# Patient Record
Sex: MALE | Race: WHITE | NOT HISPANIC OR LATINO | Employment: UNEMPLOYED | URBAN - METROPOLITAN AREA
[De-identification: names, ages, dates, MRNs, and addresses within clinical notes are randomized per-mention and may not be internally consistent; named-entity substitution may affect disease eponyms.]

---

## 2018-08-14 ENCOUNTER — OFFICE VISIT (OUTPATIENT)
Dept: NEUROLOGY | Facility: CLINIC | Age: 23
End: 2018-08-14
Payer: COMMERCIAL

## 2018-08-14 VITALS
SYSTOLIC BLOOD PRESSURE: 125 MMHG | WEIGHT: 177.5 LBS | BODY MASS INDEX: 22.78 KG/M2 | HEART RATE: 70 BPM | DIASTOLIC BLOOD PRESSURE: 85 MMHG | HEIGHT: 74 IN

## 2018-08-14 DIAGNOSIS — G40.019 LOCALIZATION-RELATED (FOCAL) (PARTIAL) IDIOPATHIC EPILEPSY AND EPILEPTIC SYNDROMES WITH SEIZURES OF LOCALIZED ONSET, INTRACTABLE, WITHOUT STATUS EPILEPTICUS: Primary | ICD-10-CM

## 2018-08-14 PROCEDURE — 99205 OFFICE O/P NEW HI 60 MIN: CPT | Mod: S$GLB,,, | Performed by: PSYCHIATRY & NEUROLOGY

## 2018-08-14 PROCEDURE — 99999 PR PBB SHADOW E&M-NEW PATIENT-LVL III: CPT | Mod: PBBFAC,,, | Performed by: PSYCHIATRY & NEUROLOGY

## 2018-08-14 RX ORDER — LACOSAMIDE 200 MG/1
200 TABLET ORAL EVERY 12 HOURS
COMMUNITY
End: 2020-05-17

## 2018-08-14 RX ORDER — ESCITALOPRAM OXALATE 20 MG/1
40 TABLET ORAL DAILY
Status: ON HOLD | COMMUNITY
End: 2018-09-20 | Stop reason: HOSPADM

## 2018-08-14 RX ORDER — LAMOTRIGINE 200 MG/1
200 TABLET ORAL 2 TIMES DAILY
COMMUNITY
End: 2018-11-29 | Stop reason: SDUPTHER

## 2018-08-14 NOTE — PROGRESS NOTES
Name: Darin Cunha  MRN: [id number]   CSN: [id number]      Date: 08/14/2018    HISTORY OF PRESENT ILLNESS (HPI)  The patient is a 23 y.o.   The patient was initially referred for consultation by Dr Holland in [place]  The patient was accompanied today by family members who provided some of the history.    Darin Cunha appears with his family today for further evaluation of an ongoing   seizure disorder, which began about five years ago.  Notably, his seizures began   immediately after a wisdom tooth extraction procedure.  He had a confusional   episode about two days postoperatively that in retrospect was likely a focal   seizure and since then has had a total of five generalized tonic-clonic seizures   as well as multiple partial seizures, which are believed to be of temporal lobe   onset.  He has been treated in [place] by Dr. Holland over the years and   has also once seen specialists in [place] at [facility] for consultation.  He   has had outpatient EEGs and brief in-office EEGs that have revealed bilateral   temporal sharp activity and have captured at least one seizure, believed to be a   right temporal lobe onset seizure.    Currently, Darin is maintained on three anticonvulsants and is still having   breakthrough partial seizures frequently up to several times per month.  He   takes Aptiom, Vimpat and Lamictal at this point and has previously failed at   least three other medications.  He had no seizures prior to five years ago and   no early onset.  No early life risk factors for seizures.  He has had minor   bumps on the head, but no losses of consciousness or major concussions.  No   history of febrile seizures or neurological infections.  He is open to the   possibility of surgery as well as other medical therapies today.  He has had an   MRI in [place], which was read normal, but I do not yet have those images.    He has not had a prolonged video EEG monitoring study.      NBB/HN  dd:  08/15/2018 08:49:04 (CDT)  td: 08/15/2018 09:02:20 (CDT)  Doc ID   #[id number]  Job ID #[id number]    CC:         [id number]              Seizure Triggers  Sleep Deprivation - None  Other medications - None  Psych/stress - None  Photic stimulation - None  Hyperventilation - None  Medical Problems - None  Menses - No  Sensory Stimulation (light, sound, etc) - None  Missed dose of meds - None    AED Treatments  Present regimen  Aptiom 800/d  Vimpat 200mg BID  Lamictal 125mg BID    Prior treatments  levetiracetam (Keppra, LEV)  carbamazepine (Tegretol, CBZ)  topiramate (Topamax, TPM)    Not tried  acetazolamide (Diamox, AZM)  amantadine    clobezam (Onfi or Frizium, CLB)  ethosuximide (Zarontin, ESM)  eslicarbazine (Aptiom, ESL)  felbamate (felbatol, FBM)  gabapentin (Neurontin, GBP)  lacosamide (Vimpat, LCM)   lamotrigine (Lamictal, LTG)     methsuximide (Celontin, MSM)  methyphenytoin (Mesantion, MHT)  oxcarbazepine (Trileptal OXC)  perampanel (Fycompa, FCP)   phenobarbital (Pb)  phenytoin (Dilantin, PHT)  pregabalin (Lyrica, PGB)  primidone (Mysoline, PRM)  retigabine (Potiga, RTG)  rufinamide (Banzel, RUF)  tiagabine (Gabatril,  TGB)    viagabatrin, (Sabril, VGB)  vagal nerve stimulator (VNS)  valproic acid (Depakote, VPA)  zonisamide (Zonegran, ZNS)  Benzodiazepines  diazepam - rectal (Diastatl)  diazepam - oral (Valium, DZ)  clonazepam (Klonopin, CZP)  clorazepate (Tranxene, CLZ)  Ativan  Brain Stimulation  Vagal Nerve Stimulation-n/a  DBS- n/a    Compliance method  Memory - yes  Mom or Spouse - Yes  Pill Box - no  Brian calendar - no  Turn over medication bottle - no  Phone alarm - no    Seizure Evaluation  EEG Routine -   EEG Ambulatory -   EEG\Video Monitoring -   MRI/MRA -   CT/CTA Scan -   PET Scan -   Neuropsychological evaluation -   DEXA Scan    Potential Epilepsy Risk Factors:   Pregnancy/Labor/Delivery - full term uncomplicated pregnancy labor and vaginal delivery  Febrile seizures - none  Head injury  - none  CNS  infection - none     Stroke - none  Family Hx of Sz - none    PAST MEDICAL HISTORY:   Active Ambulatory Problems     Diagnosis Date Noted    No Active Ambulatory Problems     Resolved Ambulatory Problems     Diagnosis Date Noted    No Resolved Ambulatory Problems     No Additional Past Medical History        PAST SURGICAL HISTORY: No past surgical history on file.     FAMILY HISTORY: No family history on file.      SOCIAL HISTORY:   Social History     Socioeconomic History    Marital status: Single     Spouse name: Not on file    Number of children: Not on file    Years of education: Not on file    Highest education level: Not on file   Social Needs    Financial resource strain: Not on file    Food insecurity - worry: Not on file    Food insecurity - inability: Not on file    Transportation needs - medical: Not on file    Transportation needs - non-medical: Not on file   Occupational History    Not on file   Tobacco Use    Smoking status: Not on file   Substance and Sexual Activity    Alcohol use: Not on file    Drug use: Not on file    Sexual activity: Not on file   Other Topics Concern    Not on file   Social History Narrative    Not on file        SUBSTANCE USE:  Social History     Tobacco Use    Smoking status: Not on file   Substance and Sexual Activity    Alcohol use: Not on file    Drug use: Not on file    Sexual activity: Not on file      Social History     Tobacco Use    Smoking status: Not on file   Substance Use Topics    Alcohol use: Not on file        ALLERGIES: Patient has no allergy information on record.       Review of Systems   Constitutional: Negative for chills, diaphoresis, fever, malaise/fatigue and weight loss.   HENT: Negative for ear pain, hearing loss, nosebleeds and tinnitus.    Eyes: Negative for blurred vision, double vision, photophobia and pain.   Respiratory: Negative for cough, hemoptysis and shortness of breath.    Cardiovascular: Negative for chest pain,  palpitations, orthopnea and leg swelling.   Gastrointestinal: Negative for abdominal pain, blood in stool, constipation, diarrhea, heartburn, nausea and vomiting.   Genitourinary: Negative for dysuria and hematuria.   Musculoskeletal: Negative for falls, joint pain and myalgias.   Skin: Negative for itching and rash.   Neurological: Negative for dizziness, tingling, tremors, sensory change, speech change, focal weakness, loss of consciousness, weakness and headaches.   Endo/Heme/Allergies: Negative for environmental allergies. Does not bruise/bleed easily.   Psychiatric/Behavioral: Negative for depression, hallucinations, memory loss and substance abuse. The patient is not nervous/anxious and does not have insomnia.          PHYSICAL EXAM:    There were no vitals taken for this visit.      Neurologic Exam      Higher Cortical Function:    Patient is a well developed, pleasant, well groomed individual appearing their stated age  Oriented - intact to person, place and time    Fund of knowledge was appropriate.    Language - Speech was fluent without evidence for an aphasia.  R-L Orientation - Intact   Agnosias, agraphesthesia, or astereognosis - not present.   Cranial Nerves II - XII:    EOMs were intact with normal smooth and no nystagmus.    PERRLA. Funduscopic exam - disc were flat with normal A/V ratio and no exudates or hemorrhages.   Visual fields were full to confrontation.    Motor - facial movement was symmetrical and normal.    Facial sensory - Light touch and pin prick sensations were normal.    Hearing was normal.  Palate moved well and was symmetrical    Tongue movement was full & the patient could speak without difficulty.  Motor: Power, bulk and tone were normal in all extremities.  Coordination:  Normal  Gait:  Normal  Tremor: resting, postural, intentional - none  Cardiovascular:  No edema  Skin: No rash      I spent 90 minutes with the patient, of which 50 minutes was spent in direct face to face  counseling in matters related to epilepsy, related safety issues, and antiepileptic drug therapy.         IMPRESSION  The patient appears today for evaluation of ongoing intractable focal epilepsy.    He is maintained on a three-drug regimen at this point with only partial   benefit.  He has not had video EEG monitoring stay, although he was seen once in   [place] where that was recommended.  By report, he has had at least one right   temporal lobe seizure recorded, but seems to have bilateral interictal activity   and a normal MRI.    At this point, I believe the best course of action will be to admit Darin for a   prolonged EMU stay to attempt to capture multiple seizures and determine the   full extent of his seizure burden and interictal burden and see if his seizures   are truly unifocal and localizable.  We had a long discussion today about the   possibility of resective epilepsy surgery as well as other possibilities such as   responsive neuro stimulation and the vagal nerve stimulator as well as other   medications that could be tried.  He is really interested in a definitive   treatment if possible, meaning hopefully resection.  I explained to him the   process and the need for the recording of as many seizures as possible as well   as other ancillary tests to determine his candidacy for that.    Today, we will begin by obtaining baseline drug levels and not make any changes   to his regimen today pending the video EEG results.  I will also attempt to   obtain hard copies of his prior MRI and EEG studies.  At this point, he is   reminded of seizure precautions and his restriction from driving at this point,   which he understands well.  I will see him back after the EMU stay.      ABIODUN  dd: 08/15/2018 08:52:12 (CDT)  td: 08/15/2018 09:09:04 (CDT)  Doc ID   #[id number]  Job ID #[id number]    CC:         [id number]

## 2018-09-05 ENCOUNTER — TELEPHONE (OUTPATIENT)
Dept: NEUROLOGY | Facility: CLINIC | Age: 23
End: 2018-09-05

## 2018-09-05 NOTE — TELEPHONE ENCOUNTER
Patient called to see if insurance for emu visit was approved. Informed him of approval. Pt instructed to call his insurance for copay information.

## 2018-09-10 ENCOUNTER — TELEPHONE (OUTPATIENT)
Dept: NEUROLOGY | Facility: CLINIC | Age: 23
End: 2018-09-10

## 2018-09-12 ENCOUNTER — HOSPITAL ENCOUNTER (INPATIENT)
Facility: HOSPITAL | Age: 23
LOS: 8 days | Discharge: HOME | DRG: 101 | End: 2018-09-20
Attending: PSYCHIATRY & NEUROLOGY | Admitting: PSYCHIATRY & NEUROLOGY
Payer: COMMERCIAL

## 2018-09-12 ENCOUNTER — HOSPITAL ENCOUNTER (EMERGENCY)
Facility: HOSPITAL | Age: 23
Discharge: HOME | End: 2018-09-12
Attending: EMERGENCY MEDICINE
Payer: COMMERCIAL

## 2018-09-12 VITALS
DIASTOLIC BLOOD PRESSURE: 87 MMHG | HEART RATE: 96 BPM | TEMPERATURE: 98 F | OXYGEN SATURATION: 95 % | SYSTOLIC BLOOD PRESSURE: 147 MMHG | RESPIRATION RATE: 18 BRPM

## 2018-09-12 DIAGNOSIS — R56.9 SEIZURES: Primary | ICD-10-CM

## 2018-09-12 DIAGNOSIS — G40.219 COMPLEX PARTIAL EPILEPSY WITH GENERALIZATION AND WITH INTRACTABLE EPILEPSY: ICD-10-CM

## 2018-09-12 DIAGNOSIS — G40.919 BREAKTHROUGH SEIZURE: Primary | ICD-10-CM

## 2018-09-12 PROBLEM — F32.A DEPRESSED: Chronic | Status: ACTIVE | Noted: 2018-09-12

## 2018-09-12 LAB
ALBUMIN SERPL BCP-MCNC: 4.3 G/DL
ALP SERPL-CCNC: 101 U/L
ALT SERPL W/O P-5'-P-CCNC: 51 U/L
ANION GAP SERPL CALC-SCNC: 9 MMOL/L
AST SERPL-CCNC: 37 U/L
BASOPHILS # BLD AUTO: 0.04 K/UL
BASOPHILS NFR BLD: 0.3 %
BILIRUB SERPL-MCNC: 0.6 MG/DL
BILIRUB UR QL STRIP: NEGATIVE
BUN SERPL-MCNC: 15 MG/DL
CALCIUM SERPL-MCNC: 9.4 MG/DL
CHLORIDE SERPL-SCNC: 104 MMOL/L
CLARITY UR REFRACT.AUTO: CLEAR
CO2 SERPL-SCNC: 26 MMOL/L
COLOR UR AUTO: YELLOW
CREAT SERPL-MCNC: 0.7 MG/DL
DIFFERENTIAL METHOD: ABNORMAL
EOSINOPHIL # BLD AUTO: 0 K/UL
EOSINOPHIL NFR BLD: 0.2 %
ERYTHROCYTE [DISTWIDTH] IN BLOOD BY AUTOMATED COUNT: 12.6 %
EST. GFR  (AFRICAN AMERICAN): >60 ML/MIN/1.73 M^2
EST. GFR  (NON AFRICAN AMERICAN): >60 ML/MIN/1.73 M^2
GLUCOSE SERPL-MCNC: 85 MG/DL
GLUCOSE UR QL STRIP: NEGATIVE
HCT VFR BLD AUTO: 42.5 %
HGB BLD-MCNC: 14.9 G/DL
HGB UR QL STRIP: NEGATIVE
IMM GRANULOCYTES # BLD AUTO: 0.07 K/UL
IMM GRANULOCYTES NFR BLD AUTO: 0.6 %
KETONES UR QL STRIP: ABNORMAL
LEUKOCYTE ESTERASE UR QL STRIP: NEGATIVE
LYMPHOCYTES # BLD AUTO: 1.1 K/UL
LYMPHOCYTES NFR BLD: 8.9 %
MCH RBC QN AUTO: 30.6 PG
MCHC RBC AUTO-ENTMCNC: 35.1 G/DL
MCV RBC AUTO: 87 FL
MONOCYTES # BLD AUTO: 0.6 K/UL
MONOCYTES NFR BLD: 4.8 %
NEUTROPHILS # BLD AUTO: 10.3 K/UL
NEUTROPHILS NFR BLD: 85.2 %
NITRITE UR QL STRIP: NEGATIVE
NRBC BLD-RTO: 0 /100 WBC
PH UR STRIP: 5 [PH] (ref 5–8)
PLATELET # BLD AUTO: 251 K/UL
PMV BLD AUTO: 9.9 FL
POTASSIUM SERPL-SCNC: 3.8 MMOL/L
PROT SERPL-MCNC: 7 G/DL
PROT UR QL STRIP: NEGATIVE
RBC # BLD AUTO: 4.87 M/UL
SODIUM SERPL-SCNC: 139 MMOL/L
SP GR UR STRIP: >=1.03 (ref 1–1.03)
URN SPEC COLLECT METH UR: ABNORMAL
UROBILINOGEN UR STRIP-ACNC: NEGATIVE EU/DL
WBC # BLD AUTO: 12.09 K/UL

## 2018-09-12 PROCEDURE — 80299 QUANTITATIVE ASSAY DRUG: CPT

## 2018-09-12 PROCEDURE — 99283 EMERGENCY DEPT VISIT LOW MDM: CPT

## 2018-09-12 PROCEDURE — 95951 PR EEG MONITORING/VIDEORECORD: CPT | Mod: 26,,, | Performed by: PSYCHIATRY & NEUROLOGY

## 2018-09-12 PROCEDURE — 81003 URINALYSIS AUTO W/O SCOPE: CPT

## 2018-09-12 PROCEDURE — 25000003 PHARM REV CODE 250: Performed by: STUDENT IN AN ORGANIZED HEALTH CARE EDUCATION/TRAINING PROGRAM

## 2018-09-12 PROCEDURE — 25000003 PHARM REV CODE 250: Performed by: PSYCHIATRY & NEUROLOGY

## 2018-09-12 PROCEDURE — 85025 COMPLETE CBC W/AUTO DIFF WBC: CPT

## 2018-09-12 PROCEDURE — 99223 1ST HOSP IP/OBS HIGH 75: CPT | Mod: ,,, | Performed by: PSYCHIATRY & NEUROLOGY

## 2018-09-12 PROCEDURE — 80299 QUANTITATIVE ASSAY DRUG: CPT | Mod: 91

## 2018-09-12 PROCEDURE — 80175 DRUG SCREEN QUAN LAMOTRIGINE: CPT

## 2018-09-12 PROCEDURE — 95951 HC EEG MONITORING/VIDEO RECORD: CPT

## 2018-09-12 PROCEDURE — 80053 COMPREHEN METABOLIC PANEL: CPT

## 2018-09-12 PROCEDURE — 20600001 HC STEP DOWN PRIVATE ROOM

## 2018-09-12 PROCEDURE — 99283 EMERGENCY DEPT VISIT LOW MDM: CPT | Mod: ,,, | Performed by: EMERGENCY MEDICINE

## 2018-09-12 PROCEDURE — 36415 COLL VENOUS BLD VENIPUNCTURE: CPT

## 2018-09-12 RX ORDER — ACETAMINOPHEN 325 MG/1
650 TABLET ORAL ONCE
Status: COMPLETED | OUTPATIENT
Start: 2018-09-12 | End: 2018-09-12

## 2018-09-12 RX ORDER — DOCUSATE SODIUM 100 MG/1
100 CAPSULE, LIQUID FILLED ORAL 2 TIMES DAILY PRN
Status: DISCONTINUED | OUTPATIENT
Start: 2018-09-12 | End: 2018-09-20 | Stop reason: HOSPADM

## 2018-09-12 RX ORDER — LAMOTRIGINE 100 MG/1
200 TABLET ORAL 2 TIMES DAILY
Status: DISCONTINUED | OUTPATIENT
Start: 2018-09-12 | End: 2018-09-14

## 2018-09-12 RX ORDER — ACETAMINOPHEN 325 MG/1
650 TABLET ORAL EVERY 4 HOURS PRN
Status: DISCONTINUED | OUTPATIENT
Start: 2018-09-12 | End: 2018-09-20 | Stop reason: HOSPADM

## 2018-09-12 RX ORDER — DIAZEPAM 5 MG/ML
1 INJECTION, SOLUTION INTRAMUSCULAR; INTRAVENOUS EVERY 5 MIN PRN
Status: DISCONTINUED | OUTPATIENT
Start: 2018-09-12 | End: 2018-09-20 | Stop reason: HOSPADM

## 2018-09-12 RX ORDER — SODIUM CHLORIDE 0.9 % (FLUSH) 0.9 %
3 SYRINGE (ML) INJECTION
Status: DISCONTINUED | OUTPATIENT
Start: 2018-09-12 | End: 2018-09-20 | Stop reason: HOSPADM

## 2018-09-12 RX ORDER — PROCHLORPERAZINE EDISYLATE 5 MG/ML
5 INJECTION INTRAMUSCULAR; INTRAVENOUS EVERY 6 HOURS PRN
Status: DISCONTINUED | OUTPATIENT
Start: 2018-09-12 | End: 2018-09-20 | Stop reason: HOSPADM

## 2018-09-12 RX ORDER — CITALOPRAM 10 MG/1
40 TABLET ORAL DAILY
Status: DISCONTINUED | OUTPATIENT
Start: 2018-09-13 | End: 2018-09-20 | Stop reason: HOSPADM

## 2018-09-12 RX ORDER — PROMETHAZINE HYDROCHLORIDE 12.5 MG/1
25 TABLET ORAL EVERY 6 HOURS PRN
Status: DISCONTINUED | OUTPATIENT
Start: 2018-09-12 | End: 2018-09-20 | Stop reason: HOSPADM

## 2018-09-12 RX ADMIN — LAMOTRIGINE 200 MG: 100 TABLET ORAL at 08:09

## 2018-09-12 RX ADMIN — ACETAMINOPHEN 650 MG: 325 TABLET, FILM COATED ORAL at 05:09

## 2018-09-12 NOTE — ED NOTES
Pt is asleep and arouses to voice. Respirations are spontaneous with normal rate and effort. Side rails up x 2 and bed in low position. Mom at bedside.

## 2018-09-12 NOTE — ED PROVIDER NOTES
Encounter Date: 9/12/2018       History     Chief Complaint   Patient presents with    Seizures     Pt arrived at [facility] for a scheduled EEG and had 2 seizures while waiting. Pt is awake, alert and confused.      Source of history:  Patient, mother    The patient presents following a witnessed seizure that occurred while here in the hospital awaiting admission to the EMU for video EEG monitoring.  The patient had a seizure in a vehicle on the way to the hospital that lasted approximately 2 min.  He had a 15 min postictal state with drowsiness and confusion following this seizure.  Then, after spending approximately 4 hr waiting for admission to the EMU he had a no other generalized seizure.  It is unclear how long this lasted.  Since secured at the hospital, a code blue was called and the patient was brought to the ED.  The patient had no prodrome prior to either the seizures.  He is not currently postictal.  He has no complaints of headache, lightheadedness, dizziness, changes in his vision, chest pain, palpitations, abdominal pain, nausea and vomiting.          Review of patient's allergies indicates:   Allergen Reactions    Zofran [ondansetron hcl (pf)]      Past Medical History:   Diagnosis Date    Epilepsy 2015     No past surgical history on file.  No family history on file.  Social History     Tobacco Use    Smoking status: Not on file   Substance Use Topics    Alcohol use: Not on file    Drug use: Not on file     Review of Systems   Constitutional: Negative for chills, diaphoresis, fatigue and fever.   Eyes: Negative for visual disturbance.   Respiratory: Negative for cough and shortness of breath.    Cardiovascular: Negative for chest pain and palpitations.   Gastrointestinal: Negative for abdominal pain, nausea and vomiting.   Genitourinary: Negative for dysuria and frequency.   Musculoskeletal: Negative for arthralgias and myalgias.   Skin: Negative for rash.   Neurological: Positive for seizures.  Negative for dizziness, syncope, light-headedness and headaches.       Physical Exam     Initial Vitals [09/12/18 1603]   BP Pulse Resp Temp SpO2   (!) 147/87 96 18 98.2 °F (36.8 °C) 95 %      MAP       --         Physical Exam    Constitutional: He appears well-developed and well-nourished. He is not diaphoretic.  Non-toxic appearance. No distress.   HENT:   Head: Normocephalic and atraumatic.   Mouth/Throat: Oropharynx is clear and moist. No oropharyngeal exudate.   Eyes: Conjunctivae and EOM are normal. Pupils are equal, round, and reactive to light. No scleral icterus.   Neck: Normal range of motion. Neck supple. No JVD present.   Cardiovascular: Normal rate, regular rhythm and normal heart sounds. Exam reveals no gallop and no friction rub.    No murmur heard.  Pulses:       Radial pulses are 2+ on the right side, and 2+ on the left side.        Dorsalis pedis pulses are 2+ on the right side, and 2+ on the left side.   No peripheral edema.   Pulmonary/Chest: Breath sounds normal. No stridor. No respiratory distress. He has no decreased breath sounds. He has no wheezes. He has no rhonchi. He has no rales. He exhibits no tenderness.   Abdominal: Soft. Bowel sounds are normal. He exhibits no distension and no mass. There is no tenderness.   Musculoskeletal: Normal range of motion. He exhibits no edema or tenderness.   Lymphadenopathy:     He has no cervical adenopathy.   Neurological: He is alert and oriented to person, place, and time. He has normal strength. No cranial nerve deficit or sensory deficit. GCS eye subscore is 4. GCS verbal subscore is 5. GCS motor subscore is 6.   Skin: Skin is warm and dry. No pallor.         ED Course   Procedures  Labs Reviewed - No data to display       Imaging Results    None          Medical Decision Making:   History:   Old Medical Records: I decided to obtain old medical records.  ED Management:  1755 - discussed the patient's presentation and exam with Dr. Do (epilepsy  service attending). No further ED evaluation is felt to be needed. The patient can be discharged from the ED and taken immediately to EMU for planned continuous video EEG.                      Clinical Impression:   The encounter diagnosis was Breakthrough seizure.      Disposition:   Disposition: Discharged  Condition: Stable                        Filippo Nunez III, MD  09/12/18 4001

## 2018-09-12 NOTE — ED NOTES
Pt's first and last name and birthday confirmed.   LOC: The patient is awake, alert and aware of environment with an appropriate affect, the patient is oriented x 3 and speaking appropriately.  APPEARANCE: Patient resting comfortably and in no acute distress, patient is clean and well groomed. Pt c/o headache.   SKIN: The skin is warm and dry, patient has normal skin turgor and moist mucus membranes, skin intact, no breakdown or brusing noted.  MUSKULOSKELETAL: Patient moving all extremities well, no obvious swelling or deformities noted.  RESPIRATORY: Airway is open and patent, respirations are spontaneous, patient has a normal effort and rate. Breath sounds are clear and equal bilaterally.  CARDIAC: Normal heart sounds. No peripheral edema.  ABDOMEN: Soft and non tender to palpation, no distention noted. Bowel sounds present. Pt reports nausea.   NEURO: No neuro deficits, hand grasp equal, no drift noted, no facial droop noted. Speech is clear. Pupils are 5 mm equal and reactive to light.

## 2018-09-12 NOTE — NURSING
Pt. Arrived to unit, via stretcher, AA&Ox4, nad noted, vitals stable, and denies pain.   Oxygen, suctioning, side rail padding, and all emergency equipment at bedside.  EMU MD at bedside assessing pt. Pt.'s mother and wife at bedside. Will cont. To monitor pt.

## 2018-09-12 NOTE — ED TRIAGE NOTES
Pt arrived at [facility] for a 10 hour EEG and had 2 seizures in the lobby. Pt was brought to the ED for further evaluation.

## 2018-09-13 PROBLEM — R45.89 DEPRESSED MOOD: Status: ACTIVE | Noted: 2018-09-12

## 2018-09-13 PROCEDURE — 95951 PR EEG MONITORING/VIDEORECORD: CPT | Mod: 26,,, | Performed by: PSYCHIATRY & NEUROLOGY

## 2018-09-13 PROCEDURE — 20600001 HC STEP DOWN PRIVATE ROOM

## 2018-09-13 PROCEDURE — 25000003 PHARM REV CODE 250: Performed by: STUDENT IN AN ORGANIZED HEALTH CARE EDUCATION/TRAINING PROGRAM

## 2018-09-13 PROCEDURE — 99233 SBSQ HOSP IP/OBS HIGH 50: CPT | Mod: ,,, | Performed by: PSYCHIATRY & NEUROLOGY

## 2018-09-13 PROCEDURE — 95951 HC EEG MONITORING/VIDEO RECORD: CPT

## 2018-09-13 RX ADMIN — CITALOPRAM HYDROBROMIDE 40 MG: 10 TABLET ORAL at 09:09

## 2018-09-13 RX ADMIN — ACETAMINOPHEN 650 MG: 325 TABLET ORAL at 08:09

## 2018-09-13 RX ADMIN — LAMOTRIGINE 200 MG: 100 TABLET ORAL at 09:09

## 2018-09-13 RX ADMIN — LAMOTRIGINE 200 MG: 100 TABLET ORAL at 08:09

## 2018-09-13 NOTE — HPI
"Mr. Cunha is a 24 yo M with epilepsy who presents for EMU characterization of events, AED titration, and possible pre-surgical workup.  His events started 5 years ago, right after a wisdom tooth extraction.  He has two events types.  He has no warning prior to either event.  With the first event type, he has lip smacking, repetitive L>R hand movements, and a blank stare.  This lasts less than 2 minutes, and occurs a few times a month.  With the second event type, his mouth draws to one side, he has eye rolling, generalized stiffening, and then a full body convulsion.  No BB incontinence or tongue or cheek biting.  These events occur approx 5x/MONTH, last about 3-4minutes at a time, and are followed by approx 15 min confusion and fatigue, and then a severe HA and nausea.  [facility] EEG reportedly showed bilateral temporal sharps, with one "seizure" originating from the R temporal lobe.  MRI Brain at OSH reportedly wnl.  He is currently on Aptiom 800mg daily, Vimpat 200mg bid, and Lamictal 200mg bid.  He last took his AEDs yesterday am.  He has had two GTCs today: one in the car on the way to [facility], and one in the admitting office.  He was taken to the ED for evaluation after the GTC in Admitting, and now is on the neuro floor.    Per Dr. Gallegos's Note on 8/23/18:  The patient is a 23 y.o.   The patient was initially referred for consultation by Dr Holland in [place]  The patient was accompanied today by family members who provided some of the history.     Darin Cunha appears with his family today for further evaluation of an ongoing   seizure disorder, which began about five years ago.  Notably, his seizures began   immediately after a wisdom tooth extraction procedure.  He had a confusional   episode about two days postoperatively that in retrospect was likely a focal   seizure and since then has had a total of five generalized tonic-clonic seizures   as well as multiple partial seizures, which are believed to be of " temporal lobe   onset.  He has been treated in [place] by Dr. Holland over the years and   has also once seen specialists in [place] at [facility] for consultation.  He   has had outpatient EEGs and brief in-office EEGs that have revealed bilateral   temporal sharp activity and have captured at least one seizure, believed to be a   right temporal lobe onset seizure.     Currently, Darin is maintained on three anticonvulsants and is still having   breakthrough partial seizures frequently up to several times per month.  He   takes Aptiom, Vimpat and Lamictal at this point and has previously failed at   least three other medications.  He had no seizures prior to five years ago and   no early onset.  No early life risk factors for seizures.  He has had minor   bumps on the head, but no losses of consciousness or major concussions.  No   history of febrile seizures or neurological infections.  He is open to the   possibility of surgery as well as other medical therapies today.  He has had an   MRI in [place], which was read normal, but I do not yet have those images.    He has not had a prolonged video EEG monitoring study.        ANGY/MARYAM  dd: 08/15/2018 08:49:04 (CDT)  td: 08/15/2018 09:02:20 (CDT)  Doc ID   #[id number]  Job ID #[id number]     CC:            [id number]                    Seizure Triggers  Sleep Deprivation - None  Other medications - None  Psych/stress - None  Photic stimulation - None  Hyperventilation - None  Medical Problems - None  Menses - No  Sensory Stimulation (light, sound, etc) - None  Missed dose of meds - None     AED Treatments  Present regimen  Aptiom 800/d  Vimpat 200mg BID  Lamictal 125mg BID     Prior treatments  levetiracetam (Keppra, LEV)  carbamazepine (Tegretol, CBZ)  topiramate (Topamax, TPM)     Not tried  acetazolamide (Diamox, AZM)  amantadine     clobezam (Onfi or Frizium, CLB)  ethosuximide (Zarontin, ESM)  eslicarbazine (Aptiom, ESL)  felbamate (felbatol, FBM)  gabapentin  (Neurontin, GBP)  lacosamide (Vimpat, LCM)   lamotrigine (Lamictal, LTG)      methsuximide (Celontin, MSM)  methyphenytoin (Mesantion, MHT)  oxcarbazepine (Trileptal OXC)  perampanel (Fycompa, FCP)   phenobarbital (Pb)  phenytoin (Dilantin, PHT)  pregabalin (Lyrica, PGB)  primidone (Mysoline, PRM)  retigabine (Potiga, RTG)  rufinamide (Banzel, RUF)  tiagabine (Gabatril,  TGB)     viagabatrin, (Sabril, VGB)  vagal nerve stimulator (VNS)  valproic acid (Depakote, VPA)  zonisamide (Zonegran, ZNS)  Benzodiazepines  diazepam - rectal (Diastatl)  diazepam - oral (Valium, DZ)  clonazepam (Klonopin, CZP)  clorazepate (Tranxene, CLZ)  Ativan  Brain Stimulation  Vagal Nerve Stimulation-n/a  DBS- n/a     Compliance method  Memory - yes  Mom or Spouse - Yes  Pill Box - no  Brian calendar - no  Turn over medication bottle - no  Phone alarm - no     Seizure Evaluation  EEG Routine -   EEG Ambulatory -   EEG\Video Monitoring -   MRI/MRA -   CT/CTA Scan -   PET Scan -   Neuropsychological evaluation -   DEXA Scan     Potential Epilepsy Risk Factors:   Pregnancy/Labor/Delivery - full term uncomplicated pregnancy labor and vaginal delivery  Febrile seizures - none  Head injury  - none  CNS infection - none     Stroke - none  Family Hx of Sz - none

## 2018-09-13 NOTE — PLAN OF CARE
Problem: Seizure Disorder/Epilepsy (Adult)  Intervention: Prevent Seizure-related Injury  Side rails padded. HOB elevated 45 degrees. EEG in progress. Two family members at bedside. Instructed not to get OOB without assistance of staff. States understanding.

## 2018-09-13 NOTE — HOSPITAL COURSE
9/12/18:  Admitted to EMU (on the neuro floor, hooked up to a cart).  9/12-13:  One clinical/electrographic seizure at 5:52am, with L temporal origination; clinically, patient had lip smacking and LUE repetitive hand movements.  In addition, bilateral independent epileptiform DCs seen, as well as spike wave DCs and intermittent L>R interictal slowing.  9/13-9/14:  One clinical/electrographic seizure around 4pm, with suspected L temporal origination; clinically, patient had lip smacking and LUE repetitive hand movements.  In addition, bilateral independent epileptiform DCs seen, as well as spike wave DCs and intermittent L>R interictal slowing.  Lamictal dose decreased from 200mg bid to 100mg bid.  9/14-15: No seizures.  Intermittent slowing and epileptiform transients occurring independently in a temporal distribution on either side as discussed previously.  9/15-17: No seizures.  Intermittent slowing and epileptiform transients occurring independently in a temporal distribution on either side as discussed previously.  9/17-18:  Multiple subclinical, focal, L hemispheric seizures, with intermittent biparietal bursts.  No clinical events.  9/18-19:  Multiple complex partial seizures + 1GTC OVN.  Home AEDs restarted: Vimpat 200mg bid; Lamictal restarted at 100mg bid x2 doses, to be subsequently increased to home dose of 200mg bid; Aptiom increased to 1200mg daily from 800mg daily  9/19-20:  No clinical or electrographic events.

## 2018-09-13 NOTE — PROGRESS NOTES
[facility]  Neurology  Progress Note    Patient Name: Darin Cunha  MRN: [id number]  Admission Date: 9/12/2018  Hospital Length of Stay: 1 days  Code Status: Full Code   Attending Provider: Abhijeet Do MD  Primary Care Physician: Primary Doctor No   Principal Problem:Complex partial epilepsy with generalization and with intractable epilepsy      Subjective:     Interval History: One clinical/electrographic seizure at 5:52am, with L temporal origination; clinically, patient had lip smacking and LUE repetitive hand movements.  In addition, bilateral independent epileptiform DCs seen, as well as spike wave DCs and intermittent L>R interictal slowing.     Current Neurological Medications: Lamictal, Celexa    Current Facility-Administered Medications   Medication Dose Route Frequency Provider Last Rate Last Dose    acetaminophen tablet 650 mg  650 mg Oral Q4H PRN Genny VYessica Modi MD        citalopram tablet 40 mg  40 mg Oral Daily Genny Modi MD   40 mg at 09/13/18 0912    diazePAM injection 1 mg  1 mg Intravenous Q5 Min PRN Genny VYessica Modi MD        docusate sodium capsule 100 mg  100 mg Oral BID PRN Genny VYessica Modi MD        lamoTRIgine tablet 200 mg  200 mg Oral BID Genny Modi MD   200 mg at 09/13/18 0912    prochlorperazine injection Soln 5 mg  5 mg Intravenous Q6H PRN Genny VYessica Modi MD        promethazine tablet 25 mg  25 mg Oral Q6H PRN Genny VYessica Modi MD        sodium chloride 0.9% flush 3 mL  3 mL Intravenous PRN Genny Modi MD           Review of Systems   Eyes: Negative for visual disturbance.   Respiratory: Negative for cough and shortness of breath.    Cardiovascular: Negative for chest pain and palpitations.   Gastrointestinal: Positive for nausea (s/p GTCs). Negative for abdominal pain, constipation, diarrhea and vomiting.   Genitourinary: Negative for dysuria, frequency and urgency.   Neurological: Positive for seizures and headaches  (s/p GTCs).   Psychiatric/Behavioral: Negative for agitation and confusion.     Objective:     Vital Signs (Most Recent):  Temp: 97.6 °F (36.4 °C) (09/13/18 1103)  Pulse: 60 (09/13/18 1124)  Resp: 18 (09/13/18 1103)  BP: 118/73 (09/13/18 1103)  SpO2: 96 % (09/13/18 1103) Vital Signs (24h Range):  Temp:  [97.6 °F (36.4 °C)-98.7 °F (37.1 °C)] 97.6 °F (36.4 °C)  Pulse:  [59-96] 60  Resp:  [15-18] 18  SpO2:  [95 %-99 %] 96 %  BP: (118-147)/(56-87) 118/73     Weight: 78 kg (172 lb)  Body mass index is 22.69 kg/m².    Physical Exam   Constitutional: He is oriented to person, place, and time. He appears well-developed and well-nourished. No distress.   HENT:   Head: Normocephalic and atraumatic.   Eyes: EOM are normal.   Cardiovascular: Normal rate.   Pulmonary/Chest: Effort normal.   Musculoskeletal: Normal range of motion.   Neurological: He is alert and oriented to person, place, and time. He has a normal Finger-Nose-Finger Test and a normal Heel to Shin Test.   Reflex Scores:       Bicep reflexes are 2+ on the right side and 2+ on the left side.       Brachioradialis reflexes are 2+ on the right side and 2+ on the left side.       Patellar reflexes are 2+ on the right side and 2+ on the left side.  Skin: Skin is warm and dry. He is not diaphoretic.   Psychiatric: He has a normal mood and affect. His speech is normal and behavior is normal. Judgment and thought content normal.   Nursing note and vitals reviewed.      NEUROLOGICAL EXAMINATION:     MENTAL STATUS   Oriented to person, place, and time.   Attention: normal. Concentration: normal.   Speech: speech is normal   Level of consciousness: alert    CRANIAL NERVES     CN II   Visual fields full to confrontation.     CN III, IV, VI   Extraocular motions are normal.     CN V   Facial sensation intact.     CN VII   Facial expression full, symmetric.     CN VIII   Hearing: intact    CN XII   Tongue: not atrophic  Fasciculations: absent  Tongue deviation: none    MOTOR  EXAM   Muscle bulk: normal  Overall muscle tone: normal    Strength   Right biceps: 5/5  Left biceps: 5/5  Right triceps: 5/5  Left triceps: 5/5  Left quadriceps: 5/5  Left hamstrin/5       4+/5 with RLE knee flexion and extension     REFLEXES     Reflexes   Right brachioradialis: 2+  Left brachioradialis: 2+  Right biceps: 2+  Left biceps: 2+  Right patellar: 2+  Left patellar: 2+    SENSORY EXAM   Light touch normal.     GAIT AND COORDINATION      Coordination   Finger to nose coordination: normal  Heel to shin coordination: normal    Tremor   Resting tremor: absent      Significant Labs:   Recent Lab Results       18  2100 18  1930      Immature Granulocytes  0.6     Immature Grans (Abs)  0.07  Comment:  Mild elevation in immature granulocytes is non specific and   can be seen in a variety of conditions including stress response,   acute inflammation, trauma and pregnancy. Correlation with other   laboratory and clinical findings is essential.       Albumin  4.3     Alkaline Phosphatase  101     ALT  51     Anion Gap  9     Appearance, UA Clear      AST  37     Baso #  0.04     Basophil%  0.3     Bilirubin (UA) Negative      Total Bilirubin  0.6  Comment:  For infants and newborns, interpretation of results should be based  on gestational age, weight and in agreement with clinical  observations.  Premature Infant recommended reference ranges:  Up to 24 hours.............<8.0 mg/dL  Up to 48 hours............<12.0 mg/dL  3-5 days..................<15.0 mg/dL  6-29 days.................<15.0 mg/dL       BUN, Bld  15     Calcium  9.4     Chloride  104     CO2  26     Color, UA Yellow      Creatinine  0.7     Differential Method  Automated     eGFR if African American  >60.0     eGFR if non   >60.0  Comment:  Calculation used to obtain the estimated glomerular filtration  rate (eGFR) is the CKD-EPI equation.        Eos #  0.0     Eosinophil%  0.2     Glucose  85     Glucose, UA  Negative      Gran # (ANC)  10.3     Gran%  85.2     Hematocrit  42.5     Hemoglobin  14.9     Ketones, UA 1+      Leukocytes, UA Negative      Lymph #  1.1     Lymph%  8.9     MCH  30.6     MCHC  35.1     MCV  87     Mono #  0.6     Mono%  4.8     MPV  9.9     Nitrite, UA Negative      nRBC  0     Occult Blood UA Negative      pH, UA 5.0      Platelets  251     Potassium  3.8     Total Protein  7.0     Protein, UA Negative  Comment:  Recommend a 24 hour urine protein or a urine   protein/creatinine ratio if globulin induced proteinuria is  clinically suspected.        RBC  4.87     RDW  12.6     Sodium  139     Specific Gravity, UA >=1.030      Specimen UA Urine, Clean Catch      Urobilinogen, UA Negative      WBC  12.09           Significant Imaging: I have reviewed and interpreted all pertinent imaging results/findings within the past 24 hours.    Assessment and Plan:     * Complex partial epilepsy with generalization and with intractable epilepsy    1 complex partial seizure, of L temporal origin, noted 9/13 at approx 5:50am.    vEEG  PS/HV   Hold Aptiom 800mg daily and Vimpat 200mg bid.  Continue Lamictal 200mg bid.  Seizure precautions  Valium 1mg q5min prn x2 doses ordered for events >/=5 min.  Please call Epilepsy if dose needed.        Depressed mood    Continue Celexa 40mg daily            VTE Risk Mitigation (From admission, onward)        Ordered     IP VTE LOW RISK PATIENT  Once      09/12/18 1914          Genny Modi MD  Neurology  [facility]

## 2018-09-13 NOTE — PROGRESS NOTES
"Called to room by family member for c/o "smacking mouth" X 30 seconds. Pt awake, alert and oriented X 4, but is unable to recall three spoken words. VSS. Notified EMU team.   "

## 2018-09-13 NOTE — H&P
"[facility]  Neurology  History & Physical    Patient Name: Darin Cunha  MRN: [id number]   Admission Date: 9/12/2018  Code Status: Full Code   Attending Provider: Abhijeet Do MD   Primary Care Physician: Primary Doctor No  Principal Problem:Complex partial epilepsy with generalization and with intractable epilepsy    Subjective:     Chief Complaint:  EMU characterization of events and AED  titration     HPI:   Mr. Cunha is a 22 yo M with epilepsy who presents for EMU characterization of events, AED titration, and possible pre-surgical workup.  His events started 5 years ago, right after a wisdom tooth extraction.  He has two events types.  He has no warning prior to either event.  With the first event type, he has lip smacking, repetitive L>R hand movements, and a blank stare.  This lasts less than 2 minutes, and occurs a few times a month.  With the second event type, his mouth draws to one side, he has eye rolling, generalized stiffening, and then a full body convulsion.  No BB incontinence or tongue or cheek biting.  These events occur approx 5x/MONTH, last about 3-4minutes at a time, and are followed by approx 15 min confusion and fatigue, and then a severe HA and nausea.  [facility] EEG reportedly showed bilateral temporal sharps, with one "seizure" originating from the R temporal lobe.  MRI Brain at OSH reportedly wnl.  He is currently on Aptiom 800mg daily, Vimpat 200mg bid, and Lamictal 200mg bid.  He last took his AEDs yesterday am.  He has had two GTCs today: one in the car on the way to [facility], and one in the admitting office.  He was taken to the ED for evaluation after the GTC in Admitting, and now is on the neuro floor.    Per Dr. Gallegos's Note on 8/23/18:  The patient is a 23 y.o.   The patient was initially referred for consultation by Dr Holland in [place]  The patient was accompanied today by family members who provided some of the history.     Darin Cunha appears with his family " today for further evaluation of an ongoing   seizure disorder, which began about five years ago.  Notably, his seizures began   immediately after a wisdom tooth extraction procedure.  He had a confusional   episode about two days postoperatively that in retrospect was likely a focal   seizure and since then has had a total of five generalized tonic-clonic seizures   as well as multiple partial seizures, which are believed to be of temporal lobe   onset.  He has been treated in [place] by Dr. Holland over the years and   has also once seen specialists in [place] at [facility] for consultation.  He   has had outpatient EEGs and brief in-office EEGs that have revealed bilateral   temporal sharp activity and have captured at least one seizure, believed to be a   right temporal lobe onset seizure.     Currently, Darin is maintained on three anticonvulsants and is still having   breakthrough partial seizures frequently up to several times per month.  He   takes Aptiom, Vimpat and Lamictal at this point and has previously failed at   least three other medications.  He had no seizures prior to five years ago and   no early onset.  No early life risk factors for seizures.  He has had minor   bumps on the head, but no losses of consciousness or major concussions.  No   history of febrile seizures or neurological infections.  He is open to the   possibility of surgery as well as other medical therapies today.  He has had an   MRI in [place], which was read normal, but I do not yet have those images.    He has not had a prolonged video EEG monitoring study.        ANGY/MARYAM  dd: 08/15/2018 08:49:04 (CDT)  td: 08/15/2018 09:02:20 (CDT)  Doc ID   #[id number]  Job ID #[id number]     CC:            [id number]                    Seizure Triggers  Sleep Deprivation - None  Other medications - None  Psych/stress - None  Photic stimulation - None  Hyperventilation - None  Medical Problems - None  Menses - No  Sensory Stimulation (light,  sound, etc) - None  Missed dose of meds - None     AED Treatments  Present regimen  Aptiom 800/d  Vimpat 200mg BID  Lamictal 125mg BID     Prior treatments  levetiracetam (Keppra, LEV)  carbamazepine (Tegretol, CBZ)  topiramate (Topamax, TPM)     Not tried  acetazolamide (Diamox, AZM)  amantadine     clobezam (Onfi or Frizium, CLB)  ethosuximide (Zarontin, ESM)  eslicarbazine (Aptiom, ESL)  felbamate (felbatol, FBM)  gabapentin (Neurontin, GBP)  lacosamide (Vimpat, LCM)   lamotrigine (Lamictal, LTG)      methsuximide (Celontin, MSM)  methyphenytoin (Mesantion, MHT)  oxcarbazepine (Trileptal OXC)  perampanel (Fycompa, FCP)   phenobarbital (Pb)  phenytoin (Dilantin, PHT)  pregabalin (Lyrica, PGB)  primidone (Mysoline, PRM)  retigabine (Potiga, RTG)  rufinamide (Banzel, RUF)  tiagabine (Gabatril,  TGB)     viagabatrin, (Sabril, VGB)  vagal nerve stimulator (VNS)  valproic acid (Depakote, VPA)  zonisamide (Zonegran, ZNS)  Benzodiazepines  diazepam - rectal (Diastatl)  diazepam - oral (Valium, DZ)  clonazepam (Klonopin, CZP)  clorazepate (Tranxene, CLZ)  Ativan  Brain Stimulation  Vagal Nerve Stimulation-n/a  DBS- n/a     Compliance method  Memory - yes  Mom or Spouse - Yes  Pill Box - no  Brian calendar - no  Turn over medication bottle - no  Phone alarm - no     Seizure Evaluation  EEG Routine -   EEG Ambulatory -   EEG\Video Monitoring -   MRI/MRA -   CT/CTA Scan -   PET Scan -   Neuropsychological evaluation -   DEXA Scan     Potential Epilepsy Risk Factors:   Pregnancy/Labor/Delivery - full term uncomplicated pregnancy labor and vaginal delivery  Febrile seizures - none  Head injury  - none  CNS infection - none     Stroke - none  Family Hx of Sz - none    Past Medical History:   Diagnosis Date    Epilepsy 2015       No past surgical history on file.    Review of patient's allergies indicates:   Allergen Reactions    Zofran [ondansetron hcl (pf)]        Current Neurological Medications: Aptiom, Lamictal, Vimpat  (off since 9/11am)    No current facility-administered medications on file prior to encounter.      Current Outpatient Medications on File Prior to Encounter   Medication Sig    escitalopram oxalate (LEXAPRO) 20 MG tablet Take 40 mg by mouth once daily.    eslicarbazepine (APTIOM) 800 mg Tab Take 800 mg by mouth.    lacosamide (VIMPAT) 200 mg Tab tablet Take 200 mg by mouth every 12 (twelve) hours.    lamoTRIgine (LAMICTAL) 200 MG tablet Take 200 mg by mouth 2 (two) times daily.     Family History     None        Tobacco Use    Smoking status: Not on file   Substance and Sexual Activity    Alcohol use: Not on file    Drug use: Not on file    Sexual activity: Not on file     Review of Systems   Eyes: Negative for visual disturbance.   Respiratory: Negative for cough and shortness of breath.    Cardiovascular: Negative for chest pain and palpitations.   Gastrointestinal: Positive for nausea (s/p GTCs). Negative for abdominal pain, constipation, diarrhea and vomiting.   Genitourinary: Negative for dysuria, frequency and urgency.   Neurological: Positive for seizures and headaches (s/p GTCs).   Psychiatric/Behavioral: Negative for agitation and confusion.     Objective:     Vital Signs (Most Recent):  Temp: 98.7 °F (37.1 °C) (09/12/18 1825)  Pulse: 77 (09/12/18 1825)  Resp: 17 (09/12/18 1825)  BP: (!) 140/81 (09/12/18 1825)  SpO2: 98 % (09/12/18 1825) Vital Signs (24h Range):  Temp:  [98.2 °F (36.8 °C)-98.7 °F (37.1 °C)] 98.7 °F (37.1 °C)  Pulse:  [77-96] 77  Resp:  [17-18] 17  SpO2:  [95 %-98 %] 98 %  BP: (140-147)/(81-87) 140/81        There is no height or weight on file to calculate BMI.    Physical Exam   Constitutional: He is oriented to person, place, and time. He appears well-developed and well-nourished. No distress.   HENT:   Head: Normocephalic and atraumatic.   Eyes: EOM are normal.   Cardiovascular: Normal rate.   Pulmonary/Chest: Effort normal.   Musculoskeletal: Normal range of motion.    Neurological: He is alert and oriented to person, place, and time. He has a normal Finger-Nose-Finger Test and a normal Heel to Shin Test.   Reflex Scores:       Bicep reflexes are 2+ on the right side and 2+ on the left side.       Brachioradialis reflexes are 2+ on the right side and 2+ on the left side.       Patellar reflexes are 2+ on the right side and 2+ on the left side.  Skin: Skin is warm and dry. He is not diaphoretic.   Psychiatric: He has a normal mood and affect. His speech is normal and behavior is normal. Judgment and thought content normal.   Nursing note and vitals reviewed.      NEUROLOGICAL EXAMINATION:     MENTAL STATUS   Oriented to person, place, and time.   Attention: normal. Concentration: normal.   Speech: speech is normal   Level of consciousness: alert    CRANIAL NERVES     CN II   Visual fields full to confrontation.     CN III, IV, VI   Extraocular motions are normal.     CN V   Facial sensation intact.     CN VII   Facial expression full, symmetric.     CN VIII   Hearing: intact    CN XII   Tongue: not atrophic  Fasciculations: absent  Tongue deviation: none    MOTOR EXAM   Muscle bulk: normal  Overall muscle tone: normal    Strength   Right biceps: 5/5  Left biceps: 5/5  Right triceps: 5/5  Left triceps: 5/5  Left quadriceps: 5/5  Left hamstrin/5       4+/5 with RLE knee flexion and extension     REFLEXES     Reflexes   Right brachioradialis: 2+  Left brachioradialis: 2+  Right biceps: 2+  Left biceps: 2+  Right patellar: 2+  Left patellar: 2+    SENSORY EXAM   Light touch normal.     GAIT AND COORDINATION      Coordination   Finger to nose coordination: normal  Heel to shin coordination: normal    Tremor   Resting tremor: absent      Significant Labs:   Recent Lab Results     None          Significant Imaging: I have reviewed and interpreted all pertinent imaging results/findings within the past 24 hours.    Assessment and Plan:     * Complex partial epilepsy with  generalization and with intractable epilepsy    vEEG  PS/HV   Hold Aptiom 800mg daily and Vimpat 200mg bid for now.  Continue Lamictal 200mg bid for now  Seizure precautions  Given 2 events today, Valium 1mg q5min prn x2 doses ordered for events >/=5 min.  Please call Epilepsy if dose needed.        Depressed    Continue Celexa 40mg daily            VTE Risk Mitigation (From admission, onward)        Ordered     IP VTE LOW RISK PATIENT  Once      09/12/18 1914          Genny Modi MD  Neurology  [facility]

## 2018-09-13 NOTE — SUBJECTIVE & OBJECTIVE
Subjective:     Interval History: One clinical/electrographic seizure at 5:52am, with L temporal origination; clinically, patient had lip smacking and LUE repetitive hand movements.  In addition, bilateral independent epileptiform DCs seen, as well as spike wave DCs and intermittent L>R interictal slowing.     Current Neurological Medications: Lamictal, Celexa    Current Facility-Administered Medications   Medication Dose Route Frequency Provider Last Rate Last Dose    acetaminophen tablet 650 mg  650 mg Oral Q4H PRN Genny Modi MD        citalopram tablet 40 mg  40 mg Oral Daily Genny Modi MD   40 mg at 09/13/18 0912    diazePAM injection 1 mg  1 mg Intravenous Q5 Min PRN Genny Modi MD        docusate sodium capsule 100 mg  100 mg Oral BID PRN Genny oMdi MD        lamoTRIgine tablet 200 mg  200 mg Oral BID Genny Modi MD   200 mg at 09/13/18 0912    prochlorperazine injection Soln 5 mg  5 mg Intravenous Q6H PRN Genny Modi MD        promethazine tablet 25 mg  25 mg Oral Q6H PRN Genny Modi MD        sodium chloride 0.9% flush 3 mL  3 mL Intravenous PRN Genny Modi MD           Review of Systems   Eyes: Negative for visual disturbance.   Respiratory: Negative for cough and shortness of breath.    Cardiovascular: Negative for chest pain and palpitations.   Gastrointestinal: Positive for nausea (s/p GTCs). Negative for abdominal pain, constipation, diarrhea and vomiting.   Genitourinary: Negative for dysuria, frequency and urgency.   Neurological: Positive for seizures and headaches (s/p GTCs).   Psychiatric/Behavioral: Negative for agitation and confusion.     Objective:     Vital Signs (Most Recent):  Temp: 97.6 °F (36.4 °C) (09/13/18 1103)  Pulse: 60 (09/13/18 1124)  Resp: 18 (09/13/18 1103)  BP: 118/73 (09/13/18 1103)  SpO2: 96 % (09/13/18 1103) Vital Signs (24h Range):  Temp:  [97.6 °F (36.4 °C)-98.7 °F (37.1 °C)] 97.6 °F (36.4  °C)  Pulse:  [59-96] 60  Resp:  [15-18] 18  SpO2:  [95 %-99 %] 96 %  BP: (118-147)/(56-87) 118/73     Weight: 78 kg (172 lb)  Body mass index is 22.69 kg/m².    Physical Exam   Constitutional: He is oriented to person, place, and time. He appears well-developed and well-nourished. No distress.   HENT:   Head: Normocephalic and atraumatic.   Eyes: EOM are normal.   Cardiovascular: Normal rate.   Pulmonary/Chest: Effort normal.   Musculoskeletal: Normal range of motion.   Neurological: He is alert and oriented to person, place, and time. He has a normal Finger-Nose-Finger Test and a normal Heel to Shin Test.   Reflex Scores:       Bicep reflexes are 2+ on the right side and 2+ on the left side.       Brachioradialis reflexes are 2+ on the right side and 2+ on the left side.       Patellar reflexes are 2+ on the right side and 2+ on the left side.  Skin: Skin is warm and dry. He is not diaphoretic.   Psychiatric: He has a normal mood and affect. His speech is normal and behavior is normal. Judgment and thought content normal.   Nursing note and vitals reviewed.      NEUROLOGICAL EXAMINATION:     MENTAL STATUS   Oriented to person, place, and time.   Attention: normal. Concentration: normal.   Speech: speech is normal   Level of consciousness: alert    CRANIAL NERVES     CN II   Visual fields full to confrontation.     CN III, IV, VI   Extraocular motions are normal.     CN V   Facial sensation intact.     CN VII   Facial expression full, symmetric.     CN VIII   Hearing: intact    CN XII   Tongue: not atrophic  Fasciculations: absent  Tongue deviation: none    MOTOR EXAM   Muscle bulk: normal  Overall muscle tone: normal    Strength   Right biceps: 5/5  Left biceps: 5/5  Right triceps: 5/5  Left triceps: 5/5  Left quadriceps: 5/5  Left hamstrin/5       4+/5 with RLE knee flexion and extension     REFLEXES     Reflexes   Right brachioradialis: 2+  Left brachioradialis: 2+  Right biceps: 2+  Left biceps: 2+  Right  patellar: 2+  Left patellar: 2+    SENSORY EXAM   Light touch normal.     GAIT AND COORDINATION      Coordination   Finger to nose coordination: normal  Heel to shin coordination: normal    Tremor   Resting tremor: absent      Significant Labs:   Recent Lab Results       09/12/18  2100 09/12/18  1930      Immature Granulocytes  0.6     Immature Grans (Abs)  0.07  Comment:  Mild elevation in immature granulocytes is non specific and   can be seen in a variety of conditions including stress response,   acute inflammation, trauma and pregnancy. Correlation with other   laboratory and clinical findings is essential.       Albumin  4.3     Alkaline Phosphatase  101     ALT  51     Anion Gap  9     Appearance, UA Clear      AST  37     Baso #  0.04     Basophil%  0.3     Bilirubin (UA) Negative      Total Bilirubin  0.6  Comment:  For infants and newborns, interpretation of results should be based  on gestational age, weight and in agreement with clinical  observations.  Premature Infant recommended reference ranges:  Up to 24 hours.............<8.0 mg/dL  Up to 48 hours............<12.0 mg/dL  3-5 days..................<15.0 mg/dL  6-29 days.................<15.0 mg/dL       BUN, Bld  15     Calcium  9.4     Chloride  104     CO2  26     Color, UA Yellow      Creatinine  0.7     Differential Method  Automated     eGFR if African American  >60.0     eGFR if non   >60.0  Comment:  Calculation used to obtain the estimated glomerular filtration  rate (eGFR) is the CKD-EPI equation.        Eos #  0.0     Eosinophil%  0.2     Glucose  85     Glucose, UA Negative      Gran # (ANC)  10.3     Gran%  85.2     Hematocrit  42.5     Hemoglobin  14.9     Ketones, UA 1+      Leukocytes, UA Negative      Lymph #  1.1     Lymph%  8.9     MCH  30.6     MCHC  35.1     MCV  87     Mono #  0.6     Mono%  4.8     MPV  9.9     Nitrite, UA Negative      nRBC  0     Occult Blood UA Negative      pH, UA 5.0      Platelets  251      Potassium  3.8     Total Protein  7.0     Protein, UA Negative  Comment:  Recommend a 24 hour urine protein or a urine   protein/creatinine ratio if globulin induced proteinuria is  clinically suspected.        RBC  4.87     RDW  12.6     Sodium  139     Specific Gravity, UA >=1.030      Specimen UA Urine, Clean Catch      Urobilinogen, UA Negative      WBC  12.09           Significant Imaging: I have reviewed and interpreted all pertinent imaging results/findings within the past 24 hours.

## 2018-09-13 NOTE — PLAN OF CARE
CM met with patient in reference to discharge planning. Patient lives with his fiance and 2 dependent children in a single story home with no steps to get into home. Prior to admission, patient was independent with ADLs.     PCP: Primary Doctor No  Pharmacy:   [facility] - [place], [place] - [address]  [address]  [place] [address]  Phone: [phone] Fax: [phone]       09/13/18 0379   Discharge Assessment   Assessment Type Discharge Planning Assessment   Confirmed/corrected address and phone number on facesheet? Yes   Assessment information obtained from? Patient;Caregiver   Expected Length of Stay (days) (TBD)   Communicated expected length of stay with patient/caregiver yes   Prior to hospitilization cognitive status: Alert/Oriented;No Deficits   Prior to hospitalization functional status: Independent   Current cognitive status: Alert/Oriented;No Deficits   Current Functional Status: Independent   Lives With significant other;child(ronan), dependent   Able to Return to Prior Arrangements unable to determine at this time (comments)   Is patient able to care for self after discharge? Unable to determine at this time (comments)   Who are your caregiver(s) and their phone number(s)? Alexandra Etienne Mother [phone]    Patient's perception of discharge disposition home or selfcare   Readmission Within The Last 30 Days no previous admission in last 30 days   Patient currently being followed by outpatient case management? No   Patient currently receives any other outside agency services? No   Equipment Currently Used at Home none   Do you have any problems affording any of your prescribed medications? No   Is the patient taking medications as prescribed? yes   Does the patient have transportation home? Yes   Transportation Available car;family or friend will provide   Does the patient receive services at the [facility]? No   Discharge Plan A Home   Discharge Plan B Home with family    Patient/Family In Agreement With Plan yes

## 2018-09-13 NOTE — SUBJECTIVE & OBJECTIVE
Past Medical History:   Diagnosis Date    Epilepsy 2015       No past surgical history on file.    Review of patient's allergies indicates:   Allergen Reactions    Zofran [ondansetron hcl (pf)]        Current Neurological Medications: Aptiom, Lamictal, Vimpat (off since 9/11am)    No current facility-administered medications on file prior to encounter.      Current Outpatient Medications on File Prior to Encounter   Medication Sig    escitalopram oxalate (LEXAPRO) 20 MG tablet Take 40 mg by mouth once daily.    eslicarbazepine (APTIOM) 800 mg Tab Take 800 mg by mouth.    lacosamide (VIMPAT) 200 mg Tab tablet Take 200 mg by mouth every 12 (twelve) hours.    lamoTRIgine (LAMICTAL) 200 MG tablet Take 200 mg by mouth 2 (two) times daily.     Family History     None        Tobacco Use    Smoking status: Not on file   Substance and Sexual Activity    Alcohol use: Not on file    Drug use: Not on file    Sexual activity: Not on file     Review of Systems   Eyes: Negative for visual disturbance.   Respiratory: Negative for cough and shortness of breath.    Cardiovascular: Negative for chest pain and palpitations.   Gastrointestinal: Positive for nausea (s/p GTCs). Negative for abdominal pain, constipation, diarrhea and vomiting.   Genitourinary: Negative for dysuria, frequency and urgency.   Neurological: Positive for seizures and headaches (s/p GTCs).   Psychiatric/Behavioral: Negative for agitation and confusion.     Objective:     Vital Signs (Most Recent):  Temp: 98.7 °F (37.1 °C) (09/12/18 1825)  Pulse: 77 (09/12/18 1825)  Resp: 17 (09/12/18 1825)  BP: (!) 140/81 (09/12/18 1825)  SpO2: 98 % (09/12/18 1825) Vital Signs (24h Range):  Temp:  [98.2 °F (36.8 °C)-98.7 °F (37.1 °C)] 98.7 °F (37.1 °C)  Pulse:  [77-96] 77  Resp:  [17-18] 17  SpO2:  [95 %-98 %] 98 %  BP: (140-147)/(81-87) 140/81        There is no height or weight on file to calculate BMI.    Physical Exam   Constitutional: He is oriented to person,  place, and time. He appears well-developed and well-nourished. No distress.   HENT:   Head: Normocephalic and atraumatic.   Eyes: EOM are normal.   Cardiovascular: Normal rate.   Pulmonary/Chest: Effort normal.   Musculoskeletal: Normal range of motion.   Neurological: He is alert and oriented to person, place, and time. He has a normal Finger-Nose-Finger Test and a normal Heel to Shin Test.   Reflex Scores:       Bicep reflexes are 2+ on the right side and 2+ on the left side.       Brachioradialis reflexes are 2+ on the right side and 2+ on the left side.       Patellar reflexes are 2+ on the right side and 2+ on the left side.  Skin: Skin is warm and dry. He is not diaphoretic.   Psychiatric: He has a normal mood and affect. His speech is normal and behavior is normal. Judgment and thought content normal.   Nursing note and vitals reviewed.      NEUROLOGICAL EXAMINATION:     MENTAL STATUS   Oriented to person, place, and time.   Attention: normal. Concentration: normal.   Speech: speech is normal   Level of consciousness: alert    CRANIAL NERVES     CN II   Visual fields full to confrontation.     CN III, IV, VI   Extraocular motions are normal.     CN V   Facial sensation intact.     CN VII   Facial expression full, symmetric.     CN VIII   Hearing: intact    CN XII   Tongue: not atrophic  Fasciculations: absent  Tongue deviation: none    MOTOR EXAM   Muscle bulk: normal  Overall muscle tone: normal    Strength   Right biceps: 5/5  Left biceps: 5/5  Right triceps: 5/5  Left triceps: 5/5  Left quadriceps: 5/5  Left hamstrin/5       4+/5 with RLE knee flexion and extension     REFLEXES     Reflexes   Right brachioradialis: 2+  Left brachioradialis: 2+  Right biceps: 2+  Left biceps: 2+  Right patellar: 2+  Left patellar: 2+    SENSORY EXAM   Light touch normal.     GAIT AND COORDINATION      Coordination   Finger to nose coordination: normal  Heel to shin coordination: normal    Tremor   Resting tremor:  absent      Significant Labs:   Recent Lab Results     None          Significant Imaging: I have reviewed and interpreted all pertinent imaging results/findings within the past 24 hours.

## 2018-09-13 NOTE — ASSESSMENT & PLAN NOTE
vEEG  PS/HV   Hold Aptiom 800mg daily and Vimpat 200mg bid for now.  Continue Lamictal 200mg bid for now  Seizure precautions  Given 2 events today, Valium 1mg q5min prn x2 doses ordered for events >/=5 min.  Please call Epilepsy if dose needed.

## 2018-09-13 NOTE — ASSESSMENT & PLAN NOTE
1 complex partial seizure, of L temporal origin, noted 9/13 at approx 5:50am.    vEEG  PS/HV   Hold Aptiom 800mg daily and Vimpat 200mg bid.  Continue Lamictal 200mg bid.  Seizure precautions  Valium 1mg q5min prn x2 doses ordered for events >/=5 min.  Please call Epilepsy if dose needed.

## 2018-09-14 LAB — LAMOTRIGINE SERPL-MCNC: 1 UG/ML (ref 2–15)

## 2018-09-14 PROCEDURE — 95951 HC EEG MONITORING/VIDEO RECORD: CPT

## 2018-09-14 PROCEDURE — 99233 SBSQ HOSP IP/OBS HIGH 50: CPT | Mod: ,,, | Performed by: PSYCHIATRY & NEUROLOGY

## 2018-09-14 PROCEDURE — 95951 PR EEG MONITORING/VIDEORECORD: CPT | Mod: 26,,, | Performed by: PSYCHIATRY & NEUROLOGY

## 2018-09-14 PROCEDURE — 20600001 HC STEP DOWN PRIVATE ROOM

## 2018-09-14 PROCEDURE — 25000003 PHARM REV CODE 250: Performed by: STUDENT IN AN ORGANIZED HEALTH CARE EDUCATION/TRAINING PROGRAM

## 2018-09-14 RX ORDER — LAMOTRIGINE 100 MG/1
100 TABLET ORAL 2 TIMES DAILY
Status: DISCONTINUED | OUTPATIENT
Start: 2018-09-14 | End: 2018-09-16

## 2018-09-14 RX ADMIN — CITALOPRAM HYDROBROMIDE 40 MG: 10 TABLET ORAL at 08:09

## 2018-09-14 RX ADMIN — LAMOTRIGINE 100 MG: 100 TABLET ORAL at 09:09

## 2018-09-14 RX ADMIN — LAMOTRIGINE 200 MG: 100 TABLET ORAL at 08:09

## 2018-09-14 NOTE — PLAN OF CARE
Problem: Patient Care Overview  Goal: Plan of Care Review  Outcome: Ongoing (interventions implemented as appropriate)  POC reviewed with patient and mother at bedside. Verbalized understanding. No acute events over night. No seizure activity noted. VSS. Complaints of a headache around 9 pm last night that was relieved with Tylenol. Pt ambulating to restroom to void. Fall precautions and seizure precautions remain in place. Bed in lowest position, call light within reach, bed alarm on, suction and O2 at bedside, side rails padded. Will continue to monitor.

## 2018-09-14 NOTE — SUBJECTIVE & OBJECTIVE
Subjective:     Interval History: One complex partial seizure, of suspected L temporal origin, at approx 4pm yesterday afternoon.    Hospital Course:  9/12/18:  Admitted to EMU (on the neuro floor, hooked up to a cart).  9/12-13:  One clinical/electrographic seizure at 5:52am, with L temporal origination; clinically, patient had lip smacking and LUE repetitive hand movements.  In addition, bilateral independent epileptiform DCs seen, as well as spike wave DCs and intermittent L>R interictal slowing.  9/13-9/14:  One clinical/electrographic seizure around 4pm, with suspected L temporal origination; clinically, patient had lip smacking and LUE repetitive hand movements.  In addition, bilateral independent epileptiform DCs seen, as well as spike wave DCs and intermittent L>R interictal slowing.  Lamictal dose decreased from 200mg bid to 100mg bid.    Current Neurological Medications: Lamictal, Celexa    Current Facility-Administered Medications   Medication Dose Route Frequency Provider Last Rate Last Dose    acetaminophen tablet 650 mg  650 mg Oral Q4H PRN Genny Modi MD   650 mg at 09/13/18 2050    citalopram tablet 40 mg  40 mg Oral Daily Genny Modi MD   40 mg at 09/14/18 0820    diazePAM injection 1 mg  1 mg Intravenous Q5 Min PRN Genny Modi MD        docusate sodium capsule 100 mg  100 mg Oral BID PRN Genny Modi MD        lamoTRIgine tablet 100 mg  100 mg Oral BID Genny Modi MD        prochlorperazine injection Soln 5 mg  5 mg Intravenous Q6H PRN Genny Modi MD        promethazine tablet 25 mg  25 mg Oral Q6H PRN Genny Modi MD        sodium chloride 0.9% flush 3 mL  3 mL Intravenous PRN Genny Modi MD           Review of Systems   Eyes: Negative for visual disturbance.   Respiratory: Negative for cough and shortness of breath.    Cardiovascular: Negative for chest pain and palpitations.   Gastrointestinal: Positive for nausea (s/p  GTCs). Negative for abdominal pain, constipation, diarrhea and vomiting.   Genitourinary: Negative for dysuria, frequency and urgency.   Neurological: Positive for seizures and headaches (s/p GTCs).   Psychiatric/Behavioral: Negative for agitation and confusion.     Objective:     Vital Signs (Most Recent):  Temp: 98 °F (36.7 °C) (09/14/18 0829)  Pulse: 97 (09/14/18 0829)  Resp: 20 (09/14/18 0829)  BP: 139/79 (09/14/18 0829)  SpO2: 98 % (09/14/18 0829) Vital Signs (24h Range):  Temp:  [96.8 °F (36 °C)-98.7 °F (37.1 °C)] 98 °F (36.7 °C)  Pulse:  [52-97] 97  Resp:  [16-20] 20  SpO2:  [93 %-98 %] 98 %  BP: (113-139)/(59-79) 139/79     Weight: 78 kg (172 lb)  Body mass index is 22.69 kg/m².    Physical Exam   Constitutional: He is oriented to person, place, and time. He appears well-developed and well-nourished. No distress.   HENT:   Head: Normocephalic and atraumatic.   Eyes: EOM are normal.   Cardiovascular: Normal rate.   Pulmonary/Chest: Effort normal.   Musculoskeletal: Normal range of motion.   Neurological: He is alert and oriented to person, place, and time. He has a normal Finger-Nose-Finger Test and a normal Heel to Shin Test.   Reflex Scores:       Bicep reflexes are 2+ on the right side and 2+ on the left side.       Brachioradialis reflexes are 2+ on the right side and 2+ on the left side.       Patellar reflexes are 2+ on the right side and 2+ on the left side.  Skin: Skin is warm and dry. He is not diaphoretic.   Psychiatric: He has a normal mood and affect. His speech is normal and behavior is normal. Judgment and thought content normal.   Nursing note and vitals reviewed.      NEUROLOGICAL EXAMINATION:     MENTAL STATUS   Oriented to person, place, and time.   Attention: normal. Concentration: normal.   Speech: speech is normal   Level of consciousness: alert    CRANIAL NERVES     CN II   Visual fields full to confrontation.     CN III, IV, VI   Extraocular motions are normal.     CN V   Facial  sensation intact.     CN VII   Facial expression full, symmetric.     CN VIII   Hearing: intact    CN XII   Tongue: not atrophic  Fasciculations: absent  Tongue deviation: none    MOTOR EXAM   Muscle bulk: normal  Overall muscle tone: normal    Strength   Right biceps: 5/5  Left biceps: 5/5  Right triceps: 5/5  Left triceps: 5/5  Left quadriceps: 5/5  Left hamstrin/5       4+/5 with RLE knee flexion and extension     REFLEXES     Reflexes   Right brachioradialis: 2+  Left brachioradialis: 2+  Right biceps: 2+  Left biceps: 2+  Right patellar: 2+  Left patellar: 2+    SENSORY EXAM   Light touch normal.     GAIT AND COORDINATION      Coordination   Finger to nose coordination: normal  Heel to shin coordination: normal    Tremor   Resting tremor: absent      Significant Labs:   Recent Lab Results     None          Significant Imaging: I have reviewed and interpreted all pertinent imaging results/findings within the past 24 hours.

## 2018-09-14 NOTE — PLAN OF CARE
Problem: Fall Risk (Adult)  Intervention: Monitor/Assist with Self Care  Discussed fall risk precautions. Slip resistant socks on. Bed alarm on. States understanding not to get OOB without assistance.

## 2018-09-14 NOTE — ASSESSMENT & PLAN NOTE
vEEG  PS/HV   Hold Aptiom 800mg daily and Vimpat 200mg bid.  Decrease Lamictal to 100mg bid from 200mg bid.  Seizure precautions  Valium 1mg q5min prn x2 doses ordered for events >/=5 min.  Please call Epilepsy if dose needed.

## 2018-09-14 NOTE — PROGRESS NOTES
[facility]  Neurology  Progress Note    Patient Name: Darin Cunha  MRN: [id number]  Admission Date: 9/12/2018  Hospital Length of Stay: 2 days  Code Status: Full Code   Attending Provider: Abhijeet Do MD  Primary Care Physician: Primary Doctor No   Principal Problem:Complex partial epilepsy with generalization and with intractable epilepsy      Subjective:     Interval History: One complex partial seizure, of suspected L temporal origin, at approx 4pm yesterday afternoon.    Hospital Course:  9/12/18:  Admitted to EMU (on the neuro floor, hooked up to a cart).  9/12-13:  One clinical/electrographic seizure at 5:52am, with L temporal origination; clinically, patient had lip smacking and LUE repetitive hand movements.  In addition, bilateral independent epileptiform DCs seen, as well as spike wave DCs and intermittent L>R interictal slowing.  9/13-9/14:  One clinical/electrographic seizure around 4pm, with suspected L temporal origination; clinically, patient had lip smacking and LUE repetitive hand movements.  In addition, bilateral independent epileptiform DCs seen, as well as spike wave DCs and intermittent L>R interictal slowing.  Lamictal dose decreased from 200mg bid to 100mg bid.    Current Neurological Medications: Lamictal, Celexa    Current Facility-Administered Medications   Medication Dose Route Frequency Provider Last Rate Last Dose    acetaminophen tablet 650 mg  650 mg Oral Q4H PRN Genny Modi MD   650 mg at 09/13/18 2050    citalopram tablet 40 mg  40 mg Oral Daily Genny Modi MD   40 mg at 09/14/18 0820    diazePAM injection 1 mg  1 mg Intravenous Q5 Min PRN Genny Modi MD        docusate sodium capsule 100 mg  100 mg Oral BID PRN Genny Modi MD        lamoTRIgine tablet 100 mg  100 mg Oral BID Genny Modi MD        prochlorperazine injection Soln 5 mg  5 mg Intravenous Q6H PRN Genny Modi MD        promethazine tablet 25 mg   25 mg Oral Q6H PRN Genny LINDSAY Modi MD        sodium chloride 0.9% flush 3 mL  3 mL Intravenous PRN Gennymary Modi MD           Review of Systems   Eyes: Negative for visual disturbance.   Respiratory: Negative for cough and shortness of breath.    Cardiovascular: Negative for chest pain and palpitations.   Gastrointestinal: Positive for nausea (s/p GTCs). Negative for abdominal pain, constipation, diarrhea and vomiting.   Genitourinary: Negative for dysuria, frequency and urgency.   Neurological: Positive for seizures and headaches (s/p GTCs).   Psychiatric/Behavioral: Negative for agitation and confusion.     Objective:     Vital Signs (Most Recent):  Temp: 98 °F (36.7 °C) (09/14/18 0829)  Pulse: 97 (09/14/18 0829)  Resp: 20 (09/14/18 0829)  BP: 139/79 (09/14/18 0829)  SpO2: 98 % (09/14/18 0829) Vital Signs (24h Range):  Temp:  [96.8 °F (36 °C)-98.7 °F (37.1 °C)] 98 °F (36.7 °C)  Pulse:  [52-97] 97  Resp:  [16-20] 20  SpO2:  [93 %-98 %] 98 %  BP: (113-139)/(59-79) 139/79     Weight: 78 kg (172 lb)  Body mass index is 22.69 kg/m².    Physical Exam   Constitutional: He is oriented to person, place, and time. He appears well-developed and well-nourished. No distress.   HENT:   Head: Normocephalic and atraumatic.   Eyes: EOM are normal.   Cardiovascular: Normal rate.   Pulmonary/Chest: Effort normal.   Musculoskeletal: Normal range of motion.   Neurological: He is alert and oriented to person, place, and time. He has a normal Finger-Nose-Finger Test and a normal Heel to Shin Test.   Reflex Scores:       Bicep reflexes are 2+ on the right side and 2+ on the left side.       Brachioradialis reflexes are 2+ on the right side and 2+ on the left side.       Patellar reflexes are 2+ on the right side and 2+ on the left side.  Skin: Skin is warm and dry. He is not diaphoretic.   Psychiatric: He has a normal mood and affect. His speech is normal and behavior is normal. Judgment and thought content normal.   Nursing  note and vitals reviewed.      NEUROLOGICAL EXAMINATION:     MENTAL STATUS   Oriented to person, place, and time.   Attention: normal. Concentration: normal.   Speech: speech is normal   Level of consciousness: alert    CRANIAL NERVES     CN II   Visual fields full to confrontation.     CN III, IV, VI   Extraocular motions are normal.     CN V   Facial sensation intact.     CN VII   Facial expression full, symmetric.     CN VIII   Hearing: intact    CN XII   Tongue: not atrophic  Fasciculations: absent  Tongue deviation: none    MOTOR EXAM   Muscle bulk: normal  Overall muscle tone: normal    Strength   Right biceps: 5/5  Left biceps: 5/5  Right triceps: 5/5  Left triceps: 5/5  Left quadriceps: 5/5  Left hamstrin/5       4+/5 with RLE knee flexion and extension     REFLEXES     Reflexes   Right brachioradialis: 2+  Left brachioradialis: 2+  Right biceps: 2+  Left biceps: 2+  Right patellar: 2+  Left patellar: 2+    SENSORY EXAM   Light touch normal.     GAIT AND COORDINATION      Coordination   Finger to nose coordination: normal  Heel to shin coordination: normal    Tremor   Resting tremor: absent      Significant Labs:   Recent Lab Results     None          Significant Imaging: I have reviewed and interpreted all pertinent imaging results/findings within the past 24 hours.    Assessment and Plan:     * Complex partial epilepsy with generalization and with intractable epilepsy    vEEG  PS/HV   Hold Aptiom 800mg daily and Vimpat 200mg bid.  Decrease Lamictal to 100mg bid from 200mg bid.  Seizure precautions  Valium 1mg q5min prn x2 doses ordered for events >/=5 min.  Please call Epilepsy if dose needed.        Depressed mood    Continue Celexa 40mg daily            VTE Risk Mitigation (From admission, onward)        Ordered     IP VTE LOW RISK PATIENT  Once      18          Genny Modi MD  Neurology  [facility]

## 2018-09-15 PROCEDURE — 95951 HC EEG MONITORING/VIDEO RECORD: CPT

## 2018-09-15 PROCEDURE — 99233 SBSQ HOSP IP/OBS HIGH 50: CPT | Mod: ,,, | Performed by: PSYCHIATRY & NEUROLOGY

## 2018-09-15 PROCEDURE — 25000003 PHARM REV CODE 250: Performed by: STUDENT IN AN ORGANIZED HEALTH CARE EDUCATION/TRAINING PROGRAM

## 2018-09-15 PROCEDURE — 20600001 HC STEP DOWN PRIVATE ROOM

## 2018-09-15 PROCEDURE — 95951 PR EEG MONITORING/VIDEORECORD: CPT | Mod: 26,52,, | Performed by: PSYCHIATRY & NEUROLOGY

## 2018-09-15 RX ADMIN — LAMOTRIGINE 100 MG: 100 TABLET ORAL at 09:09

## 2018-09-15 RX ADMIN — CITALOPRAM HYDROBROMIDE 40 MG: 10 TABLET ORAL at 09:09

## 2018-09-15 NOTE — PROGRESS NOTES
[facility]  Neurology  Progress Note    Patient Name: Darin Cunha  MRN: [id number]  Admission Date: 9/12/2018  Hospital Length of Stay: 3 days  Code Status: Full Code   Attending Provider: Abhijeet Do MD  Primary Care Physician: Primary Doctor No   Principal Problem:Complex partial epilepsy with generalization and with intractable epilepsy      Subjective:     Interval History: One complex partial seizure, of suspected L temporal origin, at approx 4pm yesterday afternoon.    Hospital Course:  9/12/18:  Admitted to EMU (on the neuro floor, hooked up to a cart).  9/12-13:  One clinical/electrographic seizure at 5:52am, with L temporal origination; clinically, patient had lip smacking and LUE repetitive hand movements.  In addition, bilateral independent epileptiform DCs seen, as well as spike wave DCs and intermittent L>R interictal slowing.  9/13-9/14:  One clinical/electrographic seizure around 4pm, with suspected L temporal origination; clinically, patient had lip smacking and LUE repetitive hand movements.  In addition, bilateral independent epileptiform DCs seen, as well as spike wave DCs and intermittent L>R interictal slowing.  Lamictal dose decreased from 200mg bid to 100mg bid.    Current Neurological Medications: Lamictal, Celexa    Current Facility-Administered Medications   Medication Dose Route Frequency Provider Last Rate Last Dose    acetaminophen tablet 650 mg  650 mg Oral Q4H PRN Genny Modi MD   650 mg at 09/13/18 2050    citalopram tablet 40 mg  40 mg Oral Daily Genny Modi MD   40 mg at 09/15/18 0913    diazePAM injection 1 mg  1 mg Intravenous Q5 Min PRN Genny Modi MD        docusate sodium capsule 100 mg  100 mg Oral BID PRN Genny Modi MD        lamoTRIgine tablet 100 mg  100 mg Oral BID Genny Modi MD   100 mg at 09/15/18 0916    prochlorperazine injection Soln 5 mg  5 mg Intravenous Q6H PRN Genny Modi MD         promethazine tablet 25 mg  25 mg Oral Q6H PRN Genny Modi MD        sodium chloride 0.9% flush 3 mL  3 mL Intravenous PRN Genny Modi MD           Review of Systems   Eyes: Negative for visual disturbance.   Respiratory: Negative for cough and shortness of breath.    Cardiovascular: Negative for chest pain and palpitations.   Gastrointestinal: Positive for nausea (s/p GTCs). Negative for abdominal pain, constipation, diarrhea and vomiting.   Genitourinary: Negative for dysuria, frequency and urgency.   Neurological: Positive for seizures and headaches (s/p GTCs).   Psychiatric/Behavioral: Negative for agitation and confusion.     Objective:     Vital Signs (Most Recent):  Temp: 97.7 °F (36.5 °C) (09/15/18 1142)  Pulse: 68 (09/15/18 1142)  Resp: 18 (09/15/18 1142)  BP: (!) 142/65 (09/15/18 1142)  SpO2: 99 % (09/15/18 1142) Vital Signs (24h Range):  Temp:  [96.1 °F (35.6 °C)-98.7 °F (37.1 °C)] 97.7 °F (36.5 °C)  Pulse:  [55-75] 68  Resp:  [16-20] 18  SpO2:  [93 %-99 %] 99 %  BP: (108-142)/(58-70) 142/65     Weight: 78 kg (172 lb)  Body mass index is 22.69 kg/m².    Physical Exam   Constitutional: He is oriented to person, place, and time. He appears well-developed and well-nourished. No distress.   HENT:   Head: Normocephalic and atraumatic.   Eyes: EOM are normal.   Cardiovascular: Normal rate.   Pulmonary/Chest: Effort normal.   Musculoskeletal: Normal range of motion.   Neurological: He is alert and oriented to person, place, and time. He has a normal Finger-Nose-Finger Test and a normal Heel to Shin Test.   Reflex Scores:       Bicep reflexes are 2+ on the right side and 2+ on the left side.       Brachioradialis reflexes are 2+ on the right side and 2+ on the left side.       Patellar reflexes are 2+ on the right side and 2+ on the left side.  Skin: Skin is warm and dry. He is not diaphoretic.   Psychiatric: He has a normal mood and affect. His speech is normal and behavior is normal. Judgment  and thought content normal.   Nursing note and vitals reviewed.      NEUROLOGICAL EXAMINATION:     MENTAL STATUS   Oriented to person, place, and time.   Attention: normal. Concentration: normal.   Speech: speech is normal   Level of consciousness: alert    CRANIAL NERVES     CN II   Visual fields full to confrontation.     CN III, IV, VI   Extraocular motions are normal.     CN V   Facial sensation intact.     CN VII   Facial expression full, symmetric.     CN VIII   Hearing: intact    CN XII   Tongue: not atrophic  Fasciculations: absent  Tongue deviation: none    MOTOR EXAM   Muscle bulk: normal  Overall muscle tone: normal    Strength   Right biceps: 5/5  Left biceps: 5/5  Right triceps: 5/5  Left triceps: 5/5  Left quadriceps: 5/5  Left hamstrin/5       4+/5 with RLE knee flexion and extension     REFLEXES     Reflexes   Right brachioradialis: 2+  Left brachioradialis: 2+  Right biceps: 2+  Left biceps: 2+  Right patellar: 2+  Left patellar: 2+    SENSORY EXAM   Light touch normal.     GAIT AND COORDINATION      Coordination   Finger to nose coordination: normal  Heel to shin coordination: normal    Tremor   Resting tremor: absent      Significant Labs:   Recent Lab Results     None          Significant Imaging: I have reviewed and interpreted all pertinent imaging results/findings within the past 24 hours.    Assessment and Plan:     * Complex partial epilepsy with generalization and with intractable epilepsy    vEEG  PS/HV   Hold Aptiom 800mg daily and Vimpat 200mg bid.  Decrease Lamictal to 100mg bid from 200mg bid.  Seizure precautions  Valium 1mg q5min prn x2 doses ordered for events >/=5 min.  Please call Epilepsy if dose needed.        Depressed mood    Continue Celexa 40mg daily            VTE Risk Mitigation (From admission, onward)        Ordered     IP VTE LOW RISK PATIENT  Once      18          J Carlos Garcia Jr, MD  Neurology  [facility]

## 2018-09-15 NOTE — SUBJECTIVE & OBJECTIVE
Subjective:     Interval History: One complex partial seizure, of suspected L temporal origin, at approx 4pm yesterday afternoon.    Hospital Course:  9/12/18:  Admitted to EMU (on the neuro floor, hooked up to a cart).  9/12-13:  One clinical/electrographic seizure at 5:52am, with L temporal origination; clinically, patient had lip smacking and LUE repetitive hand movements.  In addition, bilateral independent epileptiform DCs seen, as well as spike wave DCs and intermittent L>R interictal slowing.  9/13-9/14:  One clinical/electrographic seizure around 4pm, with suspected L temporal origination; clinically, patient had lip smacking and LUE repetitive hand movements.  In addition, bilateral independent epileptiform DCs seen, as well as spike wave DCs and intermittent L>R interictal slowing.  Lamictal dose decreased from 200mg bid to 100mg bid.    Current Neurological Medications: Lamictal, Celexa    Current Facility-Administered Medications   Medication Dose Route Frequency Provider Last Rate Last Dose    acetaminophen tablet 650 mg  650 mg Oral Q4H PRN Genny Modi MD   650 mg at 09/13/18 2050    citalopram tablet 40 mg  40 mg Oral Daily Genny Modi MD   40 mg at 09/15/18 0913    diazePAM injection 1 mg  1 mg Intravenous Q5 Min PRN Genny Modi MD        docusate sodium capsule 100 mg  100 mg Oral BID PRN Genny Modi MD        lamoTRIgine tablet 100 mg  100 mg Oral BID Genny Modi MD   100 mg at 09/15/18 0916    prochlorperazine injection Soln 5 mg  5 mg Intravenous Q6H PRN Genny Modi MD        promethazine tablet 25 mg  25 mg Oral Q6H PRN Genny Modi MD        sodium chloride 0.9% flush 3 mL  3 mL Intravenous PRN Genny Modi MD           Review of Systems   Eyes: Negative for visual disturbance.   Respiratory: Negative for cough and shortness of breath.    Cardiovascular: Negative for chest pain and palpitations.   Gastrointestinal: Positive  for nausea (s/p GTCs). Negative for abdominal pain, constipation, diarrhea and vomiting.   Genitourinary: Negative for dysuria, frequency and urgency.   Neurological: Positive for seizures and headaches (s/p GTCs).   Psychiatric/Behavioral: Negative for agitation and confusion.     Objective:     Vital Signs (Most Recent):  Temp: 97.7 °F (36.5 °C) (09/15/18 1142)  Pulse: 68 (09/15/18 1142)  Resp: 18 (09/15/18 1142)  BP: (!) 142/65 (09/15/18 1142)  SpO2: 99 % (09/15/18 1142) Vital Signs (24h Range):  Temp:  [96.1 °F (35.6 °C)-98.7 °F (37.1 °C)] 97.7 °F (36.5 °C)  Pulse:  [55-75] 68  Resp:  [16-20] 18  SpO2:  [93 %-99 %] 99 %  BP: (108-142)/(58-70) 142/65     Weight: 78 kg (172 lb)  Body mass index is 22.69 kg/m².    Physical Exam   Constitutional: He is oriented to person, place, and time. He appears well-developed and well-nourished. No distress.   HENT:   Head: Normocephalic and atraumatic.   Eyes: EOM are normal.   Cardiovascular: Normal rate.   Pulmonary/Chest: Effort normal.   Musculoskeletal: Normal range of motion.   Neurological: He is alert and oriented to person, place, and time. He has a normal Finger-Nose-Finger Test and a normal Heel to Shin Test.   Reflex Scores:       Bicep reflexes are 2+ on the right side and 2+ on the left side.       Brachioradialis reflexes are 2+ on the right side and 2+ on the left side.       Patellar reflexes are 2+ on the right side and 2+ on the left side.  Skin: Skin is warm and dry. He is not diaphoretic.   Psychiatric: He has a normal mood and affect. His speech is normal and behavior is normal. Judgment and thought content normal.   Nursing note and vitals reviewed.      NEUROLOGICAL EXAMINATION:     MENTAL STATUS   Oriented to person, place, and time.   Attention: normal. Concentration: normal.   Speech: speech is normal   Level of consciousness: alert    CRANIAL NERVES     CN II   Visual fields full to confrontation.     CN III, IV, VI   Extraocular motions are normal.      CN V   Facial sensation intact.     CN VII   Facial expression full, symmetric.     CN VIII   Hearing: intact    CN XII   Tongue: not atrophic  Fasciculations: absent  Tongue deviation: none    MOTOR EXAM   Muscle bulk: normal  Overall muscle tone: normal    Strength   Right biceps: 5/5  Left biceps: 5/5  Right triceps: 5/5  Left triceps: 5/5  Left quadriceps: 5/5  Left hamstrin/5       4+/5 with RLE knee flexion and extension     REFLEXES     Reflexes   Right brachioradialis: 2+  Left brachioradialis: 2+  Right biceps: 2+  Left biceps: 2+  Right patellar: 2+  Left patellar: 2+    SENSORY EXAM   Light touch normal.     GAIT AND COORDINATION      Coordination   Finger to nose coordination: normal  Heel to shin coordination: normal    Tremor   Resting tremor: absent      Significant Labs:   Recent Lab Results     None          Significant Imaging: I have reviewed and interpreted all pertinent imaging results/findings within the past 24 hours.

## 2018-09-15 NOTE — PLAN OF CARE
Problem: Patient Care Overview  Goal: Plan of Care Review  Outcome: Ongoing (interventions implemented as appropriate)  POC reviewed with patient and mother at bedside. Verbalized understanding. No acute events over night. VSS. No seizure activity noted. No neuro changes. Fall precautions remain in place. Will continue to monitor.

## 2018-09-16 LAB — LACOSAMIDE: 1 MCG/ML

## 2018-09-16 PROCEDURE — 25000003 PHARM REV CODE 250: Performed by: STUDENT IN AN ORGANIZED HEALTH CARE EDUCATION/TRAINING PROGRAM

## 2018-09-16 PROCEDURE — 95951 PR EEG MONITORING/VIDEORECORD: CPT | Mod: 26,,, | Performed by: PSYCHIATRY & NEUROLOGY

## 2018-09-16 PROCEDURE — 95951 HC EEG MONITORING/VIDEO RECORD: CPT

## 2018-09-16 PROCEDURE — 20600001 HC STEP DOWN PRIVATE ROOM

## 2018-09-16 PROCEDURE — A4216 STERILE WATER/SALINE, 10 ML: HCPCS | Performed by: STUDENT IN AN ORGANIZED HEALTH CARE EDUCATION/TRAINING PROGRAM

## 2018-09-16 PROCEDURE — 99233 SBSQ HOSP IP/OBS HIGH 50: CPT | Mod: ,,, | Performed by: PSYCHIATRY & NEUROLOGY

## 2018-09-16 RX ADMIN — LAMOTRIGINE 100 MG: 100 TABLET ORAL at 09:09

## 2018-09-16 RX ADMIN — CITALOPRAM HYDROBROMIDE 40 MG: 10 TABLET ORAL at 09:09

## 2018-09-16 RX ADMIN — Medication 3 ML: at 04:09

## 2018-09-16 NOTE — PLAN OF CARE
Problem: Patient Care Overview  Goal: Plan of Care Review  Outcome: Ongoing (interventions implemented as appropriate)  Plan of care reviewed with pt. Pt voiced understanding. Pt AAOX3. No c/o during the night. No apparent distress noted. Bed in lowest position and locked, call light within reach, side rails X2, and slip resistant socks on at this time. Will continue to monitor     Problem: Fall Risk (Adult)  Goal: Identify Related Risk Factors and Signs and Symptoms  Related risk factors and signs and symptoms are identified upon initiation of Human Response Clinical Practice Guideline (CPG)  Outcome: Ongoing (interventions implemented as appropriate)  Diabetes education provided to pt with questions and concerns discussed. Instructed pt on orders for hyper/hypoglycemic control and blood glucose monitoring. Acknowledged understanding. Will cont to monitor.

## 2018-09-16 NOTE — SUBJECTIVE & OBJECTIVE
Subjective:     Interval History: One complex partial seizure, of suspected L temporal origin, at approx 4pm yesterday afternoon.    Hospital Course:  9/12/18:  Admitted to EMU (on the neuro floor, hooked up to a cart).  9/12-13:  One clinical/electrographic seizure at 5:52am, with L temporal origination; clinically, patient had lip smacking and LUE repetitive hand movements.  In addition, bilateral independent epileptiform DCs seen, as well as spike wave DCs and intermittent L>R interictal slowing.  9/13-9/14:  One clinical/electrographic seizure around 4pm, with suspected L temporal origination; clinically, patient had lip smacking and LUE repetitive hand movements.  In addition, bilateral independent epileptiform DCs seen, as well as spike wave DCs and intermittent L>R interictal slowing.  Lamictal dose decreased from 200mg bid to 100mg bid.  9/14-15: No seizures.  Intermittent slowing and epileptiform transients occurring independently in a temporal distribution on either side as discussed previously.  9/15-16: No seizures.  Intermittent slowing and epileptiform transients occurring independently in a temporal distribution on either side as discussed previously.    Current Neurological Medications: Lamictal, Celexa    Current Facility-Administered Medications   Medication Dose Route Frequency Provider Last Rate Last Dose    acetaminophen tablet 650 mg  650 mg Oral Q4H PRN Genny Modi MD   650 mg at 09/13/18 2050    citalopram tablet 40 mg  40 mg Oral Daily Genny Modi MD   40 mg at 09/16/18 0904    diazePAM injection 1 mg  1 mg Intravenous Q5 Min PRN Genny Modi MD        docusate sodium capsule 100 mg  100 mg Oral BID PRN Genny Modi MD        lamoTRIgine tablet 100 mg  100 mg Oral BID Genny Modi MD   100 mg at 09/16/18 0903    prochlorperazine injection Soln 5 mg  5 mg Intravenous Q6H PRN Genny Modi MD        promethazine tablet 25 mg  25 mg Oral Q6H  JOE Modi MD        sodium chloride 0.9% flush 3 mL  3 mL Intravenous PRN Genny Modi MD           Review of Systems   Eyes: Negative for visual disturbance.   Respiratory: Negative for cough and shortness of breath.    Cardiovascular: Negative for chest pain and palpitations.   Gastrointestinal: Positive for nausea (s/p GTCs). Negative for abdominal pain, constipation, diarrhea and vomiting.   Genitourinary: Negative for dysuria, frequency and urgency.   Neurological: Positive for seizures and headaches (s/p GTCs).   Psychiatric/Behavioral: Negative for agitation and confusion.     Objective:     Vital Signs (Most Recent):  Temp: 98 °F (36.7 °C) (09/16/18 1126)  Pulse: 61 (09/16/18 1218)  Resp: 17 (09/16/18 1126)  BP: 113/67 (09/16/18 1126)  SpO2: 100 % (09/16/18 1126) Vital Signs (24h Range):  Temp:  [97.6 °F (36.4 °C)-98.6 °F (37 °C)] 98 °F (36.7 °C)  Pulse:  [54-80] 61  Resp:  [17-18] 17  SpO2:  [96 %-100 %] 100 %  BP: (113-127)/(67-74) 113/67     Weight: 78 kg (172 lb)  Body mass index is 22.69 kg/m².    Physical Exam   Constitutional: He is oriented to person, place, and time. He appears well-developed and well-nourished. No distress.   HENT:   Head: Normocephalic and atraumatic.   Eyes: EOM are normal.   Cardiovascular: Normal rate.   Pulmonary/Chest: Effort normal.   Musculoskeletal: Normal range of motion.   Neurological: He is alert and oriented to person, place, and time. He has a normal Finger-Nose-Finger Test and a normal Heel to Shin Test.   Reflex Scores:       Bicep reflexes are 2+ on the right side and 2+ on the left side.       Brachioradialis reflexes are 2+ on the right side and 2+ on the left side.       Patellar reflexes are 2+ on the right side and 2+ on the left side.  Skin: Skin is warm and dry. He is not diaphoretic.   Psychiatric: He has a normal mood and affect. His speech is normal and behavior is normal. Judgment and thought content normal.   Nursing note and  vitals reviewed.      NEUROLOGICAL EXAMINATION:     MENTAL STATUS   Oriented to person, place, and time.   Attention: normal. Concentration: normal.   Speech: speech is normal   Level of consciousness: alert    CRANIAL NERVES     CN II   Visual fields full to confrontation.     CN III, IV, VI   Extraocular motions are normal.     CN V   Facial sensation intact.     CN VII   Facial expression full, symmetric.     CN VIII   Hearing: intact    CN XII   Tongue: not atrophic  Fasciculations: absent  Tongue deviation: none    MOTOR EXAM   Muscle bulk: normal  Overall muscle tone: normal    Strength   Right biceps: 5/5  Left biceps: 5/5  Right triceps: 5/5  Left triceps: 5/5  Left quadriceps: 5/5  Left hamstrin/5       4+/5 with RLE knee flexion and extension     REFLEXES     Reflexes   Right brachioradialis: 2+  Left brachioradialis: 2+  Right biceps: 2+  Left biceps: 2+  Right patellar: 2+  Left patellar: 2+    SENSORY EXAM   Light touch normal.     GAIT AND COORDINATION      Coordination   Finger to nose coordination: normal  Heel to shin coordination: normal    Tremor   Resting tremor: absent      Significant Labs:   Recent Lab Results     None          Significant Imaging: I have reviewed and interpreted all pertinent imaging results/findings within the past 24 hours.

## 2018-09-16 NOTE — PROGRESS NOTES
[facility]  Neurology  Progress Note    Patient Name: Darin Cunha  MRN: [id number]  Admission Date: 9/12/2018  Hospital Length of Stay: 4 days  Code Status: Full Code   Attending Provider: Abhijeet Do MD  Primary Care Physician: Primary Doctor No   Principal Problem:Complex partial epilepsy with generalization and with intractable epilepsy      Subjective:     Interval History: One complex partial seizure, of suspected L temporal origin, at approx 4pm yesterday afternoon.    Hospital Course:  9/12/18:  Admitted to EMU (on the neuro floor, hooked up to a cart).  9/12-13:  One clinical/electrographic seizure at 5:52am, with L temporal origination; clinically, patient had lip smacking and LUE repetitive hand movements.  In addition, bilateral independent epileptiform DCs seen, as well as spike wave DCs and intermittent L>R interictal slowing.  9/13-9/14:  One clinical/electrographic seizure around 4pm, with suspected L temporal origination; clinically, patient had lip smacking and LUE repetitive hand movements.  In addition, bilateral independent epileptiform DCs seen, as well as spike wave DCs and intermittent L>R interictal slowing.  Lamictal dose decreased from 200mg bid to 100mg bid.  9/14-15: No seizures.  Intermittent slowing and epileptiform transients occurring independently in a temporal distribution on either side as discussed previously.  9/15-16: No seizures.  Intermittent slowing and epileptiform transients occurring independently in a temporal distribution on either side as discussed previously.    Current Neurological Medications: Lamictal, Celexa    Current Facility-Administered Medications   Medication Dose Route Frequency Provider Last Rate Last Dose    acetaminophen tablet 650 mg  650 mg Oral Q4H PRN Genny Modi MD   650 mg at 09/13/18 2050    citalopram tablet 40 mg  40 mg Oral Daily Genny Modi MD   40 mg at 09/16/18 0904    diazePAM injection 1 mg  1 mg  Intravenous Q5 Min PRN Genny Modi MD        docusate sodium capsule 100 mg  100 mg Oral BID PRN Genny Modi MD        lamoTRIgine tablet 100 mg  100 mg Oral BID Genny Modi MD   100 mg at 09/16/18 0903    prochlorperazine injection Soln 5 mg  5 mg Intravenous Q6H PRN Genny Modi MD        promethazine tablet 25 mg  25 mg Oral Q6H PRN Genny Modi MD        sodium chloride 0.9% flush 3 mL  3 mL Intravenous PRN Genny Modi MD           Review of Systems   Eyes: Negative for visual disturbance.   Respiratory: Negative for cough and shortness of breath.    Cardiovascular: Negative for chest pain and palpitations.   Gastrointestinal: Positive for nausea (s/p GTCs). Negative for abdominal pain, constipation, diarrhea and vomiting.   Genitourinary: Negative for dysuria, frequency and urgency.   Neurological: Positive for seizures and headaches (s/p GTCs).   Psychiatric/Behavioral: Negative for agitation and confusion.     Objective:     Vital Signs (Most Recent):  Temp: 98 °F (36.7 °C) (09/16/18 1126)  Pulse: 61 (09/16/18 1218)  Resp: 17 (09/16/18 1126)  BP: 113/67 (09/16/18 1126)  SpO2: 100 % (09/16/18 1126) Vital Signs (24h Range):  Temp:  [97.6 °F (36.4 °C)-98.6 °F (37 °C)] 98 °F (36.7 °C)  Pulse:  [54-80] 61  Resp:  [17-18] 17  SpO2:  [96 %-100 %] 100 %  BP: (113-127)/(67-74) 113/67     Weight: 78 kg (172 lb)  Body mass index is 22.69 kg/m².    Physical Exam   Constitutional: He is oriented to person, place, and time. He appears well-developed and well-nourished. No distress.   HENT:   Head: Normocephalic and atraumatic.   Eyes: EOM are normal.   Cardiovascular: Normal rate.   Pulmonary/Chest: Effort normal.   Musculoskeletal: Normal range of motion.   Neurological: He is alert and oriented to person, place, and time. He has a normal Finger-Nose-Finger Test and a normal Heel to Shin Test.   Reflex Scores:       Bicep reflexes are 2+ on the right side and 2+ on the  left side.       Brachioradialis reflexes are 2+ on the right side and 2+ on the left side.       Patellar reflexes are 2+ on the right side and 2+ on the left side.  Skin: Skin is warm and dry. He is not diaphoretic.   Psychiatric: He has a normal mood and affect. His speech is normal and behavior is normal. Judgment and thought content normal.   Nursing note and vitals reviewed.      NEUROLOGICAL EXAMINATION:     MENTAL STATUS   Oriented to person, place, and time.   Attention: normal. Concentration: normal.   Speech: speech is normal   Level of consciousness: alert    CRANIAL NERVES     CN II   Visual fields full to confrontation.     CN III, IV, VI   Extraocular motions are normal.     CN V   Facial sensation intact.     CN VII   Facial expression full, symmetric.     CN VIII   Hearing: intact    CN XII   Tongue: not atrophic  Fasciculations: absent  Tongue deviation: none    MOTOR EXAM   Muscle bulk: normal  Overall muscle tone: normal    Strength   Right biceps: 5/5  Left biceps: 5/5  Right triceps: 5/5  Left triceps: 5/5  Left quadriceps: 5/5  Left hamstrin/5       4+/5 with RLE knee flexion and extension     REFLEXES     Reflexes   Right brachioradialis: 2+  Left brachioradialis: 2+  Right biceps: 2+  Left biceps: 2+  Right patellar: 2+  Left patellar: 2+    SENSORY EXAM   Light touch normal.     GAIT AND COORDINATION      Coordination   Finger to nose coordination: normal  Heel to shin coordination: normal    Tremor   Resting tremor: absent      Significant Labs:   Recent Lab Results     None          Significant Imaging: I have reviewed and interpreted all pertinent imaging results/findings within the past 24 hours.    Assessment and Plan:     * Complex partial epilepsy with generalization and with intractable epilepsy    vEEG  PS/HV   Hold Aptiom 800mg daily and Vimpat 200mg bid.  Will hold Lamictal as well.  Seizure precautions  Valium 1mg q5min prn x2 doses ordered for events >/=5 min.  Please call  Epilepsy if dose needed.        Depressed mood    Continue Celexa 40mg daily            VTE Risk Mitigation (From admission, onward)        Ordered     IP VTE LOW RISK PATIENT  Once      09/12/18 1914          J Carlos Garcia Jr, MD  Neurology  [facility]

## 2018-09-16 NOTE — ASSESSMENT & PLAN NOTE
vEEG  PS/HV   Hold Aptiom 800mg daily and Vimpat 200mg bid.  Will hold Lamictal as well.  Seizure precautions  Valium 1mg q5min prn x2 doses ordered for events >/=5 min.  Please call Epilepsy if dose needed.

## 2018-09-17 PROCEDURE — 25000003 PHARM REV CODE 250: Performed by: STUDENT IN AN ORGANIZED HEALTH CARE EDUCATION/TRAINING PROGRAM

## 2018-09-17 PROCEDURE — 20600001 HC STEP DOWN PRIVATE ROOM

## 2018-09-17 PROCEDURE — 86255 FLUORESCENT ANTIBODY SCREEN: CPT

## 2018-09-17 PROCEDURE — 83519 RIA NONANTIBODY: CPT

## 2018-09-17 PROCEDURE — 99233 SBSQ HOSP IP/OBS HIGH 50: CPT | Mod: ,,, | Performed by: PSYCHIATRY & NEUROLOGY

## 2018-09-17 PROCEDURE — 86341 ISLET CELL ANTIBODY: CPT

## 2018-09-17 PROCEDURE — 95951 PR EEG MONITORING/VIDEORECORD: CPT | Mod: 26,,, | Performed by: PSYCHIATRY & NEUROLOGY

## 2018-09-17 PROCEDURE — 95951 HC EEG MONITORING/VIDEO RECORD: CPT

## 2018-09-17 RX ADMIN — CITALOPRAM HYDROBROMIDE 40 MG: 10 TABLET ORAL at 08:09

## 2018-09-17 NOTE — PLAN OF CARE
Problem: Patient Care Overview  Goal: Plan of Care Review  Outcome: Ongoing (interventions implemented as appropriate)  Plan of care reviewed with pt. Pt voiced understanding. Pt AAOX3. No c/o during the night. No apparent distress noted. Bed in lowest position and locked, call light within reach, side rails X2, and slip resistant socks on at this time. Will continue to monitor     Problem: Fall Risk (Adult)  Goal: Identify Related Risk Factors and Signs and Symptoms  Related risk factors and signs and symptoms are identified upon initiation of Human Response Clinical Practice Guideline (CPG)  Outcome: Ongoing (interventions implemented as appropriate)  Fall risk and precautions discussed with pt and family. Acknowledged understanding. Fall precautions in place. No questions or concerns at present. Call light in reach. Will cont to monitor.     Problem: Seizure Disorder/Epilepsy (Adult)  Goal: Signs and Symptoms of Listed Potential Problems Will be Absent, Minimized or Managed (Seizure Disorder/Epilepsy)  Signs and symptoms of listed potential problems will be absent, minimized or managed by discharge/transition of care (reference Seizure Disorder/Epilepsy (Adult) CPG).  Outcome: Ongoing (interventions implemented as appropriate)  Continuous EEG monitoring in place for pt. No seizure activity observed this shift. Seizure precautions in place. Will cont to monitor.

## 2018-09-17 NOTE — PLAN OF CARE
Problem: Patient Care Overview  Goal: Plan of Care Review  Outcome: Ongoing (interventions implemented as appropriate)  Plan of care reviewed with patient. All questions answered. VSS throughout shift. No seizures reported. Plan for sleep deprivation tonight-patient aware. Will continue to monitor.

## 2018-09-17 NOTE — ASSESSMENT & PLAN NOTE
vEEG  PS/HV   Hold all home AEDs:  Aptiom 800mg daily, Vimpat 200mg bid, and Lamictal 200mg bid.  Seizure precautions  Valium 1mg q5min prn x2 doses ordered for events >/=5 min.  Please call Epilepsy if dose needed.    Sleep deprivation tonight.    F/u [facility] Epilepsy Panel.

## 2018-09-17 NOTE — SUBJECTIVE & OBJECTIVE
Subjective:     Interval History: No clinical or electrographic events overnight (OVN).    Hospital Course:  9/12/18:  Admitted to EMU (on the neuro floor, hooked up to a cart).  9/12-13:  One clinical/electrographic seizure at 5:52am, with L temporal origination; clinically, patient had lip smacking and LUE repetitive hand movements.  In addition, bilateral independent epileptiform DCs seen, as well as spike wave DCs and intermittent L>R interictal slowing.  9/13-9/14:  One clinical/electrographic seizure around 4pm, with suspected L temporal origination; clinically, patient had lip smacking and LUE repetitive hand movements.  In addition, bilateral independent epileptiform DCs seen, as well as spike wave DCs and intermittent L>R interictal slowing.  Lamictal dose decreased from 200mg bid to 100mg bid.  9/14-15: No seizures.  Intermittent slowing and epileptiform transients occurring independently in a temporal distribution on either side as discussed previously.  9/15-17: No seizures.  Intermittent slowing and epileptiform transients occurring independently in a temporal distribution on either side as discussed previously.    Current Neurological Medications: Lamictal, Celexa    Current Facility-Administered Medications   Medication Dose Route Frequency Provider Last Rate Last Dose    acetaminophen tablet 650 mg  650 mg Oral Q4H PRN Genny Modi MD   650 mg at 09/13/18 2050    citalopram tablet 40 mg  40 mg Oral Daily Genny Modi MD   40 mg at 09/17/18 0821    diazePAM injection 1 mg  1 mg Intravenous Q5 Min PRN Genny Modi MD        docusate sodium capsule 100 mg  100 mg Oral BID PRN Genny Modi MD        prochlorperazine injection Soln 5 mg  5 mg Intravenous Q6H PRN Genny Modi MD        promethazine tablet 25 mg  25 mg Oral Q6H PRN Genny Modi MD        sodium chloride 0.9% flush 3 mL  3 mL Intravenous PRN Genny Modi MD   3 mL at 09/16/18 1614        Review of Systems   Eyes: Negative for visual disturbance.   Respiratory: Negative for cough and shortness of breath.    Cardiovascular: Negative for chest pain and palpitations.   Gastrointestinal: Negative for abdominal pain, constipation, diarrhea and vomiting. Nausea: s/p GTCs.   Genitourinary: Negative for dysuria, frequency and urgency.   Neurological: Positive for seizures. Headaches: s/p GTCs.   Psychiatric/Behavioral: Negative for agitation and confusion.     Objective:     Vital Signs (Most Recent):  Temp: 97.6 °F (36.4 °C) (09/17/18 0728)  Pulse: 61 (09/17/18 0758)  Resp: 18 (09/17/18 0728)  BP: 121/76 (09/17/18 0728)  SpO2: 95 % (09/17/18 0728) Vital Signs (24h Range):  Temp:  [96.6 °F (35.9 °C)-98 °F (36.7 °C)] 97.6 °F (36.4 °C)  Pulse:  [55-83] 61  Resp:  [17-20] 18  SpO2:  [88 %-100 %] 95 %  BP: (102-123)/(66-76) 121/76     Weight: 78 kg (172 lb)  Body mass index is 22.69 kg/m².    Physical Exam   Constitutional: He is oriented to person, place, and time. He appears well-developed and well-nourished. No distress.   HENT:   Head: Normocephalic and atraumatic.   Eyes: EOM are normal.   Cardiovascular: Normal rate.   Pulmonary/Chest: Effort normal.   Musculoskeletal: Normal range of motion.   Neurological: He is alert and oriented to person, place, and time. He has a normal Finger-Nose-Finger Test and a normal Heel to Shin Test.   Reflex Scores:       Bicep reflexes are 2+ on the right side and 2+ on the left side.       Brachioradialis reflexes are 2+ on the right side and 2+ on the left side.       Patellar reflexes are 2+ on the right side and 2+ on the left side.  Skin: Skin is warm and dry. He is not diaphoretic.   Psychiatric: He has a normal mood and affect. His speech is normal and behavior is normal. Judgment and thought content normal.   Nursing note and vitals reviewed.      NEUROLOGICAL EXAMINATION:     MENTAL STATUS   Oriented to person, place, and time.   Attention: normal.  Concentration: normal.   Speech: speech is normal   Level of consciousness: alert    CRANIAL NERVES     CN II   Visual fields full to confrontation.     CN III, IV, VI   Extraocular motions are normal.     CN V   Facial sensation intact.     CN VII   Facial expression full, symmetric.     CN VIII   Hearing: intact    CN XII   Tongue: not atrophic  Fasciculations: absent  Tongue deviation: none    MOTOR EXAM   Muscle bulk: normal  Overall muscle tone: normal    Strength   Right biceps: 5/5  Left biceps: 5/5  Right triceps: 5/5  Left triceps: 5/5  Left quadriceps: 5/5  Left hamstrin/5       4+/5 with RLE knee flexion and extension     REFLEXES     Reflexes   Right brachioradialis: 2+  Left brachioradialis: 2+  Right biceps: 2+  Left biceps: 2+  Right patellar: 2+  Left patellar: 2+    SENSORY EXAM   Light touch normal.     GAIT AND COORDINATION      Coordination   Finger to nose coordination: normal  Heel to shin coordination: normal    Tremor   Resting tremor: absent      Significant Labs:   Recent Lab Results     None          Significant Imaging: I have reviewed and interpreted all pertinent imaging results/findings within the past 24 hours.

## 2018-09-17 NOTE — PROGRESS NOTES
[facility]  Neurology  Progress Note    Patient Name: Darin Cunha  MRN: [id number]  Admission Date: 9/12/2018  Hospital Length of Stay: 5 days  Code Status: Full Code   Attending Provider: Abhijeet Do MD  Primary Care Physician: Primary Doctor No   Principal Problem:Complex partial epilepsy with generalization and with intractable epilepsy      Subjective:     Interval History: No clinical or electrographic events overnight (OVN).    Hospital Course:  9/12/18:  Admitted to EMU (on the neuro floor, hooked up to a cart).  9/12-13:  One clinical/electrographic seizure at 5:52am, with L temporal origination; clinically, patient had lip smacking and LUE repetitive hand movements.  In addition, bilateral independent epileptiform DCs seen, as well as spike wave DCs and intermittent L>R interictal slowing.  9/13-9/14:  One clinical/electrographic seizure around 4pm, with suspected L temporal origination; clinically, patient had lip smacking and LUE repetitive hand movements.  In addition, bilateral independent epileptiform DCs seen, as well as spike wave DCs and intermittent L>R interictal slowing.  Lamictal dose decreased from 200mg bid to 100mg bid.  9/14-15: No seizures.  Intermittent slowing and epileptiform transients occurring independently in a temporal distribution on either side as discussed previously.  9/15-17: No seizures.  Intermittent slowing and epileptiform transients occurring independently in a temporal distribution on either side as discussed previously.    Current Neurological Medications: Lamictal, Celexa    Current Facility-Administered Medications   Medication Dose Route Frequency Provider Last Rate Last Dose    acetaminophen tablet 650 mg  650 mg Oral Q4H PRN Genny Modi MD   650 mg at 09/13/18 2050    citalopram tablet 40 mg  40 mg Oral Daily Genny Modi MD   40 mg at 09/17/18 0821    diazePAM injection 1 mg  1 mg Intravenous Q5 Min PRN Genny Modi MD         docusate sodium capsule 100 mg  100 mg Oral BID PRN Genny Modi MD        prochlorperazine injection Soln 5 mg  5 mg Intravenous Q6H PRN Genny Modi MD        promethazine tablet 25 mg  25 mg Oral Q6H PRN Genny Modi MD        sodium chloride 0.9% flush 3 mL  3 mL Intravenous PRN Genny Modi MD   3 mL at 09/16/18 1614       Review of Systems   Eyes: Negative for visual disturbance.   Respiratory: Negative for cough and shortness of breath.    Cardiovascular: Negative for chest pain and palpitations.   Gastrointestinal: Negative for abdominal pain, constipation, diarrhea and vomiting. Nausea: s/p GTCs.   Genitourinary: Negative for dysuria, frequency and urgency.   Neurological: Positive for seizures. Headaches: s/p GTCs.   Psychiatric/Behavioral: Negative for agitation and confusion.     Objective:     Vital Signs (Most Recent):  Temp: 97.6 °F (36.4 °C) (09/17/18 0728)  Pulse: 61 (09/17/18 0758)  Resp: 18 (09/17/18 0728)  BP: 121/76 (09/17/18 0728)  SpO2: 95 % (09/17/18 0728) Vital Signs (24h Range):  Temp:  [96.6 °F (35.9 °C)-98 °F (36.7 °C)] 97.6 °F (36.4 °C)  Pulse:  [55-83] 61  Resp:  [17-20] 18  SpO2:  [88 %-100 %] 95 %  BP: (102-123)/(66-76) 121/76     Weight: 78 kg (172 lb)  Body mass index is 22.69 kg/m².    Physical Exam   Constitutional: He is oriented to person, place, and time. He appears well-developed and well-nourished. No distress.   HENT:   Head: Normocephalic and atraumatic.   Eyes: EOM are normal.   Cardiovascular: Normal rate.   Pulmonary/Chest: Effort normal.   Musculoskeletal: Normal range of motion.   Neurological: He is alert and oriented to person, place, and time. He has a normal Finger-Nose-Finger Test and a normal Heel to Shin Test.   Reflex Scores:       Bicep reflexes are 2+ on the right side and 2+ on the left side.       Brachioradialis reflexes are 2+ on the right side and 2+ on the left side.       Patellar reflexes are 2+ on the right side  and 2+ on the left side.  Skin: Skin is warm and dry. He is not diaphoretic.   Psychiatric: He has a normal mood and affect. His speech is normal and behavior is normal. Judgment and thought content normal.   Nursing note and vitals reviewed.      NEUROLOGICAL EXAMINATION:     MENTAL STATUS   Oriented to person, place, and time.   Attention: normal. Concentration: normal.   Speech: speech is normal   Level of consciousness: alert    CRANIAL NERVES     CN II   Visual fields full to confrontation.     CN III, IV, VI   Extraocular motions are normal.     CN V   Facial sensation intact.     CN VII   Facial expression full, symmetric.     CN VIII   Hearing: intact    CN XII   Tongue: not atrophic  Fasciculations: absent  Tongue deviation: none    MOTOR EXAM   Muscle bulk: normal  Overall muscle tone: normal    Strength   Right biceps: 5/5  Left biceps: 5/5  Right triceps: 5/5  Left triceps: 5/5  Left quadriceps: 5/5  Left hamstrin/5       4+/5 with RLE knee flexion and extension     REFLEXES     Reflexes   Right brachioradialis: 2+  Left brachioradialis: 2+  Right biceps: 2+  Left biceps: 2+  Right patellar: 2+  Left patellar: 2+    SENSORY EXAM   Light touch normal.     GAIT AND COORDINATION      Coordination   Finger to nose coordination: normal  Heel to shin coordination: normal    Tremor   Resting tremor: absent      Significant Labs:   Recent Lab Results     None          Significant Imaging: I have reviewed and interpreted all pertinent imaging results/findings within the past 24 hours.    Assessment and Plan:     * Complex partial epilepsy with generalization and with intractable epilepsy    vEEG  PS/HV   Hold all home AEDs:  Aptiom 800mg daily, Vimpat 200mg bid, and Lamictal 200mg bid.  Seizure precautions  Valium 1mg q5min prn x2 doses ordered for events >/=5 min.  Please call Epilepsy if dose needed.    Sleep deprivation tonight.    F/u [facility] Epilepsy Panel.        Depressed mood    Continue Celexa 40mg  daily            VTE Risk Mitigation (From admission, onward)        Ordered     IP VTE LOW RISK PATIENT  Once      09/12/18 1914          Genny Modi MD  Neurology  [facility]

## 2018-09-17 NOTE — PROCEDURES
EMU Monitoring  Date/Time: 9/17/2018 1:04 PM  Performed by: Delfin Srivastava MD  Authorized by: Martin Gallegos MD       EPILEPSY MONITORING UNIT  EEG/VIDEO TELEMETRY REPORT  DATE OF SERVICE: 9/16/18-9/20/18  EEG NUMBER: -6,7, 8. 9, 10  REQUESTED BY: Rosy  LOCATION OF SERVICE: [facility] [place]    METHODOLOGY      Electroencephalographic (EEG) is recorded with electrodes placed according to the International 10-20 placement system.  Thirty Two (32) channels of digital signal including the T1 and T2 electrodes are simultaneously recorded from the scalp and also including EKG, EMG  and/or eye movement monitors.  Recording band pass was 0.1 to 512 hz.  Digital video recording of the patient is simultaneously recorded with the EEG.  The patient is instructed report clinical symptoms which may occur during the recording session.  EEG and video recording is stored and archived in digital format. Activation procedures which include photic stimulation, hyperventilation and instructing patients to perform simple task are done in selected patients.         The EEG is displayed on a monitor screen and can be reformatted into different montages for evaluation.  The entire recoding is submitted for computer assisted analysis to detect spike and electrographic seizure activity.  The entire recording is visually reviewed and the times identified by computer analysis as being spikes or seizures are reviewed again.  Compresses spectral analysis (CSA) is also performed on the activity recorded from each individual channel.  This is displayed as a power display of frequencies from 0 to 30 Hz over time.   The CSA analysis is done and displayed continuously.  This is reviewed for asymmetries in power between homologous areas of the scalp and for presence of changes in power which canbe seen when seizures occur.  Sections of suspected abnormalities on the CSA is then compared with the original EEG recording.      [employer] software was also  utilized in the review of this study.  This software suite analyzes the EEG recording in multiple domains.  Coherence and rhythmicity is computed to identify EEG sections which may contain organized seizures.  Each channel undergoes analysis to detect presence of spike and sharp waves which have special and morphological characteristic of epileptic activity.  The routine EEG recording is converted from spacial into frequency domain.  This is then displayed comparing homologous areas to identify areas of significant asymmetry.  Algorithm to identify non-cortically generated artifact is used to separate eye movement, EMG and other artifact from the EEG.      Recording Times  Start on 9/16/18 at 07:00:41  Stop on 9/20/18 at 09:53:10    A total of 98  hours of EEG/Video telemetry was recorded.    ELECTROENCEPHALOGRAM  INTERICTAL:  The record shows a good  organization at rest, consisting of a 9.5-10 Hz posterior dominant rhythm with good  reactivity. There is mild bilateral beta activity. There is frequent, focal, bitemporal, independent theta slowing as well as frequent bitemporal, L>R, anteriorally predominant, independent spike and wave and sharp wave discharges. There are occasional 1-3 second bursts of generalized, high amplitude, sharply contoured rhythmic theta activity.     Drowsiness is characterized by attenuation of the background, vertex waves, and bilateral theta slowing. Stage II sleep is characterized by slowing, vertex waves, and symmetric sleep spindles. Slow wave and REM sleep are recorded.    Provocative maneuvers including hyperventilation and photic stimulation were performed.     EKG recording shows a sinus rhythm.    ICTAL:    Events/Seizures recorded  Seizure 1 - On 9/17/18 at 07:34:38 to 07:35:52  Seizure 2 - On 9/17/18 at 07:35:52 to 07:37:32  Seizure 3- On 9/17/18 at 07:47:07 to 07:47:27  Seizure 4- On 9/17/18 at 07:51:13 to 07:51:44  Seizure 5- On 9/17/18 at 08:30:00 to 08:30:14  Seizure 6-  On  9/17/18 at 11:57:34 to 11:57:59  Seizure 7- On 9/17/18 at 23:19:14 to at 23:20:02  Seizure 8- On 9/18/18 at 04:31:22 to 04:33:00  Seizure 9- On 9/18/18 at 11:58:57 to 12:00:40  Seizure 10- On 9/18/18 at 13:23:18 to 13:25:47  Seizure 11- On 9/19/18 at 08:31:23 to 08:32:07    DESCRIPTION OF SEIZURES:    Seizures 1-8- There is onset of sustained left temporal, rhythmic 4-5 Hz activity maximal over the anterior temporal region at T1 and T3. Clinically, the patient exhibits behavioral changes during seizures 6 and 7 during which he exhibits orofacial automatisms and right hand automatisms with behavioral arrest. His eyes deviate to the right during seizure 6.  Seizure 9: There is onset of subtle right hemispheric theta activity with rapid development of bilateral, posterior high amplitude, sharply contoured rhythmic delta activity with rapid increase to 5-7 Hz activity before becoming obscured by myogenic artifact.  Clinically, the patient moves his left leg briefly before repositioning himself in bed. He exhibits grasping automatisms with his left hand before exhibiting orofacial automatisms and shaking his left hand side to side. Facial automatisms become more pronounced over the L>R face before the patient tonically extends before exhibiting clonic jerking until end of the seizure  Seizure 10:  Electrographically, seizure onset is obscured by myogenic artifact, however background evolution similar to seizure 9 with rhythmic delta followed by rhythmic theta is seen. Electrographic end of the seizure is ultimately insidious. Clinically, the patient exhibits L sided orofacial automatisms and L arm and L arm>leg automatisms. He is not verbally responsive to caregivers and does not follow commands during the seizure.   Seizure 11: Electrographically and clinically similar to Seizures 1-8.    FINAL SUMMARY  ELECTROENCEPHALOGRAM: Abnormal study captured in the awake, asleep, and drowsy states. Bitemporal, independent  epileptiform discharges and mild to moderate focal bitemporal independent slowing are seen suggestive of an underlying irritative focus and possible structural abnormality in these regions. Multiple electrographic seizures are captured, largely with left temporal onset with one seizure with questionable right hemispheric onset with semiology suggestive of such.    Delfin Srivastava MD  Department of Neurology  [facility]

## 2018-09-18 LAB — ESLICARBAZEPINE: 1 MCG/ML

## 2018-09-18 PROCEDURE — 95951 PR EEG MONITORING/VIDEORECORD: CPT | Mod: 26,,, | Performed by: PSYCHIATRY & NEUROLOGY

## 2018-09-18 PROCEDURE — 99233 SBSQ HOSP IP/OBS HIGH 50: CPT | Mod: ,,, | Performed by: PSYCHIATRY & NEUROLOGY

## 2018-09-18 PROCEDURE — 95951 HC EEG MONITORING/VIDEO RECORD: CPT

## 2018-09-18 PROCEDURE — 94761 N-INVAS EAR/PLS OXIMETRY MLT: CPT

## 2018-09-18 PROCEDURE — 25000003 PHARM REV CODE 250: Performed by: STUDENT IN AN ORGANIZED HEALTH CARE EDUCATION/TRAINING PROGRAM

## 2018-09-18 PROCEDURE — 20600001 HC STEP DOWN PRIVATE ROOM

## 2018-09-18 PROCEDURE — 25000003 PHARM REV CODE 250: Performed by: PSYCHIATRY & NEUROLOGY

## 2018-09-18 RX ORDER — LAMOTRIGINE 100 MG/1
100 TABLET ORAL DAILY
Status: COMPLETED | OUTPATIENT
Start: 2018-09-18 | End: 2018-09-19

## 2018-09-18 RX ORDER — LACOSAMIDE 100 MG/1
200 TABLET ORAL EVERY 12 HOURS
Status: DISCONTINUED | OUTPATIENT
Start: 2018-09-18 | End: 2018-09-20 | Stop reason: HOSPADM

## 2018-09-18 RX ORDER — LAMOTRIGINE 100 MG/1
200 TABLET ORAL DAILY
Status: DISCONTINUED | OUTPATIENT
Start: 2018-09-20 | End: 2018-09-20 | Stop reason: HOSPADM

## 2018-09-18 RX ORDER — LACOSAMIDE 100 MG/1
200 TABLET ORAL EVERY 12 HOURS
Status: DISCONTINUED | OUTPATIENT
Start: 2018-09-18 | End: 2018-09-18

## 2018-09-18 RX ADMIN — LACOSAMIDE 200 MG: 100 TABLET, FILM COATED ORAL at 04:09

## 2018-09-18 RX ADMIN — CITALOPRAM HYDROBROMIDE 40 MG: 10 TABLET ORAL at 08:09

## 2018-09-18 RX ADMIN — ACETAMINOPHEN 650 MG: 325 TABLET ORAL at 04:09

## 2018-09-18 RX ADMIN — ACETAMINOPHEN 650 MG: 325 TABLET ORAL at 08:09

## 2018-09-18 RX ADMIN — ESLICARBAZEPINE ACETATE 1200 MG: 400 TABLET ORAL at 03:09

## 2018-09-18 RX ADMIN — LAMOTRIGINE 100 MG: 100 TABLET ORAL at 03:09

## 2018-09-18 NOTE — NURSING
EMU techs at bed and said patient went stiff and started staring into the air. Unresponsive to questions. Vitals 119/72,  94% on RA, HR 93. Lasted about 2 mins. Button pressed.

## 2018-09-18 NOTE — SUBJECTIVE & OBJECTIVE
Subjective:     Interval History: No clinical events overnight (OVN).    Hospital Course:  9/12/18:  Admitted to EMU (on the neuro floor, hooked up to a cart).  9/12-13:  One clinical/electrographic seizure at 5:52am, with L temporal origination; clinically, patient had lip smacking and LUE repetitive hand movements.  In addition, bilateral independent epileptiform DCs seen, as well as spike wave DCs and intermittent L>R interictal slowing.  9/13-9/14:  One clinical/electrographic seizure around 4pm, with suspected L temporal origination; clinically, patient had lip smacking and LUE repetitive hand movements.  In addition, bilateral independent epileptiform DCs seen, as well as spike wave DCs and intermittent L>R interictal slowing.  Lamictal dose decreased from 200mg bid to 100mg bid.  9/14-15: No seizures.  Intermittent slowing and epileptiform transients occurring independently in a temporal distribution on either side as discussed previously.  9/15-17: No seizures.  Intermittent slowing and epileptiform transients occurring independently in a temporal distribution on either side as discussed previously.  9/17-18:  Multiple subclinical, focal, L hemispheric seizures, with intermittent biparietal bursts.  No clinical events.    Current Neurological Medications: Lamictal, Celexa    Current Facility-Administered Medications   Medication Dose Route Frequency Provider Last Rate Last Dose    acetaminophen tablet 650 mg  650 mg Oral Q4H PRN Genny Modi MD   650 mg at 09/13/18 2050    citalopram tablet 40 mg  40 mg Oral Daily Genny Modi MD   40 mg at 09/18/18 0843    diazePAM injection 1 mg  1 mg Intravenous Q5 Min PRN Genny Modi MD        docusate sodium capsule 100 mg  100 mg Oral BID PRN Genny Modi MD        prochlorperazine injection Soln 5 mg  5 mg Intravenous Q6H PRN Genny Modi MD        promethazine tablet 25 mg  25 mg Oral Q6H PRN Genny Modi MD         sodium chloride 0.9% flush 3 mL  3 mL Intravenous PRN Genny Modi MD   3 mL at 09/16/18 1614       Review of Systems   Eyes: Negative for visual disturbance.   Respiratory: Negative for cough and shortness of breath.    Cardiovascular: Negative for chest pain and palpitations.   Gastrointestinal: Negative for abdominal pain, constipation, diarrhea and vomiting. Nausea: s/p GTCs.   Genitourinary: Negative for dysuria, frequency and urgency.   Neurological: Positive for seizures. Headaches: s/p GTCs.   Psychiatric/Behavioral: Negative for agitation and confusion.     Objective:     Vital Signs (Most Recent):  Temp: 96.1 °F (35.6 °C) (09/18/18 0740)  Pulse: 60 (09/18/18 0800)  Resp: 17 (09/18/18 0740)  BP: 116/65 (09/18/18 0740)  SpO2: 95 % (09/18/18 0740) Vital Signs (24h Range):  Temp:  [96.1 °F (35.6 °C)-98.6 °F (37 °C)] 96.1 °F (35.6 °C)  Pulse:  [] 60  Resp:  [17-18] 17  SpO2:  [94 %-99 %] 95 %  BP: (112-127)/(65-78) 116/65     Weight: 78 kg (172 lb)  Body mass index is 22.69 kg/m².    Physical Exam   Constitutional: He is oriented to person, place, and time. He appears well-developed and well-nourished. No distress.   HENT:   Head: Normocephalic and atraumatic.   Eyes: EOM are normal.   Cardiovascular: Normal rate.   Pulmonary/Chest: Effort normal.   Musculoskeletal: Normal range of motion.   Neurological: He is alert and oriented to person, place, and time. He has a normal Finger-Nose-Finger Test and a normal Heel to Shin Test.   Reflex Scores:       Bicep reflexes are 2+ on the right side and 2+ on the left side.       Brachioradialis reflexes are 2+ on the right side and 2+ on the left side.       Patellar reflexes are 2+ on the right side and 2+ on the left side.  Skin: Skin is warm and dry. He is not diaphoretic.   Psychiatric: He has a normal mood and affect. His speech is normal and behavior is normal. Judgment and thought content normal.   Nursing note and vitals reviewed.      NEUROLOGICAL  EXAMINATION:     MENTAL STATUS   Oriented to person, place, and time.   Attention: normal. Concentration: normal.   Speech: speech is normal   Level of consciousness: alert    CRANIAL NERVES     CN II   Visual fields full to confrontation.     CN III, IV, VI   Extraocular motions are normal.     CN V   Facial sensation intact.     CN VII   Facial expression full, symmetric.     CN VIII   Hearing: intact    CN XII   Tongue: not atrophic  Fasciculations: absent  Tongue deviation: none    MOTOR EXAM   Muscle bulk: normal  Overall muscle tone: normal    Strength   Right biceps: 5/5  Left biceps: 5/5  Right triceps: 5/5  Left triceps: 5/5  Left quadriceps: 5/5  Left hamstrin/5       4+/5 with RLE knee flexion and extension     REFLEXES     Reflexes   Right brachioradialis: 2+  Left brachioradialis: 2+  Right biceps: 2+  Left biceps: 2+  Right patellar: 2+  Left patellar: 2+    SENSORY EXAM   Light touch normal.     GAIT AND COORDINATION      Coordination   Finger to nose coordination: normal  Heel to shin coordination: normal    Tremor   Resting tremor: absent      Significant Labs:   Recent Lab Results     None          Significant Imaging: I have reviewed and interpreted all pertinent imaging results/findings within the past 24 hours.

## 2018-09-18 NOTE — NURSING
Pt had an event. Pt became stiff and staring in the air. Lips smacking. Last about 2 mins. Unable to take vitals due to pt. Becoming anxious and rolling around in bed, pulling at lines. Remained restless about 20 mins before relaxing and calming down.

## 2018-09-18 NOTE — ASSESSMENT & PLAN NOTE
vEEG  PS/HV   Hold all home AEDs:  Aptiom 800mg daily, Vimpat 200mg bid, and Lamictal 200mg bid.  Seizure precautions  Valium 1mg q5min prn x2 doses ordered for events >/=5 min.  Please call Epilepsy if dose needed.    F/u [facility] Epilepsy Panel.

## 2018-09-18 NOTE — PROCEDURES
DATE OF STUDY:  09/12/2018 through 09/16/2018    EEG NUMBERS:  EMU--1, EMU--2, EMU--3, EMU--3B,   EMU--4, EMU--5A and EMU--5.    EPILEPSY MONITORING UNIT  EEG AND VIDEO TELEMETRY REPORT    METHODOLOGY:  Electroencephalographic (EEG) is recorded with electrodes placed   according to the International 10-20 placement system.  Thirty Two (32) channels   of digital signal, including T1 and T2 electrodes, are simultaneously recorded   from the scalp and may also include EKG, EMG and/or eye movement monitors.    Recording band pass was 0.1 to 512 Hz.  Digital video recording of the patient   is simultaneously recorded with the EEG.  The patient is instructed to report   clinical symptoms which may occur during the recording session.  EEG and video   recording are stored and archived in digital format.  Activation procedures,   which include photic stimulation, hyperventilation and instructing patients to   perform simple tasks, are done in selected patients.    The EEG is displayed on a monitor screen and can be reformatted into different   montages for evaluation.  The entire recoding is submitted for computer-assisted   analysis to detect spike and electrographic seizure activity.  The entire   recording is visually reviewed, and the times identified by computer analysis as   being spikes or seizures are reviewed again.    Compressed spectral analysis (CSA) is also performed on the activity recorded   from each individual channel.  This is displayed as a power display of   frequencies from 0 to 30 Hz over time.  The CSA analysis is done and displayed   continuously.  This is reviewed for asymmetries in power between homologous   areas of the scalp and for presence of changes in power which can be seen when   seizures occur.  Sections of suspected abnormalities on the CSA are then   compared with the original EEG recording.    [employer] (PRIMP) software was also utilized in the review of  this study.  This software   suite analyzes the EEG recording in multiple domains.  Coherence and rhythmicity   are computed to identify EEG sections which may contain organized seizures.    Each channel undergoes analysis to detect presence of spike and sharp waves   which have special and morphological characteristics of epileptic activity.  The   routine EEG recording is converted from special into frequency domain.  This is   then displayed comparing homologous areas to identify areas of significant   asymmetry.  Algorithm to identify non-cortically generated artifact is used to   separate artifact from the EEG.    RECORDING TIMES:  Start on 09/12/2018, at 18:30:42.  Stop on 09/12/2018, at 18:56:21.  Start on 09/12/2018, at 19:00:51.  Stop on 09/12/2018, at 23:24:26.  Start on 09/12/2018, at 23:26:28.  Stop on 09/13/2018, at 10:53:19.  Start on 09/13/2018, at 10:57:46.  Stop on 09/14/2018, at 07:00:07.  Start on 09/14/2018, at 07:00:32.  Stop on 09/15/2018, at 07:00:06.  Start on 09/15/2018, at 07:00:27.  Stop on 09/15/2018, at 15:32:33.  Start on 09/15/2018, at 15:48:30.  Stop on 09/16/2018, at 07:00:04.  A total of 82 hours, 23 minutes and 25 seconds of video/EEG telemetry was   recorded.    SEIZURES:  Seizure #1:  Start on 09/13/2018, at 05:49:12, stop at 05:49:58.  Seizure #2:  Start on 09/13/2018, at 16:01:17, stop at 16:02:19.    FINDINGS:  The patient's background consists of a posteriorly dominant alpha   rhythm with a frequency of approximately 10 Hz, which is well formed, well   modulated and abolishes with eye opening.  Anteriorly, the patient's background   consists mainly of beta range frequencies.  There is intermittent focal slowing,   most commonly to the theta range seen independently in each temporal region.    Additionally, interictal epileptiform transients are noted.  These take the form   of spike wave discharges and are noted to occur independently both on the right   and the left in a  distribution that suggests anterior temporal seizure foci   bilaterally.  They do occur more commonly on the left.  At various times during   the study, the patient is noted to be in the awake, drowsy and asleep states   with stage N3 sleep architecture being observed.  Provocative maneuvers   including hyperventilation and photic stimulation do not induce any pathologic   changes in the patient's EEG.  The patient's EKG demonstrates sinus rhythm.    The patient had 2 seizures, which are described as below.    The first seizure occurred on 09/13/2018, beginning at 05:49:12.  The first   clear clinical manifestation of this seizure occurred at 00:49:31 when the   patient sat up and had oral automatisms.  The patient's left hand was under his   covers, so it is difficult to appreciate precisely what he did with this;   however, there were some movements questionable for automatism type movements.    The clinical seizure ended at 05:49:48.  From an electrographic perspective,   left temporal slowing emerged at 05:49:12.  Clearly formed sharps emerged at T1   with a frequency of approximately 2-1/2 Hz at 05:49:14.  The rhythmicity began   to build with the frequency increasing and spread being observed.  By 05:49:27,   the quality of the EEG was somewhat compromised by artifact.  Following the end   of the seizure at 05:49:58, a pattern of diffuse low amplitude slowing was seen.    The patient's second seizure occurred on 09/13/2018, beginning at 18:01:17.  The   first clear clinical manifestation of this seizure occurred at 16:01:34 when   the patient began to demonstrate oral automatisms.  The clinical seizure had   ended by 16:02:19.  From an electrographic perspective, the patient developed   rhythmic theta range frequencies, which were most notable anteriorly on the   left.  At 16:01:22, there was increasing artifact though it does appear that by   this time, the slowing also was present on the right.  The slowing  became more   organized and the frequency did increase somewhat.  The degree of artifact   present in the lateral chains does make it difficult to comment on where it was   best organized.  By 16:01:57, the degree of organization for the rhythmicity   began to deteriorate and by the conclusion of the seizure at 16:02:19, diffuse   slowing was noted.    INTERPRETATION:  This is an abnormal long-term video EEG monitoring study due to   the presence of independent slowing seen overlying the temporal regions   bilaterally, independent interictal epileptiform transients noted in a left   anterior temporal distribution bilaterally and the presence of 2 clinical   seizures.  The focal slowing is indicative of underlying focal cortical   dysfunction and this is somewhat more pronounced on the left than the right.    The interictal epileptiform transients indicate the presence of focal cortical   irritability and again is more prominent on the left than the right.  The first   seizure appears to have a left anterior temporal origin with subsequent spread.    The second seizure does not localize quite as well though again it appears as   though it likely originated in the anterior quadrant on the left.  Taken   together, these findings are indicative of a focal onset epilepsy.  They appear   to indicate that the patient does have seizures, which arise from the temporal   region on the left; however, there is also concern for a right temporal seizure   focus.  The study was subsequently continued in an effort to capture further   seizures.  Please see Dr. Srivastava' notes for the portions of the recording related   to those times.            TG/IN  dd: 09/16/2018 15:12:58 (CDT)  td: 09/17/2018 00:41:11 (CDT)  Doc ID   #[id number]  Job ID #[id number]    CC:

## 2018-09-18 NOTE — PLAN OF CARE
Problem: Patient Care Overview  Goal: Plan of Care Review  Outcome: Ongoing (interventions implemented as appropriate)  Plan of care reviewed with pt. Pt voiced understanding. Pt AAOx3. Pt denies any c/o during the shift. No apparent distress noted. Fall precautions maintained. Bed in lowest position, and locked. Call light within reach and advised to call for assistance. Side rails x 2 and slip resistant socks on at this time.     Problem: Fall Risk (Adult)  Goal: Identify Related Risk Factors and Signs and Symptoms  Related risk factors and signs and symptoms are identified upon initiation of Human Response Clinical Practice Guideline (CPG)  Outcome: Ongoing (interventions implemented as appropriate)  Fall risk and precautions discussed with pt and family. Acknowledged understanding. Fall precautions in place. No questions or concerns at present. Call light in reach. Will cont to monitor.     Problem: Seizure Disorder/Epilepsy (Adult)  Goal: Signs and Symptoms of Listed Potential Problems Will be Absent, Minimized or Managed (Seizure Disorder/Epilepsy)  Signs and symptoms of listed potential problems will be absent, minimized or managed by discharge/transition of care (reference Seizure Disorder/Epilepsy (Adult) CPG).  Outcome: Ongoing (interventions implemented as appropriate)  Continuous EEG monitoring in place for pt. Pt sleep deprived all night. No seizure activity observed this shift. Seizure precautions in place. Will cont to monitor.

## 2018-09-18 NOTE — PROGRESS NOTES
[facility]  Neurology  Progress Note    Patient Name: Darin Cunha  MRN: [id number]  Admission Date: 9/12/2018  Hospital Length of Stay: 6 days  Code Status: Full Code   Attending Provider: Abhijeet Do MD  Primary Care Physician: Primary Doctor No   Principal Problem:Complex partial epilepsy with generalization and with intractable epilepsy      Subjective:     Interval History: No clinical events overnight (OVN).    Hospital Course:  9/12/18:  Admitted to EMU (on the neuro floor, hooked up to a cart).  9/12-13:  One clinical/electrographic seizure at 5:52am, with L temporal origination; clinically, patient had lip smacking and LUE repetitive hand movements.  In addition, bilateral independent epileptiform DCs seen, as well as spike wave DCs and intermittent L>R interictal slowing.  9/13-9/14:  One clinical/electrographic seizure around 4pm, with suspected L temporal origination; clinically, patient had lip smacking and LUE repetitive hand movements.  In addition, bilateral independent epileptiform DCs seen, as well as spike wave DCs and intermittent L>R interictal slowing.  Lamictal dose decreased from 200mg bid to 100mg bid.  9/14-15: No seizures.  Intermittent slowing and epileptiform transients occurring independently in a temporal distribution on either side as discussed previously.  9/15-17: No seizures.  Intermittent slowing and epileptiform transients occurring independently in a temporal distribution on either side as discussed previously.  9/17-18:  Multiple subclinical, focal, L hemispheric seizures, with intermittent biparietal bursts.  No clinical events.    Current Neurological Medications: Lamictal, Celexa    Current Facility-Administered Medications   Medication Dose Route Frequency Provider Last Rate Last Dose    acetaminophen tablet 650 mg  650 mg Oral Q4H PRN Genny Modi MD   650 mg at 09/13/18 2050    citalopram tablet 40 mg  40 mg Oral Daily Genny Modi MD    40 mg at 09/18/18 0843    diazePAM injection 1 mg  1 mg Intravenous Q5 Min PRN Genny Modi MD        docusate sodium capsule 100 mg  100 mg Oral BID PRN Genny Modi MD        prochlorperazine injection Soln 5 mg  5 mg Intravenous Q6H PRN Genny Modi MD        promethazine tablet 25 mg  25 mg Oral Q6H PRN Genny Modi MD        sodium chloride 0.9% flush 3 mL  3 mL Intravenous PRN Genny Modi MD   3 mL at 09/16/18 1614       Review of Systems   Eyes: Negative for visual disturbance.   Respiratory: Negative for cough and shortness of breath.    Cardiovascular: Negative for chest pain and palpitations.   Gastrointestinal: Negative for abdominal pain, constipation, diarrhea and vomiting. Nausea: s/p GTCs.   Genitourinary: Negative for dysuria, frequency and urgency.   Neurological: Positive for seizures. Headaches: s/p GTCs.   Psychiatric/Behavioral: Negative for agitation and confusion.     Objective:     Vital Signs (Most Recent):  Temp: 96.1 °F (35.6 °C) (09/18/18 0740)  Pulse: 60 (09/18/18 0800)  Resp: 17 (09/18/18 0740)  BP: 116/65 (09/18/18 0740)  SpO2: 95 % (09/18/18 0740) Vital Signs (24h Range):  Temp:  [96.1 °F (35.6 °C)-98.6 °F (37 °C)] 96.1 °F (35.6 °C)  Pulse:  [] 60  Resp:  [17-18] 17  SpO2:  [94 %-99 %] 95 %  BP: (112-127)/(65-78) 116/65     Weight: 78 kg (172 lb)  Body mass index is 22.69 kg/m².    Physical Exam   Constitutional: He is oriented to person, place, and time. He appears well-developed and well-nourished. No distress.   HENT:   Head: Normocephalic and atraumatic.   Eyes: EOM are normal.   Cardiovascular: Normal rate.   Pulmonary/Chest: Effort normal.   Musculoskeletal: Normal range of motion.   Neurological: He is alert and oriented to person, place, and time. He has a normal Finger-Nose-Finger Test and a normal Heel to Shin Test.   Reflex Scores:       Bicep reflexes are 2+ on the right side and 2+ on the left side.       Brachioradialis  reflexes are 2+ on the right side and 2+ on the left side.       Patellar reflexes are 2+ on the right side and 2+ on the left side.  Skin: Skin is warm and dry. He is not diaphoretic.   Psychiatric: He has a normal mood and affect. His speech is normal and behavior is normal. Judgment and thought content normal.   Nursing note and vitals reviewed.      NEUROLOGICAL EXAMINATION:     MENTAL STATUS   Oriented to person, place, and time.   Attention: normal. Concentration: normal.   Speech: speech is normal   Level of consciousness: alert    CRANIAL NERVES     CN II   Visual fields full to confrontation.     CN III, IV, VI   Extraocular motions are normal.     CN V   Facial sensation intact.     CN VII   Facial expression full, symmetric.     CN VIII   Hearing: intact    CN XII   Tongue: not atrophic  Fasciculations: absent  Tongue deviation: none    MOTOR EXAM   Muscle bulk: normal  Overall muscle tone: normal    Strength   Right biceps: 5/5  Left biceps: 5/5  Right triceps: 5/5  Left triceps: 5/5  Left quadriceps: 5/5  Left hamstrin/5       4+/5 with RLE knee flexion and extension     REFLEXES     Reflexes   Right brachioradialis: 2+  Left brachioradialis: 2+  Right biceps: 2+  Left biceps: 2+  Right patellar: 2+  Left patellar: 2+    SENSORY EXAM   Light touch normal.     GAIT AND COORDINATION      Coordination   Finger to nose coordination: normal  Heel to shin coordination: normal    Tremor   Resting tremor: absent      Significant Labs:   Recent Lab Results     None          Significant Imaging: I have reviewed and interpreted all pertinent imaging results/findings within the past 24 hours.    Assessment and Plan:     * Complex partial epilepsy with generalization and with intractable epilepsy    vEEG  PS/HV   Hold all home AEDs:  Aptiom 800mg daily, Vimpat 200mg bid, and Lamictal 200mg bid.  Seizure precautions  Valium 1mg q5min prn x2 doses ordered for events >/=5 min.  Please call Epilepsy if dose  needed.    F/u [facility] Epilepsy Panel.        Depressed mood    Continue Celexa 40mg daily            VTE Risk Mitigation (From admission, onward)        Ordered     IP VTE LOW RISK PATIENT  Once      09/12/18 1914          Genny Modi MD  Neurology  [facility]

## 2018-09-19 PROCEDURE — 95951 HC EEG MONITORING/VIDEO RECORD: CPT

## 2018-09-19 PROCEDURE — 99233 SBSQ HOSP IP/OBS HIGH 50: CPT | Mod: ,,, | Performed by: PSYCHIATRY & NEUROLOGY

## 2018-09-19 PROCEDURE — 25000003 PHARM REV CODE 250: Performed by: PSYCHIATRY & NEUROLOGY

## 2018-09-19 PROCEDURE — 95951 PR EEG MONITORING/VIDEORECORD: CPT | Mod: 26,,, | Performed by: PSYCHIATRY & NEUROLOGY

## 2018-09-19 PROCEDURE — 25000003 PHARM REV CODE 250: Performed by: STUDENT IN AN ORGANIZED HEALTH CARE EDUCATION/TRAINING PROGRAM

## 2018-09-19 PROCEDURE — 20600001 HC STEP DOWN PRIVATE ROOM

## 2018-09-19 RX ORDER — LORAZEPAM 1 MG/1
1 TABLET ORAL 3 TIMES DAILY
Status: DISCONTINUED | OUTPATIENT
Start: 2018-09-19 | End: 2018-09-20 | Stop reason: HOSPADM

## 2018-09-19 RX ORDER — LORAZEPAM 1 MG/1
1 TABLET ORAL 2 TIMES DAILY
Status: DISCONTINUED | OUTPATIENT
Start: 2018-09-22 | End: 2018-09-20 | Stop reason: HOSPADM

## 2018-09-19 RX ORDER — LORAZEPAM 1 MG/1
1 TABLET ORAL DAILY
Status: DISCONTINUED | OUTPATIENT
Start: 2018-09-26 | End: 2018-09-20 | Stop reason: HOSPADM

## 2018-09-19 RX ADMIN — CITALOPRAM HYDROBROMIDE 40 MG: 10 TABLET ORAL at 09:09

## 2018-09-19 RX ADMIN — LAMOTRIGINE 100 MG: 100 TABLET ORAL at 09:09

## 2018-09-19 RX ADMIN — LACOSAMIDE 200 MG: 100 TABLET, FILM COATED ORAL at 08:09

## 2018-09-19 RX ADMIN — ESLICARBAZEPINE ACETATE 1200 MG: 400 TABLET ORAL at 09:09

## 2018-09-19 RX ADMIN — LORAZEPAM 1 MG: 1 TABLET ORAL at 08:09

## 2018-09-19 RX ADMIN — LACOSAMIDE 200 MG: 100 TABLET, FILM COATED ORAL at 09:09

## 2018-09-19 RX ADMIN — LORAZEPAM 1 MG: 1 TABLET ORAL at 02:09

## 2018-09-19 NOTE — PROGRESS NOTES
[facility]  Neurology  Progress Note    Patient Name: Darin Cunha  MRN: [id number]  Admission Date: 9/12/2018  Hospital Length of Stay: 7 days  Code Status: Full Code   Attending Provider: Abhijeet Do MD  Primary Care Physician: Primary Doctor No   Principal Problem:Complex partial epilepsy with generalization and with intractable epilepsy      Subjective:     Interval History: Multiple complex partial seizures + 1GTC OVN.  Home AEDs restarted: Vimpat 200mg bid; Lamictal restarted at 100mg bid x2 doses, to be subsequently increased to home dose of 200mg bid; Aptiom increased to 1200mg daily from 800mg daily    Hospital Course:  9/12/18:  Admitted to EMU (on the neuro floor, hooked up to a cart).  9/12-13:  One clinical/electrographic seizure at 5:52am, with L temporal origination; clinically, patient had lip smacking and LUE repetitive hand movements.  In addition, bilateral independent epileptiform DCs seen, as well as spike wave DCs and intermittent L>R interictal slowing.  9/13-9/14:  One clinical/electrographic seizure around 4pm, with suspected L temporal origination; clinically, patient had lip smacking and LUE repetitive hand movements.  In addition, bilateral independent epileptiform DCs seen, as well as spike wave DCs and intermittent L>R interictal slowing.  Lamictal dose decreased from 200mg bid to 100mg bid.  9/14-15: No seizures.  Intermittent slowing and epileptiform transients occurring independently in a temporal distribution on either side as discussed previously.  9/15-17: No seizures.  Intermittent slowing and epileptiform transients occurring independently in a temporal distribution on either side as discussed previously.  9/17-18:  Multiple subclinical, focal, L hemispheric seizures, with intermittent biparietal bursts.  No clinical events.    Current Neurological Medications: Lamictal, Celexa, Aptiom, Vimpat, Ativan    Current Facility-Administered Medications   Medication  Dose Route Frequency Provider Last Rate Last Dose    acetaminophen tablet 650 mg  650 mg Oral Q4H PRN Genny Modi MD   650 mg at 09/18/18 2051    citalopram tablet 40 mg  40 mg Oral Daily Genny Modi MD   40 mg at 09/19/18 0940    diazePAM injection 1 mg  1 mg Intravenous Q5 Min PRN Genny Modi MD        docusate sodium capsule 100 mg  100 mg Oral BID PRN Genny Modi MD        eslicarbazepine Tab 1,200 mg  1,200 mg Oral Daily Genny Modi MD   1,200 mg at 09/19/18 0939    lacosamide tablet 200 mg  200 mg Oral Q12H Abhijeet Do MD   200 mg at 09/19/18 0940    [START ON 9/20/2018] lamoTRIgine tablet 200 mg  200 mg Oral Daily Genny Modi MD        LORazepam tablet 1 mg  1 mg Oral TID Delfin Srivastava MD        Followed by    [START ON 9/22/2018] LORazepam tablet 1 mg  1 mg Oral BID Delfin Srivastava MD        Followed by    [START ON 9/26/2018] LORazepam tablet 1 mg  1 mg Oral Daily Delfin Srivastava MD        prochlorperazine injection Soln 5 mg  5 mg Intravenous Q6H PRN Genny Modi MD        promethazine tablet 25 mg  25 mg Oral Q6H PRN Genny Modi MD        sodium chloride 0.9% flush 3 mL  3 mL Intravenous PRN Genny Modi MD   3 mL at 09/16/18 1614       Review of Systems   Eyes: Negative for visual disturbance.   Respiratory: Negative for cough and shortness of breath.    Cardiovascular: Negative for chest pain and palpitations.   Gastrointestinal: Negative for abdominal pain, constipation, diarrhea and vomiting. Nausea: s/p GTCs.   Genitourinary: Negative for dysuria, frequency and urgency.   Neurological: Positive for seizures. Headaches: s/p GTCs.   Psychiatric/Behavioral: Negative for agitation and confusion.     Objective:     Vital Signs (Most Recent):  Temp: 96.4 °F (35.8 °C) (09/19/18 0813)  Pulse: 84 (09/19/18 0848)  Resp: 16 (09/19/18 0813)  BP: 112/72 (09/19/18 0813)  SpO2: 96 % (09/19/18 0813) Vital Signs (24h  Range):  Temp:  [96.4 °F (35.8 °C)-100.6 °F (38.1 °C)] 96.4 °F (35.8 °C)  Pulse:  [] 84  Resp:  [16-18] 16  SpO2:  [93 %-99 %] 96 %  BP: (112-131)/(67-84) 112/72     Weight: 78 kg (172 lb)  Body mass index is 22.69 kg/m².    Physical Exam   Constitutional: He is oriented to person, place, and time. He appears well-developed and well-nourished. No distress.   HENT:   Head: Normocephalic and atraumatic.   Eyes: EOM are normal.   Cardiovascular: Normal rate.   Pulmonary/Chest: Effort normal.   Musculoskeletal: Normal range of motion.   Neurological: He is alert and oriented to person, place, and time. He has a normal Finger-Nose-Finger Test and a normal Heel to Shin Test.   Reflex Scores:       Bicep reflexes are 2+ on the right side and 2+ on the left side.       Brachioradialis reflexes are 2+ on the right side and 2+ on the left side.       Patellar reflexes are 2+ on the right side and 2+ on the left side.  Skin: Skin is warm and dry. He is not diaphoretic.   Psychiatric: He has a normal mood and affect. His speech is normal and behavior is normal. Judgment and thought content normal.   Nursing note and vitals reviewed.      NEUROLOGICAL EXAMINATION:     MENTAL STATUS   Oriented to person, place, and time.   Attention: normal. Concentration: normal.   Speech: speech is normal   Level of consciousness: alert    CRANIAL NERVES     CN II   Visual fields full to confrontation.     CN III, IV, VI   Extraocular motions are normal.     CN V   Facial sensation intact.     CN VII   Facial expression full, symmetric.     CN VIII   Hearing: intact    CN XII   Tongue: not atrophic  Fasciculations: absent  Tongue deviation: none    MOTOR EXAM   Muscle bulk: normal  Overall muscle tone: normal    Strength   Right biceps: 5/5  Left biceps: 5/5  Right triceps: 5/5  Left triceps: 5/5  Right quadriceps: 5/5  Left quadriceps: 5/5  Right hamstrin/5  Left hamstrin/5    REFLEXES     Reflexes   Right brachioradialis:  2+  Left brachioradialis: 2+  Right biceps: 2+  Left biceps: 2+  Right patellar: 2+  Left patellar: 2+    SENSORY EXAM   Light touch normal.     GAIT AND COORDINATION      Coordination   Finger to nose coordination: normal  Heel to shin coordination: normal    Tremor   Resting tremor: absent      Significant Labs:   Recent Lab Results     None          Significant Imaging: I have reviewed and interpreted all pertinent imaging results/findings within the past 24 hours.    Assessment and Plan:     * Complex partial epilepsy with generalization and with intractable epilepsy    vEEG  PS/HV   Home AEDs restarted o 9/18:  Aptiom 800mg daily increased to 1200mg daily; Vimpat 200mg bid restarted as is, and Lamictal 200mg bid started as 100mg bid x2 doses, followed by 200mg bid.  Ativan taper started 9/19.  Seizure precautions  Valium 1mg q5min prn x2 doses ordered for events >/=5 min.  Please call Epilepsy if dose needed.    F/u [facility] Epilepsy Panel.    Likely DC tomorrow.        Depressed mood    Continue Celexa 40mg daily            VTE Risk Mitigation (From admission, onward)        Ordered     IP VTE LOW RISK PATIENT  Once      09/12/18 1914          Genny Modi MD  Neurology  [facility]

## 2018-09-19 NOTE — SUBJECTIVE & OBJECTIVE
Subjective:     Interval History: Multiple complex partial seizures + 1GTC OVN.  Home AEDs restarted: Vimpat 200mg bid; Lamictal restarted at 100mg bid x2 doses, to be subsequently increased to home dose of 200mg bid; Aptiom increased to 1200mg daily from 800mg daily    Hospital Course:  9/12/18:  Admitted to EMU (on the neuro floor, hooked up to a cart).  9/12-13:  One clinical/electrographic seizure at 5:52am, with L temporal origination; clinically, patient had lip smacking and LUE repetitive hand movements.  In addition, bilateral independent epileptiform DCs seen, as well as spike wave DCs and intermittent L>R interictal slowing.  9/13-9/14:  One clinical/electrographic seizure around 4pm, with suspected L temporal origination; clinically, patient had lip smacking and LUE repetitive hand movements.  In addition, bilateral independent epileptiform DCs seen, as well as spike wave DCs and intermittent L>R interictal slowing.  Lamictal dose decreased from 200mg bid to 100mg bid.  9/14-15: No seizures.  Intermittent slowing and epileptiform transients occurring independently in a temporal distribution on either side as discussed previously.  9/15-17: No seizures.  Intermittent slowing and epileptiform transients occurring independently in a temporal distribution on either side as discussed previously.  9/17-18:  Multiple subclinical, focal, L hemispheric seizures, with intermittent biparietal bursts.  No clinical events.    Current Neurological Medications: Lamictal, Celexa, Aptiom, Vimpat, Ativan    Current Facility-Administered Medications   Medication Dose Route Frequency Provider Last Rate Last Dose    acetaminophen tablet 650 mg  650 mg Oral Q4H PRN Genny Modi MD   650 mg at 09/18/18 2051    citalopram tablet 40 mg  40 mg Oral Daily Genny Modi MD   40 mg at 09/19/18 0940    diazePAM injection 1 mg  1 mg Intravenous Q5 Min PRN Genny Modi MD        docusate sodium capsule 100 mg   100 mg Oral BID PRN Genny Modi MD        eslicarbazepine Tab 1,200 mg  1,200 mg Oral Daily Genny Modi MD   1,200 mg at 09/19/18 0939    lacosamide tablet 200 mg  200 mg Oral Q12H Abhijeet Do MD   200 mg at 09/19/18 0940    [START ON 9/20/2018] lamoTRIgine tablet 200 mg  200 mg Oral Daily Genny Modi MD        LORazepam tablet 1 mg  1 mg Oral TID Delfin Srivastava MD        Followed by    [START ON 9/22/2018] LORazepam tablet 1 mg  1 mg Oral BID Delfin Srivastava MD        Followed by    [START ON 9/26/2018] LORazepam tablet 1 mg  1 mg Oral Daily Delfin Srivastava MD        prochlorperazine injection Soln 5 mg  5 mg Intravenous Q6H PRN Genny Modi MD        promethazine tablet 25 mg  25 mg Oral Q6H PRN Genny Modi MD        sodium chloride 0.9% flush 3 mL  3 mL Intravenous PRN Genny Modi MD   3 mL at 09/16/18 1614       Review of Systems   Eyes: Negative for visual disturbance.   Respiratory: Negative for cough and shortness of breath.    Cardiovascular: Negative for chest pain and palpitations.   Gastrointestinal: Negative for abdominal pain, constipation, diarrhea and vomiting. Nausea: s/p GTCs.   Genitourinary: Negative for dysuria, frequency and urgency.   Neurological: Positive for seizures. Headaches: s/p GTCs.   Psychiatric/Behavioral: Negative for agitation and confusion.     Objective:     Vital Signs (Most Recent):  Temp: 96.4 °F (35.8 °C) (09/19/18 0813)  Pulse: 84 (09/19/18 0848)  Resp: 16 (09/19/18 0813)  BP: 112/72 (09/19/18 0813)  SpO2: 96 % (09/19/18 0813) Vital Signs (24h Range):  Temp:  [96.4 °F (35.8 °C)-100.6 °F (38.1 °C)] 96.4 °F (35.8 °C)  Pulse:  [] 84  Resp:  [16-18] 16  SpO2:  [93 %-99 %] 96 %  BP: (112-131)/(67-84) 112/72     Weight: 78 kg (172 lb)  Body mass index is 22.69 kg/m².    Physical Exam   Constitutional: He is oriented to person, place, and time. He appears well-developed and well-nourished. No distress.   HENT:    Head: Normocephalic and atraumatic.   Eyes: EOM are normal.   Cardiovascular: Normal rate.   Pulmonary/Chest: Effort normal.   Musculoskeletal: Normal range of motion.   Neurological: He is alert and oriented to person, place, and time. He has a normal Finger-Nose-Finger Test and a normal Heel to Shin Test.   Reflex Scores:       Bicep reflexes are 2+ on the right side and 2+ on the left side.       Brachioradialis reflexes are 2+ on the right side and 2+ on the left side.       Patellar reflexes are 2+ on the right side and 2+ on the left side.  Skin: Skin is warm and dry. He is not diaphoretic.   Psychiatric: He has a normal mood and affect. His speech is normal and behavior is normal. Judgment and thought content normal.   Nursing note and vitals reviewed.      NEUROLOGICAL EXAMINATION:     MENTAL STATUS   Oriented to person, place, and time.   Attention: normal. Concentration: normal.   Speech: speech is normal   Level of consciousness: alert    CRANIAL NERVES     CN II   Visual fields full to confrontation.     CN III, IV, VI   Extraocular motions are normal.     CN V   Facial sensation intact.     CN VII   Facial expression full, symmetric.     CN VIII   Hearing: intact    CN XII   Tongue: not atrophic  Fasciculations: absent  Tongue deviation: none    MOTOR EXAM   Muscle bulk: normal  Overall muscle tone: normal    Strength   Right biceps: 5/5  Left biceps: 5/5  Right triceps: 5/5  Left triceps: 5/5  Right quadriceps: 5/5  Left quadriceps: 5/5  Right hamstrin/5  Left hamstrin/5    REFLEXES     Reflexes   Right brachioradialis: 2+  Left brachioradialis: 2+  Right biceps: 2+  Left biceps: 2+  Right patellar: 2+  Left patellar: 2+    SENSORY EXAM   Light touch normal.     GAIT AND COORDINATION      Coordination   Finger to nose coordination: normal  Heel to shin coordination: normal    Tremor   Resting tremor: absent      Significant Labs:   Recent Lab Results     None          Significant Imaging: I  have reviewed and interpreted all pertinent imaging results/findings within the past 24 hours.

## 2018-09-19 NOTE — ASSESSMENT & PLAN NOTE
vEEG  PS/HV   Home AEDs restarted o 9/18:  Aptiom 800mg daily increased to 1200mg daily; Vimpat 200mg bid restarted as is, and Lamictal 200mg bid started as 100mg bid x2 doses, followed by 200mg bid.  Ativan taper started 9/19.  Seizure precautions  Valium 1mg q5min prn x2 doses ordered for events >/=5 min.  Please call Epilepsy if dose needed.    F/u [facility] Epilepsy Panel.    Likely DC tomorrow.

## 2018-09-19 NOTE — PLAN OF CARE
Problem: Fall Risk (Adult)  Goal: Absence of Falls  Patient will demonstrate the desired outcomes by discharge/transition of care.  Outcome: Ongoing (interventions implemented as appropriate)  Patient demonstrated understanding of how to use event button and how to call staff for mobility. Patient will remain free of falls during hospital stay.

## 2018-09-20 VITALS
HEART RATE: 73 BPM | WEIGHT: 172 LBS | SYSTOLIC BLOOD PRESSURE: 130 MMHG | OXYGEN SATURATION: 99 % | RESPIRATION RATE: 18 BRPM | TEMPERATURE: 98 F | BODY MASS INDEX: 22.8 KG/M2 | HEIGHT: 73 IN | DIASTOLIC BLOOD PRESSURE: 78 MMHG

## 2018-09-20 PROCEDURE — 95813 EEG EXTND MNTR 61-119 MIN: CPT | Mod: 26,,, | Performed by: PSYCHIATRY & NEUROLOGY

## 2018-09-20 PROCEDURE — 25000003 PHARM REV CODE 250: Performed by: PSYCHIATRY & NEUROLOGY

## 2018-09-20 PROCEDURE — 99238 HOSP IP/OBS DSCHRG MGMT 30/<: CPT | Mod: ,,, | Performed by: PSYCHIATRY & NEUROLOGY

## 2018-09-20 PROCEDURE — 25000003 PHARM REV CODE 250: Performed by: STUDENT IN AN ORGANIZED HEALTH CARE EDUCATION/TRAINING PROGRAM

## 2018-09-20 RX ORDER — LORAZEPAM 1 MG/1
TABLET ORAL
Qty: 13 TABLET | Refills: 0 | Status: SHIPPED | OUTPATIENT
Start: 2018-09-20 | End: 2019-02-15

## 2018-09-20 RX ORDER — CITALOPRAM 40 MG/1
40 TABLET, FILM COATED ORAL DAILY
Qty: 30 TABLET | Refills: 11 | Status: SHIPPED | OUTPATIENT
Start: 2018-09-20 | End: 2019-01-15 | Stop reason: SDUPTHER

## 2018-09-20 RX ADMIN — CITALOPRAM HYDROBROMIDE 40 MG: 10 TABLET ORAL at 10:09

## 2018-09-20 RX ADMIN — LORAZEPAM 1 MG: 1 TABLET ORAL at 10:09

## 2018-09-20 RX ADMIN — ESLICARBAZEPINE ACETATE 1200 MG: 400 TABLET ORAL at 10:09

## 2018-09-20 RX ADMIN — LAMOTRIGINE 200 MG: 100 TABLET ORAL at 10:09

## 2018-09-20 RX ADMIN — LACOSAMIDE 200 MG: 100 TABLET, FILM COATED ORAL at 10:09

## 2018-09-20 NOTE — DISCHARGE SUMMARY
[facility]  Discharge Summary      Admit Date: 9/12/2018    Discharge Date and Time: 09/20/2018     Attending Physician: Dr. Srivastava    Reason for Admission: EMU characterization of events and AED titration    Procedures Performed: * No surgery found *    Hospital Course (synopsis of major diagnoses, care, treatment, and services provided during the course of the hospital stay):   9/12/18:  Admitted to EMU (on the neuro floor, hooked up to a cart).  9/12-13:  One clinical/electrographic seizure at 5:52am, with L temporal origination; clinically, patient had lip smacking and LUE repetitive hand movements.  In addition, bilateral independent epileptiform DCs seen, as well as spike wave DCs and intermittent L>R interictal slowing.  9/13-9/14:  One clinical/electrographic seizure around 4pm, with suspected L temporal origination; clinically, patient had lip smacking and LUE repetitive hand movements.  In addition, bilateral independent epileptiform DCs seen, as well as spike wave DCs and intermittent L>R interictal slowing.  Lamictal dose decreased from 200mg bid to 100mg bid.  9/14-15: No seizures.  Intermittent slowing and epileptiform transients occurring independently in a temporal distribution on either side as discussed previously.  9/15-17: No seizures.  Intermittent slowing and epileptiform transients occurring independently in a temporal distribution on either side as discussed previously.  9/17-18:  Multiple subclinical, focal, L hemispheric seizures, with intermittent biparietal bursts.  No clinical events.  9/18-19:  Multiple complex partial seizures + 1GTC OVN.  Home AEDs restarted: Vimpat 200mg bid; Lamictal restarted at 100mg bid x2 doses, to be subsequently increased to home dose of 200mg bid; Aptiom increased to 1200mg daily from 800mg daily  9/19-20:  No clinical or electrographic events.    On admission. Vimpat level 1.0, Aptiom level 1.0, and Lamictal level 1.0.  Patient reports  that he had skipped only 2 doses prior to admission: the night before, and that morning.  He reports that he had not missed any other doses that week, and he and his mom state that he has telephone reminders go off twice a day to ensure that he does not miss any doses.    Aptiom increased to 1200mg daily from 800 mg daily, and patient is being DCd on home doses of Vimpat 200mg bid and Lamictal 200mg bid, as well as an Ativan taper to help cover while his AEDs return to therapeutic levels.    Of note, he reported on admission that he was taking Celexa 40mg daily, not Lexapro 20 mg daily, and so his most recent home dose of Celexa was continued.       Significant Diagnostic Studies: EEG as above    Final Diagnoses:    Principal Problem: Complex partial epilepsy with generalization and with intractable epilepsy   Secondary Diagnoses:   Active Hospital Problems    Diagnosis  POA    *Complex partial epilepsy with generalization and with intractable epilepsy [G40.219]  Yes     Priority: 1 - High    Seizures [R56.9]  Yes    Depressed mood [F32.9]  Yes      Resolved Hospital Problems   No resolved problems to display.       Discharged Condition: stable    Disposition: Home or Self Care    Follow Up/Patient Instructions:     Medications:  Reconciled Home Medications:      Medication List      START taking these medications    citalopram 40 MG tablet  Commonly known as:  CELEXA  Take 1 tablet (40 mg total) by mouth once daily.     LORazepam 1 MG tablet  Commonly known as:  ATIVAN  Take 1mg three times daily Thursday and Friday, twice daily Saturday, Sunday, and Monday, and once a day Tuesday, Wednesday, and Thursday, and then stop.  This is while your other seizure meds are getting back in your system.        CHANGE how you take these medications    eslicarbazepine 400 mg Tab  Commonly known as:  APTIOM  Take 3 tablets (1,200 mg total) by mouth once daily.  What changed:    · medication strength  · how much to take  · when  to take this        CONTINUE taking these medications    lamoTRIgine 200 MG tablet  Commonly known as:  LAMICTAL  Take 200 mg by mouth 2 (two) times daily.     VIMPAT 200 mg Tab tablet  Generic drug:  lacosamide  Take 200 mg by mouth every 12 (twelve) hours.        STOP taking these medications    escitalopram oxalate 20 MG tablet  Commonly known as:  LEXAPRO          No discharge procedures on file.

## 2018-09-20 NOTE — SUBJECTIVE & OBJECTIVE
Subjective:     Interval History: No clinical or electrographic events.    Hospital Course:  9/12/18:  Admitted to EMU (on the neuro floor, hooked up to a cart).  9/12-13:  One clinical/electrographic seizure at 5:52am, with L temporal origination; clinically, patient had lip smacking and LUE repetitive hand movements.  In addition, bilateral independent epileptiform DCs seen, as well as spike wave DCs and intermittent L>R interictal slowing.  9/13-9/14:  One clinical/electrographic seizure around 4pm, with suspected L temporal origination; clinically, patient had lip smacking and LUE repetitive hand movements.  In addition, bilateral independent epileptiform DCs seen, as well as spike wave DCs and intermittent L>R interictal slowing.  Lamictal dose decreased from 200mg bid to 100mg bid.  9/14-15: No seizures.  Intermittent slowing and epileptiform transients occurring independently in a temporal distribution on either side as discussed previously.  9/15-17: No seizures.  Intermittent slowing and epileptiform transients occurring independently in a temporal distribution on either side as discussed previously.  9/17-18:  Multiple subclinical, focal, L hemispheric seizures, with intermittent biparietal bursts.  No clinical events.  9/18-19:  Multiple complex partial seizures + 1GTC OVN.  Home AEDs restarted: Vimpat 200mg bid; Lamictal restarted at 100mg bid x2 doses, to be subsequently increased to home dose of 200mg bid; Aptiom increased to 1200mg daily from 800mg daily  9/19-20:  No clinical or electrographic events.    Current Neurological Medications: Lamictal, Celexa, Aptiom, Vimpat, Ativan    Current Facility-Administered Medications   Medication Dose Route Frequency Provider Last Rate Last Dose    acetaminophen tablet 650 mg  650 mg Oral Q4H PRN Genny Modi MD   650 mg at 09/18/18 2051    citalopram tablet 40 mg  40 mg Oral Daily Genny Modi MD   40 mg at 09/19/18 0940    diazePAM injection  1 mg  1 mg Intravenous Q5 Min PRN Genny Modi MD        docusate sodium capsule 100 mg  100 mg Oral BID PRN Genny Modi MD        eslicarbazepine Tab 1,200 mg  1,200 mg Oral Daily Genny Modi MD   1,200 mg at 09/19/18 0939    lacosamide tablet 200 mg  200 mg Oral Q12H Abhijeet Do MD   200 mg at 09/19/18 2052    lamoTRIgine tablet 200 mg  200 mg Oral Daily Genny Modi MD        LORazepam tablet 1 mg  1 mg Oral TID Delfin Srivastava MD   1 mg at 09/19/18 2053    Followed by    [START ON 9/22/2018] LORazepam tablet 1 mg  1 mg Oral BID Delfin Srivastava MD        Followed by    [START ON 9/26/2018] LORazepam tablet 1 mg  1 mg Oral Daily Delfin Srivastava MD        prochlorperazine injection Soln 5 mg  5 mg Intravenous Q6H PRN Genny Modi MD        promethazine tablet 25 mg  25 mg Oral Q6H PRN Genny Modi MD        sodium chloride 0.9% flush 3 mL  3 mL Intravenous PRN Genny Modi MD   3 mL at 09/16/18 1614       Review of Systems   Eyes: Negative for visual disturbance.   Respiratory: Negative for cough and shortness of breath.    Cardiovascular: Negative for chest pain and palpitations.   Gastrointestinal: Negative for abdominal pain, constipation, diarrhea and vomiting. Nausea: s/p GTCs.   Genitourinary: Negative for dysuria, frequency and urgency.   Neurological: Positive for seizures. Headaches: s/p GTCs.   Psychiatric/Behavioral: Negative for agitation and confusion.     Objective:     Vital Signs (Most Recent):  Temp: 97.3 °F (36.3 °C) (09/20/18 0452)  Pulse: (!) 58 (09/20/18 0750)  Resp: 18 (09/20/18 0452)  BP: 108/63 (09/20/18 0452)  SpO2: 99 % (09/20/18 0452) Vital Signs (24h Range):  Temp:  [97.3 °F (36.3 °C)-98.1 °F (36.7 °C)] 97.3 °F (36.3 °C)  Pulse:  [] 58  Resp:  [18] 18  SpO2:  [96 %-99 %] 99 %  BP: (108-128)/(63-82) 108/63     Weight: 78 kg (172 lb)  Body mass index is 22.69 kg/m².    Physical Exam   Constitutional: He is oriented to  person, place, and time. He appears well-developed and well-nourished. No distress.   HENT:   Head: Normocephalic and atraumatic.   Eyes: EOM are normal.   Cardiovascular: Normal rate.   Pulmonary/Chest: Effort normal.   Musculoskeletal: Normal range of motion.   Neurological: He is alert and oriented to person, place, and time. He has a normal Finger-Nose-Finger Test and a normal Heel to Shin Test.   Reflex Scores:       Bicep reflexes are 2+ on the right side and 2+ on the left side.       Brachioradialis reflexes are 2+ on the right side and 2+ on the left side.       Patellar reflexes are 2+ on the right side and 2+ on the left side.  Skin: Skin is warm and dry. He is not diaphoretic.   Psychiatric: He has a normal mood and affect. His speech is normal and behavior is normal. Judgment and thought content normal.   Nursing note and vitals reviewed.      NEUROLOGICAL EXAMINATION:     MENTAL STATUS   Oriented to person, place, and time.   Attention: normal. Concentration: normal.   Speech: speech is normal   Level of consciousness: alert    CRANIAL NERVES     CN II   Visual fields full to confrontation.     CN III, IV, VI   Extraocular motions are normal.     CN V   Facial sensation intact.     CN VII   Facial expression full, symmetric.     CN VIII   Hearing: intact    CN XII   Tongue: not atrophic  Fasciculations: absent  Tongue deviation: none    MOTOR EXAM   Muscle bulk: normal  Overall muscle tone: normal    Strength   Right biceps: 5/5  Left biceps: 5/5  Right triceps: 5/5  Left triceps: 5/5  Right quadriceps: 5/5  Left quadriceps: 5/5  Right hamstrin/5  Left hamstrin/5    REFLEXES     Reflexes   Right brachioradialis: 2+  Left brachioradialis: 2+  Right biceps: 2+  Left biceps: 2+  Right patellar: 2+  Left patellar: 2+    SENSORY EXAM   Light touch normal.     GAIT AND COORDINATION      Coordination   Finger to nose coordination: normal  Heel to shin coordination: normal    Tremor   Resting tremor:  absent      Significant Labs:   Recent Lab Results     None          Significant Imaging: I have reviewed and interpreted all pertinent imaging results/findings within the past 24 hours.

## 2018-09-20 NOTE — ASSESSMENT & PLAN NOTE
vEEG  PS/HV   Aptiom 1200mg daily; Vimpat 200mg bid, and Lamictal 200mg bid.  Ativan taper started 9/19.  Seizure precautions  Valium 1mg q5min prn x2 doses ordered for events >/=5 min.  Please call Epilepsy if dose needed.    F/u [facility] Epilepsy Panel.    DC today.

## 2018-09-20 NOTE — PROGRESS NOTES
[facility]  Neurology  Progress Note    Patient Name: Darin Cunha  MRN: [id number]  Admission Date: 9/12/2018  Hospital Length of Stay: 8 days  Code Status: Full Code   Attending Provider: Abhijeet Do MD  Primary Care Physician: Primary Doctor No   Principal Problem:Complex partial epilepsy with generalization and with intractable epilepsy      Subjective:     Interval History: No clinical or electrographic events.    Hospital Course:  9/12/18:  Admitted to EMU (on the neuro floor, hooked up to a cart).  9/12-13:  One clinical/electrographic seizure at 5:52am, with L temporal origination; clinically, patient had lip smacking and LUE repetitive hand movements.  In addition, bilateral independent epileptiform DCs seen, as well as spike wave DCs and intermittent L>R interictal slowing.  9/13-9/14:  One clinical/electrographic seizure around 4pm, with suspected L temporal origination; clinically, patient had lip smacking and LUE repetitive hand movements.  In addition, bilateral independent epileptiform DCs seen, as well as spike wave DCs and intermittent L>R interictal slowing.  Lamictal dose decreased from 200mg bid to 100mg bid.  9/14-15: No seizures.  Intermittent slowing and epileptiform transients occurring independently in a temporal distribution on either side as discussed previously.  9/15-17: No seizures.  Intermittent slowing and epileptiform transients occurring independently in a temporal distribution on either side as discussed previously.  9/17-18:  Multiple subclinical, focal, L hemispheric seizures, with intermittent biparietal bursts.  No clinical events.  9/18-19:  Multiple complex partial seizures + 1GTC OVN.  Home AEDs restarted: Vimpat 200mg bid; Lamictal restarted at 100mg bid x2 doses, to be subsequently increased to home dose of 200mg bid; Aptiom increased to 1200mg daily from 800mg daily  9/19-20:  No clinical or electrographic events.    Current Neurological Medications:  Lamictal, Celexa, Aptiom, Vimpat, Ativan    Current Facility-Administered Medications   Medication Dose Route Frequency Provider Last Rate Last Dose    acetaminophen tablet 650 mg  650 mg Oral Q4H PRN Genny Modi MD   650 mg at 09/18/18 2051    citalopram tablet 40 mg  40 mg Oral Daily Genny Modi MD   40 mg at 09/19/18 0940    diazePAM injection 1 mg  1 mg Intravenous Q5 Min PRN Genny Modi MD        docusate sodium capsule 100 mg  100 mg Oral BID PRN Genny Modi MD        eslicarbazepine Tab 1,200 mg  1,200 mg Oral Daily Genny Modi MD   1,200 mg at 09/19/18 0939    lacosamide tablet 200 mg  200 mg Oral Q12H Abhijeet Do MD   200 mg at 09/19/18 2052    lamoTRIgine tablet 200 mg  200 mg Oral Daily Genny Modi MD        LORazepam tablet 1 mg  1 mg Oral TID Delfin Srivastava MD   1 mg at 09/19/18 2053    Followed by    [START ON 9/22/2018] LORazepam tablet 1 mg  1 mg Oral BID Delfin Srivastava MD        Followed by    [START ON 9/26/2018] LORazepam tablet 1 mg  1 mg Oral Daily Delfin Srivastava MD        prochlorperazine injection Soln 5 mg  5 mg Intravenous Q6H PRN Genny Modi MD        promethazine tablet 25 mg  25 mg Oral Q6H PRN Genny Modi MD        sodium chloride 0.9% flush 3 mL  3 mL Intravenous PRN Genny Modi MD   3 mL at 09/16/18 1614       Review of Systems   Eyes: Negative for visual disturbance.   Respiratory: Negative for cough and shortness of breath.    Cardiovascular: Negative for chest pain and palpitations.   Gastrointestinal: Negative for abdominal pain, constipation, diarrhea and vomiting. Nausea: s/p GTCs.   Genitourinary: Negative for dysuria, frequency and urgency.   Neurological: Positive for seizures. Headaches: s/p GTCs.   Psychiatric/Behavioral: Negative for agitation and confusion.     Objective:     Vital Signs (Most Recent):  Temp: 97.3 °F (36.3 °C) (09/20/18 0452)  Pulse: (!) 58 (09/20/18 0271)  Resp: 18  (09/20/18 0452)  BP: 108/63 (09/20/18 0452)  SpO2: 99 % (09/20/18 0452) Vital Signs (24h Range):  Temp:  [97.3 °F (36.3 °C)-98.1 °F (36.7 °C)] 97.3 °F (36.3 °C)  Pulse:  [] 58  Resp:  [18] 18  SpO2:  [96 %-99 %] 99 %  BP: (108-128)/(63-82) 108/63     Weight: 78 kg (172 lb)  Body mass index is 22.69 kg/m².    Physical Exam   Constitutional: He is oriented to person, place, and time. He appears well-developed and well-nourished. No distress.   HENT:   Head: Normocephalic and atraumatic.   Eyes: EOM are normal.   Cardiovascular: Normal rate.   Pulmonary/Chest: Effort normal.   Musculoskeletal: Normal range of motion.   Neurological: He is alert and oriented to person, place, and time. He has a normal Finger-Nose-Finger Test and a normal Heel to Shin Test.   Reflex Scores:       Bicep reflexes are 2+ on the right side and 2+ on the left side.       Brachioradialis reflexes are 2+ on the right side and 2+ on the left side.       Patellar reflexes are 2+ on the right side and 2+ on the left side.  Skin: Skin is warm and dry. He is not diaphoretic.   Psychiatric: He has a normal mood and affect. His speech is normal and behavior is normal. Judgment and thought content normal.   Nursing note and vitals reviewed.      NEUROLOGICAL EXAMINATION:     MENTAL STATUS   Oriented to person, place, and time.   Attention: normal. Concentration: normal.   Speech: speech is normal   Level of consciousness: alert    CRANIAL NERVES     CN II   Visual fields full to confrontation.     CN III, IV, VI   Extraocular motions are normal.     CN V   Facial sensation intact.     CN VII   Facial expression full, symmetric.     CN VIII   Hearing: intact    CN XII   Tongue: not atrophic  Fasciculations: absent  Tongue deviation: none    MOTOR EXAM   Muscle bulk: normal  Overall muscle tone: normal    Strength   Right biceps: 5/5  Left biceps: 5/5  Right triceps: 5/5  Left triceps: 5/5  Right quadriceps: 5/5  Left quadriceps: 5/5  Right  hamstrin/5  Left hamstrin/5    REFLEXES     Reflexes   Right brachioradialis: 2+  Left brachioradialis: 2+  Right biceps: 2+  Left biceps: 2+  Right patellar: 2+  Left patellar: 2+    SENSORY EXAM   Light touch normal.     GAIT AND COORDINATION      Coordination   Finger to nose coordination: normal  Heel to shin coordination: normal    Tremor   Resting tremor: absent      Significant Labs:   Recent Lab Results     None          Significant Imaging: I have reviewed and interpreted all pertinent imaging results/findings within the past 24 hours.    Assessment and Plan:     * Complex partial epilepsy with generalization and with intractable epilepsy    vEEG  PS/HV   Aptiom 1200mg daily; Vimpat 200mg bid, and Lamictal 200mg bid.  Ativan taper started .  Seizure precautions  Valium 1mg q5min prn x2 doses ordered for events >/=5 min.  Please call Epilepsy if dose needed.    F/u [facility] Epilepsy Panel.    DC today.        Depressed mood    Continue Celexa 40mg daily            VTE Risk Mitigation (From admission, onward)        Ordered     IP VTE LOW RISK PATIENT  Once      18          Genny Modi MD  Neurology  [facility]

## 2018-09-20 NOTE — PLAN OF CARE
Patient discharging home today with no d/c needs. Family will provide transportation home.     09/20/18 1046   Final Note   Assessment Type Final Discharge Note   Discharge Disposition Home   Right Care Referral Info   Post Acute Recommendation No Care

## 2018-09-20 NOTE — PLAN OF CARE
No significant shift events. Vital signs stable throughout shift. Plan of care reviewed with patient and mother. All questions answered. Will continue to monitor.

## 2018-09-21 ENCOUNTER — TELEPHONE (OUTPATIENT)
Dept: NEUROLOGY | Facility: CLINIC | Age: 23
End: 2018-09-21

## 2018-09-21 NOTE — TELEPHONE ENCOUNTER
----- Message from Dayan Farah MA sent at 9/21/2018  2:07 PM CDT -----  Can you do a P.A. For this patient please. Thanks!!  ----- Message -----  From: Dannie Ruiz  Sent: 9/21/2018   2:00 PM  To: Ly Royal V Staff    Needs Advice    Reason for call: Alexandra states PA is needed for eslicarbazepine (APTIOM) 1200 mg Tab. Pls call  to complete the PA. Alexandra states the pt will not have any medication left after tonight.        Communication Preference: Alexandra (mom) @ [phone]    Additional Information:

## 2018-09-24 ENCOUNTER — TELEPHONE (OUTPATIENT)
Dept: NEUROLOGY | Facility: CLINIC | Age: 23
End: 2018-09-24

## 2018-09-25 ENCOUNTER — TELEPHONE (OUTPATIENT)
Dept: NEUROLOGY | Facility: CLINIC | Age: 23
End: 2018-09-25

## 2018-09-25 NOTE — TELEPHONE ENCOUNTER
I notified pharmacy that pa was approved for aptiom 1200mg daily. They will notify pt when it is available

## 2018-09-28 LAB — MAYO MISCELLANEOUS RESULT (REF): NORMAL

## 2018-11-27 ENCOUNTER — TELEPHONE (OUTPATIENT)
Dept: NEUROLOGY | Facility: CLINIC | Age: 23
End: 2018-11-27

## 2018-11-27 NOTE — TELEPHONE ENCOUNTER
Spoke to Josselin. Pt possibly missed 2 doses of medication resulting in a grand mal seizure. Josselin told to monitor pt and keep a log of any other seizure activity and to keep follow up appointment with Dr Gallegos

## 2018-11-27 NOTE — TELEPHONE ENCOUNTER
----- Message from Dannie Ruiz sent at 11/27/2018  9:44 AM CST -----  Needs Advice    Reason for call: Josselin states pt had a grand mal seizure this past Friday and she's is asking to speak w/ the nurse regarding this        Communication Preference: Josselin @ [phone]    Additional Information:

## 2018-11-28 ENCOUNTER — TELEPHONE (OUTPATIENT)
Dept: NEUROLOGY | Facility: CLINIC | Age: 23
End: 2018-11-28

## 2018-11-29 ENCOUNTER — TELEPHONE (OUTPATIENT)
Dept: NEUROLOGY | Facility: CLINIC | Age: 23
End: 2018-11-29

## 2018-11-29 RX ORDER — LAMOTRIGINE 200 MG/1
200 TABLET ORAL 2 TIMES DAILY
Qty: 60 TABLET | Refills: 5 | Status: SHIPPED | OUTPATIENT
Start: 2018-11-29 | End: 2020-05-17

## 2018-11-29 NOTE — TELEPHONE ENCOUNTER
I called in refills for lamictal 200mg BID and aptiom 400mg 3/day both plus 5 refills per Dr. Gallegos

## 2018-11-29 NOTE — TELEPHONE ENCOUNTER
I was not able to get refills to pharmacy. There was no answer or voice mail to leave the message. I also called pt and left a message of above. I will try again tomorrow 11/29/18 to call in refill

## 2018-11-29 NOTE — TELEPHONE ENCOUNTER
----- Message from Dannie Ruiz sent at 11/28/2018  4:28 PM CST -----  Rx Refill/Request     Is this a Refill or New Rx: Refill   Rx Name and Strength:  lamoTRIgine (LAMICTAL) 200 MG tablet  Preferred Pharmacy with phone number: see below  Communication Preference: [phone]  Additional Information: Pt states he's out of the medication    [facility] [place] - [id number]  [id number]  [place] [address]  Phone: [phone] Fax: [phone]

## 2019-01-15 ENCOUNTER — TELEPHONE (OUTPATIENT)
Dept: NEUROLOGY | Facility: CLINIC | Age: 24
End: 2019-01-15

## 2019-01-15 RX ORDER — CITALOPRAM 40 MG/1
40 TABLET, FILM COATED ORAL DAILY
Qty: 30 TABLET | Refills: 11 | Status: SHIPPED | OUTPATIENT
Start: 2019-01-15 | End: 2019-02-15

## 2019-01-15 NOTE — TELEPHONE ENCOUNTER
Called in refills for Vimpat 200 mg 1 po BID to [facility] pharmacy x3 refills

## 2019-01-15 NOTE — TELEPHONE ENCOUNTER
----- Message from Mildred Butler sent at 1/15/2019  9:56 AM CST -----  Contact: self @ [phone]  Pt is req a refill for lacosamide (VIMPAT) 200 mg Tab tablet.    [facility] [address] [facility]                   [phone] (Phone)      [phone] (Fax)

## 2019-01-22 ENCOUNTER — OFFICE VISIT (OUTPATIENT)
Dept: NEUROLOGY | Facility: CLINIC | Age: 24
End: 2019-01-22
Payer: COMMERCIAL

## 2019-01-22 VITALS
SYSTOLIC BLOOD PRESSURE: 140 MMHG | BODY MASS INDEX: 22.97 KG/M2 | HEIGHT: 73 IN | WEIGHT: 173.31 LBS | DIASTOLIC BLOOD PRESSURE: 86 MMHG | HEART RATE: 99 BPM

## 2019-01-22 DIAGNOSIS — G40.409 EPILEPSY SYMPTOMATIC, GENERALIZED: ICD-10-CM

## 2019-01-22 PROCEDURE — 99999 PR PBB SHADOW E&M-EST. PATIENT-LVL III: ICD-10-PCS | Mod: PBBFAC,,, | Performed by: PSYCHIATRY & NEUROLOGY

## 2019-01-22 PROCEDURE — 99213 OFFICE O/P EST LOW 20 MIN: CPT | Mod: S$GLB,,, | Performed by: PSYCHIATRY & NEUROLOGY

## 2019-01-22 PROCEDURE — 99213 PR OFFICE/OUTPT VISIT, EST, LEVL III, 20-29 MIN: ICD-10-PCS | Mod: S$GLB,,, | Performed by: PSYCHIATRY & NEUROLOGY

## 2019-01-22 PROCEDURE — 99999 PR PBB SHADOW E&M-EST. PATIENT-LVL III: CPT | Mod: PBBFAC,,, | Performed by: PSYCHIATRY & NEUROLOGY

## 2019-01-22 NOTE — PROGRESS NOTES
INTERVAL HISTORY:  1/22/19:  In general doing very well.  Seizures well controlled except when he has missed med doses  2 CP sz and 1 GTC (All associated with missed doses)  Wants to try off citalopram    Aptiom 1200mg AM  LTG 200mg BID  LCM 200mg BID      Date: 08/14/2018   HISTORY OF PRESENT ILLNESS (HPI)  The patient is a 23 y.o.   The patient was initially referred for consultation by Dr Holland in [place]  The patient was accompanied today by family members who provided some of the history.     Darin Cunha appears with his family today for further evaluation of an ongoing   seizure disorder, which began about five years ago.  Notably, his seizures began   immediately after a wisdom tooth extraction procedure.  He had a confusional   episode about two days postoperatively that in retrospect was likely a focal   seizure and since then has had a total of five generalized tonic-clonic seizures   as well as multiple partial seizures, which are believed to be of temporal lobe   onset.  He has been treated in [place] by Dr. Holland over the years and   has also once seen specialists in [place] at [facility] for consultation.  He   has had outpatient EEGs and brief in-office EEGs that have revealed bilateral   temporal sharp activity and have captured at least one seizure, believed to be a   right temporal lobe onset seizure.     Currently, Darin is maintained on three anticonvulsants and is still having   breakthrough partial seizures frequently up to several times per month.  He   takes Aptiom, Vimpat and Lamictal at this point and has previously failed at   least three other medications.  He had no seizures prior to five years ago and   no early onset.  No early life risk factors for seizures.  He has had minor   bumps on the head, but no losses of consciousness or major concussions.  No   history of febrile seizures or neurological infections.  He is open to the   possibility of surgery as well as other  medical therapies today.  He has had an   MRI in [place], which was read normal, but I do not yet have those images.    He has not had a prolonged video EEG monitoring study.        NBB/HN  dd: 08/15/2018 08:49:04 (CDT)  td: 08/15/2018 09:02:20 (CDT)  Doc ID   #[id number]  Job ID #[id number]     CC:            [id number]                    Seizure Triggers  Sleep Deprivation - None  Other medications - None  Psych/stress - None  Photic stimulation - None  Hyperventilation - None  Medical Problems - None  Menses - No  Sensory Stimulation (light, sound, etc) - None  Missed dose of meds - None     AED Treatments  Present regimen  Aptiom 1200mg AM  LTG 200mg BID  LCM 200mg BID     Prior treatments  levetiracetam (Keppra, LEV)  carbamazepine (Tegretol, CBZ)  topiramate (Topamax, TPM)     Not tried  acetazolamide (Diamox, AZM)  amantadine     clobezam (Onfi or Frizium, CLB)  ethosuximide (Zarontin, ESM)  eslicarbazine (Aptiom, ESL)  felbamate (felbatol, FBM)  gabapentin (Neurontin, GBP)  lacosamide (Vimpat, LCM)   lamotrigine (Lamictal, LTG)      methsuximide (Celontin, MSM)  methyphenytoin (Mesantion, MHT)  oxcarbazepine (Trileptal OXC)  perampanel (Fycompa, FCP)   phenobarbital (Pb)  phenytoin (Dilantin, PHT)  pregabalin (Lyrica, PGB)  primidone (Mysoline, PRM)  retigabine (Potiga, RTG)  rufinamide (Banzel, RUF)  tiagabine (Gabatril,  TGB)     viagabatrin, (Sabril, VGB)  vagal nerve stimulator (VNS)  valproic acid (Depakote, VPA)  zonisamide (Zonegran, ZNS)  Benzodiazepines  diazepam - rectal (Diastatl)  diazepam - oral (Valium, DZ)  clonazepam (Klonopin, CZP)  clorazepate (Tranxene, CLZ)  Ativan  Brain Stimulation  Vagal Nerve Stimulation-n/a  DBS- n/a     Compliance method  Memory - yes  Mom or Spouse - Yes  Pill Box - no  Brian calendar - no  Turn over medication bottle - no  Phone alarm - no     Seizure Evaluation  EEG Routine -   EEG Ambulatory -   EEG\Video Monitoring -   MRI/MRA -   CT/CTA Scan -   PET Scan -    Neuropsychological evaluation -   DEXA Scan     Potential Epilepsy Risk Factors:   Pregnancy/Labor/Delivery - full term uncomplicated pregnancy labor and vaginal delivery  Febrile seizures - none  Head injury  - none  CNS infection - none     Stroke - none  Family Hx of Sz - none     PAST MEDICAL HISTORY:        Active Ambulatory Problems     Diagnosis Date Noted    No Active Ambulatory Problems           Resolved Ambulatory Problems     Diagnosis Date Noted    No Resolved Ambulatory Problems      No Additional Past Medical History         PAST SURGICAL HISTORY: No past surgical history on file.      FAMILY HISTORY: No family history on file.       SOCIAL HISTORY:   Social History               Socioeconomic History    Marital status: Single       Spouse name: Not on file    Number of children: Not on file    Years of education: Not on file    Highest education level: Not on file   Social Needs    Financial resource strain: Not on file    Food insecurity - worry: Not on file    Food insecurity - inability: Not on file    Transportation needs - medical: Not on file    Transportation needs - non-medical: Not on file   Occupational History    Not on file   Tobacco Use    Smoking status: Not on file   Substance and Sexual Activity    Alcohol use: Not on file    Drug use: Not on file    Sexual activity: Not on file   Other Topics Concern    Not on file   Social History Narrative    Not on file            SUBSTANCE USE:  Social History           Tobacco Use    Smoking status: Not on file   Substance and Sexual Activity    Alcohol use: Not on file    Drug use: Not on file    Sexual activity: Not on file      Social History           Tobacco Use    Smoking status: Not on file   Substance Use Topics    Alcohol use: Not on file         ALLERGIES: Patient has no allergy information on record.         Review of Systems   Constitutional: Negative for chills, diaphoresis, fever, malaise/fatigue and  weight loss.   HENT: Negative for ear pain, hearing loss, nosebleeds and tinnitus.    Eyes: Negative for blurred vision, double vision, photophobia and pain.   Respiratory: Negative for cough, hemoptysis and shortness of breath.    Cardiovascular: Negative for chest pain, palpitations, orthopnea and leg swelling.   Gastrointestinal: Negative for abdominal pain, blood in stool, constipation, diarrhea, heartburn, nausea and vomiting.   Genitourinary: Negative for dysuria and hematuria.   Musculoskeletal: Negative for falls, joint pain and myalgias.   Skin: Negative for itching and rash.   Neurological: Negative for dizziness, tingling, tremors, sensory change, speech change, focal weakness, loss of consciousness, weakness and headaches.   Endo/Heme/Allergies: Negative for environmental allergies. Does not bruise/bleed easily.   Psychiatric/Behavioral: Negative for depression, hallucinations, memory loss and substance abuse. The patient is not nervous/anxious and does not have insomnia.             PHYSICAL EXAM:     There were no vitals taken for this visit.        Neurologic Exam        Higher Cortical Function:    Patient is a well developed, pleasant, well groomed individual appearing their stated age  Oriented - intact to person, place and time    Fund of knowledge was appropriate.    Language - Speech was fluent without evidence for an aphasia.  R-L Orientation - Intact   Agnosias, agraphesthesia, or astereognosis - not present.   Cranial Nerves II - XII:    EOMs were intact with normal smooth and no nystagmus.    PERRLA. Funduscopic exam - disc were flat with normal A/V ratio and no exudates or hemorrhages.   Visual fields were full to confrontation.    Motor - facial movement was symmetrical and normal.    Facial sensory - Light touch and pin prick sensations were normal.    Hearing was normal.  Palate moved well and was symmetrical    Tongue movement was full & the patient could speak without  difficulty.  Motor: Power, bulk and tone were normal in all extremities.  Coordination:  Normal  Gait:  Normal  Tremor: resting, postural, intentional - none  Cardiovascular:  No edema  Skin: No rash             IMPRESSION  1. 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 Well controlled on meds now (except when missed doses)  3. Mood is fine-wants to come off of antidepressant    PLAN:  1. Cont AEDS same doses--encourage compliance to 100%  2. Check levels today  3. MRI - 3T to further evaluate possibility of future surgery should meds ultimately fail  4. Wean off of citalopram this month  5. Seizure precautions  6. No cleared to drive until sz free 6m - Discussed today

## 2019-01-31 ENCOUNTER — TELEPHONE (OUTPATIENT)
Dept: NEUROLOGY | Facility: CLINIC | Age: 24
End: 2019-01-31

## 2019-01-31 NOTE — TELEPHONE ENCOUNTER
Spoke with Josselin. Pt has 2 small seizures. Pt considering VNS or medication changes. Scheduled to see Dr Gallegos Feb 15

## 2019-01-31 NOTE — TELEPHONE ENCOUNTER
----- Message from Yadi Causey sent at 1/31/2019  8:04 AM CST -----  Needs Advice    Reason for call:calling to get clarification on the patient's medications, on how he is to take them. Please call.        Communication Preference:Josselin (wife) @ [phone]    Additional Information:

## 2019-02-08 ENCOUNTER — TELEPHONE (OUTPATIENT)
Dept: NEUROLOGY | Facility: CLINIC | Age: 24
End: 2019-02-08
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
## 2019-02-15 ENCOUNTER — HOSPITAL ENCOUNTER (OUTPATIENT)
Dept: RADIOLOGY | Facility: HOSPITAL | Age: 24
Discharge: HOME | End: 2019-02-15
Attending: PSYCHIATRY & NEUROLOGY
Payer: COMMERCIAL

## 2019-02-15 ENCOUNTER — OFFICE VISIT (OUTPATIENT)
Dept: NEUROLOGY | Facility: CLINIC | Age: 24
End: 2019-02-15
Payer: COMMERCIAL

## 2019-02-15 VITALS
SYSTOLIC BLOOD PRESSURE: 141 MMHG | WEIGHT: 170.63 LBS | DIASTOLIC BLOOD PRESSURE: 87 MMHG | HEIGHT: 73 IN | HEART RATE: 100 BPM | BODY MASS INDEX: 22.62 KG/M2

## 2019-02-15 DIAGNOSIS — R56.9 SEIZURES: Primary | ICD-10-CM

## 2019-02-15 DIAGNOSIS — R56.9 SEIZURE: ICD-10-CM

## 2019-02-15 DIAGNOSIS — G40.409 EPILEPSY SYMPTOMATIC, GENERALIZED: ICD-10-CM

## 2019-02-15 PROCEDURE — 70551 MRI BRAIN STEM W/O DYE: CPT | Mod: 26,,, | Performed by: RADIOLOGY

## 2019-02-15 PROCEDURE — 99999 PR PBB SHADOW E&M-EST. PATIENT-LVL III: CPT | Mod: PBBFAC,,, | Performed by: PSYCHIATRY & NEUROLOGY

## 2019-02-15 PROCEDURE — 70551 MRI BRAIN STEM W/O DYE: CPT | Mod: TC

## 2019-02-15 PROCEDURE — 70551 MRI BRAIN EPILEPSY WITHOUT CONTRAST: ICD-10-PCS | Mod: 26,,, | Performed by: RADIOLOGY

## 2019-02-15 PROCEDURE — 99999 PR PBB SHADOW E&M-EST. PATIENT-LVL III: ICD-10-PCS | Mod: PBBFAC,,, | Performed by: PSYCHIATRY & NEUROLOGY

## 2019-02-15 PROCEDURE — 99215 OFFICE O/P EST HI 40 MIN: CPT | Mod: S$GLB,,, | Performed by: PSYCHIATRY & NEUROLOGY

## 2019-02-15 PROCEDURE — 99215 PR OFFICE/OUTPT VISIT, EST, LEVL V, 40-54 MIN: ICD-10-PCS | Mod: S$GLB,,, | Performed by: PSYCHIATRY & NEUROLOGY

## 2019-02-15 NOTE — LETTER
February 15, 2019    Darin Cunha  [address] [facility] [facility] [address]         [facility] - Neurology Epilepsy  [address]  [facility] [address]  Phone: [phone]  Fax: [phone] February 15, 2019     Patient: Darin Cunha   YOB: 1995   Date of Visit: 2/15/2019       To Whom It May Concern:    It is my medical opinion that Darin Cunha may return to work with the following restrictions: no driving, no operation of equipment, such as blow torches, welding machines or any other tools that can cause harm if patient loses consciousness, and no climbing of ladders over one foot off the ground .    If you have any questions or concerns, please don't hesitate to call.    Sincerely,        Dr. Martin Gallegos MD
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
## 2019-02-15 NOTE — LETTER
February 15, 2019    Darin Cunha  [address] [facility] [address]         [facility] - Neurology Epilepsy  [address]  [facility] [address]  Phone: [phone]  Fax: [phone] February 15, 2019     Patient: Darin Cunha   YOB: 1995   Date of Visit: 2/15/2019       To Whom It May Concern:    It is my medical opinion that Darin Cunha may return to work with the following restrictions: no driving, no operation of equipment, such as blow torches, or any other tools that can cause harm if patient loses consciousness, and no climbing of ladders over one foot off the ground .    Sincerely,      Dr. Martin Gallegos MD

## 2019-02-18 ENCOUNTER — TELEPHONE (OUTPATIENT)
Dept: NEUROLOGY | Facility: CLINIC | Age: 24
End: 2019-02-18

## 2019-02-20 ENCOUNTER — TELEPHONE (OUTPATIENT)
Dept: NEUROLOGY | Facility: CLINIC | Age: 24
End: 2019-02-20

## 2019-02-27 ENCOUNTER — TELEPHONE (OUTPATIENT)
Dept: NEUROLOGY | Facility: CLINIC | Age: 24
End: 2019-02-27

## 2019-02-27 NOTE — TELEPHONE ENCOUNTER
Patient called to inform us that the lamictal 300 mg is making his dizzy and giving her nausea and occasional vomiting. Pt instructed to split up the dose and let us know if it helps with the symptoms.

## 2019-03-07 ENCOUNTER — OFFICE VISIT (OUTPATIENT)
Dept: NEUROSURGERY | Facility: CLINIC | Age: 24
End: 2019-03-07
Payer: COMMERCIAL

## 2019-03-07 ENCOUNTER — HOSPITAL ENCOUNTER (OUTPATIENT)
Dept: RADIOLOGY | Facility: HOSPITAL | Age: 24
Discharge: HOME | End: 2019-03-07
Attending: PSYCHIATRY & NEUROLOGY
Payer: COMMERCIAL

## 2019-03-07 DIAGNOSIS — R56.9 SEIZURES: ICD-10-CM

## 2019-03-07 DIAGNOSIS — G40.219 COMPLEX PARTIAL EPILEPSY WITH GENERALIZATION AND WITH INTRACTABLE EPILEPSY: Primary | ICD-10-CM

## 2019-03-07 LAB — POCT GLUCOSE: 107 MG/DL (ref 70–110)

## 2019-03-07 PROCEDURE — 99203 PR OFFICE/OUTPT VISIT, NEW, LEVL III, 30-44 MIN: ICD-10-PCS | Mod: S$PBB,,, | Performed by: NEUROLOGICAL SURGERY

## 2019-03-07 PROCEDURE — 99211 OFF/OP EST MAY X REQ PHY/QHP: CPT | Mod: PBBFAC,25 | Performed by: NEUROLOGICAL SURGERY

## 2019-03-07 PROCEDURE — 78608 NM PET BRAIN: ICD-10-PCS | Mod: 26,PI,, | Performed by: RADIOLOGY

## 2019-03-07 PROCEDURE — 78608 BRAIN IMAGING (PET): CPT | Mod: 26,PI,, | Performed by: RADIOLOGY

## 2019-03-07 PROCEDURE — 78608 BRAIN IMAGING (PET): CPT | Mod: TC

## 2019-03-07 PROCEDURE — 99203 OFFICE O/P NEW LOW 30 MIN: CPT | Mod: S$PBB,,, | Performed by: NEUROLOGICAL SURGERY

## 2019-03-07 PROCEDURE — 99999 PR PBB SHADOW E&M-EST. PATIENT-LVL I: CPT | Mod: PBBFAC,,, | Performed by: NEUROLOGICAL SURGERY

## 2019-03-07 PROCEDURE — 99999 PR PBB SHADOW E&M-EST. PATIENT-LVL I: ICD-10-PCS | Mod: PBBFAC,,, | Performed by: NEUROLOGICAL SURGERY

## 2019-03-07 NOTE — PROGRESS NOTES
This patient was referred to me by the epilepsy team for evaluation for a vagal nerve stimulator placement.  He is a 23-year-old male with a long history of intractable partial complex epilepsy that has failed over 10 medications according to the patient and his family.  He is currently on triple therapy of Lamictal, Vimpat, and Aptiom.  He still has regular breakthrough seizures.  His review of systems, family history, surgical history, social history, allergies and medications are reviewed in epic.  On exam today is awake alert appropriate his pupils equal and reactive his extraocular moves are full his face is symmetric and his tongue is midline.  He has no drift, no leg, no dysmetria.  He has a normal gait.  He has got full strength throughout both upper and lower extremities with normal reflexes.  By review of his MRI scan of the brain and the PET scan the brain:  I do not see any obvious lesion or epilepsy focus.  So I think this is a non lesional case.  I had an in-depth discussion with the patient and his family about vagal nerve stimulator and its role as an adjuvant treatment for epilepsy and that it is not a cure.  Insert the questions they understand the risk and benefit. I have discussed the risks/benefits, indications, and alternatives for the proposed procedure in detail. I have answered all of their questions and patient wish to proceed with surgery. We will schedule patient.

## 2019-03-07 NOTE — LETTER
March 7, 2019      Martin Gallegos MD  [address]  [facility] [address]           [facility] - Neurosurgery 7th Fl  [address]  [facility] [address]  Phone: [phone]          Patient: Darin Cunha   MR Number: [id number]   YOB: 1995   Date of Visit: 3/7/2019       Dear Dr. Martin Gallegos:    Thank you for referring Darin Cunha to me for evaluation. Attached you will find relevant portions of my assessment and plan of care.    If you have questions, please do not hesitate to call me. I look forward to following Darin Cunha along with you.    Sincerely,    Reuben Gupta MD    Enclosure  CC:  No Recipients    If you would like to receive this communication electronically, please contact [email or web address] or [phone] to request more information on [facility] access.    For providers and/or their staff who would like to refer a patient to [facility], please contact us through our one-stop-shop provider referral line, [facility], at [phone].    If you feel you have received this communication in error or would no longer like to receive these types of communications, please e-mail [email or web address]

## 2019-03-08 ENCOUNTER — TELEPHONE (OUTPATIENT)
Dept: NEUROSURGERY | Facility: CLINIC | Age: 24
End: 2019-03-08

## 2019-03-08 DIAGNOSIS — G40.211 LOCALIZATION-RELATED (FOCAL) (PARTIAL) SYMPTOMATIC EPILEPSY AND EPILEPTIC SYNDROMES WITH COMPLEX PARTIAL SEIZURES, INTRACTABLE, WITH STATUS EPILEPTICUS: Primary | ICD-10-CM

## 2019-03-19 ENCOUNTER — TELEPHONE (OUTPATIENT)
Dept: NEUROLOGY | Facility: CLINIC | Age: 24
End: 2019-03-19

## 2019-03-19 NOTE — TELEPHONE ENCOUNTER
----- Message from Yadi Causey sent at 3/18/2019  4:39 PM CDT -----  Contact: Josselin(wife) @ [phone]  Calling to speak with someone regarding the patient's disability, needing paperwork complete. Please call.

## 2019-03-26 ENCOUNTER — TELEPHONE (OUTPATIENT)
Dept: NEUROLOGY | Facility: CLINIC | Age: 24
End: 2019-03-26

## 2019-03-26 NOTE — TELEPHONE ENCOUNTER
Patient stated his vision was off and he fell this am. His medication was too strong. He was on 400 mg bid of lamictal. Pt advised to go back down to 300 mg bid. He stated he did well on that. Instructed to call for needs.

## 2019-04-01 ENCOUNTER — TELEPHONE (OUTPATIENT)
Dept: NEUROLOGY | Facility: CLINIC | Age: 24
End: 2019-04-01

## 2019-04-01 NOTE — TELEPHONE ENCOUNTER
I returned pt's call and gave him the number to the disability dept. At [facility]    ----- Message from Yadi Causey sent at 4/1/2019  2:08 PM CDT -----  Contact: pt @ [phone]  Calling to speak with someone in Dr. Gallegos's office regarding him getting paperwork to file for disability for his seizures. Says he was told to obtain the information form his neurologist. Please call.

## 2019-04-02 ENCOUNTER — TELEPHONE (OUTPATIENT)
Dept: NEUROSURGERY | Facility: CLINIC | Age: 24
End: 2019-04-02

## 2019-04-02 NOTE — TELEPHONE ENCOUNTER
Spoke with patient informed him of his 7:30, arrival time. Patient was informed to report to the day of surgery center. Patient informed to fast after midnight the night before surgery.

## 2019-04-03 ENCOUNTER — TELEPHONE (OUTPATIENT)
Dept: NEUROLOGY | Facility: CLINIC | Age: 24
End: 2019-04-03

## 2019-04-03 NOTE — TELEPHONE ENCOUNTER
Patient had questions about returning to EMU. Also stated he needed to come in after VNS placement. Appt made with .

## 2019-04-03 NOTE — PRE-PROCEDURE INSTRUCTIONS
Preop instructions: NPO solids/ milk products after midnight and clears up to 6am (clear liquids are: water, apple juice, Gatorade & Jell-O, black coffee/no milk, cream or creamer), shower instructions, directions, leave all valuables at home, medication instructions for PM prior & am of procedure explained. Patient stated an understanding.     Patient denies any side effects or issues with anesthesia or sedation.

## 2019-04-04 ENCOUNTER — ANESTHESIA EVENT (OUTPATIENT)
Dept: SURGERY | Facility: HOSPITAL | Age: 24
End: 2019-04-04
Payer: COMMERCIAL

## 2019-04-04 ENCOUNTER — HOSPITAL ENCOUNTER (OUTPATIENT)
Facility: HOSPITAL | Age: 24
Discharge: HOME | End: 2019-04-04
Attending: NEUROLOGICAL SURGERY | Admitting: NEUROLOGICAL SURGERY
Payer: COMMERCIAL

## 2019-04-04 ENCOUNTER — ANESTHESIA (OUTPATIENT)
Dept: SURGERY | Facility: HOSPITAL | Age: 24
End: 2019-04-04
Payer: COMMERCIAL

## 2019-04-04 VITALS
RESPIRATION RATE: 18 BRPM | WEIGHT: 180 LBS | HEIGHT: 73 IN | DIASTOLIC BLOOD PRESSURE: 78 MMHG | TEMPERATURE: 98 F | SYSTOLIC BLOOD PRESSURE: 118 MMHG | BODY MASS INDEX: 23.86 KG/M2 | OXYGEN SATURATION: 98 % | HEART RATE: 65 BPM

## 2019-04-04 DIAGNOSIS — G40.909 EPILEPSY: Primary | ICD-10-CM

## 2019-04-04 LAB
ABO + RH BLD: NORMAL
BLD GP AB SCN CELLS X3 SERPL QL: NORMAL

## 2019-04-04 PROCEDURE — 00300 ANES ALL PX INTEG H/N/PTRUNK: CPT | Performed by: NEUROLOGICAL SURGERY

## 2019-04-04 PROCEDURE — 37000009 HC ANESTHESIA EA ADD 15 MINS: Performed by: NEUROLOGICAL SURGERY

## 2019-04-04 PROCEDURE — 37000008 HC ANESTHESIA 1ST 15 MINUTES: Performed by: NEUROLOGICAL SURGERY

## 2019-04-04 PROCEDURE — 25000003 PHARM REV CODE 250: Performed by: NEUROLOGICAL SURGERY

## 2019-04-04 PROCEDURE — 94761 N-INVAS EAR/PLS OXIMETRY MLT: CPT

## 2019-04-04 PROCEDURE — 71000033 HC RECOVERY, INTIAL HOUR: Performed by: NEUROLOGICAL SURGERY

## 2019-04-04 PROCEDURE — D9220A PRA ANESTHESIA: Mod: ANES,,, | Performed by: ANESTHESIOLOGY

## 2019-04-04 PROCEDURE — 64568 OPN IMPLTJ CRNL NRV NEA&PG: CPT | Mod: LT,,, | Performed by: NEUROLOGICAL SURGERY

## 2019-04-04 PROCEDURE — C1767 GENERATOR, NEURO NON-RECHARG: HCPCS | Performed by: NEUROLOGICAL SURGERY

## 2019-04-04 PROCEDURE — 71000039 HC RECOVERY, EACH ADD'L HOUR: Performed by: NEUROLOGICAL SURGERY

## 2019-04-04 PROCEDURE — 64568 PR IMPLANTATION, NEUROSTIM ELECT ARRAY & PULSE GEN, OPEN, CRANIAL NERVE: ICD-10-PCS | Mod: LT,,, | Performed by: NEUROLOGICAL SURGERY

## 2019-04-04 PROCEDURE — 27201423 OPTIME MED/SURG SUP & DEVICES STERILE SUPPLY: Performed by: NEUROLOGICAL SURGERY

## 2019-04-04 PROCEDURE — 86850 RBC ANTIBODY SCREEN: CPT

## 2019-04-04 PROCEDURE — 63600175 PHARM REV CODE 636 W HCPCS: Performed by: NURSE ANESTHETIST, CERTIFIED REGISTERED

## 2019-04-04 PROCEDURE — 36000707: Performed by: NEUROLOGICAL SURGERY

## 2019-04-04 PROCEDURE — 71000016 HC POSTOP RECOV ADDL HR: Performed by: NEUROLOGICAL SURGERY

## 2019-04-04 PROCEDURE — 27000221 HC OXYGEN, UP TO 24 HOURS

## 2019-04-04 PROCEDURE — 63600175 PHARM REV CODE 636 W HCPCS: Performed by: ANESTHESIOLOGY

## 2019-04-04 PROCEDURE — 71000015 HC POSTOP RECOV 1ST HR: Performed by: NEUROLOGICAL SURGERY

## 2019-04-04 PROCEDURE — D9220A PRA ANESTHESIA: ICD-10-PCS | Mod: CRNA,,, | Performed by: NURSE ANESTHETIST, CERTIFIED REGISTERED

## 2019-04-04 PROCEDURE — 63600175 PHARM REV CODE 636 W HCPCS: Performed by: NEUROLOGICAL SURGERY

## 2019-04-04 PROCEDURE — 25000003 PHARM REV CODE 250: Performed by: NURSE ANESTHETIST, CERTIFIED REGISTERED

## 2019-04-04 PROCEDURE — C1778 LEAD, NEUROSTIMULATOR: HCPCS | Performed by: NEUROLOGICAL SURGERY

## 2019-04-04 PROCEDURE — 36000706: Performed by: NEUROLOGICAL SURGERY

## 2019-04-04 PROCEDURE — D9220A PRA ANESTHESIA: ICD-10-PCS | Mod: ANES,,, | Performed by: ANESTHESIOLOGY

## 2019-04-04 PROCEDURE — D9220A PRA ANESTHESIA: Mod: CRNA,,, | Performed by: NURSE ANESTHETIST, CERTIFIED REGISTERED

## 2019-04-04 DEVICE — LEAD PERENNIALFLEX 2MM 43CM: Type: IMPLANTABLE DEVICE | Site: CHEST | Status: FUNCTIONAL

## 2019-04-04 DEVICE — GENERATOR ASPIRE VNS: Type: IMPLANTABLE DEVICE | Site: CHEST | Status: FUNCTIONAL

## 2019-04-04 RX ORDER — MUPIROCIN 20 MG/G
1 OINTMENT TOPICAL 2 TIMES DAILY
Status: DISCONTINUED | OUTPATIENT
Start: 2019-04-04 | End: 2019-04-04 | Stop reason: HOSPADM

## 2019-04-04 RX ORDER — ONDANSETRON 8 MG/1
8 TABLET, ORALLY DISINTEGRATING ORAL EVERY 6 HOURS PRN
Status: DISCONTINUED | OUTPATIENT
Start: 2019-04-04 | End: 2019-04-04

## 2019-04-04 RX ORDER — SODIUM CHLORIDE 9 MG/ML
INJECTION, SOLUTION INTRAVENOUS CONTINUOUS PRN
Status: DISCONTINUED | OUTPATIENT
Start: 2019-04-04 | End: 2019-04-04

## 2019-04-04 RX ORDER — HYDROCODONE BITARTRATE AND ACETAMINOPHEN 5; 325 MG/1; MG/1
1 TABLET ORAL EVERY 6 HOURS PRN
Qty: 60 TABLET | Refills: 0 | Status: SHIPPED | OUTPATIENT
Start: 2019-04-04 | End: 2020-08-28 | Stop reason: CLARIF

## 2019-04-04 RX ORDER — HYDROCODONE BITARTRATE AND ACETAMINOPHEN 5; 325 MG/1; MG/1
1 TABLET ORAL EVERY 4 HOURS PRN
Status: DISCONTINUED | OUTPATIENT
Start: 2019-04-04 | End: 2019-04-04 | Stop reason: HOSPADM

## 2019-04-04 RX ORDER — PROPOFOL 10 MG/ML
VIAL (ML) INTRAVENOUS
Status: DISCONTINUED | OUTPATIENT
Start: 2019-04-04 | End: 2019-04-04

## 2019-04-04 RX ORDER — FENTANYL CITRATE 50 UG/ML
25 INJECTION, SOLUTION INTRAMUSCULAR; INTRAVENOUS EVERY 5 MIN PRN
Status: COMPLETED | OUTPATIENT
Start: 2019-04-04 | End: 2019-04-04

## 2019-04-04 RX ORDER — FENTANYL CITRATE 50 UG/ML
INJECTION, SOLUTION INTRAMUSCULAR; INTRAVENOUS
Status: DISCONTINUED | OUTPATIENT
Start: 2019-04-04 | End: 2019-04-04

## 2019-04-04 RX ORDER — GLYCOPYRROLATE 0.2 MG/ML
INJECTION INTRAMUSCULAR; INTRAVENOUS
Status: DISCONTINUED | OUTPATIENT
Start: 2019-04-04 | End: 2019-04-04

## 2019-04-04 RX ORDER — LIDOCAINE HYDROCHLORIDE AND EPINEPHRINE 10; 10 MG/ML; UG/ML
INJECTION, SOLUTION INFILTRATION; PERINEURAL
Status: DISCONTINUED | OUTPATIENT
Start: 2019-04-04 | End: 2019-04-04 | Stop reason: HOSPADM

## 2019-04-04 RX ORDER — SODIUM CHLORIDE 0.9 % (FLUSH) 0.9 %
10 SYRINGE (ML) INJECTION
Status: DISCONTINUED | OUTPATIENT
Start: 2019-04-04 | End: 2019-04-04 | Stop reason: HOSPADM

## 2019-04-04 RX ORDER — MUPIROCIN 20 MG/G
OINTMENT TOPICAL
Status: DISCONTINUED | OUTPATIENT
Start: 2019-04-04 | End: 2019-04-04 | Stop reason: HOSPADM

## 2019-04-04 RX ORDER — BACITRACIN 50000 [IU]/1
INJECTION, POWDER, FOR SOLUTION INTRAMUSCULAR
Status: DISCONTINUED | OUTPATIENT
Start: 2019-04-04 | End: 2019-04-04 | Stop reason: HOSPADM

## 2019-04-04 RX ORDER — VANCOMYCIN HYDROCHLORIDE 1 G/20ML
INJECTION, POWDER, LYOPHILIZED, FOR SOLUTION INTRAVENOUS
Status: DISCONTINUED | OUTPATIENT
Start: 2019-04-04 | End: 2019-04-04 | Stop reason: HOSPADM

## 2019-04-04 RX ORDER — MIDAZOLAM HYDROCHLORIDE 1 MG/ML
INJECTION, SOLUTION INTRAMUSCULAR; INTRAVENOUS
Status: DISCONTINUED | OUTPATIENT
Start: 2019-04-04 | End: 2019-04-04

## 2019-04-04 RX ORDER — DIAZEPAM 5 MG/1
5 TABLET ORAL EVERY 6 HOURS PRN
Status: DISCONTINUED | OUTPATIENT
Start: 2019-04-04 | End: 2019-04-04 | Stop reason: HOSPADM

## 2019-04-04 RX ORDER — NEOSTIGMINE METHYLSULFATE 1 MG/ML
INJECTION, SOLUTION INTRAVENOUS
Status: DISCONTINUED | OUTPATIENT
Start: 2019-04-04 | End: 2019-04-04

## 2019-04-04 RX ORDER — PROMETHAZINE HYDROCHLORIDE 25 MG/ML
6.25 INJECTION, SOLUTION INTRAMUSCULAR; INTRAVENOUS ONCE
Status: DISCONTINUED | OUTPATIENT
Start: 2019-04-04 | End: 2019-04-04

## 2019-04-04 RX ORDER — CEPHALEXIN 500 MG/1
500 CAPSULE ORAL EVERY 6 HOURS
Qty: 28 CAPSULE | Refills: 0 | Status: SHIPPED | OUTPATIENT
Start: 2019-04-04 | End: 2019-04-11

## 2019-04-04 RX ORDER — LIDOCAINE HCL/PF 100 MG/5ML
SYRINGE (ML) INTRAVENOUS
Status: DISCONTINUED | OUTPATIENT
Start: 2019-04-04 | End: 2019-04-04

## 2019-04-04 RX ORDER — MUPIROCIN 20 MG/G
1 OINTMENT TOPICAL
Status: COMPLETED | OUTPATIENT
Start: 2019-04-04 | End: 2019-04-04

## 2019-04-04 RX ORDER — ROCURONIUM BROMIDE 10 MG/ML
INJECTION, SOLUTION INTRAVENOUS
Status: DISCONTINUED | OUTPATIENT
Start: 2019-04-04 | End: 2019-04-04

## 2019-04-04 RX ORDER — CEFAZOLIN SODIUM 1 G/3ML
INJECTION, POWDER, FOR SOLUTION INTRAMUSCULAR; INTRAVENOUS
Status: DISCONTINUED | OUTPATIENT
Start: 2019-04-04 | End: 2019-04-04

## 2019-04-04 RX ORDER — ACETAMINOPHEN 325 MG/1
325 TABLET ORAL EVERY 6 HOURS PRN
Status: DISCONTINUED | OUTPATIENT
Start: 2019-04-04 | End: 2019-04-04 | Stop reason: HOSPADM

## 2019-04-04 RX ORDER — DEXAMETHASONE SODIUM PHOSPHATE 4 MG/ML
INJECTION, SOLUTION INTRA-ARTICULAR; INTRALESIONAL; INTRAMUSCULAR; INTRAVENOUS; SOFT TISSUE
Status: DISCONTINUED | OUTPATIENT
Start: 2019-04-04 | End: 2019-04-04

## 2019-04-04 RX ORDER — HYDROMORPHONE HYDROCHLORIDE 1 MG/ML
0.2 INJECTION, SOLUTION INTRAMUSCULAR; INTRAVENOUS; SUBCUTANEOUS EVERY 5 MIN PRN
Status: DISCONTINUED | OUTPATIENT
Start: 2019-04-04 | End: 2019-04-04 | Stop reason: HOSPADM

## 2019-04-04 RX ADMIN — FENTANYL CITRATE 50 MCG: 50 INJECTION, SOLUTION INTRAMUSCULAR; INTRAVENOUS at 11:04

## 2019-04-04 RX ADMIN — LIDOCAINE HYDROCHLORIDE 100 MG: 20 INJECTION, SOLUTION INTRAVENOUS at 11:04

## 2019-04-04 RX ADMIN — MIDAZOLAM HYDROCHLORIDE 2 MG: 1 INJECTION, SOLUTION INTRAMUSCULAR; INTRAVENOUS at 11:04

## 2019-04-04 RX ADMIN — PROPOFOL 50 MG: 10 INJECTION, EMULSION INTRAVENOUS at 11:04

## 2019-04-04 RX ADMIN — CEFAZOLIN 2 G: 330 INJECTION, POWDER, FOR SOLUTION INTRAMUSCULAR; INTRAVENOUS at 11:04

## 2019-04-04 RX ADMIN — NEOSTIGMINE METHYLSULFATE 5 MG: 1 INJECTION INTRAVENOUS at 12:04

## 2019-04-04 RX ADMIN — PROMETHAZINE HYDROCHLORIDE 6.25 MG: 25 INJECTION INTRAMUSCULAR; INTRAVENOUS at 02:04

## 2019-04-04 RX ADMIN — MUPIROCIN 1 G: 20 OINTMENT TOPICAL at 08:04

## 2019-04-04 RX ADMIN — SODIUM CHLORIDE, SODIUM GLUCONATE, SODIUM ACETATE, POTASSIUM CHLORIDE, MAGNESIUM CHLORIDE, SODIUM PHOSPHATE, DIBASIC, AND POTASSIUM PHOSPHATE: .53; .5; .37; .037; .03; .012; .00082 INJECTION, SOLUTION INTRAVENOUS at 11:04

## 2019-04-04 RX ADMIN — SODIUM CHLORIDE: 0.9 INJECTION, SOLUTION INTRAVENOUS at 11:04

## 2019-04-04 RX ADMIN — FENTANYL CITRATE 25 MCG: 50 INJECTION INTRAMUSCULAR; INTRAVENOUS at 01:04

## 2019-04-04 RX ADMIN — FENTANYL CITRATE 50 MCG: 50 INJECTION, SOLUTION INTRAMUSCULAR; INTRAVENOUS at 12:04

## 2019-04-04 RX ADMIN — GLYCOPYRROLATE 0.6 MG: 0.2 INJECTION, SOLUTION INTRAMUSCULAR; INTRAVENOUS at 12:04

## 2019-04-04 RX ADMIN — PROPOFOL 200 MG: 10 INJECTION, EMULSION INTRAVENOUS at 11:04

## 2019-04-04 RX ADMIN — ROCURONIUM BROMIDE 50 MG: 10 INJECTION, SOLUTION INTRAVENOUS at 11:04

## 2019-04-04 RX ADMIN — HYDROCODONE BITARTRATE AND ACETAMINOPHEN 1 TABLET: 5; 325 TABLET ORAL at 12:04

## 2019-04-04 RX ADMIN — FENTANYL CITRATE 100 MCG: 50 INJECTION, SOLUTION INTRAMUSCULAR; INTRAVENOUS at 11:04

## 2019-04-04 NOTE — PLAN OF CARE
Patient prepared for procedure.  POC communicated with patient and mother.  Patient reports last seizure 1 month ago.

## 2019-04-04 NOTE — PROGRESS NOTES
Still no call back from team.  Notified Neuro JASVIR Hanson that patient has not taken seizure meds this morning.  PA stated she would inform appropriate MD.  Obtained verbal order for T+S.

## 2019-04-04 NOTE — DISCHARGE INSTRUCTIONS
Please follow ONLY the instructions that are checked below.    Activity Restrictions:  [x]  Return to work will be determined on an individual basis.  [x]  No lifting greater than 10 pounds.  [x]  Avoid bending and twisting the area of your surgery more than 45 degrees from neutral position in any direction.  [x]  No driving or operating machinery:  []  until cleared by your surgeon.  [x]  while taking narcotic pain medications or muscle relaxants.  [x]  No cervical collar, soft collar, or lumbar brace required.  []  Wear your brace at all times. You may be given an extra brace or soft collar to wear when showering.  []  Wear your brace at all times except when flat in bed.  []  Wear brace for comfort only.  [x]  Increase ambulation over the next 2 weeks so that you are walking 2 miles per day at 2 weeks post-operatively.  [x]  Walk on paved surfaces only. It is okay to walk up and down stairs while holding onto a side rail.  [x]  No sexual activity for 2-3 weeks.    Discharge Medication/Follow-up:  [x]  Please refer to discharge medication reconciliation form.  []  Do not take ANY non-steroidal anti-inflammatory drugs (NSAIDS), including the following: ibuprofen, naprosyn, Aleve, Advil, Indocin, Mobic, or Celebrex for:  []  4 weeks  []  8 weeks  []  6 months  [x]  Prescriptions for appropriate medication will be given upon discharge.   []  Pain control:             []  Muscle relaxer:            [x]  Take docusate (Colace 100 mg): take one capsule a day as needed for constipation. You can get this over the counter.  [x]  Follow-up appointment:  [x]  10-14 days post-op for wound check by physician assistant/nurse  []  4-6 weeks with MD:  []  with x-rays  []  without x-rays  []  An appointment will be mailed to you.    Wound Care:  []  Remove dressing or bandaid in    days.  [x]  No bandage required. Keep your incision open to the air.  [x]  You may shower on the 2nd day after your surgery. Have the force of water  hit you opposite from the incision. Pat the incision dry after your shower; do not scrub the incision.  [x]  You cannot take a bath until 8 weeks after surgery.  []  Apply bacitracin to incision twice a day for    more days.    Call your doctor or go to the Emergency Room for any signs of infection, including: increased redness, drainage, pain, or fever (temperature ?101.5 for 24 hours). Call your doctor or go to the Emergency Room if there are any localized neurological changes; problems with speech, vision, numbness, tingling, weakness, or severe headache; or for other concerns.    Special Instructions:  [x]  No use of tobacco products.  [x]  Diet: Please eat a regular diet as tolerated.  []  Other diet:              Specific physician instructions:           Physicians need 3 days' notice for pain medicine to be refilled. Pain medicine will only be refilled between 8 AM and 5 PM, Monday through Friday, due to Food and Drug Administration regulation of documentation.    If you have any questions about this form, please call [phone].    Form No. 42900 (Revised 10/31/2013)        Anesthesia: General Anesthesia     You are watched continuously during your procedure by your anesthesia provider.     Youre due to have surgery. During surgery, youll be given medicine called anesthesia or anesthetic. This will keep you comfortable and pain-free. Your anesthesia provider will use general anesthesia.  What is general anesthesia?  General anesthesia puts you into a state like deep sleep. It goes into the bloodstream (IV anesthetics), into the lungs (gas anesthetics), or both. You feel nothing during the procedure. You will not remember it. During the procedure, the anesthesia provider monitors you continuously. He or she checks your heart rate and rhythm, blood pressure, breathing, and blood oxygen.  · IV anesthetics. IV anesthetics are given through an IV line in your arm. Theyre often given first. This is so you  are asleep before a gas anesthetic is started. Some kinds of IV anesthetics relieve pain. Others relax you. Your doctor will decide which kind is best in your case.  · Gas anesthetics. Gas anesthetics are breathed into the lungs. They are often used to keep you asleep. They can be given through a facemask or a tube placed in your larynx or trachea (breathing tube).  ¨ If you have a facemask, your anesthesia provider will most likely place it over your nose and mouth while youre still awake. Youll breathe oxygen through the mask as your IV anesthetic is started. Gas anesthetic may be added through the mask.  ¨ If you have a tube in the larynx or trachea, it will be inserted into your throat after youre asleep.  Anesthesia tools and medicines  You will likely have:  · IV anesthetics. These are put into an IV line into your bloodstream.  · Gas anesthetics. You breathe these anesthetics into your lungs, where they pass into your bloodstream.  · Pulse oximeter. This is a small clip that is attached to the end of your finger. This measures your blood oxygen level.  · Electrocardiography leads (electrodes). These are small sticky pads that are placed on your chest. They record your heart rate and rhythm.  · Blood pressure cuff. This reads your blood pressure.  Risks and possible complications  General anesthesia has some risks. These include:  · Breathing problems  · Nausea and vomiting  · Sore throat or hoarseness (usually temporary)  · Allergic reaction to the anesthetic  · Irregular heartbeat (rare)  · Cardiac arrest (rare)   Anesthesia safety  · Follow all instructions you are given for how long not to eat or drink before your procedure.  · Be sure your doctor knows what medicines and drugs you take. This includes over-the-counter medicines, herbs, supplements, alcohol or other drugs. You will be asked when those were last taken.  · Have an adult family member or friend drive you home after the procedure.  · For the  first 24 hours after your surgery:  ¨ Do not drive or use heavy equipment.  ¨ Do not make important decisions or sign legal documents. If important decisions or signing legal documents is necessary during the first 24 hours after surgery, have a trusted family member or spouse act on your behalf.  ¨ Avoid alcohol.  ¨ Have a responsible adult stay with you. He or she can watch for problems and help keep you safe.  Date Last Reviewed: 12/1/2016 © 2000-2017 [employer]. [address] [place] [id number]. All rights reserved. This information is not intended as a substitute for professional medical care. Always follow your healthcare professional's instructions.

## 2019-04-04 NOTE — ANESTHESIA PREPROCEDURE EVALUATION
04/04/2019  Darni Cunha is a 23 y.o., male with pmhx of intractable seizures, Aptiom, Lamictal and vimpat, ans still with breakthrough seizures. Scheduled for vagus nerve stimulator insertion.     Anesthesia Evaluation    I have reviewed the Patient Summary Reports.     I have reviewed the Medications.     Review of Systems  Anesthesia Hx:  History of prior surgery of interest to airway management or planning: Previous anesthesia: General Denies Family Hx of Anesthesia complications.   Denies Personal Hx of Anesthesia complications.       Physical Exam  General:  Well nourished    Airway/Jaw/Neck:  Airway Findings: Mouth Opening: Normal Tongue: Normal  General Airway Assessment: Adult  TM Distance: 4 - 6 cm        Eyes/Ears/Nose:  EYES/EARS/NOSE FINDINGS: Normal   Dental:  DENTAL FINDINGS: Normal   Chest/Lungs:  Chest/Lungs Findings: Clear to auscultation, Normal Respiratory Rate     Heart/Vascular:  Heart Findings: Rate: Normal  Rhythm: Regular Rhythm        Mental Status:  Mental Status Findings:  Cooperative, Alert and Oriented         Anesthesia Plan  Type of Anesthesia, risks & benefits discussed:  Anesthesia Type:  general  Patient's Preference:   Intra-op Monitoring Plan: standard ASA monitors  Intra-op Monitoring Plan Comments:   Post Op Pain Control Plan:   Post Op Pain Control Plan Comments:   Induction:   IV  Beta Blocker:  Patient is not currently on a Beta-Blocker (No further documentation required).       Informed Consent: Patient understands risks and agrees with Anesthesia plan.  Questions answered. Anesthesia consent signed with patient.  ASA Score: 3     Day of Surgery Review of History & Physical:    H&P update referred to the surgeon.         Ready For Surgery From Anesthesia Perspective.

## 2019-04-04 NOTE — TRANSFER OF CARE
"Anesthesia Transfer of Care Note    Patient: Darin Cunha    Procedure(s) Performed: Procedure(s) (LRB):  INSERTION, VAGUS NERVE STIMULATOR (Left)    Patient location: PACU    Anesthesia Type: general    Transport from OR: Transported from OR on 6-10 L/min O2 by face mask with adequate spontaneous ventilation    Post pain: adequate analgesia    Post assessment: no apparent anesthetic complications and tolerated procedure well    Post vital signs: stable    Level of consciousness: awake and alert    Nausea/Vomiting: no nausea/vomiting    Complications: none    Transfer of care protocol was followed      Last vitals:   Visit Vitals  /79   Pulse 101   Temp 36.1 °C (97 °F) (Temporal)   Resp 16   Ht 6' 1" (1.854 m)   Wt 81.6 kg (180 lb)   SpO2 100%   BMI 23.75 kg/m²     "

## 2019-04-04 NOTE — PROGRESS NOTES
Paged Dr. Gupta's team to inform that patient has not taken any of his seizure medications this morning, as taking them on an empty stomach make him sick, per his statements.    No Type and Screen ordered with blood consent.  Pink tube drawn in case team requests it.  Awaiting call back.

## 2019-04-04 NOTE — PLAN OF CARE
Discharge instructions reviewed with patient and mother. Verbalizes understanding. Copies of instructions given to patient. 1 prescription given to patient, 1 prescription escript to patient pharmacy. Tolerating po fluids. Denies nausea. States pain is tolerable.

## 2019-04-04 NOTE — BRIEF OP NOTE
[facility]  Brief Operative Note    SUMMARY     Surgery Date: 4/4/2019     Surgeon(s) and Role:     * Reuben Gupta MD - Primary    Assisting Surgeon: Donal Ruiz MD    Pre-op Diagnosis:  Localization-related (focal) (partial) symptomatic epilepsy and epileptic syndromes with complex partial seizures, intractable, with status epilepticus [G40.211]    Post-op Diagnosis:  Post-Op Diagnosis Codes:     * Localization-related (focal) (partial) symptomatic epilepsy and epileptic syndromes with complex partial seizures, intractable, with status epilepticus [G40.211]    Procedure(s) (LRB):  INSERTION, VAGUS NERVE STIMULATOR (Left)    Anesthesia: General    Description of Procedure: Vagal Nerve Stimulator    Description of the findings of the procedure: see full op note    Estimated Blood Loss: * No values recorded between 4/4/2019 11:51 AM and 4/4/2019 12:45 PM *         Specimens:   Specimen (12h ago, onward)    None

## 2019-04-04 NOTE — PROGRESS NOTES
Dr. Joseph lundy, returned page immediately.  States [employer] rep does not need to see patient prior to discharge.

## 2019-04-05 NOTE — ANESTHESIA POSTPROCEDURE EVALUATION
Anesthesia Post Evaluation    Patient: Darin Cunha    Procedure(s) Performed: Procedure(s) (LRB):  INSERTION, VAGUS NERVE STIMULATOR (Left)    Final Anesthesia Type: general  Patient location during evaluation: PACU  Patient participation: Yes- Able to Participate  Level of consciousness: awake and alert  Post-procedure vital signs: reviewed and stable  Pain management: adequate  Airway patency: patent  PONV status at discharge: nausea (controlled)  Anesthetic complications: no      Cardiovascular status: hemodynamically stable  Respiratory status: unassisted, spontaneous ventilation and room air  Hydration status: euvolemic  Follow-up not needed.          Vitals Value Taken Time   /78 4/4/2019  3:01 PM   Temp 36.5 °C (97.7 °F) 4/4/2019  3:00 PM   Pulse 67 4/4/2019  3:11 PM   Resp 18 4/4/2019  3:00 PM   SpO2 100 % 4/4/2019  3:11 PM   Vitals shown include unvalidated device data.      Event Time     Out of Recovery 13:56:18          Pain/Valeria Score: Pain Rating Prior to Med Admin: 6 (4/4/2019  1:15 PM)  Valeria Score: 10 (4/4/2019  3:21 PM)

## 2019-04-10 ENCOUNTER — TELEPHONE (OUTPATIENT)
Dept: NEUROLOGY | Facility: CLINIC | Age: 24
End: 2019-04-10

## 2019-04-15 NOTE — DISCHARGE SUMMARY
[facility]  Neurosurgery  Discharge Summary      Patient Name: Darin Cunha  MRN: [id number]  Admission Date: 4/4/2019  Hospital Length of Stay: 0 days  Discharge Date and Time: 4/4/2019  Attending Physician: No att. providers found   Discharging Provider: Donal Ruiz MD  Primary Care Provider: Primary Doctor Jacinta    HPI:   No notes on file    Procedure(s) (LRB):  INSERTION, VAGUS NERVE STIMULATOR (Left)     Hospital Course: No notes on file pt tolerated procedure wout complication. Observed  In PACU. Deemed medically stable by anesthesia. Was at neurologic baseline. Tolerating po. Voiding. Deemed ready for safe discharge home w/ family        Consults:     Significant Diagnostic Studies:     Pending Diagnostic Studies:     None        Final Active Diagnoses:    Diagnosis Date Noted POA    Epilepsy [G40.909] 04/04/2019 Yes      Problems Resolved During this Admission:      Discharged Condition: good    Disposition: Home or Self Care    Follow Up:  Follow-up Information     Reuben Gupta MD In 2 weeks.    Specialty:  Neurosurgery  Why:  For wound re-check  Contact information:  [address] [place] [address] [id number] [phone]                 Patient Instructions:      Diet general     Call MD for:  persistent dizziness or light-headedness     Call MD for:  hives     Call MD for:  redness, tenderness, or signs of infection (pain, swelling, redness, odor or green/yellow discharge around incision site)     Call MD for:  difficulty breathing, headache or visual disturbances     Call MD for:  severe uncontrolled pain     Call MD for:  persistent nausea and vomiting     Call MD for:  temperature >100.4     Call MD for:  extreme fatigue     No dressing needed     Medications:  Reconciled Home Medications:      Medication List      START taking these medications    HYDROcodone-acetaminophen 5-325 mg per tablet  Commonly known as:  NORCO  Take 1 tablet by mouth every 6 (six) hours as needed for Pain.         CHANGE how you take these medications    eslicarbazepine 400 mg Tab  Commonly known as:  APTIOM  Take 3 tablets (1,200 mg total) by mouth once daily.  What changed:    · how much to take  · when to take this        CONTINUE taking these medications    lamoTRIgine 200 MG tablet  Commonly known as:  LAMICTAL  Take 1 tablet (200 mg total) by mouth 2 (two) times daily.     VIMPAT 200 mg Tab tablet  Generic drug:  lacosamide  Take 200 mg by mouth every 12 (twelve) hours.        ASK your doctor about these medications    cephALEXin 500 MG capsule  Commonly known as:  KEFLEX  Take 1 capsule (500 mg total) by mouth every 6 (six) hours. for 7 days  Ask about: Should I take this medication?            Donal Ruiz MD  Neurosurgery  [facility]

## 2019-04-17 ENCOUNTER — TELEPHONE (OUTPATIENT)
Dept: NEUROLOGY | Facility: CLINIC | Age: 24
End: 2019-04-17

## 2019-04-22 ENCOUNTER — CLINICAL SUPPORT (OUTPATIENT)
Dept: NEUROSURGERY | Facility: CLINIC | Age: 24
End: 2019-04-22
Payer: COMMERCIAL

## 2019-04-22 ENCOUNTER — OFFICE VISIT (OUTPATIENT)
Dept: NEUROLOGY | Facility: CLINIC | Age: 24
End: 2019-04-22
Payer: COMMERCIAL

## 2019-04-22 VITALS — HEART RATE: 84 BPM | SYSTOLIC BLOOD PRESSURE: 138 MMHG | DIASTOLIC BLOOD PRESSURE: 96 MMHG

## 2019-04-22 DIAGNOSIS — G40.219 COMPLEX PARTIAL EPILEPSY WITH GENERALIZATION AND WITH INTRACTABLE EPILEPSY: ICD-10-CM

## 2019-04-22 PROCEDURE — 95977 PR ELEC ANALYSIS, IMPLT NEURO PULSE GEN, W/PRGRM, CMPLX CRAN NERVE: ICD-10-PCS | Mod: S$GLB,,, | Performed by: PSYCHIATRY & NEUROLOGY

## 2019-04-22 PROCEDURE — 99999 PR PBB SHADOW E&M-EST. PATIENT-LVL II: CPT | Mod: PBBFAC,,, | Performed by: PSYCHIATRY & NEUROLOGY

## 2019-04-22 PROCEDURE — 95977 ALYS CPLX CN NPGT PRGRMG: CPT | Mod: S$GLB,,, | Performed by: PSYCHIATRY & NEUROLOGY

## 2019-04-22 PROCEDURE — 99213 PR OFFICE/OUTPT VISIT, EST, LEVL III, 20-29 MIN: ICD-10-PCS | Mod: S$GLB,,, | Performed by: PSYCHIATRY & NEUROLOGY

## 2019-04-22 PROCEDURE — 99213 OFFICE O/P EST LOW 20 MIN: CPT | Mod: S$GLB,,, | Performed by: PSYCHIATRY & NEUROLOGY

## 2019-04-22 PROCEDURE — 99999 PR PBB SHADOW E&M-EST. PATIENT-LVL II: ICD-10-PCS | Mod: PBBFAC,,, | Performed by: PSYCHIATRY & NEUROLOGY
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
## 2019-04-22 NOTE — PROGRESS NOTES
Patient seen in clinic for 2 week post op s/p VNS  with Dr Gupta on 04/04/2019       Incision on anterior neck and chest assessed, dermabond used for closure, no swelling or purulent drainage, edges well approximated.          Patient was instructed as follows:    Discontinue Bacitracin after tonight.   May shower normally but pat dry after shower.   Do not submerge wound in bath tub or go swimming until released by the physician   Keep incision clean, dry and open to air as much as possible.   Patient encouraged to walk as much as possible but advised to walk with family member or friend and rest as necessary.   No lifting >10lbs with left extremity       A copy of post-operative instructions provided to the patient.    All questions were answered. Patient will follow up with Dr Silva  . Patient was encouraged to call clinic with any future concerns prior to follow up appt. If any worsening symptoms, patient should report to ED.       Skylar Baptiste RN, BSN  Neurosurgery

## 2019-05-10 ENCOUNTER — CLINICAL SUPPORT (OUTPATIENT)
Dept: NEUROLOGY | Facility: CLINIC | Age: 24
End: 2019-05-10
Payer: COMMERCIAL

## 2019-05-10 NOTE — PROGRESS NOTES
Patient arrived to clinic with girlfriend. Pt states that he had 1 seizure because he forgot his medication. States he took his meds when he realized it. Pt is here today to get his VNS increased.   Model #[id number]  Implant Date 04/1/2019  Is Device palpable in chest wall Yes   Side of implant L   Is Device positioned between   C7 & T8 spinal levels Yes   Initial Reprogram   Output current mA 0.375  Signal Freq Hz 20  Pulse width uSec 250  Signal on time Sec 30   Signal off time Min 5.0     Magnet settings  Output current mA 0.5  Pulse width uSec 250  Signal on time Sec 60    Autostimulation (AS)  Output Current mA 0.375  Pulse width uSec 250  Signal on time Sec 30  Tachycardia detect off  Threshhold for AS % 20     Reprogramed   Output current mA 0.375  Signal Freq Hz 30   Pulse width uSec 500  Signal on time Sec 30   Signal off time Min 5.0     Magnet settings  Output current mA 0.5  Pulse width uSec 500   Signal on time Sec 60     Autostimulation (AS)  Output Current mA 0.375  Pulse width uSec 500   Signal on time Sec 30    Patient waited for 15 minutes states that he feels fine. Discussed having magnet with him at all times.

## 2019-05-13 ENCOUNTER — TELEPHONE (OUTPATIENT)
Dept: NEUROLOGY | Facility: CLINIC | Age: 24
End: 2019-05-13

## 2019-05-13 NOTE — TELEPHONE ENCOUNTER
Dionne called to ask to be put back on Celexa. He stated that his mom has noticed he has anxiety and depression. Informed Dionne that he will need to go back to his primary care MD. Pt denied feeling like he was going to harm himself or others. He stated he would make appt. Informed him to call if needed or go to ED if needed.

## 2019-05-22 ENCOUNTER — TELEPHONE (OUTPATIENT)
Dept: NEUROLOGY | Facility: CLINIC | Age: 24
End: 2019-05-22

## 2019-05-22 DIAGNOSIS — R56.9 SEIZURES: Primary | ICD-10-CM

## 2019-05-22 NOTE — TELEPHONE ENCOUNTER
Patient called to report dizziness, seeing double and throwing up. States he has no temperature and has not been ill other than vomiting.  aware. Drug levels ordered. Pt. States he will go get levels at [facility] in [place] next week.

## 2019-05-28 ENCOUNTER — TELEPHONE (OUTPATIENT)
Dept: NEUROLOGY | Facility: CLINIC | Age: 24
End: 2019-05-28

## 2019-05-29 ENCOUNTER — LAB VISIT (OUTPATIENT)
Dept: LAB | Facility: HOSPITAL | Age: 24
End: 2019-05-29
Attending: PSYCHIATRY & NEUROLOGY
Payer: COMMERCIAL

## 2019-05-29 DIAGNOSIS — R56.9 SEIZURES: ICD-10-CM

## 2019-05-29 PROCEDURE — 80299 QUANTITATIVE ASSAY DRUG: CPT

## 2019-05-29 PROCEDURE — 80299 QUANTITATIVE ASSAY DRUG: CPT | Mod: 91

## 2019-05-29 PROCEDURE — 80175 DRUG SCREEN QUAN LAMOTRIGINE: CPT

## 2019-05-29 PROCEDURE — 36415 COLL VENOUS BLD VENIPUNCTURE: CPT

## 2019-06-01 LAB — LACOSAMIDE: 10.3 MCG/ML (ref 1–10)

## 2019-06-03 LAB
ESLICARBAZEPINE: 21 MCG/ML
LAMOTRIGINE SERPL-MCNC: 11.7 UG/ML (ref 2–15)

## 2019-06-10 ENCOUNTER — TELEPHONE (OUTPATIENT)
Dept: NEUROLOGY | Facility: CLINIC | Age: 24
End: 2019-06-10

## 2019-06-10 NOTE — TELEPHONE ENCOUNTER
Patient levels reviewed with . Pt states vision is still blurry and having double vision in am after taking meds. Vimpat dose to be cut in half for 3 days then d/c'd. Patient aware. Patient to call with any issues.

## 2019-06-10 NOTE — TELEPHONE ENCOUNTER
I returned call and left message to come in thursday at noon for vns adjustment and to tell the  to call for the nurse.    ----- Message from Dannie Ruiz sent at 6/10/2019  4:29 PM CDT -----  Needs Advice    Reason for call: Pt is calling to schedule an appt w/ the doctor to have VNS turned up        Communication Preference: [phone]    Additional Information:

## 2019-06-12 ENCOUNTER — CLINICAL SUPPORT (OUTPATIENT)
Dept: NEUROLOGY | Facility: CLINIC | Age: 24
End: 2019-06-12
Payer: MEDICAID

## 2019-06-12 DIAGNOSIS — R56.9 SEIZURES: ICD-10-CM

## 2019-06-12 DIAGNOSIS — G40.219 COMPLEX PARTIAL EPILEPSY WITH GENERALIZATION AND WITH INTRACTABLE EPILEPSY: ICD-10-CM

## 2019-06-12 PROCEDURE — 95970 PR ANALYZE NEUROSTIM,NO REPROG: ICD-10-PCS | Mod: S$PBB,,, | Performed by: PSYCHIATRY & NEUROLOGY

## 2019-06-12 PROCEDURE — 95970 ALYS NPGT W/O PRGRMG: CPT | Mod: PBBFAC | Performed by: PSYCHIATRY & NEUROLOGY

## 2019-06-12 PROCEDURE — 95970 ALYS NPGT W/O PRGRMG: CPT | Mod: S$PBB,,, | Performed by: PSYCHIATRY & NEUROLOGY

## 2019-06-12 NOTE — PROGRESS NOTES
Pt comes for vns adjustment after placment:    Initial settings:  N 0.375/30/500/30/5  A 0.375/500/30  M 0.5/500/60     Intermediate change per Dr. Gallegos:  N 0.500/30/500/30/5  A 0.500/500/30  M 0.625/500/60     After this change, patient went to lunch. He returned after tolerating the change and was re-adjusted per Dr. Gallegos:  N 0.625/30/750/30/5  A 0.625/750/30  M 0.750/750/60     Patient sat in clinic room for 15 minutes, then was discharged after determining good toleration with VNS re-adjustment  He will contact us for any questions or concerns

## 2019-06-21 ENCOUNTER — TELEPHONE (OUTPATIENT)
Dept: NEUROLOGY | Facility: CLINIC | Age: 24
End: 2019-06-21

## 2019-06-21 NOTE — TELEPHONE ENCOUNTER
----- Message from Yadi Causey sent at 6/21/2019 10:14 AM CDT -----  Rx Refill/Request     Is this a Refill or New Rx:  Refill    Rx Name and Strength:  eslicarbazepine (APTIOM) 400 mg Tab and lamoTRIgine (LAMICTAL) 200 MG tablet    Preferred Pharmacy with phone number:   [facility] [address]  [address]  [place] [address]  Phone: [phone] Fax: [phone]    Communication Preference:pt @ [phone]  Additional Information:

## 2019-06-21 NOTE — TELEPHONE ENCOUNTER
Called in refills for Aptiom 400mg TID and Lamictal 200mg 1.5 tabs BID to [facility] pharmacy. x3 refills

## 2019-07-19 ENCOUNTER — DOCUMENTATION ONLY (OUTPATIENT)
Dept: NEUROLOGY | Facility: CLINIC | Age: 24
End: 2019-07-19

## 2019-07-19 NOTE — PROGRESS NOTES
Previous VNS settings:    Output:          Normal-0.625mA         Autostim-0.625mA         Magnet-0.75mA    Frequency: 30Hz    Pulse Width:   Normal-750uSec   Autostim-750uSec   Magnet-750uSec    On Time:    Normal-30 sec   Autostim-30 sec   Magnet-60 sec    Off Time: 5 min    Duty Cycle: 10%        Current VNS settings:    Output:          Normal-0.625mA         Autostim-0.625mA         Magnet-0.75mA    Frequency: 30Hz    Pulse Width:   Normal-1000uSec   Autostim-1000uSec   Magnet-1000uSec    On Time:    Normal-30 sec   Autostim-30 sec   Magnet-60 sec    Off Time: 10 min    Duty Cycle: 5%

## 2019-07-22 ENCOUNTER — OFFICE VISIT (OUTPATIENT)
Dept: NEUROLOGY | Facility: CLINIC | Age: 24
End: 2019-07-22
Payer: MEDICAID

## 2019-07-22 DIAGNOSIS — G40.209 PARTIAL SYMPTOMATIC EPILEPSY WITH COMPLEX PARTIAL SEIZURES, NOT INTRACTABLE, WITHOUT STATUS EPILEPTICUS: Primary | ICD-10-CM

## 2019-07-22 PROCEDURE — 95977 PR ELEC ANALYSIS, IMPLT NEURO PULSE GEN, W/PRGRM, CMPLX CRAN NERVE: ICD-10-PCS | Mod: S$PBB,,, | Performed by: PSYCHIATRY & NEUROLOGY

## 2019-07-22 PROCEDURE — 95977 ALYS CPLX CN NPGT PRGRMG: CPT | Mod: PBBFAC | Performed by: PSYCHIATRY & NEUROLOGY

## 2019-07-22 PROCEDURE — 95977 ALYS CPLX CN NPGT PRGRMG: CPT | Mod: S$PBB,,, | Performed by: PSYCHIATRY & NEUROLOGY

## 2019-07-22 PROCEDURE — 99214 OFFICE O/P EST MOD 30 MIN: CPT | Mod: S$PBB,,, | Performed by: PSYCHIATRY & NEUROLOGY

## 2019-07-22 PROCEDURE — 99214 PR OFFICE/OUTPT VISIT, EST, LEVL IV, 30-39 MIN: ICD-10-PCS | Mod: S$PBB,,, | Performed by: PSYCHIATRY & NEUROLOGY
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
## 2019-07-29 ENCOUNTER — TELEPHONE (OUTPATIENT)
Dept: NEUROLOGY | Facility: CLINIC | Age: 24
End: 2019-07-29

## 2019-07-29 NOTE — TELEPHONE ENCOUNTER
Patient called to say he had a seizure on Friday, Saturday and Sunday. He stated he has not missed any doses of medication and is not ill.  aware. Vimpat dose increased . Pt also stated he only has enough medicing until Thursday.

## 2019-08-01 ENCOUNTER — TELEPHONE (OUTPATIENT)
Dept: PULMONOLOGY | Facility: CLINIC | Age: 24
End: 2019-08-01

## 2019-08-02 ENCOUNTER — TELEPHONE (OUTPATIENT)
Dept: NEUROLOGY | Facility: CLINIC | Age: 24
End: 2019-08-02

## 2019-08-22 ENCOUNTER — TELEPHONE (OUTPATIENT)
Dept: PHARMACY | Facility: CLINIC | Age: 24
End: 2019-08-22

## 2019-08-27 ENCOUNTER — TELEPHONE (OUTPATIENT)
Dept: NEUROLOGY | Facility: CLINIC | Age: 24
End: 2019-08-27

## 2019-08-27 NOTE — TELEPHONE ENCOUNTER
vimpat 200mg BID plus 5 refills per Dr. Gallegos sent to [facility]

## 2019-09-04 ENCOUNTER — LAB VISIT (OUTPATIENT)
Dept: LAB | Facility: HOSPITAL | Age: 24
End: 2019-09-04
Attending: PSYCHIATRY & NEUROLOGY
Payer: MEDICAID

## 2019-09-04 DIAGNOSIS — R56.9 SEIZURES: Primary | ICD-10-CM

## 2019-09-04 DIAGNOSIS — R56.9 SEIZURES: ICD-10-CM

## 2019-09-04 LAB
ALBUMIN SERPL BCP-MCNC: 4.9 G/DL (ref 3.5–5.2)
ALP SERPL-CCNC: 106 U/L (ref 55–135)
ALT SERPL W/O P-5'-P-CCNC: 20 U/L (ref 10–44)
ANION GAP SERPL CALC-SCNC: 8 MMOL/L (ref 8–16)
AST SERPL-CCNC: 18 U/L (ref 10–40)
BASOPHILS # BLD AUTO: 0.01 K/UL (ref 0–0.2)
BASOPHILS NFR BLD: 0.2 % (ref 0–1.9)
BILIRUB SERPL-MCNC: 0.7 MG/DL (ref 0.1–1)
BUN SERPL-MCNC: 13 MG/DL (ref 6–20)
CALCIUM SERPL-MCNC: 10 MG/DL (ref 8.7–10.5)
CHLORIDE SERPL-SCNC: 103 MMOL/L (ref 95–110)
CO2 SERPL-SCNC: 30 MMOL/L (ref 23–29)
CREAT SERPL-MCNC: 0.9 MG/DL (ref 0.5–1.4)
DIFFERENTIAL METHOD: NORMAL
EOSINOPHIL # BLD AUTO: 0 K/UL (ref 0–0.5)
EOSINOPHIL NFR BLD: 0.6 % (ref 0–8)
ERYTHROCYTE [DISTWIDTH] IN BLOOD BY AUTOMATED COUNT: 12.3 % (ref 11.5–14.5)
EST. GFR  (AFRICAN AMERICAN): >60 ML/MIN/1.73 M^2
EST. GFR  (NON AFRICAN AMERICAN): >60 ML/MIN/1.73 M^2
GLUCOSE SERPL-MCNC: 80 MG/DL (ref 70–110)
HCT VFR BLD AUTO: 46.7 % (ref 40–54)
HGB BLD-MCNC: 15.4 G/DL (ref 14–18)
IMM GRANULOCYTES # BLD AUTO: 0.01 K/UL (ref 0–0.04)
IMM GRANULOCYTES NFR BLD AUTO: 0.2 % (ref 0–0.5)
LYMPHOCYTES # BLD AUTO: 1.2 K/UL (ref 1–4.8)
LYMPHOCYTES NFR BLD: 23.6 % (ref 18–48)
MCH RBC QN AUTO: 30.6 PG (ref 27–31)
MCHC RBC AUTO-ENTMCNC: 33 G/DL (ref 32–36)
MCV RBC AUTO: 93 FL (ref 82–98)
MONOCYTES # BLD AUTO: 0.3 K/UL (ref 0.3–1)
MONOCYTES NFR BLD: 7 % (ref 4–15)
NEUTROPHILS # BLD AUTO: 3.3 K/UL (ref 1.8–7.7)
NEUTROPHILS NFR BLD: 68.4 % (ref 38–73)
NRBC BLD-RTO: 0 /100 WBC
PLATELET # BLD AUTO: 223 K/UL (ref 150–350)
PMV BLD AUTO: 10.7 FL (ref 9.2–12.9)
POTASSIUM SERPL-SCNC: 4.4 MMOL/L (ref 3.5–5.1)
PROT SERPL-MCNC: 7.4 G/DL (ref 6–8.4)
RBC # BLD AUTO: 5.04 M/UL (ref 4.6–6.2)
SODIUM SERPL-SCNC: 141 MMOL/L (ref 136–145)
WBC # BLD AUTO: 4.87 K/UL (ref 3.9–12.7)

## 2019-09-04 PROCEDURE — 80299 QUANTITATIVE ASSAY DRUG: CPT

## 2019-09-04 PROCEDURE — 85025 COMPLETE CBC W/AUTO DIFF WBC: CPT

## 2019-09-04 PROCEDURE — 80299 QUANTITATIVE ASSAY DRUG: CPT | Mod: 91

## 2019-09-04 PROCEDURE — 80175 DRUG SCREEN QUAN LAMOTRIGINE: CPT

## 2019-09-04 PROCEDURE — 36415 COLL VENOUS BLD VENIPUNCTURE: CPT

## 2019-09-04 PROCEDURE — 80053 COMPREHEN METABOLIC PANEL: CPT

## 2019-09-06 ENCOUNTER — TELEPHONE (OUTPATIENT)
Dept: NEUROLOGY | Facility: CLINIC | Age: 24
End: 2019-09-06

## 2019-09-06 LAB — LAMOTRIGINE SERPL-MCNC: 9.7 UG/ML (ref 2–15)

## 2019-09-06 NOTE — TELEPHONE ENCOUNTER
Patient here with wife to have vns adjusted . He states that he has had 3 seizures in the last month. He has not been ill or missed any meds.         Initial settings:  0.125/20/250/30/5  A 0.25/250/30  M 0.375/250/60    Changes made per order of :   Output-1.0/30/1000/30/5  A-0/1000/30  Magnet-1.25/1000/60.  Pt waited for 20 minutes, tolerated well.

## 2019-09-07 LAB — LACOSAMIDE: 9.2 MCG/ML (ref 1–10)

## 2019-09-09 LAB — ESLICARBAZEPINE: 12 MCG/ML

## 2019-09-13 ENCOUNTER — TELEPHONE (OUTPATIENT)
Dept: NEUROLOGY | Facility: CLINIC | Age: 24
End: 2019-09-13

## 2019-09-30 ENCOUNTER — TELEPHONE (OUTPATIENT)
Dept: NEUROLOGY | Facility: CLINIC | Age: 24
End: 2019-09-30

## 2019-10-22 ENCOUNTER — TELEPHONE (OUTPATIENT)
Dept: NEUROLOGY | Facility: CLINIC | Age: 24
End: 2019-10-22

## 2019-10-22 NOTE — TELEPHONE ENCOUNTER
Returned patient call. He stated he had another seizure on Saturday. Patient will call when he can come in for vns adjustment this week.

## 2019-11-01 ENCOUNTER — TELEPHONE (OUTPATIENT)
Dept: NEUROLOGY | Facility: CLINIC | Age: 24
End: 2019-11-01

## 2019-11-01 NOTE — TELEPHONE ENCOUNTER
aware of levels from 09/19. Aptiom incresed to 800 mg bid. Patient and wife verbalized understanding.

## 2019-11-01 NOTE — TELEPHONE ENCOUNTER
4/22/19:  Here for VNS F/U. States 5 seizures since 10/19.  aware.   Tolerating well so far.    Initial settings:  0 1.0/30/1000/30/5  A 0/1000/30  M 1.25/1000/60    Changes made per order:  O 1.125/30/1000/30/5  A 1.125/1000/30  M 1.375/1000/60    Pt waited for 20 minutes and stated felt fine.

## 2019-12-09 ENCOUNTER — TELEPHONE (OUTPATIENT)
Dept: NEUROLOGY | Facility: CLINIC | Age: 24
End: 2019-12-09

## 2019-12-09 NOTE — TELEPHONE ENCOUNTER
Called [facility] pharmacy to refill lamotrigine 200 mg 1.5 tabs bid #90 with 5 refills per order of .

## 2019-12-20 ENCOUNTER — TELEPHONE (OUTPATIENT)
Dept: NEUROLOGY | Facility: CLINIC | Age: 24
End: 2019-12-20

## 2019-12-20 NOTE — TELEPHONE ENCOUNTER
Per Dr Gallegos, start pt on Onfi 10mg BID x 1 weeek then increase to 20mg BID. Called in new prescription to [facility] pharmacy. Pt notified of changes

## 2020-01-14 ENCOUNTER — TELEPHONE (OUTPATIENT)
Dept: NEUROLOGY | Facility: CLINIC | Age: 25
End: 2020-01-14

## 2020-01-22 ENCOUNTER — TELEPHONE (OUTPATIENT)
Dept: NEUROLOGY | Facility: CLINIC | Age: 25
End: 2020-01-22

## 2020-01-22 ENCOUNTER — OFFICE VISIT (OUTPATIENT)
Dept: NEUROLOGY | Facility: CLINIC | Age: 25
End: 2020-01-22
Payer: MEDICAID

## 2020-01-22 VITALS
DIASTOLIC BLOOD PRESSURE: 83 MMHG | HEART RATE: 78 BPM | SYSTOLIC BLOOD PRESSURE: 129 MMHG | BODY MASS INDEX: 23.75 KG/M2 | HEIGHT: 73 IN

## 2020-01-22 DIAGNOSIS — G40.219 COMPLEX PARTIAL EPILEPSY WITH GENERALIZATION AND WITH INTRACTABLE EPILEPSY: Primary | ICD-10-CM

## 2020-01-22 PROCEDURE — 99999 PR PBB SHADOW E&M-EST. PATIENT-LVL II: CPT | Mod: PBBFAC,,, | Performed by: PSYCHIATRY & NEUROLOGY

## 2020-01-22 PROCEDURE — 99214 PR OFFICE/OUTPT VISIT, EST, LEVL IV, 30-39 MIN: ICD-10-PCS | Mod: S$PBB,,, | Performed by: PSYCHIATRY & NEUROLOGY

## 2020-01-22 PROCEDURE — 99999 PR PBB SHADOW E&M-EST. PATIENT-LVL II: ICD-10-PCS | Mod: PBBFAC,,, | Performed by: PSYCHIATRY & NEUROLOGY

## 2020-01-22 PROCEDURE — 99212 OFFICE O/P EST SF 10 MIN: CPT | Mod: PBBFAC | Performed by: PSYCHIATRY & NEUROLOGY

## 2020-01-22 PROCEDURE — 99214 OFFICE O/P EST MOD 30 MIN: CPT | Mod: S$PBB,,, | Performed by: PSYCHIATRY & NEUROLOGY

## 2020-01-22 RX ORDER — CLOBAZAM 20 MG/1
TABLET ORAL
COMMUNITY
Start: 2019-12-30 | End: 2020-01-30 | Stop reason: SDUPTHER

## 2020-01-22 RX ORDER — CITALOPRAM 20 MG/1
20 TABLET, FILM COATED ORAL DAILY
Qty: 30 TABLET | Refills: 11 | Status: SHIPPED | OUTPATIENT
Start: 2020-01-22 | End: 2020-08-28 | Stop reason: CLARIF
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
## 2020-01-30 ENCOUNTER — TELEPHONE (OUTPATIENT)
Dept: NEUROLOGY | Facility: CLINIC | Age: 25
End: 2020-01-30

## 2020-01-30 RX ORDER — CLOBAZAM 20 MG/1
40 TABLET ORAL 2 TIMES DAILY
Qty: 120 TABLET | Refills: 4 | Status: SHIPPED | OUTPATIENT
Start: 2020-01-30 | End: 2020-05-17

## 2020-01-30 NOTE — TELEPHONE ENCOUNTER
Patient called and stated he was feeling really dizzy and fell into a gravel road. He feels like he broke his nose. He is asking for a referral for an ENT in [place], .  aware. Patient stated that he also needs rx for his onfi since the dose was increased.

## 2020-02-03 ENCOUNTER — TELEPHONE (OUTPATIENT)
Dept: NEUROLOGY | Facility: CLINIC | Age: 25
End: 2020-02-03

## 2020-02-03 NOTE — TELEPHONE ENCOUNTER
Patient called to inform us that he only has enough onfi to last until tomorrow night. Personal appeal representative form faced per patient request.

## 2020-03-03 ENCOUNTER — TELEPHONE (OUTPATIENT)
Dept: NEUROLOGY | Facility: CLINIC | Age: 25
End: 2020-03-03

## 2020-03-03 NOTE — TELEPHONE ENCOUNTER
----- Message from Elen Geiger sent at 3/3/2020  1:41 PM CST -----  Contact: Self [phone]  Pt states he need a refill on lacosamide (VIMPAT) 200 mg Tab tablet    [facility] [address] [place] [address] [id number]

## 2020-03-03 NOTE — TELEPHONE ENCOUNTER
Refills called in for Vimpat 200mg 1 po BID to [facility]. Spoke with pt, he is doing well. Has had no events in 3 weeks

## 2020-03-05 ENCOUNTER — TELEPHONE (OUTPATIENT)
Dept: NEUROLOGY | Facility: CLINIC | Age: 25
End: 2020-03-05

## 2020-03-05 NOTE — TELEPHONE ENCOUNTER
----- Message from Delfin Lagos sent at 3/5/2020 12:35 PM CST -----  Contact: Patient @ [phone]  Patient calling to schedule a f/u appt, pls call

## 2020-03-11 ENCOUNTER — TELEPHONE (OUTPATIENT)
Dept: NEUROLOGY | Facility: CLINIC | Age: 25
End: 2020-03-11

## 2020-04-01 ENCOUNTER — TELEPHONE (OUTPATIENT)
Dept: NEUROLOGY | Facility: CLINIC | Age: 25
End: 2020-04-01

## 2020-04-28 DIAGNOSIS — R56.9 SEIZURES: Primary | ICD-10-CM

## 2020-04-30 ENCOUNTER — TELEPHONE (OUTPATIENT)
Dept: NEUROLOGY | Facility: CLINIC | Age: 25
End: 2020-04-30

## 2020-05-01 ENCOUNTER — TELEPHONE (OUTPATIENT)
Dept: NEUROLOGY | Facility: CLINIC | Age: 25
End: 2020-05-01

## 2020-05-01 DIAGNOSIS — Z01.812 PRE-PROCEDURE LAB EXAM: Primary | ICD-10-CM

## 2020-05-01 NOTE — TELEPHONE ENCOUNTER
Spoke to patient about coming to the emu. He stated he would call back if he is able to get a ride.

## 2020-05-04 ENCOUNTER — TELEPHONE (OUTPATIENT)
Dept: NEUROLOGY | Facility: CLINIC | Age: 25
End: 2020-05-04

## 2020-05-04 NOTE — TELEPHONE ENCOUNTER
Scheduled patient for emu on 05/07/20. Instructions reviewed and emailed per request. Address and phone number given to patient for covid testing. Patient aware of 24-48 hr rule. Order placed. Patient and Mother will be staying at the [facility] the day before because of long drive.

## 2020-05-06 ENCOUNTER — LAB VISIT (OUTPATIENT)
Dept: INTERNAL MEDICINE | Facility: CLINIC | Age: 25
DRG: 101 | End: 2020-05-06
Payer: MEDICAID

## 2020-05-06 DIAGNOSIS — Z01.812 PRE-PROCEDURE LAB EXAM: ICD-10-CM

## 2020-05-06 LAB — SARS-COV-2 RNA RESP QL NAA+PROBE: NOT DETECTED

## 2020-05-06 PROCEDURE — U0002 COVID-19 LAB TEST NON-CDC: HCPCS

## 2020-05-07 ENCOUNTER — HOSPITAL ENCOUNTER (INPATIENT)
Facility: HOSPITAL | Age: 25
LOS: 10 days | Discharge: HOME | DRG: 101 | End: 2020-05-17
Attending: PSYCHIATRY & NEUROLOGY | Admitting: PSYCHIATRY & NEUROLOGY
Payer: MEDICAID

## 2020-05-07 DIAGNOSIS — R45.89 DEPRESSED MOOD: ICD-10-CM

## 2020-05-07 DIAGNOSIS — R56.9 SEIZURES: Primary | ICD-10-CM

## 2020-05-07 DIAGNOSIS — G40.219 COMPLEX PARTIAL EPILEPSY WITH GENERALIZATION AND WITH INTRACTABLE EPILEPSY: ICD-10-CM

## 2020-05-07 LAB
ALBUMIN SERPL BCP-MCNC: 4.3 G/DL (ref 3.5–5.2)
ALP SERPL-CCNC: 99 U/L (ref 55–135)
ALT SERPL W/O P-5'-P-CCNC: 20 U/L (ref 10–44)
AMPHET+METHAMPHET UR QL: NEGATIVE
ANION GAP SERPL CALC-SCNC: 10 MMOL/L (ref 8–16)
AST SERPL-CCNC: 21 U/L (ref 10–40)
BARBITURATES UR QL SCN>200 NG/ML: NEGATIVE
BASOPHILS # BLD AUTO: 0.01 K/UL (ref 0–0.2)
BASOPHILS NFR BLD: 0.1 % (ref 0–1.9)
BENZODIAZ UR QL SCN>200 NG/ML: NORMAL
BILIRUB SERPL-MCNC: 0.7 MG/DL (ref 0.1–1)
BUN SERPL-MCNC: 8 MG/DL (ref 6–20)
BZE UR QL SCN: NEGATIVE
CALCIUM SERPL-MCNC: 9.7 MG/DL (ref 8.7–10.5)
CANNABINOIDS UR QL SCN: NEGATIVE
CHLORIDE SERPL-SCNC: 101 MMOL/L (ref 95–110)
CO2 SERPL-SCNC: 28 MMOL/L (ref 23–29)
CREAT SERPL-MCNC: 0.9 MG/DL (ref 0.5–1.4)
CREAT UR-MCNC: 121 MG/DL (ref 23–375)
DIFFERENTIAL METHOD: ABNORMAL
EOSINOPHIL # BLD AUTO: 0 K/UL (ref 0–0.5)
EOSINOPHIL NFR BLD: 0.2 % (ref 0–8)
ERYTHROCYTE [DISTWIDTH] IN BLOOD BY AUTOMATED COUNT: 12.1 % (ref 11.5–14.5)
EST. GFR  (AFRICAN AMERICAN): >60 ML/MIN/1.73 M^2
EST. GFR  (NON AFRICAN AMERICAN): >60 ML/MIN/1.73 M^2
GLUCOSE SERPL-MCNC: 98 MG/DL (ref 70–110)
HCT VFR BLD AUTO: 48.2 % (ref 40–54)
HGB BLD-MCNC: 16.3 G/DL (ref 14–18)
IMM GRANULOCYTES # BLD AUTO: 0.03 K/UL (ref 0–0.04)
IMM GRANULOCYTES NFR BLD AUTO: 0.4 % (ref 0–0.5)
LYMPHOCYTES # BLD AUTO: 1 K/UL (ref 1–4.8)
LYMPHOCYTES NFR BLD: 11.4 % (ref 18–48)
MCH RBC QN AUTO: 31.5 PG (ref 27–31)
MCHC RBC AUTO-ENTMCNC: 33.8 G/DL (ref 32–36)
MCV RBC AUTO: 93 FL (ref 82–98)
METHADONE UR QL SCN>300 NG/ML: NEGATIVE
MONOCYTES # BLD AUTO: 0.5 K/UL (ref 0.3–1)
MONOCYTES NFR BLD: 5.7 % (ref 4–15)
NEUTROPHILS # BLD AUTO: 7 K/UL (ref 1.8–7.7)
NEUTROPHILS NFR BLD: 82.2 % (ref 38–73)
NRBC BLD-RTO: 0 /100 WBC
OPIATES UR QL SCN: NEGATIVE
PCP UR QL SCN>25 NG/ML: NEGATIVE
PLATELET # BLD AUTO: 181 K/UL (ref 150–350)
PMV BLD AUTO: 9.6 FL (ref 9.2–12.9)
POTASSIUM SERPL-SCNC: 4.3 MMOL/L (ref 3.5–5.1)
PROT SERPL-MCNC: 6.9 G/DL (ref 6–8.4)
RBC # BLD AUTO: 5.18 M/UL (ref 4.6–6.2)
SODIUM SERPL-SCNC: 139 MMOL/L (ref 136–145)
TOXICOLOGY INFORMATION: NORMAL
WBC # BLD AUTO: 8.56 K/UL (ref 3.9–12.7)

## 2020-05-07 PROCEDURE — 80307 DRUG TEST PRSMV CHEM ANLYZR: CPT

## 2020-05-07 PROCEDURE — 80053 COMPREHEN METABOLIC PANEL: CPT

## 2020-05-07 PROCEDURE — 95720 PR EEG, W/VIDEO, CONT RECORD, I&R, >12<26 HRS: ICD-10-PCS | Mod: ,,, | Performed by: PSYCHIATRY & NEUROLOGY

## 2020-05-07 PROCEDURE — 25000003 PHARM REV CODE 250: Performed by: PHYSICIAN ASSISTANT

## 2020-05-07 PROCEDURE — 11000001 HC ACUTE MED/SURG PRIVATE ROOM

## 2020-05-07 PROCEDURE — 95700 EEG CONT REC W/VID EEG TECH: CPT

## 2020-05-07 PROCEDURE — 36415 COLL VENOUS BLD VENIPUNCTURE: CPT

## 2020-05-07 PROCEDURE — 95714 VEEG EA 12-26 HR UNMNTR: CPT

## 2020-05-07 PROCEDURE — 85025 COMPLETE CBC W/AUTO DIFF WBC: CPT

## 2020-05-07 PROCEDURE — 99223 1ST HOSP IP/OBS HIGH 75: CPT | Mod: ,,, | Performed by: PSYCHIATRY & NEUROLOGY

## 2020-05-07 PROCEDURE — 99223 PR INITIAL HOSPITAL CARE,LEVL III: ICD-10-PCS | Mod: ,,, | Performed by: PSYCHIATRY & NEUROLOGY

## 2020-05-07 PROCEDURE — 93010 EKG 12-LEAD: ICD-10-PCS | Mod: ,,, | Performed by: INTERNAL MEDICINE

## 2020-05-07 PROCEDURE — 93010 ELECTROCARDIOGRAM REPORT: CPT | Mod: ,,, | Performed by: INTERNAL MEDICINE

## 2020-05-07 PROCEDURE — 93005 ELECTROCARDIOGRAM TRACING: CPT

## 2020-05-07 PROCEDURE — 95720 EEG PHY/QHP EA INCR W/VEEG: CPT | Mod: ,,, | Performed by: PSYCHIATRY & NEUROLOGY

## 2020-05-07 RX ORDER — LAMOTRIGINE 100 MG/1
200 TABLET ORAL NIGHTLY
Status: COMPLETED | OUTPATIENT
Start: 2020-05-07 | End: 2020-05-07

## 2020-05-07 RX ORDER — SODIUM CHLORIDE 0.9 % (FLUSH) 0.9 %
10 SYRINGE (ML) INJECTION
Status: DISCONTINUED | OUTPATIENT
Start: 2020-05-07 | End: 2020-05-17 | Stop reason: HOSPADM

## 2020-05-07 RX ORDER — DIAZEPAM 10 MG/2ML
5 INJECTION INTRAMUSCULAR
Status: DISCONTINUED | OUTPATIENT
Start: 2020-05-07 | End: 2020-05-17 | Stop reason: HOSPADM

## 2020-05-07 RX ORDER — CITALOPRAM 10 MG/1
20 TABLET ORAL DAILY
Status: DISCONTINUED | OUTPATIENT
Start: 2020-05-07 | End: 2020-05-17 | Stop reason: HOSPADM

## 2020-05-07 RX ORDER — LAMOTRIGINE 100 MG/1
100 TABLET ORAL DAILY
Status: DISCONTINUED | OUTPATIENT
Start: 2020-05-08 | End: 2020-05-08

## 2020-05-07 RX ORDER — DOCUSATE SODIUM 100 MG/1
100 CAPSULE, LIQUID FILLED ORAL 2 TIMES DAILY PRN
Status: DISCONTINUED | OUTPATIENT
Start: 2020-05-07 | End: 2020-05-17 | Stop reason: HOSPADM

## 2020-05-07 RX ORDER — ACETAMINOPHEN 325 MG/1
650 TABLET ORAL EVERY 4 HOURS PRN
Status: DISCONTINUED | OUTPATIENT
Start: 2020-05-07 | End: 2020-05-17 | Stop reason: HOSPADM

## 2020-05-07 RX ADMIN — LAMOTRIGINE 200 MG: 100 TABLET ORAL at 09:05

## 2020-05-07 RX ADMIN — CITALOPRAM HYDROBROMIDE 20 MG: 10 TABLET ORAL at 01:05

## 2020-05-07 RX ADMIN — ACETAMINOPHEN 650 MG: 325 TABLET ORAL at 09:05
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
## 2020-05-07 NOTE — ASSESSMENT & PLAN NOTE
23 yo male with history of epilepsy who presents to EMU for further characterization and localization of events, potential surgical workup.     - Start VEEG  - Hold home Onfi, Vimpat  - Decrease Lamictal to 200 mg qHS, 100 mg qAM  - VNS in place - leave at current settings for now  - Seizure precautions  - Activation procedures per protocol - HV/Photic  - IV Valium PRN for GTC greater than 5 min - please call epilepsy on call before administering
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
## 2020-05-07 NOTE — SUBJECTIVE & OBJECTIVE
Past Medical History:   Diagnosis Date    Depressed 9/12/2018    Epilepsy 2015    Mitral valve prolapse 2015    Seizures        Past Surgical History:   Procedure Laterality Date    ADENOIDECTOMY  2015    finger abscess removal      VAGUS NERVE STIMULATOR INSERTION Left 4/4/2019    Procedure: INSERTION, VAGUS NERVE STIMULATOR;  Surgeon: Reuben Gupta MD;  Location: [facility] OR [address] [place] [address];  Service: Neurosurgery;  Laterality: Left;  toronto I, asa 1, regular bed, supine        Review of patient's allergies indicates:   Allergen Reactions    Zofran [ondansetron hcl (pf)] Hives and Rash       No current facility-administered medications on file prior to encounter.      Current Outpatient Medications on File Prior to Encounter   Medication Sig    citalopram (CELEXA) 20 MG tablet Take 1 tablet (20 mg total) by mouth once daily.    cloBAZam (ONFI) 20 mg Tab Take 2 tablets (40 mg total) by mouth 2 (two) times daily.    lacosamide (VIMPAT) 200 mg Tab tablet Take 200 mg by mouth every 12 (twelve) hours.    lamoTRIgine (LAMICTAL) 200 MG tablet Take 1 tablet (200 mg total) by mouth 2 (two) times daily.    HYDROcodone-acetaminophen (NORCO) 5-325 mg per tablet Take 1 tablet by mouth every 6 (six) hours as needed for Pain.    [DISCONTINUED] eslicarbazepine (APTIOM) 400 mg Tab Take 3 tablets (1,200 mg total) by mouth once daily. (Patient taking differently: Take 400 mg by mouth 3 (three) times daily. )     Continuous Infusions:    Family History     Problem Relation (Age of Onset)    Heart disease Maternal Grandmother, Maternal Grandfather    Hypertension Mother, Maternal Grandmother, Maternal Grandfather        Tobacco Use    Smoking status: Never Smoker    Smokeless tobacco: Never Used   Substance and Sexual Activity    Alcohol use: Yes     Alcohol/week: 10.0 standard drinks     Types: 10 Cans of beer per week     Comment: 10 beers/week    Drug use: No    Sexual activity: Yes     Partners: Female     Review of  Systems   Constitutional: Negative for chills, fatigue and fever.   HENT: Negative for rhinorrhea and trouble swallowing.    Eyes: Negative for photophobia and pain.   Respiratory: Negative for cough and shortness of breath.    Cardiovascular: Negative for chest pain and leg swelling.   Gastrointestinal: Negative for nausea and vomiting.   Musculoskeletal: Negative for gait problem and myalgias.   Neurological: Positive for seizures. Negative for facial asymmetry, speech difficulty, weakness and headaches.   Psychiatric/Behavioral: Negative for agitation and confusion.     Objective:     Vital Signs (Most Recent):  Temp: 96.6 °F (35.9 °C) (05/07/20 1130)  Pulse: 85 (05/07/20 1130)  Resp: 16 (05/07/20 1130)  BP: 131/74 (05/07/20 1130)  SpO2: 98 % (05/07/20 1130) Vital Signs (24h Range):  Temp:  [96.6 °F (35.9 °C)] 96.6 °F (35.9 °C)  Pulse:  [85] 85  Resp:  [16] 16  SpO2:  [98 %] 98 %  BP: (131)/(74) 131/74     Weight: 81.6 kg (180 lb)  Body mass index is 23.75 kg/m².    Physical Exam   Constitutional: He is oriented to person, place, and time. He appears well-developed and well-nourished. No distress.   HENT:   Head: Normocephalic.   Eyes: Pupils are equal, round, and reactive to light. Conjunctivae and EOM are normal. No scleral icterus.   Neck: Normal range of motion.   Pulmonary/Chest: Effort normal. No respiratory distress.   Musculoskeletal: Normal range of motion. He exhibits no edema.   Neurological: He is alert and oriented to person, place, and time. He has normal strength. He has a normal Finger-Nose-Finger Test. Gait normal.   Skin: Skin is warm and dry. He is not diaphoretic.   Psychiatric: He has a normal mood and affect. His speech is normal and behavior is normal. Judgment and thought content normal.       NEUROLOGICAL EXAMINATION:     MENTAL STATUS   Oriented to person, place, and time.   Attention: normal. Concentration: normal.   Speech: speech is normal   Level of consciousness: alert    CRANIAL  NERVES     CN III, IV, VI   Pupils are equal, round, and reactive to light.  Extraocular motions are normal.     CN V   Facial sensation intact.     CN VII   Facial expression full, symmetric.     CN VIII   CN VIII normal.     CN IX, X   CN IX normal.   CN X normal.     CN XI   CN XI normal.     CN XII   CN XII normal.     MOTOR EXAM   Muscle bulk: normal  Overall muscle tone: normal    Strength   Strength 5/5 throughout.     SENSORY EXAM   Light touch normal.   Proprioception normal.     GAIT AND COORDINATION     Gait  Gait: normal     Coordination   Finger to nose coordination: normal      Significant Labs: All pertinent lab results from the past 24 hours have been reviewed.    Significant Studies: I have reviewed all pertinent imaging results/findings within the past 24 hours.

## 2020-05-08 PROCEDURE — 99233 PR SUBSEQUENT HOSPITAL CARE,LEVL III: ICD-10-PCS | Mod: ,,, | Performed by: PSYCHIATRY & NEUROLOGY

## 2020-05-08 PROCEDURE — 99233 SBSQ HOSP IP/OBS HIGH 50: CPT | Mod: ,,, | Performed by: PSYCHIATRY & NEUROLOGY

## 2020-05-08 PROCEDURE — 95714 VEEG EA 12-26 HR UNMNTR: CPT

## 2020-05-08 PROCEDURE — 95720 EEG PHY/QHP EA INCR W/VEEG: CPT | Mod: ,,, | Performed by: PSYCHIATRY & NEUROLOGY

## 2020-05-08 PROCEDURE — 25000003 PHARM REV CODE 250: Performed by: PHYSICIAN ASSISTANT

## 2020-05-08 PROCEDURE — 95720 PR EEG, W/VIDEO, CONT RECORD, I&R, >12<26 HRS: ICD-10-PCS | Mod: ,,, | Performed by: PSYCHIATRY & NEUROLOGY

## 2020-05-08 PROCEDURE — 11000001 HC ACUTE MED/SURG PRIVATE ROOM

## 2020-05-08 RX ORDER — LAMOTRIGINE 25 MG/1
50 TABLET ORAL DAILY
Status: DISCONTINUED | OUTPATIENT
Start: 2020-05-09 | End: 2020-05-09

## 2020-05-08 RX ADMIN — CITALOPRAM HYDROBROMIDE 20 MG: 10 TABLET ORAL at 09:05

## 2020-05-08 RX ADMIN — LAMOTRIGINE 100 MG: 100 TABLET ORAL at 09:05

## 2020-05-08 NOTE — PLAN OF CARE
Problem: Adult Inpatient Plan of Care  Goal: Plan of Care Review  5/8/2020 0742 by Roxane Leonard RN  Outcome: Ongoing, Progressing  5/8/2020 0741 by Roxane Leonard RN  Outcome: Ongoing, Progressing     Problem: Adult Inpatient Plan of Care  Goal: Optimal Comfort and Wellbeing  5/8/2020 0742 by Roxane Leonard RN  Outcome: Ongoing, Progressing  5/8/2020 0741 by Roxane Leonard RN  Outcome: Ongoing, Progressing     Problem: Adult Inpatient Plan of Care  Goal: Absence of Hospital-Acquired Illness or Injury  Outcome: Ongoing, Progressing     Reviewed with Pt POC at 2130. Encouraged and answered all questions. Pt had no acute events overnight. VSS and full assessments found in flowsheets. WCTM.

## 2020-05-08 NOTE — PLAN OF CARE
CM spoke with patient via phone for Discharge Planning Assessment.  Per patient,  patient lives with alone in a(n) house with 3 steps to enter.   Per patient, patient was independent with ADLS and used no DME for ambulation.  Per patient, the patient will have assistance from family upon discharge.  Discharge Planning Booklet given to patient/family and discussed.  All questions addressed.       PCP:  Primary Doctor No      Pharmacy:    [facility] [address] [facility] [address]  [address]  [place] [address]  Phone: [phone] Fax: [phone]    [id number] - [place], [place] - [address]  [address] [place] [address]  Phone: [phone] Fax: [phone]        Emergency Contacts:  Extended Emergency Contact Information  Primary Emergency Contact: Alexandra Etienne  Address: [address]  Mobile Phone: [phone]  Relation: Mother      Insurance:    Payor: MEDICAID / Plan: [facility] [email or web address] [facility] / Product Type: Managed Medicaid /        05/08/20 0917   Discharge Assessment   Assessment Type Discharge Planning Assessment   Confirmed/corrected address and phone number on facesheet? Yes   Assessment information obtained from? Patient   Expected Length of Stay (days) 5   Communicated expected length of stay with patient/caregiver yes   Prior to hospitilization cognitive status: Alert/Oriented   Prior to hospitalization functional status: Independent   Current cognitive status: Alert/Oriented   Current Functional Status: Independent   Lives With alone   Able to Return to Prior Arrangements yes   Is patient able to care for self after discharge? Yes   Who are your caregiver(s) and their phone number(s)? Alexandra Etienne Mother [phone]   Patient's perception of discharge disposition home or selfcare   Readmission Within the Last 30 Days no previous admission in last 30 days   Patient currently being followed by  outpatient case management? No   Patient currently receives any other outside agency services? No   Equipment Currently Used at Home none   Do you have any problems affording any of your prescribed medications? No   Is the patient taking medications as prescribed? yes   Does the patient have transportation home? Yes   Transportation Anticipated family or friend will provide  (mother)   Dialysis Name and Scheduled days na   Does the patient receive services at the [facility]mad[facility] Clinic? No   Discharge Plan A Home   Discharge Plan B Home   DME Needed Upon Discharge  none   Patient/Family in Agreement with Plan yes       Alexandra Young RN  Case Management  Ext: [phone]  05/08/2020  9:20 AM

## 2020-05-09 PROCEDURE — 25000003 PHARM REV CODE 250: Performed by: PHYSICIAN ASSISTANT

## 2020-05-09 PROCEDURE — 95720 PR EEG, W/VIDEO, CONT RECORD, I&R, >12<26 HRS: ICD-10-PCS | Mod: ,,, | Performed by: PSYCHIATRY & NEUROLOGY

## 2020-05-09 PROCEDURE — 99233 PR SUBSEQUENT HOSPITAL CARE,LEVL III: ICD-10-PCS | Mod: ,,, | Performed by: PSYCHIATRY & NEUROLOGY

## 2020-05-09 PROCEDURE — 25000003 PHARM REV CODE 250: Performed by: PSYCHIATRY & NEUROLOGY

## 2020-05-09 PROCEDURE — 95714 VEEG EA 12-26 HR UNMNTR: CPT

## 2020-05-09 PROCEDURE — 99233 SBSQ HOSP IP/OBS HIGH 50: CPT | Mod: ,,, | Performed by: PSYCHIATRY & NEUROLOGY

## 2020-05-09 PROCEDURE — 95720 EEG PHY/QHP EA INCR W/VEEG: CPT | Mod: ,,, | Performed by: PSYCHIATRY & NEUROLOGY

## 2020-05-09 PROCEDURE — 11000001 HC ACUTE MED/SURG PRIVATE ROOM

## 2020-05-09 RX ADMIN — LAMOTRIGINE 50 MG: 25 TABLET ORAL at 08:05

## 2020-05-09 RX ADMIN — CITALOPRAM HYDROBROMIDE 20 MG: 10 TABLET ORAL at 08:05

## 2020-05-09 NOTE — ASSESSMENT & PLAN NOTE
25 yo male with history of epilepsy who presents to EMU for further characterization and localization of events, potential surgical workup.   Has features of L TLE, R TLE and GPFA (suggestive of a deep frontal lobe-thalamic pathway vs IGE)  - cont VEEG  - Hold home Onfi, Vimpat, Lamictal   - VNS in place - leave at current settings for now. If no sz will pause stimulation   - Seizure precautions  - Activation procedures per protocol - HV/Photic  - IV Valium PRN for GTC greater than 5 min - please call epilepsy on call before administering

## 2020-05-09 NOTE — SUBJECTIVE & OBJECTIVE
Past Medical History:   Diagnosis Date    Depressed 9/12/2018    Epilepsy 2015    Mitral valve prolapse 2015    Seizures        Past Surgical History:   Procedure Laterality Date    ADENOIDECTOMY  2015    finger abscess removal      VAGUS NERVE STIMULATOR INSERTION Left 4/4/2019    Procedure: INSERTION, VAGUS NERVE STIMULATOR;  Surgeon: Reuben Gupta MD;  Location: [facility] OR [address] [place] [address];  Service: Neurosurgery;  Laterality: Left;  toronto I, asa 1, regular bed, supine        Review of patient's allergies indicates:   Allergen Reactions    Zofran [ondansetron hcl (pf)] Hives and Rash       No current facility-administered medications on file prior to encounter.      Current Outpatient Medications on File Prior to Encounter   Medication Sig    citalopram (CELEXA) 20 MG tablet Take 1 tablet (20 mg total) by mouth once daily.    cloBAZam (ONFI) 20 mg Tab Take 2 tablets (40 mg total) by mouth 2 (two) times daily.    lacosamide (VIMPAT) 200 mg Tab tablet Take 200 mg by mouth every 12 (twelve) hours.    lamoTRIgine (LAMICTAL) 200 MG tablet Take 1 tablet (200 mg total) by mouth 2 (two) times daily.    HYDROcodone-acetaminophen (NORCO) 5-325 mg per tablet Take 1 tablet by mouth every 6 (six) hours as needed for Pain.     Continuous Infusions:    Family History     Problem Relation (Age of Onset)    Heart disease Maternal Grandmother, Maternal Grandfather    Hypertension Mother, Maternal Grandmother, Maternal Grandfather        Tobacco Use    Smoking status: Never Smoker    Smokeless tobacco: Never Used   Substance and Sexual Activity    Alcohol use: Yes     Alcohol/week: 10.0 standard drinks     Types: 10 Cans of beer per week     Comment: 10 beers/week    Drug use: No    Sexual activity: Yes     Partners: Female     Review of Systems   Constitutional: Negative for chills, fatigue and fever.   HENT: Negative for rhinorrhea and trouble swallowing.    Eyes: Negative for photophobia and pain.   Respiratory: Negative  for cough and shortness of breath.    Cardiovascular: Negative for chest pain and leg swelling.   Gastrointestinal: Negative for nausea and vomiting.   Musculoskeletal: Negative for gait problem and myalgias.   Neurological: Positive for seizures. Negative for facial asymmetry, speech difficulty, weakness and headaches.   Psychiatric/Behavioral: Negative for agitation and confusion.     Objective:     Vital Signs (Most Recent):  Temp: 98.5 °F (36.9 °C) (05/08/20 1943)  Pulse: 83 (05/08/20 1943)  Resp: 18 (05/08/20 1943)  BP: 124/82 (05/08/20 1943)  SpO2: 98 % (05/08/20 1943) Vital Signs (24h Range):  Temp:  [97.4 °F (36.3 °C)-98.5 °F (36.9 °C)] 98.5 °F (36.9 °C)  Pulse:  [54-83] 83  Resp:  [14-18] 18  SpO2:  [97 %-99 %] 98 %  BP: (123-140)/(80-86) 124/82     Weight: 81.6 kg (180 lb)  Body mass index is 23.75 kg/m².    Physical Exam   Constitutional: He is oriented to person, place, and time. He appears well-developed and well-nourished. No distress.   HENT:   Head: Normocephalic.   Eyes: Pupils are equal, round, and reactive to light. Conjunctivae and EOM are normal. No scleral icterus.   Neck: Normal range of motion.   Pulmonary/Chest: Effort normal. No respiratory distress.   Musculoskeletal: Normal range of motion. He exhibits no edema.   Neurological: He is alert and oriented to person, place, and time. He has normal strength. He has a normal Finger-Nose-Finger Test. Gait normal.   Skin: Skin is warm and dry. He is not diaphoretic.   Psychiatric: He has a normal mood and affect. His speech is normal and behavior is normal. Judgment and thought content normal.       NEUROLOGICAL EXAMINATION:     MENTAL STATUS   Oriented to person, place, and time.   Attention: normal. Concentration: normal.   Speech: speech is normal   Level of consciousness: alert    CRANIAL NERVES     CN III, IV, VI   Pupils are equal, round, and reactive to light.  Extraocular motions are normal.     CN V   Facial sensation intact.     CN VII    Facial expression full, symmetric.     CN VIII   CN VIII normal.     CN IX, X   CN IX normal.   CN X normal.     CN XI   CN XI normal.     CN XII   CN XII normal.     MOTOR EXAM   Muscle bulk: normal  Overall muscle tone: normal    Strength   Strength 5/5 throughout.     SENSORY EXAM   Light touch normal.   Proprioception normal.     GAIT AND COORDINATION     Gait  Gait: normal     Coordination   Finger to nose coordination: normal      Significant Labs: All pertinent lab results from the past 24 hours have been reviewed.    Significant Studies: I have reviewed all pertinent imaging results/findings within the past 24 hours.

## 2020-05-09 NOTE — PROGRESS NOTES
[facility]  Neurology-Epilepsy  Progress Note    Patient Name: Darin Cunha  MRN: [id number]  Admission Date: 5/7/2020  Hospital Length of Stay: 2 days  Code Status: Full Code   Attending Provider: BEBETO Marina MD  Primary Care Physician: Primary Doctor No   Principal Problem:Complex partial epilepsy with generalization and with intractable epilepsy    Subjective:     Hospital Course:   5/7 - 5/8 - no acute events overnight   EEG with L temp sharp waves   5/8-5/9 - no acute events overnight   EEG with L temp sharp waves, GPFA, L PFA, R PFA, R and L slow complexes   No sz      Past Medical History:   Diagnosis Date    Depressed 9/12/2018    Epilepsy 2015    Mitral valve prolapse 2015    Seizures        Past Surgical History:   Procedure Laterality Date    ADENOIDECTOMY  2015    finger abscess removal      VAGUS NERVE STIMULATOR INSERTION Left 4/4/2019    Procedure: INSERTION, VAGUS NERVE STIMULATOR;  Surgeon: Reuben Gupta MD;  Location: [facility] OR [address] [place] [address];  Service: Neurosurgery;  Laterality: Left;  toronto I, asa 1, regular bed, supine        Review of patient's allergies indicates:   Allergen Reactions    Zofran [ondansetron hcl (pf)] Hives and Rash       No current facility-administered medications on file prior to encounter.      Current Outpatient Medications on File Prior to Encounter   Medication Sig    citalopram (CELEXA) 20 MG tablet Take 1 tablet (20 mg total) by mouth once daily.    cloBAZam (ONFI) 20 mg Tab Take 2 tablets (40 mg total) by mouth 2 (two) times daily.    lacosamide (VIMPAT) 200 mg Tab tablet Take 200 mg by mouth every 12 (twelve) hours.    lamoTRIgine (LAMICTAL) 200 MG tablet Take 1 tablet (200 mg total) by mouth 2 (two) times daily.    HYDROcodone-acetaminophen (NORCO) 5-325 mg per tablet Take 1 tablet by mouth every 6 (six) hours as needed for Pain.     Continuous Infusions:    Family History     Problem Relation (Age of Onset)    Heart disease Maternal  Grandmother, Maternal Grandfather    Hypertension Mother, Maternal Grandmother, Maternal Grandfather        Tobacco Use    Smoking status: Never Smoker    Smokeless tobacco: Never Used   Substance and Sexual Activity    Alcohol use: Yes     Alcohol/week: 10.0 standard drinks     Types: 10 Cans of beer per week     Comment: 10 beers/week    Drug use: No    Sexual activity: Yes     Partners: Female     Review of Systems   Constitutional: Negative.  Negative for chills, fatigue and fever.   HENT: Negative.  Negative for rhinorrhea and trouble swallowing.    Eyes: Negative.  Negative for photophobia and pain.   Respiratory: Negative.  Negative for cough and shortness of breath.    Cardiovascular: Negative.  Negative for chest pain and leg swelling.   Gastrointestinal: Negative.  Negative for nausea and vomiting.   Endocrine: Negative.    Genitourinary: Negative.    Musculoskeletal: Negative.  Negative for gait problem and myalgias.   Skin: Negative.    Allergic/Immunologic: Negative.    Neurological: Negative.  Negative for facial asymmetry, speech difficulty, weakness and headaches.   Hematological: Negative.    Psychiatric/Behavioral: Negative.  Negative for agitation and confusion.     Objective:     Vital Signs (Most Recent):  Temp: 97.5 °F (36.4 °C) (05/09/20 0433)  Pulse: (!) 58 (05/09/20 0434)  Resp: 16 (05/09/20 0434)  BP: 133/87 (05/09/20 0434)  SpO2: 99 % (05/09/20 0434) Vital Signs (24h Range):  Temp:  [97.5 °F (36.4 °C)-98.5 °F (36.9 °C)] 97.5 °F (36.4 °C)  Pulse:  [58-83] 58  Resp:  [10-18] 16  SpO2:  [98 %-99 %] 99 %  BP: (106-133)/(70-87) 133/87     Weight: 81.6 kg (180 lb)  Body mass index is 23.75 kg/m².    Physical Exam   Constitutional: He is oriented to person, place, and time. He appears well-developed and well-nourished. No distress.   HENT:   Head: Normocephalic and atraumatic.   Right Ear: External ear normal.   Left Ear: External ear normal.   Nose: Nose normal.   Mouth/Throat: Oropharynx  is clear and moist.   Eyes: Pupils are equal, round, and reactive to light. Conjunctivae and EOM are normal. No scleral icterus.   Neck: Normal range of motion.   Pulmonary/Chest: Effort normal. No respiratory distress.   Musculoskeletal: Normal range of motion. He exhibits no edema.   Neurological: He is alert and oriented to person, place, and time. He has normal strength. He has a normal Finger-Nose-Finger Test. Gait normal.   Skin: Skin is warm and dry. He is not diaphoretic.   Psychiatric: He has a normal mood and affect. His speech is normal and behavior is normal. Judgment and thought content normal.       NEUROLOGICAL EXAMINATION:     MENTAL STATUS   Oriented to person, place, and time.   Attention: normal. Concentration: normal.   Speech: speech is normal   Level of consciousness: alert    CRANIAL NERVES     CN III, IV, VI   Pupils are equal, round, and reactive to light.  Extraocular motions are normal.     CN V   Facial sensation intact.     CN VII   Facial expression full, symmetric.     CN VIII   CN VIII normal.     CN IX, X   CN IX normal.   CN X normal.     CN XI   CN XI normal.     CN XII   CN XII normal.     MOTOR EXAM   Muscle bulk: normal  Overall muscle tone: normal    Strength   Strength 5/5 throughout.     SENSORY EXAM   Light touch normal.   Proprioception normal.     GAIT AND COORDINATION     Gait  Gait: normal     Coordination   Finger to nose coordination: normal      Significant Labs: All pertinent lab results from the past 24 hours have been reviewed.    Significant Studies: I have reviewed all pertinent imaging results/findings within the past 24 hours.    Assessment and Plan:     * Complex partial epilepsy with generalization and with intractable epilepsy  25 yo male with history of epilepsy who presents to EMU for further characterization and localization of events, potential surgical workup.   Has features of L TLE, R TLE and GPFA (suggestive of a deep frontal lobe-thalamic pathway vs  IGE)  - cont VEEG  - Hold home Onfi, Vimpat, Lamictal   - VNS in place - leave at current settings for now. If no sz will pause stimulation   - Seizure precautions  - Activation procedures per protocol - HV/Photic  - IV Valium PRN for GTC greater than 5 min - please call epilepsy on call before administering      Depressed mood  - Continue home Celexa 20 mg daily  Stable         VTE Risk Mitigation (From admission, onward)         Ordered     IP VTE LOW RISK PATIENT  Once      05/07/20 1215     Place CHASE hose  Until discontinued      05/07/20 1215     Place sequential compression device  Until discontinued      05/07/20 1215                Abhijeet Do MD  Neurology-Epilepsy  [facility]

## 2020-05-09 NOTE — PROCEDURES
EMU Report     DATE OF PROCEDURE:  5/7/20     EEG NUMBER:  [id number]     REFERRING PHYSICIAN: Dr. Marina      This EEG was performed to characterize the patient's events.     ELECTROENCEPHALOGRAM REPORT     METHODOLOGY:  Electroencephalographic (EEG) recording is recorded with electrodes placed according to the International 10-20 placement system.  Thirty two (32) channels of digital signal (sampling rate of 512/sec), including T1 and T2, were simultaneously recorded from the scalp and may include EKG, EMG, and/or eye monitors.  Recording band pass was 0.1 to 512 Hz.  Digital video recording of the patient is simultaneously recorded with the EEG.  The patient is instructed to report clinical symptoms which may occur during the recording session.  EEG and video recording are stored and archived in digital format.    Activation procedures, which include photic stimulation, hyperventilation and instructing patients to perform simple tasks, are done in selected patients.   The EEG is displayed on a monitor screen and can be reviewed using different montages.  Computer assisted-analysis is employed to detect spike and electrographic seizure activity.   The entire record is submitted for computer analysis.  The entire recording is visually reviewed, and the times identified by computer analysis as being spikes or seizures are reviewed again.    Compressed spectral analysis (CSA) is also performed on the activity recorded from each individual channel.  This is displayed as a power display of frequencies from 0 to 30 Hz over time.   The CSA is reviewed looking for asymmetries in power between homologous areas of the scalp, then compared with the original EEG recording.    [employer] software was also utilized in the review of this study.  This software suite analyzes the EEG recording in multiple domains.  Coherence and rhythmicity are computed to identify EEG sections which may contain organized seizures.  Each channel  undergoes analysis to detect the presence of spike and sharp waves which have special and morphological characteristics of epileptic activity.  The routine EEG recording is converted from special into frequency domain.  This is then displayed comparing homologous areas to identify areas of significant asymmetry.  Algorithm to identify non-cortically generated artifact is used to separate artifact from the EEG.       RECORDING TIMES:   Start on 5/7/20 at 14:15:36   End on 5/8/20 at 7:00:00  Total time of VEEG was 16 hrs 43 min      SEIZURES RECORDED: One sz was recorded at 17:14:22 to 17:15:04     ELECTROENCEPHALOGRAM:  Interictal:  The recording was obtained with a number of standard bipolar and referential montages during wakefulness, drowsiness and sleep.  In the alert state, the posterior background rhythm was a symmetric, well-modulated 10 Hz alpha rhythm, which reacted symmetrically to eye opening.  Activation procedures were not performed.Excessive diffuse beta was noted. During drowsiness, the background rhythm waxed and waned and there were periods of slowing. During stage II sleep, symmetric V waves, K complexes and sleep spindles were noted.     Interictal abnormalities:   Bursts of generalized paroxysmal fast activity during wake and sleep - lasting up to 6 sec  Bursts of right paroxysmal fast activity during sleep   Bursts of left paroxysmal fast activity during sleep   Intermittent L temp slow (delta theta)   Intermittent L temp -parietal slow (delta theta)   L temp sharp waves;  Often in brief runs (2-3 Hz)   R temp sharp waves   Broad L lateralized sharply contoured complexes   Rare Broad R lateralized sharply contoured complexes   Generalized 2-3 Hz bursts of slowing in sleep      Ictal:  One seizure was recorded. 3 sec burst of generalized rhythmical theta was followed by brief mild attenuation followed by  quasirhythmical theta in T5/P3 region with overriding beta. The activity evolved in amplitude  and rhythmicity.   Semiology - 4 sec after onset oral automatisms were noted. Non specific angélica LE movements were noted.      FINAL SUMMARY:  ELECTROENCEPHALOGRAM:  Interictal:  This is an abnormal EEG during wakefulness, drowsiness and sleep.  See above      Ictal:  One seizure was recorded       CLINICAL SEIZURE:  Classification: L TLE      CLINICAL CORRELATION:  The patient is a 24-year-old male with a history of seizures, who is maintained on Onfi, Vimpat and Lamictal. This is an abnormal EEG during wakefulness, drowsiness and sleep. 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

## 2020-05-09 NOTE — NURSING
POC reviewed with pt, ongoing, progressing, voiced understanding, will continue to monitor, suction at the bedside, seizure precautions, side rails padded,wheels locked A/O x4, cont b/b,wheels locked, personal items, call light within reach and informed to call for assistance before ambulating.

## 2020-05-09 NOTE — NURSING
Pt had an episode, no one witnessed, pt was not aware of activity, pt assessed v/s bp 141/78,  po2 99/RA, p 71, t 98.2 O, 18 R pt was alert and oriented x4, denied any discomfort

## 2020-05-09 NOTE — SUBJECTIVE & OBJECTIVE
Past Medical History:   Diagnosis Date    Depressed 9/12/2018    Epilepsy 2015    Mitral valve prolapse 2015    Seizures        Past Surgical History:   Procedure Laterality Date    ADENOIDECTOMY  2015    finger abscess removal      VAGUS NERVE STIMULATOR INSERTION Left 4/4/2019    Procedure: INSERTION, VAGUS NERVE STIMULATOR;  Surgeon: Reuben Gupta MD;  Location: [facility] OR [address] [place] [address];  Service: Neurosurgery;  Laterality: Left;  toronto I, asa 1, regular bed, supine        Review of patient's allergies indicates:   Allergen Reactions    Zofran [ondansetron hcl (pf)] Hives and Rash       No current facility-administered medications on file prior to encounter.      Current Outpatient Medications on File Prior to Encounter   Medication Sig    citalopram (CELEXA) 20 MG tablet Take 1 tablet (20 mg total) by mouth once daily.    cloBAZam (ONFI) 20 mg Tab Take 2 tablets (40 mg total) by mouth 2 (two) times daily.    lacosamide (VIMPAT) 200 mg Tab tablet Take 200 mg by mouth every 12 (twelve) hours.    lamoTRIgine (LAMICTAL) 200 MG tablet Take 1 tablet (200 mg total) by mouth 2 (two) times daily.    HYDROcodone-acetaminophen (NORCO) 5-325 mg per tablet Take 1 tablet by mouth every 6 (six) hours as needed for Pain.     Continuous Infusions:    Family History     Problem Relation (Age of Onset)    Heart disease Maternal Grandmother, Maternal Grandfather    Hypertension Mother, Maternal Grandmother, Maternal Grandfather        Tobacco Use    Smoking status: Never Smoker    Smokeless tobacco: Never Used   Substance and Sexual Activity    Alcohol use: Yes     Alcohol/week: 10.0 standard drinks     Types: 10 Cans of beer per week     Comment: 10 beers/week    Drug use: No    Sexual activity: Yes     Partners: Female     Review of Systems   Constitutional: Negative.  Negative for chills, fatigue and fever.   HENT: Negative.  Negative for rhinorrhea and trouble swallowing.    Eyes: Negative.  Negative for photophobia  and pain.   Respiratory: Negative.  Negative for cough and shortness of breath.    Cardiovascular: Negative.  Negative for chest pain and leg swelling.   Gastrointestinal: Negative.  Negative for nausea and vomiting.   Endocrine: Negative.    Genitourinary: Negative.    Musculoskeletal: Negative.  Negative for gait problem and myalgias.   Skin: Negative.    Allergic/Immunologic: Negative.    Neurological: Negative.  Negative for facial asymmetry, speech difficulty, weakness and headaches.   Hematological: Negative.    Psychiatric/Behavioral: Negative.  Negative for agitation and confusion.     Objective:     Vital Signs (Most Recent):  Temp: 97.5 °F (36.4 °C) (05/09/20 0433)  Pulse: (!) 58 (05/09/20 0434)  Resp: 16 (05/09/20 0434)  BP: 133/87 (05/09/20 0434)  SpO2: 99 % (05/09/20 0434) Vital Signs (24h Range):  Temp:  [97.5 °F (36.4 °C)-98.5 °F (36.9 °C)] 97.5 °F (36.4 °C)  Pulse:  [58-83] 58  Resp:  [10-18] 16  SpO2:  [98 %-99 %] 99 %  BP: (106-133)/(70-87) 133/87     Weight: 81.6 kg (180 lb)  Body mass index is 23.75 kg/m².    Physical Exam   Constitutional: He is oriented to person, place, and time. He appears well-developed and well-nourished. No distress.   HENT:   Head: Normocephalic and atraumatic.   Right Ear: External ear normal.   Left Ear: External ear normal.   Nose: Nose normal.   Mouth/Throat: Oropharynx is clear and moist.   Eyes: Pupils are equal, round, and reactive to light. Conjunctivae and EOM are normal. No scleral icterus.   Neck: Normal range of motion.   Pulmonary/Chest: Effort normal. No respiratory distress.   Musculoskeletal: Normal range of motion. He exhibits no edema.   Neurological: He is alert and oriented to person, place, and time. He has normal strength. He has a normal Finger-Nose-Finger Test. Gait normal.   Skin: Skin is warm and dry. He is not diaphoretic.   Psychiatric: He has a normal mood and affect. His speech is normal and behavior is normal. Judgment and thought content  normal.       NEUROLOGICAL EXAMINATION:     MENTAL STATUS   Oriented to person, place, and time.   Attention: normal. Concentration: normal.   Speech: speech is normal   Level of consciousness: alert    CRANIAL NERVES     CN III, IV, VI   Pupils are equal, round, and reactive to light.  Extraocular motions are normal.     CN V   Facial sensation intact.     CN VII   Facial expression full, symmetric.     CN VIII   CN VIII normal.     CN IX, X   CN IX normal.   CN X normal.     CN XI   CN XI normal.     CN XII   CN XII normal.     MOTOR EXAM   Muscle bulk: normal  Overall muscle tone: normal    Strength   Strength 5/5 throughout.     SENSORY EXAM   Light touch normal.   Proprioception normal.     GAIT AND COORDINATION     Gait  Gait: normal     Coordination   Finger to nose coordination: normal      Significant Labs: All pertinent lab results from the past 24 hours have been reviewed.    Significant Studies: I have reviewed all pertinent imaging results/findings within the past 24 hours.

## 2020-05-09 NOTE — PROGRESS NOTES
[facility]  Neurology-Epilepsy  Progress Note    Patient Name: Darin Cunha  MRN: [id number]  Admission Date: 5/7/2020  Hospital Length of Stay: 1 days  Code Status: Full Code   Attending Provider: BEBETO Marina MD  Primary Care Physician: Primary Doctor No   Principal Problem:Complex partial epilepsy with generalization and with intractable epilepsy    Subjective:     Hospital Course:   5/7 - 5/8 - no acute events overnight   EEG with L temp sharp waves     Past Medical History:   Diagnosis Date    Depressed 9/12/2018    Epilepsy 2015    Mitral valve prolapse 2015    Seizures        Past Surgical History:   Procedure Laterality Date    ADENOIDECTOMY  2015    finger abscess removal      VAGUS NERVE STIMULATOR INSERTION Left 4/4/2019    Procedure: INSERTION, VAGUS NERVE STIMULATOR;  Surgeon: Reuben Gupta MD;  Location: [facility] OR [address] [place] [address];  Service: Neurosurgery;  Laterality: Left;  toronto I, asa 1, regular bed, supine        Review of patient's allergies indicates:   Allergen Reactions    Zofran [ondansetron hcl (pf)] Hives and Rash       No current facility-administered medications on file prior to encounter.      Current Outpatient Medications on File Prior to Encounter   Medication Sig    citalopram (CELEXA) 20 MG tablet Take 1 tablet (20 mg total) by mouth once daily.    cloBAZam (ONFI) 20 mg Tab Take 2 tablets (40 mg total) by mouth 2 (two) times daily.    lacosamide (VIMPAT) 200 mg Tab tablet Take 200 mg by mouth every 12 (twelve) hours.    lamoTRIgine (LAMICTAL) 200 MG tablet Take 1 tablet (200 mg total) by mouth 2 (two) times daily.    HYDROcodone-acetaminophen (NORCO) 5-325 mg per tablet Take 1 tablet by mouth every 6 (six) hours as needed for Pain.     Continuous Infusions:    Family History     Problem Relation (Age of Onset)    Heart disease Maternal Grandmother, Maternal Grandfather    Hypertension Mother, Maternal Grandmother, Maternal Grandfather        Tobacco Use     Smoking status: Never Smoker    Smokeless tobacco: Never Used   Substance and Sexual Activity    Alcohol use: Yes     Alcohol/week: 10.0 standard drinks     Types: 10 Cans of beer per week     Comment: 10 beers/week    Drug use: No    Sexual activity: Yes     Partners: Female     Review of Systems   Constitutional: Negative for chills, fatigue and fever.   HENT: Negative for rhinorrhea and trouble swallowing.    Eyes: Negative for photophobia and pain.   Respiratory: Negative for cough and shortness of breath.    Cardiovascular: Negative for chest pain and leg swelling.   Gastrointestinal: Negative for nausea and vomiting.   Musculoskeletal: Negative for gait problem and myalgias.   Neurological: Positive for seizures. Negative for facial asymmetry, speech difficulty, weakness and headaches.   Psychiatric/Behavioral: Negative for agitation and confusion.     Objective:     Vital Signs (Most Recent):  Temp: 98.5 °F (36.9 °C) (05/08/20 1943)  Pulse: 83 (05/08/20 1943)  Resp: 18 (05/08/20 1943)  BP: 124/82 (05/08/20 1943)  SpO2: 98 % (05/08/20 1943) Vital Signs (24h Range):  Temp:  [97.4 °F (36.3 °C)-98.5 °F (36.9 °C)] 98.5 °F (36.9 °C)  Pulse:  [54-83] 83  Resp:  [14-18] 18  SpO2:  [97 %-99 %] 98 %  BP: (123-140)/(80-86) 124/82     Weight: 81.6 kg (180 lb)  Body mass index is 23.75 kg/m².    Physical Exam   Constitutional: He is oriented to person, place, and time. He appears well-developed and well-nourished. No distress.   HENT:   Head: Normocephalic.   Eyes: Pupils are equal, round, and reactive to light. Conjunctivae and EOM are normal. No scleral icterus.   Neck: Normal range of motion.   Pulmonary/Chest: Effort normal. No respiratory distress.   Musculoskeletal: Normal range of motion. He exhibits no edema.   Neurological: He is alert and oriented to person, place, and time. He has normal strength. He has a normal Finger-Nose-Finger Test. Gait normal.   Skin: Skin is warm and dry. He is not diaphoretic.    Psychiatric: He has a normal mood and affect. His speech is normal and behavior is normal. Judgment and thought content normal.       NEUROLOGICAL EXAMINATION:     MENTAL STATUS   Oriented to person, place, and time.   Attention: normal. Concentration: normal.   Speech: speech is normal   Level of consciousness: alert    CRANIAL NERVES     CN III, IV, VI   Pupils are equal, round, and reactive to light.  Extraocular motions are normal.     CN V   Facial sensation intact.     CN VII   Facial expression full, symmetric.     CN VIII   CN VIII normal.     CN IX, X   CN IX normal.   CN X normal.     CN XI   CN XI normal.     CN XII   CN XII normal.     MOTOR EXAM   Muscle bulk: normal  Overall muscle tone: normal    Strength   Strength 5/5 throughout.     SENSORY EXAM   Light touch normal.   Proprioception normal.     GAIT AND COORDINATION     Gait  Gait: normal     Coordination   Finger to nose coordination: normal      Significant Labs: All pertinent lab results from the past 24 hours have been reviewed.    Significant Studies: I have reviewed all pertinent imaging results/findings within the past 24 hours.    Assessment and Plan:     * Complex partial epilepsy with generalization and with intractable epilepsy  23 yo male with history of epilepsy who presents to EMU for further characterization and localization of events, potential surgical workup.     - cont VEEG  - Hold home Onfi, Vimpat  - Decrease Lamictal to 50 mg qAM  - VNS in place - leave at current settings for now  - Seizure precautions  - Activation procedures per protocol - HV/Photic  - IV Valium PRN for GTC greater than 5 min - please call epilepsy on call before administering      Depressed mood  - Continue home Celexa 20 mg daily  Stable         VTE Risk Mitigation (From admission, onward)         Ordered     IP VTE LOW RISK PATIENT  Once      05/07/20 1215     Place CHASE hose  Until discontinued      05/07/20 1215     Place sequential compression  device  Until discontinued      05/07/20 7042                Abhijeet Do MD  Neurology-Epilepsy  [facility]

## 2020-05-09 NOTE — HOSPITAL COURSE
5/7 - 5/8 - no acute events overnight   EEG with L temp sharp waves   5/8-5/9 - no acute events overnight   EEG with L temp sharp waves, GPFA, L PFA, R PFA, R and L slow complexes   No sz

## 2020-05-09 NOTE — PLAN OF CARE
Problem: Adult Inpatient Plan of Care  Goal: Plan of Care Review  Outcome: Ongoing, Progressing  Flowsheets (Taken 5/9/2020 0339)  Plan of Care Reviewed With: patient     Problem: Fall Injury Risk  Goal: Absence of Fall and Fall-Related Injury  Outcome: Ongoing, Progressing   POC reviewed with patient, verbalized understanding. Pt aaox4, RA. VSS. Denied any pain or discomfort. Continuous EEG monitoring ongoing; no seizure-like activity noted within shift. Fall and seizure precautions maintained. Bed alarm armed, call light and personal belongings within reach. Side rails padded; suction and O2 at bedside. Pt progressing towards goal; see chart for full assessment. WCTM.

## 2020-05-09 NOTE — PROCEDURES
EMU Report    DATE OF PROCEDURE:  5/8/20     EEG NUMBER:  [id number]     REFERRING PHYSICIAN: Dr. Marina      This EEG was performed to characterize the patient's events.     ELECTROENCEPHALOGRAM REPORT     METHODOLOGY:  Electroencephalographic (EEG) recording is recorded with electrodes placed according to the International 10-20 placement system.  Thirty two (32) channels of digital signal (sampling rate of 512/sec), including T1 and T2, were simultaneously recorded from the scalp and may include EKG, EMG, and/or eye monitors.  Recording band pass was 0.1 to 512 Hz.  Digital video recording of the patient is simultaneously recorded with the EEG.  The patient is instructed to report clinical symptoms which may occur during the recording session.  EEG and video recording are stored and archived in digital format.    Activation procedures, which include photic stimulation, hyperventilation and instructing patients to perform simple tasks, are done in selected patients.   The EEG is displayed on a monitor screen and can be reviewed using different montages.  Computer assisted-analysis is employed to detect spike and electrographic seizure activity.   The entire record is submitted for computer analysis.  The entire recording is visually reviewed, and the times identified by computer analysis as being spikes or seizures are reviewed again.    Compressed spectral analysis (CSA) is also performed on the activity recorded from each individual channel.  This is displayed as a power display of frequencies from 0 to 30 Hz over time.   The CSA is reviewed looking for asymmetries in power between homologous areas of the scalp, then compared with the original EEG recording.    Cor[employer] software was also utilized in the review of this study.  This software suite analyzes the EEG recording in multiple domains.  Coherence and rhythmicity are computed to identify EEG sections which may contain organized seizures.  Each channel  undergoes analysis to detect the presence of spike and sharp waves which have special and morphological characteristics of epileptic activity.  The routine EEG recording is converted from special into frequency domain.  This is then displayed comparing homologous areas to identify areas of significant asymmetry.  Algorithm to identify non-cortically generated artifact is used to separate artifact from the EEG.       RECORDING TIMES:   Start on 5/8/20 at 7:00:47   End on 5/9/20 at 6:59:59   Total time of VEEG was 23 hrs 58 min     SEIZURES RECORDED:  During this recording no events were recorded     ELECTROENCEPHALOGRAM:  Interictal:  The recording was obtained with a number of standard bipolar and referential montages during wakefulness, drowsiness and sleep.  In the alert state, the posterior background rhythm was a symmetric, well-modulated 10 Hz alpha rhythm, which reacted symmetrically to eye opening.  Activation procedures were not performed.Excessive diffuse beta was noted. During drowsiness, the background rhythm waxed and waned and there were periods of slowing. During stage II sleep, symmetric V waves, K complexes and sleep spindles were noted.    Interictal abnormalities:   Bursts of generalized paroxysmal fast activity during wake and sleep   Bursts of right paroxysmal fast activity during sleep   Bursts of left paroxysmal fast activity during sleep   Intermittent L temp slow (delta theta)   Intermittent L temp -parietal slow (delta theta)   L temp sharp waves;  Often in brief runs (2-3 Hz)   R temp sharp waves   Broad L lateralized sharply contoured complexes   Rare Broad R lateralized sharply contoured complexes   Generalized 2-3 Hz bursts of slowing in sleep     Ictal:  No events recorded      FINAL SUMMARY:  ELECTROENCEPHALOGRAM:  Interictal:  This is an abnormal EEG during wakefulness, drowsiness and sleep.  See above     Ictal:  During this recording no events were recorded      CLINICAL  SEIZURE:  Classification:  Not applicable      CLINICAL CORRELATION:  The patient is a 24-year-old male with a history of seizures, who is maintained on Onfi, Vimpat and Lamictal. This is an abnormal EEG during wakefulness, drowsiness and sleep. 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

## 2020-05-09 NOTE — ASSESSMENT & PLAN NOTE
23 yo male with history of epilepsy who presents to EMU for further characterization and localization of events, potential surgical workup.     - cont VEEG  - Hold home Onfi, Vimpat  - Decrease Lamictal to 50 mg qAM  - VNS in place - leave at current settings for now  - Seizure precautions  - Activation procedures per protocol - HV/Photic  - IV Valium PRN for GTC greater than 5 min - please call epilepsy on call before administering

## 2020-05-10 PROCEDURE — 25000003 PHARM REV CODE 250: Performed by: PHYSICIAN ASSISTANT

## 2020-05-10 PROCEDURE — 95720 EEG PHY/QHP EA INCR W/VEEG: CPT | Mod: ,,, | Performed by: PSYCHIATRY & NEUROLOGY

## 2020-05-10 PROCEDURE — 95714 VEEG EA 12-26 HR UNMNTR: CPT

## 2020-05-10 PROCEDURE — 95720 PR EEG, W/VIDEO, CONT RECORD, I&R, >12<26 HRS: ICD-10-PCS | Mod: ,,, | Performed by: PSYCHIATRY & NEUROLOGY

## 2020-05-10 PROCEDURE — 11000001 HC ACUTE MED/SURG PRIVATE ROOM

## 2020-05-10 RX ADMIN — ACETAMINOPHEN 650 MG: 325 TABLET ORAL at 10:05

## 2020-05-10 RX ADMIN — CITALOPRAM HYDROBROMIDE 20 MG: 10 TABLET ORAL at 09:05

## 2020-05-10 NOTE — NURSING
Pt had an episode unsure of when it started, telemetry reported vtach, around 10:15, arrived in pt room and pt was very anxious, confused and restless, pt continuously wanted to get out of bed and walk, but was very confused, v/s 123/75 ,hr123, slowly returning to baseline around 10:30, c/o headache, gave pt prn tylenol, awaiting return call from on call 136/80 after 15 minutes

## 2020-05-10 NOTE — PLAN OF CARE
Problem: Adult Inpatient Plan of Care  Goal: Plan of Care Review  Outcome: Ongoing, Progressing  Flowsheets (Taken 5/10/2020 0535)  Plan of Care Reviewed With: patient      Pt aaox4, RA. VSS. No pain complaints noted. Continuous EEG monitoring ongoing; no seizure-like activity within shift. Fall and seizure precautions maintained. Bed alarm armed, call light and personal belongings within reach. Side rails padded; suction and O2 at bedside. Pt progressing towards goal; see chart for full assessment. WCTM.

## 2020-05-11 PROCEDURE — 25000003 PHARM REV CODE 250: Performed by: PHYSICIAN ASSISTANT

## 2020-05-11 PROCEDURE — 95720 PR EEG, W/VIDEO, CONT RECORD, I&R, >12<26 HRS: ICD-10-PCS | Mod: ,,, | Performed by: PSYCHIATRY & NEUROLOGY

## 2020-05-11 PROCEDURE — 99233 SBSQ HOSP IP/OBS HIGH 50: CPT | Mod: ,,, | Performed by: PSYCHIATRY & NEUROLOGY

## 2020-05-11 PROCEDURE — 95714 VEEG EA 12-26 HR UNMNTR: CPT

## 2020-05-11 PROCEDURE — 11000001 HC ACUTE MED/SURG PRIVATE ROOM

## 2020-05-11 PROCEDURE — 99233 PR SUBSEQUENT HOSPITAL CARE,LEVL III: ICD-10-PCS | Mod: ,,, | Performed by: PSYCHIATRY & NEUROLOGY

## 2020-05-11 PROCEDURE — 95720 EEG PHY/QHP EA INCR W/VEEG: CPT | Mod: ,,, | Performed by: PSYCHIATRY & NEUROLOGY

## 2020-05-11 RX ADMIN — CITALOPRAM HYDROBROMIDE 20 MG: 10 TABLET ORAL at 09:05

## 2020-05-11 NOTE — PLAN OF CARE
Patient remains free from injury or fall. No neuro changes. No seizure like activity noted or reported.EEG in progress. Will continue to monitor.

## 2020-05-11 NOTE — PROCEDURES
[facility] EEG/VIDEO MONITORING REPORT  Darin Cunha  [id number]   1995    DATE OF SERVICE: 5/10/2020  DATE OF ADMISSION: 5/7/2020 11:30 AM    ADMITTING/REQUESTING PROVIDER: BEBETO Marina MD    REASON FOR CONSULT:  24-year-old man admitted to the epilepsy monitoring unit for event capture and characterization.    METHODOLOGY   Electroencephalographic (EEG) recording is with electrodes placed according to the International 10-20 placement system.  Thirty two (32) channels of digital signal (sampling rate of 512/sec) including T1 and T2 was simultaneously recorded from the scalp and may include  EKG, EMG, and/or eye monitors.  Recording band pass was 0.1 to 512 hz.  Digital video recording of the patient is simultaneously recorded with the EEG.  The patient is instructed report clinical symptoms which may occur during the recording session.  EEG and video recording is stored and archived in digital format.  Activation procedures which include photic stimulation, hyperventilation and instructing patients to perform simple task are done in selected patients.   The EEG is displayed on a monitor screen and can be reviewed using different montages.  Computer assisted analysis is employed to detect spike and electrographic seizure activity.   The entire record is submitted for computer analysis.  The entire recording is visually reviewed and the times identified by computer analysis as being spikes or seizures are reviewed again.  Compresses spectral analysis (CSA) is also performed on the activity recorded from each individual channel.  This is displayed as a power display of frequencies from 0 to 30 Hz over time.   The CSA is reviewed looking for asymmetries in power between homologous areas of the scalp and then compared with the original EEG recording.     [employer] software is also utilized in the review of this study.  This software suite analyzes the EEG recording in multiple domains.  Coherence and rhythmicity is  computed to identify EEG sections which may contain organized seizures.  Each channel undergoes analysis to detect presence of spike and sharp waves which have special and morphological characteristic of epileptic activity.  The routine EEG recording is converted from spacial into frequency domain.  This is then displayed comparing homologous areas to identify areas of significant asymmetry.  Algorithm to identify non-cortically generated artifact is used to separate eye movement, EMG and other artifact from the EEG.      RECORDING TIMES  Start on 05/10/2020 at 07:00 a.m.  Stop on 05/11/2020 at 07:00 a.m.  A total of 23 hr and 58 min of EEG recording is obtained.    EEG FINDINGS  Background activity:   The waking background is notable for an 11 hz posterior dominant rhythm seen bilaterally.  There are nearly continuous spike/polyspike and wave discharges most prominent over the left temporal region which often occur in prolonged runs.  There is intermittent prominent theta focal slowing in the same area as well as generalized slowing.  There are frequent generalized bursts of sharp and fast activity often with a left-sided predominance.  There are occasional independent spike and wave discharges seen in the right temporal region.  There is monomorphic 4-5 hz activity most prominent in the left temporal region when the patient is watching TV.    There is an electroclinical seizure at 10:08 a.m. Electrographically, the seizure is proceeded by several seconds of generalized sharp/fast activity more prominent on the right but seen by bilaterally.  Over the next 5 sec, there is rhythmic buildup of sharply contoured theta over the left temporal region which spreads across the hemisphere with a field into the right hemisphere.  The record is then obscured by muscle artifact.  After the event, there is marked suppression of the electrographic background with gradual resumption of the baseline activity.  Clinically, at the  beginning of the event, the patient is lying in bed quietly watching TV.  He then has some recurrent pulling of the left side of his mouth with rapid blinking.  He repositions his head and body to the left.  He then lets out a large groan and both arms and legs extend followed by clonic jerking.     There is a pushbutton activation at 15:36 p.m. which appears accidental as the patient is getting back into bed after using the bathroom.    Sleep:  The patient transitions from wakefulness to sleep with the appearance of sleep spindles, K complexes, and vertex waves with embedded sharp features and abundant epileptiform discharges, L>>R.  At times, there are long runs of spike and wave activity up to 2 hz over the left temporal leads.    Activation procedures:   Photic stimulation is performed with a symmetric photic driving response noted at 6-20 flashes per second (fps) as well as frequent epileptiform discharges most prominent over the left hemisphere.  Hyperventilation is performed with good effort with no activation of the record.     Cardiac Monitor:   Heart rate appears generally regular on a single lead EKG. There is sinus tachycardia after the electrographic seizure.    Impression:   This is an abnormal continuous EEG monitoring study because 1 electrographic seizure which appears to originate from the left temporal region.  Interictally, there is nearly continuous epileptiform activity over the left temporal region as well as occasional epileptiform activity seen independently in the right temporal region consistent multiple potential seizure foci.  There is intermittent generalized and multifocal slowing consistent with subcortical dysfunction with some areas with more prominent focal involvement.    LTM Summary 05/07/2020 - TBD:  Electroclinical Seizures:    1. 05/07/2020 at 17:14 p.m.  2. 05/10/2020 at 10:08 a.m.    Interictal findings:    Nearly continuous spike/polyspike epileptiform activity seen over  the left temporal region.  Frequent bursts of sharply contoured theta delta activity seen more generally consistent with rapid spread bilaterally.  Occasional independent discharges seen from the right temporal region.    Other notable abnormalities:   intermittent slowing most prominent in the left temporal region but also seen in the right temporal region independently as well as generalized slowing    Kiara Luque MD PhD  Neurology-Epilepsy  [facility].  [facility]

## 2020-05-11 NOTE — PLAN OF CARE
Problem: Adult Inpatient Plan of Care  Goal: Plan of Care Review  Outcome: Ongoing, Progressing  Flowsheets (Taken 5/11/2020 0308)  Plan of Care Reviewed With: patient     Problem: Fall Injury Risk  Goal: Absence of Fall and Fall-Related Injury  Outcome: Ongoing, Progressing     POC reviewed with patient, verbalized understanding. Pt aaox4, VSS. No pain complaints noted. Continuous EEG monitoring ongoing; no seizure-like activity within shift. Fall and seizure precautions maintained. Bed alarm armed, call light and personal belongings within reach. Side rails padded; suction and O2 at bedside. Pt progressing towards goal; see chart for full assessment. WCTM.

## 2020-05-11 NOTE — PLAN OF CARE
5/8-5/9 - no acute events overnight   EEG with L temp sharp waves, GPFA, L PFA, R PFA, R and L slow complexes   No sz       05/11/20 1216   Discharge Reassessment   Assessment Type Discharge Planning Reassessment   Provided patient/caregiver education on the expected discharge date and the discharge plan No   Do you have any problems affording any of your prescribed medications? No   Discharge Plan A Home   Discharge Plan B Home with family   DME Needed Upon Discharge  none   Patient choice form signed by patient/caregiver N/A   Anticipated Discharge Disposition Home   Can the patient/caregiver answer the patient profile reliably? Yes, cognitively intact   How does the patient rate their overall health at the present time? Good   Describe the patient's ability to walk at the present time. No restrictions   How often would a person be available to care for the patient? Occasionally   Post-Acute Status   Post-Acute Authorization Other   Other Status No Post-Acute Service Needs   Discharge Delays (!) Procedure Scheduling (IR, OR, Labs, Echo, Cath, Echo, EEG)  (EEG)     Alexandra Young RN  Case Management  Ext: [phone]  05/11/2020  12:17 PM

## 2020-05-11 NOTE — NURSING
POC reviewed with pt, ongoing progressing , will continue to monitor, pt A/Ox4, cont b/b, suction is at the bedside, seizure precautions, soft surfaces applied to side rails, wheels locked, personal items and call light within reach and informed to call for assistance when needed

## 2020-05-11 NOTE — PROGRESS NOTES
"Progress Note  Epilepsy    CC:  Refractory epilepsy    Interval Events:    5/7 - 5/8 - no acute events overnight   EEG with L temp sharp waves   5/8-5/9 - no acute events overnight   EEG with L temp sharp waves, GPFA, L PFA, R PFA, R and L slow complexes   No sz    5/10-5/11 Seizure yesterday that he does not remember    ROS:  No pain, eating well, ambulating to the bathroom, no chest pain/shortness of left/headache, no weakness, no sensory changes, no issues going to the bathroom, no fever    Past medical history, social history, family history are unchanged from the initial H&P.    EXAM:   - Vitals: /88 (BP Location: Left arm, Patient Position: Lying)   Pulse 71   Temp 98.1 °F (36.7 °C) (Oral)   Resp 14   Ht 6' 1" (1.854 m)   Wt 81.6 kg (180 lb)   SpO2 96%   BMI 23.75 kg/m²    - General: Awake, cooperative, NAD.  - HEENT: NC/AT  - Neck: Supple  - Pulmonary: no increased WOB  - Cardiac: well perfused   - Abdomen: soft, nontender, nondistended  - Extremities: no edema  - Skin: no rashes or lesions noted.     NEURO EXAM:   - Mental Status: Awake, alert, oriented x 3. Able to relate history without difficulty. Language is fluent with intact repetition and comprehension. Normal prosody. There were no paraphasic errors. Able to name both high and low frequency objects. Speech was not dysarthric. Able to follow both midline and appendicular commands.     - Cranial Nerves:  VFF to confrontation. EOMI . No facial droop. Hearing intact to voice.     - Motor: Normal bulk and tone throughout. No pronator drift bilaterally. No adventitious movements such as tremor or asterixis noted.  No focal asymmetry    - Sensory: No deficits to light touch.  - Coordination: No dysmetria on FNF    EEG: Reviewed personally, nearly continuous epileptiform activity over the left temporal leads, independent right discharges as well    Plan:   Refractory epilepsy  - cont VEEG  - Hold home Onfi, Vimpat, Lamictal   - VNS in place - " leave at current settings for now.  - Seizure precautions  - Activation procedures per protocol - HV/Photic  - IV Valium PRN for GTC greater than 5 min - please call epilepsy on call before administering      Depression - Arpan Luque MD PhD  Neurology-Epilepsy  [facility].  [facility]      Current Facility-Administered Medications:     acetaminophen tablet 650 mg, 650 mg, Oral, Q4H PRN, JASVIR Mulligan-JEFFERSON, 650 mg at 05/10/20 1039    citalopram tablet 20 mg, 20 mg, Oral, Daily, JASVIR Mulligan-JEFFERSON, 20 mg at 05/11/20 0946    diazePAM injection 5 mg, 5 mg, Intravenous, Q15 Min PRN, Pilar Shepard PA-C    docusate sodium capsule 100 mg, 100 mg, Oral, BID PRN, JASVIR Mulligan-JEFFERSON    sodium chloride 0.9% flush 10 mL, 10 mL, Intravenous, PRN, Pilar Shepard PA-C    VTE Risk Mitigation (From admission, onward)         Ordered     IP VTE LOW RISK PATIENT  Once      05/07/20 1215     Place CHASE hose  Until discontinued      05/07/20 1215     Place sequential compression device  Until discontinued      05/07/20 1215                Recent Labs   Lab 09/04/19  1222 05/07/20  1458   WBC 4.87 8.56   Hemoglobin 15.4 16.3   Hematocrit 46.7 48.2   Platelets 223 181   Sodium 141 139   Potassium 4.4 4.3   BUN, Bld 13 8   Creatinine 0.9 0.9   eGFR if African American >60.0 >60.0   eGFR if non African American >60.0 >60.0       Results for orders placed during the hospital encounter of 02/15/19   MRI Brain Epilepsy Without Contrast    Impression Unremarkable noncontrast MRI brain as detailed above specifically without evidence for parenchymal signal abnormality.      Electronically signed by: Yosef Ro DO  Date:    02/15/2019  Time:    14:49

## 2020-05-12 PROCEDURE — 11000001 HC ACUTE MED/SURG PRIVATE ROOM

## 2020-05-12 PROCEDURE — 95720 EEG PHY/QHP EA INCR W/VEEG: CPT | Mod: ,,, | Performed by: PSYCHIATRY & NEUROLOGY

## 2020-05-12 PROCEDURE — 95720 PR EEG, W/VIDEO, CONT RECORD, I&R, >12<26 HRS: ICD-10-PCS | Mod: ,,, | Performed by: PSYCHIATRY & NEUROLOGY

## 2020-05-12 PROCEDURE — 95714 VEEG EA 12-26 HR UNMNTR: CPT

## 2020-05-12 PROCEDURE — 99233 PR SUBSEQUENT HOSPITAL CARE,LEVL III: ICD-10-PCS | Mod: ,,, | Performed by: PSYCHIATRY & NEUROLOGY

## 2020-05-12 PROCEDURE — 99233 SBSQ HOSP IP/OBS HIGH 50: CPT | Mod: ,,, | Performed by: PSYCHIATRY & NEUROLOGY

## 2020-05-12 PROCEDURE — 25000003 PHARM REV CODE 250: Performed by: PHYSICIAN ASSISTANT

## 2020-05-12 PROCEDURE — 94761 N-INVAS EAR/PLS OXIMETRY MLT: CPT

## 2020-05-12 RX ADMIN — CITALOPRAM HYDROBROMIDE 20 MG: 10 TABLET ORAL at 08:05

## 2020-05-12 NOTE — PROCEDURES
[facility] EEG/VIDEO MONITORING REPORT  Darin Cunha  [id number]   1995    DATE OF SERVICE: 5/11/2020  DATE OF ADMISSION: 5/7/2020 11:30 AM    ADMITTING/REQUESTING PROVIDER: BEBETO Marina MD    REASON FOR CONSULT:  24-year-old man admitted to the epilepsy monitoring unit for event capture and characterization.    METHODOLOGY   Electroencephalographic (EEG) recording is with electrodes placed according to the International 10-20 placement system.  Thirty two (32) channels of digital signal (sampling rate of 512/sec) including T1 and T2 was simultaneously recorded from the scalp and may include  EKG, EMG, and/or eye monitors.  Recording band pass was 0.1 to 512 hz.  Digital video recording of the patient is simultaneously recorded with the EEG.  The patient is instructed report clinical symptoms which may occur during the recording session.  EEG and video recording is stored and archived in digital format.  Activation procedures which include photic stimulation, hyperventilation and instructing patients to perform simple task are done in selected patients.   The EEG is displayed on a monitor screen and can be reviewed using different montages.  Computer assisted analysis is employed to detect spike and electrographic seizure activity.   The entire record is submitted for computer analysis.  The entire recording is visually reviewed and the times identified by computer analysis as being spikes or seizures are reviewed again.  Compresses spectral analysis (CSA) is also performed on the activity recorded from each individual channel.  This is displayed as a power display of frequencies from 0 to 30 Hz over time.   The CSA is reviewed looking for asymmetries in power between homologous areas of the scalp and then compared with the original EEG recording.     [employer] software is also utilized in the review of this study.  This software suite analyzes the EEG recording in multiple domains.  Coherence and rhythmicity is  computed to identify EEG sections which may contain organized seizures.  Each channel undergoes analysis to detect presence of spike and sharp waves which have special and morphological characteristic of epileptic activity.  The routine EEG recording is converted from spacial into frequency domain.  This is then displayed comparing homologous areas to identify areas of significant asymmetry.  Algorithm to identify non-cortically generated artifact is used to separate eye movement, EMG and other artifact from the EEG.      RECORDING TIMES  Start on 05/11/2020 at 07:00 a.m.  Stop on 05/12/2020 at 07:00 a.m.  A total of 23 hr and 55 min of EEG recording is obtained.    EEG FINDINGS  Background activity:   The waking background is notable for an 11 hz posterior dominant rhythm seen bilaterally.  There are nearly continuous spike/polyspike and wave discharges most prominent over the left temporal region which often occur in prolonged runs.  There is intermittent prominent theta or monomorphic delta focal slowing in the same area.  There is also intermittent generalized slowing.  There are frequent generalized bursts of moderate-high voltage sharp and fast activity often with a left-sided predominance.  There are occasional independent spike and wave discharges seen in the right temporal region.  There is monomorphic 4-5 hz activity most prominent in the left temporal region when the patient is watching TV.    There are 10 pushbutton activations which all appear accidental when the patient is ambulating to the bathroom or shifting in bed.  There are no significant changes in the EEG during these times.    Sleep:  The patient transitions from wakefulness to sleep with the appearance of sleep spindles, K complexes, and vertex waves with embedded sharp features and abundant epileptiform discharges, L>>R.  Frequently during sleep, there are long runs of spike and wave activity on a rhythmic background up to 2.5 hz over the  left temporal leads.    Activation procedures:   Hyperventilation is not performed  Photic stimulation is not performed    Cardiac Monitor:   Heart rate appears generally regular on a single lead EKG.     Impression:   This is an abnormal continuous EEG monitoring study because of nearly continuous, often rhythmic, epileptiform activity over the left temporal region consistent with an area of focal cortical dysfunction and potential seizure focus in this region.  There are also occasional epileptiform discharges seen independently in the right temporal region consistent with an alternative seizure focus. There is intermittent generalized and multifocal slowing consistent with subcortical dysfunction with some areas with more prominent focal involvement especially over the left temporal leads.  There are no electroclinical seizures during this recording session.    LTM Summary 05/07/2020 - TBD:  Electroclinical Seizures:    1. 05/07/2020 at 17:14 p.m.  2. 05/10/2020 at 10:08 a.m.  Interictal findings:    Nearly continuous spike/polyspike epileptiform activity seen over the left temporal region which occurs in prolonged rhythmic runs during sleep.  Frequent bursts of sharply contoured theta/delta activity seen more generally, consistent with rapid spread bilaterally.  Occasional independent discharges seen from the right temporal region.  Other notable abnormalities:   intermittent monomorphic delta slowing most prominent in the left temporal region but also seen in the right temporal region independently as well as generalized slowing    Kiara Luque MD PhD  Neurology-Epilepsy  [facility].  [facility]

## 2020-05-12 NOTE — PLAN OF CARE
Problem: Adult Inpatient Plan of Care  Goal: Plan of Care Review  Outcome: Ongoing, Progressing     Problem: Fall Injury Risk  Goal: Absence of Fall and Fall-Related Injury  Outcome: Ongoing, Progressing     Patient is AAO x4. POC reviewed with patient. Patient verbalized understanding. Patient's breathing is unlabored with equal chest expansion. Patient denies any numbness and tingling. Patient ambulates to the restroom with assistance. Patient remained free from falls. Patient rested well through shift. Bed in lowest position,bed alarm on, side rails padded, side rails up x3, no complaints or signs of distress. WCTM.

## 2020-05-12 NOTE — PROGRESS NOTES
"Progress Note  Epilepsy    CC:  Refractory epilepsy    Interval Events:    5/7 - 5/8 - no acute events overnight   EEG with L temp sharp waves   5/8-5/9 - no acute events overnight   EEG with L temp sharp waves, GPFA, L PFA, R PFA, R and L slow complexes   No sz    5/10-5/11 Seizure yesterday that he does not remember  5/11 - 5/12 no events    ROS:  No pain, eating well, ambulating to the bathroom, no chest pain/shortness of left/headache, no weakness, no sensory changes, no issues going to the bathroom, no fever    Past medical history, social history, family history are unchanged from the initial H&P.    EXAM:   - Vitals: /85 (Patient Position: Lying)   Pulse 80   Temp 98.4 °F (36.9 °C)   Resp 20   Ht 6' 1" (1.854 m)   Wt 81.6 kg (180 lb)   SpO2 99%   BMI 23.75 kg/m²    - General: Awake, cooperative, NAD.  - HEENT: NC/AT  - Neck: Supple  - Pulmonary: no increased WOB  - Cardiac: well perfused   - Abdomen: soft, nontender, nondistended  - Extremities: no edema  - Skin: no rashes or lesions noted.     NEURO EXAM:   - Mental Status: Awake, alert, oriented x 3. Able to relate history without difficulty. Language is fluent with intact repetition and comprehension. Normal prosody. There were no paraphasic errors. Able to name both high and low frequency objects. Speech was not dysarthric. Able to follow both midline and appendicular commands.     - Cranial Nerves:  VFF to confrontation. EOMI . No facial droop. Hearing intact to voice.     - Motor: Normal bulk and tone throughout. No pronator drift bilaterally. No adventitious movements such as tremor or asterixis noted.  No focal asymmetry    - Sensory: No deficits to light touch.  - Coordination: No dysmetria on FNF    EEG: Reviewed personally, nearly continuous epileptiform activity over the left temporal leads, independent right discharges as well    Plan:   Refractory epilepsy  - cont cvEEG  - Hold home Onfi, Vimpat, Lamictal   - VNS in place - leave at " current settings for now.  - Seizure precautions  - Activation procedures per protocol - HV/Photic  - IV Valium PRN for GTC greater than 5 min - please call epilepsy on call before administering      Depression - Arpan Luque MD PhD  Neurology-Epilepsy  [facility].  [facility]      Current Facility-Administered Medications:     acetaminophen tablet 650 mg, 650 mg, Oral, Q4H PRN, Pilar Shepard PA-C, 650 mg at 05/10/20 1039    citalopram tablet 20 mg, 20 mg, Oral, Daily, Pilar Shepard PA-C, 20 mg at 05/12/20 0824    diazePAM injection 5 mg, 5 mg, Intravenous, Q15 Min PRN, Pilar Shepard PA-C    docusate sodium capsule 100 mg, 100 mg, Oral, BID PRN, Pilar Shepard PA-C    sodium chloride 0.9% flush 10 mL, 10 mL, Intravenous, PRN, Pilar Shepard PA-C    VTE Risk Mitigation (From admission, onward)         Ordered     IP VTE LOW RISK PATIENT  Once      05/07/20 1215     Place CHASE hose  Until discontinued      05/07/20 1215     Place sequential compression device  Until discontinued      05/07/20 1215                Recent Labs   Lab 09/04/19  1222 05/07/20  1458   WBC 4.87 8.56   Hemoglobin 15.4 16.3   Hematocrit 46.7 48.2   Platelets 223 181   Sodium 141 139   Potassium 4.4 4.3   BUN, Bld 13 8   Creatinine 0.9 0.9   eGFR if African American >60.0 >60.0   eGFR if non African American >60.0 >60.0       Results for orders placed during the hospital encounter of 02/15/19   MRI Brain Epilepsy Without Contrast    Impression Unremarkable noncontrast MRI brain as detailed above specifically without evidence for parenchymal signal abnormality.      Electronically signed by: Ysoef Ro DO  Date:    02/15/2019  Time:    14:49

## 2020-05-13 PROCEDURE — 99233 SBSQ HOSP IP/OBS HIGH 50: CPT | Mod: ,,, | Performed by: PSYCHIATRY & NEUROLOGY

## 2020-05-13 PROCEDURE — 11000001 HC ACUTE MED/SURG PRIVATE ROOM

## 2020-05-13 PROCEDURE — 95720 EEG PHY/QHP EA INCR W/VEEG: CPT | Mod: ,,, | Performed by: PSYCHIATRY & NEUROLOGY

## 2020-05-13 PROCEDURE — 95714 VEEG EA 12-26 HR UNMNTR: CPT

## 2020-05-13 PROCEDURE — 95720 PR EEG, W/VIDEO, CONT RECORD, I&R, >12<26 HRS: ICD-10-PCS | Mod: ,,, | Performed by: PSYCHIATRY & NEUROLOGY

## 2020-05-13 PROCEDURE — 25000003 PHARM REV CODE 250: Performed by: PHYSICIAN ASSISTANT

## 2020-05-13 PROCEDURE — 99233 PR SUBSEQUENT HOSPITAL CARE,LEVL III: ICD-10-PCS | Mod: ,,, | Performed by: PSYCHIATRY & NEUROLOGY

## 2020-05-13 RX ADMIN — CITALOPRAM HYDROBROMIDE 20 MG: 10 TABLET ORAL at 09:05

## 2020-05-13 NOTE — PLAN OF CARE
Problem: Adult Inpatient Plan of Care  Goal: Plan of Care Review  Outcome: Ongoing, Progressing     POC reviewed with patient this shift.  A/O X4.  Respirations unlabored.  Skin w/d.  Continent of b/b.  No seizure activity observed or reported this shift.  VSS.  Able to verbalize wants/needs.  No c/o pain or discomfort at this time.  WCTM.

## 2020-05-13 NOTE — PROGRESS NOTES
"Progress Note  Epilepsy    CC:  Refractory epilepsy    Interval Events:    5/7 - 5/8 - no acute events overnight   EEG with L temp sharp waves   5/8-5/9 - no acute events overnight   EEG with L temp sharp waves, GPFA, L PFA, R PFA, R and L slow complexes   No sz    5/10-5/11 Seizure yesterday that he does not remember  5/11 - 5/12 no events  5/12 - 5/13 no events    ROS:  No pain, eating well, ambulating to the bathroom, no chest pain/shortness of left/headache, no weakness, no sensory changes, no issues going to the bathroom, no fever    Past medical history, social history, family history are unchanged from the initial H&P.    EXAM:   - Vitals: BP (!) 134/91 (Patient Position: Lying)   Pulse 72   Temp 97.5 °F (36.4 °C)   Resp 10   Ht 6' 1" (1.854 m)   Wt 81.6 kg (180 lb)   SpO2 97%   BMI 23.75 kg/m²    - General: Awake, cooperative, NAD.  - HEENT: NC/AT  - Neck: Supple  - Pulmonary: no increased WOB  - Cardiac: well perfused   - Abdomen: soft, nontender, nondistended  - Extremities: no edema  - Skin: no rashes or lesions noted.     NEURO EXAM:   - Mental Status: Awake, alert, oriented x 3. Able to relate history without difficulty. Language is fluent with intact repetition and comprehension. Normal prosody. There were no paraphasic errors. Able to name both high and low frequency objects. Speech was not dysarthric. Able to follow both midline and appendicular commands.     - Cranial Nerves:  VFF to confrontation. EOMI . No facial droop. Hearing intact to voice.     - Motor: Normal bulk and tone throughout. No pronator drift bilaterally. No adventitious movements such as tremor or asterixis noted.  No focal asymmetry    - Sensory: No deficits to light touch.  - Coordination: No dysmetria on FNF    EEG: Reviewed personally, abundant epileptiform activity over the left temporal leads, independent right discharges as well    Plan:   Refractory epilepsy   - cont cvEEG  - Sleep deprive tonight, 5/13  - Hold home " Onfi, Vimpat, Lamictal   - VNS in place - leave at current settings for now.  - Seizure precautions  - Activation procedures per protocol - HV/Photic  - IV Valium PRN for GTC greater than 5 min - please call epilepsy on call before administering      Depression - Arpan Luque MD PhD  Neurology-Epilepsy  [facility].  [facility]      Current Facility-Administered Medications:     acetaminophen tablet 650 mg, 650 mg, Oral, Q4H PRN, JASVIR Mulligan-JEFFERSON, 650 mg at 05/10/20 1039    citalopram tablet 20 mg, 20 mg, Oral, Daily, JASVIR Mulligan-JEFFERSON, 20 mg at 05/13/20 0958    diazePAM injection 5 mg, 5 mg, Intravenous, Q15 Min PRN, Pilar Shepard PA-C    docusate sodium capsule 100 mg, 100 mg, Oral, BID PRN, Pilar Shepard PA-C    sodium chloride 0.9% flush 10 mL, 10 mL, Intravenous, PRN, Pilar Shepard PA-C    VTE Risk Mitigation (From admission, onward)         Ordered     IP VTE LOW RISK PATIENT  Once      05/07/20 1215     Place CHASE hose  Until discontinued      05/07/20 1215     Place sequential compression device  Until discontinued      05/07/20 1215                Recent Labs   Lab 09/04/19  1222 05/07/20  1458   WBC 4.87 8.56   Hemoglobin 15.4 16.3   Hematocrit 46.7 48.2   Platelets 223 181   Sodium 141 139   Potassium 4.4 4.3   BUN, Bld 13 8   Creatinine 0.9 0.9   eGFR if African American >60.0 >60.0   eGFR if non African American >60.0 >60.0       Results for orders placed during the hospital encounter of 02/15/19   MRI Brain Epilepsy Without Contrast    Impression Unremarkable noncontrast MRI brain as detailed above specifically without evidence for parenchymal signal abnormality.      Electronically signed by: Yosef Ro DO  Date:    02/15/2019  Time:    14:49

## 2020-05-13 NOTE — PROCEDURES
[facility] EEG/VIDEO MONITORING REPORT  Darin Cunha  [id number]   1995    DATE OF SERVICE: 5/12/2020  DATE OF ADMISSION: 5/7/2020 11:30 AM    ADMITTING/REQUESTING PROVIDER: BEBETO Marina MD    REASON FOR CONSULT:  24-year-old man admitted to the epilepsy monitoring unit for event capture and characterization.    METHODOLOGY   Electroencephalographic (EEG) recording is with electrodes placed according to the International 10-20 placement system.  Thirty two (32) channels of digital signal (sampling rate of 512/sec) including T1 and T2 was simultaneously recorded from the scalp and may include  EKG, EMG, and/or eye monitors.  Recording band pass was 0.1 to 512 hz.  Digital video recording of the patient is simultaneously recorded with the EEG.  The patient is instructed report clinical symptoms which may occur during the recording session.  EEG and video recording is stored and archived in digital format.  Activation procedures which include photic stimulation, hyperventilation and instructing patients to perform simple task are done in selected patients.   The EEG is displayed on a monitor screen and can be reviewed using different montages.  Computer assisted analysis is employed to detect spike and electrographic seizure activity.   The entire record is submitted for computer analysis.  The entire recording is visually reviewed and the times identified by computer analysis as being spikes or seizures are reviewed again.  Compresses spectral analysis (CSA) is also performed on the activity recorded from each individual channel.  This is displayed as a power display of frequencies from 0 to 30 Hz over time.   The CSA is reviewed looking for asymmetries in power between homologous areas of the scalp and then compared with the original EEG recording.     [employer] software is also utilized in the review of this study.  This software suite analyzes the EEG recording in multiple domains.  Coherence and rhythmicity is  computed to identify EEG sections which may contain organized seizures.  Each channel undergoes analysis to detect presence of spike and sharp waves which have special and morphological characteristic of epileptic activity.  The routine EEG recording is converted from spacial into frequency domain.  This is then displayed comparing homologous areas to identify areas of significant asymmetry.  Algorithm to identify non-cortically generated artifact is used to separate eye movement, EMG and other artifact from the EEG.      RECORDING TIMES  Start on 05/12/2020 at 07:00 a.m.  Stop on 05/13/2020 at 07:00 a.m.  A total of 23 hr and 46 min of EEG recording is obtained.    EEG FINDINGS  Background activity:   The waking background is notable for an 11 hz posterior dominant rhythm seen bilaterally.  There are nearly continuous spike/polyspike and wave discharges most prominent over the left temporal region which often occur in prolonged runs.  There is intermittent prominent theta or monomorphic delta focal slowing in the same area.  There is also intermittent generalized slowing.  There are frequent generalized bursts of moderate-high voltage sharp and fast activity often with a left-sided predominance.  There are occasional independent spike and wave discharges seen in the right temporal region.  There are prolonged periods with monomorphic 4-5 hz activity most prominent in the left temporal region.    There are 8 pushbutton activations which all appear accidental when the patient is ambulating to the bathroom or shifting in bed.  There are no significant changes in the EEG during these times.    Sleep:  The patient transitions from wakefulness to sleep with the appearance of sleep spindles, K complexes, and vertex waves with embedded sharp features and abundant epileptiform discharges, L>>R.  Frequently during sleep, there are long runs of spike and wave activity on a rhythmic background up to 2.5 hz over the left  temporal leads.    Activation procedures:   Hyperventilation is not performed  Photic stimulation is not performed    Cardiac Monitor:   Heart rate appears generally regular on a single lead EKG.     Impression:   This is an abnormal continuous EEG monitoring study because of nearly continuous, often rhythmic, epileptiform activity over the left temporal region consistent with an area of focal cortical dysfunction and potential seizure focus in this region.  There are also occasional epileptiform discharges seen independently in the right temporal region consistent with an alternative seizure focus. There is intermittent generalized and multifocal slowing consistent with subcortical dysfunction with some areas with more prominent focal involvement especially over the left temporal region.  There are no purposeful pushbutton activations and no electroclinical seizures during this recording session.    LTM Summary 05/07/2020 - TBD:  Electroclinical Seizures:    1. 05/07/2020 at 17:14 p.m.  2. 05/10/2020 at 10:08 a.m.  Interictal findings:    Nearly continuous spike/polyspike epileptiform activity seen over the left temporal region which occurs in prolonged rhythmic runs during sleep.  Frequent bursts of sharply contoured theta/delta activity seen more generally, consistent with rapid spread bilaterally.  Occasional independent discharges seen from the right temporal region.  Other notable abnormalities:   intermittent monomorphic delta slowing most prominent in the left temporal region, but also seen in the right temporal region independently, as well as generalized slowing    Kiara Luque MD PhD  Neurology-Epilepsy  [facility].  [facility]

## 2020-05-14 PROCEDURE — 11000001 HC ACUTE MED/SURG PRIVATE ROOM

## 2020-05-14 PROCEDURE — 95720 EEG PHY/QHP EA INCR W/VEEG: CPT | Mod: ,,, | Performed by: PSYCHIATRY & NEUROLOGY

## 2020-05-14 PROCEDURE — 99233 SBSQ HOSP IP/OBS HIGH 50: CPT | Mod: ,,, | Performed by: PSYCHIATRY & NEUROLOGY

## 2020-05-14 PROCEDURE — 25000003 PHARM REV CODE 250: Performed by: PHYSICIAN ASSISTANT

## 2020-05-14 PROCEDURE — 95720 PR EEG, W/VIDEO, CONT RECORD, I&R, >12<26 HRS: ICD-10-PCS | Mod: ,,, | Performed by: PSYCHIATRY & NEUROLOGY

## 2020-05-14 PROCEDURE — 95714 VEEG EA 12-26 HR UNMNTR: CPT

## 2020-05-14 PROCEDURE — 99233 PR SUBSEQUENT HOSPITAL CARE,LEVL III: ICD-10-PCS | Mod: ,,, | Performed by: PSYCHIATRY & NEUROLOGY

## 2020-05-14 RX ADMIN — CITALOPRAM HYDROBROMIDE 20 MG: 10 TABLET ORAL at 07:05

## 2020-05-14 NOTE — PLAN OF CARE
Problem: Adult Inpatient Plan of Care  Goal: Plan of Care Review  Outcome: Ongoing, Progressing  Goal: Patient-Specific Goal (Individualization)  Outcome: Ongoing, Progressing  Goal: Absence of Hospital-Acquired Illness or Injury  Outcome: Ongoing, Progressing  Goal: Optimal Comfort and Wellbeing  Outcome: Ongoing, Progressing     POC reviewed with pt at 1400. Pt verbalized understanding. Questions and concerns addressed. No acute events today. Pt progressing toward goals. Will continue to monitor. See flowsheets for full assessment and VS info.

## 2020-05-14 NOTE — PROCEDURES
[facility] EEG/VIDEO MONITORING REPORT  Darin Cunha  [id number]   1995    DATE OF SERVICE: 5/13/2020  DATE OF ADMISSION: 5/7/2020 11:30 AM    ADMITTING/REQUESTING PROVIDER: BEBETO Marina MD    REASON FOR CONSULT:  24-year-old man admitted to the epilepsy monitoring unit for event capture and characterization.    METHODOLOGY   Electroencephalographic (EEG) recording is with electrodes placed according to the International 10-20 placement system.  Thirty two (32) channels of digital signal (sampling rate of 512/sec) including T1 and T2 was simultaneously recorded from the scalp and may include  EKG, EMG, and/or eye monitors.  Recording band pass was 0.1 to 512 hz.  Digital video recording of the patient is simultaneously recorded with the EEG.  The patient is instructed report clinical symptoms which may occur during the recording session.  EEG and video recording is stored and archived in digital format.  Activation procedures which include photic stimulation, hyperventilation and instructing patients to perform simple task are done in selected patients.   The EEG is displayed on a monitor screen and can be reviewed using different montages.  Computer assisted analysis is employed to detect spike and electrographic seizure activity.   The entire record is submitted for computer analysis.  The entire recording is visually reviewed and the times identified by computer analysis as being spikes or seizures are reviewed again.  Compresses spectral analysis (CSA) is also performed on the activity recorded from each individual channel.  This is displayed as a power display of frequencies from 0 to 30 Hz over time.   The CSA is reviewed looking for asymmetries in power between homologous areas of the scalp and then compared with the original EEG recording.     [employer] software is also utilized in the review of this study.  This software suite analyzes the EEG recording in multiple domains.  Coherence and rhythmicity is  computed to identify EEG sections which may contain organized seizures.  Each channel undergoes analysis to detect presence of spike and sharp waves which have special and morphological characteristic of epileptic activity.  The routine EEG recording is converted from spacial into frequency domain.  This is then displayed comparing homologous areas to identify areas of significant asymmetry.  Algorithm to identify non-cortically generated artifact is used to separate eye movement, EMG and other artifact from the EEG.      RECORDING TIMES  Start on 05/13/2020 at 07:00 a.m.  Stop on 05/14/2020 at 07:00 a.m.  A total of 23 hr and 53 min of EEG recording is obtained.    EEG FINDINGS  Background activity:   The waking background is notable for an 11 hz posterior dominant rhythm seen bilaterally.  There are abundant spike/polyspike and wave discharges most prominent over the left temporal region which often occur in prolonged runs.  There is intermittent prominent theta or monomorphic delta focal slowing in the same area.  There is also intermittent generalized slowing.  There are frequent generalized bursts of moderate-high voltage sharp and fast activity often with a left-sided predominance.  There are occasional independent spike and wave discharges seen in the right temporal region.  There are prolonged periods with monomorphic 4-5 hz activity most prominent over the left temporal region.    There is 1 test pushbutton activation by the EEG technician and 12 pushbutton activations by the patient which all appear accidental when the patient is ambulating to the bathroom or shifting in bed.  There are no significant changes in the EEG during these times.    Sleep:  The patient transitions from wakefulness to sleep with the appearance of sleep spindles, K complexes, and vertex waves with embedded sharp features and abundant epileptiform discharges, L>>R.  Frequently during sleep, there are long runs of spike and wave  activity on a rhythmic background up to 2.5 hz over the left temporal leads.    Activation procedures:   Photic stimulation is performed with a symmetric photic driving response noted at 6-20 flashes per second (fps).  Hyperventilation is performed with good effort with no activation of the record.     Cardiac Monitor:   Heart rate appears generally regular on a single lead EKG.     Impression:   This is an abnormal continuous EEG monitoring study because of abundant, often rhythmic, epileptiform activity over the left temporal region consistent with an area of focal cortical dysfunction and potential seizure focus in this region.  There are also occasional epileptiform discharges seen independently in the right temporal region consistent with an alternative seizure focus.  At times, there are bursts of epileptiform activity which appear more generalized suggesting rapid spread bilaterally.  There is intermittent generalized and multifocal slowing consistent with subcortical dysfunction with some areas with more prominent focal involvement especially over the left temporal region.  There are no purposeful pushbutton activations and no electroclinical seizures during this recording session.    LTM Summary 05/07/2020 - TBD:  Electroclinical Seizures:    1. 05/07/2020 at 17:14 p.m.  2. 05/10/2020 at 10:08 a.m.  Interictal findings:    Abundant spike/polyspike epileptiform activity seen over the left temporal region which occurs in prolonged rhythmic runs during sleep.  Frequent bursts of sharply contoured theta/delta activity seen more generally, consistent with rapid spread bilaterally.  Occasional independent discharges seen from the right temporal region.  Other notable abnormalities:   intermittent monomorphic delta slowing most prominent in the left temporal region, but also seen in the right temporal region independently, as well as generalized slowing    Kiara Luque MD PhD  Neurology-Epilepsy  [facility]

## 2020-05-14 NOTE — PLAN OF CARE
Problem: Adult Inpatient Plan of Care  Goal: Plan of Care Review  Outcome: Ongoing, Progressing     POC reviewed with patient this shift.  Remains A/O x4.  Respirations unlabored.  Skin w/d.  Continues to be continent of b/b.  Ambulates in room to restroom.  Pt was sleep deprived until 2 am successfully.  No seizure activity observed or reported.  VSS.  Able to verbalize wants/needs.  No c/o pain or discomfort at this time.

## 2020-05-14 NOTE — PLAN OF CARE
5/12 - 5/13 no events       05/14/20 1034   Discharge Reassessment   Assessment Type Discharge Planning Reassessment   Provided patient/caregiver education on the expected discharge date and the discharge plan No   Do you have any problems affording any of your prescribed medications? No   Discharge Plan A Home   Discharge Plan B Home with family   DME Needed Upon Discharge  none   Patient choice form signed by patient/caregiver N/A   Anticipated Discharge Disposition Home   Can the patient/caregiver answer the patient profile reliably? Yes, cognitively intact   How does the patient rate their overall health at the present time? Good   Describe the patient's ability to walk at the present time. No restrictions   How often would a person be available to care for the patient? Occasionally   Post-Acute Status   Post-Acute Authorization Other   Other Status No Post-Acute Service Needs   Discharge Delays (!) Procedure Scheduling (IR, OR, Labs, Echo, Cath, Echo, EEG)  (continuous EEG)     Alexandra Young RN  Case Management  Ext: [phone]  05/14/2020  10:34 AM

## 2020-05-14 NOTE — PROGRESS NOTES
"Progress Note  Epilepsy    CC:  Refractory epilepsy    Interval Events:    5/7 - 5/8 - no acute events overnight   EEG with L temp sharp waves   5/8-5/9 - no acute events overnight   EEG with L temp sharp waves, GPFA, L PFA, R PFA, R and L slow complexes   No sz    5/10-5/11 Seizure yesterday that he does not remember  5/11 - 5/12 - no events  5/12 - 5/13 - no events  5/13 - 5/14 - no events, sleep-deprived      ROS:  No pain, eating well, ambulating to the bathroom, no chest pain/shortness of left/headache, no weakness, no sensory changes, no issues going to the bathroom, no fever    Past medical history, social history, family history are unchanged from the initial H&P.    EXAM:   - Vitals: BP (!) 147/72 (BP Location: Left arm)   Pulse 85   Temp 98.4 °F (36.9 °C) (Axillary)   Resp 15   Ht 6' 1" (1.854 m)   Wt 81.6 kg (180 lb)   SpO2 99%   BMI 23.75 kg/m²    - General: Awake, cooperative, NAD.  - HEENT: NC/AT  - Neck: Supple  - Pulmonary: no increased WOB  - Cardiac: well perfused   - Abdomen: soft, nontender, nondistended  - Extremities: no edema  - Skin: no rashes or lesions noted.     NEURO EXAM:   - Mental Status: Awake, alert, oriented x 3. Able to relate history without difficulty. Language is fluent with intact repetition and comprehension. Normal prosody. There were no paraphasic errors. Able to name both high and low frequency objects. Speech was not dysarthric. Able to follow both midline and appendicular commands.     - Cranial Nerves:  VFF to confrontation. EOMI . No facial droop. Hearing intact to voice.     - Motor: Normal bulk and tone throughout. No pronator drift bilaterally. No adventitious movements such as tremor or asterixis noted.  No focal asymmetry    - Sensory: No deficits to light touch.  - Coordination: No dysmetria on FNF    EEG: Reviewed personally, abundant epileptiform activity over the left temporal leads, independent right discharges as well, some discharges which appear more " generalized as well    Plan:   Refractory epilepsy   - cont cvEEG  - VNS turned off today    VNS changes today:   OC/SF/PW/On/Off  1.125/30/1000/30s/5m  OC/PW/On  AS 1.125/1000/30s  M 1.375/1000/60s    - Sleep deprive tonight, 5/14  - Hold home Onfi, Vimpat, Lamictal   - Seizure precautions  - Activation procedures per protocol - HV/Photic  - IV Valium PRN for GTC greater than 5 min - please call epilepsy on call before administering      Depression - Arpan Luque MD PhD  Neurology-Epilepsy  [facility].  [facility]      Current Facility-Administered Medications:     acetaminophen tablet 650 mg, 650 mg, Oral, Q4H PRN, JASVIR Mulligan-JEFFERSON, 650 mg at 05/10/20 1039    citalopram tablet 20 mg, 20 mg, Oral, Daily, JASVIR Mulligan-JEFFERSON, 20 mg at 05/14/20 0746    diazePAM injection 5 mg, 5 mg, Intravenous, Q15 Min PRN, Pilar Shepard PA-C    docusate sodium capsule 100 mg, 100 mg, Oral, BID PRN, Pilar Shepard PA-C    sodium chloride 0.9% flush 10 mL, 10 mL, Intravenous, PRN, Pilar Shepard PA-C    VTE Risk Mitigation (From admission, onward)         Ordered     IP VTE LOW RISK PATIENT  Once      05/07/20 1215     Place CHASE hose  Until discontinued      05/07/20 1215     Place sequential compression device  Until discontinued      05/07/20 1215                Recent Labs   Lab 09/04/19  1222 05/07/20  1458   WBC 4.87 8.56   Hemoglobin 15.4 16.3   Hematocrit 46.7 48.2   Platelets 223 181   Sodium 141 139   Potassium 4.4 4.3   BUN, Bld 13 8   Creatinine 0.9 0.9   eGFR if African American >60.0 >60.0   eGFR if non African American >60.0 >60.0       Results for orders placed during the hospital encounter of 02/15/19   MRI Brain Epilepsy Without Contrast    Impression Unremarkable noncontrast MRI brain as detailed above specifically without evidence for parenchymal signal abnormality.      Electronically signed by: Yosef Ro DO  Date:    02/15/2019  Time:    14:49

## 2020-05-15 PROCEDURE — 95714 VEEG EA 12-26 HR UNMNTR: CPT

## 2020-05-15 PROCEDURE — 94761 N-INVAS EAR/PLS OXIMETRY MLT: CPT

## 2020-05-15 PROCEDURE — 63600175 PHARM REV CODE 636 W HCPCS: Performed by: PHYSICIAN ASSISTANT

## 2020-05-15 PROCEDURE — 99233 PR SUBSEQUENT HOSPITAL CARE,LEVL III: ICD-10-PCS | Mod: ,,, | Performed by: PSYCHIATRY & NEUROLOGY

## 2020-05-15 PROCEDURE — 25000003 PHARM REV CODE 250: Performed by: PHYSICIAN ASSISTANT

## 2020-05-15 PROCEDURE — 95720 PR EEG, W/VIDEO, CONT RECORD, I&R, >12<26 HRS: ICD-10-PCS | Mod: ,,, | Performed by: PSYCHIATRY & NEUROLOGY

## 2020-05-15 PROCEDURE — 11000001 HC ACUTE MED/SURG PRIVATE ROOM

## 2020-05-15 PROCEDURE — 95720 EEG PHY/QHP EA INCR W/VEEG: CPT | Mod: ,,, | Performed by: PSYCHIATRY & NEUROLOGY

## 2020-05-15 PROCEDURE — 25000003 PHARM REV CODE 250: Performed by: PSYCHIATRY & NEUROLOGY

## 2020-05-15 PROCEDURE — 63600175 PHARM REV CODE 636 W HCPCS: Performed by: PSYCHIATRY & NEUROLOGY

## 2020-05-15 PROCEDURE — 99233 SBSQ HOSP IP/OBS HIGH 50: CPT | Mod: ,,, | Performed by: PSYCHIATRY & NEUROLOGY

## 2020-05-15 RX ORDER — DIAZEPAM 10 MG/2ML
5 INJECTION INTRAMUSCULAR ONCE
Status: COMPLETED | OUTPATIENT
Start: 2020-05-15 | End: 2020-05-15

## 2020-05-15 RX ORDER — LAMOTRIGINE 100 MG/1
200 TABLET ORAL 2 TIMES DAILY
Status: DISCONTINUED | OUTPATIENT
Start: 2020-05-15 | End: 2020-05-17 | Stop reason: HOSPADM

## 2020-05-15 RX ORDER — LACOSAMIDE 100 MG/1
200 TABLET ORAL 2 TIMES DAILY
Status: DISCONTINUED | OUTPATIENT
Start: 2020-05-15 | End: 2020-05-17 | Stop reason: HOSPADM

## 2020-05-15 RX ORDER — CLOBAZAM 10 MG/1
40 TABLET ORAL 2 TIMES DAILY
Status: DISCONTINUED | OUTPATIENT
Start: 2020-05-15 | End: 2020-05-17 | Stop reason: HOSPADM

## 2020-05-15 RX ADMIN — ACETAMINOPHEN 650 MG: 325 TABLET ORAL at 03:05

## 2020-05-15 RX ADMIN — ACETAMINOPHEN 650 MG: 325 TABLET ORAL at 10:05

## 2020-05-15 RX ADMIN — ACETAMINOPHEN 650 MG: 325 TABLET ORAL at 08:05

## 2020-05-15 RX ADMIN — LAMOTRIGINE 200 MG: 100 TABLET ORAL at 08:05

## 2020-05-15 RX ADMIN — CITALOPRAM HYDROBROMIDE 20 MG: 10 TABLET ORAL at 08:05

## 2020-05-15 RX ADMIN — DIAZEPAM 5 MG: 5 INJECTION, SOLUTION INTRAMUSCULAR; INTRAVENOUS at 02:05

## 2020-05-15 RX ADMIN — CLOBAZAM 40 MG: 10 TABLET ORAL at 08:05

## 2020-05-15 RX ADMIN — LACOSAMIDE 200 MG: 100 TABLET, FILM COATED ORAL at 08:05

## 2020-05-15 NOTE — NURSING
Patient report received at the bedside the patient is awake alert and oriented answering all questions appropriately. The patient is on seizure precautions. Bed alarm in place.

## 2020-05-15 NOTE — NURSING
"EMU SEIZURE EVENT NOTE    Time event started: 0220  Duration: ~20 minutes  Describe symptoms during event and post-ictal symptoms: Pt initially started out what appeared to be "swatting" something from in front of his face.  Upon entering room, pt was shaking and attempting to get out of bed.  Pt got very combative during seizure, and 5+ staff members had to attempt to keep patient from getting out of bed.  Pupils 7 and fixed at this time. Pt administered diazepam IM due to IV coming out during attempt to IVP medication, approximately 10 minutes from onset of seizure.  Seizure subsided approximately 2 minutes later, and pt was oriented to self only, and pupils reactive. Pt was lethargic and stated he had a headache.     Who notified: Kiara Luque MD    Intervention: pt placed on side and suctioned appropriately. Vital signs taken q5min during event and post vital signs taken.  Standardized neurological assessment competed.  Notified EMU team regarding administration of medication.     Patient response:  neurological assessment back at baseline, VSS at this time.  See flowsheets for more event information.     (adjust note appropriately, IF cardiopulmonary unstable place notify CHARGE NURSE and call RAPID RESPONSE TEAM @ [phone])  "

## 2020-05-15 NOTE — PLAN OF CARE
POC reviewed with pt.  Pt verbalized understanding.  Questions and concerns addressed.  Pt AAOx4, moves all extremities spontaneously, and denies any numbness or tingling.  Pt did have seizure event overnight (see prior note).  Prn diazepam given per order.  Pt c/o of headache and back pain now related to seizure activity.  Prn acetaminophen given for comfort with moderate relief obtained.  Fall/safety/seizure precautions maintained during shift. Bed locked and in lowest position.  See flowsheets for full assessment and vital sign information. WCTM         Problem: Adult Inpatient Plan of Care  Goal: Plan of Care Review  Outcome: Ongoing, Progressing  Goal: Patient-Specific Goal (Individualization)  Outcome: Ongoing, Progressing  Goal: Optimal Comfort and Wellbeing  Outcome: Ongoing, Progressing

## 2020-05-15 NOTE — NURSING
Paged EMU x2 regarding medication administration for seizure.  No call back.  Will attempt again.

## 2020-05-15 NOTE — PROGRESS NOTES
"[facility]  Adult Nutrition  Progress Note    SUMMARY       Recommendations    1. Continue regular diet.  Goals: 1. Pt's intake >75% x 7 days.  Nutrition Goal Status: goal met  Communication of RD Recs: (POC)    Reason for Assessment    Reason For Assessment: length of stay  Diagnosis: seizures  Relevant Medical History: epilepsy  Interdisciplinary Rounds: did not attend  General Information Comments: Pt with 100% intake meals. No weight history or h/o po intake available; pt's wt stable during admission. Pt sleeping, will follow up.  Nutrition Discharge Planning: Pt to follow regular healthy diet.    Nutrition Risk Screen    Nutrition Risk Screen: no indicators present      Anthropometrics    Temp: 97.8 °F (36.6 °C)  Height: 6' 1" (185.4 cm)  Height (inches): 73 in  Weight Method: Standard Scale  Weight: 81.6 kg (180 lb)  Weight (lb): 180 lb  Ideal Body Weight (IBW), Male: 184 lb  % Ideal Body Weight, Male (lb): 97.83 %  BMI (Calculated): 23.8       Lab/Procedures/Meds    Pertinent Labs Reviewed: reviewed  Pertinent Labs Comments: ---  Pertinent Medications Reviewed: reviewed  Pertinent Medications Comments: ---    Estimated/Assessed Needs    Weight Used For Calorie Calculations: 81.6 kg (179 lb 14.3 oz)  Energy Calorie Requirements (kcal): 2313 kcal  Energy Need Method: [place]-St Jeor(PAL 1.25)  Protein Requirements: 65-82g/dayt  Weight Used For Protein Calculations: 81.6 kg (179 lb 14.3 oz)        RDA Method (mL): 2313         Nutrition Prescription Ordered    Current Diet Order: regular    Evaluation of Received Nutrient/Fluid Intake    I/O: +5.1L since admission  Comments: LBM 5/13  % Intake of Estimated Energy Needs: 75 - 100 %  % Meal Intake: 75 - 100 %    Nutrition Risk    Level of Risk/Frequency of Follow-up: low     Assessment and Plan    No nutrition diagnosis at this time.     Monitor and Evaluation    Food and Nutrient Intake: energy intake, food and beverage intake  Food and " Nutrient Adminstration: diet order  Anthropometric Measurements: weight, weight change, body mass index  Biochemical Data, Medical Tests and Procedures: electrolyte and renal panel, gastrointestinal profile, glucose/endocrine profile, inflammatory profile, lipid profile  Nutrition-Focused Physical Findings: overall appearance     Malnutrition Assessment     Pt does not meet criteria for malnutrition.    Nutrition Follow-Up    RD Follow-up?: Yes

## 2020-05-16 PROCEDURE — 95714 VEEG EA 12-26 HR UNMNTR: CPT

## 2020-05-16 PROCEDURE — 99233 PR SUBSEQUENT HOSPITAL CARE,LEVL III: ICD-10-PCS | Mod: ,,, | Performed by: PSYCHIATRY & NEUROLOGY

## 2020-05-16 PROCEDURE — 99233 SBSQ HOSP IP/OBS HIGH 50: CPT | Mod: ,,, | Performed by: PSYCHIATRY & NEUROLOGY

## 2020-05-16 PROCEDURE — 25000003 PHARM REV CODE 250: Performed by: PHYSICIAN ASSISTANT

## 2020-05-16 PROCEDURE — 11000001 HC ACUTE MED/SURG PRIVATE ROOM

## 2020-05-16 PROCEDURE — 25000003 PHARM REV CODE 250: Performed by: PSYCHIATRY & NEUROLOGY

## 2020-05-16 PROCEDURE — 95720 PR EEG, W/VIDEO, CONT RECORD, I&R, >12<26 HRS: ICD-10-PCS | Mod: ,,, | Performed by: PSYCHIATRY & NEUROLOGY

## 2020-05-16 PROCEDURE — 95720 EEG PHY/QHP EA INCR W/VEEG: CPT | Mod: ,,, | Performed by: PSYCHIATRY & NEUROLOGY

## 2020-05-16 RX ADMIN — CLOBAZAM 40 MG: 10 TABLET ORAL at 08:05

## 2020-05-16 RX ADMIN — ACETAMINOPHEN 650 MG: 325 TABLET ORAL at 08:05

## 2020-05-16 RX ADMIN — LACOSAMIDE 200 MG: 100 TABLET, FILM COATED ORAL at 08:05

## 2020-05-16 RX ADMIN — ACETAMINOPHEN 650 MG: 325 TABLET ORAL at 02:05

## 2020-05-16 RX ADMIN — CITALOPRAM HYDROBROMIDE 20 MG: 10 TABLET ORAL at 08:05

## 2020-05-16 RX ADMIN — LAMOTRIGINE 200 MG: 100 TABLET ORAL at 08:05

## 2020-05-16 NOTE — PLAN OF CARE
POC reviewed with pt.  Pt verbalized understanding.  Questions and concerns addressed.  No acute events overnight.  Pt c/o of back pain during the shift and was given prn acetaminophen and heat packs with moderate relief obtained.  Fall/safety precautions maintained.  Seizure precautions maintained.  Bed locked and in lowest position.  See flowsheets for full assessment and vital sign information.       Problem: Adult Inpatient Plan of Care  Goal: Plan of Care Review  Outcome: Ongoing, Progressing  Goal: Patient-Specific Goal (Individualization)  Outcome: Ongoing, Progressing  Goal: Optimal Comfort and Wellbeing  Outcome: Ongoing, Progressing

## 2020-05-16 NOTE — PROGRESS NOTES
"Progress Note  Epilepsy    CC:  Refractory epilepsy    Interval Events:    5/7 - 5/8 - no acute events overnight   EEG with L temp sharp waves   5/8-5/9 - no acute events overnight   EEG with L temp sharp waves, GPFA, L PFA, R PFA, R and L slow complexes   No sz    5/10-5/11 Seizure yesterday that he does not remember  5/11 - 5/12 - no events  5/12 - 5/13 - no events  5/13 - 5/14 - no events, sleep-deprived, VNS turned off  5/14 - 5/15 - 3 seizures in the early morning 5/15      ROS:  Significant back pain, drowsiness, eating well, ambulating to the bathroom, no chest pain/shortness of left/headache, no weakness, no sensory changes, no issues going to the bathroom, no fever    Past medical history, social history, family history are unchanged from the initial H&P.    EXAM:   - Vitals: /84   Pulse 76   Temp 98.6 °F (37 °C)   Resp (!) 23   Ht 6' 1" (1.854 m)   Wt 81.6 kg (180 lb)   SpO2 98%   BMI 23.75 kg/m²    - General: Awake, cooperative, NAD.  - HEENT: NC/AT  - Neck: Supple  - Pulmonary: no increased WOB  - Cardiac: well perfused   - Abdomen: soft, nontender, nondistended  - Extremities: no edema  - Skin: no rashes or lesions noted.     NEURO EXAM:   - Mental Status: Awake, alert, oriented x 3. Able to relate history without difficulty. Language is fluent with intact repetition and comprehension. Normal prosody. There were no paraphasic errors. Able to name both high and low frequency objects. Speech was not dysarthric. Able to follow both midline and appendicular commands.     - Cranial Nerves:  VFF to confrontation. EOMI . No facial droop. Hearing intact to voice.     - Motor: Normal bulk and tone throughout. No pronator drift bilaterally. No adventitious movements such as tremor or asterixis noted.  No focal asymmetry    - Sensory: No deficits to light touch.  - Coordination: No dysmetria on FNF    EEG: Reviewed personally, abundant epileptiform activity over the left temporal leads, independent " right discharges as well, some discharges which appear more generalized as well    Plan:   Refractory epilepsy   - cont cvEEG  - VNS turned back on today  OC/SF/PW/On/Off  1.125/30/1000/30s/5m  OC/PW/On  AS 1.125/1000/30s  M 1.375/1000/60s  - restart home doses of Onfi, Vimpat, Lamictal   - Seizure precautions  - Activation procedures per protocol - HV/Photic  - IV Valium PRN for GTC greater than 5 min - please call epilepsy on call before administering      Depression - Arpan Luque MD PhD  Neurology-Epilepsy  [facility].  [facility]      Current Facility-Administered Medications:     acetaminophen tablet 650 mg, 650 mg, Oral, Q4H PRN, Pilar Shepard PA-C, 650 mg at 05/15/20 0819    citalopram tablet 20 mg, 20 mg, Oral, Daily, Pilar Shepard PA-C, 20 mg at 05/15/20 0818    cloBAZam Tab 40 mg, 40 mg, Oral, BID, Kiara Luque MD PhD, 40 mg at 05/15/20 2000    diazePAM injection 5 mg, 5 mg, Intravenous, Q15 Min PRN, Pilar Shepard PA-C    docusate sodium capsule 100 mg, 100 mg, Oral, BID PRN, Pilar Shepard PA-C    lacosamide tablet 200 mg, 200 mg, Oral, BID, Kiara Luque MD PhD, 200 mg at 05/15/20 2001    lamoTRIgine tablet 200 mg, 200 mg, Oral, BID, Kiara Luque MD PhD, 200 mg at 05/15/20 2001    sodium chloride 0.9% flush 10 mL, 10 mL, Intravenous, PRN, Pilar Shepard PA-C    VTE Risk Mitigation (From admission, onward)         Ordered     IP VTE LOW RISK PATIENT  Once      05/07/20 1215     Place CHASE hose  Until discontinued      05/07/20 1215     Place sequential compression device  Until discontinued      05/07/20 1215                Recent Labs   Lab 09/04/19  1222 05/07/20  1458   WBC 4.87 8.56   Hemoglobin 15.4 16.3   Hematocrit 46.7 48.2   Platelets 223 181   Sodium 141 139   Potassium 4.4 4.3   BUN, Bld 13 8   Creatinine 0.9 0.9   eGFR if African American >60.0 >60.0   eGFR if non African American >60.0 >60.0       Results for orders placed during the hospital  encounter of 02/15/19   MRI Brain Epilepsy Without Contrast    Impression Unremarkable noncontrast MRI brain as detailed above specifically without evidence for parenchymal signal abnormality.      Electronically signed by: Yosef Ro DO  Date:    02/15/2019  Time:    14:49

## 2020-05-16 NOTE — PLAN OF CARE
Problem: Adult Inpatient Plan of Care  Goal: Plan of Care Review  Outcome: Ongoing, Progressing     Problem: Fall Injury Risk  Goal: Absence of Fall and Fall-Related Injury  Outcome: Ongoing, Progressing     Problem: Seizure, Active Management  Goal: Absence of Seizure/Seizure-Related Injury  Outcome: Ongoing, Progressing     VSS, no acute events this shift. POC and education reviewed with pt.  Pt verbalized understanding.  Questions and concerns addressed.   Pt c/o of back pain during the shift and was given prn acetaminophen and heat packs with moderate relief obtained.  Fall/safety precautions maintained.  Seizure precautions maintained.  Bed locked and in lowest position.

## 2020-05-16 NOTE — PROCEDURES
[facility] EEG/VIDEO MONITORING REPORT  Darin Cunha  [id number]   1995    DATE OF SERVICE: 5/14/2020  DATE OF ADMISSION: 5/7/2020 11:30 AM    ADMITTING/REQUESTING PROVIDER: BEBETO Marina MD    REASON FOR CONSULT:  24-year-old man with refractory epilepsy admitted to the epilepsy monitoring unit for event capture and characterization.    METHODOLOGY   Electroencephalographic (EEG) recording is with electrodes placed according to the International 10-20 placement system.  Thirty two (32) channels of digital signal (sampling rate of 512/sec) including T1 and T2 was simultaneously recorded from the scalp and may include  EKG, EMG, and/or eye monitors.  Recording band pass was 0.1 to 512 hz.  Digital video recording of the patient is simultaneously recorded with the EEG.  The patient is instructed report clinical symptoms which may occur during the recording session.  EEG and video recording is stored and archived in digital format.  Activation procedures which include photic stimulation, hyperventilation and instructing patients to perform simple task are done in selected patients.   The EEG is displayed on a monitor screen and can be reviewed using different montages.  Computer assisted analysis is employed to detect spike and electrographic seizure activity.   The entire record is submitted for computer analysis.  The entire recording is visually reviewed and the times identified by computer analysis as being spikes or seizures are reviewed again.  Compresses spectral analysis (CSA) is also performed on the activity recorded from each individual channel.  This is displayed as a power display of frequencies from 0 to 30 Hz over time.   The CSA is reviewed looking for asymmetries in power between homologous areas of the scalp and then compared with the original EEG recording.     [employer] software is also utilized in the review of this study.  This software suite analyzes the EEG recording in multiple domains.  Coherence  and rhythmicity is computed to identify EEG sections which may contain organized seizures.  Each channel undergoes analysis to detect presence of spike and sharp waves which have special and morphological characteristic of epileptic activity.  The routine EEG recording is converted from spacial into frequency domain.  This is then displayed comparing homologous areas to identify areas of significant asymmetry.  Algorithm to identify non-cortically generated artifact is used to separate eye movement, EMG and other artifact from the EEG.      RECORDING TIMES  Start on 05/14/2020 at 07:00 a.m.  Stop on 05/15/2020 at 07:00 a.m.  A total of 23 hr and 51 min of EEG recording is obtained.    EEG FINDINGS  Background activity:   The waking background is notable for an 11 hz posterior dominant rhythm seen bilaterally.  There are abundant spike/polyspike and wave discharges most prominent over the left temporal region which often occur in prolonged runs.  There is intermittent prominent theta or monomorphic delta focal slowing in the same area.  There is also intermittent generalized slowing.  There are frequent generalized bursts of moderate-high voltage sharp and fast activity often with a left-sided predominance.  There are occasional independent spike and wave discharges seen in the right temporal region.  There are prolonged periods with monomorphic 4-5 hz activity most prominent over the left temporal region without evolution.    There are 3 electroclinical seizures at 02:24 a.m., 05:00 a.m. and 06:19 a.m.     Seizure 1.  At the onset of the seizure, the patient is in sleep with abundant epileptiform activity seen over the left temporal region as well as generally.  At 02:24 a.m. there is the appearance large voltage delta activity in the left temporal region that quickly spreads to the left parasagittal region and then into the right temporal region within several seconds.  At 02:24:22, the patient moves his head and  then sits up.  The activity over the left hemisphere than evolves into monomorphic theta/alpha activity most prominent over the anterior frontotemporal region with prominent delta slowing developing in the right parasagittal region.  The electrographic record is then obscured by muscle artifact as the patient has bilateral arm flexion and an ictal scream followed by generalized tonic-clonic movements.  At the end of the seizure, there is marked suppression of the electrographic background and tachycardia.  There is prominent disorganized delta slowing for approximately 30 min until the electrographic record gradually resumes its baseline activity.    Seizure 2.  The second seizure also arises out of sleep at 05:00 a.m.  It starts with delta slowing seen in the left temporal region which then becomes more organized and spreads to the left parasagittal region. The patient wakes up and lifts his head up.  There is then the development of monomorphic 7-8.5 hz theta/alpha activity most prominent over the left anterior frontotemporal leads which spreads bilaterally.  The electrographic background is then obscured by muscle artifact the patient has an ictal scream and proceeds to have tonic-clonic movements. Once again, at the end of the seizure, there is marked suppression of the electrographic background and tachycardia.  There is prominent disorganized delta slowing for approximately 30 min until the electrographic record gradually resumes its baseline activity.    Seizure 3.  The third seizure also arises out of sleep at 06:19 a.m.  It starts with delta slowing seen more generally in all quadrants.  This activity then ceases briefly for a second, after which there is resumption of delta slowing most prominent over the left temporal region.  This activity then evolves into monomorphic appearing theta/alpha activity most prominent over the left hemisphere.  The background is then obscured by muscle artifact, the patient  lets out an ictal scream, and proceeds to have bilateral arm and leg extension with tonic-clonic movements. Once again, at the end of the seizure, there is marked suppression of the electrographic background however the EKG is obscured by artifact.  There is prominent disorganized delta slowing for approximately 30 min until the electrographic record gradually resumes its baseline activity.    Sleep:  The patient transitions from wakefulness to sleep with the appearance of sleep spindles, K complexes, and vertex waves with embedded sharp features and abundant epileptiform discharges, L>>R.  Frequently during sleep, there are long runs of spike and wave activity on a rhythmic background up to 2.5 hz over the left temporal leads.    Activation procedures:   Hyperventilation is not performed  Photic stimulation is not performed    Cardiac Monitor:   Heart rate appears generally regular on a single lead EKG.     Impression:   This is an abnormal continuous EEG monitoring study because of 3 electroclinical seizures which appear to originate from the left hemisphere.  Interictally, there is abundant, often rhythmic, epileptiform activity over the left temporal region consistent with an area of focal cortical dysfunction and potential seizure focus in this region.  There are also occasional epileptiform discharges seen independently in the right temporal region consistent with an alternative seizure focus.  At times, there are bursts of epileptiform activity which appear more generalized suggesting rapid spread bilaterally.  There is intermittent generalized and multifocal slowing consistent with subcortical dysfunction with some areas with more prominent focal involvement especially over the left temporal region.  At times, the theta/delta slowing over the left temporal region is monomorphic, semi-rhythmic, and very prolonged without evidence of evolution.     LTM Summary 05/07/2020 - TBD:  Electroclinical Seizures:     Which appear to arise from the left frontotemporal region  1. 05/07/2020 at 17:14 p.m.  2. 05/10/2020 at 10:08 a.m.  3. 05/15/2020 at 02:24 a.m.  4. 05/15/2020 at 05:00 a.m.  5.  05/15/2020 at 06:19 a.m.    Interictal findings:    Abundant spike/polyspike epileptiform activity seen over the left temporal region which occurs in prolonged rhythmic runs during sleep.  Frequent bursts of sharply contoured theta/delta activity seen more generally, consistent with rapid spread bilaterally.  Occasional independent discharges seen from the right temporal region.  Other notable abnormalities:   intermittent monomorphic delta slowing most prominent in the left temporal region without evolution, but also seen in the right temporal region independently, as well as generalized slowing    Kiara Luque MD PhD  Neurology-Epilepsy  [facility].  [facility]

## 2020-05-17 VITALS
DIASTOLIC BLOOD PRESSURE: 95 MMHG | SYSTOLIC BLOOD PRESSURE: 134 MMHG | TEMPERATURE: 98 F | WEIGHT: 180 LBS | HEIGHT: 73 IN | BODY MASS INDEX: 23.86 KG/M2 | HEART RATE: 103 BPM | RESPIRATION RATE: 18 BRPM | OXYGEN SATURATION: 99 %

## 2020-05-17 DIAGNOSIS — G40.909 SEIZURE DISORDER: Primary | ICD-10-CM

## 2020-05-17 PROCEDURE — 25000003 PHARM REV CODE 250: Performed by: PHYSICIAN ASSISTANT

## 2020-05-17 PROCEDURE — 25000003 PHARM REV CODE 250: Performed by: PSYCHIATRY & NEUROLOGY

## 2020-05-17 PROCEDURE — 99238 PR HOSPITAL DISCHARGE DAY,<30 MIN: ICD-10-PCS | Mod: ,,, | Performed by: PSYCHIATRY & NEUROLOGY

## 2020-05-17 PROCEDURE — 99238 HOSP IP/OBS DSCHRG MGMT 30/<: CPT | Mod: ,,, | Performed by: PSYCHIATRY & NEUROLOGY

## 2020-05-17 PROCEDURE — 25000003 PHARM REV CODE 250: Performed by: NURSE PRACTITIONER

## 2020-05-17 RX ORDER — LACOSAMIDE 200 MG/1
200 TABLET ORAL EVERY 12 HOURS
Qty: 60 TABLET | Refills: 2 | Status: SHIPPED | OUTPATIENT
Start: 2020-05-17 | End: 2020-08-19 | Stop reason: SDUPTHER

## 2020-05-17 RX ORDER — LACOSAMIDE 200 MG/1
200 TABLET ORAL EVERY 12 HOURS
Qty: 60 TABLET | Refills: 2 | Status: SHIPPED | OUTPATIENT
Start: 2020-05-17 | End: 2020-05-17 | Stop reason: SDUPTHER

## 2020-05-17 RX ORDER — CLOBAZAM 20 MG/1
40 TABLET ORAL 2 TIMES DAILY
Qty: 120 TABLET | Refills: 2 | Status: SHIPPED | OUTPATIENT
Start: 2020-05-17 | End: 2020-05-17

## 2020-05-17 RX ORDER — LAMOTRIGINE 200 MG/1
200 TABLET ORAL 2 TIMES DAILY
Qty: 60 TABLET | Refills: 2 | Status: SHIPPED | OUTPATIENT
Start: 2020-05-17 | End: 2020-12-16 | Stop reason: SDUPTHER

## 2020-05-17 RX ORDER — LAMOTRIGINE 200 MG/1
200 TABLET ORAL 2 TIMES DAILY
Qty: 60 TABLET | Refills: 2 | Status: SHIPPED | OUTPATIENT
Start: 2020-05-17 | End: 2020-05-17

## 2020-05-17 RX ORDER — CLOBAZAM 20 MG/1
40 TABLET ORAL 2 TIMES DAILY
Qty: 120 TABLET | Refills: 2 | Status: SHIPPED | OUTPATIENT
Start: 2020-05-17 | End: 2020-09-17

## 2020-05-17 RX ADMIN — MENTHOL, METHYL SALICYLATE: 10; 15 CREAM TOPICAL at 01:05

## 2020-05-17 RX ADMIN — LAMOTRIGINE 200 MG: 100 TABLET ORAL at 08:05

## 2020-05-17 RX ADMIN — CLOBAZAM 40 MG: 10 TABLET ORAL at 08:05

## 2020-05-17 RX ADMIN — LACOSAMIDE 200 MG: 100 TABLET, FILM COATED ORAL at 08:05

## 2020-05-17 RX ADMIN — ACETAMINOPHEN 650 MG: 325 TABLET ORAL at 06:05

## 2020-05-17 RX ADMIN — MENTHOL, METHYL SALICYLATE: 10; 15 CREAM TOPICAL at 06:05

## 2020-05-17 RX ADMIN — CITALOPRAM HYDROBROMIDE 20 MG: 10 TABLET ORAL at 08:05

## 2020-05-17 NOTE — PLAN OF CARE
POC reviewed with pt.  Pt verbalized understanding.  Questions and concerns addressed.  No acute events overnight.  VSS.  Prn acetaminophen given and [place] applied for back pain, as well as heat packs provided.  Moderate relief obtained.  Fall/safety precautions maintained.  Seizure precautions maintained.  Bed locked and in lowest position.  See flowsheets for full assessment and vital sign information. WCTM       Problem: Adult Inpatient Plan of Care  Goal: Plan of Care Review  Outcome: Ongoing, Progressing  Goal: Optimal Comfort and Wellbeing  Outcome: Ongoing, Progressing     Problem: Fall Injury Risk  Goal: Absence of Fall and Fall-Related Injury  Outcome: Ongoing, Progressing     Problem: Seizure, Active Management  Goal: Absence of Seizure/Seizure-Related Injury  Outcome: Ongoing, Progressing

## 2020-05-17 NOTE — DISCHARGE INSTRUCTIONS
Seizure restrictions are but not limited to: no driving, avoid swimming, high altitude activities, operating heavy machinery, bathing unattended, or engaging in activities in where a seizure will cause harm to self or others.    Please follow-up with your neurologist to discuss results of hospitalization.

## 2020-05-17 NOTE — PROGRESS NOTES
"Progress Note  Epilepsy    CC:  Refractory epilepsy    Interval Events:    5/7 - 5/8 - no acute events overnight   EEG with L temp sharp waves   5/8-5/9 - no acute events overnight   EEG with L temp sharp waves, GPFA, L PFA, R PFA, R and L slow complexes   No sz    5/10-5/11 Seizure yesterday that he does not remember  5/11 - 5/12 - no events  5/12 - 5/13 - no events  5/13 - 5/14 - no events, sleep-deprived, VNS turned off  5/14 - 5/15 - 3 seizures in the early morning 5/15  5/15-5/16 - no events  5/16-5/17 - no events      ROS:  Significant back pain, drowsiness, eating well, ambulating to the bathroom, no chest pain/shortness of left/headache, no weakness, no sensory changes, no issues going to the bathroom, no fever    Past medical history, social history, family history are unchanged from the initial H&P.    EXAM:   - Vitals: BP (!) 132/91 (BP Location: Left arm, Patient Position: Lying)   Pulse 93   Temp 97.9 °F (36.6 °C) (Oral)   Resp 18   Ht 6' 1" (1.854 m)   Wt 81.6 kg (180 lb)   SpO2 99%   BMI 23.75 kg/m²    - General: Awake, cooperative, NAD.  - HEENT: NC/AT  - Neck: Supple  - Pulmonary: no increased WOB  - Cardiac: well perfused   - Abdomen: soft, nontender, nondistended  - Extremities: no edema  - Skin: no rashes or lesions noted.     NEURO EXAM:   - Mental Status: Awake, alert, oriented x 3. Able to relate history without difficulty. Language is fluent with intact repetition and comprehension. Normal prosody. There were no paraphasic errors. Able to name both high and low frequency objects. Speech was not dysarthric. Able to follow both midline and appendicular commands.     - Cranial Nerves:  VFF to confrontation. EOMI . No facial droop. Hearing intact to voice.     - Motor: Normal bulk and tone throughout. No pronator drift bilaterally. No adventitious movements such as tremor or asterixis noted.  No focal asymmetry    - Sensory: No deficits to light touch.  - Coordination: No dysmetria on " FNF    EEG: Reviewed personally, abundant epileptiform activity over the left temporal leads, independent right discharges as well, some discharges which appear more generalized as well    Plan:   Refractory epilepsy   - Discontinue cvEEG  - VNS turned back on 5/15  OC/SF/PW/On/Off  1.125/30/1000/30s/5m  OC/PW/On  AS 1.125/1000/30s  M 1.375/1000/60s  - restart home doses of Onfi, Vimpat, Lamictal 5/16  - Seizure precautions  - Activation procedures per protocol - HV/Photic  - IV Valium PRN for GTC greater than 5 min - please call epilepsy on call before administering    - discharge home today        Depression - Arpan Sanchez MD  Neurology-Epilepsy  [facility].        Current Facility-Administered Medications:     acetaminophen tablet 650 mg, 650 mg, Oral, Q4H PRN, Pilar Shepard PA-C, 650 mg at 05/17/20 0626    citalopram tablet 20 mg, 20 mg, Oral, Daily, Pilar Shepard PA-C, 20 mg at 05/17/20 0848    cloBAZam Tab 40 mg, 40 mg, Oral, BID, Kiara Luque MD PhD, 40 mg at 05/17/20 0847    diazePAM injection 5 mg, 5 mg, Intravenous, Q15 Min PRN, Pilar Shepard PA-C    docusate sodium capsule 100 mg, 100 mg, Oral, BID PRN, Pilar Shepard PA-C    lacosamide tablet 200 mg, 200 mg, Oral, BID, Kiara Luque MD PhD, 200 mg at 05/17/20 0848    lamoTRIgine tablet 200 mg, 200 mg, Oral, BID, Kiara Luque MD PhD, 200 mg at 05/17/20 0848    methyl salicylate-menthol 15-10% cream, , Topical (Top), Daily PRN, Dianna Corley NP    sodium chloride 0.9% flush 10 mL, 10 mL, Intravenous, PRN, Pilar Shepard PA-C    VTE Risk Mitigation (From admission, onward)         Ordered     IP VTE LOW RISK PATIENT  Once      05/07/20 1215     Place CAHSE hose  Until discontinued      05/07/20 1215     Place sequential compression device  Until discontinued      05/07/20 1215                Recent Labs   Lab 09/04/19  1222 05/07/20  1458   WBC 4.87 8.56   Hemoglobin 15.4 16.3   Hematocrit 46.7 48.2    Platelets 223 181   Sodium 141 139   Potassium 4.4 4.3   BUN, Bld 13 8   Creatinine 0.9 0.9   eGFR if African American >60.0 >60.0   eGFR if non African American >60.0 >60.0       Results for orders placed during the hospital encounter of 02/15/19   MRI Brain Epilepsy Without Contrast    Impression Unremarkable noncontrast MRI brain as detailed above specifically without evidence for parenchymal signal abnormality.      Electronically signed by: Yosef Ro DO  Date:    02/15/2019  Time:    14:49

## 2020-05-17 NOTE — PLAN OF CARE
Problem: Adult Inpatient Plan of Care  Goal: Plan of Care Review  Outcome: Ongoing, Progressing  Goal: Patient-Specific Goal (Individualization)  Outcome: Ongoing, Progressing  Goal: Absence of Hospital-Acquired Illness or Injury  Outcome: Ongoing, Progressing  Goal: Optimal Comfort and Wellbeing  Outcome: Ongoing, Progressing  Goal: Readiness for Transition of Care  Outcome: Ongoing, Progressing  Goal: Rounds/Family Conference  Outcome: Ongoing, Progressing     Problem: Fall Injury Risk  Goal: Absence of Fall and Fall-Related Injury  Outcome: Ongoing, Progressing     Problem: Seizure, Active Management  Goal: Absence of Seizure/Seizure-Related Injury  Outcome: Ongoing, Progressing     POC and education reviewed with pt.  Pt verbalized understanding.  Questions and concerns addressed.  No acute events overnight.  VSS. Fall/safety precautions maintained.  Seizure precautions maintained.  Bed locked and in lowest position.  See flowsheets for full assessment and vital sign information. Plans to D/C today, family notified. ARSH Krueger RN

## 2020-05-17 NOTE — PROGRESS NOTES
"Progress Note  Epilepsy    CC:  Refractory epilepsy    Interval Events:    5/7 - 5/8 - no acute events overnight   EEG with L temp sharp waves   5/8-5/9 - no acute events overnight   EEG with L temp sharp waves, GPFA, L PFA, R PFA, R and L slow complexes   No sz    5/10-5/11 Seizure yesterday that he does not remember  5/11 - 5/12 - no events  5/12 - 5/13 - no events  5/13 - 5/14 - no events, sleep-deprived, VNS turned off  5/14 - 5/15 - 3 seizures in the early morning 5/15  5/15-5/16 - no events      ROS:  Significant back pain, drowsiness, eating well, ambulating to the bathroom, no chest pain/shortness of left/headache, no weakness, no sensory changes, no issues going to the bathroom, no fever    Past medical history, social history, family history are unchanged from the initial H&P.    EXAM:   - Vitals: /78 (Patient Position: Lying)   Pulse 86   Temp 97.4 °F (36.3 °C) (Oral)   Resp (!) 22   Ht 6' 1" (1.854 m)   Wt 81.6 kg (180 lb)   SpO2 98%   BMI 23.75 kg/m²    - General: Awake, cooperative, NAD.  - HEENT: NC/AT  - Neck: Supple  - Pulmonary: no increased WOB  - Cardiac: well perfused   - Abdomen: soft, nontender, nondistended  - Extremities: no edema  - Skin: no rashes or lesions noted.     NEURO EXAM:   - Mental Status: Awake, alert, oriented x 3. Able to relate history without difficulty. Language is fluent with intact repetition and comprehension. Normal prosody. There were no paraphasic errors. Able to name both high and low frequency objects. Speech was not dysarthric. Able to follow both midline and appendicular commands.     - Cranial Nerves:  VFF to confrontation. EOMI . No facial droop. Hearing intact to voice.     - Motor: Normal bulk and tone throughout. No pronator drift bilaterally. No adventitious movements such as tremor or asterixis noted.  No focal asymmetry    - Sensory: No deficits to light touch.  - Coordination: No dysmetria on FNF    EEG: Reviewed personally, abundant " epileptiform activity over the left temporal leads, independent right discharges as well, some discharges which appear more generalized as well    Plan:   Refractory epilepsy   - cont cvEEG  - VNS turned back on 5/15  OC/SF/PW/On/Off  1.125/30/1000/30s/5m  OC/PW/On  AS 1.125/1000/30s  M 1.375/1000/60s  - restart home doses of Onfi, Vimpat, Lamictal 5/16  - Seizure precautions  - Activation procedures per protocol - HV/Photic  - IV Valium PRN for GTC greater than 5 min - please call epilepsy on call before administering      Depression - Arpan Sanchez MD  Neurology-Epilepsy  [facility].        Current Facility-Administered Medications:     acetaminophen tablet 650 mg, 650 mg, Oral, Q4H PRN, Pilar Shepard PA-C, 650 mg at 05/16/20 2012    citalopram tablet 20 mg, 20 mg, Oral, Daily, Pilar Shepard PA-C, 20 mg at 05/16/20 0841    cloBAZam Tab 40 mg, 40 mg, Oral, BID, Kiara Luque MD PhD, 40 mg at 05/16/20 2012    diazePAM injection 5 mg, 5 mg, Intravenous, Q15 Min PRN, Pilar Shepard PA-C    docusate sodium capsule 100 mg, 100 mg, Oral, BID PRN, Pilar Shepard PA-C    lacosamide tablet 200 mg, 200 mg, Oral, BID, Kiara Luque MD PhD, 200 mg at 05/16/20 2012    lamoTRIgine tablet 200 mg, 200 mg, Oral, BID, Kiara Luque MD PhD, 200 mg at 05/16/20 2012    sodium chloride 0.9% flush 10 mL, 10 mL, Intravenous, PRN, Pilar Shepard PA-C    VTE Risk Mitigation (From admission, onward)         Ordered     IP VTE LOW RISK PATIENT  Once      05/07/20 1215     Place CHASE hose  Until discontinued      05/07/20 1215     Place sequential compression device  Until discontinued      05/07/20 1215                Recent Labs   Lab 09/04/19  1222 05/07/20  1458   WBC 4.87 8.56   Hemoglobin 15.4 16.3   Hematocrit 46.7 48.2   Platelets 223 181   Sodium 141 139   Potassium 4.4 4.3   BUN, Bld 13 8   Creatinine 0.9 0.9   eGFR if African American >60.0 >60.0   eGFR if non African American >60.0 >60.0        Results for orders placed during the hospital encounter of 02/15/19   MRI Brain Epilepsy Without Contrast    Impression Unremarkable noncontrast MRI brain as detailed above specifically without evidence for parenchymal signal abnormality.      Electronically signed by: Yosef Ro DO  Date:    02/15/2019  Time:    14:49

## 2020-05-17 NOTE — NURSING
IV removed, tip intact, dressing c/d/i. Plans for patient to D/C via wheelchair to family member's vehicle. No questions related to d/c instructions at this time.

## 2020-05-18 NOTE — PLAN OF CARE
Patient medically ready for discharge to Home w/ no needs. Patient instructed by discharging provider to schedule with PCP.     No future appointments.        05/18/20 0821   Final Note   Assessment Type Final Discharge Note   Anticipated Discharge Disposition Home   Hospital Follow Up  Appt(s) scheduled? No   Discharge plans and expectations educations in teach back method with documentation complete? No   Right Care Referral Info   Post Acute Recommendation No Care   Post-Acute Status   Post-Acute Authorization Other   Other Status No Post-Acute Service Needs   Discharge Delays None known at this time       Alexandra Young RN  Case Management  Ext: [phone]  05/18/2020  8:21 AM

## 2020-05-27 NOTE — PROCEDURES
[facility] EEG/VIDEO MONITORING REPORT  Darin Cunha  [id number]   1995    DATE OF SERVICE: 5/16/2020  DATE OF ADMISSION: 5/7/2020 11:30 AM    ADMITTING/REQUESTING PROVIDER: BEBETO Marina MD    REASON FOR CONSULT:  24-year-old man with refractory epilepsy admitted to the epilepsy monitoring unit for event capture and characterization.    METHODOLOGY   Electroencephalographic (EEG) recording is with electrodes placed according to the International 10-20 placement system.  Thirty two (32) channels of digital signal (sampling rate of 512/sec) including T1 and T2 was simultaneously recorded from the scalp and may include  EKG, EMG, and/or eye monitors.  Recording band pass was 0.1 to 512 hz.  Digital video recording of the patient is simultaneously recorded with the EEG.  The patient is instructed report clinical symptoms which may occur during the recording session.  EEG and video recording is stored and archived in digital format.  Activation procedures which include photic stimulation, hyperventilation and instructing patients to perform simple task are done in selected patients.   The EEG is displayed on a monitor screen and can be reviewed using different montages.  Computer assisted analysis is employed to detect spike and electrographic seizure activity.   The entire record is submitted for computer analysis.  The entire recording is visually reviewed and the times identified by computer analysis as being spikes or seizures are reviewed again.  Compresses spectral analysis (CSA) is also performed on the activity recorded from each individual channel.  This is displayed as a power display of frequencies from 0 to 30 Hz over time.   The CSA is reviewed looking for asymmetries in power between homologous areas of the scalp and then compared with the original EEG recording.     [employer] software is also utilized in the review of this study.  This software suite analyzes the EEG recording in multiple domains.  Coherence  and rhythmicity is computed to identify EEG sections which may contain organized seizures.  Each channel undergoes analysis to detect presence of spike and sharp waves which have special and morphological characteristic of epileptic activity.  The routine EEG recording is converted from spacial into frequency domain.  This is then displayed comparing homologous areas to identify areas of significant asymmetry.  Algorithm to identify non-cortically generated artifact is used to separate eye movement, EMG and other artifact from the EEG.      RECORDING TIMES  Start on 05/16/2020 at 07:00 a.m.  Stop on 05/17/2020 at 09:12 a.m.  A total of 26 hr and 9 min of EEG recording is obtained.    EEG FINDINGS  Background activity:   The background rhythm consists of a posterior dominant alpha rhythm with frequency of 10-11 Hz.   At times the background consists of generalized theta activity with intermittent delta. There is frequent left temporal theta activity that at times was semi-rhythmic without any clinical change.   There are frequent left temporal and occasional right temporal epileptiform discharges. There were occasional periods of generalized 2.5-3Hz delta with embedded spikes.      Sleep:  The patient transitions from wakefulness to sleep with the appearance of sleep spindles, K complexes, and vertex waves.     Activation procedures:   Hyperventilation is not performed  Photic stimulation is not performed    Cardiac Monitor:   Heart rate appears generally regular on a single lead EKG.     Impression:   This is an abnormal continuous video EEG monitoring study due:    1. Frequent left temporal epileptiform discharges.  2. Occasional right temporal epileptiform discharges.  3. Occasional generalized delta with embedded spikes  4. Left temporal slow activity  5. Generalized theta-delta slow activity.     This study is consistent with a potential seizure focus in the bilateral independent temporal regions as well a  generalized potential epileptogenicity. There was additional evidence of a mild to moderate nonspecific generalized cerebral dysfunction in additional to a nonspecific focal dysfunction in the left temporal region.  There were no clinical or electrographic seizures during this portion of long-term EEG monitoring.     Cumulative Summary 05/07/2020 - 05/17/2020:  Electroclinical Seizures:    Which appear to arise from the left frontotemporal region  1. 05/07/2020 at 17:14 p.m.  2. 05/10/2020 at 10:08 a.m.  3. 05/15/2020 at 02:24 a.m.  4. 05/15/2020 at 05:00 a.m.  5.  05/15/2020 at 06:19 a.m.      Stu Sanchez MD  Neurology-Epilepsy  [facility].

## 2020-05-27 NOTE — DISCHARGE SUMMARY
[facility]  Neurology  Discharge Summary      Patient Name: Darin Cunha  MRN: [id number]  Admission Date: 5/7/2020  Hospital Length of Stay: 10 days  Discharge Date and Time: 5/17/2020 11:07 AM  Attending Physician: Jacinta att. providers found  Discharging Provider: Stu Sanchez MD  Primary Care Physician: Primary Doctor No    HPI:     Mr. Cunha is a 23 yo male with Epilepsy who presents as direct admit to Epilepsy Monitoring Unit for further characterization of seizures of optimization of treatment. He first began having seizures around age 18, and reports he has two types of events. First event type he describes as generalized tonic clonic convulsions. Last GTC approximately 1 year ago. More frequently, he has events where he will stare off, have lip smacking, and no recollection of events. He reports the longest he has gone without a seizure is approximately 1 month. He states he will typically go a few weeks without an event, then may have a cluster of a couple events in a week. He denies aura prior to events, tongue biting, or bowel/bladder incontinence. Last seizure approximately 3 weeks ago. He is currently taking Clobazam 40 mg BID, Vimpat 200 mg BID, and Lamictal 200 mg BID. Admit to EMU for further evaluation.    Indwelling Lines/Drains at time of discharge:   Lines/Drains/Airways     None               Hospital Course:   Total of electrographic five seizures captured that appeared to arise from the left frontotemporal region.     Interval Events:    5/7 - 5/8 - no acute events overnight   EEG with L temp sharp waves   5/8-5/9 - no acute events overnight   EEG with L temp sharp waves, GPFA, L PFA, R PFA, R and L slow complexes   No sz    5/10-5/11 Seizure yesterday that he does not remember  5/11 - 5/12 - no events  5/12 - 5/13 - no events  5/13 - 5/14 - no events, sleep-deprived, VNS turned off  5/14 - 5/15 - 3 seizures in the early morning 5/15  5/15-5/16 - no events  5/16-5/17 -  no events    Discharge examination  Vitals:    05/17/20 0441 05/17/20 0729 05/17/20 0835 05/17/20 1010   BP: 124/76  (!) 132/91 (!) 134/95   BP Location: Left arm  Left arm Left arm   Patient Position: Lying  Lying Sitting   Pulse: 68 74 93 103   Resp: 18  18 18   Temp: 98 °F (36.7 °C)  97.9 °F (36.6 °C) 97.9 °F (36.6 °C)   TempSrc: Oral  Oral Oral   SpO2: 95%  99% 99%   Weight:       Height:         - General: Awake, cooperative, NAD.  - HEENT: NC/AT  - Neck: Supple  - Pulmonary: no increased WOB  - Cardiac: well perfused   - Abdomen: soft, nontender, nondistended  - Extremities: no edema  - Skin: no rashes or lesions noted.     NEURO EXAM:   - Mental Status: Awake, alert, oriented x 3. Able to relate history without difficulty. Language is fluent with intact repetition and comprehension. Normal prosody. There were no paraphasic errors. Able to name both high and low frequency objects. Speech was not dysarthric. Able to follow both midline and appendicular commands.      - Cranial Nerves:  VFF to confrontation. EOMI . No facial droop. Hearing intact to voice.      - Motor: Normal bulk and tone throughout. No pronator drift bilaterally. No adventitious movements such as tremor or asterixis noted.  No focal asymmetry     - Sensory: No deficits to light touch.  - Coordination: No dysmetria on FNF     EEG: Reviewed personally, abundant epileptiform activity over the left temporal leads, independent right discharges as well, some discharges which appear more generalized as well     Plan:   Refractory epilepsy   - Discontinue cvEEG  - VNS turned back on 5/15  OC/SF/PW/On/Off  1.125/30/1000/30s/5m  OC/PW/On  AS 1.125/1000/30s  M 1.375/1000/60s  - restarted home doses of Onfi, Vimpat, Lamictal 5/16  - Seizure precautions  - Activation procedures per protocol - HV/Photic  - IV Valium PRN for GTC greater than 5 min - please call epilepsy on call before administering    - discharge home today       Consults:  None    Significant Labs: All pertinent lab results from the past 24 hours have been reviewed.    Significant Imaging: I have reviewed and interpreted all pertinent imaging results/findings within the past 24 hours.    Pending Diagnostic Studies:     None        Final Active Diagnoses:    Diagnosis Date Noted POA    PRINCIPAL PROBLEM:  Complex partial epilepsy with generalization and with intractable epilepsy [G40.219] 09/12/2018 Yes    Depressed mood [F32.9] 09/12/2018 Yes      Problems Resolved During this Admission:         Discharged Condition: good    Disposition: Home or Self Care    Follow Up:  Follow-up Information     Primary Doctor No.    Why:  Outpatient Services               Patient Instructions:   Seizure restrictions are but not limited to: no driving; avoid swimming, high altitude activities, operating heavy machinery, bathing unattended, or engaging in activities in where a seizure will cause harm to self or others.    Medications:  Reconciled Home Medications:      Medication List      CONTINUE taking these medications    citalopram 20 MG tablet  Commonly known as:  CELEXA  Take 1 tablet (20 mg total) by mouth once daily.     HYDROcodone-acetaminophen 5-325 mg per tablet  Commonly known as:  NORCO  Take 1 tablet by mouth every 6 (six) hours as needed for Pain.        STOP taking these medications    cloBAZam 20 mg Tab  Commonly known as:  ONFI     lamoTRIgine 200 MG tablet  Commonly known as:  LAMICTAL     VIMPAT 200 mg Tab tablet  Generic drug:  lacosamide          Time spent on the discharge of patient: 20 minutes    Sut Sanchez MD  Neurology  [facility]

## 2020-05-27 NOTE — PROCEDURES
[facility] EEG/VIDEO MONITORING REPORT  Darin Cunha  [id number]   1995    DATE OF SERVICE: 5/15/2020  DATE OF ADMISSION: 5/7/2020 11:30 AM    ADMITTING/REQUESTING PROVIDER: BEBETO Marina MD    REASON FOR CONSULT:  24-year-old man with refractory epilepsy admitted to the epilepsy monitoring unit for event capture and characterization.    METHODOLOGY   Electroencephalographic (EEG) recording is with electrodes placed according to the International 10-20 placement system.  Thirty two (32) channels of digital signal (sampling rate of 512/sec) including T1 and T2 was simultaneously recorded from the scalp and may include  EKG, EMG, and/or eye monitors.  Recording band pass was 0.1 to 512 hz.  Digital video recording of the patient is simultaneously recorded with the EEG.  The patient is instructed report clinical symptoms which may occur during the recording session.  EEG and video recording is stored and archived in digital format.  Activation procedures which include photic stimulation, hyperventilation and instructing patients to perform simple task are done in selected patients.   The EEG is displayed on a monitor screen and can be reviewed using different montages.  Computer assisted analysis is employed to detect spike and electrographic seizure activity.   The entire record is submitted for computer analysis.  The entire recording is visually reviewed and the times identified by computer analysis as being spikes or seizures are reviewed again.  Compresses spectral analysis (CSA) is also performed on the activity recorded from each individual channel.  This is displayed as a power display of frequencies from 0 to 30 Hz over time.   The CSA is reviewed looking for asymmetries in power between homologous areas of the scalp and then compared with the original EEG recording.     [employer] software is also utilized in the review of this study.  This software suite analyzes the EEG recording in multiple domains.  Coherence  and rhythmicity is computed to identify EEG sections which may contain organized seizures.  Each channel undergoes analysis to detect presence of spike and sharp waves which have special and morphological characteristic of epileptic activity.  The routine EEG recording is converted from spacial into frequency domain.  This is then displayed comparing homologous areas to identify areas of significant asymmetry.  Algorithm to identify non-cortically generated artifact is used to separate eye movement, EMG and other artifact from the EEG.      RECORDING TIMES  Start on 05/15/2020 at 07:00 a.m.  Stop on 05/16/2020 at 07:00 a.m.  A total of 23 hr and 50 min of EEG recording is obtained.    EEG FINDINGS  Background activity:   The background rhythm consists of a posterior dominant alpha rhythm with frequency of 10-11 Hz.   At times the background consists of generalized theta activity with intermittent delta. There is frequent left temporal theta activity that at times was semi-rhythmic without any clinical change.   There are frequent left temporal and occasional right temporal epileptiform discharges. There were occasional periods of generalized 2.5-3Hz delta with embedded spikes.      Sleep:  The patient transitions from wakefulness to sleep with the appearance of sleep spindles, K complexes, and vertex waves.     Activation procedures:   Hyperventilation is not performed  Photic stimulation is not performed    Cardiac Monitor:   Heart rate appears generally regular on a single lead EKG.     Impression:   This is an abnormal continuous video EEG monitoring study due:    1. Frequent left temporal epileptiform discharges.  2. Occasional right temporal epileptiform discharges.  3. Occasional generalized delta with embedded spikes  4. Left temporal slow activity  5. Generalized theta-delta slow activity.     This study is consistent with a potential seizure focus in the bilateral independent temporal regions as well a  generalized potential epileptogenicity. There was additional evidence of a mild to moderate nonspecific generalized cerebral dysfunction in additional to a nonspecific focal dysfunction in the left temporal region.  There were no clinical or electrographic seizures during this portion of long-term EEG monitoring.     LTM Summary 05/07/2020 - TBD:  Electroclinical Seizures:    Which appear to arise from the left frontotemporal region  1. 05/07/2020 at 17:14 p.m.  2. 05/10/2020 at 10:08 a.m.  3. 05/15/2020 at 02:24 a.m.  4. 05/15/2020 at 05:00 a.m.  5.  05/15/2020 at 06:19 a.m.      Stu Sanchez MD  Neurology-Epilepsy  [facility].

## 2020-05-28 ENCOUNTER — TELEPHONE (OUTPATIENT)
Dept: NEUROLOGY | Facility: CLINIC | Age: 25
End: 2020-05-28

## 2020-05-28 NOTE — TELEPHONE ENCOUNTER
Patient called this am to say that he had a seizure that lasted 5 minutes. He stated it was like his normal but not as bad. He stated he is normal now. He states no missed medications and no sickness. Dr. Luque informed and will be calling the patient.

## 2020-06-01 ENCOUNTER — TELEPHONE (OUTPATIENT)
Dept: NEUROLOGY | Facility: CLINIC | Age: 25
End: 2020-06-01

## 2020-06-08 ENCOUNTER — TELEPHONE (OUTPATIENT)
Dept: NEUROLOGY | Facility: CLINIC | Age: 25
End: 2020-06-08

## 2020-06-08 ENCOUNTER — OFFICE VISIT (OUTPATIENT)
Dept: NEUROLOGY | Facility: CLINIC | Age: 25
End: 2020-06-08
Payer: MEDICAID

## 2020-06-08 ENCOUNTER — HOSPITAL ENCOUNTER (OUTPATIENT)
Dept: RADIOLOGY | Facility: HOSPITAL | Age: 25
Discharge: HOME | End: 2020-06-08
Attending: PSYCHIATRY & NEUROLOGY
Payer: MEDICAID

## 2020-06-08 ENCOUNTER — PROCEDURE VISIT (OUTPATIENT)
Dept: NEUROLOGY | Facility: CLINIC | Age: 25
End: 2020-06-08
Payer: MEDICAID

## 2020-06-08 VITALS
HEIGHT: 73 IN | DIASTOLIC BLOOD PRESSURE: 96 MMHG | BODY MASS INDEX: 22.53 KG/M2 | HEART RATE: 87 BPM | SYSTOLIC BLOOD PRESSURE: 148 MMHG | WEIGHT: 170 LBS

## 2020-06-08 DIAGNOSIS — G40.209 PARTIAL SYMPTOMATIC EPILEPSY WITH COMPLEX PARTIAL SEIZURES, NOT INTRACTABLE, WITHOUT STATUS EPILEPTICUS: ICD-10-CM

## 2020-06-08 DIAGNOSIS — M25.9 SHOULDER DISORDER: ICD-10-CM

## 2020-06-08 DIAGNOSIS — M25.9 SHOULDER DISORDER: Primary | ICD-10-CM

## 2020-06-08 PROCEDURE — 73030 X-RAY EXAM OF SHOULDER: CPT | Mod: TC,RT

## 2020-06-08 PROCEDURE — 95867 NDL EMG CRANIAL NRV MUSC UNI: CPT | Mod: PBBFAC | Performed by: PSYCHIATRY & NEUROLOGY

## 2020-06-08 PROCEDURE — 99214 PR OFFICE/OUTPT VISIT, EST, LEVL IV, 30-39 MIN: ICD-10-PCS | Mod: S$PBB,,, | Performed by: PSYCHIATRY & NEUROLOGY

## 2020-06-08 PROCEDURE — 95913 NRV CNDJ TEST 13/> STUDIES: CPT | Mod: PBBFAC | Performed by: PSYCHIATRY & NEUROLOGY

## 2020-06-08 PROCEDURE — 95887 MUSC TST DONE W/N TST NONEXT: CPT | Mod: PBBFAC | Performed by: PSYCHIATRY & NEUROLOGY

## 2020-06-08 PROCEDURE — 95886 MUSC TEST DONE W/N TEST COMP: CPT | Mod: PBBFAC | Performed by: PSYCHIATRY & NEUROLOGY

## 2020-06-08 PROCEDURE — 99999 PR PBB SHADOW E&M-EST. PATIENT-LVL III: ICD-10-PCS | Mod: PBBFAC,,, | Performed by: PSYCHIATRY & NEUROLOGY

## 2020-06-08 PROCEDURE — 95886 MUSC TEST DONE W/N TEST COMP: CPT | Mod: 26,S$PBB,, | Performed by: PSYCHIATRY & NEUROLOGY

## 2020-06-08 PROCEDURE — 72050 X-RAY EXAM NECK SPINE 4/5VWS: CPT | Mod: TC

## 2020-06-08 PROCEDURE — 95886 PR EMG COMPLETE, W/ NERVE CONDUCTION STUDIES, 5+ MUSCLES: ICD-10-PCS | Mod: 26,S$PBB,, | Performed by: PSYCHIATRY & NEUROLOGY

## 2020-06-08 PROCEDURE — 72050 XR CERVICAL SPINE COMPLETE 5 VIEW: ICD-10-PCS | Mod: 26,,, | Performed by: INTERNAL MEDICINE

## 2020-06-08 PROCEDURE — 99999 PR PBB SHADOW E&M-EST. PATIENT-LVL III: CPT | Mod: PBBFAC,,, | Performed by: PSYCHIATRY & NEUROLOGY

## 2020-06-08 PROCEDURE — 72050 X-RAY EXAM NECK SPINE 4/5VWS: CPT | Mod: 26,,, | Performed by: INTERNAL MEDICINE

## 2020-06-08 PROCEDURE — 95913 NRV CNDJ TEST 13/> STUDIES: CPT | Mod: 26,S$PBB,, | Performed by: PSYCHIATRY & NEUROLOGY

## 2020-06-08 PROCEDURE — 99213 OFFICE O/P EST LOW 20 MIN: CPT | Mod: PBBFAC,25 | Performed by: PSYCHIATRY & NEUROLOGY

## 2020-06-08 PROCEDURE — 73030 X-RAY EXAM OF SHOULDER: CPT | Mod: 26,RT,, | Performed by: RADIOLOGY

## 2020-06-08 PROCEDURE — 99214 OFFICE O/P EST MOD 30 MIN: CPT | Mod: S$PBB,,, | Performed by: PSYCHIATRY & NEUROLOGY

## 2020-06-08 PROCEDURE — 95913 PR NERVE CONDUCTION STUDY; 13 OR MORE STUDIES: ICD-10-PCS | Mod: 26,S$PBB,, | Performed by: PSYCHIATRY & NEUROLOGY

## 2020-06-08 PROCEDURE — 73030 XR SHOULDER COMPLETE 2 OR MORE VIEWS RIGHT: ICD-10-PCS | Mod: 26,RT,, | Performed by: RADIOLOGY
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
## 2020-06-09 NOTE — PROCEDURES
EMU Report     DATE OF PROCEDURE:  5/9/20     EEG NUMBER:  [id number]     REFERRING PHYSICIAN: Dr. Marina      This EEG was performed to characterize the patient's events.     ELECTROENCEPHALOGRAM REPORT     METHODOLOGY:  Electroencephalographic (EEG) recording is recorded with electrodes placed according to the International 10-20 placement system.  Thirty two (32) channels of digital signal (sampling rate of 512/sec), including T1 and T2, were simultaneously recorded from the scalp and may include EKG, EMG, and/or eye monitors.  Recording band pass was 0.1 to 512 Hz.  Digital video recording of the patient is simultaneously recorded with the EEG.  The patient is instructed to report clinical symptoms which may occur during the recording session.  EEG and video recording are stored and archived in digital format.    Activation procedures, which include photic stimulation, hyperventilation and instructing patients to perform simple tasks, are done in selected patients.   The EEG is displayed on a monitor screen and can be reviewed using different montages.  Computer assisted-analysis is employed to detect spike and electrographic seizure activity.   The entire record is submitted for computer analysis.  The entire recording is visually reviewed, and the times identified by computer analysis as being spikes or seizures are reviewed again.    Compressed spectral analysis (CSA) is also performed on the activity recorded from each individual channel.  This is displayed as a power display of frequencies from 0 to 30 Hz over time.   The CSA is reviewed looking for asymmetries in power between homologous areas of the scalp, then compared with the original EEG recording.    [employer] software was also utilized in the review of this study.  This software suite analyzes the EEG recording in multiple domains.  Coherence and rhythmicity are computed to identify EEG sections which may contain organized seizures.  Each channel  undergoes analysis to detect the presence of spike and sharp waves which have special and morphological characteristics of epileptic activity.  The routine EEG recording is converted from special into frequency domain.  This is then displayed comparing homologous areas to identify areas of significant asymmetry.  Algorithm to identify non-cortically generated artifact is used to separate artifact from the EEG.       RECORDING TIMES:   Start on 5/9/20 at 7:00:44  End on 5/10/20 at 7:00:00   Total time of VEEG was 23 hrs 59 min      SEIZURES RECORDED:  During this recording no events were recorded     ELECTROENCEPHALOGRAM:  Interictal:  The recording was obtained with a number of standard bipolar and referential montages during wakefulness, drowsiness and sleep.  In the alert state, the posterior background rhythm was a symmetric, well-modulated 10 Hz alpha rhythm, which reacted symmetrically to eye opening.   intermittent photic stimulation evokes symmetric posterior Mrs. bilaterally.  Hyperventilation produced physiologic slowing..Excessive diffuse beta was noted. During drowsiness, the background rhythm waxed and waned and there were periods of slowing. During stage II sleep, symmetric V waves, K complexes and sleep spindles were noted.     Interictal abnormalities:   Bursts of generalized paroxysmal fast activity during wake and sleep   Bursts of right paroxysmal fast activity during sleep   Bursts of left paroxysmal fast activity during sleep   Intermittent L temp slow (delta theta)   Intermittent L temp -parietal slow (delta theta)   L temp sharp waves;  Often in brief runs (2-3 Hz)   R temp sharp waves   Broad L lateralized sharply contoured complexes   Rare Broad R lateralized temporal max sharply contoured complexes     Ictal:    Runs of L temp 1-2 Hz sp/w activity lasting 10 sec with evolving morphology and amplitude   Runs of R temp low amplitude fast activity lasting 10 sec with evolving Hz and amplitude       FINAL SUMMARY:  ELECTROENCEPHALOGRAM:  Interictal:  This is an abnormal EEG during wakefulness, drowsiness and sleep.  See above      Ictal: several subclinical seizures were notd      CLINICAL SEIZURE:  Classification: Focal (angélica temp lobe onset)      CLINICAL CORRELATION:  The patient is a 24-year-old male with a history of seizures, who is maintained on Onfi, Vimpat and Lamictal. This is an abnormal EEG during wakefulness, drowsiness and sleep. Mixed focal and generalized epileptiform abnormalities were noted suggesting a mixed focal  epilepsy.

## 2020-06-16 ENCOUNTER — TELEPHONE (OUTPATIENT)
Dept: NEUROLOGY | Facility: CLINIC | Age: 25
End: 2020-06-16

## 2020-06-16 ENCOUNTER — OFFICE VISIT (OUTPATIENT)
Dept: NEUROLOGY | Facility: CLINIC | Age: 25
End: 2020-06-16
Payer: MEDICAID

## 2020-06-16 DIAGNOSIS — F06.70 MILD NEUROCOGNITIVE DISORDER DUE TO ANOTHER MEDICAL CONDITION: Primary | ICD-10-CM

## 2020-06-16 DIAGNOSIS — R56.9 SEIZURES: ICD-10-CM

## 2020-06-16 DIAGNOSIS — F43.21 ADJUSTMENT DISORDER WITH DEPRESSED MOOD: ICD-10-CM

## 2020-06-16 PROCEDURE — 90791 PSYCH DIAGNOSTIC EVALUATION: CPT | Mod: 95,HP,HB, | Performed by: CLINICAL NEUROPSYCHOLOGIST

## 2020-06-16 PROCEDURE — 90791 PR PSYCHIATRIC DIAGNOSTIC EVALUATION: ICD-10-PCS | Mod: 95,HP,HB, | Performed by: CLINICAL NEUROPSYCHOLOGIST

## 2020-06-16 PROCEDURE — 99499 UNLISTED E&M SERVICE: CPT | Mod: 95,HP,HB, | Performed by: CLINICAL NEUROPSYCHOLOGIST

## 2020-06-16 PROCEDURE — 99499 NO LOS: ICD-10-PCS | Mod: 95,HP,HB, | Performed by: CLINICAL NEUROPSYCHOLOGIST

## 2020-06-16 NOTE — ASSESSMENT & PLAN NOTE
Assessment:  -Very likely some memory trouble and expressive language trouble, but need testing to determine if any change from baseline, whether seizures are primary cause of cognitive impairment, severity of impairment, and recommendations to improve treatment plan for health care providers and caregivers at home  Plan:  -Attempted to escalate testing given that he's coming to [facility] soon, but cannot with Medicaid. Will send in pre-auth and schedule once approved.

## 2020-06-16 NOTE — PROGRESS NOTES
NEUROPSYCHOLOGY CONSULT (TELEHEALTH)    [Initial visit and testing to be scheduled]    Referral Information  Name: Darin Cunha  MRN: [id number]  : 1995  Age: 25 y.o.  Race: White  Gender: male  Referring Provider: Martin Gallegos Md  [address] [place] [address]  Billing: See below for details as coding/billing has changed   Telemedicine:   The patient location is: Home  The provider location is: [facility]  The chief complaint leading to consultation/medical necessity is: Memory trouble in a patient with chronic treatment refractory epilepsy along withconsultation to determine potential neuropsych testing prior to proposed neurosurgical procedure  Visit type: Virtual visit with audio only (telephone)  The reason for the audio only service rather than synchronous audio and video virtual visit was related to patient preference necessity for this visit.  Total time spent with patient: 45-minutes  Each patient to whom he or she provides medical services by telemedicine is:  (1) informed of the relationship between the physician and patient and the respective role of any other health care provider with respect to management of the patient; and (2) notified that he or she may decline to receive medical services by telemedicine and may withdraw from such care at any time.  Consent/Emergency Plan: The patient expressed an understanding of the purpose of the evaluation and consented to all procedures. I informed the patient of limits to confidentiality and discussed an emergency plan.      SUMMARY/TREATMENT PLAN   Results from the interview indicate the following diagnoses and treatment plan recommendations. This was discussed with the patient.     Diagnoses/Plan:  Problem List Items Addressed This Visit        Neuro    Seizures - Primary    Current Assessment & Plan       Plan:  - Follows with Dr. Do             Mild neurocognitive disorder due to another medical condition    Current Assessment & Plan      "Assessment:  -Very likely some memory trouble and expressive language trouble, but need testing to determine if any change from baseline, whether seizures are primary cause of cognitive impairment, severity of impairment, and recommendations to improve treatment plan for health care providers and caregivers at home  Plan:  -Attempted to escalate testing given that he's coming to [facility] soon, but cannot with Medicaid. Will send in pre-auth and schedule once approved.                 Psychiatric    Adjustment disorder with depressed mood    Current Assessment & Plan     Assessment:  -intermittent depression given stressors (recent separation with fiance) and chronic stressors (epilepsy, loss of work, identity as a worker/provider)  -improved on celexa  Plan:  -dr. Do continues to manage celexa.                    HISTORY OF PRESENT ILLNESS AND CURRENT SYMPTOMS     Mr. Cunha has active problems noted below.    Cognitive Symptoms:   Onset: Family and patient notices more short-term memory trouble and mild word finding trouble with onset 2-3 years ago.    Course: Possibly mild decline but largely stable to patient. No other sxs when asked. No variability day to day.     Current Functional Status/Needs:  ADLs  Self-Care Eating Safety Other   -Independent  -Independent  -Independent  -NA     Instrumental IADLs:   Driving Medications/Health Household Finances   -None -"I can manage all perfect" but when asked, he acknowledges taking meds when he remembers but not on a schedule.  -Discussed using alarms on phone rather  -No trouble, but mother cooks -Independent      No flowsheet data found.    Psychiatric/Behavioral Symptoms:  Mood:  Depression/Dysphoria Anxiety/Fearfulness Irritability   -Some adjustment depression related to not working and recent initiation of citalopram is helpful.  -None -None     No flowsheet data found.  No flowsheet data found.    Behavior: No issues    No flowsheet data " "found.    Neurovegetative:  Sleep/Nighttime  [employer]   -"Perfectly fine" -"Perfect" -Normal     Suicidal/Homicidal Ideation: None    Physical Symptoms: None     PERTINENT BACKGROUND INFORMATION   SOCIAL HISTORY    · Family Status: Previously engaged and  in January 2020 + one son (2yo)   · Current Living Situation: Own home and mom's home + when he has his son, he lives with his mom that week  · Primary Source of Support: Family  · Daily Activities: Parenting, chores around the house, yard work  · Stressors: Health issues  · Other Factors:  · Educational Level: Completed 10th grade and later GED  · Learning Issues: Some trouble with math but no other remarkable history.   · Occupational Status and History:   · Stopped working 12-months ago due to seizures + Has disability as of 30-days ago  ·  and    · Other: None    Family History   Problem Relation Age of Onset    Hypertension Mother     Hypertension Maternal Grandmother     Heart disease Maternal Grandmother     Hypertension Maternal Grandfather     Heart disease Maternal Grandfather      Family Neurologic History: Negative for heritable risk factors  Family Psychiatric History: Negative for heritable risk factors    MEDICAL STATUS  Patient Active Problem List   Diagnosis    Complex partial epilepsy with generalization and with intractable epilepsy    Adjustment disorder with depressed mood    Seizures    Epilepsy    Mild neurocognitive disorder due to another medical condition     Past Medical History:   Diagnosis Date    Depressed 9/12/2018    Epilepsy 2015    Mitral valve prolapse 2015    Seizures      Past Surgical History:   Procedure Laterality Date    ADENOIDECTOMY  2015    finger abscess removal      VAGUS NERVE STIMULATOR INSERTION Left 4/4/2019    Procedure: INSERTION, VAGUS NERVE STIMULATOR;  Surgeon: Reuben Gupta MD;  Location: [facility] OR [address] [place] [address];  Service: Neurosurgery;  Laterality: Left;  " "toronto I, asa 1, regular bed, supine        Updated/Relevant Neurologic History:  · Falls: None  · TBI: None  · Seizures: Yes, chronic treatment refractory left temporal lobe epilepsy; has had prior VNS procedure in 2019.   · Currently, planning for sEEG to see if laser or other procedure may help control seizures.   · Work Up: Discussed sEEG to "find exact part where my seizures are coming from" "to where I can possibly have one of three type of surgeries" (laser, removal or some other device)   · Goal: "My main goal is to stop having them to have a normal life." "Possible to still be on some type of medication" No rating of likelihood.   · Stroke: None  · Movement Concerns: None  · Referral Diagnosis: Memory Loss, Seizures    Recent Labs  No results found for: MVCOAAOI47  No results found for: RPR  No results found for: FOLATE  No results found for: TSH, K2IUPME, B5DGWGN, THYROIDAB  No results found for: LABA1C, HGBA1C  No results found for: HIV1X2, HCB13TGMS    Current Medications    Current Outpatient Medications:     citalopram (CELEXA) 20 MG tablet, Take 1 tablet (20 mg total) by mouth once daily., Disp: 30 tablet, Rfl: 11    cloBAZam (ONFI) 20 mg Tab, Take 2 tablets (40 mg total) by mouth 2 (two) times daily., Disp: 120 tablet, Rfl: 2    HYDROcodone-acetaminophen (NORCO) 5-325 mg per tablet, Take 1 tablet by mouth every 6 (six) hours as needed for Pain. (Patient not taking: Reported on 6/8/2020), Disp: 60 tablet, Rfl: 0    lacosamide (VIMPAT) 200 mg Tab tablet, Take 1 tablet (200 mg total) by mouth every 12 (twelve) hours., Disp: 60 tablet, Rfl: 2    lamoTRIgine (LAMICTAL) 200 MG tablet, Take 1 tablet (200 mg total) by mouth 2 (two) times daily., Disp: 60 tablet, Rfl: 2    Updated/Relevant Psychiatric History: None until recently (see above)    MENTAL STATUS AND OBSERVATIONS:  APPEARANCE: Casually dressed and adequate grooming/hygiene.   ALERTNESS/ORIENTATION: Attentive and alert. Fully oriented (x5) to " "time and place  GAIT: Not assessed  MOTOR MOVEMENTS/MANNERISMS: Not assessed  SPEECH/LANGUAGE: Normal in rate, rhythm, tone, and volume. No significant word finding difficulty noted. Expressive and receptive language was normal.  STATED MOOD/AFFECT: The patients stated mood was "good" Affect was not assessed as via phone. .   INTERPERSONAL BEHAVIOR: Rapport was quickly and easily established   SUICIDALITY/HOMICIDALITY: Denied  HALLUCINATIONS/DELUSIONS: None evidenced or endorsed  THOUGHT PROCESSES/INSIGHT: Thoughts seemed logical and goal-directed.     BILLING  Service Description CPT Code Minutes Units   Psychiatric diagnostic evaluation by physician [id number] [phone]   Neurobehavioral status exam by physician [id number]     Each additional hour by physician [id number]  0   Test Evaluation Services --  --   Neuropsychological testing evaluation services by physician [id number]  0   Each additional hour by physician [id number]  0   Test Administration and Scoring --  --   Psychological or neuropsychological test administration and scoring by physician [id number]  0   Each additional 30 minutes by physician [id number]  0   Psychological or neuropsychological test administration and scoring by technician [id number]  0   Each additional 30 minutes by technician [id number]  0                   "

## 2020-06-16 NOTE — ASSESSMENT & PLAN NOTE
Assessment:  -intermittent depression given stressors (recent separation with fiance) and chronic stressors (epilepsy, loss of work, identity as a worker/provider)  -improved on celexa  Plan:  -dr. Do continues to manage celexa.

## 2020-06-18 ENCOUNTER — OFFICE VISIT (OUTPATIENT)
Dept: NEUROSURGERY | Facility: CLINIC | Age: 25
End: 2020-06-18
Payer: MEDICAID

## 2020-06-18 DIAGNOSIS — G40.219 COMPLEX PARTIAL EPILEPSY WITH GENERALIZATION AND WITH INTRACTABLE EPILEPSY: Primary | ICD-10-CM

## 2020-06-18 DIAGNOSIS — F06.70 MILD NEUROCOGNITIVE DISORDER DUE TO ANOTHER MEDICAL CONDITION: ICD-10-CM

## 2020-06-18 PROCEDURE — 99214 PR OFFICE/OUTPT VISIT, EST, LEVL IV, 30-39 MIN: ICD-10-PCS | Mod: 95,,, | Performed by: NEUROLOGICAL SURGERY

## 2020-06-18 PROCEDURE — 99214 OFFICE O/P EST MOD 30 MIN: CPT | Mod: 95,,, | Performed by: NEUROLOGICAL SURGERY

## 2020-06-18 NOTE — PROGRESS NOTES
Neurosurgery  History & Physical    SUBJECTIVE:     Chief Complaint: intractable epilepsy     History of Present Illness:  Darin Cunha is a 25 y.o. male with intractable epilepsy since 2013 who presents as a referral from Dr. Abhijeet Do to discuss the gamut of epilepsy surgery options. Today's visit is a video visit, and the patient is a passenger in a moving vehicle. His mother is not with him.     The patient states that when he has events, he stares off and smacks his lips. The episodes last for about 5-6 minutes. He often waves his hands and feels tired afterwards. He currently has about 3-5 episodes a week. He has failed at least 4 AEDs at therapeutic dosages.     Per EEG review, he has bilateral temporal involvement, moreso left-sided than right.     Triggers include being tired and being stressed. He is not working (on disability) but finished his GED. He lives next door to grandmother and nearby to his mother. His major concern is memory loss from ongoing seizures.     Of notes, he endorses a history of staph infection previously. He denies history of bleeding or anesthetic complications. He has a VNS in place.     Review of patient's allergies indicates:   Allergen Reactions    Zofran [ondansetron hcl (pf)] Hives and Rash       Current Outpatient Medications   Medication Sig Dispense Refill    citalopram (CELEXA) 20 MG tablet Take 1 tablet (20 mg total) by mouth once daily. 30 tablet 11    cloBAZam (ONFI) 20 mg Tab Take 2 tablets (40 mg total) by mouth 2 (two) times daily. 120 tablet 2    HYDROcodone-acetaminophen (NORCO) 5-325 mg per tablet Take 1 tablet by mouth every 6 (six) hours as needed for Pain. (Patient not taking: Reported on 6/8/2020) 60 tablet 0    lacosamide (VIMPAT) 200 mg Tab tablet Take 1 tablet (200 mg total) by mouth every 12 (twelve) hours. 60 tablet 2    lamoTRIgine (LAMICTAL) 200 MG tablet Take 1 tablet (200 mg total) by mouth 2 (two) times daily. 60 tablet 2     No current  facility-administered medications for this visit.        Past Medical History:   Diagnosis Date    Depressed 9/12/2018    Epilepsy 2015    Mitral valve prolapse 2015    Seizures      Past Surgical History:   Procedure Laterality Date    ADENOIDECTOMY  2015    finger abscess removal      VAGUS NERVE STIMULATOR INSERTION Left 4/4/2019    Procedure: INSERTION, VAGUS NERVE STIMULATOR;  Surgeon: Reuben Gupta MD;  Location: [facility] OR [address] [place] [address];  Service: Neurosurgery;  Laterality: Left;  toronto I, asa 1, regular bed, supine      Family History     Problem Relation (Age of Onset)    Heart disease Maternal Grandmother, Maternal Grandfather    Hypertension Mother, Maternal Grandmother, Maternal Grandfather        Social History     Socioeconomic History    Marital status: Significant Other     Spouse name: Not on file    Number of children: Not on file    Years of education: Not on file    Highest education level: Not on file   Occupational History    Not on file   Social Needs    Financial resource strain: Not on file    Food insecurity     Worry: Not on file     Inability: Not on file    Transportation needs     Medical: Not on file     Non-medical: Not on file   Tobacco Use    Smoking status: Never Smoker    Smokeless tobacco: Never Used   Substance and Sexual Activity    Alcohol use: Yes     Alcohol/week: 10.0 standard drinks     Types: 10 Cans of beer per week     Comment: 10 beers/week    Drug use: No    Sexual activity: Yes     Partners: Female   Lifestyle    Physical activity     Days per week: Not on file     Minutes per session: Not on file    Stress: Not on file   Relationships    Social connections     Talks on phone: Not on file     Gets together: Not on file     Attends Zoroastrian service: Not on file     Active member of club or organization: Not on file     Attends meetings of clubs or organizations: Not on file     Relationship status: Not on file   Other Topics Concern    Not on file    Social History Narrative    Not on file       Review of Systems   Constitutional: Negative for fever.   HENT: Negative for nosebleeds.    Eyes: Negative for visual disturbance.   Respiratory: Negative for shortness of breath.    Cardiovascular: Negative for chest pain.   Gastrointestinal: Negative for vomiting.   Endocrine: Positive for cold intolerance.   Genitourinary: Negative for difficulty urinating.   Musculoskeletal: Positive for back pain.   Skin: Negative for color change.   Neurological: Positive for seizures.   Hematological: Does not bruise/bleed easily.   Psychiatric/Behavioral: Positive for confusion.       OBJECTIVE:     Vital Signs     There is no height or weight on file to calculate BMI.      Physical Exam:    Constitutional: He appears well-developed and well-nourished. No distress.     Eyes: EOM are normal.     Skin: Skin displays no rash on extremities. Skin displays no lesions on extremities.     Psych/Behavior: He is alert. He is oriented to person, place, and time.     Musculoskeletal:      Comments: Limited as patient passenger in car; however, he was able to lift up his arms without significant pronator drift noted     Neurological:        Coordination: He has normal finger to nose coordination.        Cranial nerves:   Face grossly symmetric; tongue midline     Pulmonary: nonlabored respirations     Diagnostic Results:  MRI brain personally reviewed   No lesional findings     ASSESSMENT/PLAN:     Darin Cunha is a 25 y.o. male with intractable epilepsy who presents to discuss surgical treatment options for epilepsy. Our visit was somewhat limited by the fact that the patient did not have his mother with him today, and furthermore, he was a passenger in a moving vehicle.     We had a discussion about the risks, benefits, and alternatives to intracranial localization for seizures. We discussed that it would be appropriate to proceed with phase two stereo electroencephalogram monitoring to  better identify the seizure focus.  We have a good hypothesis that seizures are arising in the left temporal region, which gives us a frame work that we can use to design a localization plan.  This will be designed with input from the patient's epilepsy neurologist, Dr. Abhijeet Do, and the interdisciplinary team.      We discussed that monitoring typically takes 1-2 weeks but may be longer or shorter depending on how long it takes for him to have concordant seizures. We discussed that he will not be able to get out of bed while the electrodes are in his head, and that he will remain in the ICU during that time. We also discussed that this is a diagnostic, not therapeutic, procedure, and that the definitive surgery would be performed 6-8 weeks after the time of sEEG localization.     We also discussed the specific risks of the localization surgery. Risks include, but are not limited to, bleeding, pain, infection, scarring, need for further/repeat procedure, death, paralysis, stroke (including venous infarct)/damage to major blood vessels, leak of cerebrospinal fluid, and hardware-related complications. There is a chance that we would fail to localize the seizures with the initial electrode plan, which would require placement of additional electrodes. We discussed that my partner Dr. Gupta will also be involved in the surgical process.      I explained that we cannot predict until we determine where the seizures are beginning. We also discussed that DBS does not require localization intracranially. We discussed open resection, laser ablation, and RNS as some potential treatment options after seizure localization.     The patient requests that we have a follow-up visit with his mother present. I think this is imperative, as we will need to have family support prior to proceeding. We will discuss the patient further in interdisciplinary epilepsy conference to understand better his neuropsychological testing and proceed  accordingly.     I have encouraged the patient to contact the clinic in the interim with any questions, concerns, or adverse clinical change.     The patient location is: in a car  The chief complaint leading to consultation is: intractable epilepsy     Visit type: audiovisual    Face to Face time with patient: 40  60 minutes of total time spent on the encounter, which includes face to face time and non-face to face time preparing to see the patient (eg, review of tests), Obtaining and/or reviewing separately obtained history, Documenting clinical information in the electronic or other health record, Independently interpreting results (not separately reported) and communicating results to the patient/family/caregiver, or Care coordination (not separately reported).     Each patient to whom he or she provides medical services by telemedicine is:  (1) informed of the relationship between the physician and patient and the respective role of any other health care provider with respect to management of the patient; and (2) notified that he or she may decline to receive medical services by telemedicine and may withdraw from such care at any time.    Notes: as above

## 2020-06-30 ENCOUNTER — TELEPHONE (OUTPATIENT)
Dept: NEUROLOGY | Facility: CLINIC | Age: 25
End: 2020-06-30

## 2020-07-01 ENCOUNTER — INITIAL CONSULT (OUTPATIENT)
Dept: NEUROLOGY | Facility: CLINIC | Age: 25
End: 2020-07-01
Payer: MEDICAID

## 2020-07-01 DIAGNOSIS — R56.9 SEIZURES: ICD-10-CM

## 2020-07-01 DIAGNOSIS — F06.70 MILD NEUROCOGNITIVE DISORDER DUE TO ANOTHER MEDICAL CONDITION: Primary | ICD-10-CM

## 2020-07-01 PROCEDURE — 99499 UNLISTED E&M SERVICE: CPT | Mod: S$PBB,,, | Performed by: CLINICAL NEUROPSYCHOLOGIST

## 2020-07-01 PROCEDURE — 99499 NO LOS: ICD-10-PCS | Mod: S$PBB,,, | Performed by: CLINICAL NEUROPSYCHOLOGIST

## 2020-07-01 PROCEDURE — 96138 PR PSYCH/NEUROPSYCH TEST ADMIN/SCORING, BY TECH, 2+ TESTS, 1ST 30 MIN: ICD-10-PCS | Mod: HP,HB,, | Performed by: CLINICAL NEUROPSYCHOLOGIST

## 2020-07-01 PROCEDURE — 96132 PR NEUROPSYCHOLOGIC TEST EVAL SVCS, 1ST HR: ICD-10-PCS | Mod: HP,HB,, | Performed by: CLINICAL NEUROPSYCHOLOGIST

## 2020-07-01 PROCEDURE — 96133 NRPSYC TST EVAL PHYS/QHP EA: CPT | Mod: HP,HB,, | Performed by: CLINICAL NEUROPSYCHOLOGIST

## 2020-07-01 PROCEDURE — 96138 PSYCL/NRPSYC TECH 1ST: CPT | Mod: HP,HB,, | Performed by: CLINICAL NEUROPSYCHOLOGIST

## 2020-07-01 PROCEDURE — 96133 PR NEUROPSYCHOLOGIC TEST EVAL SVCS, EA ADDTL HR: ICD-10-PCS | Mod: HP,HB,, | Performed by: CLINICAL NEUROPSYCHOLOGIST

## 2020-07-01 PROCEDURE — 96139 PR PSYCH/NEUROPSYCH TEST ADMIN/SCORING, BY TECH, 2+ TESTS, EA ADDTL 30 MIN: ICD-10-PCS | Mod: HP,HB,, | Performed by: CLINICAL NEUROPSYCHOLOGIST

## 2020-07-01 PROCEDURE — 96139 PSYCL/NRPSYC TST TECH EA: CPT | Mod: HP,HB,, | Performed by: CLINICAL NEUROPSYCHOLOGIST

## 2020-07-01 PROCEDURE — 96132 NRPSYC TST EVAL PHYS/QHP 1ST: CPT | Mod: HP,HB,, | Performed by: CLINICAL NEUROPSYCHOLOGIST

## 2020-07-06 NOTE — PROGRESS NOTES
NEUROPSYCHOLOGICAL EVALUATION    Referral Information  Name: Darin Cunha  MRN: [id number]  : 1995  Age: 25 y.o.  Race: White  Gender: male  Referring Provider: Abhijeet Do MD  Billing: See below for details as coding/billing has changed   Telemedicine:   The patient location is: Home  The provider location is: [facility] [place]  The chief complaint leading to consultation/medical necessity is: Memory trouble in a patient with chronic treatment refractory epilepsy. Need for cognitive testing to determine cognitive status, differential diagnosis, update treatment plan, and provide evaluation recommendations prior to proposed neurosurgical procedure for epilepsy management.  Visit type: Testing and Report Appointment. Please see the visit from 20 for the initial consultation.      SUMMARY/TREATMENT PLAN   Results from the evaluation indicate the following diagnoses and treatment plan recommendations. This will be discussed with the patient and family during feedback.     Diagnoses/Plan:  Problem List Items Addressed This Visit        Neuro    Seizures    Current Assessment & Plan       Plan:  - Follows with Dr. Do             Mild neurocognitive disorder due to another medical condition - Primary    Overview     -initial neuropsych consult 20  -neuropsych report on 20         Current Assessment & Plan     Assessment:  -Cognitive Testing: Assessment showed mild but notable cognitive impairment (MoCA=16/30, IQ in Mild Intellectual Disability range). This is likely a decline from his baseline skills (likely below average to borderline intelligence baseline).   >>Functionally, his basic attention (e.g., capacity to stay focused, follow basic multi-step instructions, complete basic mental math) and basic language skills (e.g., vocabulary, reading level) are intact enough for most daily living needs (e.g., basic purchases, basic chores, basic safety awareness).   >>Despite this, most other cognitive skills (e.g.,  verbal memory, executive functions, problem solving/reasoning) are quite impaired and likely will require accommodation/support throughout his life. Will discuss with his family as younger patients with cognitive impairment often have less support/monitoring than they need.   >>Diagnostically, he seems on the borderzone between a dementia/major neurocognitive disorder and mild cognitive disorder. But, it is likely that he can do most ADLs by rote/habit and more complicated ones (e.g., financial planning, health management) are maintained by family. Etiology is primarily 2/2 ongoing seizure disorder and effects from multiple AEDs.   -Localization/Lateralization: While testing showed global impairment, certainly there was greater evidenced of left temporal lobe impairment (e.g., impaired naming, much worse verbal memory relative to intact visual memory) relative to the right temporal lobe.   -Pre-surgical Considerations:   >>He was able to adequately explain current health issues and upcoming epilepsy management plan. Thus, I don't see any afshin issues with decision-making capacity.   >>Given his cognitive impairment, I think it would make sense to have the team and his mother very intensively monitor his medication adherence (e.g., daily!) to ensure he isn't forgetting medications.   >>His level of support is reduced recently given relationship separation and he will need intensive support/monitoring during any pre/adria/post surgical procedure.  >>His cognitive impairment is significant enough to indicate likely reduced risk for cognitive decline post-surgery.  -Reversible Risk Factors:  >>Depression is better managed, but anxiety continues. Will discuss during feedback whether he should discuss treatment for this.   Plan:  -Will have feedback to review above findings and treatment plan  -Will discuss with his epilepsy team  -See below for other recommendations or in neuropsych consult from 7/1/20.                       Recommendations for Mr. Cunha and Caregivers/Family:   · Brain Health: Will discuss improving vascular health and brain health. This includes recommendations for physical activity, healthy diet (e.g., Mediterranean Style Diet), social activity, and mental activity all to reduce dementia risk.  · Attention: Remember that inattention and lack of focus are major culprits to forgetting information so be sure and practice paying attention for adequate learning of information. If you rely on passive attention to remembering something (e.g., yeah, uh-huh approach), youll find you cannot recall it later. I recommend the following to improve attention, which may aid in later recall:   · Reduce distractions in the area as much as possible.  · Look at the person as they are speaking to you.   · Paraphrase as they are speaking  · Write down important pieces of information   · Ask them to repeat if you zone out.   · Have them simplify and reduce information that you need to attend to during conversation.   · Have visual cues to remind you if you need to do something later.  · Processing Speed:   · Using multiple modalities (e.g., listening, writing notes, asking questions, recording) to learn new information is likely to allow additional time for processing, thus improving memory for the material.   · Allowing sufficient time to complete tasks will reduce frustration and help to ensure completion.  · Executive Functioning:  · Dont attempt to multi-task.  Separate tasks so that each can be completed one at a time.  · Consider using a calendar/day planner, as that may be effective to help you plan and stay on track.    · Storing Information: Use the below strategies to help you further enhance how information is stored.  · Rehearse - Immediately after seeing/hearing something, try to recall it.  Wait a few minutes, then check again.  Gradually lengthen the intervals between rehearsals.  · Repetition of learned material  "is critical to ensure storage of information to be learned. Self-test at home to ensure learning.  · Write down important information to improve your attention and focus and to have something to look back on when you need to recall it.  · Make sure the person doesnt rattle off, but presents in a clear, logical, and unhurried manner.   · Practice good cognitive health to reduce risk of cognitive decline:  · Engage in regular exercise, which increases alertness and arousal and can improve attention and focus.    · Get a good nights sleep, as this can enhance alertness and cognition.  · Eat healthy foods and balanced meals. It is notable that research indicates certain nutrients may aid in brain function, such as B vitamins (especially B6, B12, and folic acid), antioxidants (such as vitamins C and E, and beta carotene), and Omega-3 fatty acids. Talk with your physician or nutritionist about whats right for you.   · Keep your brain active. Find activities to stay mentally active, such as reading, games (cards, checkers), puzzles (crosswords, Sudoku, jig saw), crafts (models, woodworking), gardening, or participating in activities in the community.  · Stay socially engaged. Continue staying active with your family and friends.    HISTORY OF PRESENT ILLNESS AND CURRENT SYMPTOMS     Mr. Cunha has active problems noted below.      Cognitive Symptoms:   Onset: Family and patient notices more short-term memory trouble and mild word finding trouble with onset 2-3 years ago.    Course: Possibly mild decline but largely stable to patient. No other sxs when asked. No variability day to day.     Current Functional Status/Needs:  ADLs  Self-Care Eating Safety Other   -Independent  -Independent  -Independent  -NA     Instrumental IADLs:   Driving Medications/Health Household Finances   -None -"I can manage all perfect" but when asked, he acknowledges taking meds when he remembers but not on a schedule.  -Discussed using alarms " "on phone rather  -No trouble, but mother cooks -Independent      No flowsheet data found.    Psychiatric/Behavioral Symptoms:  Mood:  Depression/Dysphoria Anxiety/Fearfulness Irritability   -Some adjustment depression related to not working and recent initiation of citalopram is helpful.  -None on initial interview  -Reporting more anxiety on the SOTERO-7 today -None     PHQ9 7/2/2020   Total Score 7     GAD7 7/2/2020   1. Feeling nervous, anxious, or on edge? 2   2. Not being able to stop or control worrying? 0   3. Worrying too much about different things? 3   4. Trouble relaxing? 1   5. Being so restless that it is hard to sit still? 0   6. Becoming easily annoyed or irritable? 1   7. Feeling afraid as if something awful might happen? 3   8. If you checked off any problems, how difficult have these problems made it for you to do your work, take care of things at home, or get along with other people? 1   SOTERO-7 Score 10       Behavior: No issues    Neurovegetative:  Sleep/Nighttime  [employer]   -"Perfectly fine" -"Perfect" -Normal     Suicidal/Homicidal Ideation: None    Physical Symptoms: None     PERTINENT BACKGROUND INFORMATION   SOCIAL HISTORY    · Family Status: Previously engaged and  in January 2020 + one son (2yo)   · Current Living Situation: Own home and mom's home + when he has his son, he lives with his mom that week  · Primary Source of Support: Family  · Daily Activities: Parenting, chores around the house, yard work  · Stressors: Health issues  · Other Factors:  · Educational Level: Completed 10th grade and later GED  · Learning Issues: Some trouble with math but no other remarkable history.   · Occupational Status and History:   · Stopped working 12-months ago due to seizures + Has disability as of 30-days ago  ·  and    · Other: None    Family History   Problem Relation Age of Onset    Hypertension Mother     Hypertension Maternal Grandmother     Heart disease " "Maternal Grandmother     Hypertension Maternal Grandfather     Heart disease Maternal Grandfather      Family Neurologic History: Negative for heritable risk factors  Family Psychiatric History: Negative for heritable risk factors    MEDICAL STATUS  Patient Active Problem List   Diagnosis    Complex partial epilepsy with generalization and with intractable epilepsy    Adjustment disorder with depressed mood    Seizures    Epilepsy    Mild neurocognitive disorder due to another medical condition     Past Medical History:   Diagnosis Date    Depressed 9/12/2018    Epilepsy 2015    Mitral valve prolapse 2015    Seizures      Past Surgical History:   Procedure Laterality Date    ADENOIDECTOMY  2015    finger abscess removal      VAGUS NERVE STIMULATOR INSERTION Left 4/4/2019    Procedure: INSERTION, VAGUS NERVE STIMULATOR;  Surgeon: Reuben Gupta MD;  Location: [facility] OR [address] [place] [address];  Service: Neurosurgery;  Laterality: Left;  toronto I, asa 1, regular bed, supine        Updated/Relevant Neurologic History:  · Falls: None  · TBI: None  · Seizures: Yes, chronic treatment refractory left temporal lobe epilepsy; has had prior VNS procedure in 2019.   · Currently, planning for sEEG to see if laser or other procedure may help control seizures.   · Work Up: Discussed sEEG to "find exact part where my seizures are coming from" "to where I can possibly have one of three type of surgeries" (laser, removal or some other device)   · Goal: "My main goal is to stop having them to have a normal life." "Possible to still be on some type of medication" No rating of likelihood.   · Stroke: None  · Movement Concerns: None  · Referral Diagnosis: Memory Loss, Seizures    Recent Labs  No results found for: FSPIEESR54  No results found for: RPR  No results found for: FOLATE  No results found for: TSH, Y4FQIZR, B3XITYJ, THYROIDAB  No results found for: LABA1C, HGBA1C  No results found for: HIV1X2, AFD64BNCO    Neurodiagnostics  Year " Diagnostic Results[Copied from Report]   2019   PET FINDINGS:  There is symmetric metabolic activity within the cerebral and cerebellar hemispheres with no significant focal or diffuse areas of abnormal activity.     IMPRESSION:      No areas of abnormal activity to suggest a progressive neurodegenerative process.   2019   MRI The brain parenchyma is normal in signal.  The ventricles are normal in size without hydrocephalus.  There is no diffusion signal abnormality.  No midline shift or mass effect.  Slight asymmetry of the lateral ventricles right being larger than left likely developmental variant.  No abnormal parenchymal susceptibility to suggest parenchymal hemorrhage.  Major intracranial T2 flow voids are present.  Allowing for asymmetry of the lateral ventricles the mesial temporal lobes are relatively symmetric.     IMPRESSION:      Unremarkable noncontrast MRI brain as detailed above specifically without evidence for parenchymal signal abnormality.   2020   EEG/vEEG from EMU 1. Frequent left temporal epileptiform discharges.   2. Occasional right temporal epileptiform discharges.   3. Occasional generalized delta with embedded spikes   4. Left temporal slow activity   5. Generalized theta-delta slow activity.     This study is consistent with a potential seizure focus in the   bilateral independent temporal regions as well a generalized   potential epileptogenicity. There was additional evidence of a   mild to moderate nonspecific generalized cerebral dysfunction in   additional to a nonspecific focal dysfunction in the left   temporal region.  There were no clinical or electrographic   seizures during this portion of long-term EEG monitoring.        Current Medications    Current Outpatient Medications:     citalopram (CELEXA) 20 MG tablet, Take 1 tablet (20 mg total) by mouth once daily., Disp: 30 tablet, Rfl: 11    cloBAZam (ONFI) 20 mg Tab, Take 2 tablets (40 mg total) by mouth 2 (two) times daily.,  "Disp: 120 tablet, Rfl: 2    HYDROcodone-acetaminophen (NORCO) 5-325 mg per tablet, Take 1 tablet by mouth every 6 (six) hours as needed for Pain. (Patient not taking: Reported on 6/8/2020), Disp: 60 tablet, Rfl: 0    lacosamide (VIMPAT) 200 mg Tab tablet, Take 1 tablet (200 mg total) by mouth every 12 (twelve) hours., Disp: 60 tablet, Rfl: 2    lamoTRIgine (LAMICTAL) 200 MG tablet, Take 1 tablet (200 mg total) by mouth 2 (two) times daily., Disp: 60 tablet, Rfl: 2    Updated/Relevant Psychiatric History: None until recently (see above)    MENTAL STATUS AND OBSERVATIONS:  APPEARANCE: Casually dressed and adequate grooming/hygiene.   ALERTNESS/ORIENTATION: Attentive and alert. Fully oriented (x5) to time and place  GAIT: Unremarkable  MOTOR MOVEMENTS/MANNERISMS: Unremarkable  SPEECH/LANGUAGE: Normal in rate, rhythm, tone, and volume. No significant word finding difficulty noted. Expressive and receptive language was normal.  STATED MOOD/AFFECT: The patients stated mood was "anxious" Affect was quite anxious throughout testing.   INTERPERSONAL BEHAVIOR: Rapport was quickly and easily established   SUICIDALITY/HOMICIDALITY: Denied  HALLUCINATIONS/DELUSIONS: None evidenced or endorsed  THOUGHT PROCESSES/INSIGHT: Thoughts seemed logical and goal-directed.   TEST TAKING BEHAVIOR and VALIDITY: Freestanding and embedded performance validity measures and observation of effort were suggestive of adequate engagement. The current results, therefore, are likely a valid reflection of the patient's current functioning.     PROCEDURES/TESTS ADMINISTERED   In addition to performing a review of pertinent medical records, reviewing limits to confidentiality, conducting a clinical interview, and explaining procedures, the following measures were administered:Herb Cognitive Assessment (MoCA), [employer] (ACS) Test of Pre-Morbid Functioning (TOPF), Green's MSVT, Wechsler Adult Intelligence Scale-Fourth Edition " (WAIS-IV Core Battery), Trail Making Test (TMT-A&B; Seng et al., 2004), Verbal Fluency Test(FAS/Animals; Seng et al., 2004), NAB Naming Test, Jarrell Complex Figure Copy Trial(Jarrell CFT), Randolph Verbal Learning Test - Revised (Form-1); Wechsler Memory Scale-Fourth Edition (WMS-IV) Logical Memory and Visual Reproduction subtests, Wisconsin Card Sorting Test (WCST-128), Grooved Pegboard (GPT; Seng et al., 2004), SOTERO-7/PHQ-9 (Computer)  Manual norms were used unless otherwise indicated.  Review data Appendix Below for scores and percentiles.     TEST RESULTS     Pre-Morbid Functioning    Likely below average for borderline range based on premorbid functioning measure and educational and occupational history   Mental Status:    MoCA=16/30   Attention/Working Memory:  Mildly impaired attention and working memory across all measures   Processing or Mental Speed  Mildly impaired processing speed across all measures   Motor Functions    Mildly impaired motor speed and dexterity bilaterally   Language    Basic expressive and receptive language was normal on observation   Expressive vocabulary was mildly impaired   Object naming was severely impaired   Phonemic fluency was significantly impaired   Semantic fluency was mildly impaired to borderline range   Verbal reasoning was mildly impaired   Visuospatial/  Construction:  Mildly impaired for multiple visual reasoning measures   His initial copy of a complex figure was quite impaired any asked to do it again.  His 2nd copy was better but still impaired   Simple copy a basic designs was normal   Learning and Memory:    He had significant trouble (due to his attention, speed, executive dysfunction) learning new information regardless of verbal or visual modality   However, verbal memory was much more impaired relative to fairly intact visual memory.    Visual recognition memory was normal! Verbal recognition memory was better but still well below  expectations.     Executive or Frontal-lobe Functions  Mildly impaired across all measures   Psychiatric or Behavioral Symptoms  He reported some mild generalized anxiety during the evaluation today but did not report this a couple months ago           BILLING     Service Description CPT Code Minutes Units   Psychiatric diagnostic evaluation by physician [id number]     Neurobehavioral status exam by physician [id number]  0   Each additional hour by physician [id number]  0   Test Evaluation Services --  --   Neuropsychological testing evaluation services by physician [id number] [phone] 1   Each additional hour by physician [phone]   Test Administration and Scoring --  --   Psychological or neuropsychological test administration and scoring by physician [id number]  0   Each additional 30 minutes by physician [id number]  0   Psychological or neuropsychological test administration and scoring by technician [id number] 1   Each additional 30 minutes by technician [phone]       DATA APPENDIX:   Note: It is important to note that scores/percentiles should only be interpreted by a neuropsychologist. It is common for healthy individuals to have 1-3 isolated low/unusual scores that are not indicative of any significant cognitive dysfunction.      Raw Score Standardized Score   MSVT  -   MSVT  -   MSVT Cons 100 -   ACS LM II Rec 16 -   ACS VR II Rec 5 -   ACS RDS 5 -   HVLT-R Recognition Discirmination 6 -   PREMORBID FUNCTIONING Raw Score Standardized Score   TOPF simple dem. eFSIQ - 88   TOPF pred. eFSIQ - 74   TOPF simple + pred. eFSIQ - 82   INTELLECTUAL FUNCTIONING Raw Score Standardized Score   WAIS-IV     VCI - 68   SARAH - 67   WMI - 66   PSI - 68   FSIQ - 61   GAI - 64   Similarities 13 4   Vocabulary 12 4   Information 4 5   Block Design 20 5   Matrix Reasoning 5 2   Visual Puzzles 8 6   Digit Span 14 3         DS Forward 6 4         DS Backward 4 4         DS Sequence 4 4         Longest Digit Forward 5 -         Longest Digit  Backward 2 -         Longest Digit Sequence 3 -   Arithmetic 7 5   Symbol Search 14 3   Coding 41 5   COGNITIVE SCREENING Raw Score Standardized Score   MoCA 16/30 -   MMSE N/A -   Orientation - Place 2/2 -   Orientation - Date 4/4 -   LANGUAGE FUNCTIONING Raw Score Standardized Score   WAIS-IV VCI - 68   WAIS-IV Similarities 13 4   WAIS-IV Vocabulary 12 4   WAIS-IV Information 4 5   TOPF Word Reading 18 76   NAB Naming 20 19   FAS 15 27   Animal Naming 14 32   VISUOSPATIAL FUNCTIONING Raw Score Standardized Score   WAIS-IV SARAH - 67   WAIS-IV Block Design 20 5   WAIS-IV Matrix Reasoning 5 2   WAIS-IV Visual Puzzles 8 6   RCFT Copy 19.5 -   RCFT Time to Copy 219 -   LEARNING & MEMORY Raw Score Standardized Score   HVLT-R     Total Immediate 20 31   Delayed Recall 3 20   Retention % 37.5 20   Hits 8 -   False Positives 2 -   Discrimination  6 20   WMS-IV Subtests     LM I 10 3   LM II 3 1   LM Recognition 16 -   VR I 22 1   VR II 17 6   VR II Recognition 5 -   VR Copy 41 -   ATTENTION/WORKING MEMORY Raw Score Standardized Score   WAIS-IV WMI - 66   WAIS-IV Digit Span 14 3         DS Forward 6 4         DS Backward 4 4         DS Sequence 4 4         Longest Digit Forward 5 -         Longest Digit Backward 2 -         Longest Digit Sequence 3 -   WAIS-IV Arithmetic 7 5   MENTAL PROCESSING SPEED Raw Score Standardized Score   WAIS-IV PSI - 68   WAIS-IV Symbol Search 14 3   WAIS-IV Coding 41 5   TMT A  39 34   TMT A errors 0 -   EXECUTIVE FUNCTIONING Raw Score Standardized Score   TMT B 164 25   TMT B errors 0 -   WCST-128     Total Correct 55    Total Errors 73 67   Perseverative Resp. 47 62   Perseverative Err. 36 67   Nonperseverative Err. 37 72   Concept. Level Response 31 N/A   Categories Completed 1 N/A   FMS 1 -   Learning to Learn n/a -   FRONTOMOTOR  Raw Score Standardized Score   GPT DH 79 34   GPD NDH 89 31   MOOD & PERSONALITY Raw Score Standardized Score   PHQ-9 7 -   SOTERO-7 10 -

## 2020-07-06 NOTE — ASSESSMENT & PLAN NOTE
Assessment:  -Cognitive Testing: Assessment showed mild but notable cognitive impairment (MoCA=16/30, IQ in Mild Intellectual Disability range). This is likely a decline from his baseline skills (likely below average to borderline intelligence baseline).   >>Functionally, his basic attention (e.g., capacity to stay focused, follow basic multi-step instructions, complete basic mental math) and basic language skills (e.g., vocabulary, reading level) are intact enough for most daily living needs (e.g., basic purchases, basic chores, basic safety awareness).   >>Despite this, most other cognitive skills (e.g., verbal memory, executive functions, problem solving/reasoning) are quite impaired and likely will require accommodation/support throughout his life. Will discuss with his family as younger patients with cognitive impairment often have less support/monitoring than they need.   >>Diagnostically, he seems on the borderzone between a dementia/major neurocognitive disorder and mild cognitive disorder. But, it is likely that he can do most ADLs by rote/habit and more complicated ones (e.g., financial planning, health management) are maintained by family. Etiology is primarily 2/2 ongoing seizure disorder and effects from multiple AEDs.   -Localization/Lateralization: While testing showed global impairment, certainly there was greater evidenced of left temporal lobe impairment (e.g., impaired naming, much worse verbal memory relative to intact visual memory) relative to the right temporal lobe.   -Pre-surgical Considerations:   >>He was able to adequately explain current health issues and upcoming epilepsy management plan. Thus, I don't see any afshin issues with decision-making capacity.   >>Given his cognitive impairment, I think it would make sense to have the team and his mother very intensively monitor his medication adherence (e.g., daily!) to ensure he isn't forgetting medications.   >>His level of support is  reduced recently given relationship separation and he will need intensive support/monitoring during any pre/adria/post surgical procedure.  >>His cognitive impairment is significant enough to indicate likely reduced risk for cognitive decline post-surgery.  -Reversible Risk Factors:  >>Depression is better managed, but anxiety continues. Will discuss during feedback whether he should discuss treatment for this.   Plan:  -Will have feedback to review above findings and treatment plan  -Will discuss with his epilepsy team  -See below for other recommendations or in neuropsych consult from 7/1/20.

## 2020-07-09 ENCOUNTER — TELEPHONE (OUTPATIENT)
Dept: NEUROLOGY | Facility: CLINIC | Age: 25
End: 2020-07-09

## 2020-07-09 ENCOUNTER — OFFICE VISIT (OUTPATIENT)
Dept: NEUROLOGY | Facility: CLINIC | Age: 25
End: 2020-07-09
Payer: MEDICAID

## 2020-07-09 DIAGNOSIS — F43.21 ADJUSTMENT DISORDER WITH DEPRESSED MOOD: Primary | ICD-10-CM

## 2020-07-09 PROCEDURE — 99499 UNLISTED E&M SERVICE: CPT | Mod: 95,,, | Performed by: CLINICAL NEUROPSYCHOLOGIST

## 2020-07-09 PROCEDURE — 99499 NO LOS: ICD-10-PCS | Mod: 95,,, | Performed by: CLINICAL NEUROPSYCHOLOGIST

## 2020-07-15 DIAGNOSIS — G40.909 SEIZURE DISORDER: Primary | ICD-10-CM

## 2020-07-15 RX ORDER — TOPIRAMATE 50 MG/1
50 TABLET, FILM COATED ORAL 2 TIMES DAILY
Qty: 60 TABLET | Refills: 11 | Status: SHIPPED | OUTPATIENT
Start: 2020-07-15 | End: 2021-07-26 | Stop reason: SDUPTHER

## 2020-07-16 ENCOUNTER — TELEPHONE (OUTPATIENT)
Dept: NEUROLOGY | Facility: CLINIC | Age: 25
End: 2020-07-16

## 2020-07-16 DIAGNOSIS — R56.9 CONVULSIONS, UNSPECIFIED CONVULSION TYPE: ICD-10-CM

## 2020-07-16 NOTE — TELEPHONE ENCOUNTER
Spoke with patient he stated that he had 4 seizures on Sunday. He stated they were his regular seizures.he did not miss and meds and has not been ill.  aware. Topamax ordered and patient to be seen by  and  next week. Pt to go to ed if any issues.

## 2020-07-20 ENCOUNTER — TELEPHONE (OUTPATIENT)
Dept: NEUROSURGERY | Facility: CLINIC | Age: 25
End: 2020-07-20

## 2020-07-20 DIAGNOSIS — F06.70 MILD NEUROCOGNITIVE DISORDER DUE TO ANOTHER MEDICAL CONDITION: ICD-10-CM

## 2020-07-20 DIAGNOSIS — R56.9 SEIZURES: Primary | ICD-10-CM

## 2020-07-21 ENCOUNTER — HOSPITAL ENCOUNTER (EMERGENCY)
Facility: HOSPITAL | Age: 25
Discharge: HOME | End: 2020-07-21
Attending: EMERGENCY MEDICINE
Payer: MEDICAID

## 2020-07-21 ENCOUNTER — OFFICE VISIT (OUTPATIENT)
Dept: NEUROSURGERY | Facility: CLINIC | Age: 25
End: 2020-07-21
Payer: MEDICAID

## 2020-07-21 VITALS
BODY MASS INDEX: 23.03 KG/M2 | RESPIRATION RATE: 17 BRPM | HEIGHT: 72 IN | SYSTOLIC BLOOD PRESSURE: 129 MMHG | TEMPERATURE: 99 F | WEIGHT: 170 LBS | DIASTOLIC BLOOD PRESSURE: 85 MMHG | OXYGEN SATURATION: 100 % | HEART RATE: 62 BPM

## 2020-07-21 DIAGNOSIS — M95.8 WINGED SCAPULA OF RIGHT SIDE: ICD-10-CM

## 2020-07-21 DIAGNOSIS — G40.219 COMPLEX PARTIAL EPILEPSY WITH GENERALIZATION AND WITH INTRACTABLE EPILEPSY: Primary | ICD-10-CM

## 2020-07-21 DIAGNOSIS — M95.8: Primary | ICD-10-CM

## 2020-07-21 PROCEDURE — 99281 EMR DPT VST MAYX REQ PHY/QHP: CPT

## 2020-07-21 PROCEDURE — 99215 PR OFFICE/OUTPT VISIT, EST, LEVL V, 40-54 MIN: ICD-10-PCS | Mod: 57,S$PBB,, | Performed by: NEUROLOGICAL SURGERY

## 2020-07-21 PROCEDURE — 99284 EMERGENCY DEPT VISIT MOD MDM: CPT | Mod: ,,, | Performed by: EMERGENCY MEDICINE

## 2020-07-21 PROCEDURE — 99215 OFFICE O/P EST HI 40 MIN: CPT | Mod: 57,S$PBB,, | Performed by: NEUROLOGICAL SURGERY

## 2020-07-21 PROCEDURE — 99284 PR EMERGENCY DEPT VISIT,LEVEL IV: ICD-10-PCS | Mod: ,,, | Performed by: EMERGENCY MEDICINE

## 2020-07-21 NOTE — ED NOTES
Two patient identifiers have been checked and are correct.      Appearance: Pt awake, alert & oriented to person, place & time. Pt in no acute distress at present time. Pt is clean and well groomed with clothes appropriately fastened.   Skin: Skin warm, dry & intact. Color consistent with ethnicity. Mucous membranes moist. No breakdown or brusing noted.   Musculoskeletal: Patient moving all extremities well, no obvious swelling or deformities noted. + right sided scapula swelling. No pain reported to site.   Respiratory: Respirations spontaneous, even, and non-labored. Visible chest rise noted. Airway is open and patent. No accessory muscle use noted.   Neurologic: Sensation is intact. Speech is clear and appropriate. Eyes open spontaneously, behavior appropriate to situation, follows commands, facial expression symmetrical, bilateral hand grasp equal and even, purposeful motor response noted.  Cardiac: All peripheral pulses present. No Bilateral lower extremity edema. Cap refill is <3 seconds.  Abdomen: Abdomen soft, non-tender to palpation.   : Pt reports no dysuria or hematuria.

## 2020-07-21 NOTE — PROGRESS NOTES
Neurosurgery  Follow-up    SUBJECTIVE:     Chief Complaint: intractable epilepsy     History of Present Illness:  Darin Cunha is a 25 y.o. male with intractable epilepsy since 2013 who presents as a referral from Dr. Abhijeet Do to discuss the gamut of epilepsy surgery options. Today's visit is a video visit, and the patient is a passenger in a moving vehicle. His mother is not with him.     The patient states that when he has events, he stares off and smacks his lips. The episodes last for about 5-6 minutes. He often waves his hands and feels tired afterwards. He currently has about 3-5 episodes a week. He has failed at least 4 AEDs at therapeutic dosages.     Per EEG review, he has bilateral temporal involvement, moreso left-sided than right.     Triggers include being tired and being stressed. He is not working (on disability) but finished his GED. He lives next door to grandmother and nearby to his mother. His major concern is memory loss from ongoing seizures.     Of notes, he endorses a history of staph infection previously. He denies history of bleeding or anesthetic complications. He has a VNS in place.     As of 7/21/20, we are seeing each other in person. His mother presents with him today.  They report that while the patient was recently in the EMU, he had a grand mal seizure while off his medications and fell out of bed.  This resulted in his having a right-sided wing scapula.  The patient has undergone EMG nerve conduction study which demonstrates no evidence of long thoracic nerve injury.  They are also concerned about a cyst that he has on his right shoulder.    Regarding his seizure activity, he had 4 back-to-back seizures about 10 days ago.  He continues to have significant seizure burden.  He does not feel that the VNS is helpful.         Review of patient's allergies indicates:   Allergen Reactions    Zofran [ondansetron hcl (pf)] Hives and Rash       Current Outpatient Medications   Medication  Sig Dispense Refill    citalopram (CELEXA) 20 MG tablet Take 1 tablet (20 mg total) by mouth once daily. 30 tablet 11    cloBAZam (ONFI) 20 mg Tab Take 2 tablets (40 mg total) by mouth 2 (two) times daily. 120 tablet 2    HYDROcodone-acetaminophen (NORCO) 5-325 mg per tablet Take 1 tablet by mouth every 6 (six) hours as needed for Pain. (Patient not taking: Reported on 6/8/2020) 60 tablet 0    lacosamide (VIMPAT) 200 mg Tab tablet Take 1 tablet (200 mg total) by mouth every 12 (twelve) hours. 60 tablet 2    lamoTRIgine (LAMICTAL) 200 MG tablet Take 1 tablet (200 mg total) by mouth 2 (two) times daily. 60 tablet 2    topiramate (TOPAMAX) 50 MG tablet Take 1 tablet (50 mg total) by mouth 2 (two) times daily. 60 tablet 11     No current facility-administered medications for this visit.        Past Medical History:   Diagnosis Date    Depressed 9/12/2018    Epilepsy 2015    Mitral valve prolapse 2015    Seizures      Past Surgical History:   Procedure Laterality Date    ADENOIDECTOMY  2015    finger abscess removal      VAGUS NERVE STIMULATOR INSERTION Left 4/4/2019    Procedure: INSERTION, VAGUS NERVE STIMULATOR;  Surgeon: Reuben Gupta MD;  Location: [facility] OR [address] [place] [address];  Service: Neurosurgery;  Laterality: Left;  toronto I, asa 1, regular bed, supine      Family History     Problem Relation (Age of Onset)    Heart disease Maternal Grandmother, Maternal Grandfather    Hypertension Mother, Maternal Grandmother, Maternal Grandfather        Social History     Socioeconomic History    Marital status: Significant Other     Spouse name: Not on file    Number of children: Not on file    Years of education: Not on file    Highest education level: Not on file   Occupational History    Not on file   Social Needs    Financial resource strain: Not on file    Food insecurity     Worry: Not on file     Inability: Not on file    Transportation needs     Medical: Not on file     Non-medical: Not on file   Tobacco Use     Smoking status: Never Smoker    Smokeless tobacco: Never Used   Substance and Sexual Activity    Alcohol use: Yes     Alcohol/week: 10.0 standard drinks     Types: 10 Cans of beer per week     Comment: 10 beers/week    Drug use: No    Sexual activity: Yes     Partners: Female   Lifestyle    Physical activity     Days per week: Not on file     Minutes per session: Not on file    Stress: Not on file   Relationships    Social connections     Talks on phone: Not on file     Gets together: Not on file     Attends Hoahaoism service: Not on file     Active member of club or organization: Not on file     Attends meetings of clubs or organizations: Not on file     Relationship status: Not on file   Other Topics Concern    Not on file   Social History Narrative    Not on file       Review of Systems   Constitutional: Negative for fever.   HENT: Negative for nosebleeds.    Eyes: Negative for visual disturbance.   Respiratory: Negative for shortness of breath.    Cardiovascular: Negative for chest pain.   Gastrointestinal: Negative for vomiting.        Heartburn   Endocrine: Positive for cold intolerance.   Genitourinary: Negative for difficulty urinating.   Musculoskeletal: Positive for back pain.        Shoulder dislocated   Skin: Negative for color change.   Neurological: Positive for seizures.   Hematological: Does not bruise/bleed easily.   Psychiatric/Behavioral: Positive for confusion.       OBJECTIVE:     Vital Signs     There is no height or weight on file to calculate BMI.      Physical Exam:    Constitutional: He appears well-developed and well-nourished. No distress.     Eyes: EOM are normal.     Abdominal: Soft.     Skin: Skin displays no rash on extremities. Skin displays lesions on trunk (cyst atop right shoulder).     Psych/Behavior: He is alert. He is oriented to person, place, and time.     Musculoskeletal: Gait is normal.        Right Upper Extremities: Muscle strength is 5/5.        Left Upper  Extremities: Muscle strength is 5/5.       Right Lower Extremities: Muscle strength is 5/5.        Left Lower Extremities: Muscle strength is 5/5.      Comments: Scapular winging noted at rest and made worse by the patient flexing the arms forward against the wall.  I believe this appears most consistent with the serratus anterior palsy, rather than a trapezius palsy     Neurological:        Coordination: He has normal finger to nose coordination.        Sensory: There is no sensory deficit in the trunk. There is no sensory deficit in the extremities.        DTRs: DTRs are DTRS NORMAL AND SYMMETRICnormal and symmetric.        Cranial nerves:   Face grossly symmetric; tongue midline     Pulmonary: nonlabored respirations     Diagnostic Results:  MRI brain personally reviewed   No lesional findings     ASSESSMENT/PLAN:     Darin Cunha is a 25 y.o. male with intractable epilepsy who presents to discuss surgical treatment options for epilepsy.     We had a discussion about the risks, benefits, and alternatives to intracranial localization for seizures. We discussed that it would be appropriate to proceed with phase two stereo electroencephalogram monitoring to better identify the seizure focus.  We have a good hypothesis that seizures are arising in the left temporal region, which gives us a frame work that we can use to design a localization plan.  However, I anticipate that both the right and left temporal lobes will be sampled.   This will be designed with input from the patient's epilepsy neurologist, Dr. Abhijeet Do, and the interdisciplinary team.      We discussed that monitoring typically takes 1-2 weeks but may be longer or shorter depending on how long it takes for him to have concordant seizures. We discussed that he will not be able to get out of bed while the electrodes are in his head, and that he will remain in the ICU during that time. We also discussed that this is a diagnostic, not therapeutic,  procedure, and that the definitive surgery would be performed 6-8 weeks after the time of sEEG localization.     We also discussed the specific risks of the localization surgery. Risks include, but are not limited to, bleeding, pain, infection, scarring, need for further/repeat procedure, death, paralysis, stroke (including venous infarct)/damage to major blood vessels, leak of cerebrospinal fluid, and hardware-related complications. There is a chance that we would fail to localize the seizures with the initial electrode plan, which would require placement of additional electrodes. We discussed that my partner Dr. Gupta will also be involved in the surgical process. Informed consent was obtained from the patient for placement and subsequent removal of the electrodes, together with a blood consent.  The patient's mother was present and expressed understanding of the entire process.     I explained that we cannot predict the likelihood of seizure freedom until we determine where the seizures are beginning. We discussed open resection, laser ablation, and RNS as some potential treatment options after seizure localization.     We will discuss the patient further in interdisciplinary epilepsy conference to understand better his neuropsychological testing and proceed accordingly. Dr. Tucker stopped by my office today to discuss that he feels the patient is an appropriate surgical candidate.  He would like the patient to be more consistent about having a system in place to ensure he is taking his medications daily.  I have further reinforced this with the patient.    Regarding the winged scapula, I have encouraged the patient to present to the emergency department for orthopedic surgery evaluation of the musculature given that EMG nerve conduction study was negative.  We may also consider MRI of the cervical spine to ensure no C5, C6, or C7 nerve damage.  However, I believe this is less likely given that the patient's  presentation is painless, and he has no weakness over those dermatomes.     Patient will require an MRI of the brain with contrast prior to surgical planning so that we can visualize his cerebral vasculature.    I have encouraged the patient to contact the clinic in the interim with any questions, concerns, or adverse clinical change.     Note generated with voice recognition software; please excuse any typographical errors.

## 2020-07-21 NOTE — ED PROVIDER NOTES
Encounter Date: 7/21/2020    SCRIBE #1 NOTE: I, Jose Luis Vazquez, am scribing for, and in the presence of,  Donal Parada MD. I have scribed the following portions of the note - Other sections scribed: ROS and PE.       History     Chief Complaint   Patient presents with    Shoulder Pain     r shoulder blaade pain since my seizure month ago, dr ortiz sent me here cause shoulder blade feels funny, no pain     26 yo M with refractory epilepsy s/p VNS, depression presents with chief complaint of Right scapula deformity. 6 weeks prior, pt was admitted to EMU and had a seizure and fell from the stretcher. Since that time, he has had a winged scapula deformity on the right. No pain, weakness, numbness, or difficulty with ADLs.  Symptoms have not worsened, but are constant.  Patient had a appointment with his neurologist Dr. Abhijeet Do this afternoon.  He drove in from 3 hr away.  Dr. Do turned off the patient's vagal nerve stimulator in order to obtain an MRI for the deformity. Dr. Ortiz requests this to be obtained now in order to optimize outpatient management.    The history is provided by the patient and medical records.     Review of patient's allergies indicates:   Allergen Reactions    Zofran [ondansetron hcl (pf)] Hives and Rash     Past Medical History:   Diagnosis Date    Depressed 9/12/2018    Epilepsy 2015    Mitral valve prolapse 2015    Seizures      Past Surgical History:   Procedure Laterality Date    ADENOIDECTOMY  2015    finger abscess removal      VAGUS NERVE STIMULATOR INSERTION Left 4/4/2019    Procedure: INSERTION, VAGUS NERVE STIMULATOR;  Surgeon: Reuben Gupta MD;  Location: [facility] OR [address] [place] [address];  Service: Neurosurgery;  Laterality: Left;  toronto I, asa 1, regular bed, supine      Family History   Problem Relation Age of Onset    Hypertension Mother     Hypertension Maternal Grandmother     Heart disease Maternal Grandmother     Hypertension Maternal Grandfather     Heart disease  Maternal Grandfather      Social History     Tobacco Use    Smoking status: Never Smoker    Smokeless tobacco: Never Used   Substance Use Topics    Alcohol use: Yes     Alcohol/week: 10.0 standard drinks     Types: 10 Cans of beer per week     Comment: 10 beers/week    Drug use: No     Review of Systems   Constitutional: Negative for fever.   Respiratory: Negative for shortness of breath.    Cardiovascular: Negative for chest pain.   Gastrointestinal: Negative for abdominal pain.   Genitourinary: Negative for dysuria.   Musculoskeletal: Negative for back pain.        Positive for protruding right shoulder blade with no associated right shoulder pain   Skin: Negative for wound.   Neurological: Positive for seizures. Negative for headaches.       Physical Exam     Initial Vitals [07/21/20 1650]   BP Pulse Resp Temp SpO2   (!) 140/83 74 18 99.1 °F (37.3 °C) 100 %      MAP       --         Physical Exam    Nursing note and vitals reviewed.  Constitutional: He appears well-developed and well-nourished. He is not diaphoretic. No distress.   HENT:   Head: Normocephalic.   Neck: Neck supple.   Cardiovascular: Normal rate.   Pulses:       Radial pulses are 2+ on the right side and 2+ on the left side.   Pulmonary/Chest: No respiratory distress.   Musculoskeletal:      Comments: Right scapula winged, no tenderness to palpation  Full range of motion of shoulders bilaterally.    Neurological: He is alert.   Skin: Skin is warm.   Psychiatric: He has a normal mood and affect.         ED Course   Procedures  Labs Reviewed - No data to display       Imaging Results    None          Medical Decision Making:   History:   Old Medical Records: I decided to obtain old medical records.  Old Records Summarized: records from clinic visits.  Initial Assessment:   26 yo M with refractory epilepsy s/p VNS, depression presents with chief complaint of Right scapular deformity.  Differential Diagnosis:   Winged scapula, long thoracic nerve  injury  ED Management:  Case discussed at length with Dr. Ortiz questioning the need for an emergent MRI.  At Dr. Ortiz's request, I discussed the case with orthopedics who deferred to Neurosurgery.  Neurosurgery recommended an MRI to evaluate the long thoracic nerve.  Case discussed with radiology who stated that the only MRI capable of this imaging is a T3 magnet that is only available at the MRI at the imaging center.  They are unable to fit him in for an MRI as they are closing shortly.  I then looped back to Dr. Ortiz and informed him of this.  The patient will have an MRI obtained as an outpatient on Thursday when he gets the MRI of his brain.  Patient and Dr. Ortiz comfortable with plan.  Other:   I have discussed this case with another health care provider.            Scribe Attestation:   Scribe #1: I performed the above scribed service and the documentation accurately describes the services I performed. I attest to the accuracy of the note.    Attending Attestation:           Physician Attestation for Scribe:      Comments: I, Dr. Donal Parada, personally performed the services described in this documentation. All medical record entries made by the scribe were at my direction and in my presence.  I have reviewed the chart and agree that the record reflects my personal performance and is accurate and complete. Donal Parada MD.  7:09 PM 07/21/2020                            Clinical Impression:       ICD-10-CM ICD-9-CM   1. Scapula alata of right side  M95.8 736.89         Disposition:   Disposition: Discharged     ED Disposition Condition    Discharge Stable        ED Prescriptions     None        Follow-up Information    None                                    Donal Parada MD  07/21/20 4882

## 2020-07-21 NOTE — PROVIDER PROGRESS NOTES - EMERGENCY DEPT.
Emergency Department TeleTRIAGE Encounter Note      CHIEF COMPLAINT    Chief Complaint   Patient presents with    Shoulder Pain     r shoulder blaade pain since my seizure month ago, dr card sent me here cause shoulder blade feels funny, no pain       VITAL SIGNS   Initial Vitals [07/21/20 1650]   BP Pulse Resp Temp SpO2   (!) 140/83 74 18 99.1 °F (37.3 °C) 100 %      MAP       --            ALLERGIES    Review of patient's allergies indicates:   Allergen Reactions    Zofran [ondansetron hcl (pf)] Hives and Rash       PROVIDER TRIAGE NOTE  This is a teletriage evaluation of a 25 y.o. male presenting to the ED with c/o left shoulder pain, possibly since an injury during a seizure and subsequent fall last month. Initial orders will be placed and care will be transferred to an alternate provider when patient is roomed for a full evaluation. Any additional orders and the final disposition will be determined by that provider.         ORDERS  Labs Reviewed - No data to display    ED Orders (720h ago, onward)    Start Ordered     Status Ordering Provider    07/21/20 1700 07/21/20 1659  X-Ray Shoulder 2 or More Views Left  1 time imaging      Ordered LILLIAN BAGLEY            Virtual Visit Note: The provider triage portion of this emergency department evaluation and documentation was performed via [employer], a HIPAA-compliant telemedicine application, in concert with a tele-presenter in the room. A face to face patient evaluation with one of my colleagues will occur once the patient is placed in an emergency department room.      DISCLAIMER: This note was prepared with [employer] voice recognition transcription software. Garbled syntax, mangled pronouns, and other bizarre constructions may be attributed to that software system.

## 2020-07-22 DIAGNOSIS — M25.9 SHOULDER DISORDER: Primary | ICD-10-CM

## 2020-07-22 PROBLEM — M95.8 WINGED SCAPULA OF RIGHT SIDE: Status: ACTIVE | Noted: 2020-07-22

## 2020-07-23 ENCOUNTER — TELEPHONE (OUTPATIENT)
Dept: NEUROLOGY | Facility: CLINIC | Age: 25
End: 2020-07-23

## 2020-07-23 ENCOUNTER — HOSPITAL ENCOUNTER (OUTPATIENT)
Dept: RADIOLOGY | Facility: HOSPITAL | Age: 25
Discharge: HOME | End: 2020-07-23
Attending: PSYCHIATRY & NEUROLOGY
Payer: MEDICAID

## 2020-07-23 ENCOUNTER — HOSPITAL ENCOUNTER (OUTPATIENT)
Dept: RADIOLOGY | Facility: HOSPITAL | Age: 25
Discharge: HOME | End: 2020-07-23
Attending: NEUROLOGICAL SURGERY
Payer: MEDICAID

## 2020-07-23 DIAGNOSIS — R56.9 SEIZURES: ICD-10-CM

## 2020-07-23 DIAGNOSIS — M25.9 SHOULDER DISORDER: ICD-10-CM

## 2020-07-23 DIAGNOSIS — R56.9 CONVULSIONS, UNSPECIFIED CONVULSION TYPE: ICD-10-CM

## 2020-07-23 PROCEDURE — 70553 MRI BRAIN STEM W/O & W/DYE: CPT | Mod: TC

## 2020-07-23 PROCEDURE — 25500020 PHARM REV CODE 255: Performed by: PSYCHIATRY & NEUROLOGY

## 2020-07-23 PROCEDURE — 72125 CT NECK SPINE W/O DYE: CPT | Mod: 26,,, | Performed by: RADIOLOGY

## 2020-07-23 PROCEDURE — 72125 CT CERVICAL SPINE WITHOUT CONTRAST: ICD-10-PCS | Mod: 26,,, | Performed by: RADIOLOGY

## 2020-07-23 PROCEDURE — 72125 CT NECK SPINE W/O DYE: CPT | Mod: TC

## 2020-07-23 PROCEDURE — 70553 MRI BRAIN STEM W/O & W/DYE: CPT | Mod: 26,,, | Performed by: RADIOLOGY

## 2020-07-23 PROCEDURE — 70553 MRI BRAIN W WO CONTRAST: ICD-10-PCS | Mod: 26,,, | Performed by: RADIOLOGY

## 2020-07-23 PROCEDURE — A9585 GADOBUTROL INJECTION: HCPCS | Performed by: PSYCHIATRY & NEUROLOGY

## 2020-07-23 RX ORDER — GADOBUTROL 604.72 MG/ML
8 INJECTION INTRAVENOUS
Status: COMPLETED | OUTPATIENT
Start: 2020-07-23 | End: 2020-07-23

## 2020-07-23 RX ADMIN — GADOBUTROL 8 ML: 604.72 INJECTION INTRAVENOUS at 10:07

## 2020-07-23 NOTE — TELEPHONE ENCOUNTER
Patient returned from mri and ct. Vns returned to original settings.   Normal output 0.75  Pulse width 0.75  Magnet  0.875. Patient tolerated well.

## 2020-07-23 NOTE — TELEPHONE ENCOUNTER
Patient in to have VNS turned off for MRI.   Model #106  Normal output-0  autostim-0  Magnet-0.Patient tolerated well and instructed to remove magnet from wrist and to alert MRI tech if any discomfort. Patient to return to clinic after mri to have VNS turned back on.

## 2020-07-24 ENCOUNTER — TELEPHONE (OUTPATIENT)
Dept: NEUROSURGERY | Facility: CLINIC | Age: 25
End: 2020-07-24

## 2020-07-29 ENCOUNTER — TELEPHONE (OUTPATIENT)
Dept: NEUROSURGERY | Facility: CLINIC | Age: 25
End: 2020-07-29

## 2020-07-29 DIAGNOSIS — M95.8 WINGED SCAPULA OF RIGHT SIDE: Primary | ICD-10-CM

## 2020-07-30 ENCOUNTER — TELEPHONE (OUTPATIENT)
Dept: NEUROSURGERY | Facility: CLINIC | Age: 25
End: 2020-07-30

## 2020-07-30 DIAGNOSIS — G40.211 LOCALIZATION-RELATED (FOCAL) (PARTIAL) SYMPTOMATIC EPILEPSY AND EPILEPTIC SYNDROMES WITH COMPLEX PARTIAL SEIZURES, INTRACTABLE, WITH STATUS EPILEPTICUS: ICD-10-CM

## 2020-07-30 DIAGNOSIS — Z01.818 PRE-OP TESTING: ICD-10-CM

## 2020-07-30 DIAGNOSIS — G40.219 COMPLEX PARTIAL EPILEPSY WITH GENERALIZATION AND WITH INTRACTABLE EPILEPSY: Primary | ICD-10-CM

## 2020-07-30 DIAGNOSIS — Z78.9 KNOWN HEALTH PROBLEMS: NONE: ICD-10-CM

## 2020-07-31 ENCOUNTER — TELEPHONE (OUTPATIENT)
Dept: NEUROSURGERY | Facility: CLINIC | Age: 25
End: 2020-07-31

## 2020-07-31 DIAGNOSIS — G40.219 COMPLEX PARTIAL EPILEPSY WITH GENERALIZATION AND WITH INTRACTABLE EPILEPSY: Primary | ICD-10-CM

## 2020-08-07 ENCOUNTER — APPOINTMENT (OUTPATIENT)
Dept: RADIOLOGY | Facility: HOSPITAL | Age: 25
End: 2020-08-07
Attending: NEUROLOGICAL SURGERY
Payer: MEDICAID

## 2020-08-07 DIAGNOSIS — M95.8 WINGED SCAPULA OF RIGHT SIDE: ICD-10-CM

## 2020-08-07 PROCEDURE — 72040 X-RAY EXAM NECK SPINE 2-3 VW: CPT | Mod: TC,PO

## 2020-08-07 PROCEDURE — 72040 X-RAY EXAM NECK SPINE 2-3 VW: CPT | Mod: 26,,, | Performed by: RADIOLOGY

## 2020-08-07 PROCEDURE — 72040 XR CERVICAL SPINE FLEXION  AND EXTENSION ONLY: ICD-10-PCS | Mod: 26,,, | Performed by: RADIOLOGY

## 2020-08-10 ENCOUNTER — TELEPHONE (OUTPATIENT)
Dept: NEUROSURGERY | Facility: CLINIC | Age: 25
End: 2020-08-10

## 2020-08-10 ENCOUNTER — PATIENT MESSAGE (OUTPATIENT)
Dept: NEUROSURGERY | Facility: CLINIC | Age: 25
End: 2020-08-10

## 2020-08-10 NOTE — TELEPHONE ENCOUNTER
Attempted to call patient several times as well, with the same results as Ketty. Sent message on patient portal, requesting return call and provided phone number.    ----- Message from Ketty Zaldivar RN sent at 8/10/2020  9:16 AM CDT -----  I have tried to get in touch with this patient 6 different days and have left messages to call me. He has not responded. I have tried his mother's phone a couple of days and her mailbox is full. If you hear from him please have him call me at [phone].  Thanks!

## 2020-08-11 ENCOUNTER — TELEPHONE (OUTPATIENT)
Dept: NEUROSURGERY | Facility: CLINIC | Age: 25
End: 2020-08-11

## 2020-08-11 DIAGNOSIS — E23.7 PITUITARY ABNORMALITY: Primary | ICD-10-CM

## 2020-08-11 DIAGNOSIS — G40.219 COMPLEX PARTIAL EPILEPSY WITH GENERALIZATION AND WITH INTRACTABLE EPILEPSY: Primary | ICD-10-CM

## 2020-08-11 DIAGNOSIS — Z01.818 PREOPERATIVE TESTING: Primary | ICD-10-CM

## 2020-08-11 DIAGNOSIS — G40.211 LOCALIZATION-RELATED (FOCAL) (PARTIAL) SYMPTOMATIC EPILEPSY AND EPILEPTIC SYNDROMES WITH COMPLEX PARTIAL SEIZURES, INTRACTABLE, WITH STATUS EPILEPTICUS: ICD-10-CM

## 2020-08-11 NOTE — PRE-PROCEDURE INSTRUCTIONS
Patient stated has not had any problem with anesthesia in the past. Will need medical optimization by Dr. Walker. Will need poc appt and labs. Our  will call to set up these appts. He does not have a PCP. He uses urgent care as needed.   Preop instructions given. Hold aspirin, aspirin containing products, nsaids(aleve, advil, motrin, ibuprofen, naprosyn, naproxen, voltaren, diclofenac), vitamins and supplements one week prior to surgery.(sent to my chart)  Verbalizes understanding.

## 2020-08-11 NOTE — ANESTHESIA PAT ROS NOTE
08/11/2020  Darin Cunha is a 25 y.o., male.      Pre-op Assessment          Review of Systems  Anesthesia Hx:  No problems with previous Anesthesia  History of prior surgery of interest to airway management or planning: Previous anesthesia: General 4/4/2019-INSERTION VAGAS NERVE STIMULATOR with general anesthesia.  Denies Family Hx of Anesthesia complications.   Denies Personal Hx of Anesthesia complications.   Social:  Non-Smoker, Social Alcohol Use    Hematology/Oncology:  Hematology Normal   Oncology Normal     EENT/Dental:EENT/Dental Normal   Cardiovascular:    Denies Angina.  Functional Capacity good / => 4 METS   Mitral Valve Prolapse     Pulmonary:   Denies Shortness of breath.  Denies Recent URI.    Renal/:  Renal/ Normal     Hepatic/GI:  Hepatic/GI Normal    Musculoskeletal:  Musculoskeletal Normal    Neurological:   INTRACTABLE EPILEPSY SINCE 2013.  COMPLEX PARTIAL EPILEPSY.     LOCALIZATION-RELATED (FOCAL) (PARTIAL) SYMPTOMATIC EPILEPSY AND EPILEPTIC SYNDROMES WITH COMPLEX PARTIAL SEIZURES, INTRACTABLE, WITH STATUS EPILEPTICUS Seizure Disorder , Most recent seizure occurred < 1 month ago , frequency is weekly    Endocrine:  Endocrine Normal    Psych:   depression          Physical Exam  General:  Well nourished    Airway/Jaw/Neck:  Airway Findings: Mouth Opening: Normal Tongue: Normal  Jaw/Neck Findings:  Neck ROM: Normal ROM      Dental:  Dental Findings: In tact   Chest/Lungs:  Chest/Lungs Findings: Clear to auscultation     Heart/Vascular:  Heart Findings: Rate: Normal  Rhythm: Regular Rhythm  Sounds: Normal        Mental Status:  Mental Status Findings:  Cooperative, Alert and Oriented         Anesthesia Assessment: Preoperative EQUATION    Planned Procedure: Procedure(s) (LRB):  CREATION, CRANIAL YUNG HOLE, PLACEMENT OF THE FIDUCIAL SCREWS BILATERAL 2.PLACEMENT OF SEEG ELECTRODES  BILATERAL WITH OWEN ROBOT (Bilateral)  Requested Anesthesia Type:General  Surgeon: Melani Ortiz MD  Service: Neurosurgery  Known or anticipated Date of Surgery:8/31/2020    Surgeon notes: reviewed    Electronic QUestionnaire Assessment completed via nurse interview with patient.        Triage considerations:       Previous anesthesia records:GETA and No problems  4/4/2019 INSERTION, VAGUS NERVE STIMULATOR (Left )  Airway/Jaw/Neck:  Airway Findings: Mouth Opening: Normal Tongue: Normal  General Airway Assessment: Adult  TM Distance: 4 - 6 cm        Airway Present Prior to Hospital Arrival?: No Placement Date: 04/04/19 Placement Time: 1116 Method of Intubation: Direct laryngoscopy Inserted by: -- , DIEGO [email or web address]   Airway Device: Endotracheal Tube Mask Ventilation: Easy Intubated: Postinduction Blade: Dulce Maria #4 Airway Device Size: 7.5 Cuff Inflation: Minimal occlusive pressure Placement Verified By: Auscultation;Capnometry;ETT Condensation Grade: Grade I Complicating Factors: None Findings Post-Intubation: Positive EtCO2;Bilateral breath sounds;Atraumatic/Condition of teeth unchanged Depth of Insertion (cm): 22 Secured at: Lips Complications: None Breath Sounds: Equal Bilateral Insertion attempts (enter comment if more than 2 attempts): 1 Removal Date: 04/04/19 Removal Time: 1248       Last PCP note: DOES NOT HAVE A PCP, USES URGENT CARE  Subspecialty notes: Neurology, Neurosurgery, Psychiatry    Other important co-morbidities:PER Epic:  COMPLEX PARTIAL EPILEPSY      Tests already available:  Available tests,  within 3 months , 3-6 months ago , within [facility] .7/23/2020 MRI BRAIN W W/O CONTRAST, CT CERVICAL SPINE W/O CONTRAST, 5/7/2020 EKG             Instructions given. (See in Nurse's note)    Optimization:  Anesthesia Preop Clinic Assessment  Indicated    Medical Opinion Indicated       Plan:    Testing:  CBC, CMP, PT/INR and T&S   Pre-anesthesia  visit       Visit focus: concerns in complex and/or prolonged  anesthesia     Consultation:IM Perioperative Hospitalist     Patient  has previously scheduled Medical Appointment:NONE    Navigation: Tests Scheduled. TBD             Consults scheduled.TBD             Results will be tracked by Preop Clinic.  Ketty Zaldivar RN BSN

## 2020-08-13 ENCOUNTER — TELEPHONE (OUTPATIENT)
Dept: PREADMISSION TESTING | Facility: HOSPITAL | Age: 25
End: 2020-08-13

## 2020-08-13 NOTE — TELEPHONE ENCOUNTER
----- Message from Ketty Zaldivar RN sent at 8/11/2020  9:17 AM CDT -----  SURGERY 8/31  Please schedule Dr. Walker per Dr. Ortiz, poc appt, and labs.   You will need to call from a non [facility] phone to get in touch with him . [facility] numbers are blocked on his phone. If you use your cell phone , #[phone] will block your number and he will answer. He does not know how to unblock his calls. Neurosurgery tried to talk him through it yesterday. He is expecting your call.  Thanks!

## 2020-08-19 DIAGNOSIS — G40.909 SEIZURE DISORDER: ICD-10-CM

## 2020-08-19 RX ORDER — LACOSAMIDE 200 MG/1
200 TABLET ORAL EVERY 12 HOURS
Qty: 60 TABLET | Refills: 4 | Status: SHIPPED | OUTPATIENT
Start: 2020-08-19 | End: 2020-10-05 | Stop reason: SDUPTHER

## 2020-08-20 NOTE — TELEPHONE ENCOUNTER
8/28 Phone message with call back number and appt. Info. I have done all you ask and have not talked to this patient. Called his mother and the mail box is full.

## 2020-08-21 ENCOUNTER — TELEPHONE (OUTPATIENT)
Dept: PREADMISSION TESTING | Facility: HOSPITAL | Age: 25
End: 2020-08-21

## 2020-08-21 NOTE — TELEPHONE ENCOUNTER
This patient called and said he does not have a ride to get here . His mother is his ride but she has to work. He will ask his DrYessica To see if he can get some where to stay for a couple of days. This way he can make all his appt. He will call back .This is all the info I have . Please check with Dr. Wooten.

## 2020-08-21 NOTE — TELEPHONE ENCOUNTER
----- Message from Ketty Zaldivar RN sent at 8/21/2020 11:27 AM CDT -----  Surgery 8/31  Please schedule poc appt. Patient had told me he uses my chart portal.   Thanks!

## 2020-08-28 ENCOUNTER — ANESTHESIA EVENT (OUTPATIENT)
Dept: SURGERY | Facility: HOSPITAL | Age: 25
DRG: 027 | End: 2020-08-28
Payer: MEDICAID

## 2020-08-28 ENCOUNTER — HOSPITAL ENCOUNTER (OUTPATIENT)
Dept: PREADMISSION TESTING | Facility: HOSPITAL | Age: 25
Discharge: HOME | End: 2020-08-28
Attending: NEUROLOGICAL SURGERY
Payer: MEDICAID

## 2020-08-28 ENCOUNTER — TELEPHONE (OUTPATIENT)
Dept: NEUROSURGERY | Facility: CLINIC | Age: 25
End: 2020-08-28

## 2020-08-28 ENCOUNTER — LAB VISIT (OUTPATIENT)
Dept: SURGERY | Facility: CLINIC | Age: 25
End: 2020-08-28
Payer: MEDICAID

## 2020-08-28 ENCOUNTER — INITIAL CONSULT (OUTPATIENT)
Dept: INTERNAL MEDICINE | Facility: CLINIC | Age: 25
End: 2020-08-28
Payer: MEDICAID

## 2020-08-28 VITALS
WEIGHT: 168 LBS | HEIGHT: 73 IN | HEART RATE: 76 BPM | RESPIRATION RATE: 16 BRPM | BODY MASS INDEX: 22.26 KG/M2 | OXYGEN SATURATION: 100 % | DIASTOLIC BLOOD PRESSURE: 81 MMHG | SYSTOLIC BLOOD PRESSURE: 133 MMHG | TEMPERATURE: 98 F

## 2020-08-28 DIAGNOSIS — R41.3 MEMORY PROBLEM: ICD-10-CM

## 2020-08-28 DIAGNOSIS — I34.1 MITRAL VALVE PROLAPSE: ICD-10-CM

## 2020-08-28 DIAGNOSIS — Z86.14 HISTORY OF MRSA INFECTION: ICD-10-CM

## 2020-08-28 DIAGNOSIS — M95.8 WINGED SCAPULA OF RIGHT SIDE: ICD-10-CM

## 2020-08-28 DIAGNOSIS — Z78.9 KNOWN HEALTH PROBLEMS: NONE: ICD-10-CM

## 2020-08-28 DIAGNOSIS — R56.9 SEIZURES: ICD-10-CM

## 2020-08-28 DIAGNOSIS — F32.A DEPRESSION, UNSPECIFIED DEPRESSION TYPE: ICD-10-CM

## 2020-08-28 DIAGNOSIS — Z01.818 PRE-OP TESTING: ICD-10-CM

## 2020-08-28 PROBLEM — Z86.19 HISTORY OF STAPH INFECTION: Status: ACTIVE | Noted: 2020-08-28

## 2020-08-28 LAB — SARS-COV-2 RNA RESP QL NAA+PROBE: NOT DETECTED

## 2020-08-28 PROCEDURE — 99204 PR OFFICE/OUTPT VISIT, NEW, LEVL IV, 45-59 MIN: ICD-10-PCS | Mod: S$PBB,,, | Performed by: HOSPITALIST

## 2020-08-28 PROCEDURE — 99204 OFFICE O/P NEW MOD 45 MIN: CPT | Mod: S$PBB,,, | Performed by: HOSPITALIST

## 2020-08-28 PROCEDURE — U0003 INFECTIOUS AGENT DETECTION BY NUCLEIC ACID (DNA OR RNA); SEVERE ACUTE RESPIRATORY SYNDROME CORONAVIRUS 2 (SARS-COV-2) (CORONAVIRUS DISEASE [COVID-19]), AMPLIFIED PROBE TECHNIQUE, MAKING USE OF HIGH THROUGHPUT TECHNOLOGIES AS DESCRIBED BY CMS-2020-01-R: HCPCS

## 2020-08-28 PROCEDURE — 99999 PR PBB SHADOW E&M-EST. PATIENT-LVL II: ICD-10-PCS | Mod: PBBFAC,,, | Performed by: HOSPITALIST

## 2020-08-28 PROCEDURE — 99212 OFFICE O/P EST SF 10 MIN: CPT | Mod: PBBFAC,25 | Performed by: HOSPITALIST

## 2020-08-28 PROCEDURE — 99999 PR PBB SHADOW E&M-EST. PATIENT-LVL II: CPT | Mod: PBBFAC,,, | Performed by: HOSPITALIST

## 2020-08-28 RX ORDER — MUPIROCIN 20 MG/G
OINTMENT TOPICAL 2 TIMES DAILY
Qty: 22 G | Refills: 0 | Status: ON HOLD | OUTPATIENT
Start: 2020-08-29 | End: 2020-09-15 | Stop reason: HOSPADM

## 2020-08-28 RX ORDER — CITALOPRAM 20 MG/1
20 TABLET, FILM COATED ORAL DAILY
COMMUNITY
End: 2021-07-01

## 2020-08-28 NOTE — ASSESSMENT & PLAN NOTE
Rt middle finger   2015   Had to have drained  No known to have Staph contacts   Was working in the yard   Was hospitalized for 2 days ( [facility],  [place], [place])- Records requested -   As per him- was not isolated   Had it once and had no recurrence     Based on chart review / Patient has a reported history of Staph infection   In an attempt to prevent Surgical site infection , with the up coming surgery , suggest the following        Intra nasal Bactroban for 5 days ( 2 days pre op and 3  days post op )     Instructions for use of  nasal Bactroban discussed     Generic ointment or generic cream (30gram tube) - place a drop on a q tip , insert into to very tip of the nose only , then press on the nose gently to distribute.      Records reviewed from that time Jan 2015   Had elevate White cell count   He may have received Vancomycin at that time   Microbiology cultures from finger- from  1/27/2015 showed moderate growth of MRSA

## 2020-08-28 NOTE — HPI
History of present illness- I had the pleasure of meeting this pleasant 25 y.o. gentleman in the pre op clinic prior to his elective Neuro surgery. The patient is new to me . .    I have obtained the history by speaking to the patient and by reviewing the electronic health records.    Events leading up to surgery / History of presenting illness -    Started with seizures about 7 years ago in 2013   Did not have seizures prior to that   Has been to see see different Neurologists ( Stacie Medina )    Tried multiple seizure medication with no lasting benefits     As per him- Usually do not have  Grand mal until goes off Anti epileptic Medication for EEG    Usually has partial complex seizures      Seizure frequency - average - once a week- lasting 2-3 minutes   As per friends , family who witness the seizures - has a Stare and smacking  - No Loss of Consciousness- Once recovered moves on with what he was doing ( Unless he has Grand mal seizure after which he goes to sleep )     Has tongue bite with grand mal seizures     In March 2019 had a [place] and [place] . Did not sustain any injuries and did not hurt any one else    Since then,  no longer driving     No headaches  No head injuries in the past     No known triggers     Takes seizure Medication regularly     Relevant health conditions of significance for the perioperative period/ History of presenting illness -    Health conditions of significance for the perioperative period     Seizures     Lives partly  with her mother due to seizures and partly in his own place  Going to recover with mother post surgery    Not known to have coronary  disease , Diabetes Mellitus, Lung disease   ,HTN

## 2020-08-28 NOTE — ASSESSMENT & PLAN NOTE
Short term memory   For past 5-6 years , since seizures have been troublesome   Used to work in construction in the past until 2019   He is unable to work due to seizures and on disability

## 2020-08-28 NOTE — TELEPHONE ENCOUNTER
I contacted  and informed him that he is scheduled for arrival at 5am. 2nd floor DOSC with .  I also reminded the pt of his MRI appt on Sun at 9am and his [facility] reservation.  The pt VU and will be present for all scheduled appts.

## 2020-08-28 NOTE — ASSESSMENT & PLAN NOTE
From having to depend on others to be able to drive him from seizures     On Citalopram     I suggest monitoring the sodium as SIADH from Citalopram  use and hypersecretion of ADH associated with surgery can reduce sodium in the perioperative period

## 2020-08-28 NOTE — PROGRESS NOTES
[facility]  Progress Note    Patient Name: Darin Cunha  MRN: [id number]  Date of Evaluation- 08/28/2020  PCP- Primary Doctor No    Future cases for Darin Cunha [[id number]]     Case ID Status Date Time Tono Procedure Provider Location    [id number] [facility] 8/31/2020  7:00  CREATION, CRANIAL YUNG HOLE, PLACEMENT OF THE FIDUCIAL SCREWS BILATERAL 2.PLACEMENT OF SEEG ELECTRODES BILATERAL WITH OWEN ROBOT Melani Ortiz MD [[id number]] [facility] OR 2ND FLR          HPI:  History of present illness- I had the pleasure of meeting this pleasant 25 y.o. gentleman in the pre op clinic prior to his elective Neuro surgery. The patient is new to me . .    I have obtained the history by speaking to the patient and by reviewing the electronic health records.    Events leading up to surgery / History of presenting illness -    Started with seizures about 7 years ago in 2013   Did not have seizures prior to that   Has been to see see different Neurologists ( [place], [place] )    Tried multiple seizure medication with no lasting benefits     As per him- Usually do not have  Grand mal until goes off Anti epileptic Medication for EEG    Usually has partial complex seizures      Seizure frequency - average - once a week- lasting 2-3 minutes   As per friends , family who witness the seizures - has a Stare and smacking  - No Loss of Consciousness- Once recovered moves on with what he was doing ( Unless he has Grand mal seizure after which he goes to sleep )     Has tongue bite with grand mal seizures     In March 2019 had a [place] . Did not sustain any injuries and did not hurt any one else    Since then,  no longer driving     No headaches  No head injuries in the past     No known triggers     Takes seizure Medication regularly     Relevant health conditions of significance for the perioperative period/ History of presenting illness -    Health conditions of significance for the  perioperative period     Seizures     Lives partly  with her mother due to seizures and partly in his own place  Going to recover with mother post surgery    Not known to have coronary  disease , Diabetes Mellitus, Lung disease   ,HTN        Subjective/ Objective:       Chief complaint-Preoperative evaluation, Perioperative Medical management, complication reduction plan     Active cardiac conditions- none    Revised cardiac risk index predictors- none    Functional capacity -Examples of physical activity, 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       Review of Systems   Constitutional: Negative for chills and fever.        No unusual weight changes     HENT:        STOPBANG score 2  / 8      Neck size over 40 CM  Male gender   Eyes:        No unusual vision changes   Respiratory:        No cough , phlegm    No Hemoptysis   Cardiovascular:        As noted   Gastrointestinal:        Bowels- Regular daily   No overt GI/ blood losses    No testicular lumps    Endocrine:        Prednisone use > 20 mg daily for 3 weeks- none    Genitourinary: Negative for dysuria.        No urinary hesitancy    Musculoskeletal:        As above   No unusual muscle/ joint pains   Skin: Negative for rash.   Neurological: Negative for syncope.        No unilateral weakness   Hematological:        Current use of Anticoagulants  None   Psychiatric/Behavioral:          No SI/HI   No vascular stenting       12 beers over a months - spread over a mellissa      No anesthesia, bleeding, cardiac problems, PONV  with previous surgeries/procedures.  Medications and Allergies reviewed in epic.   FH- No anesthesia,bleeding / venous thrombosis , problems in family     Physical Exam    Vitals - 1 value per visit  "8/28/2020   SYSTOLIC 133   DIASTOLIC 81   PULSE 76   TEMPERATURE 98.1   RESPIRATIONS 16   SPO2 100   Weight (lb) 168   Weight (kg) 76.204   HEIGHT 6' 1"   BODY MASS INDEX 22.16       Physical Exam  Constitutional-General appearance-Conscious,Coherent  Eyes- No conjunctival icterus,pupils  round  and reactive to light   ENT-Oral cavity- moist  , Hearing grossly normal   Neck- No thyromegaly ,Trachea -central, No jugular venous distension,   No Carotid Bruit   Cardiovascular -Heart Sounds-sittkng reclining ,  Normal  and  no murmur   , No gallop rhythm   Respiratory - Normal Respiratory Effort, Normal breath sounds,  no wheeze  and  no forced expiratory wheeze    Peripheral pitting pedal edema-- none , no calf pain   Gastrointestinal -Soft abdomen, No palpable masses, Non Tender,Liver,Spleen not palpable. No-- free fluid and shifting dullness  Musculoskeletal- No finger Clubbing. Strength grossly normal   Lymphatic-No Palpable cervical, axillary,Inguinal lymphadenopathy   Psychiatric - normal effect,Orientation  Rt Dorsalis pedis pulses-palpable    Lt Dorsalis pedis pulses- palpable   Rt Posterior tibial pulses -palpable   Left posterior tibial pulses -palpable   Miscellaneous -  no asterixis,  no renal bruit,  bowel sounds positive  and  Tattoo  Investigations  Lab and Imaging have been reviewed in [facility].    Review of Medicine tests    EKG- I had independently reviewed the EKG from--5/7/2020   It was reported to be showing     Normal sinus rhythm with sinus arrhythmia   Possible Left atrial enlargement   Borderline Abnormal ECG   No previous ECGs available    Review of clinical lab tests:  Lab Results   Component Value Date    CREATININE 1.0 08/28/2020    HGB 16.5 08/28/2020     08/28/2020         Review of old records- Was done and information gathered regards to events leading to surgery and health conditions of significance in the perioperative period.        Preoperative cardiac risk assessment-  The " patient does not have any active cardiac conditions . Revised cardiac risk index predictors- 0---.Functional capacity is more than 4 Mets. He will be undergoing a Neuro  procedure that carries a Moderate Risk risk     Risk of a major Cardiac event ( Defined as death, myocardial infarction, or cardiac arrest at 30 days after noncardiac surgery), based on RCRI score     3.9%       No further cardiac work up is indicated prior to proceeding with the surgery     Orders Placed This Encounter    mupirocin (BACTROBAN) 2 % ointment          Postoperative pulmonary complication risk assessment:      ARISCAT ( Canet) risk index- risk class -  Low     Renettazullah Respiratory failure index- percentage risk of respiratory failure: 1.8 %     Assessment/Plan:     Seizures  Iintractable epilepsy - despite taking Medication   Has VNS placed about 1 year ago - as per him , did not help   Gets rides from mother and his uncle     Suggested avoidance of Tramadol as it can lower seizure threshold     She took tramadol in the past and as per him affected the liver - since off of Tramdol , no problem with liver      He wants to be able to drive and work      Planned for For surgery     Memory problem  Short term memory   For past 5-6 years , since seizures have been troublesome   Used to work in construction in the past until 2019   He is unable to work due to seizures and on disability     Winged scapula of right side  Noticed by his step dad after having a Grand mal seizure , 1-2 months ago   Was evaluated for this     No pain from this   No weakness     X ray June 2020- There are no osseous or articular abnormalities.  Soft tissues are unremarkable.  The visualized right ribs demonstrate no significant abnormalities    CT July 2020- No acute fracture or dislocation.    Depression  From having to depend on others to be able to drive him from seizures     On Citalopram     I suggest monitoring the sodium as SIADH from Citalopram  use and  hypersecretion of ADH associated with surgery can reduce sodium in the perioperative period    History of MRSA infection  Rt middle finger   2015   Had to have drained  No known to have Staph contacts   Was working in the yard   Was hospitalized for 2 days ( [place], [place])- Records requested -   As per him- was not isolated   Had it once and had no recurrence     Based on chart review / Patient has a reported history of Staph infection   In an attempt to prevent Surgical site infection , with the up coming surgery , suggest the following        Intra nasal Bactroban for 5 days ( 2 days pre op and 3  days post op )     Instructions for use of  nasal Bactroban discussed     Generic ointment or generic cream (30gram tube) - place a drop on a q tip , insert into to very tip of the nose only , then press on the nose gently to distribute.      Records reviewed from that time Jan 2015   Had elevate White cell count   He may have received Vancomycin at that time   Microbiology cultures from finger- from  1/27/2015 showed moderate growth of MRSA          Mitral valve prolapse  Found when he was born   Had Cardiology evaluation long time - as per him no problem  Not under Cardiology care   Does not cause any problem   No heart failure         Preventive perioperative care    Thromboembolic prophylaxis:  His risk factors for thrombosis include surgical procedure.I suggest  thromboembolic prophylaxis ( mechanical/pharmacological, weighing the risk benefits of pharmacological agent use considering [facility]ria procedural bleeding )  during the perioperative period.I suggested being active in the post operative period.      Postoperative pulmonary complication prophylaxis-- I suggest incentive spirometry use, early ambulation and end tidal carbon dioxide monitoring  , oral care , head end of bed elevation      Renal complication prophylaxis- . I suggest keeping him well hydrated  in the perioperative period.      Surgical site Infection Prophylaxis-I  suggest appropriate antibiotic for Prophylaxis against Surgical site infections     This visit was focused on Preoperative evaluation, Perioperative Medical management, complication reduction plans. I suggest that the patient follows up with primary care or relevant sub specialists for ongoing health care.    I appreciate the opportunity to be involved in this patients care. Please feel free to contact me if there were any questions about this consultation.    Patient is optimized     Patient/ was instructed to call and update me about any changes to health,  medication, office visits ,testing out side of the [facility] center , hospitalizations between now and surgery     Tania Walker MD  Perioperative Medicine  [facility]   Pager [phone]    COVID screening     No fever   No cough   No SOB  No sore throat   No loss of taste or smell   No muscle aches   No nausea, vomiting , diarrhea    Called to discuss previous MRSA infection   Unable to speak   Left a message   To use Bactroban     Based on chart review / Patient has a reported history of Staph infection       Hibiclens     Instructions for use of Hibiclens     Below the neck     Called mother  Unable to speak, leave a message   -  8/28- 16 56     Shower with Hibiclens night before surgery and morning of surgery   Avoid eyes, ears , mouth   Called , left a message   --  8/28- 17 47     Winging of Left scapula   ? Cyst Rt shoulder area - no suggestion of acute problem

## 2020-08-28 NOTE — ASSESSMENT & PLAN NOTE
Noticed by his step dad after having a Grand mal seizure , 1-2 months ago   Was evaluated for this     No pain from this   No weakness     X ray June 2020- There are no osseous or articular abnormalities.  Soft tissues are unremarkable.  The visualized right ribs demonstrate no significant abnormalities    CT July 2020- No acute fracture or dislocation.

## 2020-08-28 NOTE — OUTPATIENT SUBJECTIVE & OBJECTIVE
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       Review of Systems   Constitutional: Negative for chills and fever.        No unusual weight changes     HENT:        STOPBANG score 2  / 8      Neck size over 40 CM  Male gender   Eyes:        No unusual vision changes   Respiratory:        No cough , phlegm    No Hemoptysis   Cardiovascular:        As noted   Gastrointestinal:        Bowels- Regular daily   No overt GI/ blood losses    No testicular lumps    Endocrine:        Prednisone use > 20 mg daily for 3 weeks- none    Genitourinary: Negative for dysuria.        No urinary hesitancy    Musculoskeletal:        As above   No unusual muscle/ joint pains   Skin: Negative for rash.   Neurological: Negative for syncope.        No unilateral weakness   Hematological:        Current use of Anticoagulants  None   Psychiatric/Behavioral:          No SI/HI   No vascular stenting       12 beers over a months - spread over a mellissa      No anesthesia, bleeding, cardiac problems, PONV  with previous surgeries/procedures.  Medications and Allergies reviewed in epic.   FH- No anesthesia,bleeding / venous thrombosis , problems in family     Physical Exam    Vitals - 1 value per visit 8/28/2020   SYSTOLIC 133   DIASTOLIC 81   PULSE 76   TEMPERATURE 98.1   RESPIRATIONS 16   SPO2 100   Weight (lb) 168   Weight (kg) 76.204   HEIGHT 6' 1"   BODY MASS INDEX 22.16       Physical Exam  Constitutional-General " appearance-Conscious,Coherent  Eyes- No conjunctival icterus,pupils  round  and reactive to light   ENT-Oral cavity- moist  , Hearing grossly normal   Neck- No thyromegaly ,Trachea -central, No jugular venous distension,   No Carotid Bruit   Cardiovascular -Heart Sounds-sittkng reclining ,  Normal  and  no murmur   , No gallop rhythm   Respiratory - Normal Respiratory Effort, Normal breath sounds,  no wheeze  and  no forced expiratory wheeze    Peripheral pitting pedal edema-- none , no calf pain   Gastrointestinal -Soft abdomen, No palpable masses, Non Tender,Liver,Spleen not palpable. No-- free fluid and shifting dullness  Musculoskeletal- No finger Clubbing. Strength grossly normal   Lymphatic-No Palpable cervical, axillary,Inguinal lymphadenopathy   Psychiatric - normal effect,Orientation  Rt Dorsalis pedis pulses-palpable    Lt Dorsalis pedis pulses- palpable   Rt Posterior tibial pulses -palpable   Left posterior tibial pulses -palpable   Miscellaneous -  no asterixis,  no renal bruit,  bowel sounds positive  and  Tattoo  Investigations  Lab and Imaging have been reviewed in [facility].    Review of Medicine tests    EKG- I had independently reviewed the EKG from--5/7/2020   It was reported to be showing     Normal sinus rhythm with sinus arrhythmia   Possible Left atrial enlargement   Borderline Abnormal ECG   No previous ECGs available    Review of clinical lab tests:  Lab Results   Component Value Date    CREATININE 1.0 08/28/2020    HGB 16.5 08/28/2020     08/28/2020         Review of old records- Was done and information gathered regards to events leading to surgery and health conditions of significance in the perioperative period.    Outpatient Subjective & Objective

## 2020-08-28 NOTE — ASSESSMENT & PLAN NOTE
Iintractable epilepsy - despite taking Medication   Has VNS placed about 1 year ago - as per him , did not help   Gets rides from mother and his uncle     Suggested avoidance of Tramadol as it can lower seizure threshold     She took tramadol in the past and as per him affected the liver - since off of Tramdol , no problem with liver      He wants to be able to drive and work      Planned for For surgery

## 2020-08-28 NOTE — LETTER
August 28, 2020      Melani Ortiz MD  [address] [facility] [address]           [facility]  [address] [facility] [address]  Phone: [phone]          Patient: Darin Cunha   MR Number: [id number]   YOB: 1995   Date of Visit: 8/28/2020       Dear Dr. Melani Ortiz:    Thank you for referring Darin Cunha to me for evaluation. Attached you will find relevant portions of my assessment and plan of care.    If you have questions, please do not hesitate to call me. I look forward to following Darin Cunha along with you.    Sincerely,    Tania Walker MD    Enclosure  CC:  No Recipients    If you would like to receive this communication electronically, please contact [email or web address] or [phone] to request more information on [employer] Link access.    For providers and/or their staff who would like to refer a patient to [facility], please contact us through our one-stop-shop provider referral line, [facility], at [phone].    If you feel you have received this communication in error or would no longer like to receive these types of communications, please e-mail [email or web address]

## 2020-08-28 NOTE — ASSESSMENT & PLAN NOTE
Found when he was born   Had Cardiology evaluation long time - as per him no problem  Not under Cardiology care   Does not cause any problem   No heart failure

## 2020-08-30 ENCOUNTER — HOSPITAL ENCOUNTER (OUTPATIENT)
Dept: RADIOLOGY | Facility: HOSPITAL | Age: 25
Discharge: HOME | DRG: 027 | End: 2020-08-30
Attending: NEUROLOGICAL SURGERY
Payer: MEDICAID

## 2020-08-30 ENCOUNTER — DOCUMENTATION ONLY (OUTPATIENT)
Dept: NEUROLOGY | Facility: HOSPITAL | Age: 25
End: 2020-08-30

## 2020-08-30 DIAGNOSIS — R56.9 SEIZURES: ICD-10-CM

## 2020-08-30 PROCEDURE — 72141 MRI CERVICAL SPINE WITHOUT CONTRAST: ICD-10-PCS | Mod: 26,,, | Performed by: RADIOLOGY

## 2020-08-30 PROCEDURE — 72141 MRI NECK SPINE W/O DYE: CPT | Mod: TC

## 2020-08-30 PROCEDURE — 72141 MRI NECK SPINE W/O DYE: CPT | Mod: 26,,, | Performed by: RADIOLOGY

## 2020-08-30 NOTE — PROGRESS NOTES
Patient reports that he completed the MRI with no issues.  VNS was set back to prior settings as described below.  The patient tolerated them without discomfort.  I have asked him to please contact us should any difficulties arise.    Initial VNS settings:  Output current (mA):               0  Signal frequency (Hz):            30  Pulse width (µs):                     1000  Signal on time (s):                   30  Signal off time (min):               5  Magnet current (mA):              0  Magnet on time (s):                 30  Magnet pulse width (µs):        1000  Autostim current (mA):            0  Autostim on time (s):               60  Autostim pulse width (µs):      1000    Final VNS settings:  Output current (mA):               0.75  Signal frequency (Hz):            30  Pulse width (µs):                     1000  Signal on time (s):                   30  Signal off time (min):               5  Magnet current (mA):              0.75  Magnet on time (s):                 30  Magnet pulse width (µs):        1000  Autostim current (mA):            0.875  Autostim on time (s):               60  Autostim pulse width (µs):      1000

## 2020-08-30 NOTE — PROGRESS NOTES
The patient was seen today to have his VNS turned off for an MRI of the brain.  I have instructed the patient to return to have it switched back on after the study is done.  A total of 3 parameters were changed as noted below.    Initial VNS settings:  Output current (mA):  0.75  Signal frequency (Hz): 30  Pulse width (µs):  1000  Signal on time (s):  30  Signal off time (min):  5  Magnet current (mA):  0.75  Magnet on time (s):  30  Magnet pulse width (µs): 1000  Autostim current (mA): 0.875  Autostim on time (s):  60  Autostim pulse width (µs): 1000    System diagnostics:  Lead impedance:   OK  Impedence value (?):  2915  Near end of service:   No (50-75%)    Final VNS settings:  Output current (mA):  0  Signal frequency (Hz): 30  Pulse width (µs):  1000  Signal on time (s):  30  Signal off time (min):  5  Magnet current (mA):  0  Magnet on time (s):  30  Magnet pulse width (µs): 1000  Autostim current (mA): 0  Autostim on time (s):  60  Autostim pulse width (µs): 1000

## 2020-08-31 ENCOUNTER — HOSPITAL ENCOUNTER (INPATIENT)
Facility: HOSPITAL | Age: 25
LOS: 15 days | Discharge: HOME | DRG: 027 | End: 2020-09-15
Attending: NEUROLOGICAL SURGERY | Admitting: NEUROLOGICAL SURGERY
Payer: MEDICAID

## 2020-08-31 ENCOUNTER — ANESTHESIA (OUTPATIENT)
Dept: SURGERY | Facility: HOSPITAL | Age: 25
DRG: 027 | End: 2020-08-31
Payer: MEDICAID

## 2020-08-31 ENCOUNTER — TELEPHONE (OUTPATIENT)
Dept: NEUROSURGERY | Facility: CLINIC | Age: 25
End: 2020-08-31

## 2020-08-31 DIAGNOSIS — G40.909 EPILEPSY: Primary | ICD-10-CM

## 2020-08-31 DIAGNOSIS — G40.219 PARTIAL SYMPTOMATIC EPILEPSY WITH COMPLEX PARTIAL SEIZURES, INTRACTABLE, WITHOUT STATUS EPILEPTICUS: ICD-10-CM

## 2020-08-31 PROBLEM — R11.0 NAUSEA: Status: ACTIVE | Noted: 2020-08-31

## 2020-08-31 LAB
APTT BLDCRRT: 31.4 SEC (ref 21–32)
BASOPHILS # BLD AUTO: 0.01 K/UL (ref 0–0.2)
BASOPHILS NFR BLD: 0.1 % (ref 0–1.9)
BILIRUB UR QL STRIP: NEGATIVE
CLARITY UR REFRACT.AUTO: CLEAR
COLOR UR AUTO: YELLOW
DIFFERENTIAL METHOD: ABNORMAL
EOSINOPHIL # BLD AUTO: 0 K/UL (ref 0–0.5)
EOSINOPHIL NFR BLD: 0 % (ref 0–8)
ERYTHROCYTE [DISTWIDTH] IN BLOOD BY AUTOMATED COUNT: 12.3 % (ref 11.5–14.5)
ESTIMATED AVG GLUCOSE: 80 MG/DL (ref 68–131)
GLUCOSE UR QL STRIP: NEGATIVE
HBA1C MFR BLD HPLC: 4.4 % (ref 4–5.6)
HCT VFR BLD AUTO: 41.6 % (ref 40–54)
HGB BLD-MCNC: 14.5 G/DL (ref 14–18)
HGB UR QL STRIP: ABNORMAL
HYALINE CASTS UR QL AUTO: 4 /LPF
IMM GRANULOCYTES # BLD AUTO: 0.05 K/UL (ref 0–0.04)
IMM GRANULOCYTES NFR BLD AUTO: 0.6 % (ref 0–0.5)
INR PPP: 1 (ref 0.8–1.2)
KETONES UR QL STRIP: NEGATIVE
LEUKOCYTE ESTERASE UR QL STRIP: NEGATIVE
LYMPHOCYTES # BLD AUTO: 0.4 K/UL (ref 1–4.8)
LYMPHOCYTES NFR BLD: 4.5 % (ref 18–48)
MCH RBC QN AUTO: 31.9 PG (ref 27–31)
MCHC RBC AUTO-ENTMCNC: 34.9 G/DL (ref 32–36)
MCV RBC AUTO: 91 FL (ref 82–98)
MICROSCOPIC COMMENT: ABNORMAL
MONOCYTES # BLD AUTO: 0.2 K/UL (ref 0.3–1)
MONOCYTES NFR BLD: 2.7 % (ref 4–15)
NEUTROPHILS # BLD AUTO: 7.7 K/UL (ref 1.8–7.7)
NEUTROPHILS NFR BLD: 92.1 % (ref 38–73)
NITRITE UR QL STRIP: NEGATIVE
NRBC BLD-RTO: 0 /100 WBC
PH UR STRIP: 7 [PH] (ref 5–8)
PLATELET # BLD AUTO: 185 K/UL (ref 150–350)
PMV BLD AUTO: 9.9 FL (ref 9.2–12.9)
POCT GLUCOSE: 110 MG/DL (ref 70–110)
PROT UR QL STRIP: NEGATIVE
PROTHROMBIN TIME: 11.3 SEC (ref 9–12.5)
RBC # BLD AUTO: 4.55 M/UL (ref 4.6–6.2)
RBC #/AREA URNS AUTO: 49 /HPF (ref 0–4)
SP GR UR STRIP: 1.02 (ref 1–1.03)
SQUAMOUS #/AREA URNS AUTO: 0 /HPF
TSH SERPL DL<=0.005 MIU/L-ACNC: 0.45 UIU/ML (ref 0.4–4)
URN SPEC COLLECT METH UR: ABNORMAL
WBC # BLD AUTO: 8.4 K/UL (ref 3.9–12.7)
WBC #/AREA URNS AUTO: 3 /HPF (ref 0–5)

## 2020-08-31 PROCEDURE — D9220A PRA ANESTHESIA: ICD-10-PCS | Mod: ANES,,, | Performed by: ANESTHESIOLOGY

## 2020-08-31 PROCEDURE — 99223 PR INITIAL HOSPITAL CARE,LEVL III: ICD-10-PCS | Mod: ,,, | Performed by: NURSE PRACTITIONER

## 2020-08-31 PROCEDURE — 81001 URINALYSIS AUTO W/SCOPE: CPT

## 2020-08-31 PROCEDURE — 95720 PR EEG, W/VIDEO, CONT RECORD, I&R, >12<26 HRS: ICD-10-PCS | Mod: ,,, | Performed by: PSYCHIATRY & NEUROLOGY

## 2020-08-31 PROCEDURE — 25000003 PHARM REV CODE 250: Performed by: NURSE PRACTITIONER

## 2020-08-31 PROCEDURE — 63600175 PHARM REV CODE 636 W HCPCS: Performed by: STUDENT IN AN ORGANIZED HEALTH CARE EDUCATION/TRAINING PROGRAM

## 2020-08-31 PROCEDURE — 99232 PR SUBSEQUENT HOSPITAL CARE,LEVL II: ICD-10-PCS | Mod: ,,, | Performed by: PSYCHIATRY & NEUROLOGY

## 2020-08-31 PROCEDURE — 25000003 PHARM REV CODE 250: Performed by: PHYSICIAN ASSISTANT

## 2020-08-31 PROCEDURE — 83036 HEMOGLOBIN GLYCOSYLATED A1C: CPT

## 2020-08-31 PROCEDURE — 36620 INSERTION CATHETER ARTERY: CPT | Mod: 59,,, | Performed by: ANESTHESIOLOGY

## 2020-08-31 PROCEDURE — 64999 PR  PLACEMENT OF SKULL FIDUCIALS FOR DBS: ICD-10-PCS | Mod: ,,, | Performed by: NEUROLOGICAL SURGERY

## 2020-08-31 PROCEDURE — C1778 LEAD, NEUROSTIMULATOR: HCPCS | Performed by: NEUROLOGICAL SURGERY

## 2020-08-31 PROCEDURE — 20000000 HC ICU ROOM

## 2020-08-31 PROCEDURE — 25000003 PHARM REV CODE 250: Performed by: STUDENT IN AN ORGANIZED HEALTH CARE EDUCATION/TRAINING PROGRAM

## 2020-08-31 PROCEDURE — 63600175 PHARM REV CODE 636 W HCPCS: Performed by: NURSE ANESTHETIST, CERTIFIED REGISTERED

## 2020-08-31 PROCEDURE — 95720 EEG PHY/QHP EA INCR W/VEEG: CPT | Mod: ,,, | Performed by: PSYCHIATRY & NEUROLOGY

## 2020-08-31 PROCEDURE — 84443 ASSAY THYROID STIM HORMONE: CPT

## 2020-08-31 PROCEDURE — 99223 1ST HOSP IP/OBS HIGH 75: CPT | Mod: ,,, | Performed by: NURSE PRACTITIONER

## 2020-08-31 PROCEDURE — 37000009 HC ANESTHESIA EA ADD 15 MINS: Performed by: NEUROLOGICAL SURGERY

## 2020-08-31 PROCEDURE — 85730 THROMBOPLASTIN TIME PARTIAL: CPT

## 2020-08-31 PROCEDURE — 95955: ICD-10-PCS | Mod: 26,59,, | Performed by: PSYCHIATRY & NEUROLOGY

## 2020-08-31 PROCEDURE — D9220A PRA ANESTHESIA: ICD-10-PCS | Mod: CRNA,,, | Performed by: NURSE ANESTHETIST, CERTIFIED REGISTERED

## 2020-08-31 PROCEDURE — 63600175 PHARM REV CODE 636 W HCPCS: Performed by: NURSE PRACTITIONER

## 2020-08-31 PROCEDURE — 25000003 PHARM REV CODE 250: Performed by: NURSE ANESTHETIST, CERTIFIED REGISTERED

## 2020-08-31 PROCEDURE — D9220A PRA ANESTHESIA: Mod: ANES,,, | Performed by: ANESTHESIOLOGY

## 2020-08-31 PROCEDURE — 63600175 PHARM REV CODE 636 W HCPCS: Performed by: NEUROLOGICAL SURGERY

## 2020-08-31 PROCEDURE — 85610 PROTHROMBIN TIME: CPT

## 2020-08-31 PROCEDURE — 27201423 OPTIME MED/SURG SUP & DEVICES STERILE SUPPLY: Performed by: NEUROLOGICAL SURGERY

## 2020-08-31 PROCEDURE — 25000003 PHARM REV CODE 250: Performed by: NEUROLOGICAL SURGERY

## 2020-08-31 PROCEDURE — 36620 ARTERIAL: ICD-10-PCS | Mod: 59,,, | Performed by: ANESTHESIOLOGY

## 2020-08-31 PROCEDURE — 95714 VEEG EA 12-26 HR UNMNTR: CPT

## 2020-08-31 PROCEDURE — 64999 UNLISTED PX NERVOUS SYSTEM: CPT | Mod: ,,, | Performed by: NEUROLOGICAL SURGERY

## 2020-08-31 PROCEDURE — 95955 EEG DURING SURGERY: CPT | Mod: 26,59,, | Performed by: PSYCHIATRY & NEUROLOGY

## 2020-08-31 PROCEDURE — 61760 IMPLANT BRAIN ELECTRODES: CPT | Mod: ,,, | Performed by: NEUROLOGICAL SURGERY

## 2020-08-31 PROCEDURE — 37000008 HC ANESTHESIA 1ST 15 MINUTES: Performed by: NEUROLOGICAL SURGERY

## 2020-08-31 PROCEDURE — 36000710: Performed by: NEUROLOGICAL SURGERY

## 2020-08-31 PROCEDURE — D9220A PRA ANESTHESIA: Mod: CRNA,,, | Performed by: NURSE ANESTHETIST, CERTIFIED REGISTERED

## 2020-08-31 PROCEDURE — A4648 IMPLANTABLE TISSUE MARKER: HCPCS | Performed by: NEUROLOGICAL SURGERY

## 2020-08-31 PROCEDURE — 63600175 PHARM REV CODE 636 W HCPCS: Performed by: ANESTHESIOLOGY

## 2020-08-31 PROCEDURE — C1713 ANCHOR/SCREW BN/BN,TIS/BN: HCPCS | Performed by: NEUROLOGICAL SURGERY

## 2020-08-31 PROCEDURE — 85025 COMPLETE CBC W/AUTO DIFF WBC: CPT

## 2020-08-31 PROCEDURE — 99232 SBSQ HOSP IP/OBS MODERATE 35: CPT | Mod: ,,, | Performed by: PSYCHIATRY & NEUROLOGY

## 2020-08-31 PROCEDURE — 36000711: Performed by: NEUROLOGICAL SURGERY

## 2020-08-31 PROCEDURE — 95700 EEG CONT REC W/VID EEG TECH: CPT

## 2020-08-31 PROCEDURE — 61760 PR STEREO IMPLANT BRAIN ELECTRODES, SEIZURE MONITOR: ICD-10-PCS | Mod: ,,, | Performed by: NEUROLOGICAL SURGERY

## 2020-08-31 DEVICE — FIDUCIAL KIT UNI STEREO 10 MM: Type: IMPLANTABLE DEVICE | Site: BRAIN | Status: FUNCTIONAL

## 2020-08-31 RX ORDER — CEFAZOLIN SODIUM 1 G/3ML
2 INJECTION, POWDER, FOR SOLUTION INTRAMUSCULAR; INTRAVENOUS ONCE
Status: DISCONTINUED | OUTPATIENT
Start: 2020-08-31 | End: 2020-08-31 | Stop reason: HOSPADM

## 2020-08-31 RX ORDER — CEFAZOLIN SODIUM 1 G/3ML
2 INJECTION, POWDER, FOR SOLUTION INTRAMUSCULAR; INTRAVENOUS
Status: DISCONTINUED | OUTPATIENT
Start: 2020-08-31 | End: 2020-09-03

## 2020-08-31 RX ORDER — MUPIROCIN 20 MG/G
OINTMENT TOPICAL 2 TIMES DAILY
Status: COMPLETED | OUTPATIENT
Start: 2020-08-31 | End: 2020-09-05

## 2020-08-31 RX ORDER — PROPOFOL 10 MG/ML
VIAL (ML) INTRAVENOUS
Status: DISCONTINUED | OUTPATIENT
Start: 2020-08-31 | End: 2020-08-31

## 2020-08-31 RX ORDER — FENTANYL CITRATE 50 UG/ML
INJECTION, SOLUTION INTRAMUSCULAR; INTRAVENOUS
Status: DISCONTINUED | OUTPATIENT
Start: 2020-08-31 | End: 2020-08-31

## 2020-08-31 RX ORDER — SODIUM CHLORIDE 9 MG/ML
INJECTION, SOLUTION INTRAVENOUS CONTINUOUS
Status: DISCONTINUED | OUTPATIENT
Start: 2020-08-31 | End: 2020-08-31

## 2020-08-31 RX ORDER — PROCHLORPERAZINE EDISYLATE 5 MG/ML
INJECTION INTRAMUSCULAR; INTRAVENOUS
Status: DISCONTINUED | OUTPATIENT
Start: 2020-08-31 | End: 2020-08-31

## 2020-08-31 RX ORDER — FENTANYL CITRATE 50 UG/ML
25 INJECTION, SOLUTION INTRAMUSCULAR; INTRAVENOUS EVERY 5 MIN PRN
Status: DISCONTINUED | OUTPATIENT
Start: 2020-08-31 | End: 2020-08-31

## 2020-08-31 RX ORDER — PHENYLEPHRINE HYDROCHLORIDE 10 MG/ML
INJECTION INTRAVENOUS
Status: DISCONTINUED | OUTPATIENT
Start: 2020-08-31 | End: 2020-08-31

## 2020-08-31 RX ORDER — CITALOPRAM 10 MG/1
20 TABLET ORAL DAILY
Status: DISCONTINUED | OUTPATIENT
Start: 2020-08-31 | End: 2020-09-15 | Stop reason: HOSPADM

## 2020-08-31 RX ORDER — ESMOLOL HYDROCHLORIDE 10 MG/ML
INJECTION INTRAVENOUS
Status: DISCONTINUED | OUTPATIENT
Start: 2020-08-31 | End: 2020-08-31

## 2020-08-31 RX ORDER — LABETALOL HYDROCHLORIDE 5 MG/ML
10 INJECTION, SOLUTION INTRAVENOUS EVERY 4 HOURS PRN
Status: DISCONTINUED | OUTPATIENT
Start: 2020-08-31 | End: 2020-09-15 | Stop reason: HOSPADM

## 2020-08-31 RX ORDER — MUPIROCIN 20 MG/G
1 OINTMENT TOPICAL 2 TIMES DAILY
Status: DISCONTINUED | OUTPATIENT
Start: 2020-08-31 | End: 2020-08-31 | Stop reason: HOSPADM

## 2020-08-31 RX ORDER — OXYCODONE HYDROCHLORIDE 5 MG/1
5 TABLET ORAL EVERY 6 HOURS PRN
Status: DISCONTINUED | OUTPATIENT
Start: 2020-08-31 | End: 2020-09-15 | Stop reason: HOSPADM

## 2020-08-31 RX ORDER — HYDRALAZINE HYDROCHLORIDE 20 MG/ML
10 INJECTION INTRAMUSCULAR; INTRAVENOUS EVERY 4 HOURS PRN
Status: DISCONTINUED | OUTPATIENT
Start: 2020-08-31 | End: 2020-09-15 | Stop reason: HOSPADM

## 2020-08-31 RX ORDER — ACETAMINOPHEN 325 MG/1
650 TABLET ORAL EVERY 6 HOURS PRN
Status: DISCONTINUED | OUTPATIENT
Start: 2020-08-31 | End: 2020-08-31

## 2020-08-31 RX ORDER — MUPIROCIN 20 MG/G
OINTMENT TOPICAL
Status: DISCONTINUED | OUTPATIENT
Start: 2020-08-31 | End: 2020-08-31 | Stop reason: HOSPADM

## 2020-08-31 RX ORDER — TOPIRAMATE 25 MG/1
50 TABLET ORAL 2 TIMES DAILY
Status: DISCONTINUED | OUTPATIENT
Start: 2020-08-31 | End: 2020-09-08

## 2020-08-31 RX ORDER — GENTAMICIN SULFATE 40 MG/ML
INJECTION, SOLUTION INTRAMUSCULAR; INTRAVENOUS
Status: DISCONTINUED | OUTPATIENT
Start: 2020-08-31 | End: 2020-08-31 | Stop reason: HOSPADM

## 2020-08-31 RX ORDER — CLOBAZAM 10 MG/1
40 TABLET ORAL 2 TIMES DAILY
Status: DISCONTINUED | OUTPATIENT
Start: 2020-08-31 | End: 2020-09-01

## 2020-08-31 RX ORDER — ONDANSETRON 2 MG/ML
4 INJECTION INTRAMUSCULAR; INTRAVENOUS ONCE AS NEEDED
Status: DISCONTINUED | OUTPATIENT
Start: 2020-08-31 | End: 2020-08-31

## 2020-08-31 RX ORDER — ROCURONIUM BROMIDE 10 MG/ML
INJECTION, SOLUTION INTRAVENOUS
Status: DISCONTINUED | OUTPATIENT
Start: 2020-08-31 | End: 2020-08-31

## 2020-08-31 RX ORDER — MIDAZOLAM HYDROCHLORIDE 1 MG/ML
INJECTION, SOLUTION INTRAMUSCULAR; INTRAVENOUS
Status: DISCONTINUED | OUTPATIENT
Start: 2020-08-31 | End: 2020-08-31

## 2020-08-31 RX ORDER — DIPHENHYDRAMINE HYDROCHLORIDE 50 MG/ML
25 INJECTION INTRAMUSCULAR; INTRAVENOUS EVERY 6 HOURS PRN
Status: DISCONTINUED | OUTPATIENT
Start: 2020-08-31 | End: 2020-08-31

## 2020-08-31 RX ORDER — BACITRACIN ZINC 500 UNIT/G
OINTMENT (GRAM) TOPICAL
Status: DISCONTINUED | OUTPATIENT
Start: 2020-08-31 | End: 2020-08-31 | Stop reason: HOSPADM

## 2020-08-31 RX ORDER — ACETAMINOPHEN 650 MG/1
650 SUPPOSITORY RECTAL EVERY 6 HOURS PRN
Status: DISCONTINUED | OUTPATIENT
Start: 2020-08-31 | End: 2020-08-31

## 2020-08-31 RX ORDER — BUPIVACAINE HYDROCHLORIDE AND EPINEPHRINE 5; 5 MG/ML; UG/ML
INJECTION, SOLUTION EPIDURAL; INTRACAUDAL; PERINEURAL
Status: DISCONTINUED | OUTPATIENT
Start: 2020-08-31 | End: 2020-08-31 | Stop reason: HOSPADM

## 2020-08-31 RX ORDER — HYDROCODONE BITARTRATE AND ACETAMINOPHEN 5; 325 MG/1; MG/1
1 TABLET ORAL EVERY 4 HOURS PRN
Status: DISCONTINUED | OUTPATIENT
Start: 2020-08-31 | End: 2020-08-31

## 2020-08-31 RX ORDER — PROPOFOL 10 MG/ML
VIAL (ML) INTRAVENOUS CONTINUOUS PRN
Status: DISCONTINUED | OUTPATIENT
Start: 2020-08-31 | End: 2020-08-31

## 2020-08-31 RX ORDER — AMOXICILLIN 250 MG
1 CAPSULE ORAL DAILY
Status: DISCONTINUED | OUTPATIENT
Start: 2020-08-31 | End: 2020-09-15 | Stop reason: HOSPADM

## 2020-08-31 RX ORDER — LIDOCAINE HYDROCHLORIDE AND EPINEPHRINE 10; 10 MG/ML; UG/ML
INJECTION, SOLUTION INFILTRATION; PERINEURAL
Status: DISCONTINUED | OUTPATIENT
Start: 2020-08-31 | End: 2020-08-31 | Stop reason: HOSPADM

## 2020-08-31 RX ORDER — ACETAMINOPHEN 325 MG/1
650 TABLET ORAL EVERY 4 HOURS PRN
Status: DISCONTINUED | OUTPATIENT
Start: 2020-08-31 | End: 2020-09-15 | Stop reason: HOSPADM

## 2020-08-31 RX ORDER — HYDROMORPHONE HYDROCHLORIDE 1 MG/ML
0.2 INJECTION, SOLUTION INTRAMUSCULAR; INTRAVENOUS; SUBCUTANEOUS EVERY 5 MIN PRN
Status: DISCONTINUED | OUTPATIENT
Start: 2020-08-31 | End: 2020-08-31

## 2020-08-31 RX ADMIN — PHENYLEPHRINE HYDROCHLORIDE 100 MCG: 10 INJECTION INTRAVENOUS at 07:08

## 2020-08-31 RX ADMIN — CLOBAZAM 40 MG: 10 TABLET ORAL at 08:08

## 2020-08-31 RX ADMIN — CEFAZOLIN 2 G: 1 INJECTION, POWDER, FOR SOLUTION INTRAMUSCULAR; INTRAVENOUS at 03:08

## 2020-08-31 RX ADMIN — PHENYLEPHRINE HYDROCHLORIDE 100 MCG: 10 INJECTION INTRAVENOUS at 09:08

## 2020-08-31 RX ADMIN — ROCURONIUM BROMIDE 50 MG: 10 INJECTION, SOLUTION INTRAVENOUS at 07:08

## 2020-08-31 RX ADMIN — FENTANYL CITRATE 25 MCG: 50 INJECTION, SOLUTION INTRAMUSCULAR; INTRAVENOUS at 12:08

## 2020-08-31 RX ADMIN — FENTANYL CITRATE 25 MCG: 50 INJECTION INTRAMUSCULAR; INTRAVENOUS at 01:08

## 2020-08-31 RX ADMIN — PROPOFOL 200 MG: 10 INJECTION, EMULSION INTRAVENOUS at 07:08

## 2020-08-31 RX ADMIN — HYDRALAZINE HYDROCHLORIDE 10 MG: 20 INJECTION INTRAMUSCULAR; INTRAVENOUS at 04:08

## 2020-08-31 RX ADMIN — SODIUM CHLORIDE: 0.9 INJECTION, SOLUTION INTRAVENOUS at 05:08

## 2020-08-31 RX ADMIN — PROPOFOL 150 MCG/KG/MIN: 10 INJECTION, EMULSION INTRAVENOUS at 07:08

## 2020-08-31 RX ADMIN — CITALOPRAM HYDROBROMIDE 20 MG: 10 TABLET ORAL at 04:08

## 2020-08-31 RX ADMIN — ESMOLOL HYDROCHLORIDE 20 MG: 10 INJECTION INTRAVENOUS at 12:08

## 2020-08-31 RX ADMIN — TOPIRAMATE 50 MG: 25 TABLET, FILM COATED ORAL at 08:08

## 2020-08-31 RX ADMIN — MIDAZOLAM HYDROCHLORIDE 2 MG: 1 INJECTION, SOLUTION INTRAMUSCULAR; INTRAVENOUS at 07:08

## 2020-08-31 RX ADMIN — MUPIROCIN: 20 OINTMENT TOPICAL at 05:08

## 2020-08-31 RX ADMIN — MUPIROCIN: 20 OINTMENT TOPICAL at 08:08

## 2020-08-31 RX ADMIN — FENTANYL CITRATE 100 MCG: 50 INJECTION, SOLUTION INTRAMUSCULAR; INTRAVENOUS at 07:08

## 2020-08-31 RX ADMIN — SODIUM CHLORIDE 0.25 MCG/KG/MIN: 9 INJECTION, SOLUTION INTRAVENOUS at 09:08

## 2020-08-31 RX ADMIN — ROCURONIUM BROMIDE 20 MG: 10 INJECTION, SOLUTION INTRAVENOUS at 09:08

## 2020-08-31 RX ADMIN — CEFTRIAXONE SODIUM 2 G: 2 INJECTION, SOLUTION INTRAVENOUS at 07:08

## 2020-08-31 RX ADMIN — ESMOLOL HYDROCHLORIDE 20 MG: 10 INJECTION INTRAVENOUS at 01:08

## 2020-08-31 RX ADMIN — ROCURONIUM BROMIDE 20 MG: 10 INJECTION, SOLUTION INTRAVENOUS at 08:08

## 2020-08-31 RX ADMIN — SODIUM CHLORIDE, SODIUM GLUCONATE, SODIUM ACETATE, POTASSIUM CHLORIDE, MAGNESIUM CHLORIDE, SODIUM PHOSPHATE, DIBASIC, AND POTASSIUM PHOSPHATE: .53; .5; .37; .037; .03; .012; .00082 INJECTION, SOLUTION INTRAVENOUS at 07:08

## 2020-08-31 RX ADMIN — OXYCODONE HYDROCHLORIDE 5 MG: 5 TABLET ORAL at 04:08

## 2020-08-31 RX ADMIN — PHENYLEPHRINE HYDROCHLORIDE 100 MCG: 10 INJECTION INTRAVENOUS at 08:08

## 2020-08-31 RX ADMIN — PROMETHAZINE HYDROCHLORIDE 6.25 MG: 25 INJECTION INTRAMUSCULAR; INTRAVENOUS at 05:08

## 2020-08-31 RX ADMIN — ROCURONIUM BROMIDE 20 MG: 10 INJECTION, SOLUTION INTRAVENOUS at 10:08

## 2020-08-31 RX ADMIN — LAMOTRIGINE 200 MG: 25 TABLET ORAL at 08:08

## 2020-08-31 RX ADMIN — FENTANYL CITRATE 25 MCG: 50 INJECTION, SOLUTION INTRAMUSCULAR; INTRAVENOUS at 01:08

## 2020-08-31 RX ADMIN — DEXTROSE 2 G: 50 INJECTION, SOLUTION INTRAVENOUS at 09:08

## 2020-08-31 RX ADMIN — PROCHLORPERAZINE EDISYLATE 2.5 MG: 5 INJECTION INTRAMUSCULAR; INTRAVENOUS at 11:08

## 2020-08-31 RX ADMIN — CEFAZOLIN 2 G: 1 INJECTION, POWDER, FOR SOLUTION INTRAMUSCULAR; INTRAVENOUS at 10:08

## 2020-08-31 RX ADMIN — ROCURONIUM BROMIDE 20 MG: 10 INJECTION, SOLUTION INTRAVENOUS at 07:08

## 2020-08-31 RX ADMIN — ROCURONIUM BROMIDE 10 MG: 10 INJECTION, SOLUTION INTRAVENOUS at 09:08

## 2020-08-31 NOTE — OR NURSING
Chart left open due to patient procedures. First procedure fiducial placement followed by patient leaving for CT scan and returning for Second procedure SEEG electrode placement, OWEN robot.

## 2020-08-31 NOTE — HOSPITAL COURSE
8/31: sEEG placement. Hold Vimpat. Continue other AEDs: Onfi 40 mg BID, Lamictal 200 mg BID, Topamax 50 mg BID.  8/31>9/1: No clinical or electrographic seizures overnight  9/1>9/2: Four electrographic seizures (3 with clinical correlate) originating from L hippo and entro areas  9/2>9/3: No electrographic seizures  9/3>9/4: No electrographic seizures  9/4>9/5: No electrographic seizures  9/5>9/6: No electrographic seizures  9/6>9/7: No electrographic seizures  9/7>9/8: No electrographic seizures  9/8>9/9: No electrographic seizures  9/9>9/10: No electrographic seizures  9/10>9/11: One clinical and electrographic seizure originating from L Hippo and Entro areas  9/11>9/12: One clinical and electrographic seizure so far 9/12, progressed to GTC  9/12>9/13 - no acute events overnight.   9/13>9/14 - no acute events overnight, resume home AEDs, VNS turned back on to prior settings. To OR today for sEEG removal.  9/14>9/15 - no acute events, tolerating home AEDs well, no issues with VNS. Worked with PT/OT, states he is feeling well and looking to discharge home. Discharge per NSGY/NCC.

## 2020-08-31 NOTE — CONSULTS
[facility]  Neurology-Epilepsy  Consult Note    Patient Name: Darin Cunha  MRN: [id number]  Admission Date: 8/31/2020  Hospital Length of Stay: 0 days  Code Status: Full Code   Attending Provider: Melani Ortiz MD   Consulting Provider: Minerva Oconnor PA-C  Primary Care Physician: Primary Doctor No  Principal Problem:Epilepsy    Consults   Subjective:     HPI:   25 year old male with a PMHx of depression and epilepsy since 2013 s/p VNS placement on Onfi 40 mg BID, Lamictal 200 mg BID, Vimpat 200 mg BID, and Topamax 50 mg BID admitted for bilateral sEEG placement to identify seizure focus. VNS turned off s/p EEG placement. Patient admitted to [facility] following sEEG placement.     Hospital Course:   8/31: sEEG placement. Hold Vimpat. Continue other AEDs: Onfi 40 mg BID, Lamictal 200 mg BID, Topamax 50 mg BID.     Past Medical History:   Diagnosis Date    Depressed 9/12/2018    Epilepsy 2015    Mitral valve prolapse 2015    Seizures        Past Surgical History:   Procedure Laterality Date    ADENOIDECTOMY  2015    ADENOIDECTOMY      finger abscess removal      TONSILLECTOMY      VAGUS NERVE STIMULATOR INSERTION Left 4/4/2019    Procedure: INSERTION, VAGUS NERVE STIMULATOR;  Surgeon: Reuben Gupta MD;  Location: [facility] OR [address] [place] [address];  Service: Neurosurgery;  Laterality: Left;  toronto I, asa 1, regular bed, supine     wisdom teeth extracted- about 2010          Review of patient's allergies indicates:   Allergen Reactions    Zofran [ondansetron hcl (pf)] Hives and Rash       No current facility-administered medications on file prior to encounter.      Current Outpatient Medications on File Prior to Encounter   Medication Sig    cloBAZam (ONFI) 20 mg Tab Take 2 tablets (40 mg total) by mouth 2 (two) times daily. (Patient taking differently: Take 40 mg by mouth 2 (two) times daily. Take am of surgery)    lamoTRIgine (LAMICTAL) 200 MG tablet Take 1 tablet (200 mg total) by mouth 2 (two) times daily.  (Patient taking differently: Take 200 mg by mouth 2 (two) times daily. Take am of surgery)    topiramate (TOPAMAX) 50 MG tablet Take 1 tablet (50 mg total) by mouth 2 (two) times daily. (Patient taking differently: Take 50 mg by mouth 2 (two) times daily. Take am of surgery)     Continuous Infusions:    Family History     Problem Relation (Age of Onset)    Heart disease Maternal Grandmother, Maternal Grandfather    Hypertension Mother, Maternal Grandmother, Maternal Grandfather        Tobacco Use    Smoking status: Never Smoker    Smokeless tobacco: Never Used   Substance and Sexual Activity    Alcohol use: Yes     Alcohol/week: 10.0 standard drinks     Types: 10 Cans of beer per week     Comment: 12 beers over a months - spread over a month     Drug use: No    Sexual activity: Yes     Partners: Female     Review of Systems   Constitutional: Negative for chills, diaphoresis, fatigue and fever.   HENT: Negative for congestion, sore throat and trouble swallowing.    Eyes: Negative for photophobia and visual disturbance.   Respiratory: Negative for cough and shortness of breath.    Cardiovascular: Negative for chest pain and palpitations.   Gastrointestinal: Negative for abdominal pain, diarrhea, nausea and vomiting.   Musculoskeletal: Negative for back pain, gait problem and neck pain.   Neurological: Positive for headaches (s/p sEEG). Negative for dizziness, speech difficulty, weakness and numbness.   Psychiatric/Behavioral: Negative for agitation, behavioral problems and confusion. The patient is not nervous/anxious.      Objective:     Vital Signs (Most Recent):  Temp: 97.5 °F (36.4 °C) (08/31/20 1605)  Pulse: 80 (08/31/20 1705)  Resp: 16 (08/31/20 1705)  BP: (!) 107/59 (08/31/20 1705)  SpO2: 100 % (08/31/20 1705) Vital Signs (24h Range):  Temp:  [97.5 °F (36.4 °C)-98 °F (36.7 °C)] 97.5 °F (36.4 °C)  Pulse:  [60-95] 80  Resp:  [13-20] 16  SpO2:  [96 %-100 %] 100 %  BP: (107-142)/(59-80) 107/59  Arterial Line  BP: (118-144)/(70-82) 118/70     Weight: 78.5 kg (173 lb 1 oz)  Body mass index is 22.83 kg/m².    Physical Exam  Constitutional:       General: He is not in acute distress.     Appearance: Normal appearance. He is not diaphoretic.      Interventions: He is sedated and intubated.   HENT:      Head: Normocephalic.      Comments: sEEG in place  Eyes:      General: No scleral icterus.        Right eye: No discharge.         Left eye: No discharge.      Extraocular Movements: EOM normal.      Conjunctiva/sclera: Conjunctivae normal.      Pupils: Pupils are equal, round, and reactive to light.   Cardiovascular:      Rate and Rhythm: Normal rate.   Pulmonary:      Effort: Pulmonary effort is normal. No respiratory distress.  Abdominal:      General: There is no distension.      Palpations: Abdomen is soft.      Tenderness: There is no abdominal tenderness.   Musculoskeletal: Normal range of motion.         General: No deformity or signs of injury.   Skin:     General: Skin is warm and dry.   Neurological:      Mental Status: He is alert and oriented to person, place, and time. Mental status is at baseline.      Coordination: Finger-Nose-Finger Test normal.   Psychiatric:         Mood and Affect: Mood normal.         Speech: Speech normal.         Behavior: Behavior normal.         Thought Content: Thought content normal.         NEUROLOGICAL EXAMINATION:     MENTAL STATUS   Oriented to person, place, and time.   Attention: normal. Concentration: normal.   Speech: speech is normal   Level of consciousness: alert    CRANIAL NERVES     CN II   Visual fields full to confrontation.     CN III, IV, VI   Pupils are equal, round, and reactive to light.  Extraocular motions are normal.     CN V   Right corneal reflex: normal  Left corneal reflex: normal    CN VII   Facial expression full, symmetric.     CN VIII   Hearing: intact    CN XI   CN XI normal.     CN XII   CN XII normal.     GAIT AND COORDINATION      Coordination    Finger to nose coordination: normal      Significant Labs: All pertinent lab results from the past 24 hours have been reviewed.    Significant Studies: I have reviewed all pertinent imaging results/findings within the past 24 hours.    Assessment and Plan:     * Epilepsy  25M with a PMHx of intractable epilepsy s/p VNS placement on Onfi 40 mg BID, Lamictal 200 mg BID, Vimpat 200 mg BID, and Topamax 50 mg BID s/p bilateral sEEG placement with Dr. Melani Ortiz on 8/31.    Recommendations:  - Continuous vEEG monitoring  - Held home Vimpat  - Continue other home AEDs: Onfi 40 mg BID, Lamictal 200 mg BID, and Topamax 50 mg BID  - Please call epilepsy on call prior to administering benzodiazepines   - Neurosurgery following  - Postop CTH stable  - Seizure precautions  - Avoid agents that lower seizure thresold      Plan of care discussed with patient and NCC team. Will follow. Please call with questions.    Depression  - Chronic and stable  - Continue home Celexa      VTE Risk Mitigation (From admission, onward)         Ordered     Place sequential compression device  Until discontinued      08/31/20 5776                Thank you for your consult. I will follow-up with patient. Please contact us if you have any additional questions.    Minerva Oconnor PA-C  Neurology-Epilepsy  [facility]  Staff: Dr. Sage

## 2020-08-31 NOTE — OP NOTE
[facility]  Neurosurgery  Operative Note    SUMMARY      Date of Procedure: 8/31/2020     Procedure: Procedure(s) (LRB):  CREATION, CRANIAL YUNG HOLE, PLACEMENT OF THE FIDUCIAL SCREWS BILATERAL 2.PLACEMENT OF SEEG ELECTRODES BILATERAL WITH OWEN ROBOT (Bilateral)     Surgeon(s) and Role:     * Melani Ortiz MD - Primary     * Katey Lee MD - Resident - Assisting     * Harshal Tony MD - Resident - Assisting    Pre-Operative Diagnosis: Complex partial epilepsy with generalization and with intractable epilepsy [G40.219]  Localization-related (focal) (partial) symptomatic epilepsy and epileptic syndromes with complex partial seizures, intractable, with status epilepticus [G40.211]    Post-Operative Diagnosis: Post-Op Diagnosis Codes:     * Complex partial epilepsy with generalization and with intractable epilepsy [G40.219]     * Localization-related (focal) (partial) symptomatic epilepsy and epileptic syndromes with complex partial seizures, intractable, with status epilepticus [G40.211]    Anesthesia: General    Technical Procedures Used:   1. Placement of fiducial screws   2. Placement fourteen bilateral sEEG depth electrodes   3. Use of OWEN robot neuro-navigation    Description of the Findings of the Procedure:   The patient was brought back to the operating room, where he was carefully positioned on the stretcher in anticipation of skull fiducial placement. General endotracheal anesthesia was induced by the anesthesia team. He received 2 grams of IV Rocephin within 30 minutes of skin incision. He was prepped and draped in the usual sterile fashion, including clipping of hair.  Two cc of 1% lidocaine with epinephrine were used to infiltrate the skin, and a stab incision was performed with a number 15 over the right frontal region, behind the hairline. The pericranium was elevated, and a skull fiducial was placed using the power screwdriver and checked with the hand screwdriver. This process  was repeated an additional four times over the right parietal, left parasagittal, left frontal, and left parietal positions.  We further injected 0.25% Marcaine with epinephrine in each of the sites.  The patient was then taken to the CT scanner for a volumetric study with the anesthesia team before returning to the OR and being transferred to the OR table. Again, all pressure points were carefully padded.     At this point, the patient's head was affixed using a 3-point Flores head prieto to the OWEN robot, onto which we loaded the previously completed sEEG plan and merged to the just-acquired CT scan with fiducials. As soon as the head was affixed to the OWEN, the bed was unplugged from the wall. Skull fiducial registration was then completed with an appropriate projected accuracy of 0.38 mm. The patient was then again prepped and draped in the usual sterile fashion. Instructions were given to the anesthesia team to keep SBP <140 at all times. CHASE hose and SCDs were applied. 2 grams of IV Keflex was administered.      Attention was turned to the first trajectory, left amygdala. The skin was marked at the site of incision and infiltrated with 1% lidocaine with epinephrine. A stab incision was made, and using the OWEN as a trajectory guide, the hand drill was used to drill through the skull. Care was taken not to plunge. The dura was cauterized with application of the Bovie cautery to a Rocío pin. The reducing tube was removed, and the bone anchor was secured to the skull with two-finger tightness. After being carefully measured and marked, a rigid stylet followed by the the electrode was then passed to depth, and secured to the bone anchor. The stylet was then removed, and the electrode number was read and confirmed with the EEG technician.      This exact procedure was repeated 13 more times, for the left hippocampus, left entorhinal cortex, left anterior cingulate, left orbitofrontall, left prefrontal  cortex, left anterior insula, left posterior insula, left temporal-occipital, left parieta, right amygdala, right hippocampus, right anterior superior insula, right anterior cingulate.      The epilepsy neurology team then came into the OR to test the electrodes and found all of them to have satisfactory EEG output.      The patient tolerated the procedure well without apparent complication. All counts were correct x2. A large, protective, sterile headwrap was applied. The patient was then extubated by the anesthesia team with disposition to the neuro ICU by way of the CT scanner.     Complications: No    Estimated Blood Loss (EBL): minimal            Specimens:   Specimen (12h ago, onward)    None           Implants:   Implant Name Type Inv. Item Serial No.  Lot No. LRB No. Used Action   FIDUCIAL KIT UNI STEREO 10 MM - [id number]  FIDUCIAL KIT UNI STEREO 10 MM  [employer] [facility] [id number] Bilateral 1 Implanted   sEEG Depthalon, 14 Contact for 2.1mm Drill, 0.8mm OD, 2mm Contact Length, 47.5mm Recording Depth, 3.5mm Spacing , 27cm Main Tube Length    [id number]  [id number] Left 1 Implanted   sEEG [place] [place] for 2.1mm Drill, 25mm Length      [id number] Left 1 Implanted   anchor bolt seeg anchor bolt for 2.1mm drill 25mm length, PMT      [id number] Left 2 Implanted   PMT seeg depthlalon platinum 14contact mini connector for 2.1mm drill   60966  [id number] Left 1 Implanted   sEEG depthalon platium 14 contact mini connector for 2.1mm drill    [id number]  [id number] Left 1 Implanted   PMT anchor bolt sEEG anchor bolt for 2.1mm drill 25mm length     [id number] Left 7 Implanted   PMT sEEG depthalon platimym 14 contact mini connector for 2.1mm drill   [id number]  [id number] Left 1 Implanted   PMT SEEG Depthalon platinum 16 contact mini connector  for 2.1mm drill   [id number]  [id number] Left 1 Implanted   PMT anchor bolt sEEG anchor bolt, for 2.1mm drill 25mm length     [id number] Left 1 Implanted   PMT sEEG depthalon platimum 16 contact connector for 2.1mm drill   [id number]   [id number] Left 1 Implanted   PMT SEEG depthalon platimum 8 contact mini connector 2.1mm drill   25496  [id number] Left 1 Implanted   PMT sEEG depthalon platinum 8contact mini connector   [id number]  [id number] Left 1 Implanted   PMT sEEG depthalon platimum 8 contact mini connector for 2.1mm drill   68884  [id number] Left 1 Implanted   PMT sEEG depthalon 12 contact mini connector   60603  [id number] Right 1 Implanted   PMT anchor bolt sEEG anchor  for 2.1mm drill 25mm length     [id number] Right 4 Implanted   PMT sEEg depthalon 12 contact for 2.1mm drill   [id number]  [id number] Right 1 Implanted   PMT sEEG depthalon 8 contact mini connector   [id number]  [id number] Right 1 Implanted   PMT sEEG depthalon 16 contact mini connector    [id number]  [id number] Right 1 Implanted   PMT sEEG depthalon 8 contact mini connector   [id number]  [id number] Right 1 Implanted              Condition: Stable    Disposition: ICU - extubated and stable.    Attestation: I was present and scrubbed for the entire procedure.

## 2020-08-31 NOTE — H&P
"HPI  The following HPI is per Dr. Ortiz's note on 7/22/2020:    "Chief Complaint: intractable epilepsy      History of Present Illness:  Darin Cunha is a 25 y.o. male with intractable epilepsy since 2013 who presents as a referral from Dr. Abhijeet Do to discuss the gamut of epilepsy surgery options. Today's visit is a video visit, and the patient is a passenger in a moving vehicle. His mother is not with him.      The patient states that when he has events, he stares off and smacks his lips. The episodes last for about 5-6 minutes. He often waves his hands and feels tired afterwards. He currently has about 3-5 episodes a week. He has failed at least 4 AEDs at therapeutic dosages.      Per EEG review, he has bilateral temporal involvement, moreso left-sided than right.      Triggers include being tired and being stressed. He is not working (on disability) but finished his GED. He lives next door to grandmother and nearby to his mother. His major concern is memory loss from ongoing seizures.      Of notes, he endorses a history of staph infection previously. He denies history of bleeding or anesthetic complications. He has a VNS in place.      As of 7/21/20, we are seeing each other in person. His mother presents with him today.  They report that while the patient was recently in the EMU, he had a grand mal seizure while off his medications and fell out of bed.  This resulted in his having a right-sided wing scapula.  The patient has undergone EMG nerve conduction study which demonstrates no evidence of long thoracic nerve injury.  They are also concerned about a cyst that he has on his right shoulder.     Regarding his seizure activity, he had 4 back-to-back seizures about 10 days ago.  He continues to have significant seizure burden.  He does not feel that the VNS is helpful.                 Review of patient's allergies indicates:   Allergen Reactions    Zofran [ondansetron hcl (pf)] Hives and Rash         Current " Medications          Current Outpatient Medications   Medication Sig Dispense Refill    citalopram (CELEXA) 20 MG tablet Take 1 tablet (20 mg total) by mouth once daily. 30 tablet 11    cloBAZam (ONFI) 20 mg Tab Take 2 tablets (40 mg total) by mouth 2 (two) times daily. 120 tablet 2    HYDROcodone-acetaminophen (NORCO) 5-325 mg per tablet Take 1 tablet by mouth every 6 (six) hours as needed for Pain. (Patient not taking: Reported on 6/8/2020) 60 tablet 0    lacosamide (VIMPAT) 200 mg Tab tablet Take 1 tablet (200 mg total) by mouth every 12 (twelve) hours. 60 tablet 2    lamoTRIgine (LAMICTAL) 200 MG tablet Take 1 tablet (200 mg total) by mouth 2 (two) times daily. 60 tablet 2    topiramate (TOPAMAX) 50 MG tablet Take 1 tablet (50 mg total) by mouth 2 (two) times daily. 60 tablet 11      No current facility-administered medications for this visit.                 Past Medical History:   Diagnosis Date    Depressed 9/12/2018    Epilepsy 2015    Mitral valve prolapse 2015    Seizures              Past Surgical History:   Procedure Laterality Date    ADENOIDECTOMY   2015    finger abscess removal        VAGUS NERVE STIMULATOR INSERTION Left 4/4/2019     Procedure: INSERTION, VAGUS NERVE STIMULATOR;  Surgeon: Reuben Gupta MD;  Location: [facility] OR [address] [place] [address];  Service: Neurosurgery;  Laterality: Left;  toronto I, asa 1, regular bed, supine            Family History      Problem Relation (Age of Onset)     Heart disease Maternal Grandmother, Maternal Grandfather     Hypertension Mother, Maternal Grandmother, Maternal Grandfather          Social History            Socioeconomic History    Marital status: Significant Other       Spouse name: Not on file    Number of children: Not on file    Years of education: Not on file    Highest education level: Not on file   Occupational History    Not on file   Social Needs    Financial resource strain: Not on file    Food insecurity       Worry: Not on file        Inability: Not on file    Transportation needs       Medical: Not on file       Non-medical: Not on file   Tobacco Use    Smoking status: Never Smoker    Smokeless tobacco: Never Used   Substance and Sexual Activity    Alcohol use: Yes       Alcohol/week: 10.0 standard drinks       Types: 10 Cans of beer per week       Comment: 10 beers/week    Drug use: No    Sexual activity: Yes       Partners: Female   Lifestyle    Physical activity       Days per week: Not on file       Minutes per session: Not on file    Stress: Not on file   Relationships    Social connections       Talks on phone: Not on file       Gets together: Not on file       Attends Anglican service: Not on file       Active member of club or organization: Not on file       Attends meetings of clubs or organizations: Not on file       Relationship status: Not on file   Other Topics Concern    Not on file   Social History Narrative    Not on file         Review of Systems   Constitutional: Negative for fever.   HENT: Negative for nosebleeds.    Eyes: Negative for visual disturbance.   Respiratory: Negative for shortness of breath.    Cardiovascular: Negative for chest pain.   Gastrointestinal: Negative for vomiting.        Heartburn   Endocrine: Positive for cold intolerance.   Genitourinary: Negative for difficulty urinating.   Musculoskeletal: Positive for back pain.        Shoulder dislocated   Skin: Negative for color change.   Neurological: Positive for seizures.   Hematological: Does not bruise/bleed easily.   Psychiatric/Behavioral: Positive for confusion.         OBJECTIVE:      Physical Exam:     Constitutional: He appears well-developed and well-nourished. No distress.      Eyes: EOM are normal.      Abdominal: Soft.      Skin: Skin displays no rash on extremities. Skin displays lesions on trunk (cyst atop right shoulder).     Psych/Behavior: He is alert. He is oriented to person, place, and time.     Musculoskeletal: Gait is  normal.        Right Upper Extremities: Muscle strength is 5/5.        Left Upper Extremities: Muscle strength is 5/5.       Right Lower Extremities: Muscle strength is 5/5.        Left Lower Extremities: Muscle strength is 5/5.      Comments: Scapular winging noted at rest and made worse by the patient flexing the arms forward against the wall.  I believe this appears most consistent with the serratus anterior palsy, rather than a trapezius palsy     Neurological:        Coordination: He has normal finger to nose coordination.        Sensory: There is no sensory deficit in the trunk. There is no sensory deficit in the extremities.        DTRs: DTRs are DTRS NORMAL AND SYMMETRICnormal and symmetric.        Cranial nerves:   Face grossly symmetric; tongue midline      Pulmonary: nonlabored respirations      Diagnostic Results:  MRI brain personally reviewed   No lesional findings      ASSESSMENT/PLAN:      Darin Cunha is a 25 y.o. male with intractable epilepsy who presents to discuss surgical treatment options for epilepsy.      We had a discussion about the risks, benefits, and alternatives to intracranial localization for seizures. We discussed that it would be appropriate to proceed with phase two stereo electroencephalogram monitoring to better identify the seizure focus.  We have a good hypothesis that seizures are arising in the left temporal region, which gives us a frame work that we can use to design a localization plan.  However, I anticipate that both the right and left temporal lobes will be sampled.   This will be designed with input from the patient's epilepsy neurologist, Dr. Abhijeet Do, and the interdisciplinary team.      We discussed that monitoring typically takes 1-2 weeks but may be longer or shorter depending on how long it takes for him to have concordant seizures. We discussed that he will not be able to get out of bed while the electrodes are in his head, and that he will remain in the ICU  during that time. We also discussed that this is a diagnostic, not therapeutic, procedure, and that the definitive surgery would be performed 6-8 weeks after the time of sEEG localization.      We also discussed the specific risks of the localization surgery. Risks include, but are not limited to, bleeding, pain, infection, scarring, need for further/repeat procedure, death, paralysis, stroke (including venous infarct)/damage to major blood vessels, leak of cerebrospinal fluid, and hardware-related complications. There is a chance that we would fail to localize the seizures with the initial electrode plan, which would require placement of additional electrodes. We discussed that my partner Dr. Gupta will also be involved in the surgical process. Informed consent was obtained from the patient for placement and subsequent removal of the electrodes, together with a blood consent.  The patient's mother was present and expressed understanding of the entire process.     I explained that we cannot predict the likelihood of seizure freedom until we determine where the seizures are beginning. We discussed open resection, laser ablation, and RNS as some potential treatment options after seizure localization.      We will discuss the patient further in interdisciplinary epilepsy conference to understand better his neuropsychological testing and proceed accordingly. Dr. Tucker stopped by my office today to discuss that he feels the patient is an appropriate surgical candidate.  He would like the patient to be more consistent about having a system in place to ensure he is taking his medications daily.  I have further reinforced this with the patient.     Regarding the winged scapula, I have encouraged the patient to present to the emergency department for orthopedic surgery evaluation of the musculature given that EMG nerve conduction study was negative.  We may also consider MRI of the cervical spine to ensure no C5, C6, or C7  "nerve damage.  However, I believe this is less likely given that the patient's presentation is painless, and he has no weakness over those dermatomes.      Patient will require an MRI of the brain with contrast prior to surgical planning so that we can visualize his cerebral vasculature.     I have encouraged the patient to contact the clinic in the interim with any questions, concerns, or adverse clinical change.   "    In addendum to Dr. Ortiz's clinic note, the patient describes stable symptomology, has been NPO since midnight, and has not taken any anti-plt/coag medications in the last 72 hours.    A&P  25M/F w/ PMH of intractable epilepsy who presents today for elective sEEG placement.     --Patient evaluated prior to surgery  --All diagnostics and imaging reviewed  --Patient NPO since MN  --No anti-coag/plt medication in the last 72h  --Patient consented and all questions answered. Blood/surgical consent in media tab  --Site marked (bilateral) - midline forehead marked  --VNS is ON, patient had in MRI mode yesterday for MRI C spine (Reviewed). Has battery with him today, VNS reprogrammed yesterday per VN.  --Patient has not taken his AED yet this AM, last seizure last week per patient/family  --Pre op labs in process  --Further reccs to follow s/p surgery    Katey Lee MD  Neurosurgery  [facility]      "

## 2020-08-31 NOTE — ANESTHESIA PREPROCEDURE EVALUATION
08/31/2020  Darin Cunha is a 25 y.o., male.  Patient Active Problem List   Diagnosis    Complex partial epilepsy with generalization and with intractable epilepsy    Adjustment disorder with depressed mood    Seizures    Epilepsy    Mild neurocognitive disorder due to another medical condition    Winged scapula of right side    History of MRSA infection    Memory problem    Depression    Mitral valve prolapse         Anesthesia Evaluation          Review of Systems      Physical Exam  General:  Well nourished    Airway/Jaw/Neck:  Airway Findings: Mouth Opening: Normal Tongue: Normal  General Airway Assessment: Adult  Mallampati: II  Improves to II with phonation.  TM Distance: Normal, at least 6 cm      Dental:  Dental Findings:   Chest/Lungs:  Chest/Lungs Findings: Clear to auscultation     Heart/Vascular:  Heart Findings: Rate: Normal  Rhythm: Regular Rhythm  Sounds: Normal        Mental Status:  Mental Status Findings:  Cooperative, Alert and Oriented         Anesthesia Plan  Type of Anesthesia, risks & benefits discussed:  Anesthesia Type:  general  Patient's Preference: General  Intra-op Monitoring Plan: standard ASA monitors  Intra-op Monitoring Plan Comments: Standard ASA monitors.   Post Op Pain Control Plan: per primary service following discharge from PACU  Post Op Pain Control Plan Comments: Per primary service.     Induction:   IV  Beta Blocker:  Patient is not currently on a Beta-Blocker (No further documentation required).       Informed Consent: Patient understands risks and agrees with Anesthesia plan.  Questions answered. Anesthesia consent signed with patient.  ASA Score: 3     Day of Surgery Review of History & Physical:    H&P update referred to the surgeon.     Anesthesia Plan Notes: Chart reviewed, patient interviewed and examined.  The plan for general anesthesia was  explained.  Questions were answered and the consent was signed.  Kaveh LEONARD         Ready For Surgery From Anesthesia Perspective.

## 2020-08-31 NOTE — HPI
The patient is a 25 year old M with PMHx of depression, seizures, epilepsy admitted to [facility] s/p placement of sEED electrodes bilateral. Per chart review he has failed at least 4 AEDs at therapeutic dosages. Triggers include being tired and being stressed. He is not working (on disability) but finished his GED. He lives next door to grandmother and nearby to his mother. His major concern is memory loss from ongoing seizures. NSGY and epilepsy following. Patient admitted to [facility] for close monitoring and higher level of care.

## 2020-08-31 NOTE — ASSESSMENT & PLAN NOTE
-s/p sEEG placement  -cefazolin 2 q8 prophylaxis  -NSGY and epilepsy following  -seizure precautions  -continue: Topiramate 50 mg BID, lamotrigine 200mg BID, clobazam 40 mg BID, hole home med Vimpat  -neuro checks q 1 hr

## 2020-08-31 NOTE — ANESTHESIA PROCEDURE NOTES
Arterial    Diagnosis: epilepsy     Patient location during procedure: done in OR  Procedure start time: 8/31/2020 7:37 AM  Timeout: 8/31/2020 7:34 AM  Procedure end time: 8/31/2020 7:42 AM    Staffing  Authorizing Provider: Rich Haley MD  Performing Provider: Patricio Miguel CRNA    Staffing  Other anesthesia staff: Evan Mccarthy  Anesthesiologist was present at the time of the procedure.    Preanesthetic Checklist  Completed: patient identified, site marked, surgical consent, pre-op evaluation, timeout performed, IV checked, risks and benefits discussed, monitors and equipment checked and anesthesia consent givenArterial  Skin Prep: chlorhexidine gluconate  Local Infiltration: none  Orientation: right  Location: radial  Catheter Size: 20 G  Catheter placement by Anatomical landmarks. Heme positive aspiration all ports.Insertion Attempts: 1  Assessment  Dressing: secured with tape and tegaderm  Patient: Tolerated well

## 2020-08-31 NOTE — BRIEF OP NOTE
[facility]  Surgery Department  Operative Note    SUMMARY     Date of Procedure: 8/31/2020     Procedure: Procedure(s) (LRB):  CREATION, CRANIAL YUNG HOLE, PLACEMENT OF THE FIDUCIAL SCREWS BILATERAL 2.PLACEMENT OF SEEG ELECTRODES BILATERAL WITH OWEN ROBOT (Bilateral)     Surgeon(s) and Role:     * Melani Ortiz MD - Primary     * Katey Lee MD - Resident - Assisting     * Harshal Tony MD - Resident - Assisting        Pre-Operative Diagnosis: Complex partial epilepsy with generalization and with intractable epilepsy [G40.219]  Localization-related (focal) (partial) symptomatic epilepsy and epileptic syndromes with complex partial seizures, intractable, with status epilepticus [G40.211]    Post-Operative Diagnosis: Post-Op Diagnosis Codes:     * Complex partial epilepsy with generalization and with intractable epilepsy [G40.219]     * Localization-related (focal) (partial) symptomatic epilepsy and epileptic syndromes with complex partial seizures, intractable, with status epilepticus [G40.211]    Anesthesia: General    Technical Procedures Used: see full operative note    Description of the Findings of the Procedure: placement of stereotactic sEEG leads with OWEN robot. Placement and removal of fiducial screws      Complications: No    Estimated Blood Loss (EBL): * No values recorded between 8/31/2020  7:39 AM and 8/31/2020 12:33 PM *           Implants:   Implant Name Type Inv. Item Serial No.  Lot No. LRB No. Used Action   FIDUCIAL KIT UNI STEREO 10 MM - [id number]  FIDUCIAL KIT UNI STEREO 10 MM  [employer] [facility] [id number] Bilateral 1 Implanted   sEEG Depthalon, 14 Contact for 2.1mm Drill, 0.8mm OD, 2mm Contact Length, 47.5mm Recording Depth, 3.5mm Spacing , 27cm Main Tube Length    [id number]  [id number] Left 1 Implanted   sEEG [place] for 2.1mm Drill, 25mm Length      [id number] Left 1 Implanted   anchor bolt seeg anchor bolt for 2.1mm drill 25mm length, PMT      [id number] Left 2 Implanted    PMT seeg depthlalon platinum 14contact mini connector for 2.1mm drill   [id number]  [id number] Left 1 Implanted   sEEG depthalon platium 14 contact mini connector for 2.1mm drill    [id number]  [id number] Left 1 Implanted   PMT anchor bolt sEEG anchor bolt for 2.1mm drill 25mm length     [id number] Left 7 Implanted   PMT sEEG depthalon platimym 14 contact mini connector for 2.1mm drill   [id number]  [id number] Left 1 Implanted   PMT SEEG Depthalon platinum 16 contact mini connector  for 2.1mm drill   [id number]  [id number] Left 1 Implanted   PMT anchor bolt sEEG anchor bolt, for 2.1mm drill 25mm length     [id number] Left 1 Implanted   PMT sEEG depthalon platimum 16 contact connector for 2.1mm drill   [id number]  [id number] Left 1 Implanted   PMT SEEG depthalon platimum 8 contact mini connector 2.1mm drill   [id number]  [id number] Left 1 Implanted   PMT sEEG depthalon platinum 8contact mini connector   [id number]  [id number] Left 1 Implanted   PMT sEEG depthalon platimum 8 contact mini connector for 2.1mm drill   [id number]  [id number] Left 1 Implanted   PMT sEEG depthalon 12 contact mini connector   [id number]  [id number] Right 1 Implanted   PMT anchor bolt sEEG anchor  for 2.1mm drill 25mm length     [id number] Right 4 Implanted   PMT sEEg depthalon 12 contact for 2.1mm drill   [id number]  [id number] Right 1 Implanted   PMT sEEG depthalon 8 contact mini connector   [id number]  [id number] Right 1 Implanted   PMT sEEG depthalon 16 contact mini connector    [id number]  [id number] Right 1 Implanted   PMT sEEG depthalon 8 contact mini connector   [id number]  [id number] Right 1 Implanted       Specimens:   Specimen (12h ago, onward)    None                  Condition: Stable    Disposition: ICU - extubated and stable.    Attestation: Dr Ortiz was scrubbed for procedure

## 2020-08-31 NOTE — NURSING
Patient arrived to [facility] from OR    Type of stroke/diagnosis:  Seizures s/p sEEG electrode placement.     Current symptoms: somnolent, follows commands, moves all 4 extremities, pupils equal and brisk.     Skin assessment done: Y  Wounds noted: head incision with 14 electrodes wrapped in post-op dressing  STEPHENIE Armband Applied: yes  Patient Belongings on Admit: none    NCC notified: MD Gerald    1326 - Georgie, NP with NCC at bedside.

## 2020-08-31 NOTE — SUBJECTIVE & OBJECTIVE
Past Medical History:   Diagnosis Date    Depressed 9/12/2018    Epilepsy 2015    Mitral valve prolapse 2015    Seizures      Past Surgical History:   Procedure Laterality Date    ADENOIDECTOMY  2015    ADENOIDECTOMY      finger abscess removal      TONSILLECTOMY      VAGUS NERVE STIMULATOR INSERTION Left 4/4/2019    Procedure: INSERTION, VAGUS NERVE STIMULATOR;  Surgeon: Reuben Gupta MD;  Location: [facility] OR [address] [place] [address];  Service: Neurosurgery;  Laterality: Left;  toronto I, asa 1, regular bed, supine     wisdom teeth extracted- about 2010         No current facility-administered medications on file prior to encounter.      Current Outpatient Medications on File Prior to Encounter   Medication Sig Dispense Refill    cloBAZam (ONFI) 20 mg Tab Take 2 tablets (40 mg total) by mouth 2 (two) times daily. (Patient taking differently: Take 40 mg by mouth 2 (two) times daily. Take am of surgery) 120 tablet 2    lamoTRIgine (LAMICTAL) 200 MG tablet Take 1 tablet (200 mg total) by mouth 2 (two) times daily. (Patient taking differently: Take 200 mg by mouth 2 (two) times daily. Take am of surgery) 60 tablet 2    topiramate (TOPAMAX) 50 MG tablet Take 1 tablet (50 mg total) by mouth 2 (two) times daily. (Patient taking differently: Take 50 mg by mouth 2 (two) times daily. Take am of surgery) 60 tablet 11      Allergies: Zofran [ondansetron hcl (pf)]    Family History   Problem Relation Age of Onset    Hypertension Mother     Hypertension Maternal Grandmother     Heart disease Maternal Grandmother     Hypertension Maternal Grandfather     Heart disease Maternal Grandfather      Social History     Tobacco Use    Smoking status: Never Smoker    Smokeless tobacco: Never Used   Substance Use Topics    Alcohol use: Yes     Alcohol/week: 10.0 standard drinks     Types: 10 Cans of beer per week     Comment: 12 beers over a months - spread over a month     Drug use: No     Review of Systems   Constitutional: Denies  fevers, weight loss, chills, or weakness.  Eyes: Denies changes in vision.  ENT: Denies dysphagia, nasal discharge, ear pain or discharge.  Cardiovascular: Denies chest pain, palpitations, orthopnea, or claudication.  Respiratory: Denies shortness of breath, cough, hemoptysis, or wheezing.  GI: Denies , hematochezia, melena, abd pain, or changes in appetite. +nausea  : Denies dysuria, incontinence, or hematuria.  Musculoskeletal: Denies joint pain or myalgias.  Skin/breast: Denies rashes, lumps, lesions, or discharge.  Neurologic: Denies , dizziness, vertigo, or paresthesias. +headache  Psychiatric: Denies changes in mood or hallucinations.  Endocrine: Denies polyuria, polydipsia, heat/cold intolerance.  Hematologic/Lymph: Denies lymphadenopathy, easy bruising or easy bleeding.  Allergic/Immunologic: Denies rash, rhinitis.   Objective:     Vitals:    Temp: 97.5 °F (36.4 °C)  Pulse: 78  Rhythm: normal sinus rhythm  BP: (!) 142/80  MAP (mmHg): 104  Resp: 16  SpO2: 100 %  O2 Device (Oxygen Therapy): room air    Temp  Min: 97.5 °F (36.4 °C)  Max: 98 °F (36.7 °C)  Pulse  Min: 60  Max: 95  BP  Min: 119/78  Max: 142/80  MAP (mmHg)  Min: 94  Max: 104  Resp  Min: 13  Max: 20  SpO2  Min: 96 %  Max: 100 %    No intake/output data recorded.           Physical Exam  GA: comfortable, no acute distress.   HEENT: No scleral icterus or JVD.   Pulmonary: Clear to auscultation A/L.   Cardiac: RRR S1 & S2 w/o rubs/murmurs/gallops.   Abdominal: Bowel sounds present x 4. No appreciable hepatosplenomegaly.  Skin: No jaundice, rashes, or visible lesions.  Neuro:  --GCS: E4 V5 M6  --Mental Status:  Awake, oriented X4, follows commands, fluent speech  --CN II-XII grossly intact.   --Pupils 3mm, PERRL.   --Corneal reflex, gag, cough intact.  --ELIZABETH spont and against gravity    Today I personally reviewed pertinent medications, lines/drains/airways, imaging, cardiology results, laboratory results,

## 2020-08-31 NOTE — PLAN OF CARE
[facility] Care Plan    POC reviewed with Darin Cunha and pt's mom Alexandra at 1400. Pt verbalized understanding. Questions and concerns addressed. Post-op s/p sEEG depth electrodes placement. No seizures noted this shift - per epilepsy seizures are demonstrated as patient starring and smacking lips. Patient's mother oriented to sEEG event button. Hopper removed. Pt progressing toward goals. Will continue to monitor. See below and flowsheets for full assessment and VS info.       Neuro:  Remington Coma Scale  Best Eye Response: 4-->(E4) spontaneous  Best Motor Response: 6-->(M6) obeys commands  Best Verbal Response: 5-->(V5) oriented  Remington Coma Scale Score: 15  Assessment Qualifiers: no eye obstruction present, patient not sedated/intubated        24 hr Temp:  [97.5 °F (36.4 °C)-98 °F (36.7 °C)]     CV:   Rhythm: normal sinus rhythm  BP goals:   SBP < 140  MAP > 65    Resp:   O2 Device (Oxygen Therapy): room air       Plan: N/A    GI/:  STEPHENIE Total Score: 20  Diet/Nutrition Received: NPO  Last Bowel Movement: 08/31/20  Voiding Characteristics: ureteral catheter    Intake/Output Summary (Last 24 hours) at 8/31/2020 1726  Last data filed at 8/31/2020 1705  Gross per 24 hour   Intake 3100 ml   Output 890 ml   Net 2210 ml     Unmeasured Output  Urine Occurrence: 1    Labs/Accuchecks:  Recent Labs   Lab 08/31/20  1352   WBC 8.40   RBC 4.55*   HGB 14.5   HCT 41.6         Recent Labs   Lab 08/28/20  1024      K 4.2   CO2 26      BUN 13   CREATININE 1.0   ALKPHOS 105   ALT 22   AST 19   BILITOT 0.9      Recent Labs   Lab 08/31/20  0531   INR 1.0   APTT 31.4    No results for input(s): CPK, CPKMB, TROPONINI, MB in the last 168 hours.    Electrolytes: No replacement orders  Accuchecks: none    Gtts:       LDA/Wounds:  Lines/Drains/Airways       Arterial Line              Arterial Line 08/31/20 0737 Right Radial less than 1 day              Peripheral Intravenous Line                   Peripheral IV - Single Lumen  08/31/20 0557 18 G Right Antecubital less than 1 day         Peripheral IV - Single Lumen 08/31/20 0721 18 G Right Hand less than 1 day                  Wounds: Yes; head incisions  Wound care consulted: No

## 2020-08-31 NOTE — TRANSFER OF CARE
"Anesthesia Transfer of Care Note    Patient: Darin Cunha    Procedure(s) Performed: Procedure(s) (LRB):  CREATION, CRANIAL YUNG HOLE, PLACEMENT OF THE FIDUCIAL SCREWS BILATERAL 2.PLACEMENT OF SEEG ELECTRODES BILATERAL WITH OWEN ROBOT (Bilateral)    Patient location: PACU    Anesthesia Type: general    Transport from OR: Transported from OR on 6-10 L/min O2 by face mask with adequate spontaneous ventilation. Continuous SpO2 monitoring in transport. Continuous ECG monitoring in transport. Continuos invasive BP monitoring in transport    Post pain: adequate analgesia    Post assessment: no apparent anesthetic complications and tolerated procedure well    Post vital signs: stable    Level of consciousness: awake and responds to stimulation    Nausea/Vomiting: no nausea/vomiting    Complications: none    Transfer of care protocol was followed      Last vitals:   Visit Vitals  /80   Pulse 60   Temp 36.7 °C (98 °F) (Oral)   Resp 18   Ht 6' 1" (1.854 m)   Wt 78.5 kg (173 lb)   SpO2 100%   BMI 22.82 kg/m²     "

## 2020-08-31 NOTE — ASSESSMENT & PLAN NOTE
25M with a PMHx of intractable epilepsy s/p VNS placement on Onfi 40 mg BID, Lamictal 200 mg BID, Vimpat 200 mg BID, and Topamax 50 mg BID s/p bilateral sEEG placement with Dr. Melani Ortiz on 8/31.    Recommendations:  - Continuous vEEG monitoring  - Held home Vimpat  - Continue other home AEDs: Onfi 40 mg BID, Lamictal 200 mg BID, and Topamax 50 mg BID  - Please call epilepsy on call prior to administering benzodiazepines   - Neurosurgery following  - Postop CTH stable  - Seizure precautions  - Avoid agents that lower seizure thresold      Plan of care discussed with patient and NCC team. Will follow. Please call with questions.

## 2020-08-31 NOTE — TELEPHONE ENCOUNTER
I returned the pt mother call  regarding her stay at the [facility]. I explained that I will speak with Management and she will be updated.  Update*   stay has been covered.  Approved by management.  ----- Message from Lazara Griffin sent at 8/31/2020 10:08 AM CDT -----  Contact: mother- fanta  Pts mother is asking for a call back in regards to speaking with someone in the office and the  in regards to help getting her a discounted room while her son if here at [facility] for surgery.     Contact info- [phone]

## 2020-08-31 NOTE — ANESTHESIA PROCEDURE NOTES
Intubation  Performed by: Patricio Miguel CRNA  Authorized by: Rich Haley MD     Intubation:     Induction:  Intravenous    Intubated:  Postinduction    Mask Ventilation:  Easy with oral airway    Attempts:  1    Attempted By:  Student (Evan Mccarthy)    Method of Intubation:  Direct    Blade:  Dulce Maria 3    Laryngeal View Grade: Grade I - full view of chords      Difficult Airway Encountered?: No      Complications:  None    Airway Device Size:  7.5    Style/Cuff Inflation:  Cuffed    Inflation Amount (mL):  5    Tube secured:  23    Secured at:  The lips    Placement Verified By:  Capnometry    Complicating Factors:  None    Findings Post-Intubation:  BS equal bilateral and atraumatic/condition of teeth unchanged

## 2020-08-31 NOTE — HPI
25 year old male with a PMHx of depression and intractable epilepsy since 2013 s/p VNS placement on Onfi 40 mg BID, Lamictal 200 mg BID, Vimpat 200 mg BID, and Topamax 50 mg BID admitted for bilateral sEEG placement to identify seizure focus. VNS turned off s/p EEG placement. Patient admitted to [facility] following sEEG placement.

## 2020-08-31 NOTE — ANESTHESIA POSTPROCEDURE EVALUATION
Anesthesia Post Evaluation    Patient: Darin Cunha    Procedure(s) Performed: Procedure(s) (LRB):  CREATION, CRANIAL YUNG HOLE, PLACEMENT OF THE FIDUCIAL SCREWS BILATERAL 2.PLACEMENT OF SEEG ELECTRODES BILATERAL WITH OWEN ROBOT (Bilateral)    Final Anesthesia Type: general    Patient location during evaluation: ICU  Patient participation: Yes- Able to Participate  Level of consciousness: awake and alert  Post-procedure vital signs: reviewed and stable  Pain management: adequate  Airway patency: stridor    PONV status at discharge: No PONV  Anesthetic complications: no      Cardiovascular status: hemodynamically stable  Respiratory status: unassisted  Hydration status: euvolemic  Follow-up not needed.          Vitals Value Taken Time   /80 08/31/20 1501   Temp 36.6 °C (97.8 °F) 08/31/20 1330   Pulse 76 08/31/20 1530   Resp 19 08/31/20 1530   SpO2 100 % 08/31/20 1530   Vitals shown include unvalidated device data.      No case tracking events are documented in the log.      Pain/Valeria Score: Pain Rating Prior to Med Admin: 7 (8/31/2020  1:42 PM)

## 2020-08-31 NOTE — PROGRESS NOTES
NSGY Post Op Check    Patient examined in NCC  Drowsy but awake  Oriented to self, hospital  PERRL  headwrap in place with 14 sEEG electrodes visible, minimal sanguinous drainage on kerlex  FC x4  AG in all extremities  Moves limbs symmetrically    Plan:  SBP <140  Post op CTH reviewed  Abx Ancef 2q8h  AED per Epilepsy - no benzos unless cleared by Epilepsy  BED REST while sEEG in place  Ok to DC Hopper if patient ok with using urinal  Ok for recumbent bike while in bed    Please call w/exam change    D/w Dr. Ortiz and NCC team    Katey Lee MD  Neurosurgery  [facility]

## 2020-08-31 NOTE — NURSING
Notified PRIETO Jacques regarding pt c/o nausea. Phenergan ordered. Will continue to monitor very closely.

## 2020-08-31 NOTE — SUBJECTIVE & OBJECTIVE
Past Medical History:   Diagnosis Date    Depressed 9/12/2018    Epilepsy 2015    Mitral valve prolapse 2015    Seizures        Past Surgical History:   Procedure Laterality Date    ADENOIDECTOMY  2015    ADENOIDECTOMY      finger abscess removal      TONSILLECTOMY      VAGUS NERVE STIMULATOR INSERTION Left 4/4/2019    Procedure: INSERTION, VAGUS NERVE STIMULATOR;  Surgeon: Reuben Gupta MD;  Location: [facility] OR [address] [place] [address];  Service: Neurosurgery;  Laterality: Left;  toronto I, asa 1, regular bed, supine     wisdom teeth extracted- about 2010          Review of patient's allergies indicates:   Allergen Reactions    Zofran [ondansetron hcl (pf)] Hives and Rash       No current facility-administered medications on file prior to encounter.      Current Outpatient Medications on File Prior to Encounter   Medication Sig    cloBAZam (ONFI) 20 mg Tab Take 2 tablets (40 mg total) by mouth 2 (two) times daily. (Patient taking differently: Take 40 mg by mouth 2 (two) times daily. Take am of surgery)    lamoTRIgine (LAMICTAL) 200 MG tablet Take 1 tablet (200 mg total) by mouth 2 (two) times daily. (Patient taking differently: Take 200 mg by mouth 2 (two) times daily. Take am of surgery)    topiramate (TOPAMAX) 50 MG tablet Take 1 tablet (50 mg total) by mouth 2 (two) times daily. (Patient taking differently: Take 50 mg by mouth 2 (two) times daily. Take am of surgery)     Continuous Infusions:    Family History     Problem Relation (Age of Onset)    Heart disease Maternal Grandmother, Maternal Grandfather    Hypertension Mother, Maternal Grandmother, Maternal Grandfather        Tobacco Use    Smoking status: Never Smoker    Smokeless tobacco: Never Used   Substance and Sexual Activity    Alcohol use: Yes     Alcohol/week: 10.0 standard drinks     Types: 10 Cans of beer per week     Comment: 12 beers over a months - spread over a month     Drug use: No    Sexual activity: Yes     Partners: Female     Review of  Systems   Constitutional: Negative for chills, diaphoresis, fatigue and fever.   HENT: Negative for congestion, sore throat and trouble swallowing.    Eyes: Negative for photophobia and visual disturbance.   Respiratory: Negative for cough and shortness of breath.    Cardiovascular: Negative for chest pain and palpitations.   Gastrointestinal: Negative for abdominal pain, diarrhea, nausea and vomiting.   Musculoskeletal: Negative for back pain, gait problem and neck pain.   Neurological: Positive for headaches (s/p sEEG). Negative for dizziness, speech difficulty, weakness and numbness.   Psychiatric/Behavioral: Negative for agitation, behavioral problems and confusion. The patient is not nervous/anxious.      Objective:     Vital Signs (Most Recent):  Temp: 97.5 °F (36.4 °C) (08/31/20 1605)  Pulse: 80 (08/31/20 1705)  Resp: 16 (08/31/20 1705)  BP: (!) 107/59 (08/31/20 1705)  SpO2: 100 % (08/31/20 1705) Vital Signs (24h Range):  Temp:  [97.5 °F (36.4 °C)-98 °F (36.7 °C)] 97.5 °F (36.4 °C)  Pulse:  [60-95] 80  Resp:  [13-20] 16  SpO2:  [96 %-100 %] 100 %  BP: (107-142)/(59-80) 107/59  Arterial Line BP: (118-144)/(70-82) 118/70     Weight: 78.5 kg (173 lb 1 oz)  Body mass index is 22.83 kg/m².    Physical Exam  Constitutional:       General: He is not in acute distress.     Appearance: Normal appearance. He is not diaphoretic.      Interventions: He is sedated and intubated.   HENT:      Head: Normocephalic.      Comments: sEEG in place  Eyes:      General: No scleral icterus.        Right eye: No discharge.         Left eye: No discharge.      Extraocular Movements: EOM normal.      Conjunctiva/sclera: Conjunctivae normal.      Pupils: Pupils are equal, round, and reactive to light.   Cardiovascular:      Rate and Rhythm: Normal rate.   Pulmonary:      Effort: Pulmonary effort is normal. No respiratory distress. He is intubated.      Comments: Intubated  Abdominal:      General: There is no distension.       Palpations: Abdomen is soft.      Tenderness: There is no abdominal tenderness.   Musculoskeletal: Normal range of motion.         General: No deformity or signs of injury.   Skin:     General: Skin is warm and dry.   Neurological:      Mental Status: He is alert and oriented to person, place, and time. Mental status is at baseline.      Coordination: Finger-Nose-Finger Test normal.   Psychiatric:         Mood and Affect: Mood normal.         Speech: Speech normal.         Behavior: Behavior normal.         Thought Content: Thought content normal.         NEUROLOGICAL EXAMINATION:     MENTAL STATUS   Oriented to person, place, and time.   Attention: normal. Concentration: normal.   Speech: speech is normal   Level of consciousness: alert    CRANIAL NERVES     CN II   Visual fields full to confrontation.     CN III, IV, VI   Pupils are equal, round, and reactive to light.  Extraocular motions are normal.     CN V   Right corneal reflex: normal  Left corneal reflex: normal    CN VII   Facial expression full, symmetric.     CN VIII   Hearing: intact    CN XI   CN XI normal.     CN XII   CN XII normal.     GAIT AND COORDINATION      Coordination   Finger to nose coordination: normal      Significant Labs: All pertinent lab results from the past 24 hours have been reviewed.    Significant Studies: I have reviewed all pertinent imaging results/findings within the past 24 hours.

## 2020-08-31 NOTE — HOSPITAL COURSE
8/31/2020: patient admitted to [facility] s/p sEEG electrodes placement, NSGY and epilepsy following, SBP goal < 140, perepilepsy restarted all AEDs except for Vimpat  9/1/2020: 1 episode of seizure, Vimpat  On hold, Onfi and lamictal decreased, continue topamax  9/2/2020: No seizures this AM, epilepsy managing AEDs, started SQH  9/3/2020: No electrographic seizures in AM, Per epilepsy: If no seizures throughout day, plan to decrease Onfi to 10 mg BID (Dr. Abhijeet Do to change tonight if needed)  9/4/2020: d/c A-line  9/5/2020: NAEON  9/6/2020: MERLENE, plan for removal of sEEG on 9/9 09/07/2020 naeon  9/8/2020: NAEON, off AEDs. Likely bolt removal this Friday 9/9/2020: NAEON  9/10/2020: one tonic seizure today-epilepsy notified  9/11/2020  NAEON. No seizures over night. All AEDs on hold. Since admit has hadTotal 5 seizures from L hippocampus and entorhinal cortex areas. Tentative plan per NSGY to remove sEEG electrodes on Monday.  9/12 NAEON. Mid morning had one GTC seizure, epilepsy notified. Continue to hold AEDs, no ativan, if another GTC in 12h may consider vimpat dose.  9/13 No seizures overnight or today. Plan is for removal of sEEG electrodes on Monday.  9/14/2020: plan for OR today for sEEG removal

## 2020-08-31 NOTE — H&P
[facility]  Neurocritical Care  History & Physical    Admit Date: 8/31/2020  Service Date: 08/31/2020  Length of Stay: 0    Subjective:     Chief Complaint: Epilepsy    History of Present Illness: The patient is a 25 year old M with PMHx of depression, seizures, epilepsy admitted to [facility] s/p placement of sEED electrodes bilateral. Per chart review he has failed at least 4 AEDs at therapeutic dosages. Triggers include being tired and being stressed. He is not working (on disability) but finished his GED. He lives next door to grandmother and nearby to his mother. His major concern is memory loss from ongoing seizures. NSGY and epilepsy following. Patient admitted to [facility] for close monitoring and higher level of care.     Past Medical History:   Diagnosis Date    Depressed 9/12/2018    Epilepsy 2015    Mitral valve prolapse 2015    Seizures      Past Surgical History:   Procedure Laterality Date    ADENOIDECTOMY  2015    ADENOIDECTOMY      finger abscess removal      TONSILLECTOMY      VAGUS NERVE STIMULATOR INSERTION Left 4/4/2019    Procedure: INSERTION, VAGUS NERVE STIMULATOR;  Surgeon: Reuben Gupta MD;  Location: [facility] OR [address] [place] [address];  Service: Neurosurgery;  Laterality: Left;  toronto I, asa 1, regular bed, supine     wisdom teeth extracted- about 2010         No current facility-administered medications on file prior to encounter.      Current Outpatient Medications on File Prior to Encounter   Medication Sig Dispense Refill    cloBAZam (ONFI) 20 mg Tab Take 2 tablets (40 mg total) by mouth 2 (two) times daily. (Patient taking differently: Take 40 mg by mouth 2 (two) times daily. Take am of surgery) 120 tablet 2    lamoTRIgine (LAMICTAL) 200 MG tablet Take 1 tablet (200 mg total) by mouth 2 (two) times daily. (Patient taking differently: Take 200 mg by mouth 2 (two) times daily. Take am of surgery) 60 tablet 2    topiramate (TOPAMAX) 50 MG tablet Take 1 tablet (50 mg total) by mouth 2  (two) times daily. (Patient taking differently: Take 50 mg by mouth 2 (two) times daily. Take am of surgery) 60 tablet 11      Allergies: Zofran [ondansetron hcl (pf)]    Family History   Problem Relation Age of Onset    Hypertension Mother     Hypertension Maternal Grandmother     Heart disease Maternal Grandmother     Hypertension Maternal Grandfather     Heart disease Maternal Grandfather      Social History     Tobacco Use    Smoking status: Never Smoker    Smokeless tobacco: Never Used   Substance Use Topics    Alcohol use: Yes     Alcohol/week: 10.0 standard drinks     Types: 10 Cans of beer per week     Comment: 12 beers over a months - spread over a month     Drug use: No     Review of Systems   Constitutional: Denies fevers, weight loss, chills, or weakness.  Eyes: Denies changes in vision.  ENT: Denies dysphagia, nasal discharge, ear pain or discharge.  Cardiovascular: Denies chest pain, palpitations, orthopnea, or claudication.  Respiratory: Denies shortness of breath, cough, hemoptysis, or wheezing.  GI: Denies , hematochezia, melena, abd pain, or changes in appetite. +nausea  : Denies dysuria, incontinence, or hematuria.  Musculoskeletal: Denies joint pain or myalgias.  Skin/breast: Denies rashes, lumps, lesions, or discharge.  Neurologic: Denies , dizziness, vertigo, or paresthesias. +headache  Psychiatric: Denies changes in mood or hallucinations.  Endocrine: Denies polyuria, polydipsia, heat/cold intolerance.  Hematologic/Lymph: Denies lymphadenopathy, easy bruising or easy bleeding.  Allergic/Immunologic: Denies rash, rhinitis.   Objective:     Vitals:    Temp: 97.5 °F (36.4 °C)  Pulse: 78  Rhythm: normal sinus rhythm  BP: (!) 142/80  MAP (mmHg): 104  Resp: 16  SpO2: 100 %  O2 Device (Oxygen Therapy): room air    Temp  Min: 97.5 °F (36.4 °C)  Max: 98 °F (36.7 °C)  Pulse  Min: 60  Max: 95  BP  Min: 119/78  Max: 142/80  MAP (mmHg)  Min: 94  Max: 104  Resp  Min: 13  Max: 20  SpO2  Min: 96 %   Max: 100 %    No intake/output data recorded.           Physical Exam  GA: comfortable, no acute distress.   HEENT: No scleral icterus or JVD.   Pulmonary: Clear to auscultation A/L.   Cardiac: RRR S1 & S2 w/o rubs/murmurs/gallops.   Abdominal: Bowel sounds present x 4. No appreciable hepatosplenomegaly.  Skin: No jaundice, rashes, or visible lesions.  Neuro:  --GCS: E4 V5 M6  --Mental Status:  Awake, oriented X4, follows commands, fluent speech  --CN II-XII grossly intact.   --Pupils 3mm, PERRL.   --Corneal reflex, gag, cough intact.  --ELIZABETH spont and against gravity    Today I personally reviewed pertinent medications, lines/drains/airways, imaging, cardiology results, laboratory results,     Assessment/Plan:     Neuro  * Epilepsy  -s/p sEEG placement  -cefazolin 2 q8 prophylaxis  -NSGY and epilepsy following  -seizure precautions  -continue: Topiramate 50 mg BID, lamotrigine 200mg BID, clobazam 40 mg BID, hole home med Vimpat  -neuro checks q 1 hr      Psychiatric  Depression  - continue home med celexa    GI  Nausea  - allergic to zofran  -continue Phenergan PRN          The patient is being Prophylaxed for:  Venous Thromboembolism with: Mechanical  Stress Ulcer with: None  Ventilator Pneumonia with: not applicable    Activity Orders          Diet clear liquid: Clear Liquid starting at 08/31 1328        Full Code    Georgie Hoyos NP  Neurocritical Care  [facility]

## 2020-09-01 PROBLEM — E87.6 HYPOKALEMIA: Status: ACTIVE | Noted: 2020-09-01

## 2020-09-01 LAB
ALBUMIN SERPL BCP-MCNC: 3.7 G/DL (ref 3.5–5.2)
ALP SERPL-CCNC: 88 U/L (ref 55–135)
ALT SERPL W/O P-5'-P-CCNC: 15 U/L (ref 10–44)
ANION GAP SERPL CALC-SCNC: 5 MMOL/L (ref 8–16)
AST SERPL-CCNC: 21 U/L (ref 10–40)
BASOPHILS # BLD AUTO: 0.02 K/UL (ref 0–0.2)
BASOPHILS NFR BLD: 0.2 % (ref 0–1.9)
BILIRUB SERPL-MCNC: 0.8 MG/DL (ref 0.1–1)
BUN SERPL-MCNC: 8 MG/DL (ref 6–20)
CALCIUM SERPL-MCNC: 8.2 MG/DL (ref 8.7–10.5)
CHLORIDE SERPL-SCNC: 108 MMOL/L (ref 95–110)
CO2 SERPL-SCNC: 23 MMOL/L (ref 23–29)
CREAT SERPL-MCNC: 0.8 MG/DL (ref 0.5–1.4)
DIFFERENTIAL METHOD: ABNORMAL
EOSINOPHIL # BLD AUTO: 0 K/UL (ref 0–0.5)
EOSINOPHIL NFR BLD: 0.4 % (ref 0–8)
ERYTHROCYTE [DISTWIDTH] IN BLOOD BY AUTOMATED COUNT: 12.1 % (ref 11.5–14.5)
EST. GFR  (AFRICAN AMERICAN): >60 ML/MIN/1.73 M^2
EST. GFR  (NON AFRICAN AMERICAN): >60 ML/MIN/1.73 M^2
GLUCOSE SERPL-MCNC: 95 MG/DL (ref 70–110)
HCT VFR BLD AUTO: 42.7 % (ref 40–54)
HGB BLD-MCNC: 14.8 G/DL (ref 14–18)
IMM GRANULOCYTES # BLD AUTO: 0.03 K/UL (ref 0–0.04)
IMM GRANULOCYTES NFR BLD AUTO: 0.4 % (ref 0–0.5)
LYMPHOCYTES # BLD AUTO: 1.4 K/UL (ref 1–4.8)
LYMPHOCYTES NFR BLD: 17.5 % (ref 18–48)
MAGNESIUM SERPL-MCNC: 1.9 MG/DL (ref 1.6–2.6)
MCH RBC QN AUTO: 31.4 PG (ref 27–31)
MCHC RBC AUTO-ENTMCNC: 34.7 G/DL (ref 32–36)
MCV RBC AUTO: 91 FL (ref 82–98)
MONOCYTES # BLD AUTO: 0.8 K/UL (ref 0.3–1)
MONOCYTES NFR BLD: 9.1 % (ref 4–15)
NEUTROPHILS # BLD AUTO: 6 K/UL (ref 1.8–7.7)
NEUTROPHILS NFR BLD: 72.4 % (ref 38–73)
NRBC BLD-RTO: 0 /100 WBC
PHOSPHATE SERPL-MCNC: 3 MG/DL (ref 2.7–4.5)
PLATELET # BLD AUTO: 202 K/UL (ref 150–350)
PMV BLD AUTO: 10.1 FL (ref 9.2–12.9)
POTASSIUM SERPL-SCNC: 3.4 MMOL/L (ref 3.5–5.1)
POTASSIUM SERPL-SCNC: 4.1 MMOL/L (ref 3.5–5.1)
PROT SERPL-MCNC: 5.7 G/DL (ref 6–8.4)
RBC # BLD AUTO: 4.72 M/UL (ref 4.6–6.2)
SODIUM SERPL-SCNC: 136 MMOL/L (ref 136–145)
WBC # BLD AUTO: 8.24 K/UL (ref 3.9–12.7)

## 2020-09-01 PROCEDURE — 94761 N-INVAS EAR/PLS OXIMETRY MLT: CPT

## 2020-09-01 PROCEDURE — 99233 PR SUBSEQUENT HOSPITAL CARE,LEVL III: ICD-10-PCS | Mod: ,,, | Performed by: NURSE PRACTITIONER

## 2020-09-01 PROCEDURE — 83735 ASSAY OF MAGNESIUM: CPT

## 2020-09-01 PROCEDURE — 25000003 PHARM REV CODE 250: Performed by: PHYSICIAN ASSISTANT

## 2020-09-01 PROCEDURE — 99233 PR SUBSEQUENT HOSPITAL CARE,LEVL III: ICD-10-PCS | Mod: ,,, | Performed by: PSYCHIATRY & NEUROLOGY

## 2020-09-01 PROCEDURE — 85025 COMPLETE CBC W/AUTO DIFF WBC: CPT

## 2020-09-01 PROCEDURE — 84100 ASSAY OF PHOSPHORUS: CPT

## 2020-09-01 PROCEDURE — 99233 SBSQ HOSP IP/OBS HIGH 50: CPT | Mod: ,,, | Performed by: NURSE PRACTITIONER

## 2020-09-01 PROCEDURE — 20000000 HC ICU ROOM

## 2020-09-01 PROCEDURE — 99233 SBSQ HOSP IP/OBS HIGH 50: CPT | Mod: ,,, | Performed by: PSYCHIATRY & NEUROLOGY

## 2020-09-01 PROCEDURE — 95720 EEG PHY/QHP EA INCR W/VEEG: CPT | Mod: ,,, | Performed by: PSYCHIATRY & NEUROLOGY

## 2020-09-01 PROCEDURE — 95714 VEEG EA 12-26 HR UNMNTR: CPT

## 2020-09-01 PROCEDURE — 25000003 PHARM REV CODE 250: Performed by: NURSE PRACTITIONER

## 2020-09-01 PROCEDURE — 84132 ASSAY OF SERUM POTASSIUM: CPT

## 2020-09-01 PROCEDURE — 95720 PR EEG, W/VIDEO, CONT RECORD, I&R, >12<26 HRS: ICD-10-PCS | Mod: ,,, | Performed by: PSYCHIATRY & NEUROLOGY

## 2020-09-01 PROCEDURE — 63600175 PHARM REV CODE 636 W HCPCS: Performed by: STUDENT IN AN ORGANIZED HEALTH CARE EDUCATION/TRAINING PROGRAM

## 2020-09-01 PROCEDURE — 80053 COMPREHEN METABOLIC PANEL: CPT

## 2020-09-01 RX ORDER — POTASSIUM CHLORIDE 1.5 G/1.58G
40 POWDER, FOR SOLUTION ORAL
Status: DISCONTINUED | OUTPATIENT
Start: 2020-09-01 | End: 2020-09-15 | Stop reason: HOSPADM

## 2020-09-01 RX ORDER — SODIUM,POTASSIUM PHOSPHATES 280-250MG
2 POWDER IN PACKET (EA) ORAL
Status: DISCONTINUED | OUTPATIENT
Start: 2020-09-01 | End: 2020-09-15 | Stop reason: HOSPADM

## 2020-09-01 RX ORDER — CLOBAZAM 10 MG/1
20 TABLET ORAL 2 TIMES DAILY
Status: DISCONTINUED | OUTPATIENT
Start: 2020-09-01 | End: 2020-09-03

## 2020-09-01 RX ORDER — LANOLIN ALCOHOL/MO/W.PET/CERES
800 CREAM (GRAM) TOPICAL
Status: DISCONTINUED | OUTPATIENT
Start: 2020-09-01 | End: 2020-09-15 | Stop reason: HOSPADM

## 2020-09-01 RX ORDER — LAMOTRIGINE 25 MG/1
100 TABLET ORAL 2 TIMES DAILY
Status: DISCONTINUED | OUTPATIENT
Start: 2020-09-01 | End: 2020-09-04

## 2020-09-01 RX ADMIN — OXYCODONE HYDROCHLORIDE 5 MG: 5 TABLET ORAL at 03:09

## 2020-09-01 RX ADMIN — OXYCODONE HYDROCHLORIDE 5 MG: 5 TABLET ORAL at 06:09

## 2020-09-01 RX ADMIN — MUPIROCIN: 20 OINTMENT TOPICAL at 08:09

## 2020-09-01 RX ADMIN — LAMOTRIGINE 100 MG: 25 TABLET ORAL at 08:09

## 2020-09-01 RX ADMIN — CLOBAZAM 20 MG: 10 TABLET ORAL at 08:09

## 2020-09-01 RX ADMIN — TOPIRAMATE 50 MG: 25 TABLET, FILM COATED ORAL at 08:09

## 2020-09-01 RX ADMIN — CEFAZOLIN 2 G: 1 INJECTION, POWDER, FOR SOLUTION INTRAMUSCULAR; INTRAVENOUS at 04:09

## 2020-09-01 RX ADMIN — POTASSIUM CHLORIDE 40 MEQ: 1.5 POWDER, FOR SOLUTION ORAL at 05:09

## 2020-09-01 RX ADMIN — POTASSIUM CHLORIDE 40 MEQ: 1.5 POWDER, FOR SOLUTION ORAL at 08:09

## 2020-09-01 RX ADMIN — CEFAZOLIN 2 G: 1 INJECTION, POWDER, FOR SOLUTION INTRAMUSCULAR; INTRAVENOUS at 12:09

## 2020-09-01 RX ADMIN — CITALOPRAM HYDROBROMIDE 20 MG: 10 TABLET ORAL at 08:09

## 2020-09-01 RX ADMIN — OXYCODONE HYDROCHLORIDE 5 MG: 5 TABLET ORAL at 08:09

## 2020-09-01 RX ADMIN — CEFAZOLIN 2 G: 1 INJECTION, POWDER, FOR SOLUTION INTRAMUSCULAR; INTRAVENOUS at 10:09

## 2020-09-01 NOTE — PROGRESS NOTES
[facility]  Neurosurgery  Progress Note    Subjective:     History of Present Illness: Darin Cunha is a 25 y.o. male with intractable epilepsy since 2013 who presents as a referral from Dr. Abhijeet Do to discuss the gamut of epilepsy surgery options. Today's visit is a video visit, and the patient is a passenger in a moving vehicle. His mother is not with him.      The patient states that when he has events, he stares off and smacks his lips. The episodes last for about 5-6 minutes. He often waves his hands and feels tired afterwards. He currently has about 3-5 episodes a week. He has failed at least 4 AEDs at therapeutic dosages.      Per EEG review, he has bilateral temporal involvement, moreso left-sided than right.      Triggers include being tired and being stressed. He is not working (on disability) but finished his GED. He lives next door to grandmother and nearby to his mother. His major concern is memory loss from ongoing seizures.      Of notes, he endorses a history of staph infection previously. He denies history of bleeding or anesthetic complications. He has a VNS in place.        Post-Op Info:  Procedure(s) (LRB):  CREATION, CRANIAL YUNG HOLE, PLACEMENT OF THE FIDUCIAL SCREWS BILATERAL 2.PLACEMENT OF SEEG ELECTRODES BILATERAL WITH OWEN ROBOT (Bilateral)   1 Day Post-Op     Interval History: NAEON. No seizure activity per patient although he usually does not know when he is going have a seizure. sEEG leads in place, per EEG most active interictal activity coming from Hippocampal depth most proximal contacts. Hopper removed postoperatively, voiding spontaneously. Pain controlled.     Medications:  Continuous Infusions:  Scheduled Meds:   ceFAZolin (ANCEF) IVPB  2 g Intravenous Q8H    citalopram  20 mg Oral Daily    cloBAZam  40 mg Oral BID    lamoTRIgine  200 mg Oral BID    mupirocin   Nasal BID    senna-docusate 8.6-50 mg  1 tablet Oral Daily    topiramate  50 mg Oral BID      PRN Meds:acetaminophen, hydrALAZINE, labetaloL, magnesium oxide, magnesium oxide, oxyCODONE, potassium chloride, potassium chloride, potassium chloride, potassium, sodium phosphates, potassium, sodium phosphates, potassium, sodium phosphates, promethazine (PHENERGAN) IVPB     Review of Systems  Objective:     Weight: 78.5 kg (173 lb 1 oz)  Body mass index is 22.83 kg/m².  Vital Signs (Most Recent):  Temp: 98.5 °F (36.9 °C) (09/01/20 0302)  Pulse: 84 (09/01/20 0502)  Resp: 17 (09/01/20 0502)  BP: 114/64 (09/01/20 0502)  SpO2: 98 % (09/01/20 0502) Vital Signs (24h Range):  Temp:  [97.5 °F (36.4 °C)-98.5 °F (36.9 °C)] 98.5 °F (36.9 °C)  Pulse:  [71-95] 84  Resp:  [13-21] 17  SpO2:  [96 %-100 %] 98 %  BP: ()/(53-80) 114/64  Arterial Line BP: (101-144)/(62-82) 124/70                          Neurosurgery Physical Exam   Awake, alert  AOX3  PERRL  AG in all extremities, symmetric  FCx4  sEEG leads in place with headwrap      Significant Labs:  Recent Labs   Lab 09/01/20  0113   GLU 95      K 3.4*      CO2 23   BUN 8   CREATININE 0.8   CALCIUM 8.2*   MG 1.9     Recent Labs   Lab 08/31/20  1352 09/01/20  0113   WBC 8.40 8.24   HGB 14.5 14.8   HCT 41.6 42.7    202     Recent Labs   Lab 08/31/20  0531   INR 1.0   APTT 31.4       Significant Diagnostics:  Ct Head Without Contrast    Result Date: 8/31/2020  Interval placement of multiple stereo EEG electrodes without apparent intracranial complication. Electronically signed by resident: Vicki Patel Date:    08/31/2020 Time:    13:36 Electronically signed by: Jc Us MD Date:    08/31/2020 Time:    14:43    Ct Head Without Contrast    Result Date: 8/31/2020  1. Stealth/fiducial protocol CT performed for purposes of procedure localization. 2. Brain parenchyma appears within normal limits. 3. Fullness of the pituitary gland suggesting underlying lesion, possibly a proteinaceous cyst. Electronically signed by resident: Vicki Patel  Date:    08/31/2020 Time:    08:28 Electronically signed by: Jc Us MD Date:    08/31/2020 Time:    09:26      Assessment/Plan:     * Epilepsy  25 M with intractable epilepsy s/p multiple failed AEDs and VNS placement now s/p sEEG lead placement:    Admit to [facility]  q1h neurochecks  sEEG leads in place, abx while in place, continue headwrap  Bed rest at all times  SBP<140  No HOB restrictions  Ok for recumbent bike in bed  Ok for diet  Voiding spontaneously s/p michelle removal  Post operative CTH reviewed - expected post op changes  Monitor for seizure activity - no benzos or sedating meds unless cleared by Epilepsy team  AEDs per Epilepsy  Please page w/exam change    Dispo: continue ICU care      Katey Power MD  Neurosurgery  [facility]

## 2020-09-01 NOTE — HPI
Darin Cunha is a 25 y.o. male with intractable epilepsy since 2013 who presents as a referral from Dr. Abhijeet Do to discuss the gamut of epilepsy surgery options. Today's visit is a video visit, and the patient is a passenger in a moving vehicle. His mother is not with him.      The patient states that when he has events, he stares off and smacks his lips. The episodes last for about 5-6 minutes. He often waves his hands and feels tired afterwards. He currently has about 3-5 episodes a week. He has failed at least 4 AEDs at therapeutic dosages.      Per EEG review, he has bilateral temporal involvement, moreso left-sided than right.      Triggers include being tired and being stressed. He is not working (on disability) but finished his GED. He lives next door to grandmother and nearby to his mother. His major concern is memory loss from ongoing seizures.      Of notes, he endorses a history of staph infection previously. He denies history of bleeding or anesthetic complications. He has a VNS in place.

## 2020-09-01 NOTE — PLAN OF CARE
09/01/20 1002   Post-Acute Status   Post-Acute Authorization Placement   Post-Acute Placement Status Awaiting Internal Medical Clearance  (GRID)     Jaimie Knight LCSW  Neurocritical Care   [facility]  [id number]

## 2020-09-01 NOTE — PROGRESS NOTES
[facility]  Neurocritical Care  Progress Note    Admit Date: 8/31/2020  Service Date: 09/01/2020  Length of Stay: 1    Subjective:     Chief Complaint: Epilepsy    History of Present Illness: The patient is a 25 year old M with PMHx of depression, seizures, epilepsy admitted to [facility] s/p placement of sEED electrodes bilateral. Per chart review he has failed at least 4 AEDs at therapeutic dosages. Triggers include being tired and being stressed. He is not working (on disability) but finished his GED. He lives next door to grandmother and nearby to his mother. His major concern is memory loss from ongoing seizures. NSGY and epilepsy following. Patient admitted to [facility] for close monitoring and higher level of care.     Hospital Course: 8/31/2020: patient admitted to [facility] s/p sEEG electrodes placement, NSGY and epilepsy following, SBP goal < 140, perepilepsy restarted all AEDs except for Vimpat  9/1/2020: 1 episode of seizure, Vimpat  On hold, Onfi and lamictal decreased, continue topamax          Review of Systems  Constitutional: Denies fevers, weight loss, chills, or weakness.  Eyes: Denies changes in vision.  ENT: Denies dysphagia, nasal discharge, ear pain or discharge.  Cardiovascular: Denies chest pain, palpitations, orthopnea, or claudication.  Respiratory: Denies shortness of breath, cough, hemoptysis, or wheezing.  GI: Denies nausea/vomitting, hematochezia, melena, abd pain, or changes in appetite.  : Denies dysuria, incontinence, or hematuria.  Musculoskeletal: Denies joint pain or myalgias.  Skin/breast: Denies rashes, lumps, lesions, or discharge.  Neurologic: Denies headache, dizziness, vertigo, or paresthesias.  Psychiatric: Denies changes in mood or hallucinations.  Endocrine: Denies polyuria, polydipsia, heat/cold intolerance.  Hematologic/Lymph: Denies lymphadenopathy, easy bruising or easy bleeding.  Allergic/Immunologic: Denies rash, rhinitis.   Objective:     Vitals:  Temp: 98.5 °F  (36.9 °C)  Pulse: 87  Rhythm: normal sinus rhythm  BP: 121/63  MAP (mmHg): 86  Resp: 12  SpO2: 97 %  O2 Device (Oxygen Therapy): room air    Temp  Min: 97.5 °F (36.4 °C)  Max: 99.1 °F (37.3 °C)  Pulse  Min: 71  Max: 106  BP  Min: 95/53  Max: 142/80  MAP (mmHg)  Min: 70  Max: 105  Resp  Min: 8  Max: 35  SpO2  Min: 94 %  Max: 100 %    08/31 0701 - 09/01 0700  In: 3230 [P.O.:100; I.V.:3080]  Out: 1590 [Urine:1590]   Unmeasured Output  Urine Occurrence: 1  Stool Occurrence: 0       Physical Exam  GA:  comfortable, no acute distress.   HEENT: No scleral icterus or JVD.   Pulmonary: Clear to auscultation A/L. No wheezing, crackles, or rhonchi.  Cardiac: RRR S1 & S2 w/o rubs/murmurs/gallops.   Abdominal: Bowel sounds present x 4. No appreciable hepatosplenomegaly.  Skin: No jaundice, rashes, or visible lesions.  Neuro:  --GCS: E4 V5 M6  --Mental Status:  Awake, alert, oriented X4, follows commands, fluent speech  --CN II-XII grossly intact.   --Pupils 4mm, PERRL.   --Corneal reflex, gag, cough intact.  --ELIZABETH spont and against gravity    Medications:  Continuous ScheduledceFAZolin (ANCEF) IVPB, 2 g, Q8H  citalopram, 20 mg, Daily  cloBAZam, 20 mg, BID  lamoTRIgine, 100 mg, BID  mupirocin, , BID  senna-docusate 8.6-50 mg, 1 tablet, Daily  topiramate, 50 mg, BID    PRNacetaminophen, 650 mg, Q4H PRN  hydrALAZINE, 10 mg, Q4H PRN  labetaloL, 10 mg, Q4H PRN  magnesium oxide, 800 mg, PRN  magnesium oxide, 800 mg, PRN  oxyCODONE, 5 mg, Q6H PRN  potassium chloride, 40 mEq, PRN  potassium chloride, 40 mEq, PRN  potassium chloride, 40 mEq, PRN  potassium, sodium phosphates, 2 packet, PRN  potassium, sodium phosphates, 2 packet, PRN  potassium, sodium phosphates, 2 packet, PRN  promethazine (PHENERGAN) IVPB, 6.25 mg, Q8H PRN      Today I personally reviewed pertinent medications, lines/drains/airways, imaging, cardiology results, laboratory results,     Diet  Diet Adult Regular (IDDSI Level 7)        Assessment/Plan:     Neuro  *  Epilepsy  -s/p sEEG placement  -cefazolin 2 q8 prophylaxis  -NSGY and epilepsy following  -seizure precautions  -continue: Topiramate 50 mg BID, lamotrigine 200mg BID, clobazam 40 mg BID, hole home med Vimpat  -neuro checks q 1 hr  9/1/2020: 1 seizure this morning associated with lip smacking  -per epilepsy: Held home Vimpat 8/31  - decreasing Onfi to 20 mg BID and decreasing Lamictal to 100 mg BID  - Continue Topamax 50 mg BID      Psychiatric  Depression  - continue home med celexa    Renal/  Hypokalemia  - replace PRN  - follow CMP          The patient is being Prophylaxed for:  Venous Thromboembolism with: None  Stress Ulcer with: None  Ventilator Pneumonia with: not applicable    Activity Orders          Diet Adult Regular (IDDSI Level 7): Regular starting at 09/01 0941        Full Code    Georgie Hoyos NP  Neurocritical Care  [facility]

## 2020-09-01 NOTE — PLAN OF CARE
Admit Date:  8/31/2020  4:54 AM      Admit Diagnosis:  Complex partial epilepsy with generalization and with intractable epilepsy [G40.219]  Localization-related (focal) (partial) symptomatic epilepsy and epileptic syndromes with complex partial seizures, intractable, with status epilepticus [G40.211]  Epilepsy [G40.909]  Epilepsy [G40.909]      CM met with patient in room for [place] Planning Assessment.  Patient is able to answer questions.  Per patient, he lives back and forth with his parents and his own home.  His parent's home, where he will discharge is a single story house with 1 step(s) to enter.   Per patient, he was independent with ADLS and used no dme for ambulation.  Patient will have assistance from his parents upon discharge.   Discharge Planning Booklet given to patient and discussed.  All questions addressed.  CM will follow for needs.         09/01/20 1135   Discharge Assessment   Assessment Type Discharge Planning Assessment   Confirmed/corrected address and phone number on facesheet? Yes   Assessment information obtained from? Patient   Expected Length of Stay (days) 7   Communicated expected length of stay with patient/caregiver yes   Prior to hospitilization cognitive status: Alert/Oriented   Prior to hospitalization functional status: Independent   Current cognitive status: Alert/Oriented   Current Functional Status: Needs Assistance  (EEG)   Facility Arrived From: Home   Lives With parent(s)  (Patient stated that he lives back and forth with his parents and his own home)   Able to Return to Prior Arrangements yes   Is patient able to care for self after discharge? Yes   Who are your caregiver(s) and their phone number(s)? Alexandra Etienne (mother) [phone]   Patient's perception of discharge disposition home or selfcare   Readmission Within the Last 30 Days no previous admission in last 30 days   Patient currently being followed by outpatient case management? No   Patient currently  receives any other outside agency services? No   Equipment Currently Used at Home none   Do you have any problems affording any of your prescribed medications? No   Is the patient taking medications as prescribed? yes   Does the patient have transportation home? Yes   Transportation Anticipated family or friend will provide   Does the patient receive services at the [facility]? Yes   Discharge Plan A Home with family   Discharge Plan B Home with family   DME Needed Upon Discharge  none   Patient/Family in Agreement with Plan yes                PCP:  Primary Doctor No  None        Pharmacy:    [facility] [address] [facility] [address] [facility] [address]  [address] [address]  Phone: [phone] Fax: [phone]    [id number] [place], [place] - [address] [facility]  [address] [address]  Phone: [phone] Fax: [phone]        Emergency Contacts:  Extended Emergency Contact Information  Primary Emergency Contact: Alexandra Etienne  Address: [address] [place] [address]  Mobile Phone: [phone]  Relation: Mother      Insurance:    Payor: MEDICAID / Plan: [facility] / Product Type: Managed Medicaid /     Shivani De Leon RN, CCRN-K, [facility]  Neuro-Critical Care   X [id number]    09/01/2020  11:38 AM

## 2020-09-01 NOTE — PROCEDURES
Stereo EEG Depth Electrode Recording  DATE OF SERVICE: 2020/08/31  EEG NUMBER: [id number]  REQUESTED BY: Dr Alix Ortiz  LOCATION OF SERVICE: OR 2     METHODOLOGY              Depth electrodes consisted of thin wires with electrical contacts it fixed distances along the wire.  These are then implanted using a stereotactic procedure targeting cortical and subcortical structures.  The up to 256 electrode contact can be recorded simultaneously with this procedure.  Recording band pass was 0.1  in the low past filters setting could be set at the 512, 1024, or 2048.  Digital video recording of the patient is simultaneously recorded with the EEG.  The patient is instructed report clinical symptoms which may occur during the recording session.  EEG and video recording is stored and archived in digital format. Activation procedures which include photic stimulation, hyperventilation and instructing patients to perform simple task are done in selected patients.   Compresses spectral analysis (CSA) is also performed on the activity recorded from each individual channel.  This is displayed as a power display of frequencies from 0 to 30 Hz over time.   The CSA analysis is done and displayed continuously.  This is reviewed for asymmetries in power between homologous areas of the scalp and for presence of changes in power which canbe seen when seizures occur.  Sections of suspected abnormalities on the CSA is then compared with the original EEG recording.          Implant Date MRN Name Last First                      8/31/2020 [id number]  Alphonse Webster                            Contacts                   Depth Elect Name SN  # contacts Color 1 Color 2 1 2 3 4 5 6 7 8 9 10 11 12 13 14 15 16   1 L Amyg 38065  14 Yellow Green 1 2 3 4 5 6 7 8 9 10 11 12 13 14     2 L Hippo 98563  14 Black Blue 15 16 17 18 19 20 21 22 23 24 25 26 27 28     3 L Entor 95973  14 Red Blue 29 30 31 32 33 34 35 36 37 38 39 40 41 42     4 L AntCi 83298  14 Red  Green 43 44 45 46 47 48 49 50 51 52 53 54 55 56     5 L OrbFr 14040  16 Blue Red 57 58 59 60 61 62 63 64 65 66 67 68 69 70 71 72   6 L PreFr 68760  16 Yellow Green 73 74 75 76 77 78 79 80 81 82 83 84 85 86 87 88   7 L AntLn 39400  8 Black Green 89 90 91 92 93 94 95 96           8 L PoIns 77680  8 Yellow Black 97 98 99 100 101 102 103 104           9 L TeOcc 52922  8 Black Red 105 106 107 108 109 110 111 112           10 L Pariet 52868  8 Black Red 113 114 115 116 117 118 119 120           11 R Amyg 86143  12 Green Red 121 122 123 124 125 126 127 128 129 130 131 132       12 R Hippo 90101  12 Green Blue 133 134 135 136 137 138 139 140 141 142 143 144       13 R Asuin 17340  8 Blue Red 145 146 147 148 149 150 151 152           14 R Acing 61544  16 Yellow Blue 153 154 155 156 157 158 159 160 161 162 163 164 165 166 167 168       Recording Times  Electrode integrety was evaluated in the OR     EEG FINDINGS  Recording was surveyed from all depth electrodes with recording obtained from all contacts.      Impression  Good quality recording obtained.

## 2020-09-01 NOTE — SUBJECTIVE & OBJECTIVE
Review of Systems  Constitutional: Denies fevers, weight loss, chills, or weakness.  Eyes: Denies changes in vision.  ENT: Denies dysphagia, nasal discharge, ear pain or discharge.  Cardiovascular: Denies chest pain, palpitations, orthopnea, or claudication.  Respiratory: Denies shortness of breath, cough, hemoptysis, or wheezing.  GI: Denies nausea/vomitting, hematochezia, melena, abd pain, or changes in appetite.  : Denies dysuria, incontinence, or hematuria.  Musculoskeletal: Denies joint pain or myalgias.  Skin/breast: Denies rashes, lumps, lesions, or discharge.  Neurologic: Denies headache, dizziness, vertigo, or paresthesias.  Psychiatric: Denies changes in mood or hallucinations.  Endocrine: Denies polyuria, polydipsia, heat/cold intolerance.  Hematologic/Lymph: Denies lymphadenopathy, easy bruising or easy bleeding.  Allergic/Immunologic: Denies rash, rhinitis.   Objective:     Vitals:  Temp: 98.5 °F (36.9 °C)  Pulse: 87  Rhythm: normal sinus rhythm  BP: 121/63  MAP (mmHg): 86  Resp: 12  SpO2: 97 %  O2 Device (Oxygen Therapy): room air    Temp  Min: 97.5 °F (36.4 °C)  Max: 99.1 °F (37.3 °C)  Pulse  Min: 71  Max: 106  BP  Min: 95/53  Max: 142/80  MAP (mmHg)  Min: 70  Max: 105  Resp  Min: 8  Max: 35  SpO2  Min: 94 %  Max: 100 %    08/31 0701 - 09/01 0700  In: 3230 [P.O.:100; I.V.:3080]  Out: 1590 [Urine:1590]   Unmeasured Output  Urine Occurrence: 1  Stool Occurrence: 0       Physical Exam  GA:  comfortable, no acute distress.   HEENT: No scleral icterus or JVD.   Pulmonary: Clear to auscultation A/L. No wheezing, crackles, or rhonchi.  Cardiac: RRR S1 & S2 w/o rubs/murmurs/gallops.   Abdominal: Bowel sounds present x 4. No appreciable hepatosplenomegaly.  Skin: No jaundice, rashes, or visible lesions.  Neuro:  --GCS: E4 V5 M6  --Mental Status:  Awake, alert, oriented X4, follows commands, fluent speech  --CN II-XII grossly intact.   --Pupils 4mm, PERRL.   --Corneal reflex, gag, cough intact.  --GLENN  spont and against gravity    Medications:  Continuous ScheduledceFAZolin (ANCEF) IVPB, 2 g, Q8H  citalopram, 20 mg, Daily  cloBAZam, 20 mg, BID  lamoTRIgine, 100 mg, BID  mupirocin, , BID  senna-docusate 8.6-50 mg, 1 tablet, Daily  topiramate, 50 mg, BID    PRNacetaminophen, 650 mg, Q4H PRN  hydrALAZINE, 10 mg, Q4H PRN  labetaloL, 10 mg, Q4H PRN  magnesium oxide, 800 mg, PRN  magnesium oxide, 800 mg, PRN  oxyCODONE, 5 mg, Q6H PRN  potassium chloride, 40 mEq, PRN  potassium chloride, 40 mEq, PRN  potassium chloride, 40 mEq, PRN  potassium, sodium phosphates, 2 packet, PRN  potassium, sodium phosphates, 2 packet, PRN  potassium, sodium phosphates, 2 packet, PRN  promethazine (PHENERGAN) IVPB, 6.25 mg, Q8H PRN      Today I personally reviewed pertinent medications, lines/drains/airways, imaging, cardiology results, laboratory results,     Diet  Diet Adult Regular (IDDSI Level 7)

## 2020-09-01 NOTE — SUBJECTIVE & OBJECTIVE
Interval History: NAEON. No seizure activity per patient although he usually does not know when he is going have a seizure. sEEG leads in place, per EEG most active interictal activity coming from Hippocampal depth most proximal contacts. Hopper removed postoperatively, voiding spontaneously. Pain controlled.     Medications:  Continuous Infusions:  Scheduled Meds:   ceFAZolin (ANCEF) IVPB  2 g Intravenous Q8H    citalopram  20 mg Oral Daily    cloBAZam  40 mg Oral BID    lamoTRIgine  200 mg Oral BID    mupirocin   Nasal BID    senna-docusate 8.6-50 mg  1 tablet Oral Daily    topiramate  50 mg Oral BID     PRN Meds:acetaminophen, hydrALAZINE, labetaloL, magnesium oxide, magnesium oxide, oxyCODONE, potassium chloride, potassium chloride, potassium chloride, potassium, sodium phosphates, potassium, sodium phosphates, potassium, sodium phosphates, promethazine (PHENERGAN) IVPB     Review of Systems  Objective:     Weight: 78.5 kg (173 lb 1 oz)  Body mass index is 22.83 kg/m².  Vital Signs (Most Recent):  Temp: 98.5 °F (36.9 °C) (09/01/20 0302)  Pulse: 84 (09/01/20 0502)  Resp: 17 (09/01/20 0502)  BP: 114/64 (09/01/20 0502)  SpO2: 98 % (09/01/20 0502) Vital Signs (24h Range):  Temp:  [97.5 °F (36.4 °C)-98.5 °F (36.9 °C)] 98.5 °F (36.9 °C)  Pulse:  [71-95] 84  Resp:  [13-21] 17  SpO2:  [96 %-100 %] 98 %  BP: ()/(53-80) 114/64  Arterial Line BP: (101-144)/(62-82) 124/70                          Neurosurgery Physical Exam   Awake, alert  AOX3  PERRL  AG in all extremities, symmetric  FCx4  sEEG leads in place with headwrap      Significant Labs:  Recent Labs   Lab 09/01/20  0113   GLU 95      K 3.4*      CO2 23   BUN 8   CREATININE 0.8   CALCIUM 8.2*   MG 1.9     Recent Labs   Lab 08/31/20  1352 09/01/20  0113   WBC 8.40 8.24   HGB 14.5 14.8   HCT 41.6 42.7    202     Recent Labs   Lab 08/31/20  0531   INR 1.0   APTT 31.4       Significant Diagnostics:  Ct Head Without Contrast    Result  Date: 8/31/2020  Interval placement of multiple stereo EEG electrodes without apparent intracranial complication. Electronically signed by resident: Vicki Patel Date:    08/31/2020 Time:    13:36 Electronically signed by: Jc Us MD Date:    08/31/2020 Time:    14:43    Ct Head Without Contrast    Result Date: 8/31/2020  1. Stealth/fiducial protocol CT performed for purposes of procedure localization. 2. Brain parenchyma appears within normal limits. 3. Fullness of the pituitary gland suggesting underlying lesion, possibly a proteinaceous cyst. Electronically signed by resident: Vicki Patel Date:    08/31/2020 Time:    08:28 Electronically signed by: Jc Us MD Date:    08/31/2020 Time:    09:26

## 2020-09-01 NOTE — ASSESSMENT & PLAN NOTE
25M with a PMHx of intractable epilepsy s/p VNS placement on Onfi 40 mg BID, Lamictal 200 mg BID, Vimpat 200 mg BID, and Topamax 50 mg BID s/p bilateral sEEG placement with Dr. Melani Ortiz on 8/31. Postop CTH stable.    Recommendations:  - Continuous vEEG monitoring  - Held home Vimpat 8/31  - Recommend decreasing Onfi to 20 mg BID and decreasing Lamictal to 100 mg BID  - Continue Topamax 50 mg BID  - Please call epilepsy on call prior to administering benzodiazepines   - Neurosurgery following  - Seizure precautions  - Avoid agents that lower seizure thresold      Plan of care discussed with patient and NCC team. Will follow. Please call with questions.

## 2020-09-01 NOTE — NURSING
"0835 - Pt's mother alerted RN to the beginning of pt having seizure-like activity involving lip smacking. Red button pressed for EEG monitor. RN gave patient a code word "blue dog" to recall.   0836 - End of seizure-like activity. Pt remains disoriented and unable to recall code word. VSS.   0838 - Pt now oriented. GCS 15. Patient does not recall seizure event or code word given. Pt says he has a 7/10 headache and is sleepy. Per pt's mother, this is normal after his seizures. VSS.   0840 -Pt remains oriented.   Minerva with Epilepsy notified.    "

## 2020-09-01 NOTE — PLAN OF CARE
POC reviewed with pt. Pt verbalized understanding. Questions and concerns addressed. No acute events overnight. No seizure like activity noted during shift. Pt slept majority of shift. Pt chief complaint was pain which was managed well with current pain regimen. Grid in place and equipment all WDL. Pt progressing toward goals. Will continue to monitor. See flowsheets for full assessment and VS info

## 2020-09-01 NOTE — PROGRESS NOTES
[facility]  Neurology-Epilepsy  Progress Note    Patient Name: Darin Cunha  MRN: [id number]  Admission Date: 8/31/2020  Hospital Length of Stay: 1 days  Code Status: Full Code   Attending Provider: Melani Ortiz MD  Primary Care Physician: Primary Doctor No   Principal Problem:Epilepsy    Subjective:     Hospital Course:   8/31: sEEG placement. Hold Vimpat. Continue other AEDs: Onfi 40 mg BID, Lamictal 200 mg BID, Topamax 50 mg BID.  8/31>9/1: No clinical or electrographic seizures overnight. One clinical and electrographic seizure at 0834 originating from L hippo and entro areas       Interval History: No acute events overnight. Pushbutton activation 0835 this morning after mother noticed patient lip smacking. Patient does not recall event. Patient complained of headache and fatigue after seizure which is typical per mother. Discussed plan of care with patient this morning.    Current Facility-Administered Medications   Medication Dose Route Frequency Provider Last Rate Last Dose    acetaminophen tablet 650 mg  650 mg Oral Q4H PRN Georgiefroilan Hoyos, NP        ceFAZolin injection 2 g  2 g Intravenous Q8H Katey Lee MD   2 g at 09/01/20 0448    citalopram tablet 20 mg  20 mg Oral Daily Georgie Romain, NP   20 mg at 09/01/20 0854    cloBAZam Tab 20 mg  20 mg Oral BID Minerva Areaux, PA-C   20 mg at 09/01/20 0854    hydrALAZINE injection 10 mg  10 mg Intravenous Q4H PRN Georgie Mithomas, NP   10 mg at 08/31/20 1601    labetaloL injection 10 mg  10 mg Intravenous Q4H PRN Georgie Romain, NP        lamoTRIgine tablet 100 mg  100 mg Oral BID Minerva Areaux, PA-C   100 mg at 09/01/20 0854    magnesium oxide tablet 800 mg  800 mg Oral PRN Melissa Flores NP        magnesium oxide tablet 800 mg  800 mg Oral PRN Melissa Flores NP        mupirocin 2 % ointment   Nasal BID Katey Lee MD        oxyCODONE immediate release tablet 5 mg  5 mg Oral Q6H PRN Georgie Hoyos NP   5 mg at 09/01/20 0855     potassium chloride packet 40 mEq  40 mEq Oral PRN Melissa Flores NP        potassium chloride packet 40 mEq  40 mEq Oral PRN Melissa Flores NP   40 mEq at 09/01/20 0856    potassium chloride packet 40 mEq  40 mEq Oral PRN Melissa Flores NP        potassium, sodium phosphates 280-160-250 mg packet 2 packet  2 packet Oral PRN Melissa Flores NP        potassium, sodium phosphates 280-160-250 mg packet 2 packet  2 packet Oral PRN Melissa Flores NP        potassium, sodium phosphates 280-160-250 mg packet 2 packet  2 packet Oral PRN Melissa Flores NP        promethazine (PHENERGAN) 6.25 mg in dextrose 5 % 50 mL IVPB  6.25 mg Intravenous Q8H PRN Georgie Hoyos  mL/hr at 08/31/20 1730 6.25 mg at 08/31/20 1730    senna-docusate 8.6-50 mg per tablet 1 tablet  1 tablet Oral Daily Georgie Hoyos NP   Stopped at 09/01/20 0900    topiramate tablet 50 mg  50 mg Oral BID Minerva Oconnor, PA-C   50 mg at 09/01/20 0854     Continuous Infusions:    Review of Systems   Constitutional: Positive for fatigue. Negative for chills, diaphoresis and fever.   HENT: Negative for sore throat and trouble swallowing.    Respiratory: Negative for cough, shortness of breath and wheezing.    Cardiovascular: Negative for chest pain and palpitations.   Gastrointestinal: Negative for abdominal pain, diarrhea, nausea and vomiting.   Skin: Negative for rash and wound.   Neurological: Positive for seizures and headaches (resolved). Negative for dizziness, syncope, speech difficulty and weakness.   Psychiatric/Behavioral: Negative for behavioral problems, confusion and sleep disturbance. The patient is not nervous/anxious.      Objective:     Vital Signs (Most Recent):  Temp: 98.5 °F (36.9 °C) (09/01/20 1105)  Pulse: 88 (09/01/20 1105)  Resp: 12 (09/01/20 1105)  BP: 122/77 (09/01/20 1105)  SpO2: 98 % (09/01/20 1105) Vital Signs (24h Range):  Temp:  [97.5 °F (36.4 °C)-99.1 °F (37.3 °C)] 98.5 °F (36.9  °C)  Pulse:  [] 88  Resp:  [8-35] 12  SpO2:  [94 %-100 %] 98 %  BP: ()/(53-80) 122/77  Arterial Line BP: (101-144)/(62-82) 124/68     Weight: 78.5 kg (173 lb 1 oz)  Body mass index is 22.83 kg/m².    Physical Exam  Constitutional:       General: He is not in acute distress.     Appearance: Normal appearance. He is not diaphoretic.   HENT:      Head: Normocephalic.      Comments: sEEG in place  Eyes:      General: No scleral icterus.        Right eye: No discharge.         Left eye: No discharge.      Extraocular Movements: EOM normal.      Conjunctiva/sclera: Conjunctivae normal.      Pupils: Pupils are equal, round, and reactive to light.   Cardiovascular:      Rate and Rhythm: Normal rate.   Pulmonary:      Effort: Pulmonary effort is normal. No respiratory distress.   Abdominal:      General: There is no distension.      Palpations: Abdomen is soft.      Tenderness: There is no abdominal tenderness.   Musculoskeletal: Normal range of motion.         General: No deformity or signs of injury.   Skin:     General: Skin is warm and dry.   Neurological:      General: No focal deficit present.      Mental Status: He is alert and oriented to person, place, and time. Mental status is at baseline.      Coordination: Finger-Nose-Finger Test normal.   Psychiatric:         Mood and Affect: Mood normal.         Speech: Speech normal.         Behavior: Behavior normal.         Thought Content: Thought content normal.         NEUROLOGICAL EXAMINATION:     MENTAL STATUS   Oriented to person, place, and time.   Speech: speech is normal   Level of consciousness: alert    CRANIAL NERVES     CN III, IV, VI   Pupils are equal, round, and reactive to light.  Extraocular motions are normal.     CN VII   Facial expression full, symmetric.     CN VIII   Hearing: intact    CN XI   CN XI normal.     CN XII   CN XII normal.     GAIT AND COORDINATION      Coordination   Finger to nose coordination: normal      Significant Labs:  All pertinent lab results from the past 24 hours have been reviewed.    Significant Studies: I have reviewed all pertinent imaging results/findings within the past 24 hours.    Assessment and Plan:     * Epilepsy  25M with a PMHx of intractable epilepsy s/p VNS placement on Onfi 40 mg BID, Lamictal 200 mg BID, Vimpat 200 mg BID, and Topamax 50 mg BID s/p bilateral sEEG placement with Dr. Melani Ortiz on 8/31. Postop CTH stable.    Recommendations:  - Continuous vEEG monitoring  - Held home Vimpat 8/31  - Recommend decreasing Onfi to 20 mg BID and decreasing Lamictal to 100 mg BID  - Continue Topamax 50 mg BID  - Please call epilepsy on call prior to administering benzodiazepines   - Neurosurgery following  - Seizure precautions  - Avoid agents that lower seizure thresold      Plan of care discussed with patient and NCC team. Will follow. Please call with questions.    Depression  - Chronic and stable  - Continue home Celexa        VTE Risk Mitigation (From admission, onward)         Ordered     Place sequential compression device  Until discontinued      08/31/20 4404                Minerva Oconnor PA-C  Neurology-Epilepsy  [facility]  Staff: Dr. Sage

## 2020-09-01 NOTE — ASSESSMENT & PLAN NOTE
25 M with intractable epilepsy s/p multiple failed AEDs and VNS placement now s/p sEEG lead placement:    Admit to NCC  q1h neurochecks  sEEG leads in place, abx while in place, continue headwrap  Bed rest at all times  SBP<140  No HOB restrictions  Ok for recumbent bike in bed  Ok for diet  Voiding spontaneously s/p michelle removal  Post operative CTH reviewed - expected post op changes  Monitor for seizure activity - no benzos or sedating meds unless cleared by Epilepsy team  AEDs per Epilepsy  Please page w/exam change    Dispo: continue ICU care

## 2020-09-01 NOTE — PLAN OF CARE
Psychiatric Care Plan    POC reviewed with Darin Cunha and pt's mother at 1400. Pt verbalized understanding. Questions and concerns addressed. sEEG remains in place. Seizure x1 today. Pt progressing toward goals. Will continue to monitor. See below and flowsheets for full assessment and VS info.       Neuro:  [place] Coma Scale  Best Eye Response: 4-->(E4) spontaneous  Best Motor Response: 6-->(M6) obeys commands  Best Verbal Response: 5-->(V5) oriented  [place] Coma Scale Score: 15  Assessment Qualifiers: patient not sedated/intubated  Pupil PERRLA: yes     24 hr Temp:  [97.5 °F (36.4 °C)-99.1 °F (37.3 °C)]     CV:   Rhythm: normal sinus rhythm  BP goals:   SBP < 140  MAP > 65    Resp:   O2 Device (Oxygen Therapy): room air       Plan: N/A    GI/:  STEPHENIE Total Score: 20  Diet/Nutrition Received: regular  Last Bowel Movement: 08/31/20  Voiding Characteristics: voids spontaneously without difficulty    Intake/Output Summary (Last 24 hours) at 9/1/2020 1534  Last data filed at 9/1/2020 1505  Gross per 24 hour   Intake 1045 ml   Output 2860 ml   Net -1815 ml     Unmeasured Output  Urine Occurrence: 1  Stool Occurrence: 0    Labs/Accuchecks:  Recent Labs   Lab 09/01/20  0113   WBC 8.24   RBC 4.72   HGB 14.8   HCT 42.7         Recent Labs   Lab 09/01/20  0113 09/01/20  1303     --    K 3.4* 4.1   CO2 23  --      --    BUN 8  --    CREATININE 0.8  --    ALKPHOS 88  --    ALT 15  --    AST 21  --    BILITOT 0.8  --       Recent Labs   Lab 08/31/20  0531   INR 1.0   APTT 31.4    No results for input(s): CPK, CPKMB, TROPONINI, MB in the last 168 hours.    Electrolytes: Electrolytes replaced  Accuchecks: none    Gtts: n/a       LDA/Wounds:  Lines/Drains/Airways       Arterial Line              Arterial Line 08/31/20 0737 Right Radial 1 day              Peripheral Intravenous Line                   Peripheral IV - Single Lumen 08/31/20 0557 18 G Right Antecubital 1 day         Peripheral IV - Single Lumen  08/31/20 0721 18 G Right Hand 1 day                  Wounds: Electrodes wrapped in gauze around head.   Wound care consulted: No, managed per neurosurgery

## 2020-09-01 NOTE — SUBJECTIVE & OBJECTIVE
Interval History: No acute events overnight. Pushbutton activation 0835 this morning after mother noticed patient lip smacking. Patient does not recall event. Patient complained of headache and fatigue after seizure which is typical per mother. Discussed plan of care with patient this morning.    Current Facility-Administered Medications   Medication Dose Route Frequency Provider Last Rate Last Dose    acetaminophen tablet 650 mg  650 mg Oral Q4H PRN Georgie Hoyos, NP        ceFAZolin injection 2 g  2 g Intravenous Q8H Katey Lee MD   2 g at 09/01/20 0448    citalopram tablet 20 mg  20 mg Oral Daily Georgie Mithomas, NP   20 mg at 09/01/20 0854    cloBAZam Tab 20 mg  20 mg Oral BID Minerva Oconnor, PA-C   20 mg at 09/01/20 0854    hydrALAZINE injection 10 mg  10 mg Intravenous Q4H PRN Georgie Hoyos, NP   10 mg at 08/31/20 1601    labetaloL injection 10 mg  10 mg Intravenous Q4H PRN Georgie Hoyos, NP        lamoTRIgine tablet 100 mg  100 mg Oral BID Minerva Oconnor, PA-C   100 mg at 09/01/20 0854    magnesium oxide tablet 800 mg  800 mg Oral PRN Melissa Flores NP        magnesium oxide tablet 800 mg  800 mg Oral PRN Melissa Flores NP        mupirocin 2 % ointment   Nasal BID Katey Lee MD        oxyCODONE immediate release tablet 5 mg  5 mg Oral Q6H PRN Georgie Hoyos, NP   5 mg at 09/01/20 0855    potassium chloride packet 40 mEq  40 mEq Oral PRN Melissa Flores NP        potassium chloride packet 40 mEq  40 mEq Oral PRN Melissa Flores NP   40 mEq at 09/01/20 0856    potassium chloride packet 40 mEq  40 mEq Oral PRN Melissa Flores NP        potassium, sodium phosphates 280-160-250 mg packet 2 packet  2 packet Oral PRN Melissa Flores NP        potassium, sodium phosphates 280-160-250 mg packet 2 packet  2 packet Oral PRN Melissa Flores NP        potassium, sodium phosphates 280-160-250 mg packet 2 packet  2 packet Oral PRN Melissa Flores NP         promethazine (PHENERGAN) 6.25 mg in dextrose 5 % 50 mL IVPB  6.25 mg Intravenous Q8H PRN Georgie Hoyos  mL/hr at 08/31/20 1730 6.25 mg at 08/31/20 1730    senna-docusate 8.6-50 mg per tablet 1 tablet  1 tablet Oral Daily Georgie HopkinsPRIETO guzman   Stopped at 09/01/20 0900    topiramate tablet 50 mg  50 mg Oral BID Minerva Oconnor PA-C   50 mg at 09/01/20 0854     Continuous Infusions:    Review of Systems   Constitutional: Positive for fatigue. Negative for chills, diaphoresis and fever.   HENT: Negative for sore throat and trouble swallowing.    Respiratory: Negative for cough, shortness of breath and wheezing.    Cardiovascular: Negative for chest pain and palpitations.   Gastrointestinal: Negative for abdominal pain, diarrhea, nausea and vomiting.   Skin: Negative for rash and wound.   Neurological: Positive for seizures and headaches (resolved). Negative for dizziness, syncope, speech difficulty and weakness.   Psychiatric/Behavioral: Negative for behavioral problems, confusion and sleep disturbance. The patient is not nervous/anxious.      Objective:     Vital Signs (Most Recent):  Temp: 98.5 °F (36.9 °C) (09/01/20 1105)  Pulse: 88 (09/01/20 1105)  Resp: 12 (09/01/20 1105)  BP: 122/77 (09/01/20 1105)  SpO2: 98 % (09/01/20 1105) Vital Signs (24h Range):  Temp:  [97.5 °F (36.4 °C)-99.1 °F (37.3 °C)] 98.5 °F (36.9 °C)  Pulse:  [] 88  Resp:  [8-35] 12  SpO2:  [94 %-100 %] 98 %  BP: ()/(53-80) 122/77  Arterial Line BP: (101-144)/(62-82) 124/68     Weight: 78.5 kg (173 lb 1 oz)  Body mass index is 22.83 kg/m².    Physical Exam  Constitutional:       General: He is not in acute distress.     Appearance: Normal appearance. He is not diaphoretic.   HENT:      Head: Normocephalic.      Comments: sEEG in place  Eyes:      General: No scleral icterus.        Right eye: No discharge.         Left eye: No discharge.      Extraocular Movements: EOM normal.      Conjunctiva/sclera: Conjunctivae normal.      Pupils:  Pupils are equal, round, and reactive to light.   Cardiovascular:      Rate and Rhythm: Normal rate.   Pulmonary:      Effort: Pulmonary effort is normal. No respiratory distress.   Abdominal:      General: There is no distension.      Palpations: Abdomen is soft.      Tenderness: There is no abdominal tenderness.   Musculoskeletal: Normal range of motion.         General: No deformity or signs of injury.   Skin:     General: Skin is warm and dry.   Neurological:      General: No focal deficit present.      Mental Status: He is alert and oriented to person, place, and time. Mental status is at baseline.      Coordination: Finger-Nose-Finger Test normal.   Psychiatric:         Mood and Affect: Mood normal.         Speech: Speech normal.         Behavior: Behavior normal.         Thought Content: Thought content normal.         NEUROLOGICAL EXAMINATION:     MENTAL STATUS   Oriented to person, place, and time.   Speech: speech is normal   Level of consciousness: alert    CRANIAL NERVES     CN III, IV, VI   Pupils are equal, round, and reactive to light.  Extraocular motions are normal.     CN VII   Facial expression full, symmetric.     CN VIII   Hearing: intact    CN XI   CN XI normal.     CN XII   CN XII normal.     GAIT AND COORDINATION      Coordination   Finger to nose coordination: normal      Significant Labs: All pertinent lab results from the past 24 hours have been reviewed.    Significant Studies: I have reviewed all pertinent imaging results/findings within the past 24 hours.

## 2020-09-01 NOTE — NURSING
"1639- Pt displayed seizure-like activity with lip smacking. Red button pressed on EEG monitor machine. Pt given code word "purple elephant" to recall. Pt unable to answer questions.   1640- End of seizure-like activity. Pt remains disoriented and unable to recall code word. VSS.  1643- Pt oriented to self. Remains lethargic. Unable to state year or code word. VSS.   1645- Pt oriented to self, place and year. VSS.   Stella with epilepsy in room during event with RN and stated would let epilepsy team know.   "

## 2020-09-01 NOTE — ASSESSMENT & PLAN NOTE
-s/p sEEG placement  -cefazolin 2 q8 prophylaxis  -NSGY and epilepsy following  -seizure precautions  -continue: Topiramate 50 mg BID, lamotrigine 200mg BID, clobazam 40 mg BID, hole home med Vimpat  -neuro checks q 1 hr  9/1/2020: 1 seizure this morning associated with lip smacking  -per epilepsy: Held home Vimpat 8/31  - decreasing Onfi to 20 mg BID and decreasing Lamictal to 100 mg BID  - Continue Topamax 50 mg BID

## 2020-09-02 LAB
ALBUMIN SERPL BCP-MCNC: 4 G/DL (ref 3.5–5.2)
ALP SERPL-CCNC: 94 U/L (ref 55–135)
ALT SERPL W/O P-5'-P-CCNC: 13 U/L (ref 10–44)
ANION GAP SERPL CALC-SCNC: 8 MMOL/L (ref 8–16)
AST SERPL-CCNC: 25 U/L (ref 10–40)
BASOPHILS # BLD AUTO: 0.02 K/UL (ref 0–0.2)
BASOPHILS NFR BLD: 0.2 % (ref 0–1.9)
BILIRUB SERPL-MCNC: 0.8 MG/DL (ref 0.1–1)
BUN SERPL-MCNC: 10 MG/DL (ref 6–20)
CALCIUM SERPL-MCNC: 9.1 MG/DL (ref 8.7–10.5)
CHLORIDE SERPL-SCNC: 109 MMOL/L (ref 95–110)
CO2 SERPL-SCNC: 22 MMOL/L (ref 23–29)
CREAT SERPL-MCNC: 0.9 MG/DL (ref 0.5–1.4)
DIFFERENTIAL METHOD: ABNORMAL
EOSINOPHIL # BLD AUTO: 0.1 K/UL (ref 0–0.5)
EOSINOPHIL NFR BLD: 0.8 % (ref 0–8)
ERYTHROCYTE [DISTWIDTH] IN BLOOD BY AUTOMATED COUNT: 12.1 % (ref 11.5–14.5)
EST. GFR  (AFRICAN AMERICAN): >60 ML/MIN/1.73 M^2
EST. GFR  (NON AFRICAN AMERICAN): >60 ML/MIN/1.73 M^2
GLUCOSE SERPL-MCNC: 104 MG/DL (ref 70–110)
HCT VFR BLD AUTO: 45.6 % (ref 40–54)
HGB BLD-MCNC: 15.8 G/DL (ref 14–18)
IMM GRANULOCYTES # BLD AUTO: 0.02 K/UL (ref 0–0.04)
IMM GRANULOCYTES NFR BLD AUTO: 0.2 % (ref 0–0.5)
LYMPHOCYTES # BLD AUTO: 1.6 K/UL (ref 1–4.8)
LYMPHOCYTES NFR BLD: 18.5 % (ref 18–48)
MAGNESIUM SERPL-MCNC: 2.1 MG/DL (ref 1.6–2.6)
MCH RBC QN AUTO: 31.3 PG (ref 27–31)
MCHC RBC AUTO-ENTMCNC: 34.6 G/DL (ref 32–36)
MCV RBC AUTO: 90 FL (ref 82–98)
MONOCYTES # BLD AUTO: 1 K/UL (ref 0.3–1)
MONOCYTES NFR BLD: 11.3 % (ref 4–15)
NEUTROPHILS # BLD AUTO: 5.9 K/UL (ref 1.8–7.7)
NEUTROPHILS NFR BLD: 69 % (ref 38–73)
NRBC BLD-RTO: 0 /100 WBC
PHOSPHATE SERPL-MCNC: 3.7 MG/DL (ref 2.7–4.5)
PLATELET # BLD AUTO: 213 K/UL (ref 150–350)
PMV BLD AUTO: 10 FL (ref 9.2–12.9)
POTASSIUM SERPL-SCNC: 3.7 MMOL/L (ref 3.5–5.1)
POTASSIUM SERPL-SCNC: 4.2 MMOL/L (ref 3.5–5.1)
PROT SERPL-MCNC: 6.6 G/DL (ref 6–8.4)
RBC # BLD AUTO: 5.05 M/UL (ref 4.6–6.2)
SODIUM SERPL-SCNC: 139 MMOL/L (ref 136–145)
WBC # BLD AUTO: 8.52 K/UL (ref 3.9–12.7)

## 2020-09-02 PROCEDURE — 95720 PR EEG, W/VIDEO, CONT RECORD, I&R, >12<26 HRS: ICD-10-PCS | Mod: ,,, | Performed by: PSYCHIATRY & NEUROLOGY

## 2020-09-02 PROCEDURE — 99291 CRITICAL CARE FIRST HOUR: CPT | Mod: ,,, | Performed by: PHYSICIAN ASSISTANT

## 2020-09-02 PROCEDURE — 20000000 HC ICU ROOM

## 2020-09-02 PROCEDURE — 99233 PR SUBSEQUENT HOSPITAL CARE,LEVL III: ICD-10-PCS | Mod: ,,, | Performed by: PSYCHIATRY & NEUROLOGY

## 2020-09-02 PROCEDURE — 80053 COMPREHEN METABOLIC PANEL: CPT

## 2020-09-02 PROCEDURE — 95714 VEEG EA 12-26 HR UNMNTR: CPT

## 2020-09-02 PROCEDURE — 25000003 PHARM REV CODE 250: Performed by: NURSE PRACTITIONER

## 2020-09-02 PROCEDURE — 95720 EEG PHY/QHP EA INCR W/VEEG: CPT | Mod: ,,, | Performed by: PSYCHIATRY & NEUROLOGY

## 2020-09-02 PROCEDURE — 63600175 PHARM REV CODE 636 W HCPCS: Performed by: PHYSICIAN ASSISTANT

## 2020-09-02 PROCEDURE — 25000003 PHARM REV CODE 250: Performed by: PHYSICIAN ASSISTANT

## 2020-09-02 PROCEDURE — 94761 N-INVAS EAR/PLS OXIMETRY MLT: CPT

## 2020-09-02 PROCEDURE — 84100 ASSAY OF PHOSPHORUS: CPT

## 2020-09-02 PROCEDURE — 85025 COMPLETE CBC W/AUTO DIFF WBC: CPT

## 2020-09-02 PROCEDURE — 99233 SBSQ HOSP IP/OBS HIGH 50: CPT | Mod: ,,, | Performed by: PSYCHIATRY & NEUROLOGY

## 2020-09-02 PROCEDURE — 83735 ASSAY OF MAGNESIUM: CPT

## 2020-09-02 PROCEDURE — 84132 ASSAY OF SERUM POTASSIUM: CPT

## 2020-09-02 PROCEDURE — 63600175 PHARM REV CODE 636 W HCPCS: Performed by: STUDENT IN AN ORGANIZED HEALTH CARE EDUCATION/TRAINING PROGRAM

## 2020-09-02 PROCEDURE — 99291 PR CRITICAL CARE, E/M 30-74 MINUTES: ICD-10-PCS | Mod: ,,, | Performed by: PHYSICIAN ASSISTANT

## 2020-09-02 RX ORDER — HEPARIN SODIUM 5000 [USP'U]/ML
5000 INJECTION, SOLUTION INTRAVENOUS; SUBCUTANEOUS EVERY 8 HOURS
Status: DISCONTINUED | OUTPATIENT
Start: 2020-09-02 | End: 2020-09-13

## 2020-09-02 RX ADMIN — OXYCODONE HYDROCHLORIDE 5 MG: 5 TABLET ORAL at 07:09

## 2020-09-02 RX ADMIN — OXYCODONE HYDROCHLORIDE 5 MG: 5 TABLET ORAL at 01:09

## 2020-09-02 RX ADMIN — MUPIROCIN: 20 OINTMENT TOPICAL at 09:09

## 2020-09-02 RX ADMIN — HEPARIN SODIUM 5000 UNITS: 5000 INJECTION INTRAVENOUS; SUBCUTANEOUS at 02:09

## 2020-09-02 RX ADMIN — LAMOTRIGINE 100 MG: 25 TABLET ORAL at 09:09

## 2020-09-02 RX ADMIN — CEFAZOLIN 2 G: 1 INJECTION, POWDER, FOR SOLUTION INTRAMUSCULAR; INTRAVENOUS at 09:09

## 2020-09-02 RX ADMIN — HEPARIN SODIUM 5000 UNITS: 5000 INJECTION INTRAVENOUS; SUBCUTANEOUS at 09:09

## 2020-09-02 RX ADMIN — CITALOPRAM HYDROBROMIDE 20 MG: 10 TABLET ORAL at 08:09

## 2020-09-02 RX ADMIN — DOCUSATE SODIUM 50MG AND SENNOSIDES 8.6MG 1 TABLET: 8.6; 5 TABLET, FILM COATED ORAL at 08:09

## 2020-09-02 RX ADMIN — CLOBAZAM 20 MG: 10 TABLET ORAL at 09:09

## 2020-09-02 RX ADMIN — TOPIRAMATE 50 MG: 25 TABLET, FILM COATED ORAL at 09:09

## 2020-09-02 RX ADMIN — TOPIRAMATE 50 MG: 25 TABLET, FILM COATED ORAL at 08:09

## 2020-09-02 RX ADMIN — MUPIROCIN: 20 OINTMENT TOPICAL at 08:09

## 2020-09-02 RX ADMIN — ACETAMINOPHEN 650 MG: 325 TABLET ORAL at 07:09

## 2020-09-02 RX ADMIN — POTASSIUM CHLORIDE 40 MEQ: 1.5 POWDER, FOR SOLUTION ORAL at 05:09

## 2020-09-02 RX ADMIN — CEFAZOLIN 2 G: 1 INJECTION, POWDER, FOR SOLUTION INTRAMUSCULAR; INTRAVENOUS at 05:09

## 2020-09-02 RX ADMIN — CLOBAZAM 20 MG: 10 TABLET ORAL at 08:09

## 2020-09-02 RX ADMIN — CEFAZOLIN 2 G: 1 INJECTION, POWDER, FOR SOLUTION INTRAMUSCULAR; INTRAVENOUS at 02:09

## 2020-09-02 RX ADMIN — LAMOTRIGINE 100 MG: 25 TABLET ORAL at 08:09

## 2020-09-02 NOTE — PROGRESS NOTES
[facility]  Neurocritical Care  Progress Note    Admit Date: 8/31/2020  Service Date: 09/02/2020  Length of Stay: 2    Subjective:     Chief Complaint: Epilepsy    History of Present Illness: The patient is a 25 year old M with PMHx of depression, seizures, epilepsy admitted to [facility] s/p placement of sEED electrodes bilateral. Per chart review he has failed at least 4 AEDs at therapeutic dosages. Triggers include being tired and being stressed. He is not working (on disability) but finished his GED. He lives next door to grandmother and nearby to his mother. His major concern is memory loss from ongoing seizures. NSGY and epilepsy following. Patient admitted to [facility] for close monitoring and higher level of care.     Hospital Course: 8/31/2020: patient admitted to [facility] s/p sEEG electrodes placement, NSGY and epilepsy following, SBP goal < 140, perepilepsy restarted all AEDs except for Vimpat  9/1/2020: 1 episode of seizure, Vimpat  On hold, Onfi and lamictal decreased, continue topamax  9/2/2020: No seizures this AM, epilepsy managing AEDs, started SQH    Interval History:  Pt s/p sEEG bilat lead placemnt had no seizure activity in AM today. Continue to monitor seizure activity. Continue topiramate 50mg BID, Lamotrigine 100mg BID, Clobazam 20mg BID. Continue to hold vimpat. Started SQH today for DVT ppx. Continue to maintain MAP goals >65, SBP <140. Continue to monitor potassium and replace PRN.    Review of Systems   Constitutional: Negative for chills and fever.   HENT: Negative for congestion, drooling and rhinorrhea.    Eyes: Negative for discharge and redness.   Respiratory: Negative for cough, shortness of breath and wheezing.    Cardiovascular: Negative for chest pain and palpitations.   Gastrointestinal: Positive for nausea. Negative for abdominal distention, abdominal pain, diarrhea and vomiting.   Endocrine: Negative for polyuria.   Genitourinary: Negative for difficulty urinating, dysuria  and urgency.   Musculoskeletal: Negative for arthralgias, myalgias, neck pain and neck stiffness.   Skin: Negative for rash.   Neurological: Positive for seizures and headaches. Negative for dizziness, tremors, syncope, facial asymmetry, speech difficulty, weakness, light-headedness and numbness.   Psychiatric/Behavioral: Negative for agitation and behavioral problems.        Short term memory loss, depression       Objective:     Vitals:  Temp: 97.4 °F (36.3 °C)  Pulse: 70  Rhythm: normal sinus rhythm  BP: (!) 109/57  MAP (mmHg): 77  Resp: 16  SpO2: 97 %  O2 Device (Oxygen Therapy): room air    Temp  Min: 97.4 °F (36.3 °C)  Max: 99 °F (37.2 °C)  Pulse  Min: 65  Max: 104  BP  Min: 101/64  Max: 155/95  MAP (mmHg)  Min: 76  Max: 112  Resp  Min: 12  Max: 25  SpO2  Min: 94 %  Max: 100 %    09/01 0701 - 09/02 0700  In: 1390 [P.O.:1150; I.V.:240]  Out: 2675 [Urine:2675]   Unmeasured Output  Urine Occurrence: 1  Stool Occurrence: 0       Physical Exam  Constitutional:       General: He is not in acute distress.     Appearance: He is not toxic-appearing.   HENT:      Head:      Comments: S/p sEEG bilat placement, head wrapped     Right Ear: External ear normal.      Left Ear: External ear normal.      Nose: Nose normal.      Mouth/Throat:      Mouth: Mucous membranes are moist.   Eyes:      General:         Right eye: No discharge.         Left eye: No discharge.      Conjunctiva/sclera: Conjunctivae normal.   Neck:      Musculoskeletal: Normal range of motion.   Cardiovascular:      Rate and Rhythm: Normal rate and regular rhythm.   Pulmonary:      Effort: Pulmonary effort is normal. No respiratory distress.      Breath sounds: No wheezing or rhonchi.   Abdominal:      General: Abdomen is flat. There is no distension.      Palpations: Abdomen is soft. There is no mass.      Tenderness: There is no abdominal tenderness. There is no guarding.      Hernia: No hernia is present.   Musculoskeletal: Normal range of motion.          General: No deformity.      Right lower leg: No edema.      Left lower leg: No edema.   Skin:     General: Skin is warm and dry.      Capillary Refill: Capillary refill takes less than 2 seconds.      Coloration: Skin is not jaundiced.   Neurological:      Mental Status: He is alert.      Comments: GCS [id number]  A&Ox4, follows commands  EOMs, cough, gag, shoulder shrug intact, no tongue deviation  [employer] spont against gravity  Sensation intact in all extremities   Psychiatric:         Mood and Affect: Mood normal.         Behavior: Behavior normal.         Thought Content: Thought content normal.         Judgment: Judgment normal.           Medications:  Continuous ScheduledceFAZolin (ANCEF) IVPB, 2 g, Q8H  citalopram, 20 mg, Daily  cloBAZam, 20 mg, BID  heparin (porcine), 5,000 Units, Q8H  lamoTRIgine, 100 mg, BID  mupirocin, , BID  senna-docusate 8.6-50 mg, 1 tablet, Daily  topiramate, 50 mg, BID    PRNacetaminophen, 650 mg, Q4H PRN  hydrALAZINE, 10 mg, Q4H PRN  labetaloL, 10 mg, Q4H PRN  magnesium oxide, 800 mg, PRN  magnesium oxide, 800 mg, PRN  oxyCODONE, 5 mg, Q6H PRN  potassium chloride, 40 mEq, PRN  potassium chloride, 40 mEq, PRN  potassium chloride, 40 mEq, PRN  potassium, sodium phosphates, 2 packet, PRN  potassium, sodium phosphates, 2 packet, PRN  potassium, sodium phosphates, 2 packet, PRN  promethazine (PHENERGAN) IVPB, 6.25 mg, Q8H PRN      Today I personally reviewed pertinent medications, lines/drains/airways, imaging, cardiology results, laboratory results, microbiology results,    Diet  Diet Adult Regular (IDDSI Level 7)  Diet Adult Regular (IDDSI Level 7)        Assessment/Plan:     Neuro  * Epilepsy  -s/p sEEG placement  -cefazolin 2 q8 prophylaxis  -SBP goal <140  -NSGY and epilepsy following  -seizure precautions  -continue: Topiramate 50 mg BID, lamotrigine 100mg BID, clobazam 20 mg BID, hold home med Vimpat  -neuro checks q 1 hr  9/2/2020: 1 seizure this morning associated with lip  smacking  -per epilepsy: Held home Vimpat 8/31  - Continue Topamax 50 mg BID  9/3/2020: no seizures this AM, continue to hold vimpat, started SQH for DVT ppx      Psychiatric  Depression  - continue home med celexa    Renal/  Hypokalemia  - replace PRN  - follow CMP    GI  Nausea  - allergic to zofran  -continue Phenergan PRN          The patient is being Prophylaxed for:  Venous Thromboembolism with: Chemical  Stress Ulcer with: None  Ventilator Pneumonia with: not applicable    Activity Orders          Diet Adult Regular (IDDSI Level 7): Regular starting at 09/01 0941        Full Code  Critical care time 35min    Carito Amezquita PA-C  Neurocritical Care  [facility]

## 2020-09-02 NOTE — ASSESSMENT & PLAN NOTE
25M with a PMHx of intractable epilepsy s/p VNS placement on Onfi 40 mg BID, Lamictal 200 mg BID, Vimpat 200 mg BID, and Topamax 50 mg BID s/p bilateral sEEG placement with Dr. Melani Ortiz on 8/31. Postop CTH stable.    Recommendations:  - Continuous vEEG monitoring  - 4 electrographic seizures from L hippo and entro areas since admit  - Held home Vimpat 8/31  - Continue Onfi 20 mg BID, Lamictal to 100 mg BID, Topamax 50 mg BID  - Please call epilepsy on call prior to administering benzodiazepines   - If patient has more than one GTC, can consider giving IV Vimpat 100 mgx1  - Neurosurgery following  - Seizure precautions  - Avoid agents that lower seizure thresold      Plan of care discussed with patient, mother at bedside, and NCC team. Will follow. Please call with questions.

## 2020-09-02 NOTE — PLAN OF CARE
POC reviewed with pt. Pt verbalized understanding. Questions and concerns addressed. No acute events overnight. No witnessed seizures or seizure like activity noted during shift. Pt denies feeling like he may have had a seizure during shift. Pt pain managed with current pain regimen.No fevers during shift. Pt continues to have not no appetite despite being encouraged to eat by nursing staff. Pt slept the majority of the shift.  Pt progressing toward goals. Will continue to monitor. See flowsheets for full assessment and VS info

## 2020-09-02 NOTE — PROCEDURES
Stereo EEG Depth Electrode Recording  DATE OF SERVICE: 2020/09/01  EEG NUMBER: [id number]  REQUESTED BY: Dr Alix Ortiz  LOCATION OF SERVICE: [id number]     METHODOLOGY              Depth electrodes consisted of thin wires with electrical contacts it fixed distances along the wire.  These are then implanted using a stereotactic procedure targeting cortical and subcortical structures.  The up to 256 electrode contact can be recorded simultaneously with this procedure.  Recording band pass was 0.1  in the low past filters setting could be set at the 512, 1024, or 2048.  Digital video recording of the patient is simultaneously recorded with the EEG.  The patient is instructed report clinical symptoms which may occur during the recording session.  EEG and video recording is stored and archived in digital format. Activation procedures which include photic stimulation, hyperventilation and instructing patients to perform simple task are done in selected patients.   Compresses spectral analysis (CSA) is also performed on the activity recorded from each individual channel.  This is displayed as a power display of frequencies from 0 to 30 Hz over time.   The CSA analysis is done and displayed continuously.  This is reviewed for asymmetries in power between homologous areas of the scalp and for presence of changes in power which canbe seen when seizures occur.  Sections of suspected abnormalities on the CSA is then compared with the original EEG recording.          Implant Date MRN Name Last First                      8/31/2020 [id number]  Alphonse Webster                            Contacts                   Depth Elect Name SN  # contacts Color 1 Color 2 1 2 3 4 5 6 7 8 9 10 11 12 13 14 15 16   1 L Amyg 36099  14 Yellow Green 1 2 3 4 5 6 7 8 9 10 11 12 13 14     2 L Hippo 13655  14 Black Blue 15 16 17 18 19 20 21 22 23 24 25 26 27 28     3 L Entor 66660  14 Red Blue 29 30 31 32 33 34 35 36 37 38 39 40 41 42     4 L AntCi 93504  14  Red Green 43 44 45 46 47 48 49 50 51 52 53 54 55 56     5 L OrbFr 85978  16 Blue Red 57 58 59 60 61 62 63 64 65 66 67 68 69 70 71 72   6 L PreFr 26806  16 Yellow Green 73 74 75 76 77 78 79 80 81 82 83 84 85 86 87 88   7 L AntLn 26317  8 Black Green 89 90 91 92 93 94 95 96           8 L PoIns 07624  8 Yellow Black 97 98 99 100 101 102 103 104           9 L TeOcc 50988  8 Black Red 105 106 107 108 109 110 111 112           10 L Pariet 49968  8 Black Red 113 114 115 116 117 118 119 120           11 R Amyg 76644  12 Green Red 121 122 123 124 125 126 127 128 129 130 131 132       12 R Hippo 24608  12 Green Blue 133 134 135 136 137 138 139 140 141 142 143 144       13 R Asuin 61392  8 Blue Red 145 146 147 148 149 150 151 152           14 R Acing 20109  16 Yellow Blue 153 154 155 156 157 158 159 160 161 162 163 164 165 166 167 168       Electrode integrety   High amplitude artifact present from the following contacts               Depth electrode          Contact #                       L Pariet  4              L Entor   12, 13, 14   L Pariet  3, 4   R Amygd  2, 3    11, 12  Appropriate recording obtained from the remaining electrode contacts.     RECORDING TIMES  Start on 09/01/2020t 7:00 p.m.  Stop on 09/02/2020t 7:00 a.m.  A total of 24 hrs of EEG monitoring was obtained    Seizures Recorded   Date  Start  End  Sz 1 2020/09/01 08:34:43 08:35:28    Sz 2 2020/09/01 13:18:13 13:18:55    Sz 3  2020/09/01  16:39:13   start L hippo 1 2   16:39:13   sz spread Amyg Entor Prefr   16:39:38   Patient Event   16:39:43   nurse telling him a code word, Purple elephant   16:39:48  Sz 3 electrographic stop   16:40:14  pt not following commands   16:40:37  pt able to follow commands    Sz 4 2020/09/01    21:05:53 start Emtpr > Hippo    very focal   21:06:17 Stop    EEG FINDINGS  Ictal Recording   Seizure #         Date                 Start                 End  #1                    2020/09/01 08:34:43 08:35:28   Build up started in L  "Hippo contacts 1, 2  >> 3, 4      then spread to L Hippo contacts 11, 12, 13 L Amygd contacts 11, 12, 13      then spread to L Entor contacts 11, 12, 13     Seizure Description   Seizure 1   08:34:43  Sz 1 start  08:34:58  Oral facial mvt  08:35:01  L forearm and leg mvt  08:35:16  L hand auttomatism  08:35:23  Oral facial mvt continues  08:35:28  Sz end    Seizure 2  d1 13:18:13  Sz 2 start  d1 13:18:29  Head turn slightly to L  d1 13:18:33  Oral facial mvt  d1 13:18:41  Moving some in bed  d1 13:18:45  oral facial mvt continues  d1 13:18:55  Sz 2 stop  d1 13:18:57  Returns to looking at iPhone    Seizure 3  16:39:13  Lying back in bed - no mvt  16:39:20  Raises hed and slightly flexes knees  16:39:26  no response to nurse "are U OK?  16:39:30  Oral facial mvts  16:39:38  Patient Event  16:39:43  nurse telling him a code word, Purple elephant  16:39:48  Sz 3 stop  16:39:52  limb mvts stop  16:40:02  not responding to nurse  16:40:14  pt not following commands  16:40:37  pt able to follow commands    Seizure 4     Nothing clinical     Impression  Four seizure originating from L Hippo and Entro areas   "

## 2020-09-02 NOTE — PROGRESS NOTES
[facility]  Neurosurgery  Progress Note    Subjective:     History of Present Illness: Darin Cunha is a 25 y.o. male with intractable epilepsy since 2013 who presents as a referral from Dr. Abhijeet Do to discuss the gamut of epilepsy surgery options. Today's visit is a video visit, and the patient is a passenger in a moving vehicle. His mother is not with him.      The patient states that when he has events, he stares off and smacks his lips. The episodes last for about 5-6 minutes. He often waves his hands and feels tired afterwards. He currently has about 3-5 episodes a week. He has failed at least 4 AEDs at therapeutic dosages.      Per EEG review, he has bilateral temporal involvement, moreso left-sided than right.      Triggers include being tired and being stressed. He is not working (on disability) but finished his GED. He lives next door to grandmother and nearby to his mother. His major concern is memory loss from ongoing seizures.      Of notes, he endorses a history of staph infection previously. He denies history of bleeding or anesthetic complications. He has a VNS in place.        Post-Op Info:  Procedure(s) (LRB):  CREATION, CRANIAL YUNG HOLE, PLACEMENT OF THE FIDUCIAL SCREWS BILATERAL 2.PLACEMENT OF SEEG ELECTRODES BILATERAL WITH OWEN ROBOT (Bilateral)   2 Days Post-Op     Interval History: had 2 seizures overnight.     Medications:  Continuous Infusions:  Scheduled Meds:   ceFAZolin (ANCEF) IVPB  2 g Intravenous Q8H    citalopram  20 mg Oral Daily    cloBAZam  20 mg Oral BID    heparin (porcine)  5,000 Units Subcutaneous Q8H    lamoTRIgine  100 mg Oral BID    mupirocin   Nasal BID    senna-docusate 8.6-50 mg  1 tablet Oral Daily    topiramate  50 mg Oral BID     PRN Meds:acetaminophen, hydrALAZINE, labetaloL, magnesium oxide, magnesium oxide, oxyCODONE, potassium chloride, potassium chloride, potassium chloride, potassium, sodium phosphates, potassium, sodium phosphates,  potassium, sodium phosphates, promethazine (PHENERGAN) IVPB     Review of Systems  Objective:     Weight: 78.5 kg (173 lb 1 oz)  Body mass index is 22.83 kg/m².  Vital Signs (Most Recent):  Temp: 97.4 °F (36.3 °C) (09/02/20 1505)  Pulse: 80 (09/02/20 1600)  Resp: 14 (09/02/20 1600)  BP: 114/68 (09/02/20 1600)  SpO2: 97 % (09/02/20 1600) Vital Signs (24h Range):  Temp:  [97.4 °F (36.3 °C)-99 °F (37.2 °C)] 97.4 °F (36.3 °C)  Pulse:  [] 80  Resp:  [12-25] 14  SpO2:  [94 %-100 %] 97 %  BP: (101-155)/(57-95) 114/68  Arterial Line BP: (114-143)/(67-82) 128/74     Date 09/02/20 0700 - 09/03/20 0659   Shift 3493-2496 5200-3126 3732-9975 24 Hour Total   INTAKE   P.O.  200  200   I.V.(mL/kg) 30(0.4)   30(0.4)   Shift Total(mL/kg) 30(0.4) 200(2.5)  230(2.9)   OUTPUT   Urine(mL/kg/hr) 550(0.9)   550   Shift Total(mL/kg) 550(7)   550(7)   Weight (kg) 78.5 78.5 78.5 78.5                        Physical Exam:    Constitutional: No distress.     Eyes: Pupils are equal, round, and reactive to light. EOM are normal.     Cardiovascular: Normal rate and regular rhythm.     Psych/Behavior: He is oriented to person, place, and time.     Neurological:        Cranial nerves: Cranial nerve(s) II, III, IV, V, VI, VII, VIII, IX, X, XI and XII are intact.   scapular winging (R) o/w intact       Significant Labs:  Recent Labs   Lab 09/01/20  0113 09/01/20  1303 09/02/20  0110 09/02/20  0911   GLU 95  --  104  --      --  139  --    K 3.4* 4.1 3.7 4.2     --  109  --    CO2 23  --  22*  --    BUN 8  --  10  --    CREATININE 0.8  --  0.9  --    CALCIUM 8.2*  --  9.1  --    MG 1.9  --  2.1  --      Recent Labs   Lab 09/01/20  0113 09/02/20  0110   WBC 8.24 8.52   HGB 14.8 15.8   HCT 42.7 45.6    213     No results for input(s): LABPT, INR, APTT in the last 48 hours.  Microbiology Results (last 7 days)     ** No results found for the last 168 hours. **        All pertinent labs from the last 24 hours have been  reviewed.    Significant Diagnostics:  I have reviewed and interpreted all pertinent imaging results/findings within the past 24 hours.    Assessment/Plan:     * Epilepsy  25 M with intractable epilepsy s/p multiple failed AEDs and VNS placement now s/p sEEG lead placement:    Had 2 seizures overnight. Neurologically at baseline during examination.     Admit to [facility]  q1h neurochecks  sEEG leads in place, abx while in place, continue headwrap  Bed rest at all times  SBP<140  No HOB restrictions  Ok for recumbent bike in bed  Ok for diet  Voiding spontaneously s/p michelle removal  Post operative CTH reviewed - expected post op changes  Monitor for seizure activity - no benzos or sedating meds unless cleared by Epilepsy team  AEDs per Epilepsy  Please page w/exam change    Dispo: continue ICU care        Ino Collado MD  Neurosurgery  [facility]

## 2020-09-02 NOTE — ASSESSMENT & PLAN NOTE
25 M with intractable epilepsy s/p multiple failed AEDs and VNS placement now s/p sEEG lead placement:    Had 2 seizures overnight. Neurologically at baseline during examination.     Admit to NCC  q1h neurochecks  sEEG leads in place, abx while in place, continue headwrap  Bed rest at all times  SBP<140  No HOB restrictions  Ok for recumbent bike in bed  Ok for diet  Voiding spontaneously s/p michelle removal  Post operative CTH reviewed - expected post op changes  Monitor for seizure activity - no benzos or sedating meds unless cleared by Epilepsy team  AEDs per Epilepsy  Please page w/exam change    Dispo: continue ICU care

## 2020-09-02 NOTE — SUBJECTIVE & OBJECTIVE
Interval History:  Pt s/p sEEG bilat lead placemnt had no seizure activity in AM today. Continue to monitor seizure activity. Continue topiramate 50mg BID, Lamotrigine 100mg BID, Clobazam 20mg BID. Continue to hold vimpat. Started SQH today for DVT ppx. Continue to maintain MAP goals >65, SBP <140. Continue to monitor potassium and replace PRN.    Review of Systems   Constitutional: Negative for chills and fever.   HENT: Negative for congestion, drooling and rhinorrhea.    Eyes: Negative for discharge and redness.   Respiratory: Negative for cough, shortness of breath and wheezing.    Cardiovascular: Negative for chest pain and palpitations.   Gastrointestinal: Positive for nausea. Negative for abdominal distention, abdominal pain, diarrhea and vomiting.   Endocrine: Negative for polyuria.   Genitourinary: Negative for difficulty urinating, dysuria and urgency.   Musculoskeletal: Negative for arthralgias, myalgias, neck pain and neck stiffness.   Skin: Negative for rash.   Neurological: Positive for seizures and headaches. Negative for dizziness, tremors, syncope, facial asymmetry, speech difficulty, weakness, light-headedness and numbness.   Psychiatric/Behavioral: Negative for agitation and behavioral problems.        Short term memory loss, depression       Objective:     Vitals:  Temp: 97.4 °F (36.3 °C)  Pulse: 70  Rhythm: normal sinus rhythm  BP: (!) 109/57  MAP (mmHg): 77  Resp: 16  SpO2: 97 %  O2 Device (Oxygen Therapy): room air    Temp  Min: 97.4 °F (36.3 °C)  Max: 99 °F (37.2 °C)  Pulse  Min: 65  Max: 104  BP  Min: 101/64  Max: 155/95  MAP (mmHg)  Min: 76  Max: 112  Resp  Min: 12  Max: 25  SpO2  Min: 94 %  Max: 100 %    09/01 0701 - 09/02 0700  In: 1390 [P.O.:1150; I.V.:240]  Out: 2675 [Urine:2675]   Unmeasured Output  Urine Occurrence: 1  Stool Occurrence: 0       Physical Exam  Constitutional:       General: He is not in acute distress.     Appearance: He is not toxic-appearing.   HENT:      Head:       Comments: S/p sEEG bilat placement, head wrapped     Right Ear: External ear normal.      Left Ear: External ear normal.      Nose: Nose normal.      Mouth/Throat:      Mouth: Mucous membranes are moist.   Eyes:      General:         Right eye: No discharge.         Left eye: No discharge.      Conjunctiva/sclera: Conjunctivae normal.   Neck:      Musculoskeletal: Normal range of motion.   Cardiovascular:      Rate and Rhythm: Normal rate and regular rhythm.   Pulmonary:      Effort: Pulmonary effort is normal. No respiratory distress.      Breath sounds: No wheezing or rhonchi.   Abdominal:      General: Abdomen is flat. There is no distension.      Palpations: Abdomen is soft. There is no mass.      Tenderness: There is no abdominal tenderness. There is no guarding.      Hernia: No hernia is present.   Musculoskeletal: Normal range of motion.         General: No deformity.      Right lower leg: No edema.      Left lower leg: No edema.   Skin:     General: Skin is warm and dry.      Capillary Refill: Capillary refill takes less than 2 seconds.      Coloration: Skin is not jaundiced.   Neurological:      Mental Status: He is alert.      Comments: GCS [id number]  A&Ox4, follows commands  EOMs, cough, gag, shoulder shrug intact, no tongue deviation  [employer] spont against gravity  Sensation intact in all extremities   Psychiatric:         Mood and Affect: Mood normal.         Behavior: Behavior normal.         Thought Content: Thought content normal.         Judgment: Judgment normal.           Medications:  Continuous ScheduledceFAZolin (ANCEF) IVPB, 2 g, Q8H  citalopram, 20 mg, Daily  cloBAZam, 20 mg, BID  heparin (porcine), 5,000 Units, Q8H  lamoTRIgine, 100 mg, BID  mupirocin, , BID  senna-docusate 8.6-50 mg, 1 tablet, Daily  topiramate, 50 mg, BID    PRNacetaminophen, 650 mg, Q4H PRN  hydrALAZINE, 10 mg, Q4H PRN  labetaloL, 10 mg, Q4H PRN  magnesium oxide, 800 mg, PRN  magnesium oxide, 800 mg, PRN  oxyCODONE, 5 mg, Q6H  PRN  potassium chloride, 40 mEq, PRN  potassium chloride, 40 mEq, PRN  potassium chloride, 40 mEq, PRN  potassium, sodium phosphates, 2 packet, PRN  potassium, sodium phosphates, 2 packet, PRN  potassium, sodium phosphates, 2 packet, PRN  promethazine (PHENERGAN) IVPB, 6.25 mg, Q8H PRN      Today I personally reviewed pertinent medications, lines/drains/airways, imaging, cardiology results, laboratory results, microbiology results,    Diet  Diet Adult Regular (IDDSI Level 7)  Diet Adult Regular (IDDSI Level 7)

## 2020-09-02 NOTE — PLAN OF CARE
[facility] Care Plan    POC reviewed with Darin Cunha and family at 1400. Pt verbalized understanding. Questions and concerns addressed. No acute events today. Pt progressing toward goals. Will continue to monitor. See below and flowsheets for full assessment and VS info. Patient used stationary bike for approximately 20 minutes. Bath completed, linen changed.       Neuro:  Rem[place] Coma Scale  Best Eye Response: 4-->(E4) spontaneous  Best Motor Response: 6-->(M6) obeys commands  Best Verbal Response: 5-->(V5) oriented  [place]ington Coma Scale Score: 15  Assessment Qualifiers: no eye obstruction present, patient not sedated/intubated  Pupil PERRLA: yes     24 hr Temp:  [97.4 °F (36.3 °C)-99 °F (37.2 °C)]     CV:   Rhythm: normal sinus rhythm  BP goals:   SBP < 140  MAP > 65    Resp:   O2 Device (Oxygen Therapy): room air       Plan: N/A    GI/:  STEPHENIE Total Score: 20  Diet/Nutrition Received: regular  Last Bowel Movement: 08/31/20  Voiding Characteristics: voids spontaneously without difficulty    Intake/Output Summary (Last 24 hours) at 9/2/2020 1531  Last data filed at 9/2/2020 1512  Gross per 24 hour   Intake 805 ml   Output 1225 ml   Net -420 ml     Unmeasured Output  Urine Occurrence: 1  Stool Occurrence: 0    Labs/Accuchecks:  Recent Labs   Lab 09/02/20  0110   WBC 8.52   RBC 5.05   HGB 15.8   HCT 45.6         Recent Labs   Lab 09/02/20  0110 09/02/20  0911     --    K 3.7 4.2   CO2 22*  --      --    BUN 10  --    CREATININE 0.9  --    ALKPHOS 94  --    ALT 13  --    AST 25  --    BILITOT 0.8  --       Recent Labs   Lab 08/31/20  0531   INR 1.0   APTT 31.4    No results for input(s): CPK, CPKMB, TROPONINI, MB in the last 168 hours.    Electrolytes: Electrolytes replaced  Accuchecks: none    Gtts:       LDA/Wounds:  Lines/Drains/Airways       Arterial Line              Arterial Line 08/31/20 0737 Right Radial 2 days              Peripheral Intravenous Line                   Peripheral IV - Single  Lumen 08/31/20 0557 18 G Right Antecubital 2 days         Peripheral IV - Single Lumen 08/31/20 0721 18 G Right Hand 2 days                  Wounds: Yes - sEEG deep electrode sites wrapped in gauze, DICK.   Wound care consulted: No

## 2020-09-02 NOTE — ASSESSMENT & PLAN NOTE
-s/p sEEG placement  -cefazolin 2 q8 prophylaxis  -SBP goal <140  -NSGY and epilepsy following  -seizure precautions  -continue: Topiramate 50 mg BID, lamotrigine 100mg BID, clobazam 20 mg BID, hold home med Vimpat  -neuro checks q 1 hr  9/2/2020: 1 seizure this morning associated with lip smacking  -per epilepsy: Held home Vimpat 8/31  - Continue Topamax 50 mg BID  9/3/2020: no seizures this AM, continue to hold vimpat, started SQH for DVT ppx

## 2020-09-02 NOTE — SUBJECTIVE & OBJECTIVE
Interval History: No acute events overnight. This AM, patient resting comfortably in bed with mother at bedside. Patient rode stationary bike for ~2 miles. No complaints this morning. Discussed plan of care with patient and mother at bedside.    Current Facility-Administered Medications   Medication Dose Route Frequency Provider Last Rate Last Dose    acetaminophen tablet 650 mg  650 mg Oral Q4H PRN Georgie Hoyos NP        ceFAZolin injection 2 g  2 g Intravenous Q8H Katey Lee MD   2 g at 09/02/20 0515    citalopram tablet 20 mg  20 mg Oral Daily Georgie Hoyos NP   20 mg at 09/02/20 0810    cloBAZam Tab 20 mg  20 mg Oral BID Minerva Oconnor PA-C   20 mg at 09/02/20 0810    heparin (porcine) injection 5,000 Units  5,000 Units Subcutaneous Q8H Carito Amezquita PA-C        hydrALAZINE injection 10 mg  10 mg Intravenous Q4H PRN Georgie Hoyos NP   10 mg at 08/31/20 1601    labetaloL injection 10 mg  10 mg Intravenous Q4H PRN Georgie Hoyos NP        lamoTRIgine tablet 100 mg  100 mg Oral BID Minerva Oconnor, PA-C   100 mg at 09/02/20 0810    magnesium oxide tablet 800 mg  800 mg Oral PRN Melissa Flores NP        magnesium oxide tablet 800 mg  800 mg Oral PRN Melissa Flores NP        mupirocin 2 % ointment   Nasal BID Katey Lee MD        oxyCODONE immediate release tablet 5 mg  5 mg Oral Q6H PRN Georgie Hyoos NP   5 mg at 09/01/20 1859    potassium chloride packet 40 mEq  40 mEq Oral PRN Melissa Flores NP   40 mEq at 09/02/20 0519    potassium chloride packet 40 mEq  40 mEq Oral PRN Melissa Flores NP   40 mEq at 09/01/20 0856    potassium chloride packet 40 mEq  40 mEq Oral PRN Melissa Flores NP        potassium, sodium phosphates 280-160-250 mg packet 2 packet  2 packet Oral PRN Melissa Flores NP        potassium, sodium phosphates 280-160-250 mg packet 2 packet  2 packet Oral PRN Melissa Flores NP        potassium, sodium phosphates 280-160-250 mg  packet 2 packet  2 packet Oral PRN Melissa Flores NP        promethazine (PHENERGAN) 6.25 mg in dextrose 5 % 50 mL IVPB  6.25 mg Intravenous Q8H PRN Georgie Hoyos  mL/hr at 08/31/20 1730 6.25 mg at 08/31/20 1730    senna-docusate 8.6-50 mg per tablet 1 tablet  1 tablet Oral Daily Georgie Hoyos NP   1 tablet at 09/02/20 0810    topiramate tablet 50 mg  50 mg Oral BID Minerva Oconnor PA-C   50 mg at 09/02/20 0810     Continuous Infusions:    Review of Systems   Constitutional: Negative for chills, diaphoresis, fatigue and fever.   HENT: Negative for sore throat and trouble swallowing.    Respiratory: Negative for cough, shortness of breath and wheezing.    Cardiovascular: Negative for chest pain and palpitations.   Gastrointestinal: Negative for abdominal pain, diarrhea, nausea and vomiting.   Skin: Negative for rash and wound.   Neurological: Positive for seizures and headaches (resolved). Negative for dizziness, syncope, speech difficulty and weakness.   Psychiatric/Behavioral: Negative for behavioral problems, confusion and sleep disturbance. The patient is not nervous/anxious.      Objective:     Vital Signs (Most Recent):  Temp: 98.3 °F (36.8 °C) (09/02/20 1105)  Pulse: 90 (09/02/20 1105)  Resp: 18 (09/02/20 1105)  BP: 125/73 (09/02/20 1105)  SpO2: 97 % (09/02/20 1105) Vital Signs (24h Range):  Temp:  [97.9 °F (36.6 °C)-99 °F (37.2 °C)] 98.3 °F (36.8 °C)  Pulse:  [] 90  Resp:  [12-25] 18  SpO2:  [94 %-100 %] 97 %  BP: (101-155)/(58-95) 125/73  Arterial Line BP: (114-143)/(67-82) 129/68     Weight: 78.5 kg (173 lb 1 oz)  Body mass index is 22.83 kg/m².    Physical Exam  Constitutional:       General: He is not in acute distress.     Appearance: Normal appearance. He is not diaphoretic.   HENT:      Head: Normocephalic.      Comments: sEEG in place  Eyes:      General: No scleral icterus.        Right eye: No discharge.         Left eye: No discharge.      Extraocular Movements: EOM normal.       Conjunctiva/sclera: Conjunctivae normal.      Pupils: Pupils are equal, round, and reactive to light.   Cardiovascular:      Rate and Rhythm: Normal rate.   Pulmonary:      Effort: Pulmonary effort is normal. No respiratory distress.   Abdominal:      General: There is no distension.      Palpations: Abdomen is soft.      Tenderness: There is no abdominal tenderness.   Musculoskeletal: Normal range of motion.         General: No deformity or signs of injury.   Skin:     General: Skin is warm and dry.   Neurological:      General: No focal deficit present.      Mental Status: He is alert and oriented to person, place, and time. Mental status is at baseline.      Coordination: Finger-Nose-Finger Test normal.   Psychiatric:         Mood and Affect: Mood normal.         Speech: Speech normal.         Behavior: Behavior normal.         Thought Content: Thought content normal.         NEUROLOGICAL EXAMINATION:     MENTAL STATUS   Oriented to person, place, and time.   Speech: speech is normal   Level of consciousness: alert    CRANIAL NERVES     CN III, IV, VI   Pupils are equal, round, and reactive to light.  Extraocular motions are normal.     CN VII   Facial expression full, symmetric.     CN VIII   Hearing: intact    CN XI   CN XI normal.     CN XII   CN XII normal.     GAIT AND COORDINATION      Coordination   Finger to nose coordination: normal      Significant Labs: All pertinent lab results from the past 24 hours have been reviewed.    Significant Studies: I have reviewed all pertinent imaging results/findings within the past 24 hours.

## 2020-09-02 NOTE — SUBJECTIVE & OBJECTIVE
Interval History: had 2 seizures overnight.     Medications:  Continuous Infusions:  Scheduled Meds:   ceFAZolin (ANCEF) IVPB  2 g Intravenous Q8H    citalopram  20 mg Oral Daily    cloBAZam  20 mg Oral BID    heparin (porcine)  5,000 Units Subcutaneous Q8H    lamoTRIgine  100 mg Oral BID    mupirocin   Nasal BID    senna-docusate 8.6-50 mg  1 tablet Oral Daily    topiramate  50 mg Oral BID     PRN Meds:acetaminophen, hydrALAZINE, labetaloL, magnesium oxide, magnesium oxide, oxyCODONE, potassium chloride, potassium chloride, potassium chloride, potassium, sodium phosphates, potassium, sodium phosphates, potassium, sodium phosphates, promethazine (PHENERGAN) IVPB     Review of Systems  Objective:     Weight: 78.5 kg (173 lb 1 oz)  Body mass index is 22.83 kg/m².  Vital Signs (Most Recent):  Temp: 97.4 °F (36.3 °C) (09/02/20 1505)  Pulse: 80 (09/02/20 1600)  Resp: 14 (09/02/20 1600)  BP: 114/68 (09/02/20 1600)  SpO2: 97 % (09/02/20 1600) Vital Signs (24h Range):  Temp:  [97.4 °F (36.3 °C)-99 °F (37.2 °C)] 97.4 °F (36.3 °C)  Pulse:  [] 80  Resp:  [12-25] 14  SpO2:  [94 %-100 %] 97 %  BP: (101-155)/(57-95) 114/68  Arterial Line BP: (114-143)/(67-82) 128/74     Date 09/02/20 0700 - 09/03/20 0659   Shift 6910-2853 8416-6597 5432-3059 24 Hour Total   INTAKE   P.O.  200  200   I.V.(mL/kg) 30(0.4)   30(0.4)   Shift Total(mL/kg) 30(0.4) 200(2.5)  230(2.9)   OUTPUT   Urine(mL/kg/hr) 550(0.9)   550   Shift Total(mL/kg) 550(7)   550(7)   Weight (kg) 78.5 78.5 78.5 78.5                        Physical Exam:    Constitutional: No distress.     Eyes: Pupils are equal, round, and reactive to light. EOM are normal.     Cardiovascular: Normal rate and regular rhythm.     Psych/Behavior: He is oriented to person, place, and time.     Neurological:        Cranial nerves: Cranial nerve(s) II, III, IV, V, VI, VII, VIII, IX, X, XI and XII are intact.   scapular winging (R) o/w intact       Significant Labs:  Recent Labs   Lab  09/01/20  0113 09/01/20  1303 09/02/20  0110 09/02/20  0911   GLU 95  --  104  --      --  139  --    K 3.4* 4.1 3.7 4.2     --  109  --    CO2 23  --  22*  --    BUN 8  --  10  --    CREATININE 0.8  --  0.9  --    CALCIUM 8.2*  --  9.1  --    MG 1.9  --  2.1  --      Recent Labs   Lab 09/01/20  0113 09/02/20  0110   WBC 8.24 8.52   HGB 14.8 15.8   HCT 42.7 45.6    213     No results for input(s): LABPT, INR, APTT in the last 48 hours.  Microbiology Results (last 7 days)     ** No results found for the last 168 hours. **        All pertinent labs from the last 24 hours have been reviewed.    Significant Diagnostics:  I have reviewed and interpreted all pertinent imaging results/findings within the past 24 hours.

## 2020-09-02 NOTE — PROGRESS NOTES
[facility]  Neurology-Epilepsy  Progress Note    Patient Name: Darin Cunha  MRN: [id number]  Admission Date: 8/31/2020  Hospital Length of Stay: 2 days  Code Status: Full Code   Attending Provider: Melani Ortiz MD  Primary Care Physician: Primary Doctor No   Principal Problem:Epilepsy    Subjective:     Hospital Course:   8/31: sEEG placement. Hold Vimpat. Continue other AEDs: Onfi 40 mg BID, Lamictal 200 mg BID, Topamax 50 mg BID.  8/31>9/1: No clinical or electrographic seizures overnight.   9/1>9/2: Four electrographic seizures (3 with clinical correlate) originating from L hippo and entro areas.    Interval History: No acute events overnight. This AM, patient resting comfortably in bed with mother at bedside. Patient rode stationary bike for ~2 miles. No complaints this morning. Discussed plan of care with patient and mother at bedside.    Current Facility-Administered Medications   Medication Dose Route Frequency Provider Last Rate Last Dose    acetaminophen tablet 650 mg  650 mg Oral Q4H PRN Georgie Miinea, NP        ceFAZolin injection 2 g  2 g Intravenous Q8H Katey Lee MD   2 g at 09/02/20 0515    citalopram tablet 20 mg  20 mg Oral Daily Georgie Miinea, NP   20 mg at 09/02/20 0810    cloBAZam Tab 20 mg  20 mg Oral BID Minerva Oconnor PA-C   20 mg at 09/02/20 0810    heparin (porcine) injection 5,000 Units  5,000 Units Subcutaneous Q8H Carito Amezquita PA-C        hydrALAZINE injection 10 mg  10 mg Intravenous Q4H PRN Georgie Miinea, NP   10 mg at 08/31/20 1601    labetaloL injection 10 mg  10 mg Intravenous Q4H PRN Georgie Miinea, NP        lamoTRIgine tablet 100 mg  100 mg Oral BID Minerva Oconnor, PA-C   100 mg at 09/02/20 0810    magnesium oxide tablet 800 mg  800 mg Oral PRN Melissa Flores NP        magnesium oxide tablet 800 mg  800 mg Oral PRN Melissa Flores NP        mupirocin 2 % ointment   Nasal BID Katey Lee MD        oxyCODONE immediate release tablet  5 mg  5 mg Oral Q6H PRN Georgie Hoyos NP   5 mg at 09/01/20 1859    potassium chloride packet 40 mEq  40 mEq Oral PRN Melissa Flores NP   40 mEq at 09/02/20 0519    potassium chloride packet 40 mEq  40 mEq Oral PRN Melissa Flores NP   40 mEq at 09/01/20 0856    potassium chloride packet 40 mEq  40 mEq Oral PRN Melissa Flores NP        potassium, sodium phosphates 280-160-250 mg packet 2 packet  2 packet Oral PRN Melissa Flores NP        potassium, sodium phosphates 280-160-250 mg packet 2 packet  2 packet Oral PRN Melissa Flores NP        potassium, sodium phosphates 280-160-250 mg packet 2 packet  2 packet Oral PRN Melissa Flores NP        promethazine (PHENERGAN) 6.25 mg in dextrose 5 % 50 mL IVPB  6.25 mg Intravenous Q8H PRN Georgie Hoyos  mL/hr at 08/31/20 1730 6.25 mg at 08/31/20 1730    senna-docusate 8.6-50 mg per tablet 1 tablet  1 tablet Oral Daily Georgie Hoyos NP   1 tablet at 09/02/20 0810    topiramate tablet 50 mg  50 mg Oral BID Minerva Oconnor PA-C   50 mg at 09/02/20 0810     Continuous Infusions:    Review of Systems   Constitutional: Negative for chills, diaphoresis, fatigue and fever.   HENT: Negative for sore throat and trouble swallowing.    Respiratory: Negative for cough, shortness of breath and wheezing.    Cardiovascular: Negative for chest pain and palpitations.   Gastrointestinal: Negative for abdominal pain, diarrhea, nausea and vomiting.   Skin: Negative for rash and wound.   Neurological: Positive for seizures and headaches (resolved). Negative for dizziness, syncope, speech difficulty and weakness.   Psychiatric/Behavioral: Negative for behavioral problems, confusion and sleep disturbance. The patient is not nervous/anxious.      Objective:     Vital Signs (Most Recent):  Temp: 98.3 °F (36.8 °C) (09/02/20 1105)  Pulse: 90 (09/02/20 1105)  Resp: 18 (09/02/20 1105)  BP: 125/73 (09/02/20 1105)  SpO2: 97 % (09/02/20 1105) Vital Signs (24h  Range):  Temp:  [97.9 °F (36.6 °C)-99 °F (37.2 °C)] 98.3 °F (36.8 °C)  Pulse:  [] 90  Resp:  [12-25] 18  SpO2:  [94 %-100 %] 97 %  BP: (101-155)/(58-95) 125/73  Arterial Line BP: (114-143)/(67-82) 129/68     Weight: 78.5 kg (173 lb 1 oz)  Body mass index is 22.83 kg/m².    Physical Exam  Constitutional:       General: He is not in acute distress.     Appearance: Normal appearance. He is not diaphoretic.   HENT:      Head: Normocephalic.      Comments: sEEG in place  Eyes:      General: No scleral icterus.        Right eye: No discharge.         Left eye: No discharge.      Extraocular Movements: EOM normal.      Conjunctiva/sclera: Conjunctivae normal.      Pupils: Pupils are equal, round, and reactive to light.   Cardiovascular:      Rate and Rhythm: Normal rate.   Pulmonary:      Effort: Pulmonary effort is normal. No respiratory distress.   Abdominal:      General: There is no distension.      Palpations: Abdomen is soft.      Tenderness: There is no abdominal tenderness.   Musculoskeletal: Normal range of motion.         General: No deformity or signs of injury.   Skin:     General: Skin is warm and dry.   Neurological:      General: No focal deficit present.      Mental Status: He is alert and oriented to person, place, and time. Mental status is at baseline.      Coordination: Finger-Nose-Finger Test normal.   Psychiatric:         Mood and Affect: Mood normal.         Speech: Speech normal.         Behavior: Behavior normal.         Thought Content: Thought content normal.         NEUROLOGICAL EXAMINATION:     MENTAL STATUS   Oriented to person, place, and time.   Speech: speech is normal   Level of consciousness: alert    CRANIAL NERVES     CN III, IV, VI   Pupils are equal, round, and reactive to light.  Extraocular motions are normal.     CN VII   Facial expression full, symmetric.     CN VIII   Hearing: intact    CN XI   CN XI normal.     CN XII   CN XII normal.     GAIT AND COORDINATION       Coordination   Finger to nose coordination: normal      Significant Labs: All pertinent lab results from the past 24 hours have been reviewed.    Significant Studies: I have reviewed all pertinent imaging results/findings within the past 24 hours.    Assessment and Plan:     * Epilepsy  25M with a PMHx of intractable epilepsy s/p VNS placement on Onfi 40 mg BID, Lamictal 200 mg BID, Vimpat 200 mg BID, and Topamax 50 mg BID s/p bilateral sEEG placement with Dr. Melani Ortiz on 8/31. Postop CTH stable.    Recommendations:  - Continuous vEEG monitoring  - 4 electrographic seizures from L hippo and entro areas since admit  - Held home Vimpat 8/31  - Continue Onfi 20 mg BID, Lamictal to 100 mg BID, Topamax 50 mg BID  - Please call epilepsy on call prior to administering benzodiazepines   - If patient has more than one GTC, can consider giving IV Vimpat 100 mgx1  - Neurosurgery following  - Seizure precautions  - Avoid agents that lower seizure thresold      Plan of care discussed with patient, mother at bedside, and NCC team. Will follow. Please call with questions.    Depression  - Chronic and stable  - Continue home Celexa        VTE Risk Mitigation (From admission, onward)         Ordered     heparin (porcine) injection 5,000 Units  Every 8 hours      09/02/20 1035     Place sequential compression device  Until discontinued      08/31/20 1646                Minerva Oconnor PA-C  Neurology-Epilepsy  [facility]  Staff: Dr. Sage

## 2020-09-03 LAB
ALBUMIN SERPL BCP-MCNC: 4 G/DL (ref 3.5–5.2)
ALP SERPL-CCNC: 94 U/L (ref 55–135)
ALT SERPL W/O P-5'-P-CCNC: 13 U/L (ref 10–44)
ANION GAP SERPL CALC-SCNC: 8 MMOL/L (ref 8–16)
AST SERPL-CCNC: 22 U/L (ref 10–40)
BASOPHILS # BLD AUTO: 0.03 K/UL (ref 0–0.2)
BASOPHILS NFR BLD: 0.5 % (ref 0–1.9)
BILIRUB SERPL-MCNC: 0.7 MG/DL (ref 0.1–1)
BUN SERPL-MCNC: 14 MG/DL (ref 6–20)
CALCIUM SERPL-MCNC: 9.6 MG/DL (ref 8.7–10.5)
CHLORIDE SERPL-SCNC: 108 MMOL/L (ref 95–110)
CO2 SERPL-SCNC: 23 MMOL/L (ref 23–29)
CREAT SERPL-MCNC: 0.9 MG/DL (ref 0.5–1.4)
DIFFERENTIAL METHOD: ABNORMAL
EOSINOPHIL # BLD AUTO: 0.1 K/UL (ref 0–0.5)
EOSINOPHIL NFR BLD: 1.3 % (ref 0–8)
ERYTHROCYTE [DISTWIDTH] IN BLOOD BY AUTOMATED COUNT: 11.9 % (ref 11.5–14.5)
EST. GFR  (AFRICAN AMERICAN): >60 ML/MIN/1.73 M^2
EST. GFR  (NON AFRICAN AMERICAN): >60 ML/MIN/1.73 M^2
GLUCOSE SERPL-MCNC: 95 MG/DL (ref 70–110)
HCT VFR BLD AUTO: 45.9 % (ref 40–54)
HGB BLD-MCNC: 16.1 G/DL (ref 14–18)
IMM GRANULOCYTES # BLD AUTO: 0.02 K/UL (ref 0–0.04)
IMM GRANULOCYTES NFR BLD AUTO: 0.4 % (ref 0–0.5)
LYMPHOCYTES # BLD AUTO: 1.7 K/UL (ref 1–4.8)
LYMPHOCYTES NFR BLD: 30.8 % (ref 18–48)
MAGNESIUM SERPL-MCNC: 2 MG/DL (ref 1.6–2.6)
MCH RBC QN AUTO: 31.4 PG (ref 27–31)
MCHC RBC AUTO-ENTMCNC: 35.1 G/DL (ref 32–36)
MCV RBC AUTO: 90 FL (ref 82–98)
MONOCYTES # BLD AUTO: 0.7 K/UL (ref 0.3–1)
MONOCYTES NFR BLD: 12.2 % (ref 4–15)
NEUTROPHILS # BLD AUTO: 3 K/UL (ref 1.8–7.7)
NEUTROPHILS NFR BLD: 54.8 % (ref 38–73)
NRBC BLD-RTO: 0 /100 WBC
PHOSPHATE SERPL-MCNC: 4.3 MG/DL (ref 2.7–4.5)
PLATELET # BLD AUTO: 225 K/UL (ref 150–350)
PMV BLD AUTO: 9.4 FL (ref 9.2–12.9)
POTASSIUM SERPL-SCNC: 3.8 MMOL/L (ref 3.5–5.1)
PROT SERPL-MCNC: 6.8 G/DL (ref 6–8.4)
RBC # BLD AUTO: 5.12 M/UL (ref 4.6–6.2)
SODIUM SERPL-SCNC: 139 MMOL/L (ref 136–145)
WBC # BLD AUTO: 5.48 K/UL (ref 3.9–12.7)

## 2020-09-03 PROCEDURE — 25000003 PHARM REV CODE 250: Performed by: NURSE PRACTITIONER

## 2020-09-03 PROCEDURE — 95714 VEEG EA 12-26 HR UNMNTR: CPT

## 2020-09-03 PROCEDURE — 99232 PR SUBSEQUENT HOSPITAL CARE,LEVL II: ICD-10-PCS | Mod: ,,, | Performed by: PSYCHIATRY & NEUROLOGY

## 2020-09-03 PROCEDURE — 94761 N-INVAS EAR/PLS OXIMETRY MLT: CPT

## 2020-09-03 PROCEDURE — 99232 SBSQ HOSP IP/OBS MODERATE 35: CPT | Mod: ,,, | Performed by: PSYCHIATRY & NEUROLOGY

## 2020-09-03 PROCEDURE — 20000000 HC ICU ROOM

## 2020-09-03 PROCEDURE — 63600175 PHARM REV CODE 636 W HCPCS: Performed by: NEUROLOGICAL SURGERY

## 2020-09-03 PROCEDURE — 99233 SBSQ HOSP IP/OBS HIGH 50: CPT | Mod: ,,, | Performed by: PSYCHIATRY & NEUROLOGY

## 2020-09-03 PROCEDURE — 25000003 PHARM REV CODE 250: Performed by: PSYCHIATRY & NEUROLOGY

## 2020-09-03 PROCEDURE — 80053 COMPREHEN METABOLIC PANEL: CPT

## 2020-09-03 PROCEDURE — 25000003 PHARM REV CODE 250: Performed by: NEUROLOGICAL SURGERY

## 2020-09-03 PROCEDURE — 95720 PR EEG, W/VIDEO, CONT RECORD, I&R, >12<26 HRS: ICD-10-PCS | Mod: ,,, | Performed by: PSYCHIATRY & NEUROLOGY

## 2020-09-03 PROCEDURE — 85025 COMPLETE CBC W/AUTO DIFF WBC: CPT

## 2020-09-03 PROCEDURE — 84100 ASSAY OF PHOSPHORUS: CPT

## 2020-09-03 PROCEDURE — 63600175 PHARM REV CODE 636 W HCPCS: Performed by: STUDENT IN AN ORGANIZED HEALTH CARE EDUCATION/TRAINING PROGRAM

## 2020-09-03 PROCEDURE — 25000003 PHARM REV CODE 250: Performed by: PHYSICIAN ASSISTANT

## 2020-09-03 PROCEDURE — 99233 PR SUBSEQUENT HOSPITAL CARE,LEVL III: ICD-10-PCS | Mod: ,,, | Performed by: PSYCHIATRY & NEUROLOGY

## 2020-09-03 PROCEDURE — 95720 EEG PHY/QHP EA INCR W/VEEG: CPT | Mod: ,,, | Performed by: PSYCHIATRY & NEUROLOGY

## 2020-09-03 PROCEDURE — 63600175 PHARM REV CODE 636 W HCPCS: Performed by: PHYSICIAN ASSISTANT

## 2020-09-03 PROCEDURE — 83735 ASSAY OF MAGNESIUM: CPT

## 2020-09-03 RX ORDER — CLOBAZAM 10 MG/1
10 TABLET ORAL 2 TIMES DAILY
Status: DISCONTINUED | OUTPATIENT
Start: 2020-09-03 | End: 2020-09-08

## 2020-09-03 RX ADMIN — CEFAZOLIN 2 G: 1 INJECTION, POWDER, FOR SOLUTION INTRAMUSCULAR; INTRAVENOUS at 06:09

## 2020-09-03 RX ADMIN — MUPIROCIN: 20 OINTMENT TOPICAL at 09:09

## 2020-09-03 RX ADMIN — CLOBAZAM 20 MG: 10 TABLET ORAL at 08:09

## 2020-09-03 RX ADMIN — HEPARIN SODIUM 5000 UNITS: 5000 INJECTION INTRAVENOUS; SUBCUTANEOUS at 06:09

## 2020-09-03 RX ADMIN — CITALOPRAM HYDROBROMIDE 20 MG: 10 TABLET ORAL at 08:09

## 2020-09-03 RX ADMIN — DOCUSATE SODIUM 50MG AND SENNOSIDES 8.6MG 1 TABLET: 8.6; 5 TABLET, FILM COATED ORAL at 08:09

## 2020-09-03 RX ADMIN — LAMOTRIGINE 100 MG: 25 TABLET ORAL at 10:09

## 2020-09-03 RX ADMIN — VANCOMYCIN HYDROCHLORIDE 1500 MG: 1.5 INJECTION, POWDER, LYOPHILIZED, FOR SOLUTION INTRAVENOUS at 12:09

## 2020-09-03 RX ADMIN — TOPIRAMATE 50 MG: 25 TABLET, FILM COATED ORAL at 08:09

## 2020-09-03 RX ADMIN — HEPARIN SODIUM 5000 UNITS: 5000 INJECTION INTRAVENOUS; SUBCUTANEOUS at 09:09

## 2020-09-03 RX ADMIN — TOPIRAMATE 50 MG: 25 TABLET, FILM COATED ORAL at 09:09

## 2020-09-03 RX ADMIN — HEPARIN SODIUM 5000 UNITS: 5000 INJECTION INTRAVENOUS; SUBCUTANEOUS at 04:09

## 2020-09-03 RX ADMIN — MUPIROCIN: 20 OINTMENT TOPICAL at 10:09

## 2020-09-03 RX ADMIN — POTASSIUM CHLORIDE 40 MEQ: 1.5 POWDER, FOR SOLUTION ORAL at 06:09

## 2020-09-03 RX ADMIN — CLOBAZAM 10 MG: 10 TABLET ORAL at 09:09

## 2020-09-03 NOTE — ASSESSMENT & PLAN NOTE
-s/p sEEG placement  -cefazolin 2 q8 prophylaxis  -SBP goal <140  -NSGY and epilepsy following  -seizure precautions  -continue: Topiramate 50 mg BID, lamotrigine 100mg BID, clobazam 20 mg BID, hold home med Vimpat  -neuro checks q 1 hr  9/2/2020: 1 seizure this morning associated with lip smacking  -per epilepsy: Held home Vimpat 8/31  - Continue Topamax 50 mg BID  9/3/2020: no seizures this AM, continue to hold vimpat, continue SQH for DVT ppx  - Per epilepsy: If no seizures throughout day, plan to decrease Onfi to 10 mg BID (Dr. Abhijeet Do to change tonight if needed).

## 2020-09-03 NOTE — PROGRESS NOTES
Pharmacokinetic Initial Assessment: IV Vancomycin    Assessment/Plan:    - Prophylactic antibiotics s/p sEEG  - Received vancomycin loading dose of 1500 mg x1 this afternoon  - Initiate 1250mg IV every 12 hours tomorrow at 0400  - Will plan for trough prior to Saturday AM dose     Please contact pharmacy at extension [phone] with any questions regarding this assessment.     Thank you for the consult,   Sapphire Cobos, PharmD  Neurocritical Care Pharmacist       Patient brief summary:  Darin Cunha is a 25 y.o. male initiated on antimicrobial therapy with IV Vancomycin for treatment of suspected skin & soft tissue infection    Drug Allergies:   Review of patient's allergies indicates:   Allergen Reactions    Zofran [ondansetron hcl (pf)] Hives and Rash       Actual Body Weight:   78.5 kg    Renal Function:   Estimated Creatinine Clearance: 136.9 mL/min (based on SCr of 0.9 mg/dL).,     Dialysis Method (if applicable):  N/A

## 2020-09-03 NOTE — SUBJECTIVE & OBJECTIVE
Interval History: No acute events overnight. This AM, patient lying in bed resting comfortably with mother at bedside. Patient has no complaints. Discussed plan of care with patient and mother.    Current Facility-Administered Medications   Medication Dose Route Frequency Provider Last Rate Last Dose    acetaminophen tablet 650 mg  650 mg Oral Q4H PRN Georgie Hoyos, NP   650 mg at 09/02/20 1914    citalopram tablet 20 mg  20 mg Oral Daily Georgiefroilan Hoyos NP   20 mg at 09/03/20 0841    cloBAZam Tab 20 mg  20 mg Oral BID Minerva Oconnor PA-C   20 mg at 09/03/20 0841    heparin (porcine) injection 5,000 Units  5,000 Units Subcutaneous Q8H Carito Amezquita PA-C   5,000 Units at 09/03/20 0610    hydrALAZINE injection 10 mg  10 mg Intravenous Q4H PRN Georgie Hoyos NP   10 mg at 08/31/20 1601    labetaloL injection 10 mg  10 mg Intravenous Q4H PRN Georgie Hoyos NP        lamoTRIgine tablet 100 mg  100 mg Oral BID Minerva Oconnor PA-C   100 mg at 09/03/20 1007    magnesium oxide tablet 800 mg  800 mg Oral PRN Melissa Flores NP        magnesium oxide tablet 800 mg  800 mg Oral PRN Melissa Flores NP        mupirocin 2 % ointment   Nasal BID Katey Lee MD        oxyCODONE immediate release tablet 5 mg  5 mg Oral Q6H PRN Georgie Hoyos NP   5 mg at 09/02/20 1914    potassium chloride packet 40 mEq  40 mEq Oral PRN Melissa Flores NP   40 mEq at 09/03/20 0609    potassium chloride packet 40 mEq  40 mEq Oral PRN Melissa Flores NP   40 mEq at 09/01/20 0856    potassium chloride packet 40 mEq  40 mEq Oral PRN Melissa Flores NP        potassium, sodium phosphates 280-160-250 mg packet 2 packet  2 packet Oral PRN Melissa Flores NP        potassium, sodium phosphates 280-160-250 mg packet 2 packet  2 packet Oral PRN Melissa Flores NP        potassium, sodium phosphates 280-160-250 mg packet 2 packet  2 packet Oral PRN Melissa Flores NP        promethazine (PHENERGAN) 6.25  mg in dextrose 5 % 50 mL IVPB  6.25 mg Intravenous Q8H PRN Georgie Hoyos  mL/hr at 08/31/20 1730 6.25 mg at 08/31/20 1730    senna-docusate 8.6-50 mg per tablet 1 tablet  1 tablet Oral Daily Georgie Hoyos NP   1 tablet at 09/03/20 0841    topiramate tablet 50 mg  50 mg Oral BID Minerva Oconnor PA-C   50 mg at 09/03/20 0841    vancomycin - pharmacy to dose   Intravenous pharmacy to manage frequency Katey Lee MD        vancomycin 1.5 g in dextrose 5 % 250 mL IVPB (ready to mix)  1,500 mg Intravenous Once Melani Ortiz .7 mL/hr at 09/03/20 1228 1,500 mg at 09/03/20 1228     Continuous Infusions:    Review of Systems   Constitutional: Negative for chills, diaphoresis, fatigue and fever.   HENT: Negative for sore throat and trouble swallowing.    Respiratory: Negative for cough, shortness of breath and wheezing.    Cardiovascular: Negative for chest pain and palpitations.   Gastrointestinal: Negative for abdominal pain, diarrhea, nausea and vomiting.   Neurological: Positive for seizures. Negative for dizziness, syncope, speech difficulty, weakness and headaches (resolved).   Psychiatric/Behavioral: Negative for behavioral problems, confusion and sleep disturbance. The patient is not nervous/anxious.      Objective:     Vital Signs (Most Recent):  Temp: 97.6 °F (36.4 °C) (09/03/20 0705)  Pulse: 76 (09/03/20 1200)  Resp: 18 (09/03/20 1200)  BP: 100/61 (09/03/20 1200)  SpO2: 98 % (09/03/20 1200) Vital Signs (24h Range):  Temp:  [97.4 °F (36.3 °C)-98.5 °F (36.9 °C)] 97.6 °F (36.4 °C)  Pulse:  [53-88] 76  Resp:  [11-24] 18  SpO2:  [95 %-99 %] 98 %  BP: ()/(54-80) 100/61  Arterial Line BP: ()/(58-80) 108/76     Weight: 78.5 kg (173 lb 1 oz)  Body mass index is 22.83 kg/m².    Physical Exam  Constitutional:       General: He is not in acute distress.     Appearance: Normal appearance. He is not diaphoretic.   HENT:      Head: Normocephalic.      Comments: sEEG in place  Eyes:      General: No  scleral icterus.        Right eye: No discharge.         Left eye: No discharge.      Extraocular Movements: EOM normal.      Conjunctiva/sclera: Conjunctivae normal.      Pupils: Pupils are equal, round, and reactive to light.   Cardiovascular:      Rate and Rhythm: Normal rate.   Pulmonary:      Effort: Pulmonary effort is normal. No respiratory distress.   Abdominal:      General: There is no distension.      Palpations: Abdomen is soft.      Tenderness: There is no abdominal tenderness.   Musculoskeletal: Normal range of motion.         General: No deformity or signs of injury.   Skin:     General: Skin is warm and dry.   Neurological:      General: No focal deficit present.      Mental Status: He is alert and oriented to person, place, and time. Mental status is at baseline.      Coordination: Finger-Nose-Finger Test normal.   Psychiatric:         Mood and Affect: Mood normal.         Speech: Speech normal.         Behavior: Behavior normal.         Thought Content: Thought content normal.         NEUROLOGICAL EXAMINATION:     MENTAL STATUS   Oriented to person, place, and time.   Speech: speech is normal   Level of consciousness: alert    CRANIAL NERVES     CN III, IV, VI   Pupils are equal, round, and reactive to light.  Extraocular motions are normal.     CN VII   Facial expression full, symmetric.     CN VIII   Hearing: intact    CN XI   CN XI normal.     CN XII   CN XII normal.     GAIT AND COORDINATION      Coordination   Finger to nose coordination: normal      Significant Labs: All pertinent lab results from the past 24 hours have been reviewed.    Significant Studies: I have reviewed all pertinent imaging results/findings within the past 24 hours.

## 2020-09-03 NOTE — PROGRESS NOTES
[facility]  Neurocritical Care  Progress Note    Admit Date: 8/31/2020  Service Date: 09/03/2020  Length of Stay: 3    Subjective:     Chief Complaint: Epilepsy    History of Present Illness: The patient is a 25 year old M with PMHx of depression, seizures, epilepsy admitted to [facility] s/p placement of sEED electrodes bilateral. Per chart review he has failed at least 4 AEDs at therapeutic dosages. Triggers include being tired and being stressed. He is not working (on disability) but finished his GED. He lives next door to grandmother and nearby to his mother. His major concern is memory loss from ongoing seizures. NSGY and epilepsy following. Patient admitted to [facility] for close monitoring and higher level of care.     Hospital Course: 8/31/2020: patient admitted to [facility] s/p sEEG electrodes placement, NSGY and epilepsy following, SBP goal < 140, perepilepsy restarted all AEDs except for Vimpat  9/1/2020: 1 episode of seizure, Vimpat  On hold, Onfi and lamictal decreased, continue topamax  9/2/2020: No seizures this AM, epilepsy managing AEDs, started SQH  9/3/2020: No electrographic seizures in AM, Per epilepsy: If no seizures throughout day, plan to decrease Onfi to 10 mg BID (Dr. Abhijeet Do to change tonight if needed)    Interval History:  No electrographic seizures evident in AM. Continue to monitor seizure activity. Continue topiramate 50mg BID, Lamotrigine 100mg BID, Clobazam 20mg BID. Continue to hold vimpat.Per epilepsy: If no seizures throughout day, plan to decrease Onfi to 10 mg BID (Dr. Abhijeet Do to change tonight if needed). Continue to maintain MAP goals >65, SBP <140. Continue to monitor potassium and replace PRN.       Review of Systems   Constitutional: Negative for chills and fever.   HENT: Negative for congestion, drooling and rhinorrhea.    Eyes: Negative for discharge and redness.   Respiratory: Negative for cough, shortness of breath and wheezing.    Cardiovascular: Negative for  chest pain and palpitations.   Gastrointestinal: Negative for abdominal distention, abdominal pain, diarrhea, nausea and vomiting.   Endocrine: Negative for polyuria.   Genitourinary: Negative for difficulty urinating, dysuria and urgency.   Musculoskeletal: Negative for arthralgias, myalgias, neck pain and neck stiffness.   Skin: Negative for rash.   Neurological: Positive for seizures and headaches. Negative for dizziness, tremors, syncope, facial asymmetry, speech difficulty, weakness, light-headedness and numbness.   Psychiatric/Behavioral: Negative for agitation and behavioral problems.        Short term memory loss, depression       Objective:     Vitals:  Temp: 97.8 °F (36.6 °C)  Pulse: 74  Rhythm: normal sinus rhythm  BP: 122/78  MAP (mmHg): 95  Resp: 18  SpO2: 100 %  O2 Device (Oxygen Therapy): room air    Temp  Min: 97.6 °F (36.4 °C)  Max: 98.5 °F (36.9 °C)  Pulse  Min: 53  Max: 88  BP  Min: 94/54  Max: 124/72  MAP (mmHg)  Min: 68  Max: 96  Resp  Min: 11  Max: 24  SpO2  Min: 96 %  Max: 100 %    09/02 0701 - 09/03 0700  In: 230 [P.O.:200; I.V.:30]  Out: 1250 [Urine:1250]   Unmeasured Output  Urine Occurrence: 1  Stool Occurrence: 1       Physical Exam  Constitutional:       General: He is not in acute distress.     Appearance: He is not toxic-appearing.   HENT:      Head:      Comments: S/p sEEG bilat placement, head wrapped     Right Ear: External ear normal.      Left Ear: External ear normal.      Nose: Nose normal.      Mouth/Throat:      Mouth: Mucous membranes are moist.   Eyes:      General:         Right eye: No discharge.         Left eye: No discharge.      Conjunctiva/sclera: Conjunctivae normal.   Neck:      Musculoskeletal: Normal range of motion.   Cardiovascular:      Rate and Rhythm: Normal rate and regular rhythm.   Pulmonary:      Effort: Pulmonary effort is normal. No respiratory distress.      Breath sounds: No wheezing or rhonchi.   Abdominal:      General: Abdomen is flat. There is no  distension.      Palpations: Abdomen is soft. There is no mass.      Tenderness: There is no abdominal tenderness. There is no guarding.      Hernia: No hernia is present.   Musculoskeletal: Normal range of motion.         General: No deformity.      Right lower leg: No edema.      Left lower leg: No edema.   Skin:     General: Skin is warm and dry.      Capillary Refill: Capillary refill takes less than 2 seconds.      Coloration: Skin is not jaundiced.   Neurological:      Mental Status: He is alert.      Comments: GCS [id number]  A&Ox4, follows commands  EOMs, cough, gag, shoulder shrug intact, no tongue deviation  [employer] spont against gravity  Sensation intact in all extremities   Psychiatric:         Mood and Affect: Mood normal.         Behavior: Behavior normal.         Thought Content: Thought content normal.         Judgment: Judgment normal.           Medications:  Continuous Scheduledcitalopram, 20 mg, Daily  cloBAZam, 20 mg, BID  heparin (porcine), 5,000 Units, Q8H  lamoTRIgine, 100 mg, BID  mupirocin, , BID  senna-docusate 8.6-50 mg, 1 tablet, Daily  topiramate, 50 mg, BID  [START ON 9/4/2020] vancomycin (VANCOCIN) IVPB, 1,250 mg, Q12H    PRNacetaminophen, 650 mg, Q4H PRN  hydrALAZINE, 10 mg, Q4H PRN  labetaloL, 10 mg, Q4H PRN  magnesium oxide, 800 mg, PRN  magnesium oxide, 800 mg, PRN  oxyCODONE, 5 mg, Q6H PRN  potassium chloride, 40 mEq, PRN  potassium chloride, 40 mEq, PRN  potassium chloride, 40 mEq, PRN  potassium, sodium phosphates, 2 packet, PRN  potassium, sodium phosphates, 2 packet, PRN  potassium, sodium phosphates, 2 packet, PRN  promethazine (PHENERGAN) IVPB, 6.25 mg, Q8H PRN  vancomycin - pharmacy to dose, , pharmacy to manage frequency      Today I personally reviewed pertinent medications, lines/drains/airways, imaging, cardiology results, laboratory results, microbiology results, CBC, CMP, POCT, epilepsy note for EEG    Diet  Diet Adult Regular (IDDSI Level 7)  Diet Adult Regular (IDDSI Level  7)        Assessment/Plan:     Neuro  * Epilepsy  -s/p sEEG placement  -cefazolin 2 q8 prophylaxis  -SBP goal <140  -NSGY and epilepsy following  -seizure precautions  -continue: Topiramate 50 mg BID, lamotrigine 100mg BID, clobazam 20 mg BID, hold home med Vimpat  -neuro checks q 1 hr  9/2/2020: 1 seizure this morning associated with lip smacking  -per epilepsy: Held home Vimpat 8/31  - Continue Topamax 50 mg BID  9/3/2020: no seizures this AM, continue to hold vimpat, continue SQH for DVT ppx  - Per epilepsy: If no seizures throughout day, plan to decrease Onfi to 10 mg BID (Dr. Abhijeet Do to change tonight if needed).         Psychiatric  Depression  - continue home med celexa    Renal/  Hypokalemia  - replace PRN  - follow CMP    GI  Nausea  - allergic to zofran  -continue Phenergan PRN          The patient is being Prophylaxed for:  Venous Thromboembolism with: Chemical  Stress Ulcer with: None  Ventilator Pneumonia with: not applicable    Activity Orders          Diet Adult Regular (IDDSI Level 7): Regular starting at 09/01 0941        Full Code    Carito Aemzquita PA-C  Neurocritical Care  [facility]

## 2020-09-03 NOTE — PROGRESS NOTES
[facility] Care Plan    POC reviewed with Darin Cunha at 0300. Pt verbalized understanding. Questions and concerns addressed. No acute events overnight. No witnessed seizures or seizure like activity during shift. Will continue to monitor. See below and flowsheets for full assessment and VS info.       Neuro:  Remington Coma Scale  Best Eye Response: 4-->(E4) spontaneous  Best Motor Response: 6-->(M6) obeys commands  Best Verbal Response: 5-->(V5) oriented  Serena Coma Scale Score: 15  Assessment Qualifiers: no eye obstruction present, patient not sedated/intubated  Pupil PERRLA: yes     24hr Temp:  [97.4 °F (36.3 °C)-98.5 °F (36.9 °C)]     CV:   Rhythm: normal sinus rhythm  BP goals:   SBP < 140  MAP > 65    Resp:   O2 Device (Oxygen Therapy): room air       Plan: monitor for seizures    GI/:  STEPHENIE Total Score: 20  Diet/Nutrition Received: regular  Last Bowel Movement: 09/02/20  Voiding Characteristics: voids spontaneously without difficulty    Intake/Output Summary (Last 24 hours) at 9/3/2020 0623  Last data filed at 9/3/2020 0305  Gross per 24 hour   Intake 230 ml   Output 1250 ml   Net -1020 ml     Unmeasured Output  Urine Occurrence: 1  Stool Occurrence: 0    Labs/Accuchecks:  Recent Labs   Lab 09/03/20  0346   WBC 5.48   RBC 5.12   HGB 16.1   HCT 45.9         Recent Labs   Lab 09/03/20  0346      K 3.8   CO2 23      BUN 14   CREATININE 0.9   ALKPHOS 94   ALT 13   AST 22   BILITOT 0.7      Recent Labs   Lab 08/31/20  0531   INR 1.0   APTT 31.4    No results for input(s): CPK, CPKMB, TROPONINI, MB in the last 168 hours.    Electrolytes: Electrolytes replaced  Accuchecks: none    Gtts: none      LDA/Wounds:  Lines/Drains/Airways     Arterial Line            Arterial Line 08/31/20 0737 Right Radial 2 days          Peripheral Intravenous Line                 Peripheral IV - Single Lumen 08/31/20 0557 18 G Right Antecubital 3 days         Peripheral IV - Single Lumen 08/31/20 0721 18 G Right Hand 2  days              Wounds: No  Wound care consulted: No

## 2020-09-03 NOTE — PLAN OF CARE
[facility] Care Plan    POC reviewed with Darin Cunha and family at 1400. Pt verbalized understanding. Questions and concerns addressed. No acute events today. Pt progressing toward goals. Will continue to monitor. See below and flowsheets for full assessment and VS info.       Neuro:  Emeigh Coma Scale  Best Eye Response: 4-->(E4) spontaneous  Best Motor Response: 6-->(M6) obeys commands  Best Verbal Response: 5-->(V5) oriented  Remington Coma Scale Score: 15  Assessment Qualifiers: patient not sedated/intubated  Pupil PERRLA: yes     24 hr Temp:  [97.6 °F (36.4 °C)-98.5 °F (36.9 °C)]     CV:   Rhythm: normal sinus rhythm  BP goals:   SBP < 140  MAP > 65    Resp:   O2 Device (Oxygen Therapy): room air       Plan: N/A    GI/:  STEPHENIE Total Score: 20  Diet/Nutrition Received: regular  Last Bowel Movement: 09/03/20  Voiding Characteristics: voids spontaneously without difficulty    Intake/Output Summary (Last 24 hours) at 9/3/2020 1719  Last data filed at 9/3/2020 1700  Gross per 24 hour   Intake 380 ml   Output 950 ml   Net -570 ml     Unmeasured Output  Urine Occurrence: 1  Stool Occurrence: 1    Labs/Accuchecks:  Recent Labs   Lab 09/03/20  0346   WBC 5.48   RBC 5.12   HGB 16.1   HCT 45.9         Recent Labs   Lab 09/03/20  0346      K 3.8   CO2 23      BUN 14   CREATININE 0.9   ALKPHOS 94   ALT 13   AST 22   BILITOT 0.7      Recent Labs   Lab 08/31/20  0531   INR 1.0   APTT 31.4    No results for input(s): CPK, CPKMB, TROPONINI, MB in the last 168 hours.    Electrolytes: N/A - electrolytes WDL  Accuchecks: none    Gtts:       LDA/Wounds:  Lines/Drains/Airways       Arterial Line              Arterial Line 08/31/20 0737 Right Radial 3 days              Peripheral Intravenous Line                   Peripheral IV - Single Lumen 08/31/20 0557 18 G Right Antecubital 3 days         Peripheral IV - Single Lumen 08/31/20 0721 18 G Right Hand 3 days                  Wounds: No  Wound care consulted: No

## 2020-09-03 NOTE — SUBJECTIVE & OBJECTIVE
Interval History: NAEON. No seizure activity witnessed overnight. Plan for tentative sEEG bolt removal on Wednesday 9/9 pending Epilepsy discussion.     Medications:  Continuous Infusions:  Scheduled Meds:   ceFAZolin (ANCEF) IVPB  2 g Intravenous Q8H    citalopram  20 mg Oral Daily    cloBAZam  20 mg Oral BID    heparin (porcine)  5,000 Units Subcutaneous Q8H    lamoTRIgine  100 mg Oral BID    mupirocin   Nasal BID    senna-docusate 8.6-50 mg  1 tablet Oral Daily    topiramate  50 mg Oral BID     PRN Meds:acetaminophen, hydrALAZINE, labetaloL, magnesium oxide, magnesium oxide, oxyCODONE, potassium chloride, potassium chloride, potassium chloride, potassium, sodium phosphates, potassium, sodium phosphates, potassium, sodium phosphates, promethazine (PHENERGAN) IVPB     Review of Systems  Objective:     Weight: 78.5 kg (173 lb 1 oz)  Body mass index is 22.83 kg/m².  Vital Signs (Most Recent):  Temp: 98.1 °F (36.7 °C) (09/03/20 0305)  Pulse: 63 (09/03/20 0600)  Resp: 15 (09/03/20 0600)  BP: 99/64 (09/03/20 0600)  SpO2: 96 % (09/03/20 0600) Vital Signs (24h Range):  Temp:  [97.4 °F (36.3 °C)-98.5 °F (36.9 °C)] 98.1 °F (36.7 °C)  Pulse:  [53-90] 63  Resp:  [11-24] 15  SpO2:  [95 %-99 %] 96 %  BP: ()/(54-86) 99/64  Arterial Line BP: ()/(58-80) 104/67                          Neurosurgery Physical Exam   Awake, alert, NAD  PERRL  SEEG in place, headwrap c/d/i  AG in all extremities  FCx4    Significant Labs:  Recent Labs   Lab 09/02/20  0110 09/02/20  0911 09/03/20  0346     --  95     --  139   K 3.7 4.2 3.8     --  108   CO2 22*  --  23   BUN 10  --  14   CREATININE 0.9  --  0.9   CALCIUM 9.1  --  9.6   MG 2.1  --  2.0     Recent Labs   Lab 09/02/20  0110 09/03/20  0346   WBC 8.52 5.48   HGB 15.8 16.1   HCT 45.6 45.9    225     Significant Diagnostics:  No results found in the last 24 hours.

## 2020-09-03 NOTE — ASSESSMENT & PLAN NOTE
25M with a PMHx of intractable epilepsy s/p VNS placement on Onfi 40 mg BID, Lamictal 200 mg BID, Vimpat 200 mg BID, and Topamax 50 mg BID s/p bilateral sEEG placement with Dr. Melani Ortiz on 8/31. Postop CTH stable.    Recommendations:  - Continuous vEEG monitoring  - 4 electrographic seizures from L hippo and entro areas since admit  - Held home Vimpat 8/31  - Continue Onfi 20 mg BID, Lamictal to 100 mg BID, Topamax 50 mg BID  - If no seizures throughout day, plan to decrease Onfi to 10 mg BID (Dr. Abhijeet Do to change tonight if needed)  - Please call epilepsy on call prior to administering benzodiazepines   - If patient has more than one GTC, can consider giving IV Vimpat 100 mgx1  - Neurosurgery following; tentative plan for sEEG removal on Wednesday  - Seizure precautions  - Avoid agents that lower seizure thresold      Plan of care discussed with patient, mother at bedside, and NCC team. Will follow. Please call with questions.

## 2020-09-03 NOTE — SUBJECTIVE & OBJECTIVE
Interval History:  No electrographic seizures evident in AM. Continue to monitor seizure activity. Continue topiramate 50mg BID, Lamotrigine 100mg BID, Clobazam 20mg BID. Continue to hold vimpat.Per epilepsy: If no seizures throughout day, plan to decrease Onfi to 10 mg BID (Dr. Abhijeet Do to change tonight if needed). Continue to maintain MAP goals >65, SBP <140. Continue to monitor potassium and replace PRN.       Review of Systems   Constitutional: Negative for chills and fever.   HENT: Negative for congestion, drooling and rhinorrhea.    Eyes: Negative for discharge and redness.   Respiratory: Negative for cough, shortness of breath and wheezing.    Cardiovascular: Negative for chest pain and palpitations.   Gastrointestinal: Negative for abdominal distention, abdominal pain, diarrhea, nausea and vomiting.   Endocrine: Negative for polyuria.   Genitourinary: Negative for difficulty urinating, dysuria and urgency.   Musculoskeletal: Negative for arthralgias, myalgias, neck pain and neck stiffness.   Skin: Negative for rash.   Neurological: Positive for seizures and headaches. Negative for dizziness, tremors, syncope, facial asymmetry, speech difficulty, weakness, light-headedness and numbness.   Psychiatric/Behavioral: Negative for agitation and behavioral problems.        Short term memory loss, depression       Objective:     Vitals:  Temp: 97.8 °F (36.6 °C)  Pulse: 74  Rhythm: normal sinus rhythm  BP: 122/78  MAP (mmHg): 95  Resp: 18  SpO2: 100 %  O2 Device (Oxygen Therapy): room air    Temp  Min: 97.6 °F (36.4 °C)  Max: 98.5 °F (36.9 °C)  Pulse  Min: 53  Max: 88  BP  Min: 94/54  Max: 124/72  MAP (mmHg)  Min: 68  Max: 96  Resp  Min: 11  Max: 24  SpO2  Min: 96 %  Max: 100 %    09/02 0701 - 09/03 0700  In: 230 [P.O.:200; I.V.:30]  Out: 1250 [Urine:1250]   Unmeasured Output  Urine Occurrence: 1  Stool Occurrence: 1       Physical Exam  Constitutional:       General: He is not in acute distress.     Appearance: He  is not toxic-appearing.   HENT:      Head:      Comments: S/p sEEG bilat placement, head wrapped     Right Ear: External ear normal.      Left Ear: External ear normal.      Nose: Nose normal.      Mouth/Throat:      Mouth: Mucous membranes are moist.   Eyes:      General:         Right eye: No discharge.         Left eye: No discharge.      Conjunctiva/sclera: Conjunctivae normal.   Neck:      Musculoskeletal: Normal range of motion.   Cardiovascular:      Rate and Rhythm: Normal rate and regular rhythm.   Pulmonary:      Effort: Pulmonary effort is normal. No respiratory distress.      Breath sounds: No wheezing or rhonchi.   Abdominal:      General: Abdomen is flat. There is no distension.      Palpations: Abdomen is soft. There is no mass.      Tenderness: There is no abdominal tenderness. There is no guarding.      Hernia: No hernia is present.   Musculoskeletal: Normal range of motion.         General: No deformity.      Right lower leg: No edema.      Left lower leg: No edema.   Skin:     General: Skin is warm and dry.      Capillary Refill: Capillary refill takes less than 2 seconds.      Coloration: Skin is not jaundiced.   Neurological:      Mental Status: He is alert.      Comments: GCS [id number]  A&Ox4, follows commands  EOMs, cough, gag, shoulder shrug intact, no tongue deviation  [employer] spont against gravity  Sensation intact in all extremities   Psychiatric:         Mood and Affect: Mood normal.         Behavior: Behavior normal.         Thought Content: Thought content normal.         Judgment: Judgment normal.           Medications:  Continuous Scheduledcitalopram, 20 mg, Daily  cloBAZam, 20 mg, BID  heparin (porcine), 5,000 Units, Q8H  lamoTRIgine, 100 mg, BID  mupirocin, , BID  senna-docusate 8.6-50 mg, 1 tablet, Daily  topiramate, 50 mg, BID  [START ON 9/4/2020] vancomycin (VANCOCIN) IVPB, 1,250 mg, Q12H    PRNacetaminophen, 650 mg, Q4H PRN  hydrALAZINE, 10 mg, Q4H PRN  labetaloL, 10 mg, Q4H  PRN  magnesium oxide, 800 mg, PRN  magnesium oxide, 800 mg, PRN  oxyCODONE, 5 mg, Q6H PRN  potassium chloride, 40 mEq, PRN  potassium chloride, 40 mEq, PRN  potassium chloride, 40 mEq, PRN  potassium, sodium phosphates, 2 packet, PRN  potassium, sodium phosphates, 2 packet, PRN  potassium, sodium phosphates, 2 packet, PRN  promethazine (PHENERGAN) IVPB, 6.25 mg, Q8H PRN  vancomycin - pharmacy to dose, , pharmacy to manage frequency      Today I personally reviewed pertinent medications, lines/drains/airways, imaging, cardiology results, laboratory results, microbiology results, CBC, CMP, POCT, epilepsy note for EEG    Diet  Diet Adult Regular (IDDSI Level 7)  Diet Adult Regular (IDDSI Level 7)

## 2020-09-03 NOTE — PROGRESS NOTES
[facility]  Neurology-Epilepsy  Progress Note    Patient Name: Darin Cunha  MRN: [id number]  Admission Date: 8/31/2020  Hospital Length of Stay: 3 days  Code Status: Full Code   Attending Provider: Melani Ortiz MD  Primary Care Physician: Primary Doctor No   Principal Problem:Epilepsy    Subjective:     Hospital Course:   8/31: sEEG placement. Hold Vimpat. Continue other AEDs: Onfi 40 mg BID, Lamictal 200 mg BID, Topamax 50 mg BID.  8/31>9/1: No clinical or electrographic seizures overnight.   9/1>9/2: Four electrographic seizures (3 with clinical correlate) originating from L hippo and entro areas.  9/2>9/3: No electrographic seizures    Interval History: No acute events overnight. This AM, patient lying in bed resting comfortably with mother at bedside. Patient has no complaints. Discussed plan of care with patient and mother.    Current Facility-Administered Medications   Medication Dose Route Frequency Provider Last Rate Last Dose    acetaminophen tablet 650 mg  650 mg Oral Q4H PRN Georgie Miinea, NP   650 mg at 09/02/20 1914    citalopram tablet 20 mg  20 mg Oral Daily Georgie Miinea, NP   20 mg at 09/03/20 0841    cloBAZam Tab 20 mg  20 mg Oral BID Minerva Oconnor PA-C   20 mg at 09/03/20 0841    heparin (porcine) injection 5,000 Units  5,000 Units Subcutaneous Q8H Carito Amezquita PA-C   5,000 Units at 09/03/20 0610    hydrALAZINE injection 10 mg  10 mg Intravenous Q4H PRN Georgie Miinea, NP   10 mg at 08/31/20 1601    labetaloL injection 10 mg  10 mg Intravenous Q4H PRN Georgie Miinea, NP        lamoTRIgine tablet 100 mg  100 mg Oral BID BRIEN CopeC   100 mg at 09/03/20 1007    magnesium oxide tablet 800 mg  800 mg Oral PRN Melissa Flores, NP        magnesium oxide tablet 800 mg  800 mg Oral PRN Melissa Flores, NP        mupirocin 2 % ointment   Nasal BID Katey Lee MD        oxyCODONE immediate release tablet 5 mg  5 mg Oral Q6H PRN Georgie Miinea, NP   5 mg at  09/02/20 1914    potassium chloride packet 40 mEq  40 mEq Oral PRN Melissa Flores NP   40 mEq at 09/03/20 0609    potassium chloride packet 40 mEq  40 mEq Oral PRN Melissa Flores NP   40 mEq at 09/01/20 0856    potassium chloride packet 40 mEq  40 mEq Oral PRN Melissa Flores NP        potassium, sodium phosphates 280-160-250 mg packet 2 packet  2 packet Oral PRN Melissa Flores, NP        potassium, sodium phosphates 280-160-250 mg packet 2 packet  2 packet Oral PRN Melissasony Flores, NP        potassium, sodium phosphates 280-160-250 mg packet 2 packet  2 packet Oral PRN Melissa Flores NP        promethazine (PHENERGAN) 6.25 mg in dextrose 5 % 50 mL IVPB  6.25 mg Intravenous Q8H PRN Georgie Hoyos  mL/hr at 08/31/20 1730 6.25 mg at 08/31/20 1730    senna-docusate 8.6-50 mg per tablet 1 tablet  1 tablet Oral Daily Georgie Hoyos NP   1 tablet at 09/03/20 0841    topiramate tablet 50 mg  50 mg Oral BID Minerva Oconnor PA-C   50 mg at 09/03/20 0841    vancomycin - pharmacy to dose   Intravenous pharmacy to manage frequency Katey Lee MD        vancomycin 1.5 g in dextrose 5 % 250 mL IVPB (ready to mix)  1,500 mg Intravenous Once Melani Ortiz .7 mL/hr at 09/03/20 1228 1,500 mg at 09/03/20 1228     Continuous Infusions:    Review of Systems   Constitutional: Negative for chills, diaphoresis, fatigue and fever.   HENT: Negative for sore throat and trouble swallowing.    Respiratory: Negative for cough, shortness of breath and wheezing.    Cardiovascular: Negative for chest pain and palpitations.   Gastrointestinal: Negative for abdominal pain, diarrhea, nausea and vomiting.   Neurological: Positive for seizures. Negative for dizziness, syncope, speech difficulty, weakness and headaches (resolved).   Psychiatric/Behavioral: Negative for behavioral problems, confusion and sleep disturbance. The patient is not nervous/anxious.      Objective:     Vital Signs (Most  Recent):  Temp: 97.6 °F (36.4 °C) (09/03/20 0705)  Pulse: 76 (09/03/20 1200)  Resp: 18 (09/03/20 1200)  BP: 100/61 (09/03/20 1200)  SpO2: 98 % (09/03/20 1200) Vital Signs (24h Range):  Temp:  [97.4 °F (36.3 °C)-98.5 °F (36.9 °C)] 97.6 °F (36.4 °C)  Pulse:  [53-88] 76  Resp:  [11-24] 18  SpO2:  [95 %-99 %] 98 %  BP: ()/(54-80) 100/61  Arterial Line BP: ()/(58-80) 108/76     Weight: 78.5 kg (173 lb 1 oz)  Body mass index is 22.83 kg/m².    Physical Exam  Constitutional:       General: He is not in acute distress.     Appearance: Normal appearance. He is not diaphoretic.   HENT:      Head: Normocephalic.      Comments: sEEG in place  Eyes:      General: No scleral icterus.        Right eye: No discharge.         Left eye: No discharge.      Extraocular Movements: EOM normal.      Conjunctiva/sclera: Conjunctivae normal.      Pupils: Pupils are equal, round, and reactive to light.   Cardiovascular:      Rate and Rhythm: Normal rate.   Pulmonary:      Effort: Pulmonary effort is normal. No respiratory distress.   Abdominal:      General: There is no distension.      Palpations: Abdomen is soft.      Tenderness: There is no abdominal tenderness.   Musculoskeletal: Normal range of motion.         General: No deformity or signs of injury.   Skin:     General: Skin is warm and dry.   Neurological:      General: No focal deficit present.      Mental Status: He is alert and oriented to person, place, and time. Mental status is at baseline.      Coordination: Finger-Nose-Finger Test normal.   Psychiatric:         Mood and Affect: Mood normal.         Speech: Speech normal.         Behavior: Behavior normal.         Thought Content: Thought content normal.         NEUROLOGICAL EXAMINATION:     MENTAL STATUS   Oriented to person, place, and time.   Speech: speech is normal   Level of consciousness: alert    CRANIAL NERVES     CN III, IV, VI   Pupils are equal, round, and reactive to light.  Extraocular motions are  normal.     CN VII   Facial expression full, symmetric.     CN VIII   Hearing: intact    CN XI   CN XI normal.     CN XII   CN XII normal.     GAIT AND COORDINATION      Coordination   Finger to nose coordination: normal      Significant Labs: All pertinent lab results from the past 24 hours have been reviewed.    Significant Studies: I have reviewed all pertinent imaging results/findings within the past 24 hours.    Assessment and Plan:     * Epilepsy  25M with a PMHx of intractable epilepsy s/p VNS placement on Onfi 40 mg BID, Lamictal 200 mg BID, Vimpat 200 mg BID, and Topamax 50 mg BID s/p bilateral sEEG placement with Dr. Melani Ortiz on 8/31. Postop CTH stable.    Recommendations:  - Continuous vEEG monitoring  - 4 electrographic seizures from L hippo and entro areas since admit  - Held home Vimpat 8/31  - Continue Onfi 20 mg BID, Lamictal to 100 mg BID, Topamax 50 mg BID  - If no seizures throughout day, plan to decrease Onfi to 10 mg BID (Dr. Abhijeet Do to change tonight if needed)  - Please call epilepsy on call prior to administering benzodiazepines   - If patient has more than one GTC, can consider giving IV Vimpat 100 mgx1  - Neurosurgery following; tentative plan for sEEG removal on Wednesday  - Seizure precautions  - Avoid agents that lower seizure thresold      Plan of care discussed with patient, mother at bedside, and NCC team. Will follow. Please call with questions.    Depression  - Chronic and stable  - Continue home Celexa        VTE Risk Mitigation (From admission, onward)         Ordered     heparin (porcine) injection 5,000 Units  Every 8 hours      09/02/20 1035     Place sequential compression device  Until discontinued      08/31/20 1646                Minerva Oconnor PA-C  Neurology-Epilepsy  [facility]  Staff: Dr. Sage

## 2020-09-03 NOTE — PROCEDURES
Stereo EEG Depth Electrode Recording  DATE OF SERVICE: 2020/09/02  EEG NUMBER: [id number]  REQUESTED BY: Dr Alix Ortiz  LOCATION OF SERVICE: [id number]     METHODOLOGY              Depth electrodes consisted of thin wires with electrical contacts it fixed distances along the wire.  These are then implanted using a stereotactic procedure targeting cortical and subcortical structures.  The up to 256 electrode contact can be recorded simultaneously with this procedure.  Recording band pass was 0.1  in the low past filters setting could be set at the 512, 1024, or 2048.  Digital video recording of the patient is simultaneously recorded with the EEG.  The patient is instructed report clinical symptoms which may occur during the recording session.  EEG and video recording is stored and archived in digital format. Activation procedures which include photic stimulation, hyperventilation and instructing patients to perform simple task are done in selected patients.   Compresses spectral analysis (CSA) is also performed on the activity recorded from each individual channel.  This is displayed as a power display of frequencies from 0 to 30 Hz over time.   The CSA analysis is done and displayed continuously.  This is reviewed for asymmetries in power between homologous areas of the scalp and for presence of changes in power which canbe seen when seizures occur.  Sections of suspected abnormalities on the CSA is then compared with the original EEG recording.          Implant Date MRN Name Last First                      8/31/2020 [id number]  Alphonse Webster                            Contacts                   Depth Elect Name SN  # contacts Color 1 Color 2 1 2 3 4 5 6 7 8 9 10 11 12 13 14 15 16   1 L Amyg 05066  14 Yellow Green 1 2 3 4 5 6 7 8 9 10 11 12 13 14     2 L Hippo 56443  14 Black Blue 15 16 17 18 19 20 21 22 23 24 25 26 27 28     3 L Entor 33964  14 Red Blue 29 30 31 32 33 34 35 36 37 38 39 40 41 42     4 L AntCi 35487  14  Red Green 43 44 45 46 47 48 49 50 51 52 53 54 55 56     5 L OrbFr 53291  16 Blue Red 57 58 59 60 61 62 63 64 65 66 67 68 69 70 71 72   6 L PreFr 49003  16 Yellow Green 73 74 75 76 77 78 79 80 81 82 83 84 85 86 87 88   7 L AntLn 66393  8 Black Green 89 90 91 92 93 94 95 96           8 L PoIns 53769  8 Yellow Black 97 98 99 100 101 102 103 104           9 L TeOcc 45169  8 Black Red 105 106 107 108 109 110 111 112           10 L Pariet 68004  8 Black Red 113 114 115 116 117 118 119 120           11 R Amyg 90196  12 Green Red 121 122 123 124 125 126 127 128 129 130 131 132       12 R Hippo 45678  12 Green Blue 133 134 135 136 137 138 139 140 141 142 143 144       13 R Asuin 40760  8 Blue Red 145 146 147 148 149 150 151 152           14 R Acing 78069  16 Yellow Blue 153 154 155 156 157 158 159 160 161 162 163 164 165 166 167 168       Electrode integrety   High amplitude artifact present from the following contacts               Depth electrode          Contact #                       L Pariet  4              L Entor   12, 13, 14  The L Pariet  3, 4   R Amygd  2, 3    11, 12  Appropriate recording obtained from the remaining electrode contacts.     RECORDING TIMES  Start on 09/02/2020t 7:00 p.m.  Stop on 09/03/2020t 7:00 a.m.  A total of 24 hrs of EEG monitoring was obtained    Interictal Activity    Seizures Recorded   Date  Start  End  Sz 1 2020/09/01 08:34:43 08:35:28    Sz 2 2020/09/01 13:18:13 13:18:55    Sz 3  2020/09/01  16:39:13   start L hippo 1 2   16:39:13   sz spread Amyg Entor Prefr   16:39:38   Patient Event   16:39:43   nurse telling him a code word, Purple elephant   16:39:48  Sz 3 electrographic stop   16:40:14  pt not following commands   16:40:37  pt able to follow commands    Sz 4 2020/09/01    21:05:53 start Emtpr > Hippo    very focal   21:06:17 Stop      EEG FINDINGS  Ictal Recording   Seizure #         Date                 Start                 End  #1                     "2020/09/01 08:34:43 08:35:28   Build up started in L Hippo contacts 1, 2  >> 3, 4      then spread to L Hippo contacts 11, 12, 13 L Amygd contacts 11, 12, 13      then spread to L Entor contacts 11, 12, 13     Seizure Description   Seizure 1   08:34:43  Sz 1 start  08:34:58  Oral facial mvt  08:35:01  L forearm and leg mvt  08:35:16  L hand auttomatism  08:35:23  Oral facial mvt continues  08:35:28  Sz end    Seizure 2  d1 13:18:13  Sz 2 start  d1 13:18:29  Head turn slightly to L  d1 13:18:33  Oral facial mvt  d1 13:18:41  Moving some in bed  d1 13:18:45  oral facial mvt continues  d1 13:18:55  Sz 2 stop  d1 13:18:57  Returns to looking at iPhone    Seizure 3  16:39:13  Lying back in bed - no mvt  16:39:20  Raises hed and slightly flexes knees  16:39:26  no response to nurse "are U OK?  16:39:30  Oral facial mvts  16:39:38  Patient Event  16:39:43  nurse telling him a code word, Purple elephant  16:39:48  Sz 3 stop  16:39:52  limb mvts stop  16:40:02  not responding to nurse  16:40:14  pt not following commands  16:40:37  pt able to follow commands    Seizure 4     Nothing clinical     Impression  Four seizure originating from L Hippo and Entro areas   No seizures recorded 2020/09/02>03    "

## 2020-09-03 NOTE — PROGRESS NOTES
[facility]  Neurosurgery  Progress Note    Subjective:     History of Present Illness: Darin Cunha is a 25 y.o. male with intractable epilepsy since 2013 who presents as a referral from Dr. Abhijeet Do to discuss the gamut of epilepsy surgery options. Today's visit is a video visit, and the patient is a passenger in a moving vehicle. His mother is not with him.      The patient states that when he has events, he stares off and smacks his lips. The episodes last for about 5-6 minutes. He often waves his hands and feels tired afterwards. He currently has about 3-5 episodes a week. He has failed at least 4 AEDs at therapeutic dosages.      Per EEG review, he has bilateral temporal involvement, moreso left-sided than right.      Triggers include being tired and being stressed. He is not working (on disability) but finished his GED. He lives next door to grandmother and nearby to his mother. His major concern is memory loss from ongoing seizures.      Of notes, he endorses a history of staph infection previously. He denies history of bleeding or anesthetic complications. He has a VNS in place.        Post-Op Info:  Procedure(s) (LRB):  CREATION, CRANIAL YUNG HOLE, PLACEMENT OF THE FIDUCIAL SCREWS BILATERAL 2.PLACEMENT OF SEEG ELECTRODES BILATERAL WITH OWEN ROBOT (Bilateral)   3 Days Post-Op     Interval History: NAEON. No seizure activity witnessed overnight. Plan for tentative sEEG bolt removal on Wednesday 9/9 pending Epilepsy discussion.     Medications:  Continuous Infusions:  Scheduled Meds:   ceFAZolin (ANCEF) IVPB  2 g Intravenous Q8H    citalopram  20 mg Oral Daily    cloBAZam  20 mg Oral BID    heparin (porcine)  5,000 Units Subcutaneous Q8H    lamoTRIgine  100 mg Oral BID    mupirocin   Nasal BID    senna-docusate 8.6-50 mg  1 tablet Oral Daily    topiramate  50 mg Oral BID     PRN Meds:acetaminophen, hydrALAZINE, labetaloL, magnesium oxide, magnesium oxide, oxyCODONE, potassium  chloride, potassium chloride, potassium chloride, potassium, sodium phosphates, potassium, sodium phosphates, potassium, sodium phosphates, promethazine (PHENERGAN) IVPB     Review of Systems  Objective:     Weight: 78.5 kg (173 lb 1 oz)  Body mass index is 22.83 kg/m².  Vital Signs (Most Recent):  Temp: 98.1 °F (36.7 °C) (09/03/20 0305)  Pulse: 63 (09/03/20 0600)  Resp: 15 (09/03/20 0600)  BP: 99/64 (09/03/20 0600)  SpO2: 96 % (09/03/20 0600) Vital Signs (24h Range):  Temp:  [97.4 °F (36.3 °C)-98.5 °F (36.9 °C)] 98.1 °F (36.7 °C)  Pulse:  [53-90] 63  Resp:  [11-24] 15  SpO2:  [95 %-99 %] 96 %  BP: ()/(54-86) 99/64  Arterial Line BP: ()/(58-80) 104/67                          Neurosurgery Physical Exam   Awake, alert, NAD  PERRL  SEEG in place, headwrap c/d/i  AG in all extremities  FCx4    Significant Labs:  Recent Labs   Lab 09/02/20 0110 09/02/20 0911 09/03/20  0346     --  95     --  139   K 3.7 4.2 3.8     --  108   CO2 22*  --  23   BUN 10  --  14   CREATININE 0.9  --  0.9   CALCIUM 9.1  --  9.6   MG 2.1  --  2.0     Recent Labs   Lab 09/02/20 0110 09/03/20  0346   WBC 8.52 5.48   HGB 15.8 16.1   HCT 45.6 45.9    225     Significant Diagnostics:  No results found in the last 24 hours.      Assessment/Plan:     * Epilepsy  25 M with intractable epilepsy s/p multiple failed AEDs and VNS placement now s/p sEEG lead placement:    Admit to [facility]  q1h neurochecks  sEEG leads in place, abx while in place, continue headwrap  Bed rest at all times  SBP<140  No HOB restrictions  Ok for recumbent bike in bed  Ok for diet  Voiding spontaneously s/p michelle removal  Post operative CTH reviewed - expected post op changes  Monitor for seizure activity - no benzos or sedating meds unless cleared by Epilepsy team  AEDs per Epilepsy  Please page w/exam change  Tentative sEEG bolt removal on 9/9, booked, will obtain consent.     Dispo: continue ICU care        Katey Power,  MD  Neurosurgery  [facility]

## 2020-09-03 NOTE — ASSESSMENT & PLAN NOTE
25 M with intractable epilepsy s/p multiple failed AEDs and VNS placement now s/p sEEG lead placement:    Admit to NCC  q1h neurochecks  sEEG leads in place, abx while in place, continue headwrap  Bed rest at all times  SBP<140  No HOB restrictions  Ok for recumbent bike in bed  Ok for diet  Voiding spontaneously s/p michelle removal  Post operative CTH reviewed - expected post op changes  Monitor for seizure activity - no benzos or sedating meds unless cleared by Epilepsy team  AEDs per Epilepsy  Please page w/exam change  Tentative sEEG bolt removal on 9/9, booked, will obtain consent.     Dispo: continue ICU care

## 2020-09-04 LAB
ALBUMIN SERPL BCP-MCNC: 4.2 G/DL (ref 3.5–5.2)
ALP SERPL-CCNC: 100 U/L (ref 55–135)
ALT SERPL W/O P-5'-P-CCNC: 9 U/L (ref 10–44)
ANION GAP SERPL CALC-SCNC: 8 MMOL/L (ref 8–16)
AST SERPL-CCNC: 16 U/L (ref 10–40)
BASOPHILS # BLD AUTO: 0.02 K/UL (ref 0–0.2)
BASOPHILS NFR BLD: 0.3 % (ref 0–1.9)
BILIRUB SERPL-MCNC: 0.6 MG/DL (ref 0.1–1)
BUN SERPL-MCNC: 14 MG/DL (ref 6–20)
CALCIUM SERPL-MCNC: 9.3 MG/DL (ref 8.7–10.5)
CHLORIDE SERPL-SCNC: 106 MMOL/L (ref 95–110)
CO2 SERPL-SCNC: 23 MMOL/L (ref 23–29)
CREAT SERPL-MCNC: 0.9 MG/DL (ref 0.5–1.4)
DIFFERENTIAL METHOD: ABNORMAL
EOSINOPHIL # BLD AUTO: 0.1 K/UL (ref 0–0.5)
EOSINOPHIL NFR BLD: 1.1 % (ref 0–8)
ERYTHROCYTE [DISTWIDTH] IN BLOOD BY AUTOMATED COUNT: 11.8 % (ref 11.5–14.5)
EST. GFR  (AFRICAN AMERICAN): >60 ML/MIN/1.73 M^2
EST. GFR  (NON AFRICAN AMERICAN): >60 ML/MIN/1.73 M^2
GLUCOSE SERPL-MCNC: 100 MG/DL (ref 70–110)
HCT VFR BLD AUTO: 45.4 % (ref 40–54)
HGB BLD-MCNC: 16.1 G/DL (ref 14–18)
IMM GRANULOCYTES # BLD AUTO: 0.02 K/UL (ref 0–0.04)
IMM GRANULOCYTES NFR BLD AUTO: 0.3 % (ref 0–0.5)
LYMPHOCYTES # BLD AUTO: 1.6 K/UL (ref 1–4.8)
LYMPHOCYTES NFR BLD: 24 % (ref 18–48)
MAGNESIUM SERPL-MCNC: 1.9 MG/DL (ref 1.6–2.6)
MCH RBC QN AUTO: 31.4 PG (ref 27–31)
MCHC RBC AUTO-ENTMCNC: 35.5 G/DL (ref 32–36)
MCV RBC AUTO: 89 FL (ref 82–98)
MONOCYTES # BLD AUTO: 0.6 K/UL (ref 0.3–1)
MONOCYTES NFR BLD: 9.2 % (ref 4–15)
NEUTROPHILS # BLD AUTO: 4.3 K/UL (ref 1.8–7.7)
NEUTROPHILS NFR BLD: 65.1 % (ref 38–73)
NRBC BLD-RTO: 0 /100 WBC
PHOSPHATE SERPL-MCNC: 4 MG/DL (ref 2.7–4.5)
PLATELET # BLD AUTO: 240 K/UL (ref 150–350)
PMV BLD AUTO: 10 FL (ref 9.2–12.9)
POTASSIUM SERPL-SCNC: 3.4 MMOL/L (ref 3.5–5.1)
POTASSIUM SERPL-SCNC: 4.2 MMOL/L (ref 3.5–5.1)
PROT SERPL-MCNC: 6.9 G/DL (ref 6–8.4)
RBC # BLD AUTO: 5.13 M/UL (ref 4.6–6.2)
SODIUM SERPL-SCNC: 137 MMOL/L (ref 136–145)
WBC # BLD AUTO: 6.63 K/UL (ref 3.9–12.7)

## 2020-09-04 PROCEDURE — 25000003 PHARM REV CODE 250: Performed by: NURSE PRACTITIONER

## 2020-09-04 PROCEDURE — 84100 ASSAY OF PHOSPHORUS: CPT

## 2020-09-04 PROCEDURE — 25000003 PHARM REV CODE 250: Performed by: PHYSICIAN ASSISTANT

## 2020-09-04 PROCEDURE — 25000003 PHARM REV CODE 250: Performed by: PSYCHIATRY & NEUROLOGY

## 2020-09-04 PROCEDURE — 95720 EEG PHY/QHP EA INCR W/VEEG: CPT | Mod: ,,, | Performed by: PSYCHIATRY & NEUROLOGY

## 2020-09-04 PROCEDURE — 85025 COMPLETE CBC W/AUTO DIFF WBC: CPT

## 2020-09-04 PROCEDURE — 99233 PR SUBSEQUENT HOSPITAL CARE,LEVL III: ICD-10-PCS | Mod: ,,, | Performed by: PSYCHIATRY & NEUROLOGY

## 2020-09-04 PROCEDURE — 95720 PR EEG, W/VIDEO, CONT RECORD, I&R, >12<26 HRS: ICD-10-PCS | Mod: ,,, | Performed by: PSYCHIATRY & NEUROLOGY

## 2020-09-04 PROCEDURE — 83735 ASSAY OF MAGNESIUM: CPT

## 2020-09-04 PROCEDURE — 84132 ASSAY OF SERUM POTASSIUM: CPT

## 2020-09-04 PROCEDURE — 80053 COMPREHEN METABOLIC PANEL: CPT

## 2020-09-04 PROCEDURE — 99233 SBSQ HOSP IP/OBS HIGH 50: CPT | Mod: ,,, | Performed by: PSYCHIATRY & NEUROLOGY

## 2020-09-04 PROCEDURE — 25000003 PHARM REV CODE 250: Performed by: NEUROLOGICAL SURGERY

## 2020-09-04 PROCEDURE — 63600175 PHARM REV CODE 636 W HCPCS: Performed by: PHYSICIAN ASSISTANT

## 2020-09-04 PROCEDURE — 63600175 PHARM REV CODE 636 W HCPCS: Performed by: NEUROLOGICAL SURGERY

## 2020-09-04 PROCEDURE — 99233 SBSQ HOSP IP/OBS HIGH 50: CPT | Mod: ,,, | Performed by: NURSE PRACTITIONER

## 2020-09-04 PROCEDURE — 94761 N-INVAS EAR/PLS OXIMETRY MLT: CPT

## 2020-09-04 PROCEDURE — 99233 PR SUBSEQUENT HOSPITAL CARE,LEVL III: ICD-10-PCS | Mod: ,,, | Performed by: NURSE PRACTITIONER

## 2020-09-04 PROCEDURE — 20000000 HC ICU ROOM

## 2020-09-04 PROCEDURE — 95714 VEEG EA 12-26 HR UNMNTR: CPT

## 2020-09-04 RX ORDER — LAMOTRIGINE 25 MG/1
50 TABLET ORAL 2 TIMES DAILY
Status: DISCONTINUED | OUTPATIENT
Start: 2020-09-04 | End: 2020-09-06

## 2020-09-04 RX ADMIN — MUPIROCIN: 20 OINTMENT TOPICAL at 08:09

## 2020-09-04 RX ADMIN — TOPIRAMATE 50 MG: 25 TABLET, FILM COATED ORAL at 09:09

## 2020-09-04 RX ADMIN — POTASSIUM CHLORIDE 40 MEQ: 1.5 POWDER, FOR SOLUTION ORAL at 06:09

## 2020-09-04 RX ADMIN — HEPARIN SODIUM 5000 UNITS: 5000 INJECTION INTRAVENOUS; SUBCUTANEOUS at 02:09

## 2020-09-04 RX ADMIN — HEPARIN SODIUM 5000 UNITS: 5000 INJECTION INTRAVENOUS; SUBCUTANEOUS at 09:09

## 2020-09-04 RX ADMIN — CLOBAZAM 10 MG: 10 TABLET ORAL at 09:09

## 2020-09-04 RX ADMIN — LAMOTRIGINE 50 MG: 25 TABLET ORAL at 09:09

## 2020-09-04 RX ADMIN — VANCOMYCIN HYDROCHLORIDE 1250 MG: 1.25 INJECTION, POWDER, LYOPHILIZED, FOR SOLUTION INTRAVENOUS at 05:09

## 2020-09-04 RX ADMIN — HEPARIN SODIUM 5000 UNITS: 5000 INJECTION INTRAVENOUS; SUBCUTANEOUS at 06:09

## 2020-09-04 RX ADMIN — POTASSIUM CHLORIDE 40 MEQ: 1.5 POWDER, FOR SOLUTION ORAL at 08:09

## 2020-09-04 RX ADMIN — CITALOPRAM HYDROBROMIDE 20 MG: 10 TABLET ORAL at 08:09

## 2020-09-04 RX ADMIN — LAMOTRIGINE 100 MG: 25 TABLET ORAL at 08:09

## 2020-09-04 RX ADMIN — MUPIROCIN: 20 OINTMENT TOPICAL at 09:09

## 2020-09-04 RX ADMIN — CLOBAZAM 10 MG: 10 TABLET ORAL at 08:09

## 2020-09-04 RX ADMIN — TOPIRAMATE 50 MG: 25 TABLET, FILM COATED ORAL at 08:09

## 2020-09-04 RX ADMIN — VANCOMYCIN HYDROCHLORIDE 1250 MG: 1.25 INJECTION, POWDER, LYOPHILIZED, FOR SOLUTION INTRAVENOUS at 03:09

## 2020-09-04 NOTE — SUBJECTIVE & OBJECTIVE
Medications Prior to Admission   Medication Sig Dispense Refill Last Dose    citalopram (CELEXA) 20 MG tablet Take 20 mg by mouth once daily.   2020 at 0800    cloBAZam (ONFI) 20 mg Tab Take 2 tablets (40 mg total) by mouth 2 (two) times daily. (Patient taking differently: Take 40 mg by mouth 2 (two) times daily. Take am of surgery) 120 tablet 2 2020 at 2100    lacosamide (VIMPAT) 200 mg Tab tablet Take 1 tablet (200 mg total) by mouth every 12 (twelve) hours. (Patient taking differently: Take 200 mg by mouth every 12 (twelve) hours. Take am of surgery) 60 tablet 4 2020 at 2100    lamoTRIgine (LAMICTAL) 200 MG tablet Take 1 tablet (200 mg total) by mouth 2 (two) times daily. (Patient taking differently: Take 200 mg by mouth 2 (two) times daily. Take am of surgery) 60 tablet 2 2020 at 2100    [] mupirocin (BACTROBAN) 2 % ointment Apply by Nasal route 2 (two) times daily. for 5 days 22 g 0 2020 at 2100    topiramate (TOPAMAX) 50 MG tablet Take 1 tablet (50 mg total) by mouth 2 (two) times daily. (Patient taking differently: Take 50 mg by mouth 2 (two) times daily. Take am of surgery) 60 tablet 11 2020 at 2100       Review of patient's allergies indicates:   Allergen Reactions    Zofran [ondansetron hcl (pf)] Hives and Rash       Past Medical History:   Diagnosis Date    Depressed 2018    Epilepsy 2015    Mitral valve prolapse 2015    Seizures      Past Surgical History:   Procedure Laterality Date    ADENOIDECTOMY  2015    ADENOIDECTOMY      CREATION OF YUNG HOLE WITH EVACUATION OF HEMATOMA Bilateral 2020    Procedure: CREATION, CRANIAL YUNG HOLE, PLACEMENT OF THE FIDUCIAL SCREWS BILATERAL 2.PLACEMENT OF SEEG ELECTRODES BILATERAL WITH OWEN ROBOT;  Surgeon: Melani Ortiz MD;  Location: [facility] OR [address] [place] [address];  Service: Neurosurgery;  Laterality: Bilateral;  TORONTO III, ASA III, BLOOD TYPE & SCREEN, HEADREST SAPP, REGULAR , SUPINE. SPECIAL EQUIPMENT  MEDTRONICS DAIN WILCOX, OWEN ASHRAF, PMT    finger abscess removal      TONSILLECTOMY      VAGUS NERVE STIMULATOR INSERTION Left 4/4/2019    Procedure: INSERTION, VAGUS NERVE STIMULATOR;  Surgeon: Reuben Gupta MD;  Location: [facility] OR [address] [place] [address];  Service: Neurosurgery;  Laterality: Left;  toronto I, asa 1, regular bed, supine     wisdom teeth extracted- about 2010        Family History     Problem Relation (Age of Onset)    Heart disease Maternal Grandmother, Maternal Grandfather    Hypertension Mother, Maternal Grandmother, Maternal Grandfather        Tobacco Use    Smoking status: Never Smoker    Smokeless tobacco: Never Used   Substance and Sexual Activity    Alcohol use: Yes     Alcohol/week: 10.0 standard drinks     Types: 10 Cans of beer per week     Comment: 12 beers over a months - spread over a month     Drug use: No    Sexual activity: Yes     Partners: Female     Review of Systems  Objective:     Weight: 78.5 kg (173 lb 1 oz)  Body mass index is 22.83 kg/m².  Vital Signs (Most Recent):  Temp: 98.3 °F (36.8 °C) (09/04/20 1101)  Pulse: 78 (09/04/20 1201)  Resp: 14 (09/04/20 1201)  BP: 117/73 (09/04/20 1201)  SpO2: 99 % (09/04/20 1201) Vital Signs (24h Range):  Temp:  [97.6 °F (36.4 °C)-98.4 °F (36.9 °C)] 98.3 °F (36.8 °C)  Pulse:  [58-94] 78  Resp:  [12-22] 14  SpO2:  [95 %-100 %] 99 %  BP: (102-136)/(60-97) 117/73  Arterial Line BP: ()/(64-97) 122/66     Date 09/04/20 0700 - 09/05/20 0659   Shift 8772-8697 7432-3126 4063-7537 24 Hour Total   INTAKE   P.O. 200   200   I.V.(mL/kg) 30(0.4)   30(0.4)   Shift Total(mL/kg) 230(2.9)   230(2.9)   OUTPUT   Urine(mL/kg/hr) 350   350   Shift Total(mL/kg) 350(4.5)   350(4.5)   Weight (kg) 78.5 78.5 78.5 78.5                        Physical Exam:    Constitutional: No distress.     Eyes: Pupils are equal, round, and reactive to light. EOM are normal.     Cardiovascular: Normal rate and regular rhythm.     Musculoskeletal:        Right Upper Extremities: Muscle  strength is 5/5.        Left Upper Extremities: Muscle strength is 5/5.       Right Lower Extremities: Muscle strength is 5/5.        Left Lower Extremities: Muscle strength is 5/5.     Neurological:        Cranial nerves: Cranial nerve(s) II, III, IV, V, VI, VII, VIII, IX, X, XI and XII are intact.       Significant Labs:  Recent Labs   Lab 09/03/20 0346 09/04/20  0258 09/04/20  1154   GLU 95 100  --     137  --    K 3.8 3.4* 4.2    106  --    CO2 23 23  --    BUN 14 14  --    CREATININE 0.9 0.9  --    CALCIUM 9.6 9.3  --    MG 2.0 1.9  --      Recent Labs   Lab 09/03/20 0346 09/04/20  0258   WBC 5.48 6.63   HGB 16.1 16.1   HCT 45.9 45.4    240     No results for input(s): LABPT, INR, APTT in the last 48 hours.  Microbiology Results (last 7 days)     ** No results found for the last 168 hours. **        All pertinent labs from the last 24 hours have been reviewed.    Significant Diagnostics:  I have reviewed and interpreted all pertinent imaging results/findings within the past 24 hours.

## 2020-09-04 NOTE — PROCEDURES
Stereo EEG Depth Electrode Recording  DATE OF SERVICE: 2020/09/03  EEG NUMBER: [id number]  REQUESTED BY: Dr Alix Ortiz  LOCATION OF SERVICE: [id number]     METHODOLOGY              Depth electrodes consisted of thin wires with electrical contacts it fixed distances along the wire.  These are then implanted using a stereotactic procedure targeting cortical and subcortical structures.  The up to 256 electrode contact can be recorded simultaneously with this procedure.  Recording band pass was 0.1  in the low past filters setting could be set at the 512, 1024, or 2048.  Digital video recording of the patient is simultaneously recorded with the EEG.  The patient is instructed report clinical symptoms which may occur during the recording session.  EEG and video recording is stored and archived in digital format. Activation procedures which include photic stimulation, hyperventilation and instructing patients to perform simple task are done in selected patients.   Compresses spectral analysis (CSA) is also performed on the activity recorded from each individual channel.  This is displayed as a power display of frequencies from 0 to 30 Hz over time.   The CSA analysis is done and displayed continuously.  This is reviewed for asymmetries in power between homologous areas of the scalp and for presence of changes in power which canbe seen when seizures occur.  Sections of suspected abnormalities on the CSA is then compared with the original EEG recording.          Implant Date MRN Name Last First                      8/31/2020 [id number]  Alphonse Webster                            Contacts                   Depth Elect Name SN  # contacts Color 1 Color 2 1 2 3 4 5 6 7 8 9 10 11 12 13 14 15 16   1 L Amyg 68571  14 Yellow Green 1 2 3 4 5 6 7 8 9 10 11 12 13 14     2 L Hippo 79221  14 Black Blue 15 16 17 18 19 20 21 22 23 24 25 26 27 28     3 L Entor 53093  14 Red Blue 29 30 31 32 33 34 35 36 37 38 39 40 41 42     4 L AntCi 16696  14  Red Green 43 44 45 46 47 48 49 50 51 52 53 54 55 56     5 L OrbFr 76028  16 Blue Red 57 58 59 60 61 62 63 64 65 66 67 68 69 70 71 72   6 L PreFr 85733  16 Yellow Green 73 74 75 76 77 78 79 80 81 82 83 84 85 86 87 88   7 L AntLn 97575  8 Black Green 89 90 91 92 93 94 95 96           8 L PoIns 24679  8 Yellow Black 97 98 99 100 101 102 103 104           9 L TeOcc 79899  8 Black Red 105 106 107 108 109 110 111 112           10 L Pariet 43527  8 Black Red 113 114 115 116 117 118 119 120           11 R Amyg 68647  12 Green Red 121 122 123 124 125 126 127 128 129 130 131 132       12 R Hippo 93937  12 Green Blue 133 134 135 136 137 138 139 140 141 142 143 144       13 R Asuin 73262  8 Blue Red 145 146 147 148 149 150 151 152           14 R Acing 53807  16 Yellow Blue 153 154 155 156 157 158 159 160 161 162 163 164 165 166 167 168       Electrode integrety   High amplitude artifact present from the following contacts               Depth electrode          Contact #                       L Pariet  4              L Entor   12, 13, 14  The L Pariet  3, 4   R Amygd  2, 3    11, 12  Appropriate recording obtained from the remaining electrode contacts.     RECORDING TIMES  Start on 09/03/2020t 7:00 p.m.  Stop on 09/04/2020t 7:00 a.m.  A total of 24 hrs of EEG monitoring was obtained    Interictal Activity    Seizures Recorded   Date  Start  End  Sz 1 2020/09/01 08:34:43 08:35:28    Sz 2 2020/09/01 13:18:13 13:18:55    Sz 3  2020/09/01  16:39:13   start L hippo 1 2   16:39:13   sz spread Amyg Entor Prefr   16:39:38   Patient Event   16:39:43   nurse telling him a code word, Purple elephant   16:39:48  Sz 3 electrographic stop   16:40:14  pt not following commands   16:40:37  pt able to follow commands    Sz 4 2020/09/01    21:05:53 start Emtpr > Hippo    very focal   21:06:17 Stop      EEG FINDINGS  Ictal Recording   Seizure #         Date                 Start                 End  #1                     "2020/09/01 08:34:43 08:35:28   Build up started in L Hippo contacts 1, 2  >> 3, 4      then spread to L Hippo contacts 11, 12, 13 L Amygd contacts 11, 12, 13      then spread to L Entor contacts 11, 12, 13     Seizure Description   Seizure 1   08:34:43  Sz 1 start  08:34:58  Oral facial mvt  08:35:01  L forearm and leg mvt  08:35:16  L hand auttomatism  08:35:23  Oral facial mvt continues  08:35:28  Sz end    Seizure 2  d1 13:18:13  Sz 2 start  d1 13:18:29  Head turn slightly to L  d1 13:18:33  Oral facial mvt  d1 13:18:41  Moving some in bed  d1 13:18:45  oral facial mvt continues  d1 13:18:55  Sz 2 stop  d1 13:18:57  Returns to looking at iPhone    Seizure 3  16:39:13  Lying back in bed - no mvt  16:39:20  Raises hed and slightly flexes knees  16:39:26  no response to nurse "are U OK?  16:39:30  Oral facial mvts  16:39:38  Patient Event  16:39:43  nurse telling him a code word, Purple elephant  16:39:48  Sz 3 stop  16:39:52  limb mvts stop  16:40:02  not responding to nurse  16:40:14  pt not following commands  16:40:37  pt able to follow commands    Seizure 4     Nothing clinical     Impression  Four seizure originating from L Hippo and Entro areas   No seizures recorded 2020/09/02>03  No seizures recorded 2020/09/02>03    "

## 2020-09-04 NOTE — PROGRESS NOTES
[facility]  Neurosurgery  Progress Note    Subjective:     History of Present Illness: Darin Cunha is a 25 y.o. male with intractable epilepsy since 2013 who presents as a referral from Dr. Abhijeet Do to discuss the gamut of epilepsy surgery options. Today's visit is a video visit, and the patient is a passenger in a moving vehicle. His mother is not with him.      The patient states that when he has events, he stares off and smacks his lips. The episodes last for about 5-6 minutes. He often waves his hands and feels tired afterwards. He currently has about 3-5 episodes a week. He has failed at least 4 AEDs at therapeutic dosages.      Per EEG review, he has bilateral temporal involvement, moreso left-sided than right.      Triggers include being tired and being stressed. He is not working (on disability) but finished his GED. He lives next door to grandmother and nearby to his mother. His major concern is memory loss from ongoing seizures.      Of notes, he endorses a history of staph infection previously. He denies history of bleeding or anesthetic complications. He has a VNS in place.        Interval history: KALEIGH, VSS, pt continues to be stable neurologically    Post-Op Info:  Procedure(s) (LRB):  CREATION, CRANIAL YUNG HOLE, PLACEMENT OF THE FIDUCIAL SCREWS BILATERAL 2.PLACEMENT OF SEEG ELECTRODES BILATERAL WITH OWEN ROBOT (Bilateral)   4 Days Post-Op     Medications Prior to Admission   Medication Sig Dispense Refill Last Dose    citalopram (CELEXA) 20 MG tablet Take 20 mg by mouth once daily.   8/30/2020 at 0800    cloBAZam (ONFI) 20 mg Tab Take 2 tablets (40 mg total) by mouth 2 (two) times daily. (Patient taking differently: Take 40 mg by mouth 2 (two) times daily. Take am of surgery) 120 tablet 2 8/30/2020 at 2100    lacosamide (VIMPAT) 200 mg Tab tablet Take 1 tablet (200 mg total) by mouth every 12 (twelve) hours. (Patient taking differently: Take 200 mg by mouth every 12 (twelve)  hours. Take am of surgery) 60 tablet 4 2020 at 2100    lamoTRIgine (LAMICTAL) 200 MG tablet Take 1 tablet (200 mg total) by mouth 2 (two) times daily. (Patient taking differently: Take 200 mg by mouth 2 (two) times daily. Take am of surgery) 60 tablet 2 2020 at 2100    [] mupirocin (BACTROBAN) 2 % ointment Apply by Nasal route 2 (two) times daily. for 5 days 22 g 0 2020 at 2100    topiramate (TOPAMAX) 50 MG tablet Take 1 tablet (50 mg total) by mouth 2 (two) times daily. (Patient taking differently: Take 50 mg by mouth 2 (two) times daily. Take am of surgery) 60 tablet 11 2020 at 2100       Review of patient's allergies indicates:   Allergen Reactions    Zofran [ondansetron hcl (pf)] Hives and Rash       Past Medical History:   Diagnosis Date    Depressed 2018    Epilepsy     Mitral valve prolapse     Seizures      Past Surgical History:   Procedure Laterality Date    ADENOIDECTOMY  2015    ADENOIDECTOMY      CREATION OF YUNG HOLE WITH EVACUATION OF HEMATOMA Bilateral 2020    Procedure: CREATION, CRANIAL YUNG HOLE, PLACEMENT OF THE FIDUCIAL SCREWS BILATERAL 2.PLACEMENT OF SEEG ELECTRODES BILATERAL WITH OWEN ROBOT;  Surgeon: Melani Ortiz MD;  Location: [facility] OR [address] [place] [address];  Service: Neurosurgery;  Laterality: Bilateral;  TORONTO III, ASA III, BLOOD TYPE & SCREEN, HEADREST [place], REGULAR , SUPINE. SPECIAL EQUIPMENT MED[employer]ONICS DAIN WILCOX, OWEN REP, PMT    finger abscess removal      TONSILLECTOMY      VAGUS NERVE STIMULATOR INSERTION Left 2019    Procedure: INSERTION, VAGUS NERVE STIMULATOR;  Surgeon: Reuben Gupta MD;  Location: [facility] OR [address] [place] [address];  Service: Neurosurgery;  Laterality: Left;  toronto I, asa 1, regular bed, supine     wisdom teeth extracted- about         Family History     Problem Relation (Age of Onset)    Heart disease Maternal Grandmother, Maternal Grandfather    Hypertension Mother, Maternal Grandmother, Maternal Grandfather         Tobacco Use    Smoking status: Never Smoker    Smokeless tobacco: Never Used   Substance and Sexual Activity    Alcohol use: Yes     Alcohol/week: 10.0 standard drinks     Types: 10 Cans of beer per week     Comment: 12 beers over a months - spread over a month     Drug use: No    Sexual activity: Yes     Partners: Female     Review of Systems  Objective:     Weight: 78.5 kg (173 lb 1 oz)  Body mass index is 22.83 kg/m².  Vital Signs (Most Recent):  Temp: 98.3 °F (36.8 °C) (09/04/20 1101)  Pulse: 78 (09/04/20 1201)  Resp: 14 (09/04/20 1201)  BP: 117/73 (09/04/20 1201)  SpO2: 99 % (09/04/20 1201) Vital Signs (24h Range):  Temp:  [97.6 °F (36.4 °C)-98.4 °F (36.9 °C)] 98.3 °F (36.8 °C)  Pulse:  [58-94] 78  Resp:  [12-22] 14  SpO2:  [95 %-100 %] 99 %  BP: (102-136)/(60-97) 117/73  Arterial Line BP: ()/(64-97) 122/66     Date 09/04/20 0700 - 09/05/20 0659   Shift 6956-2780 6110-8319 6359-0209 24 Hour Total   INTAKE   P.O. 200   200   I.V.(mL/kg) 30(0.4)   30(0.4)   Shift Total(mL/kg) 230(2.9)   230(2.9)   OUTPUT   Urine(mL/kg/hr) 350   350   Shift Total(mL/kg) 350(4.5)   350(4.5)   Weight (kg) 78.5 78.5 78.5 78.5                        Physical Exam:    Constitutional: No distress.     Eyes: Pupils are equal, round, and reactive to light. EOM are normal.     Cardiovascular: Normal rate and regular rhythm.     Musculoskeletal:        Right Upper Extremities: Muscle strength is 5/5.        Left Upper Extremities: Muscle strength is 5/5.       Right Lower Extremities: Muscle strength is 5/5.        Left Lower Extremities: Muscle strength is 5/5.     Neurological:        Cranial nerves: Cranial nerve(s) II, III, IV, V, VI, VII, VIII, IX, X, XI and XII are intact.       Significant Labs:  Recent Labs   Lab 09/03/20  0346 09/04/20  0258 09/04/20  1154   GLU 95 100  --     137  --    K 3.8 3.4* 4.2    106  --    CO2 23 23  --    BUN 14 14  --    CREATININE 0.9 0.9  --    CALCIUM 9.6 9.3  --    MG  2.0 1.9  --      Recent Labs   Lab 09/03/20  0346 09/04/20  0258   WBC 5.48 6.63   HGB 16.1 16.1   HCT 45.9 45.4    240     No results for input(s): LABPT, INR, APTT in the last 48 hours.  Microbiology Results (last 7 days)     ** No results found for the last 168 hours. **        All pertinent labs from the last 24 hours have been reviewed.    Significant Diagnostics:  I have reviewed and interpreted all pertinent imaging results/findings within the past 24 hours.    Assessment/Plan:     * Epilepsy  25 M with intractable epilepsy s/p multiple failed AEDs and VNS placement now s/p sEEG lead placement:    Continues to be stable neurologically, will continue to monitor until procedure on 9/9/2020    Admit to [facility]  q1h neurochecks  sEEG leads in place, abx while in place, continue headwrap  Bed rest at all times  SBP<140  No HOB restrictions  Ok for recumbent bike in bed  Ok for diet  Voiding spontaneously s/p michelle removal  Post operative CTH reviewed - expected post op changes  Monitor for seizure activity - no benzos or sedating meds unless cleared by Epilepsy team  AEDs per Epilepsy  Please page w/exam change  Tentative sEEG bolt removal on 9/9, booked, will obtain consent.     Dispo: continue ICU care        Ino Collado MD  Neurosurgery  [facility]

## 2020-09-04 NOTE — ASSESSMENT & PLAN NOTE
25 M with intractable epilepsy s/p multiple failed AEDs and VNS placement now s/p sEEG lead placement:    Continues to be stable neurologically, will continue to monitor until procedure on 9/9/2020    Admit to [facility]  q1h neurochecks  sEEG leads in place, abx while in place, continue headwrap  Bed rest at all times  SBP<140  No HOB restrictions  Ok for recumbent bike in bed  Ok for diet  Voiding spontaneously s/p michelle removal  Post operative CTH reviewed - expected post op changes  Monitor for seizure activity - no benzos or sedating meds unless cleared by Epilepsy team  AEDs per Epilepsy  Please page w/exam change  Tentative sEEG bolt removal on 9/9, booked, will obtain consent.     Dispo: continue ICU care

## 2020-09-04 NOTE — PROGRESS NOTES
[facility]  Neurology-Epilepsy  Progress Note    Patient Name: Darin Cunha  MRN: [id number]  Admission Date: 8/31/2020  Hospital Length of Stay: 4 days  Code Status: Full Code   Attending Provider: Melani Ortiz MD  Primary Care Physician: Primary Doctor No   Principal Problem:Epilepsy    Subjective:     Hospital Course:   8/31: sEEG placement. Hold Vimpat. Continue other AEDs: Onfi 40 mg BID, Lamictal 200 mg BID, Topamax 50 mg BID.  8/31>9/1: No clinical or electrographic seizures overnight.   9/1>9/2: Four electrographic seizures (3 with clinical correlate) originating from L hippo and entro areas.  9/2>9/3: No electrographic seizures  9/3>9/4: No electrographic seizures    Interval History: No clinical seizures overnight. Onfi decreased 9/3 pm, decrease Lamictal 9/4 pm.    Current Facility-Administered Medications   Medication Dose Route Frequency Provider Last Rate Last Dose    acetaminophen tablet 650 mg  650 mg Oral Q4H PRN Georgie Miinea, NP   650 mg at 09/02/20 1914    citalopram tablet 20 mg  20 mg Oral Daily Georgie Miinea, NP   20 mg at 09/04/20 0804    cloBAZam Tab 10 mg  10 mg Oral BID Abhijeet Do MD   10 mg at 09/04/20 0803    heparin (porcine) injection 5,000 Units  5,000 Units Subcutaneous Q8H Carito Amezquita PA-C   5,000 Units at 09/04/20 0604    hydrALAZINE injection 10 mg  10 mg Intravenous Q4H PRN Georgie Miinea, NP   10 mg at 08/31/20 1601    labetaloL injection 10 mg  10 mg Intravenous Q4H PRN Georgie Miinea, NP        lamoTRIgine tablet 100 mg  100 mg Oral BID Minerva Oconnor PA-C   100 mg at 09/04/20 0804    magnesium oxide tablet 800 mg  800 mg Oral PRN Melissa Flores, NP        magnesium oxide tablet 800 mg  800 mg Oral PRN Melissa Flores, NP        mupirocin 2 % ointment   Nasal BID Katey Lee MD        oxyCODONE immediate release tablet 5 mg  5 mg Oral Q6H PRN Georgie Miinea, NP   5 mg at 09/02/20 1914    potassium chloride packet 40 mEq  40 mEq Oral  PRN Melissa Flores NP   40 mEq at 09/03/20 0609    potassium chloride packet 40 mEq  40 mEq Oral PRN Melissa Flores NP   40 mEq at 09/04/20 0804    potassium chloride packet 40 mEq  40 mEq Oral PRN Melissa Flores NP        potassium, sodium phosphates 280-160-250 mg packet 2 packet  2 packet Oral PRN Melissa Flores NP        potassium, sodium phosphates 280-160-250 mg packet 2 packet  2 packet Oral PRN Melissa Flores NP        potassium, sodium phosphates 280-160-250 mg packet 2 packet  2 packet Oral PRN Melissa Flores NP        promethazine (PHENERGAN) 6.25 mg in dextrose 5 % 50 mL IVPB  6.25 mg Intravenous Q8H PRN Georgie Hoyos  mL/hr at 08/31/20 1730 6.25 mg at 08/31/20 1730    senna-docusate 8.6-50 mg per tablet 1 tablet  1 tablet Oral Daily Georgie Hoyos NP   Stopped at 09/04/20 0804    topiramate tablet 50 mg  50 mg Oral BID Minerva Oconnor PA-C   50 mg at 09/04/20 0803    vancomycin - pharmacy to dose   Intravenous pharmacy to manage frequency Katey Lee MD        vancomycin 1.25 g in dextrose 5% 250 mL IVPB (ready to mix)  1,250 mg Intravenous Q12H Melani Ortiz .7 mL/hr at 09/04/20 0500 1,250 mg at 09/04/20 0500     Continuous Infusions:    Review of Systems   Constitutional: Negative for chills, diaphoresis, fatigue and fever.   HENT: Negative for sore throat and trouble swallowing.    Respiratory: Negative for cough, shortness of breath and wheezing.    Cardiovascular: Negative for chest pain and palpitations.   Gastrointestinal: Negative for abdominal pain, diarrhea, nausea and vomiting.   Neurological: Positive for seizures. Negative for dizziness, syncope, speech difficulty, weakness and headaches.   Psychiatric/Behavioral: Negative for behavioral problems, confusion and sleep disturbance. The patient is not nervous/anxious.      Objective:     Vital Signs (Most Recent):  Temp: 98.3 °F (36.8 °C) (09/04/20 1101)  Pulse: 78 (09/04/20  1201)  Resp: 14 (09/04/20 1201)  BP: 117/73 (09/04/20 1201)  SpO2: 99 % (09/04/20 1201) Vital Signs (24h Range):  Temp:  [97.6 °F (36.4 °C)-98.4 °F (36.9 °C)] 98.3 °F (36.8 °C)  Pulse:  [58-94] 78  Resp:  [12-22] 14  SpO2:  [95 %-100 %] 99 %  BP: (102-136)/(60-97) 117/73  Arterial Line BP: ()/(64-97) 122/66     Weight: 78.5 kg (173 lb 1 oz)  Body mass index is 22.83 kg/m².    Physical Exam  Constitutional:       General: He is not in acute distress.     Appearance: Normal appearance. He is not diaphoretic.   HENT:      Head: Normocephalic.      Comments: sEEG in place  Eyes:      General: No scleral icterus.        Right eye: No discharge.         Left eye: No discharge.      Extraocular Movements: EOM normal.      Conjunctiva/sclera: Conjunctivae normal.      Pupils: Pupils are equal, round, and reactive to light.   Cardiovascular:      Rate and Rhythm: Normal rate.   Pulmonary:      Effort: Pulmonary effort is normal. No respiratory distress.   Abdominal:      General: There is no distension.      Palpations: Abdomen is soft.      Tenderness: There is no abdominal tenderness.   Musculoskeletal: Normal range of motion.         General: No deformity or signs of injury.   Skin:     General: Skin is warm and dry.   Neurological:      General: No focal deficit present.      Mental Status: He is alert and oriented to person, place, and time. Mental status is at baseline.      Coordination: Finger-Nose-Finger Test normal.   Psychiatric:         Mood and Affect: Mood normal.         Speech: Speech normal.         Behavior: Behavior normal.         Thought Content: Thought content normal.         NEUROLOGICAL EXAMINATION:     MENTAL STATUS   Oriented to person, place, and time.   Speech: speech is normal   Level of consciousness: alert    CRANIAL NERVES     CN III, IV, VI   Pupils are equal, round, and reactive to light.  Extraocular motions are normal.     CN VII   Facial expression full, symmetric.     CN VIII    Hearing: intact    CN XI   CN XI normal.     CN XII   CN XII normal.     GAIT AND COORDINATION      Coordination   Finger to nose coordination: normal      Significant Labs: All pertinent lab results from the past 24 hours have been reviewed.    Significant Studies: I have reviewed all pertinent imaging results/findings within the past 24 hours.    Assessment and Plan:     * Epilepsy  25M with a PMHx of intractable epilepsy s/p VNS placement on Onfi 40 mg BID, Lamictal 200 mg BID, Vimpat 200 mg BID, and Topamax 50 mg BID s/p bilateral sEEG placement with Dr. Melani Ortiz on 8/31. Postop CTH stable.    Recommendations:  - Continuous vEEG monitoring  - 4 electrographic seizures from L hippo and entro areas since admit  - Holding home Vimpat starting 8/31  - Onfi decreased to 10 mg BID 9/3  - Decrease Lamictal to 50 mg BID 9/4  - ContinueTopamax 50 mg BID  - Please call epilepsy on call prior to administering benzodiazepines   - If patient has more than one GTC, can consider giving IV Vimpat 100 mgx1  - Neurosurgery following; tentative plan for sEEG removal on Wednesday  - Seizure precautions  - Avoid agents that lower seizure thresold      Plan of care discussed with patient, mother at bedside, and NCC team. Will follow. Please call with questions.    Depression  - Chronic and stable  - Continue home Celexa        VTE Risk Mitigation (From admission, onward)         Ordered     heparin (porcine) injection 5,000 Units  Every 8 hours      09/02/20 1035     Place sequential compression device  Until discontinued      08/31/20 7327                Pilar Shepard PA-C  Neurology-Epilepsy  [facility]  Staff: Dr. Sage

## 2020-09-04 NOTE — ASSESSMENT & PLAN NOTE
25M with a PMHx of intractable epilepsy s/p VNS placement on Onfi 40 mg BID, Lamictal 200 mg BID, Vimpat 200 mg BID, and Topamax 50 mg BID s/p bilateral sEEG placement with Dr. Melani Ortiz on 8/31. Postop CTH stable.    Recommendations:  - Continuous vEEG monitoring  - 4 electrographic seizures from L hippo and entro areas since admit  - Holding home Vimpat starting 8/31  - Onfi decreased to 10 mg BID 9/3  - Continue Lamictal to 100 mg BID, Topamax 50 mg BID  - Please call epilepsy on call prior to administering benzodiazepines   - If patient has more than one GTC, can consider giving IV Vimpat 100 mgx1  - Neurosurgery following; tentative plan for sEEG removal on Wednesday  - Seizure precautions  - Avoid agents that lower seizure thresold      Plan of care discussed with patient, mother at bedside, and NCC team. Will follow. Please call with questions.

## 2020-09-04 NOTE — PROGRESS NOTES
[facility] Care Plan    POC reviewed with Darin Cunha at 0500. Pt verbalized understanding. Questions and concerns addressed. No acute events overnight. No witnessed seizures or seizure like activity during shift. Pt progressing toward goals. Will continue to monitor. See below and flowsheets for full assessment and VS info.       Neuro:  Remington Coma Scale  Best Eye Response: 4-->(E4) spontaneous  Best Motor Response: 6-->(M6) obeys commands  Best Verbal Response: 5-->(V5) oriented  Remington Coma Scale Score: 15  Assessment Qualifiers: no eye obstruction present, patient not sedated/intubated  Pupil PERRLA: yes     24hr Temp:  [97.6 °F (36.4 °C)-98.4 °F (36.9 °C)]     CV:   Rhythm: normal sinus rhythm  BP goals:   SBP < 140  MAP > 65    Resp:   O2 Device (Oxygen Therapy): room air       Plan: N/A    GI/:  STEPHENIE Total Score: 20  Diet/Nutrition Received: regular  Last Bowel Movement: 09/02/20  Voiding Characteristics: voids spontaneously without difficulty    Intake/Output Summary (Last 24 hours) at 9/4/2020 0641  Last data filed at 9/3/2020 2305  Gross per 24 hour   Intake 410 ml   Output 750 ml   Net -340 ml     Unmeasured Output  Urine Occurrence: 1  Stool Occurrence: 1    Labs/Accuchecks:  Recent Labs   Lab 09/04/20  0258   WBC 6.63   RBC 5.13   HGB 16.1   HCT 45.4         Recent Labs   Lab 09/04/20  0258      K 3.4*   CO2 23      BUN 14   CREATININE 0.9   ALKPHOS 100   ALT 9*   AST 16   BILITOT 0.6      Recent Labs   Lab 08/31/20  0531   INR 1.0   APTT 31.4    No results for input(s): CPK, CPKMB, TROPONINI, MB in the last 168 hours.    Electrolytes: Electrolytes replaced  Accuchecks: none    Gtts:      LDA/Wounds:  Lines/Drains/Airways     Arterial Line            Arterial Line 08/31/20 0737 Right Radial 3 days          Peripheral Intravenous Line                 Peripheral IV - Single Lumen 08/31/20 0557 18 G Right Antecubital 4 days         Peripheral IV - Single Lumen 08/31/20 0721 18 G Right  Hand 3 days              Wounds: No  Wound care consulted: No

## 2020-09-04 NOTE — SUBJECTIVE & OBJECTIVE
Review of Systems  Review of Symptoms:  Constitutional: Denies fevers, weight loss, chills, or weakness.  Eyes: Denies changes in vision.  ENT: Denies dysphagia, nasal discharge, ear pain or discharge.  Cardiovascular: Denies chest pain, palpitations, orthopnea, or claudication.  Respiratory: Denies shortness of breath, cough, hemoptysis, or wheezing.  GI: Denies nausea/vomitting, hematochezia, melena, abd pain, or changes in appetite.  : Denies dysuria, incontinence, or hematuria.  Musculoskeletal: Denies joint pain or myalgias.  Skin/breast: Denies rashes, lumps, lesions, or discharge.  Neurologic: Denies headache, dizziness, vertigo, or paresthesias.  Psychiatric: Denies changes in mood or hallucinations.  Endocrine: Denies polyuria, polydipsia, heat/cold intolerance.  Hematologic/Lymph: Denies lymphadenopathy, easy bruising or easy bleeding.  Allergic/Immunologic: Denies rash, rhinitis.   Objective:     Vitals:  Temp: 98.3 °F (36.8 °C)  Pulse: 80  Rhythm: normal sinus rhythm  BP: 117/73  MAP (mmHg): 89  Resp: 12  SpO2: 98 %  O2 Device (Oxygen Therapy): room air    Temp  Min: 97.6 °F (36.4 °C)  Max: 98.4 °F (36.9 °C)  Pulse  Min: 58  Max: 94  BP  Min: 102/60  Max: 136/89  MAP (mmHg)  Min: 75  Max: 108  Resp  Min: 12  Max: 22  SpO2  Min: 95 %  Max: 100 %    09/03 0701 - 09/04 0700  In: 660 [P.O.:100; I.V.:60]  Out: 750 [Urine:750]   Unmeasured Output  Urine Occurrence: 1  Stool Occurrence: 0       Physical Exam  GA: comfortable, no acute distress.   HEENT: No scleral icterus or JVD.   Pulmonary: Clear to auscultation A/L.   Cardiac: RRR S1 & S2 w/o rubs/murmurs/gallops.   Abdominal: Bowel sounds present x 4. No appreciable hepatosplenomegaly.  Skin: No jaundice, rashes, or visible lesions.  Neuro:  --GCS: E4 V5M6  --Mental Status:  Awake, oriented X4, follows commands, fluent speech  --CN II-XII grossly intact.   --Pupils 4mm, PERRL.   --Corneal reflex, gag, cough intact.  --ELIZABETH spont and against  gravity    Medications:  Continuous Scheduledcitalopram, 20 mg, Daily  cloBAZam, 10 mg, BID  heparin (porcine), 5,000 Units, Q8H  lamoTRIgine, 100 mg, BID  mupirocin, , BID  senna-docusate 8.6-50 mg, 1 tablet, Daily  topiramate, 50 mg, BID  vancomycin (VANCOCIN) IVPB, 1,250 mg, Q12H    PRNacetaminophen, 650 mg, Q4H PRN  hydrALAZINE, 10 mg, Q4H PRN  labetaloL, 10 mg, Q4H PRN  magnesium oxide, 800 mg, PRN  magnesium oxide, 800 mg, PRN  oxyCODONE, 5 mg, Q6H PRN  potassium chloride, 40 mEq, PRN  potassium chloride, 40 mEq, PRN  potassium chloride, 40 mEq, PRN  potassium, sodium phosphates, 2 packet, PRN  potassium, sodium phosphates, 2 packet, PRN  potassium, sodium phosphates, 2 packet, PRN  promethazine (PHENERGAN) IVPB, 6.25 mg, Q8H PRN  vancomycin - pharmacy to dose, , pharmacy to manage frequency      Today I personally reviewed pertinent medications, lines/drains/airways, imaging, cardiology results, laboratory results,     Diet  Diet Adult Regular (IDDSI Level 7)

## 2020-09-04 NOTE — PROGRESS NOTES
[facility]  Neurocritical Care  Progress Note    Admit Date: 8/31/2020  Service Date: 09/04/2020  Length of Stay: 4    Subjective:     Chief Complaint: Epilepsy    History of Present Illness: The patient is a 25 year old M with PMHx of depression, seizures, epilepsy admitted to [facility] s/p placement of sEED electrodes bilateral. Per chart review he has failed at least 4 AEDs at therapeutic dosages. Triggers include being tired and being stressed. He is not working (on disability) but finished his GED. He lives next door to grandmother and nearby to his mother. His major concern is memory loss from ongoing seizures. NSGY and epilepsy following. Patient admitted to [facility] for close monitoring and higher level of care.     Hospital Course: 8/31/2020: patient admitted to [facility] s/p sEEG electrodes placement, NSGY and epilepsy following, SBP goal < 140, perepilepsy restarted all AEDs except for Vimpat  9/1/2020: 1 episode of seizure, Vimpat  On hold, Onfi and lamictal decreased, continue topamax  9/2/2020: No seizures this AM, epilepsy managing AEDs, started SQH  9/3/2020: No electrographic seizures in AM, Per epilepsy: If no seizures throughout day, plan to decrease Onfi to 10 mg BID (Dr. Abhijeet Do to change tonight if needed)  9/4/2020: d/c A-line        Review of Systems  Review of Symptoms:  Constitutional: Denies fevers, weight loss, chills, or weakness.  Eyes: Denies changes in vision.  ENT: Denies dysphagia, nasal discharge, ear pain or discharge.  Cardiovascular: Denies chest pain, palpitations, orthopnea, or claudication.  Respiratory: Denies shortness of breath, cough, hemoptysis, or wheezing.  GI: Denies nausea/vomitting, hematochezia, melena, abd pain, or changes in appetite.  : Denies dysuria, incontinence, or hematuria.  Musculoskeletal: Denies joint pain or myalgias.  Skin/breast: Denies rashes, lumps, lesions, or discharge.  Neurologic: Denies headache, dizziness, vertigo, or  paresthesias.  Psychiatric: Denies changes in mood or hallucinations.  Endocrine: Denies polyuria, polydipsia, heat/cold intolerance.  Hematologic/Lymph: Denies lymphadenopathy, easy bruising or easy bleeding.  Allergic/Immunologic: Denies rash, rhinitis.   Objective:     Vitals:  Temp: 98.3 °F (36.8 °C)  Pulse: 80  Rhythm: normal sinus rhythm  BP: 117/73  MAP (mmHg): 89  Resp: 12  SpO2: 98 %  O2 Device (Oxygen Therapy): room air    Temp  Min: 97.6 °F (36.4 °C)  Max: 98.4 °F (36.9 °C)  Pulse  Min: 58  Max: 94  BP  Min: 102/60  Max: 136/89  MAP (mmHg)  Min: 75  Max: 108  Resp  Min: 12  Max: 22  SpO2  Min: 95 %  Max: 100 %    09/03 0701 - 09/04 0700  In: 660 [P.O.:100; I.V.:60]  Out: 750 [Urine:750]   Unmeasured Output  Urine Occurrence: 1  Stool Occurrence: 0       Physical Exam  GA: comfortable, no acute distress.   HEENT: No scleral icterus or JVD.   Pulmonary: Clear to auscultation A/L.   Cardiac: RRR S1 & S2 w/o rubs/murmurs/gallops.   Abdominal: Bowel sounds present x 4. No appreciable hepatosplenomegaly.  Skin: No jaundice, rashes, or visible lesions.  Neuro:  --GCS: E4 V5M6  --Mental Status:  Awake, oriented X4, follows commands, fluent speech  --CN II-XII grossly intact.   --Pupils 4mm, PERRL.   --Corneal reflex, gag, cough intact.  --ELIZABETH spont and against gravity    Medications:  Continuous Scheduledcitalopram, 20 mg, Daily  cloBAZam, 10 mg, BID  heparin (porcine), 5,000 Units, Q8H  lamoTRIgine, 100 mg, BID  mupirocin, , BID  senna-docusate 8.6-50 mg, 1 tablet, Daily  topiramate, 50 mg, BID  vancomycin (VANCOCIN) IVPB, 1,250 mg, Q12H    PRNacetaminophen, 650 mg, Q4H PRN  hydrALAZINE, 10 mg, Q4H PRN  labetaloL, 10 mg, Q4H PRN  magnesium oxide, 800 mg, PRN  magnesium oxide, 800 mg, PRN  oxyCODONE, 5 mg, Q6H PRN  potassium chloride, 40 mEq, PRN  potassium chloride, 40 mEq, PRN  potassium chloride, 40 mEq, PRN  potassium, sodium phosphates, 2 packet, PRN  potassium, sodium phosphates, 2 packet, PRN  potassium,  sodium phosphates, 2 packet, PRN  promethazine (PHENERGAN) IVPB, 6.25 mg, Q8H PRN  vancomycin - pharmacy to dose, , pharmacy to manage frequency      Today I personally reviewed pertinent medications, lines/drains/airways, imaging, cardiology results, laboratory results,     Diet  Diet Adult Regular (IDDSI Level 7)        Assessment/Plan:     Neuro  * Epilepsy  -s/p sEEG placement  -cefazolin 2 q8 prophylaxis  -SBP goal <140  -NSGY and epilepsy following  -seizure precautions  -continue: Topiramate 50 mg BID, lamotrigine 100mg BID, clobazam 20 mg BID, hold home med Vimpat  -neuro checks q 1 hr  9/2/2020: 1 seizure this morning associated with lip smacking  -per epilepsy: Held home Vimpat 8/31  - Continue Topamax 50 mg BID  9/3/2020: no seizures this AM, continue to hold vimpat, continue SQH for DVT ppx  - Per epilepsy: If no seizures throughout day, plan to decrease Onfi to 10 mg BID (Dr. Abhijeet Do to change tonight if needed).   9/4/2020: d/c a-line and epilepsy decreased AED today        Psychiatric  Depression  - continue home med celexa    Renal/  Hypokalemia  - replace PRN  - follow CMP          The patient is being Prophylaxed for:  Venous Thromboembolism with: Chemical  Stress Ulcer with: None  Ventilator Pneumonia with: not applicable    Activity Orders          Diet Adult Regular (IDDSI Level 7): Regular starting at 09/01 0941        Full Code    Georgie Hoyos NP  Neurocritical Care  [facility]

## 2020-09-04 NOTE — SUBJECTIVE & OBJECTIVE
Interval History: No clinical seizures overnight. Onfi decreased 9/3 pm, continue current regimen.    Current Facility-Administered Medications   Medication Dose Route Frequency Provider Last Rate Last Dose    acetaminophen tablet 650 mg  650 mg Oral Q4H PRN Georgie Miinea, NP   650 mg at 09/02/20 1914    citalopram tablet 20 mg  20 mg Oral Daily Georgie Miinea, NP   20 mg at 09/04/20 0804    cloBAZam Tab 10 mg  10 mg Oral BID Abhijeet Do MD   10 mg at 09/04/20 0803    heparin (porcine) injection 5,000 Units  5,000 Units Subcutaneous Q8H Carito Amezquita PA-C   5,000 Units at 09/04/20 0604    hydrALAZINE injection 10 mg  10 mg Intravenous Q4H PRN Georgie Miinea, NP   10 mg at 08/31/20 1601    labetaloL injection 10 mg  10 mg Intravenous Q4H PRN Georgie Mithomas, NP        lamoTRIgine tablet 100 mg  100 mg Oral BID Minerva Oconnor PA-C   100 mg at 09/04/20 0804    magnesium oxide tablet 800 mg  800 mg Oral PRN Melissa Flores NP        magnesium oxide tablet 800 mg  800 mg Oral PRN Melissa Flores NP        mupirocin 2 % ointment   Nasal BID Katey Lee MD        oxyCODONE immediate release tablet 5 mg  5 mg Oral Q6H PRN Georgiefroilan Hoyos, NP   5 mg at 09/02/20 1914    potassium chloride packet 40 mEq  40 mEq Oral PRN Melissa Flores NP   40 mEq at 09/03/20 0609    potassium chloride packet 40 mEq  40 mEq Oral PRN Melissa Flores NP   40 mEq at 09/04/20 0804    potassium chloride packet 40 mEq  40 mEq Oral PRN Melissa Flores NP        potassium, sodium phosphates 280-160-250 mg packet 2 packet  2 packet Oral PRN Melissa Flores NP        potassium, sodium phosphates 280-160-250 mg packet 2 packet  2 packet Oral PRN Melissa Flores NP        potassium, sodium phosphates 280-160-250 mg packet 2 packet  2 packet Oral PRN Melissa Flores NP        promethazine (PHENERGAN) 6.25 mg in dextrose 5 % 50 mL IVPB  6.25 mg Intravenous Q8H PRN Georgie Hoyos  mL/hr at  08/31/20 1730 6.25 mg at 08/31/20 1730    senna-docusate 8.6-50 mg per tablet 1 tablet  1 tablet Oral Daily Georgie Hoyos NP   Stopped at 09/04/20 0804    topiramate tablet 50 mg  50 mg Oral BID Minerva Oconnor PA-C   50 mg at 09/04/20 0803    vancomycin - pharmacy to dose   Intravenous pharmacy to manage frequency Katey Lee MD        vancomycin 1.25 g in dextrose 5% 250 mL IVPB (ready to mix)  1,250 mg Intravenous Q12H Melani Ortiz .7 mL/hr at 09/04/20 0500 1,250 mg at 09/04/20 0500     Continuous Infusions:    Review of Systems   Constitutional: Negative for chills, diaphoresis, fatigue and fever.   HENT: Negative for sore throat and trouble swallowing.    Respiratory: Negative for cough, shortness of breath and wheezing.    Cardiovascular: Negative for chest pain and palpitations.   Gastrointestinal: Negative for abdominal pain, diarrhea, nausea and vomiting.   Neurological: Positive for seizures. Negative for dizziness, syncope, speech difficulty, weakness and headaches.   Psychiatric/Behavioral: Negative for behavioral problems, confusion and sleep disturbance. The patient is not nervous/anxious.      Objective:     Vital Signs (Most Recent):  Temp: 98.3 °F (36.8 °C) (09/04/20 1101)  Pulse: 78 (09/04/20 1201)  Resp: 14 (09/04/20 1201)  BP: 117/73 (09/04/20 1201)  SpO2: 99 % (09/04/20 1201) Vital Signs (24h Range):  Temp:  [97.6 °F (36.4 °C)-98.4 °F (36.9 °C)] 98.3 °F (36.8 °C)  Pulse:  [58-94] 78  Resp:  [12-22] 14  SpO2:  [95 %-100 %] 99 %  BP: (102-136)/(60-97) 117/73  Arterial Line BP: ()/(64-97) 122/66     Weight: 78.5 kg (173 lb 1 oz)  Body mass index is 22.83 kg/m².    Physical Exam  Constitutional:       General: He is not in acute distress.     Appearance: Normal appearance. He is not diaphoretic.   HENT:      Head: Normocephalic.      Comments: sEEG in place  Eyes:      General: No scleral icterus.        Right eye: No discharge.         Left eye: No discharge.      Extraocular  Movements: EOM normal.      Conjunctiva/sclera: Conjunctivae normal.      Pupils: Pupils are equal, round, and reactive to light.   Cardiovascular:      Rate and Rhythm: Normal rate.   Pulmonary:      Effort: Pulmonary effort is normal. No respiratory distress.   Abdominal:      General: There is no distension.      Palpations: Abdomen is soft.      Tenderness: There is no abdominal tenderness.   Musculoskeletal: Normal range of motion.         General: No deformity or signs of injury.   Skin:     General: Skin is warm and dry.   Neurological:      General: No focal deficit present.      Mental Status: He is alert and oriented to person, place, and time. Mental status is at baseline.      Coordination: Finger-Nose-Finger Test normal.   Psychiatric:         Mood and Affect: Mood normal.         Speech: Speech normal.         Behavior: Behavior normal.         Thought Content: Thought content normal.         NEUROLOGICAL EXAMINATION:     MENTAL STATUS   Oriented to person, place, and time.   Speech: speech is normal   Level of consciousness: alert    CRANIAL NERVES     CN III, IV, VI   Pupils are equal, round, and reactive to light.  Extraocular motions are normal.     CN VII   Facial expression full, symmetric.     CN VIII   Hearing: intact    CN XI   CN XI normal.     CN XII   CN XII normal.     GAIT AND COORDINATION      Coordination   Finger to nose coordination: normal      Significant Labs: All pertinent lab results from the past 24 hours have been reviewed.    Significant Studies: I have reviewed all pertinent imaging results/findings within the past 24 hours.

## 2020-09-04 NOTE — ASSESSMENT & PLAN NOTE
-s/p sEEG placement  -cefazolin 2 q8 prophylaxis  -SBP goal <140  -NSGY and epilepsy following  -seizure precautions  -continue: Topiramate 50 mg BID, lamotrigine 100mg BID, clobazam 20 mg BID, hold home med Vimpat  -neuro checks q 1 hr  9/2/2020: 1 seizure this morning associated with lip smacking  -per epilepsy: Held home Vimpat 8/31  - Continue Topamax 50 mg BID  9/3/2020: no seizures this AM, continue to hold vimpat, continue SQH for DVT ppx  - Per epilepsy: If no seizures throughout day, plan to decrease Onfi to 10 mg BID (Dr. Abhijeet Do to change tonight if needed).   9/4/2020: d/c a-line and epilepsy decreased AED today

## 2020-09-04 NOTE — PLAN OF CARE
[facility] Care Plan    POC reviewed with Darin Cunha at 1400. Pt verbalized understanding. Questions and concerns addressed. No clinical seizures noted today. AEDs managed per Epilepsy. SBP <140 maintained today. A-line d/c'ed. Pt voiding via urinal and passing stool via bedpan. Afebrile. No acute events today. Pt progressing toward goals. Will continue to monitor. See below and flowsheets for full assessment and VS info.       Neuro:  [place] Coma Scale  Best Eye Response: 4-->(E4) spontaneous  Best Motor Response: 6-->(M6) obeys commands  Best Verbal Response: 5-->(V5) oriented  [place]ington Coma Scale Score: 15  Assessment Qualifiers: no eye obstruction present, patient not sedated/intubated  Pupil PERRLA: yes     24 hr Temp:  [97.6 °F (36.4 °C)-98.4 °F (36.9 °C)]     CV:   Rhythm: normal sinus rhythm  BP goals:   SBP < 140  MAP > 65    Resp:   O2 Device (Oxygen Therapy): room air       Plan: n/a    GI/:  STEPHENIE Total Score: 20  Diet/Nutrition Received: regular  Last Bowel Movement: 09/04/20  Voiding Characteristics: voids spontaneously without difficulty    Intake/Output Summary (Last 24 hours) at 9/4/2020 1553  Last data filed at 9/4/2020 1501  Gross per 24 hour   Intake 885 ml   Output 1150 ml   Net -265 ml     Unmeasured Output  Urine Occurrence: 1  Stool Occurrence: 1    Labs/Accuchecks:  Recent Labs   Lab 09/04/20  0258   WBC 6.63   RBC 5.13   HGB 16.1   HCT 45.4         Recent Labs   Lab 09/04/20  0258 09/04/20  1154     --    K 3.4* 4.2   CO2 23  --      --    BUN 14  --    CREATININE 0.9  --    ALKPHOS 100  --    ALT 9*  --    AST 16  --    BILITOT 0.6  --       Recent Labs   Lab 08/31/20  0531   INR 1.0   APTT 31.4    No results for input(s): CPK, CPKMB, TROPONINI, MB in the last 168 hours.    Electrolytes: Electrolytes replaced  Accuchecks: none    Gtts:       LDA/Wounds:  Lines/Drains/Airways       Peripheral Intravenous Line                   Peripheral IV - Single Lumen 08/31/20 0721 18 G  Right Hand 4 days         Peripheral IV - Single Lumen 09/04/20 1101 18 G Left Antecubital less than 1 day                  Wounds: No  Wound care consulted: No

## 2020-09-05 LAB
ALBUMIN SERPL BCP-MCNC: 4.3 G/DL (ref 3.5–5.2)
ALP SERPL-CCNC: 100 U/L (ref 55–135)
ALT SERPL W/O P-5'-P-CCNC: 14 U/L (ref 10–44)
ANION GAP SERPL CALC-SCNC: 9 MMOL/L (ref 8–16)
AST SERPL-CCNC: 16 U/L (ref 10–40)
BASOPHILS # BLD AUTO: 0.03 K/UL (ref 0–0.2)
BASOPHILS NFR BLD: 0.5 % (ref 0–1.9)
BILIRUB SERPL-MCNC: 0.8 MG/DL (ref 0.1–1)
BUN SERPL-MCNC: 13 MG/DL (ref 6–20)
CALCIUM SERPL-MCNC: 9.6 MG/DL (ref 8.7–10.5)
CHLORIDE SERPL-SCNC: 105 MMOL/L (ref 95–110)
CO2 SERPL-SCNC: 22 MMOL/L (ref 23–29)
CREAT SERPL-MCNC: 0.9 MG/DL (ref 0.5–1.4)
DIFFERENTIAL METHOD: ABNORMAL
EOSINOPHIL # BLD AUTO: 0.1 K/UL (ref 0–0.5)
EOSINOPHIL NFR BLD: 1 % (ref 0–8)
ERYTHROCYTE [DISTWIDTH] IN BLOOD BY AUTOMATED COUNT: 11.6 % (ref 11.5–14.5)
EST. GFR  (AFRICAN AMERICAN): >60 ML/MIN/1.73 M^2
EST. GFR  (NON AFRICAN AMERICAN): >60 ML/MIN/1.73 M^2
GLUCOSE SERPL-MCNC: 98 MG/DL (ref 70–110)
HCT VFR BLD AUTO: 45.2 % (ref 40–54)
HGB BLD-MCNC: 15.9 G/DL (ref 14–18)
IMM GRANULOCYTES # BLD AUTO: 0.02 K/UL (ref 0–0.04)
IMM GRANULOCYTES NFR BLD AUTO: 0.3 % (ref 0–0.5)
LYMPHOCYTES # BLD AUTO: 1.4 K/UL (ref 1–4.8)
LYMPHOCYTES NFR BLD: 22.4 % (ref 18–48)
MAGNESIUM SERPL-MCNC: 1.9 MG/DL (ref 1.6–2.6)
MCH RBC QN AUTO: 31.5 PG (ref 27–31)
MCHC RBC AUTO-ENTMCNC: 35.2 G/DL (ref 32–36)
MCV RBC AUTO: 90 FL (ref 82–98)
MONOCYTES # BLD AUTO: 0.6 K/UL (ref 0.3–1)
MONOCYTES NFR BLD: 10.6 % (ref 4–15)
NEUTROPHILS # BLD AUTO: 3.9 K/UL (ref 1.8–7.7)
NEUTROPHILS NFR BLD: 65.2 % (ref 38–73)
NRBC BLD-RTO: 0 /100 WBC
PHOSPHATE SERPL-MCNC: 3.6 MG/DL (ref 2.7–4.5)
PLATELET # BLD AUTO: 237 K/UL (ref 150–350)
PMV BLD AUTO: 9.6 FL (ref 9.2–12.9)
POTASSIUM SERPL-SCNC: 3.5 MMOL/L (ref 3.5–5.1)
PROT SERPL-MCNC: 7 G/DL (ref 6–8.4)
RBC # BLD AUTO: 5.05 M/UL (ref 4.6–6.2)
SODIUM SERPL-SCNC: 136 MMOL/L (ref 136–145)
VANCOMYCIN TROUGH SERPL-MCNC: 8.5 UG/ML (ref 10–22)
WBC # BLD AUTO: 6.04 K/UL (ref 3.9–12.7)

## 2020-09-05 PROCEDURE — 95720 PR EEG, W/VIDEO, CONT RECORD, I&R, >12<26 HRS: ICD-10-PCS | Mod: ,,, | Performed by: PSYCHIATRY & NEUROLOGY

## 2020-09-05 PROCEDURE — 20000000 HC ICU ROOM

## 2020-09-05 PROCEDURE — 84100 ASSAY OF PHOSPHORUS: CPT

## 2020-09-05 PROCEDURE — 25000003 PHARM REV CODE 250: Performed by: PHYSICIAN ASSISTANT

## 2020-09-05 PROCEDURE — 63600175 PHARM REV CODE 636 W HCPCS: Performed by: NEUROLOGICAL SURGERY

## 2020-09-05 PROCEDURE — 25000003 PHARM REV CODE 250: Performed by: NURSE PRACTITIONER

## 2020-09-05 PROCEDURE — 85025 COMPLETE CBC W/AUTO DIFF WBC: CPT

## 2020-09-05 PROCEDURE — 80202 ASSAY OF VANCOMYCIN: CPT

## 2020-09-05 PROCEDURE — 95714 VEEG EA 12-26 HR UNMNTR: CPT

## 2020-09-05 PROCEDURE — 25000003 PHARM REV CODE 250: Performed by: PSYCHIATRY & NEUROLOGY

## 2020-09-05 PROCEDURE — 25000003 PHARM REV CODE 250: Performed by: NEUROLOGICAL SURGERY

## 2020-09-05 PROCEDURE — 99233 SBSQ HOSP IP/OBS HIGH 50: CPT | Mod: ,,, | Performed by: NURSE PRACTITIONER

## 2020-09-05 PROCEDURE — 83735 ASSAY OF MAGNESIUM: CPT

## 2020-09-05 PROCEDURE — 95720 EEG PHY/QHP EA INCR W/VEEG: CPT | Mod: ,,, | Performed by: PSYCHIATRY & NEUROLOGY

## 2020-09-05 PROCEDURE — 63600175 PHARM REV CODE 636 W HCPCS: Performed by: PHYSICIAN ASSISTANT

## 2020-09-05 PROCEDURE — 80053 COMPREHEN METABOLIC PANEL: CPT

## 2020-09-05 PROCEDURE — 99233 PR SUBSEQUENT HOSPITAL CARE,LEVL III: ICD-10-PCS | Mod: ,,, | Performed by: NURSE PRACTITIONER

## 2020-09-05 RX ADMIN — CITALOPRAM HYDROBROMIDE 20 MG: 10 TABLET ORAL at 08:09

## 2020-09-05 RX ADMIN — MUPIROCIN: 20 OINTMENT TOPICAL at 09:09

## 2020-09-05 RX ADMIN — TOPIRAMATE 50 MG: 25 TABLET, FILM COATED ORAL at 09:09

## 2020-09-05 RX ADMIN — HEPARIN SODIUM 5000 UNITS: 5000 INJECTION INTRAVENOUS; SUBCUTANEOUS at 10:09

## 2020-09-05 RX ADMIN — CLOBAZAM 10 MG: 10 TABLET ORAL at 08:09

## 2020-09-05 RX ADMIN — HEPARIN SODIUM 5000 UNITS: 5000 INJECTION INTRAVENOUS; SUBCUTANEOUS at 02:09

## 2020-09-05 RX ADMIN — HEPARIN SODIUM 5000 UNITS: 5000 INJECTION INTRAVENOUS; SUBCUTANEOUS at 05:09

## 2020-09-05 RX ADMIN — VANCOMYCIN HYDROCHLORIDE 1250 MG: 1.25 INJECTION, POWDER, LYOPHILIZED, FOR SOLUTION INTRAVENOUS at 03:09

## 2020-09-05 RX ADMIN — DOCUSATE SODIUM 50MG AND SENNOSIDES 8.6MG 1 TABLET: 8.6; 5 TABLET, FILM COATED ORAL at 08:09

## 2020-09-05 RX ADMIN — LAMOTRIGINE 50 MG: 25 TABLET ORAL at 08:09

## 2020-09-05 RX ADMIN — POTASSIUM CHLORIDE 40 MEQ: 1.5 POWDER, FOR SOLUTION ORAL at 05:09

## 2020-09-05 RX ADMIN — VANCOMYCIN HYDROCHLORIDE 1500 MG: 1.5 INJECTION, POWDER, LYOPHILIZED, FOR SOLUTION INTRAVENOUS at 12:09

## 2020-09-05 RX ADMIN — TOPIRAMATE 50 MG: 25 TABLET, FILM COATED ORAL at 08:09

## 2020-09-05 NOTE — PLAN OF CARE
POC reviewed with pt at 1500. Pt verbalized understanding. Questions and concerns addressed. No acute events today. Pt progressing toward goals. Will continue to monitor. See flowsheets for full assessment and VS info.     No seizure activity noted this shift.    Neuro- Alert and oriented x4, ELIZABETH spontaneously, pupils 4-5, brisk  Cardiac- SBP<140 without interventions, NSR  Resp- Room air, no issues with sats  GI/- voids per urinal, one bowel movement this morning. Regular diet, tolerating well.   Skin- headwrap in place for electrodes. No other skin issues

## 2020-09-05 NOTE — PROCEDURES
Stereo EEG Depth Electrode Recording  DATE OF SERVICE: 09/04/2020  EEG NUMBER: [id number]  REQUESTED BY: Dr Alix Ortiz  LOCATION OF SERVICE: [id number]     METHODOLOGY              Depth electrodes consisted of thin wires with electrical contacts it fixed distances along the wire.  These are then implanted using a stereotactic procedure targeting cortical and subcortical structures.  The up to 256 electrode contact can be recorded simultaneously with this procedure.  Recording band pass was 0.1  in the low past filters setting could be set at the 512, 1024, or 2048.  Digital video recording of the patient is simultaneously recorded with the EEG.  The patient is instructed report clinical symptoms which may occur during the recording session.  EEG and video recording is stored and archived in digital format. Activation procedures which include photic stimulation, hyperventilation and instructing patients to perform simple task are done in selected patients.   Compresses spectral analysis (CSA) is also performed on the activity recorded from each individual channel.  This is displayed as a power display of frequencies from 0 to 30 Hz over time.   The CSA analysis is done and displayed continuously.  This is reviewed for asymmetries in power between homologous areas of the scalp and for presence of changes in power which canbe seen when seizures occur.  Sections of suspected abnormalities on the CSA is then compared with the original EEG recording.          Implant Date MRN Name Last First                      8/31/2020 [id number]  Alphonse Webster                            Contacts                   Depth Elect Name SN  # contacts Color 1 Color 2 1 2 3 4 5 6 7 8 9 10 11 12 13 14 15 16   1 L Amyg [id number]  14 Yellow Green 1 2 3 4 5 6 7 8 9 10 11 12 13 14     2 L Hippo 21549  14 Black Blue 15 16 17 18 19 20 21 22 23 24 25 26 27 28     3 L Entor 47508  14 Red Blue 29 30 31 32 33 34 35 36 37 38 39 40 41 42     4 L AntCi 85341  14  Red Green 43 44 45 46 47 48 49 50 51 52 53 54 55 56     5 L OrbFr 52718  16 Blue Red 57 58 59 60 61 62 63 64 65 66 67 68 69 70 71 72   6 L PreFr 47599  16 Yellow Green 73 74 75 76 77 78 79 80 81 82 83 84 85 86 87 88   7 L AntLn 98290  8 Black Green 89 90 91 92 93 94 95 96           8 L PoIns 87846  8 Yellow Black 97 98 99 100 101 102 103 104           9 L TeOcc 48625  8 Black Red 105 106 107 108 109 110 111 112           10 L Pariet 45578  8 Black Red 113 114 115 116 117 118 119 120           11 R Amyg 50835  12 Green Red 121 122 123 124 125 126 127 128 129 130 131 132       12 R Hippo 31824  12 Green Blue 133 134 135 136 137 138 139 140 141 142 143 144       13 R Asuin 23861  8 Blue Red 145 146 147 148 149 150 151 152           14 R Acing 80427  16 Yellow Blue 153 154 155 156 157 158 159 160 161 162 163 164 165 166 167 168       Electrode integrety   High amplitude artifact present from the following contacts               Depth electrode          Contact #                       L Pariet  4              L Entor   12, 13, 14  The L Pariet  3, 4   R Amygd  2, 3    11, 12  Appropriate recording obtained from the remaining electrode contacts.     RECORDING TIMES  Start on 09/03/2020t 7:00 p.m.  Stop on 09/04/2020t 7:00 a.m.  A total of 24 hrs of EEG monitoring was obtained    Interictal Activity    Seizures Recorded   Date  Start  End  Sz 1 2020/09/01 08:34:43 08:35:28    Sz 2 2020/09/01 13:18:13 13:18:55    Sz 3  2020/09/01  16:39:13   start L hippo 1 2   16:39:13   sz spread Amyg Entor Prefr   16:39:38   Patient Event   16:39:43   nurse telling him a code word, Purple elephant   16:39:48  Sz 3 electrographic stop   16:40:14  pt not following commands   16:40:37  pt able to follow commands    Sz 4 2020/09/01    21:05:53 start Emtpr > Hippo    very focal   21:06:17 Stop      EEG FINDINGS  Ictal Recording   Seizure #         Date                 Start                 End  #1                     "2020/09/01 08:34:43 08:35:28   Build up started in L Hippo contacts 1, 2  >> 3, 4      then spread to L Hippo contacts 11, 12, 13 L Amygd contacts 11, 12, 13      then spread to L Entor contacts 11, 12, 13     Seizure Description   Seizure 1   08:34:43  Sz 1 start  08:34:58  Oral facial mvt  08:35:01  L forearm and leg mvt  08:35:16  L hand auttomatism  08:35:23  Oral facial mvt continues  08:35:28  Sz end    Seizure 2  d1 13:18:13  Sz 2 start  d1 13:18:29  Head turn slightly to L  d1 13:18:33  Oral facial mvt  d1 13:18:41  Moving some in bed  d1 13:18:45  oral facial mvt continues  d1 13:18:55  Sz 2 stop  d1 13:18:57  Returns to looking at iPhone    Seizure 3  16:39:13  Lying back in bed - no mvt  16:39:20  Raises hed and slightly flexes knees  16:39:26  no response to nurse "are U OK?  16:39:30  Oral facial mvts  16:39:38  Patient Event  16:39:43  nurse telling him a code word, Purple elephant  16:39:48  Sz 3 stop  16:39:52  limb mvts stop  16:40:02  not responding to nurse  16:40:14  pt not following commands  16:40:37  pt able to follow commands    Seizure 4     Nothing clinical     Impression  Four seizure originating from L Hippo and Entro areas   No seizures recorded 2020/09/02>03  No seizures recorded 2020/09/03>04  No seizures recorded 2020/09/04>05      BEBETO Marina MD  Professor Emeritus  Epilepsy Division    "

## 2020-09-05 NOTE — PROGRESS NOTES
Pharmacokinetic Assessment Follow Up: IV Vancomycin    Vancomycin Regimen Assessment & Plan:  - Vancomycin trough drawn this morning resulted as 8.5 ug/mL, sub therapeutic for goal trough 10-20 ug/mL.  - Increase scheduled vancomycin from 1250 mg IV q12h to 1500 mg IV q12h.  - Draw next trough on 9/7 @1200.    Drug levels (last 3 results):  Recent Labs   Lab Result Units 09/05/20  0317   Vancomycin-Trough ug/mL 8.5*       Pharmacy will continue to follow and monitor vancomycin.    Please contact pharmacy at extension [phone] for questions regarding this assessment.    Thank you for the consult,   Magen Barkley       Patient brief summary:  Darin Cunha is a 25 y.o. male initiated on antimicrobial therapy with IV Vancomycin for treatment of skin & soft tissue infection    Drug Allergies:   Review of patient's allergies indicates:   Allergen Reactions    Zofran [ondansetron hcl (pf)] Hives and Rash       Actual Body Weight:   78.5 kg    Renal Function:   Estimated Creatinine Clearance: 136.9 mL/min (based on SCr of 0.9 mg/dL).,     Dialysis Method (if applicable):  N/A

## 2020-09-05 NOTE — PROGRESS NOTES
[facility]  Neurocritical Care  Progress Note    Admit Date: 8/31/2020  Service Date: 09/05/2020  Length of Stay: 5    Subjective:     Chief Complaint: Epilepsy    History of Present Illness: The patient is a 25 year old M with PMHx of depression, seizures, epilepsy admitted to [facility] s/p placement of sEED electrodes bilateral. Per chart review he has failed at least 4 AEDs at therapeutic dosages. Triggers include being tired and being stressed. He is not working (on disability) but finished his GED. He lives next door to grandmother and nearby to his mother. His major concern is memory loss from ongoing seizures. NSGY and epilepsy following. Patient admitted to [facility] for close monitoring and higher level of care.     Hospital Course: 8/31/2020: patient admitted to [facility] s/p sEEG electrodes placement, NSGY and epilepsy following, SBP goal < 140, perepilepsy restarted all AEDs except for Vimpat  9/1/2020: 1 episode of seizure, Vimpat  On hold, Onfi and lamictal decreased, continue topamax  9/2/2020: No seizures this AM, epilepsy managing AEDs, started SQH  9/3/2020: No electrographic seizures in AM, Per epilepsy: If no seizures throughout day, plan to decrease Onfi to 10 mg BID (Dr. Abhijeet Do to change tonight if needed)  9/4/2020: d/c A-line  9/5/2020: MERLENE        Review of Systems  Review of Symptoms:  Constitutional: Denies fevers, weight loss, chills, or weakness.  Eyes: Denies changes in vision.  ENT: Denies dysphagia, nasal discharge, ear pain or discharge.  Cardiovascular: Denies chest pain, palpitations, orthopnea, or claudication.  Respiratory: Denies shortness of breath, cough, hemoptysis, or wheezing.  GI: Denies nausea/vomitting, hematochezia, melena, abd pain, or changes in appetite.  : Denies dysuria, incontinence, or hematuria.  Musculoskeletal: Denies joint pain or myalgias.  Skin/breast: Denies rashes, lumps, lesions, or discharge.  Neurologic: Denies headache, dizziness, vertigo, or  paresthesias.  Psychiatric: Denies changes in mood or hallucinations.  Endocrine: Denies polyuria, polydipsia, heat/cold intolerance.  Hematologic/Lymph: Denies lymphadenopathy, easy bruising or easy bleeding.  Allergic/Immunologic: Denies rash, rhinitis.   Objective:     Vitals:  Temp: 98.7 °F (37.1 °C)  Pulse: 74  Rhythm: normal sinus rhythm  BP: 136/79  MAP (mmHg): 103  Resp: (!) 21  SpO2: 99 %  O2 Device (Oxygen Therapy): room air    Temp  Min: 97.5 °F (36.4 °C)  Max: 98.7 °F (37.1 °C)  Pulse  Min: 58  Max: 89  BP  Min: 103/65  Max: 136/79  MAP (mmHg)  Min: 76  Max: 103  Resp  Min: 12  Max: 25  SpO2  Min: 93 %  Max: 100 %    09/04 0701 - 09/05 0700  In: 1150 [P.O.:550; I.V.:100]  Out: 1400 [Urine:1400]   Unmeasured Output  Urine Occurrence: 1  Stool Occurrence: 0       Physical Exam  GA: Alert, comfortable, no acute distress.   HEENT: No scleral icterus or JVD.   Pulmonary: Clear to auscultation A/L.   Cardiac: RRR S1 & S2 w/o rubs/murmurs/gallops.   Abdominal: Bowel sounds present x 4. No appreciable hepatosplenomegaly.  Skin: No jaundice, rashes, or visible lesions.  Neuro:  --GCS: E4 V5 M6  --Mental Status:  Awake, oriented X4, follows commands, fluent speech  --CN II-XII grossly intact.   --Pupils 5mm, PERRL.   --Corneal reflex, gag, cough intact.  --ELIZABETH spont and against gravity    Medications:  Continuous Scheduledcitalopram, 20 mg, Daily  cloBAZam, 10 mg, BID  heparin (porcine), 5,000 Units, Q8H  lamoTRIgine, 50 mg, BID  senna-docusate 8.6-50 mg, 1 tablet, Daily  topiramate, 50 mg, BID  vancomycin (VANCOCIN) IVPB, 1,500 mg, Q12H    PRNacetaminophen, 650 mg, Q4H PRN  hydrALAZINE, 10 mg, Q4H PRN  labetaloL, 10 mg, Q4H PRN  magnesium oxide, 800 mg, PRN  magnesium oxide, 800 mg, PRN  oxyCODONE, 5 mg, Q6H PRN  potassium chloride, 40 mEq, PRN  potassium chloride, 40 mEq, PRN  potassium chloride, 40 mEq, PRN  potassium, sodium phosphates, 2 packet, PRN  potassium, sodium phosphates, 2 packet, PRN  potassium,  sodium phosphates, 2 packet, PRN  promethazine (PHENERGAN) IVPB, 6.25 mg, Q8H PRN      Today I personally reviewed pertinent medications, lines/drains/airways, imaging, cardiology results, laboratory results,     Diet  Diet Adult Regular (IDDSI Level 7)      Assessment/Plan:     Neuro  * Epilepsy  -s/p sEEG placement  -cefazolin 2 q8 prophylaxis  -SBP goal <140  -NSGY and epilepsy following  -seizure precautions  -continue: Topiramate 50 mg BID, lamotrigine 100mg BID, clobazam 20 mg BID, hold home med Vimpat  -neuro checks q 1 hr  9/2/2020: 1 seizure this morning associated with lip smacking  -per epilepsy: Held home Vimpat 8/31  - Continue Topamax 50 mg BID  9/3/2020: no seizures this AM, continue to hold vimpat, continue SQH for DVT ppx  - Per epilepsy: If no seizures throughout day, plan to decrease Onfi to 10 mg BID (Dr. Abhijeet Do to change tonight if needed).   9/5/2020: continue AEDs per epilepsy team         Psychiatric  Depression  - continue home med celexa          The patient is being Prophylaxed for:  Venous Thromboembolism with: Chemical  Stress Ulcer with: None  Ventilator Pneumonia with: not applicable    Activity Orders          Diet Adult Regular (IDDSI Level 7): Regular starting at 09/01 0941        Full Code    Georgie Hoyos NP  Neurocritical Care  [facility]

## 2020-09-05 NOTE — ASSESSMENT & PLAN NOTE
-s/p sEEG placement  -cefazolin 2 q8 prophylaxis  -SBP goal <140  -NSGY and epilepsy following  -seizure precautions  -continue: Topiramate 50 mg BID, lamotrigine 100mg BID, clobazam 20 mg BID, hold home med Vimpat  -neuro checks q 1 hr  9/2/2020: 1 seizure this morning associated with lip smacking  -per epilepsy: Held home Vimpat 8/31  - Continue Topamax 50 mg BID  9/3/2020: no seizures this AM, continue to hold vimpat, continue SQH for DVT ppx  - Per epilepsy: If no seizures throughout day, plan to decrease Onfi to 10 mg BID (Dr. Abhijeet Do to change tonight if needed).   9/5/2020: continue AEDs per epilepsy team

## 2020-09-05 NOTE — SUBJECTIVE & OBJECTIVE
Review of Systems  Review of Symptoms:  Constitutional: Denies fevers, weight loss, chills, or weakness.  Eyes: Denies changes in vision.  ENT: Denies dysphagia, nasal discharge, ear pain or discharge.  Cardiovascular: Denies chest pain, palpitations, orthopnea, or claudication.  Respiratory: Denies shortness of breath, cough, hemoptysis, or wheezing.  GI: Denies nausea/vomitting, hematochezia, melena, abd pain, or changes in appetite.  : Denies dysuria, incontinence, or hematuria.  Musculoskeletal: Denies joint pain or myalgias.  Skin/breast: Denies rashes, lumps, lesions, or discharge.  Neurologic: Denies headache, dizziness, vertigo, or paresthesias.  Psychiatric: Denies changes in mood or hallucinations.  Endocrine: Denies polyuria, polydipsia, heat/cold intolerance.  Hematologic/Lymph: Denies lymphadenopathy, easy bruising or easy bleeding.  Allergic/Immunologic: Denies rash, rhinitis.   Objective:     Vitals:  Temp: 98.7 °F (37.1 °C)  Pulse: 74  Rhythm: normal sinus rhythm  BP: 136/79  MAP (mmHg): 103  Resp: (!) 21  SpO2: 99 %  O2 Device (Oxygen Therapy): room air    Temp  Min: 97.5 °F (36.4 °C)  Max: 98.7 °F (37.1 °C)  Pulse  Min: 58  Max: 89  BP  Min: 103/65  Max: 136/79  MAP (mmHg)  Min: 76  Max: 103  Resp  Min: 12  Max: 25  SpO2  Min: 93 %  Max: 100 %    09/04 0701 - 09/05 0700  In: 1150 [P.O.:550; I.V.:100]  Out: 1400 [Urine:1400]   Unmeasured Output  Urine Occurrence: 1  Stool Occurrence: 0       Physical Exam  GA: Alert, comfortable, no acute distress.   HEENT: No scleral icterus or JVD.   Pulmonary: Clear to auscultation A/L.   Cardiac: RRR S1 & S2 w/o rubs/murmurs/gallops.   Abdominal: Bowel sounds present x 4. No appreciable hepatosplenomegaly.  Skin: No jaundice, rashes, or visible lesions.  Neuro:  --GCS: E4 V5 M6  --Mental Status:  Awake, oriented X4, follows commands, fluent speech  --CN II-XII grossly intact.   --Pupils 5mm, PERRL.   --Corneal reflex, gag, cough intact.  --ELIZABETH spont and  against gravity    Medications:  Continuous Scheduledcitalopram, 20 mg, Daily  cloBAZam, 10 mg, BID  heparin (porcine), 5,000 Units, Q8H  lamoTRIgine, 50 mg, BID  senna-docusate 8.6-50 mg, 1 tablet, Daily  topiramate, 50 mg, BID  vancomycin (VANCOCIN) IVPB, 1,500 mg, Q12H    PRNacetaminophen, 650 mg, Q4H PRN  hydrALAZINE, 10 mg, Q4H PRN  labetaloL, 10 mg, Q4H PRN  magnesium oxide, 800 mg, PRN  magnesium oxide, 800 mg, PRN  oxyCODONE, 5 mg, Q6H PRN  potassium chloride, 40 mEq, PRN  potassium chloride, 40 mEq, PRN  potassium chloride, 40 mEq, PRN  potassium, sodium phosphates, 2 packet, PRN  potassium, sodium phosphates, 2 packet, PRN  potassium, sodium phosphates, 2 packet, PRN  promethazine (PHENERGAN) IVPB, 6.25 mg, Q8H PRN      Today I personally reviewed pertinent medications, lines/drains/airways, imaging, cardiology results, laboratory results,     Diet  Diet Adult Regular (IDDSI Level 7)

## 2020-09-06 LAB
ABO + RH BLD: NORMAL
ALBUMIN SERPL BCP-MCNC: 4 G/DL (ref 3.5–5.2)
ALP SERPL-CCNC: 96 U/L (ref 55–135)
ALT SERPL W/O P-5'-P-CCNC: 24 U/L (ref 10–44)
ANION GAP SERPL CALC-SCNC: 9 MMOL/L (ref 8–16)
AST SERPL-CCNC: 31 U/L (ref 10–40)
BASOPHILS # BLD AUTO: 0.03 K/UL (ref 0–0.2)
BASOPHILS NFR BLD: 0.4 % (ref 0–1.9)
BILIRUB SERPL-MCNC: 1 MG/DL (ref 0.1–1)
BLD GP AB SCN CELLS X3 SERPL QL: NORMAL
BUN SERPL-MCNC: 13 MG/DL (ref 6–20)
CALCIUM SERPL-MCNC: 9.3 MG/DL (ref 8.7–10.5)
CHLORIDE SERPL-SCNC: 109 MMOL/L (ref 95–110)
CO2 SERPL-SCNC: 19 MMOL/L (ref 23–29)
CREAT SERPL-MCNC: 0.8 MG/DL (ref 0.5–1.4)
DIFFERENTIAL METHOD: ABNORMAL
EOSINOPHIL # BLD AUTO: 0 K/UL (ref 0–0.5)
EOSINOPHIL NFR BLD: 0.6 % (ref 0–8)
ERYTHROCYTE [DISTWIDTH] IN BLOOD BY AUTOMATED COUNT: 11.6 % (ref 11.5–14.5)
EST. GFR  (AFRICAN AMERICAN): >60 ML/MIN/1.73 M^2
EST. GFR  (NON AFRICAN AMERICAN): >60 ML/MIN/1.73 M^2
GLUCOSE SERPL-MCNC: 87 MG/DL (ref 70–110)
HCT VFR BLD AUTO: 44.2 % (ref 40–54)
HGB BLD-MCNC: 15.6 G/DL (ref 14–18)
IMM GRANULOCYTES # BLD AUTO: 0.02 K/UL (ref 0–0.04)
IMM GRANULOCYTES NFR BLD AUTO: 0.3 % (ref 0–0.5)
LYMPHOCYTES # BLD AUTO: 1.4 K/UL (ref 1–4.8)
LYMPHOCYTES NFR BLD: 20.7 % (ref 18–48)
MAGNESIUM SERPL-MCNC: 1.8 MG/DL (ref 1.6–2.6)
MCH RBC QN AUTO: 31.5 PG (ref 27–31)
MCHC RBC AUTO-ENTMCNC: 35.3 G/DL (ref 32–36)
MCV RBC AUTO: 89 FL (ref 82–98)
MONOCYTES # BLD AUTO: 0.7 K/UL (ref 0.3–1)
MONOCYTES NFR BLD: 9.4 % (ref 4–15)
NEUTROPHILS # BLD AUTO: 4.7 K/UL (ref 1.8–7.7)
NEUTROPHILS NFR BLD: 68.6 % (ref 38–73)
NRBC BLD-RTO: 0 /100 WBC
PHOSPHATE SERPL-MCNC: 4.1 MG/DL (ref 2.7–4.5)
PLATELET # BLD AUTO: 232 K/UL (ref 150–350)
PMV BLD AUTO: 9.8 FL (ref 9.2–12.9)
POTASSIUM SERPL-SCNC: 3.5 MMOL/L (ref 3.5–5.1)
PROT SERPL-MCNC: 6.7 G/DL (ref 6–8.4)
RBC # BLD AUTO: 4.95 M/UL (ref 4.6–6.2)
SODIUM SERPL-SCNC: 137 MMOL/L (ref 136–145)
WBC # BLD AUTO: 6.91 K/UL (ref 3.9–12.7)

## 2020-09-06 PROCEDURE — 20000000 HC ICU ROOM

## 2020-09-06 PROCEDURE — 25000003 PHARM REV CODE 250: Performed by: NEUROLOGICAL SURGERY

## 2020-09-06 PROCEDURE — 25000003 PHARM REV CODE 250: Performed by: NURSE PRACTITIONER

## 2020-09-06 PROCEDURE — 94761 N-INVAS EAR/PLS OXIMETRY MLT: CPT

## 2020-09-06 PROCEDURE — 25000003 PHARM REV CODE 250: Performed by: PSYCHIATRY & NEUROLOGY

## 2020-09-06 PROCEDURE — 86901 BLOOD TYPING SEROLOGIC RH(D): CPT

## 2020-09-06 PROCEDURE — 25000003 PHARM REV CODE 250: Performed by: PHYSICIAN ASSISTANT

## 2020-09-06 PROCEDURE — 95718 EEG PHYS/QHP 2-12 HR W/VEEG: CPT | Mod: ,,, | Performed by: PSYCHIATRY & NEUROLOGY

## 2020-09-06 PROCEDURE — 63600175 PHARM REV CODE 636 W HCPCS: Performed by: PHYSICIAN ASSISTANT

## 2020-09-06 PROCEDURE — 80053 COMPREHEN METABOLIC PANEL: CPT

## 2020-09-06 PROCEDURE — 84100 ASSAY OF PHOSPHORUS: CPT

## 2020-09-06 PROCEDURE — 63600175 PHARM REV CODE 636 W HCPCS: Performed by: NEUROLOGICAL SURGERY

## 2020-09-06 PROCEDURE — 95718 PR EEG, W/VIDEO, CONT RECORD, I&R, 2-12 HRS: ICD-10-PCS | Mod: ,,, | Performed by: PSYCHIATRY & NEUROLOGY

## 2020-09-06 PROCEDURE — 99233 SBSQ HOSP IP/OBS HIGH 50: CPT | Mod: ,,, | Performed by: NURSE PRACTITIONER

## 2020-09-06 PROCEDURE — 99233 PR SUBSEQUENT HOSPITAL CARE,LEVL III: ICD-10-PCS | Mod: ,,, | Performed by: NURSE PRACTITIONER

## 2020-09-06 PROCEDURE — 83735 ASSAY OF MAGNESIUM: CPT

## 2020-09-06 PROCEDURE — 85025 COMPLETE CBC W/AUTO DIFF WBC: CPT

## 2020-09-06 PROCEDURE — 95714 VEEG EA 12-26 HR UNMNTR: CPT

## 2020-09-06 RX ADMIN — TOPIRAMATE 50 MG: 25 TABLET, FILM COATED ORAL at 09:09

## 2020-09-06 RX ADMIN — CITALOPRAM HYDROBROMIDE 20 MG: 10 TABLET ORAL at 09:09

## 2020-09-06 RX ADMIN — HEPARIN SODIUM 5000 UNITS: 5000 INJECTION INTRAVENOUS; SUBCUTANEOUS at 06:09

## 2020-09-06 RX ADMIN — CLOBAZAM 10 MG: 10 TABLET ORAL at 09:09

## 2020-09-06 RX ADMIN — LAMOTRIGINE 50 MG: 25 TABLET ORAL at 08:09

## 2020-09-06 RX ADMIN — CLOBAZAM 10 MG: 10 TABLET ORAL at 08:09

## 2020-09-06 RX ADMIN — VANCOMYCIN HYDROCHLORIDE 1500 MG: 1.5 INJECTION, POWDER, LYOPHILIZED, FOR SOLUTION INTRAVENOUS at 01:09

## 2020-09-06 RX ADMIN — VANCOMYCIN HYDROCHLORIDE 1500 MG: 1.5 INJECTION, POWDER, LYOPHILIZED, FOR SOLUTION INTRAVENOUS at 12:09

## 2020-09-06 RX ADMIN — HEPARIN SODIUM 5000 UNITS: 5000 INJECTION INTRAVENOUS; SUBCUTANEOUS at 02:09

## 2020-09-06 RX ADMIN — HEPARIN SODIUM 5000 UNITS: 5000 INJECTION INTRAVENOUS; SUBCUTANEOUS at 10:09

## 2020-09-06 RX ADMIN — LAMOTRIGINE 50 MG: 25 TABLET ORAL at 09:09

## 2020-09-06 RX ADMIN — DOCUSATE SODIUM 50MG AND SENNOSIDES 8.6MG 1 TABLET: 8.6; 5 TABLET, FILM COATED ORAL at 09:09

## 2020-09-06 RX ADMIN — TOPIRAMATE 50 MG: 25 TABLET, FILM COATED ORAL at 08:09

## 2020-09-06 RX ADMIN — POTASSIUM CHLORIDE 40 MEQ: 1.5 POWDER, FOR SOLUTION ORAL at 06:09

## 2020-09-06 NOTE — PROGRESS NOTES
[facility]  Neurocritical Care  Progress Note    Admit Date: 8/31/2020  Service Date: 09/06/2020  Length of Stay: 6    Subjective:     Chief Complaint: Epilepsy    History of Present Illness: The patient is a 25 year old M with PMHx of depression, seizures, epilepsy admitted to [facility] s/p placement of sEED electrodes bilateral. Per chart review he has failed at least 4 AEDs at therapeutic dosages. Triggers include being tired and being stressed. He is not working (on disability) but finished his GED. He lives next door to grandmother and nearby to his mother. His major concern is memory loss from ongoing seizures. NSGY and epilepsy following. Patient admitted to [facility] for close monitoring and higher level of care.     Hospital Course: 8/31/2020: patient admitted to [facility] s/p sEEG electrodes placement, NSGY and epilepsy following, SBP goal < 140, perepilepsy restarted all AEDs except for Vimpat  9/1/2020: 1 episode of seizure, Vimpat  On hold, Onfi and lamictal decreased, continue topamax  9/2/2020: No seizures this AM, epilepsy managing AEDs, started SQH  9/3/2020: No electrographic seizures in AM, Per epilepsy: If no seizures throughout day, plan to decrease Onfi to 10 mg BID (Dr. Abhijeet Do to change tonight if needed)  9/4/2020: d/c A-line  9/5/2020: NAEON  9/6/2020: MERLENE, plan for removal of sEEG on 9/9        Review of Systems  Review of Symptoms:  Constitutional: Denies fevers, weight loss, chills, or weakness.  Eyes: Denies changes in vision.  ENT: Denies dysphagia, nasal discharge, ear pain or discharge.  Cardiovascular: Denies chest pain, palpitations, orthopnea, or claudication.  Respiratory: Denies shortness of breath, cough, hemoptysis, or wheezing.  GI: Denies nausea/vomitting, hematochezia, melena, abd pain, or changes in appetite.  : Denies dysuria, incontinence, or hematuria.  Musculoskeletal: Denies joint pain or myalgias.  Skin/breast: Denies rashes, lumps, lesions, or  discharge.  Neurologic: Denies headache, dizziness, vertigo, or paresthesias.  Psychiatric: Denies changes in mood or hallucinations.  Endocrine: Denies polyuria, polydipsia, heat/cold intolerance.  Hematologic/Lymph: Denies lymphadenopathy, easy bruising or easy bleeding.  Allergic/Immunologic: Denies rash, rhinitis.   Objective:     Vitals:  Temp: 98.3 °F (36.8 °C)  Pulse: 92  Rhythm: normal sinus rhythm  BP: 131/81  MAP (mmHg): 100  Resp: 18  SpO2: 96 %  O2 Device (Oxygen Therapy): room air    Temp  Min: 97.6 °F (36.4 °C)  Max: 98.7 °F (37.1 °C)  Pulse  Min: 51  Max: 92  BP  Min: 102/68  Max: 141/96  MAP (mmHg)  Min: 76  Max: 111  Resp  Min: 8  Max: 24  SpO2  Min: 96 %  Max: 100 %    09/05 0701 - 09/06 0700  In: 770 [P.O.:480; I.V.:40]  Out: 1025 [Urine:1025]   Unmeasured Output  Urine Occurrence: 1  Stool Occurrence: 0       Physical Exam  GA:  comfortable, no acute distress.   HEENT: No scleral icterus or JVD.   Pulmonary: Clear to auscultation A/L.   Cardiac: RRR S1 & S2 w/o rubs/murmurs/gallops.   Abdominal: Bowel sounds present x 4. No appreciable hepatosplenomegaly.  Skin: No jaundice, rashes, or visible lesions.  Neuro:  --GCS: E4 V5 M6  --Mental Status:  AAOX4, follows commands, fluent speech  --CN II-XII grossly intact.   --Pupils 3mm, PERRL.   --Corneal reflex, gag, cough intact.  --ELZIABETH spont    Medications:  Continuous Scheduledcitalopram, 20 mg, Daily  cloBAZam, 10 mg, BID  heparin (porcine), 5,000 Units, Q8H  lamoTRIgine, 50 mg, BID  senna-docusate 8.6-50 mg, 1 tablet, Daily  topiramate, 50 mg, BID  vancomycin (VANCOCIN) IVPB, 1,500 mg, Q12H    PRNacetaminophen, 650 mg, Q4H PRN  hydrALAZINE, 10 mg, Q4H PRN  labetaloL, 10 mg, Q4H PRN  magnesium oxide, 800 mg, PRN  magnesium oxide, 800 mg, PRN  oxyCODONE, 5 mg, Q6H PRN  potassium chloride, 40 mEq, PRN  potassium chloride, 40 mEq, PRN  potassium chloride, 40 mEq, PRN  potassium, sodium phosphates, 2 packet, PRN  potassium, sodium phosphates, 2 packet,  PRN  potassium, sodium phosphates, 2 packet, PRN  promethazine (PHENERGAN) IVPB, 6.25 mg, Q8H PRN      Today I personally reviewed pertinent medications, lines/drains/airways, imaging, cardiology results, laboratory results,     Diet  Diet Adult Regular (IDDSI Level 7)      Assessment/Plan:     Neuro  * Epilepsy  -s/p sEEG placement  -cefazolin 2 q8 prophylaxis  -SBP goal <140  -NSGY and epilepsy following  -seizure precautions  -continue: Topiramate 50 mg BID, lamotrigine 100mg BID, clobazam 20 mg BID, hold home med Vimpat  -neuro checks q 1 hr  9/2/2020: 1 seizure this morning associated with lip smacking  -per epilepsy: Held home Vimpat 8/31  - Continue Topamax 50 mg BID  9/3/2020: no seizures this AM, continue to hold vimpat, continue SQH for DVT ppx  - Per epilepsy: If no seizures throughout day, plan to decrease Onfi to 10 mg BID (Dr. Abhijeet Do to change tonight if needed).   9/6/2020: continue AEDs per epilepsy team         Psychiatric  Depression  - continue home med celexa          The patient is being Prophylaxed for:  Venous Thromboembolism with: Chemical  Stress Ulcer with: None  Ventilator Pneumonia with: not applicable    Activity Orders          Diet Adult Regular (IDDSI Level 7): Regular starting at 09/01 0941        Full Code    Georgie Hoyos NP  Neurocritical Care  [facility]

## 2020-09-06 NOTE — SUBJECTIVE & OBJECTIVE
Review of Systems  Review of Symptoms:  Constitutional: Denies fevers, weight loss, chills, or weakness.  Eyes: Denies changes in vision.  ENT: Denies dysphagia, nasal discharge, ear pain or discharge.  Cardiovascular: Denies chest pain, palpitations, orthopnea, or claudication.  Respiratory: Denies shortness of breath, cough, hemoptysis, or wheezing.  GI: Denies nausea/vomitting, hematochezia, melena, abd pain, or changes in appetite.  : Denies dysuria, incontinence, or hematuria.  Musculoskeletal: Denies joint pain or myalgias.  Skin/breast: Denies rashes, lumps, lesions, or discharge.  Neurologic: Denies headache, dizziness, vertigo, or paresthesias.  Psychiatric: Denies changes in mood or hallucinations.  Endocrine: Denies polyuria, polydipsia, heat/cold intolerance.  Hematologic/Lymph: Denies lymphadenopathy, easy bruising or easy bleeding.  Allergic/Immunologic: Denies rash, rhinitis.   Objective:     Vitals:  Temp: 98.3 °F (36.8 °C)  Pulse: 92  Rhythm: normal sinus rhythm  BP: 131/81  MAP (mmHg): 100  Resp: 18  SpO2: 96 %  O2 Device (Oxygen Therapy): room air    Temp  Min: 97.6 °F (36.4 °C)  Max: 98.7 °F (37.1 °C)  Pulse  Min: 51  Max: 92  BP  Min: 102/68  Max: 141/96  MAP (mmHg)  Min: 76  Max: 111  Resp  Min: 8  Max: 24  SpO2  Min: 96 %  Max: 100 %    09/05 0701 - 09/06 0700  In: 770 [P.O.:480; I.V.:40]  Out: 1025 [Urine:1025]   Unmeasured Output  Urine Occurrence: 1  Stool Occurrence: 0       Physical Exam  GA:  comfortable, no acute distress.   HEENT: No scleral icterus or JVD.   Pulmonary: Clear to auscultation A/L.   Cardiac: RRR S1 & S2 w/o rubs/murmurs/gallops.   Abdominal: Bowel sounds present x 4. No appreciable hepatosplenomegaly.  Skin: No jaundice, rashes, or visible lesions.  Neuro:  --GCS: E4 V5 M6  --Mental Status:  AAOX4, follows commands, fluent speech  --CN II-XII grossly intact.   --Pupils 3mm, PERRL.   --Corneal reflex, gag, cough intact.  --ELIZABETH spont    Medications:  Continuous  Scheduledcitalopram, 20 mg, Daily  cloBAZam, 10 mg, BID  heparin (porcine), 5,000 Units, Q8H  lamoTRIgine, 50 mg, BID  senna-docusate 8.6-50 mg, 1 tablet, Daily  topiramate, 50 mg, BID  vancomycin (VANCOCIN) IVPB, 1,500 mg, Q12H    PRNacetaminophen, 650 mg, Q4H PRN  hydrALAZINE, 10 mg, Q4H PRN  labetaloL, 10 mg, Q4H PRN  magnesium oxide, 800 mg, PRN  magnesium oxide, 800 mg, PRN  oxyCODONE, 5 mg, Q6H PRN  potassium chloride, 40 mEq, PRN  potassium chloride, 40 mEq, PRN  potassium chloride, 40 mEq, PRN  potassium, sodium phosphates, 2 packet, PRN  potassium, sodium phosphates, 2 packet, PRN  potassium, sodium phosphates, 2 packet, PRN  promethazine (PHENERGAN) IVPB, 6.25 mg, Q8H PRN      Today I personally reviewed pertinent medications, lines/drains/airways, imaging, cardiology results, laboratory results,     Diet  Diet Adult Regular (IDDSI Level 7)

## 2020-09-06 NOTE — HOSPITAL COURSE
9/5  Pt exam stable.  No known sz overnight.    9/6 no knwn sz overnight. Pt doing well clinically on SEEG   9/7: No known sz overnight. Pt was taken off of Lamictal to further instigate sz.   9/8: Off lamictal, no seizures overnight, neurointact.   9/9: OR sEEG removal cancelled yesterday to continue to observe for more seizures. Weaned off of all antiepileptics  9/10: No seizures overnight, biked almost 8 miles on recumbent, possible OR tomorrow pending discussion with epileptologist  9/11: AF, VSS. One seizure yesterday, pending formal EEG read, will plan for tentative OR Monday for sEEG removal.   9/14: To OR today for sEEG removal  9/15: POD1 patient doing well, worked with PT/OT, who stated patient is stable for discharge. Patient to go home with family today

## 2020-09-06 NOTE — SUBJECTIVE & OBJECTIVE
Interval History: No sz overnight.     Medications:  Continuous Infusions:  Scheduled Meds:   citalopram  20 mg Oral Daily    cloBAZam  10 mg Oral BID    heparin (porcine)  5,000 Units Subcutaneous Q8H    lamoTRIgine  50 mg Oral BID    senna-docusate 8.6-50 mg  1 tablet Oral Daily    topiramate  50 mg Oral BID    vancomycin (VANCOCIN) IVPB  1,500 mg Intravenous Q12H     PRN Meds:acetaminophen, hydrALAZINE, labetaloL, magnesium oxide, magnesium oxide, oxyCODONE, potassium chloride, potassium chloride, potassium chloride, potassium, sodium phosphates, potassium, sodium phosphates, potassium, sodium phosphates, promethazine (PHENERGAN) IVPB     Review of Systems  Objective:     Weight: 78.5 kg (173 lb 1 oz)  Body mass index is 22.83 kg/m².  Vital Signs (Most Recent):  Temp: 98.2 °F (36.8 °C) (09/05/20 1905)  Pulse: 77 (09/05/20 2105)  Resp: 19 (09/05/20 2105)  BP: 119/78 (09/05/20 2105)  SpO2: 98 % (09/05/20 2105) Vital Signs (24h Range):  Temp:  [97.5 °F (36.4 °C)-98.7 °F (37.1 °C)] 98.2 °F (36.8 °C)  Pulse:  [58-87] 77  Resp:  [12-24] 19  SpO2:  [93 %-100 %] 98 %  BP: (103-136)/(65-84) 119/78     Date 09/05/20 0700 - 09/06/20 0659   Shift 8791-0974 9054-0371 7521-9418 24 Hour Total   INTAKE   P.O. 360 120  480   I.V.(mL/kg) 40(0.5)   40(0.5)   IV Piggyback 250   250   Shift Total(mL/kg) 650(8.3) 120(1.5)  770(9.8)   OUTPUT   Urine(mL/kg/hr) 425(0.7)   425   Shift Total(mL/kg) 425(5.4)   425(5.4)   Weight (kg) 78.5 78.5 78.5 78.5                        Neurosurgery Physical Exam      Physical Exam:     Constitutional: No distress.      Eyes: Pupils are equal, round, and reactive to light. EOM are normal.      Cardiovascular: Normal rate and regular rhythm.     Musculoskeletal:        Right Upper Extremities: Muscle strength is 5/5.        Left Upper Extremities: Muscle strength is 5/5.       Right Lower Extremities: Muscle strength is 5/5.        Left Lower Extremities: Muscle strength is 5/5.     Neurological:         Cranial nerves: Cranial nerve(s) II, III, IV, V, VI, VII, VIII, IX, X, XI and XII are intact.        Physical Exam:     Constitutional: No distress.      Eyes: Pupils are equal, round, and reactive to light. EOM are normal.      Cardiovascular: Normal rate and regular rhythm.     Musculoskeletal:        Right Upper Extremities: Muscle strength is 5/5.        Left Upper Extremities: Muscle strength is 5/5.       Right Lower Extremities: Muscle strength is 5/5.        Left Lower Extremities: Muscle strength is 5/5.     Neurological:        Cranial nerves: Cranial nerve(s) II, III, IV, V, VI, VII, VIII, IX, X, XI and XII are intact.        Significant Labs:  Recent Labs   Lab 09/04/20 0258 09/04/20  1154 09/05/20 0317     --  98     --  136   K 3.4* 4.2 3.5     --  105   CO2 23  --  22*   BUN 14  --  13   CREATININE 0.9  --  0.9   CALCIUM 9.3  --  9.6   MG 1.9  --  1.9     Recent Labs   Lab 09/04/20 0258 09/05/20 0317   WBC 6.63 6.04   HGB 16.1 15.9   HCT 45.4 45.2    237     No results for input(s): LABPT, INR, APTT in the last 48 hours.  Microbiology Results (last 7 days)     ** No results found for the last 168 hours. **        All pertinent labs from the last 24 hours have been reviewed.    Significant Diagnostics:  I have reviewed all pertinent imaging results/findings within the past 24 hours.

## 2020-09-06 NOTE — PLAN OF CARE
POC reviewed with pt at 1500. Pt verbalized understanding. Questions and concerns addressed. No acute events today. Pt progressing toward goals. Will continue to monitor. See flowsheets for full assessment and VS info.     Neuro- alert and  oriented x4, afebrile, pupils 4-5 brisk  Cardiac- SBP<140 without interventions, NSR  Resp- Room air, no issues with sats  GI/- voids per urinal, one bowel movement this afternoon. Regular diet, tolerating well.   Skin- headwrap in place for electrodes. No other skin issues

## 2020-09-06 NOTE — ASSESSMENT & PLAN NOTE
-s/p sEEG placement  -cefazolin 2 q8 prophylaxis  -SBP goal <140  -NSGY and epilepsy following  -seizure precautions  -continue: Topiramate 50 mg BID, lamotrigine 100mg BID, clobazam 20 mg BID, hold home med Vimpat  -neuro checks q 1 hr  9/2/2020: 1 seizure this morning associated with lip smacking  -per epilepsy: Held home Vimpat 8/31  - Continue Topamax 50 mg BID  9/3/2020: no seizures this AM, continue to hold vimpat, continue SQH for DVT ppx  - Per epilepsy: If no seizures throughout day, plan to decrease Onfi to 10 mg BID (Dr. Abhijeet Do to change tonight if needed).   9/6/2020: continue AEDs per epilepsy team

## 2020-09-06 NOTE — PROGRESS NOTES
[facility]  Neurosurgery  Progress Note    Subjective:     History of Present Illness: Darin Cunha is a 25 y.o. male with intractable epilepsy since 2013 who presents as a referral from Dr. Abhijeet Do to discuss the gamut of epilepsy surgery options. Today's visit is a video visit, and the patient is a passenger in a moving vehicle. His mother is not with him.      The patient states that when he has events, he stares off and smacks his lips. The episodes last for about 5-6 minutes. He often waves his hands and feels tired afterwards. He currently has about 3-5 episodes a week. He has failed at least 4 AEDs at therapeutic dosages.      Per EEG review, he has bilateral temporal involvement, moreso left-sided than right.      Triggers include being tired and being stressed. He is not working (on disability) but finished his GED. He lives next door to grandmother and nearby to his mother. His major concern is memory loss from ongoing seizures.      Of notes, he endorses a history of staph infection previously. He denies history of bleeding or anesthetic complications. He has a VNS in place.        Post-Op Info:  Procedure(s) (LRB):  CREATION, CRANIAL YUNG HOLE, PLACEMENT OF THE FIDUCIAL SCREWS BILATERAL 2.PLACEMENT OF SEEG ELECTRODES BILATERAL WITH OWEN ROBOT (Bilateral)   5 Days Post-Op     Interval History: No sz overnight.     Medications:  Continuous Infusions:  Scheduled Meds:   citalopram  20 mg Oral Daily    cloBAZam  10 mg Oral BID    heparin (porcine)  5,000 Units Subcutaneous Q8H    lamoTRIgine  50 mg Oral BID    senna-docusate 8.6-50 mg  1 tablet Oral Daily    topiramate  50 mg Oral BID    vancomycin (VANCOCIN) IVPB  1,500 mg Intravenous Q12H     PRN Meds:acetaminophen, hydrALAZINE, labetaloL, magnesium oxide, magnesium oxide, oxyCODONE, potassium chloride, potassium chloride, potassium chloride, potassium, sodium phosphates, potassium, sodium phosphates, potassium, sodium  phosphates, promethazine (PHENERGAN) IVPB     Review of Systems  Objective:     Weight: 78.5 kg (173 lb 1 oz)  Body mass index is 22.83 kg/m².  Vital Signs (Most Recent):  Temp: 98.2 °F (36.8 °C) (09/05/20 1905)  Pulse: 77 (09/05/20 2105)  Resp: 19 (09/05/20 2105)  BP: 119/78 (09/05/20 2105)  SpO2: 98 % (09/05/20 2105) Vital Signs (24h Range):  Temp:  [97.5 °F (36.4 °C)-98.7 °F (37.1 °C)] 98.2 °F (36.8 °C)  Pulse:  [58-87] 77  Resp:  [12-24] 19  SpO2:  [93 %-100 %] 98 %  BP: (103-136)/(65-84) 119/78     Date 09/05/20 0700 - 09/06/20 0659   Shift 8572-0062 1961-0587 [id number] 24 Hour Total   INTAKE   P.O. 360 120  480   I.V.(mL/kg) 40(0.5)   40(0.5)   IV Piggyback 250   250   Shift Total(mL/kg) 650(8.3) 120(1.5)  770(9.8)   OUTPUT   Urine(mL/kg/hr) 425(0.7)   425   Shift Total(mL/kg) 425(5.4)   425(5.4)   Weight (kg) 78.5 78.5 78.5 78.5                        Neurosurgery Physical Exam      Physical Exam:     Constitutional: No distress.      Eyes: Pupils are equal, round, and reactive to light. EOM are normal.      Cardiovascular: Normal rate and regular rhythm.     Musculoskeletal:        Right Upper Extremities: Muscle strength is 5/5.        Left Upper Extremities: Muscle strength is 5/5.       Right Lower Extremities: Muscle strength is 5/5.        Left Lower Extremities: Muscle strength is 5/5.     Neurological:        Cranial nerves: Cranial nerve(s) II, III, IV, V, VI, VII, VIII, IX, X, XI and XII are intact.        Physical Exam:     Constitutional: No distress.      Eyes: Pupils are equal, round, and reactive to light. EOM are normal.      Cardiovascular: Normal rate and regular rhythm.     Musculoskeletal:        Right Upper Extremities: Muscle strength is 5/5.        Left Upper Extremities: Muscle strength is 5/5.       Right Lower Extremities: Muscle strength is 5/5.        Left Lower Extremities: Muscle strength is 5/5.     Neurological:        Cranial nerves: Cranial nerve(s) II, III, IV, V, VI, VII,  VIII, IX, X, XI and XII are intact.        Significant Labs:  Recent Labs   Lab 09/04/20  0258 09/04/20  1154 09/05/20 0317     --  98     --  136   K 3.4* 4.2 3.5     --  105   CO2 23  --  22*   BUN 14  --  13   CREATININE 0.9  --  0.9   CALCIUM 9.3  --  9.6   MG 1.9  --  1.9     Recent Labs   Lab 09/04/20 0258 09/05/20 0317   WBC 6.63 6.04   HGB 16.1 15.9   HCT 45.4 45.2    237     No results for input(s): LABPT, INR, APTT in the last 48 hours.  Microbiology Results (last 7 days)     ** No results found for the last 168 hours. **        All pertinent labs from the last 24 hours have been reviewed.    Significant Diagnostics:  I have reviewed all pertinent imaging results/findings within the past 24 hours.    Assessment/Plan:     * Epilepsy  25 M with intractable epilepsy s/p multiple failed AEDs and VNS placement now s/p sEEG lead placement:    Continues to be stable neurologically, will continue to monitor until procedure on 9/9/2020    Admit to [facility]  q1h neurochecks  sEEG leads in place, abx while in place, continue headwrap  Bed rest at all times  SBP<140  No HOB restrictions  Ok for recumbent bike in bed  Ok for diet  Voiding spontaneously s/p michelle removal  Post operative CTH reviewed - expected post op changes  Monitor for seizure activity - no benzos or sedating meds unless cleared by Epilepsy team  AEDs per Epilepsy  Please page w/exam change  Tentative sEEG bolt removal on 9/9, booked, will obtain consent.     Dispo: continue ICU care        Harshal Tony MD  Neurosurgery  [facility]

## 2020-09-07 LAB
ALBUMIN SERPL BCP-MCNC: 4.3 G/DL (ref 3.5–5.2)
ALP SERPL-CCNC: 107 U/L (ref 55–135)
ALT SERPL W/O P-5'-P-CCNC: 40 U/L (ref 10–44)
ANION GAP SERPL CALC-SCNC: 9 MMOL/L (ref 8–16)
AST SERPL-CCNC: 39 U/L (ref 10–40)
BASOPHILS # BLD AUTO: 0.03 K/UL (ref 0–0.2)
BASOPHILS NFR BLD: 0.4 % (ref 0–1.9)
BILIRUB SERPL-MCNC: 0.9 MG/DL (ref 0.1–1)
BUN SERPL-MCNC: 14 MG/DL (ref 6–20)
CALCIUM SERPL-MCNC: 9.6 MG/DL (ref 8.7–10.5)
CHLORIDE SERPL-SCNC: 106 MMOL/L (ref 95–110)
CO2 SERPL-SCNC: 21 MMOL/L (ref 23–29)
CREAT SERPL-MCNC: 0.9 MG/DL (ref 0.5–1.4)
DIFFERENTIAL METHOD: ABNORMAL
EOSINOPHIL # BLD AUTO: 0.1 K/UL (ref 0–0.5)
EOSINOPHIL NFR BLD: 0.7 % (ref 0–8)
ERYTHROCYTE [DISTWIDTH] IN BLOOD BY AUTOMATED COUNT: 11.4 % (ref 11.5–14.5)
EST. GFR  (AFRICAN AMERICAN): >60 ML/MIN/1.73 M^2
EST. GFR  (NON AFRICAN AMERICAN): >60 ML/MIN/1.73 M^2
GLUCOSE SERPL-MCNC: 95 MG/DL (ref 70–110)
HCT VFR BLD AUTO: 43.7 % (ref 40–54)
HGB BLD-MCNC: 15.8 G/DL (ref 14–18)
IMM GRANULOCYTES # BLD AUTO: 0.03 K/UL (ref 0–0.04)
IMM GRANULOCYTES NFR BLD AUTO: 0.4 % (ref 0–0.5)
LYMPHOCYTES # BLD AUTO: 1.3 K/UL (ref 1–4.8)
LYMPHOCYTES NFR BLD: 14.8 % (ref 18–48)
MAGNESIUM SERPL-MCNC: 1.9 MG/DL (ref 1.6–2.6)
MCH RBC QN AUTO: 31.4 PG (ref 27–31)
MCHC RBC AUTO-ENTMCNC: 36.2 G/DL (ref 32–36)
MCV RBC AUTO: 87 FL (ref 82–98)
MONOCYTES # BLD AUTO: 0.8 K/UL (ref 0.3–1)
MONOCYTES NFR BLD: 9.4 % (ref 4–15)
NEUTROPHILS # BLD AUTO: 6.3 K/UL (ref 1.8–7.7)
NEUTROPHILS NFR BLD: 74.3 % (ref 38–73)
NRBC BLD-RTO: 0 /100 WBC
PHOSPHATE SERPL-MCNC: 3.4 MG/DL (ref 2.7–4.5)
PLATELET # BLD AUTO: 229 K/UL (ref 150–350)
PMV BLD AUTO: 9.7 FL (ref 9.2–12.9)
POTASSIUM SERPL-SCNC: 3.3 MMOL/L (ref 3.5–5.1)
POTASSIUM SERPL-SCNC: 4.3 MMOL/L (ref 3.5–5.1)
PROT SERPL-MCNC: 7 G/DL (ref 6–8.4)
RBC # BLD AUTO: 5.03 M/UL (ref 4.6–6.2)
SODIUM SERPL-SCNC: 136 MMOL/L (ref 136–145)
VANCOMYCIN TROUGH SERPL-MCNC: 11.1 UG/ML (ref 10–22)
WBC # BLD AUTO: 8.42 K/UL (ref 3.9–12.7)

## 2020-09-07 PROCEDURE — 99233 SBSQ HOSP IP/OBS HIGH 50: CPT | Mod: ,,, | Performed by: PSYCHIATRY & NEUROLOGY

## 2020-09-07 PROCEDURE — 25000003 PHARM REV CODE 250: Performed by: PHYSICIAN ASSISTANT

## 2020-09-07 PROCEDURE — 95714 VEEG EA 12-26 HR UNMNTR: CPT

## 2020-09-07 PROCEDURE — 25000003 PHARM REV CODE 250: Performed by: PSYCHIATRY & NEUROLOGY

## 2020-09-07 PROCEDURE — 80202 ASSAY OF VANCOMYCIN: CPT

## 2020-09-07 PROCEDURE — 20000000 HC ICU ROOM

## 2020-09-07 PROCEDURE — 84132 ASSAY OF SERUM POTASSIUM: CPT

## 2020-09-07 PROCEDURE — 99233 PR SUBSEQUENT HOSPITAL CARE,LEVL III: ICD-10-PCS | Mod: ,,, | Performed by: PSYCHIATRY & NEUROLOGY

## 2020-09-07 PROCEDURE — 94761 N-INVAS EAR/PLS OXIMETRY MLT: CPT

## 2020-09-07 PROCEDURE — 63600175 PHARM REV CODE 636 W HCPCS: Performed by: NEUROLOGICAL SURGERY

## 2020-09-07 PROCEDURE — 25000003 PHARM REV CODE 250: Performed by: NURSE PRACTITIONER

## 2020-09-07 PROCEDURE — 95720 EEG PHY/QHP EA INCR W/VEEG: CPT | Mod: ,,, | Performed by: PSYCHIATRY & NEUROLOGY

## 2020-09-07 PROCEDURE — 63600175 PHARM REV CODE 636 W HCPCS: Performed by: PHYSICIAN ASSISTANT

## 2020-09-07 PROCEDURE — 80053 COMPREHEN METABOLIC PANEL: CPT

## 2020-09-07 PROCEDURE — 84100 ASSAY OF PHOSPHORUS: CPT

## 2020-09-07 PROCEDURE — 25000003 PHARM REV CODE 250: Performed by: NEUROLOGICAL SURGERY

## 2020-09-07 PROCEDURE — 85025 COMPLETE CBC W/AUTO DIFF WBC: CPT

## 2020-09-07 PROCEDURE — 83735 ASSAY OF MAGNESIUM: CPT

## 2020-09-07 PROCEDURE — 95720 PR EEG, W/VIDEO, CONT RECORD, I&R, >12<26 HRS: ICD-10-PCS | Mod: ,,, | Performed by: PSYCHIATRY & NEUROLOGY

## 2020-09-07 RX ADMIN — HEPARIN SODIUM 5000 UNITS: 5000 INJECTION INTRAVENOUS; SUBCUTANEOUS at 06:09

## 2020-09-07 RX ADMIN — CLOBAZAM 10 MG: 10 TABLET ORAL at 09:09

## 2020-09-07 RX ADMIN — DOCUSATE SODIUM 50MG AND SENNOSIDES 8.6MG 1 TABLET: 8.6; 5 TABLET, FILM COATED ORAL at 09:09

## 2020-09-07 RX ADMIN — TOPIRAMATE 50 MG: 25 TABLET, FILM COATED ORAL at 09:09

## 2020-09-07 RX ADMIN — HEPARIN SODIUM 5000 UNITS: 5000 INJECTION INTRAVENOUS; SUBCUTANEOUS at 01:09

## 2020-09-07 RX ADMIN — VANCOMYCIN HYDROCHLORIDE 1500 MG: 1.5 INJECTION, POWDER, LYOPHILIZED, FOR SOLUTION INTRAVENOUS at 01:09

## 2020-09-07 RX ADMIN — CITALOPRAM HYDROBROMIDE 20 MG: 10 TABLET ORAL at 09:09

## 2020-09-07 RX ADMIN — POTASSIUM CHLORIDE 40 MEQ: 1.5 POWDER, FOR SOLUTION ORAL at 06:09

## 2020-09-07 RX ADMIN — POTASSIUM CHLORIDE 40 MEQ: 1.5 POWDER, FOR SOLUTION ORAL at 09:09

## 2020-09-07 RX ADMIN — HEPARIN SODIUM 5000 UNITS: 5000 INJECTION INTRAVENOUS; SUBCUTANEOUS at 09:09

## 2020-09-07 NOTE — PROGRESS NOTES
"Progress Note  Epilepsy    CC:  sEEG for refractory epilepsy    Interval Events:  No seizures overnight, lamotrigine stopped    ROS:  Mild headache not requiring medication.  Loose stool.  Otherwise eating and drinking well.  No nausea, vomiting.  No changes in vision, speech, language, hearing.  No generalized or focal weakness.  No sensory changes.  No cough or shortness of breath or chest pain.  No urinary issues.    Past medical history, social history, family history are unchanged from the initial H&P.    EXAM:   - Vitals: /79 (BP Location: Right arm, Patient Position: Lying)   Pulse 89   Temp 98.6 °F (37 °C) (Oral)   Resp (!) 22   Ht 6' 1" (1.854 m)   Wt 78.5 kg (173 lb 1 oz)   SpO2 100%   BMI 22.83 kg/m²    - General: Awake, cooperative, NAD.  - HEENT: NC/AT, EEG in place, bandage clean  - Neck: Supple  - Pulmonary: no increased WOB  - Cardiac: well perfused   - Abdomen: soft, nontender, nondistended  - Extremities: no edema  - Skin: no rashes or lesions noted.     NEURO EXAM:   - Mental Status: Awake, alert, oriented x 3. Language is fluent with intact repetition and comprehension. Normal prosody. There were no paraphasic errors. Able to name both high and low frequency objects. Speech was not dysarthric. Able to follow both midline and appendicular commands.     - Cranial Nerves:  VFF to confrontation. EOMI without nystagmus. No facial droop. Hearing intact to room voice.     - Motor: Normal bulk and tone throughout. No pronator drift bilaterally. No adventitious movements such as tremor or asterixis noted.  Antigravity x4.     - Sensory: No deficits to light touch  - Coordination: No dysmetria on FNF   - Gait:  Remains in bed    EEG:  Refer to daily reports    Plan:  25-year-old man admitted for sEEG placement.  No seizures in several days.  Stop lamotrigine. Seizure precautions.     Kiara Luque MD PhD  Neurology-Epilepsy  [facility].  [facility]      Current " Facility-Administered Medications:     acetaminophen tablet 650 mg, 650 mg, Oral, Q4H PRN, Georgie Hoyos NP, 650 mg at 09/02/20 1914    citalopram tablet 20 mg, 20 mg, Oral, Daily, Georgie Hoyos NP, 20 mg at 09/07/20 0923    cloBAZam Tab 10 mg, 10 mg, Oral, BID, Abhijeet Do MD, 10 mg at 09/07/20 0923    heparin (porcine) injection 5,000 Units, 5,000 Units, Subcutaneous, Q8H, Carito Amezquita PA-C, 5,000 Units at 09/07/20 1351    hydrALAZINE injection 10 mg, 10 mg, Intravenous, Q4H PRN, Georgie Hoyos NP, 10 mg at 08/31/20 1601    labetaloL injection 10 mg, 10 mg, Intravenous, Q4H PRN, Georgie Hoyos NP    magnesium oxide tablet 800 mg, 800 mg, Oral, PRN, Melissa Flores NP    magnesium oxide tablet 800 mg, 800 mg, Oral, PRN, Melissa Flores NP    oxyCODONE immediate release tablet 5 mg, 5 mg, Oral, Q6H PRN, Georgie Hoyos NP, 5 mg at 09/02/20 1914    potassium chloride packet 40 mEq, 40 mEq, Oral, PRN, Melissa Flores NP, 40 mEq at 09/06/20 0630    potassium chloride packet 40 mEq, 40 mEq, Oral, PRN, Melissa Flores NP, 40 mEq at 09/07/20 0923    potassium chloride packet 40 mEq, 40 mEq, Oral, PRN, Melissa Flores NP    potassium, sodium phosphates 280-160-250 mg packet 2 packet, 2 packet, Oral, PRN, Melissa Flores NP    potassium, sodium phosphates 280-160-250 mg packet 2 packet, 2 packet, Oral, PRN, Melissa Flores NP    potassium, sodium phosphates 280-160-250 mg packet 2 packet, 2 packet, Oral, PRN, Melissa Flores NP    promethazine (PHENERGAN) 6.25 mg in dextrose 5 % 50 mL IVPB, 6.25 mg, Intravenous, Q8H PRN, Georgie Hoyos NP, Last Rate: 150 mL/hr at 08/31/20 1730, 6.25 mg at 08/31/20 1730    senna-docusate 8.6-50 mg per tablet 1 tablet, 1 tablet, Oral, Daily, Georgie Hoyos NP, 1 tablet at 09/07/20 0923    topiramate tablet 50 mg, 50 mg, Oral, BID, Minerva Oconnor PA-C, 50 mg at 09/07/20 0923    vancomycin 1.5 g in dextrose 5 % 250 mL IVPB (ready to  mix), 1,500 mg, Intravenous, Q12H, Melani Ortiz MD, Last Rate: 166.7 mL/hr at 09/07/20 1351, 1,500 mg at 09/07/20 1351    VTE Risk Mitigation (From admission, onward)         Ordered     heparin (porcine) injection 5,000 Units  Every 8 hours      09/02/20 1035     Place sequential compression device  Until discontinued      08/31/20 1646                Recent Labs   Lab 08/28/20  1024  08/31/20  1616  09/06/20  0132 09/07/20  0049 09/07/20  1233   WBC 4.69   < >  --    < > 6.91 8.42  --    Hemoglobin 16.5   < >  --    < > 15.6 15.8  --    Hematocrit 47.0   < >  --    < > 44.2 43.7  --    Platelets 234   < >  --    < > 232 229  --    Sodium 139  --   --    < > 137 136  --    Potassium 4.2  --   --    < > 3.5 3.3 L 4.3   BUN, Bld 13  --   --    < > 13 14  --    Creatinine 1.0  --   --    < > 0.8 0.9  --    eGFR if  >60.0  --   --    < > >60.0 >60.0  --    eGFR if non  >60.0  --   --    < > >60.0 >60.0  --    Hemoglobin A1C  --   --  4.4  --   --   --   --    TSH 0.481  --  0.451  --   --   --   --     < > = values in this interval not displayed.       Results for orders placed during the hospital encounter of 07/23/20   MRI Brain W WO Contrast    Impression Subcentimeter lobular hypoenhancing focus left paramedian adenohypophysis with slight suprasellar extension.  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    No significant mass effect on the suprasellar neurovascular structures.    Incidental probable arachnoid cyst right paramedian superior cerebellar cistern.    Otherwise unremarkable MRI brain specifically without evidence for parenchymal signal abnormality.    Electronically signed by resident: Clyde Mena  Date:    07/23/2020  Time:    11:09    Electronically signed by: Yosef Ro DO  Date:    07/23/2020  Time:    11:45     Results for orders placed during the hospital encounter of 08/31/20   CT Head Without Contrast     Impression Interval placement of multiple stereo EEG electrodes without apparent intracranial complication.    Electronically signed by resident: Vicki Patel  Date:    08/31/2020  Time:    13:36    Electronically signed by: Jc Us MD  Date:    08/31/2020  Time:    14:43     Results for orders placed during the hospital encounter of 08/30/20   MRI Cervical Spine Without Contrast    Impression No evidence of central spinal canal stenosis or neural foraminal narrowing as described above.      Electronically signed by: Rich Alicea MD  Date:    08/30/2020  Time:    11:50     Results for orders placed during the hospital encounter of 02/15/19   MRI Brain Epilepsy Without Contrast    Impression Unremarkable noncontrast MRI brain as detailed above specifically without evidence for parenchymal signal abnormality.      Electronically signed by: Yosef Ro DO  Date:    02/15/2019  Time:    14:49

## 2020-09-07 NOTE — PROGRESS NOTES
Pharmacokinetic Assessment Follow Up: IV Vancomycin    Vancomycin Regimen Assessment & Plan:    - Vancomycin trough resulted as 11.1 ug/mL, within goal trough of 10-20 ug/mL.  - Continue current regimen of vancomycin 1500 mg IV q12h.  - Neurosurgery tentatively planning for sEEG bolt removal on 9/9.  Will not order a surveillance trough at this time.     Drug levels (last 3 results):  Recent Labs   Lab Result Units 09/05/20  0317 09/07/20  1233   Vancomycin-Trough ug/mL 8.5* 11.1       Pharmacy will continue to follow and monitor vancomycin.    Please contact pharmacy at extension [phone] for questions regarding this assessment.    Thank you for the consult,   Mirna Ring, PharmD, BCCCP       Patient brief summary:  Darin Cunha is a 25 y.o. male initiated on antimicrobial therapy with IV Vancomycin for treatment of skin & soft tissue infection.     Drug Allergies:   Review of patient's allergies indicates:   Allergen Reactions    Zofran [ondansetron hcl (pf)] Hives and Rash       Actual Body Weight:   78.5 kg    Renal Function:   Estimated Creatinine Clearance: 136.9 mL/min (based on SCr of 0.9 mg/dL).,     Dialysis Method (if applicable):  N/A

## 2020-09-07 NOTE — PROCEDURES
Stereo EEG Depth Electrode Recording  DATE OF SERVICE: 09/06/2020  EEG NUMBER: [id number]  REQUESTED BY: Dr Alix Ortiz  LOCATION OF SERVICE: [id number]     METHODOLOGY              Depth electrodes consisted of thin wires with electrical contacts it fixed distances along the wire.  These are then implanted using a stereotactic procedure targeting cortical and subcortical structures.  The up to 256 electrode contact can be recorded simultaneously with this procedure.  Recording band pass was 0.1  in the low past filters setting could be set at the 512, 1024, or 2048.  Digital video recording of the patient is simultaneously recorded with the EEG.  The patient is instructed report clinical symptoms which may occur during the recording session.  EEG and video recording is stored and archived in digital format. Activation procedures which include photic stimulation, hyperventilation and instructing patients to perform simple task are done in selected patients.   Compresses spectral analysis (CSA) is also performed on the activity recorded from each individual channel.  This is displayed as a power display of frequencies from 0 to 30 Hz over time.   The CSA analysis is done and displayed continuously.  This is reviewed for asymmetries in power between homologous areas of the scalp and for presence of changes in power which canbe seen when seizures occur.  Sections of suspected abnormalities on the CSA is then compared with the original EEG recording.          Implant Date MRN Name Last First                      8/31/2020 [id number]  Alphonse Webster                            Contacts                   Depth Elect Name SN  # contacts Color 1 Color 2 1 2 3 4 5 6 7 8 9 10 11 12 13 14 15 16   1 L Amyg 40535  14 Yellow Green 1 2 3 4 5 6 7 8 9 10 11 12 13 14     2 L Hippo 41973  14 Black Blue 15 16 17 18 19 20 21 22 23 24 25 26 27 28     3 L Entor 99061  14 Red Blue 29 30 31 32 33 34 35 36 37 38 39 40 41 42     4 L AntCi 43575  14  Red Green 43 44 45 46 47 48 49 50 51 52 53 54 55 56     5 L OrbFr 58568  16 Blue Red 57 58 59 60 61 62 63 64 65 66 67 68 69 70 71 72   6 L PreFr 72088  16 Yellow Green 73 74 75 76 77 78 79 80 81 82 83 84 85 86 87 88   7 L AntLn 12832  8 Black Green 89 90 91 92 93 94 95 96           8 L PoIns 45294  8 Yellow Black 97 98 99 100 101 102 103 104           9 L TeOcc 15828  8 Black Red 105 106 107 108 109 110 111 112           10 L Pariet 04208  8 Black Red 113 114 115 116 117 118 119 120           11 R Amyg 85717  12 Green Red 121 122 123 124 125 126 127 128 129 130 131 132       12 R Hippo 12121  12 Green Blue 133 134 135 136 137 138 139 140 141 142 143 144       13 R Asuin 80141  8 Blue Red 145 146 147 148 149 150 151 152           14 R Acing 56100  16 Yellow Blue 153 154 155 156 157 158 159 160 161 162 163 164 165 166 167 168       Electrode integrety   High amplitude artifact present from the following contacts               Depth electrode          Contact #                       L Pariet  4              L Entor   12, 13, 14  The L Pariet  3, 4   R Amygd  2, 3    11, 12  Appropriate recording obtained from the remaining electrode contacts.     RECORDING TIMES  Start on 09/06/2020t 7:00 p.m.  Stop on 09/06/2020t 6:36 a.m.  A total of 11 hrs and 36 min of EEG monitoring was obtained    Interictal Activity  Inner ictal activity continues to be frequently recorded.  Of commonly starting from the left hippocampus contacts 10 11 and 12,  spread is most common to the ipsilateral depth electrodes however and occasional initial spread is noted to the right hippocampus.  Infrequently a right hippocampal spike discharges noted.  The  Seizures Recorded   Date  Start  End  Sz 1 2020/09/01 08:34:43 08:35:28    Sz 2 2020/09/01 13:18:13 13:18:55    Sz 3  2020/09/01  16:39:13   start L hippo 1 2   16:39:13   sz spread Amyg Entor Prefr   16:39:38   Patient Event   16:39:43   nurse telling him a code word, Purple  "elephant   16:39:48  Sz 3 electrographic stop   16:40:14  pt not following commands   16:40:37  pt able to follow commands    Sz 4 2020/09/01    21:05:53 start Emtpr > Hippo    very focal   21:06:17 Stop      EEG FINDINGS  Ictal Recording   Seizure #         Date                 Start                 End  #1                    2020/09/01 08:34:43 08:35:28   Build up started in L Hippo contacts 1, 2  >> 3, 4      then spread to L Hippo contacts 11, 12, 13 L Amygd contacts 11, 12, 13      then spread to L Entor contacts 11, 12, 13     Seizure Description   Seizure 1   08:34:43  Sz 1 start  08:34:58  Oral facial mvt  08:35:01  L forearm and leg mvt  08:35:16  L hand auttomatism  08:35:23  Oral facial mvt continues  08:35:28  Sz end    Seizure 2  d1 13:18:13  Sz 2 start  d1 13:18:29  Head turn slightly to L  d1 13:18:33  Oral facial mvt  d1 13:18:41  Moving some in bed  d1 13:18:45  oral facial mvt continues  d1 13:18:55  Sz 2 stop  d1 13:18:57  Returns to looking at iPhone    Seizure 3  16:39:13  Lying back in bed - no mvt  16:39:20  Raises hed and slightly flexes knees  16:39:26  no response to nurse "are U OK?  16:39:30  Oral facial mvts  16:39:38  Patient Event  16:39:43  nurse telling him a code word, Purple elephant  16:39:48  Sz 3 stop  16:39:52  limb mvts stop  16:40:02  not responding to nurse  16:40:14  pt not following commands  16:40:37  pt able to follow commands    Seizure 4     Nothing clinical     Impression  Four seizure originating from L Hippo and Entro areas   No seizures recorded 2020/09/02>03 (#3)  No seizures recorded 2020/09/03>04 (#4)  No seizures recorded 2020/09/04>05 (#5)  No seizures recorded 2020/09/05>06 (#6)  No seizures recorded 2020/09/06>07 (#7)    BEBETO Marina MD  Professor Emeritus  Epilepsy Division    "

## 2020-09-07 NOTE — PROCEDURES
Stereo EEG Depth Electrode Recording  DATE OF SERVICE: 09/07/2020  EEG NUMBER: [id number]  REQUESTED BY: Dr Alix Ortiz  LOCATION OF SERVICE: [id number]     METHODOLOGY              Depth electrodes consisted of thin wires with electrical contacts it fixed distances along the wire.  These are then implanted using a stereotactic procedure targeting cortical and subcortical structures.  The up to 256 electrode contact can be recorded simultaneously with this procedure.  Recording band pass was 0.1  in the low past filters setting could be set at the 512, 1024, or 2048.  Digital video recording of the patient is simultaneously recorded with the EEG.  The patient is instructed report clinical symptoms which may occur during the recording session.  EEG and video recording is stored and archived in digital format. Activation procedures which include photic stimulation, hyperventilation and instructing patients to perform simple task are done in selected patients.   Compresses spectral analysis (CSA) is also performed on the activity recorded from each individual channel.  This is displayed as a power display of frequencies from 0 to 30 Hz over time.   The CSA analysis is done and displayed continuously.  This is reviewed for asymmetries in power between homologous areas of the scalp and for presence of changes in power which canbe seen when seizures occur.  Sections of suspected abnormalities on the CSA is then compared with the original EEG recording.          Implant Date MRN Name Last First                      8/31/2020 [id number]  Alphonse Webster                            Contacts                   Depth Elect Name SN  # contacts Color 1 Color 2 1 2 3 4 5 6 7 8 9 10 11 12 13 14 15 16   1 L Amyg 74148  14 Yellow Green 1 2 3 4 5 6 7 8 9 10 11 12 13 14     2 L Hippo 71017  14 Black Blue 15 16 17 18 19 20 21 22 23 24 25 26 27 28     3 L Entor 78468  14 Red Blue 29 30 31 32 33 34 35 36 37 38 39 40 41 42     4 L AntCi 89265  14  Red Green 43 44 45 46 47 48 49 50 51 52 53 54 55 56     5 L OrbFr 32409  16 Blue Red 57 58 59 60 61 62 63 64 65 66 67 68 69 70 71 72   6 L PreFr 43227  16 Yellow Green 73 74 75 76 77 78 79 80 81 82 83 84 85 86 87 88   7 L AntLn 71573  8 Black Green 89 90 91 92 93 94 95 96           8 L PoIns 87148  8 Yellow Black 97 98 99 100 101 102 103 104           9 L TeOcc 04088  8 Black Red 105 106 107 108 109 110 111 112           10 L Pariet 24506  8 Black Red 113 114 115 116 117 118 119 120           11 R Amyg 96162  12 Green Red 121 122 123 124 125 126 127 128 129 130 131 132       12 R Hippo 38587  12 Green Blue 133 134 135 136 137 138 139 140 141 142 143 144       13 R Asuin 85257  8 Blue Red 145 146 147 148 149 150 151 152           14 R Acing 98724  16 Yellow Blue 153 154 155 156 157 158 159 160 161 162 163 164 165 166 167 168       Electrode integrety   High amplitude artifact present from the following contacts               Depth electrode          Contact #                       L Pariet  4              L Entor   12, 13, 14  The L Pariet  3, 4   R Amygd  2, 3    11, 12  Appropriate recording obtained from the remaining electrode contacts.     RECORDING TIMES  Start on 09/07/2020 7:00 p.m.  Stop on 09/08/2020t 7:00 p.m.  A total of 24 hrs   of EEG monitoring was obtained    Interictal Activity  Inner ictal activity continues to be frequently recorded.  Of commonly starting from the left hippocampus contacts 10 11 and 12,  spread is most common to the ipsilateral depth electrodes however and occasional initial spread is noted to the right hippocampus.  Infrequently a right hippocampal spike discharges noted.  The  Seizures Recorded   Date  Start  End  Sz 1 2020/09/01 08:34:43 08:35:28    Sz 2 2020/09/01 13:18:13 13:18:55    Sz 3  2020/09/01  16:39:13   start L hippo 1 2   16:39:13   sz spread Amyg Entor Prefr   16:39:38   Patient Event   16:39:43   nurse telling him a code word, Purple elephant   16:39:48  Sz 3  "electrographic stop   16:40:14  pt not following commands   16:40:37  pt able to follow commands    Sz 4 2020/09/01    21:05:53 start Emtpr > Hippo    very focal   21:06:17 Stop      EEG FINDINGS  Ictal Recording   Seizure #         Date                 Start                 End  #1                    2020/09/01 08:34:43 08:35:28   Build up started in L Hippo contacts 1, 2  >> 3, 4      then spread to L Hippo contacts 11, 12, 13 L Amygd contacts 11, 12, 13      then spread to L Entor contacts 11, 12, 13     Seizure Description   Seizure 1   08:34:43  Sz 1 start  08:34:58  Oral facial mvt  08:35:01  L forearm and leg mvt  08:35:16  L hand auttomatism  08:35:23  Oral facial mvt continues  08:35:28  Sz end    Seizure 2  d1 13:18:13  Sz 2 start  d1 13:18:29  Head turn slightly to L  d1 13:18:33  Oral facial mvt  d1 13:18:41  Moving some in bed  d1 13:18:45  oral facial mvt continues  d1 13:18:55  Sz 2 stop  d1 13:18:57  Returns to looking at iPhone    Seizure 3  16:39:13  Lying back in bed - no mvt  16:39:20  Raises hed and slightly flexes knees  16:39:26  no response to nurse "are U OK?  16:39:30  Oral facial mvts  16:39:38  Patient Event  16:39:43  nurse telling him a code word, Purple elephant  16:39:48  Sz 3 stop  16:39:52  limb mvts stop  16:40:02  not responding to nurse  16:40:14  pt not following commands  16:40:37  pt able to follow commands    Seizure 4     Nothing clinical     Impression  Four seizure originating from L Hippo and Entro areas   No seizures recorded 2020/09/02>03 (#3)  No seizures recorded 2020/09/03>04 (#4)  No seizures recorded 2020/09/04>05 (#5)  No seizures recorded 2020/09/05>06 (#6)  No seizures recorded 2020/09/06>07 (#7)    BEBETO Marina MD  Professor Emeritus  Epilepsy Division    "

## 2020-09-07 NOTE — PROCEDURES
Stereo EEG Depth Electrode Recording  DATE OF SERVICE: 09/05/2020  EEG NUMBER: FH -[id number]  REQUESTED BY: Dr Alix Ortiz  LOCATION OF SERVICE: [id number]     METHODOLOGY              Depth electrodes consisted of thin wires with electrical contacts it fixed distances along the wire.  These are then implanted using a stereotactic procedure targeting cortical and subcortical structures.  The up to 256 electrode contact can be recorded simultaneously with this procedure.  Recording band pass was 0.1  in the low past filters setting could be set at the 512, 1024, or 2048.  Digital video recording of the patient is simultaneously recorded with the EEG.  The patient is instructed report clinical symptoms which may occur during the recording session.  EEG and video recording is stored and archived in digital format. Activation procedures which include photic stimulation, hyperventilation and instructing patients to perform simple task are done in selected patients.   Compresses spectral analysis (CSA) is also performed on the activity recorded from each individual channel.  This is displayed as a power display of frequencies from 0 to 30 Hz over time.   The CSA analysis is done and displayed continuously.  This is reviewed for asymmetries in power between homologous areas of the scalp and for presence of changes in power which canbe seen when seizures occur.  Sections of suspected abnormalities on the CSA is then compared with the original EEG recording.          Implant Date MRN Name Last First                      8/31/2020 [id number]  Alphonse Webster                            Contacts                   Depth Elect Name SN  # contacts Color 1 Color 2 1 2 3 4 5 6 7 8 9 10 11 12 13 14 15 16   1 L Amyg 91157  14 Yellow Green 1 2 3 4 5 6 7 8 9 10 11 12 13 14     2 L Hippo 65945  14 Black Blue 15 16 17 18 19 20 21 22 23 24 25 26 27 28     3 L Entor 86870  14 Red Blue 29 30 31 32 33 34 35 36 37 38 39 40 41 42     4 L AntCi 29995  14  Red Green 43 44 45 46 47 48 49 50 51 52 53 54 55 56     5 L OrbFr 87453  16 Blue Red 57 58 59 60 61 62 63 64 65 66 67 68 69 70 71 72   6 L PreFr 24996  16 Yellow Green 73 74 75 76 77 78 79 80 81 82 83 84 85 86 87 88   7 L AntLn 62810  8 Black Green 89 90 91 92 93 94 95 96           8 L PoIns 29265  8 Yellow Black 97 98 99 100 101 102 103 104           9 L TeOcc 77216  8 Black Red 105 106 107 108 109 110 111 112           10 L Pariet 63257  8 Black Red 113 114 115 116 117 118 119 120           11 R Amyg 77944  12 Green Red 121 122 123 124 125 126 127 128 129 130 131 132       12 R Hippo 90904  12 Green Blue 133 134 135 136 137 138 139 140 141 142 143 144       13 R Asuin 84893  8 Blue Red 145 146 147 148 149 150 151 152           14 R Acing 03079  16 Yellow Blue 153 154 155 156 157 158 159 160 161 162 163 164 165 166 167 168       Electrode integrety   High amplitude artifact present from the following contacts               Depth electrode          Contact #                       L Pariet  4              L Entor   12, 13, 14  The L Pariet  3, 4   R Amygd  2, 3    11, 12  Appropriate recording obtained from the remaining electrode contacts.     RECORDING TIMES  Start on 09/05/2020 7:00 p.m.  Stop on 09/06/2020 7:00 a.m.  A total of 24 hrs of EEG monitoring was obtained    Interictal Activity  Inner ictal activity continues to be frequently recorded.  Of commonly starting from the left hippocampus contacts 10 11 and 12,  spread is most common to the ipsilateral depth electrodes however and occasional initial spread is noted to the right hippocampus.  Infrequently a right hippocampal spike discharges noted.  The  Seizures Recorded   Date  Start  End  Sz 1 2020/09/01 08:34:43 08:35:28    Sz 2 2020/09/01 13:18:13 13:18:55    Sz 3  2020/09/01  16:39:13   start L hippo 1 2   16:39:13   sz spread Amyg Entor Prefr   16:39:38   Patient Event   16:39:43   nurse telling him a code word, Purple elephant   16:39:48  Sz 3  "electrographic stop   16:40:14  pt not following commands   16:40:37  pt able to follow commands    Sz 4 2020/09/01    21:05:53 start Emtpr > Hippo    very focal   21:06:17 Stop      EEG FINDINGS  Ictal Recording   Seizure #         Date                 Start                 End  #1                    2020/09/01 08:34:43 08:35:28   Build up started in L Hippo contacts 1, 2  >> 3, 4      then spread to L Hippo contacts 11, 12, 13 L Amygd contacts 11, 12, 13      then spread to L Entor contacts 11, 12, 13     Seizure Description   Seizure 1   08:34:43  Sz 1 start  08:34:58  Oral facial mvt  08:35:01  L forearm and leg mvt  08:35:16  L hand auttomatism  08:35:23  Oral facial mvt continues  08:35:28  Sz end    Seizure 2  d1 13:18:13  Sz 2 start  d1 13:18:29  Head turn slightly to L  d1 13:18:33  Oral facial mvt  d1 13:18:41  Moving some in bed  d1 13:18:45  oral facial mvt continues  d1 13:18:55  Sz 2 stop  d1 13:18:57  Returns to looking at iPhone    Seizure 3  16:39:13  Lying back in bed - no mvt  16:39:20  Raises hed and slightly flexes knees  16:39:26  no response to nurse "are U OK?  16:39:30  Oral facial mvts  16:39:38  Patient Event  16:39:43  nurse telling him a code word, Purple elephant  16:39:48  Sz 3 stop  16:39:52  limb mvts stop  16:40:02  not responding to nurse  16:40:14  pt not following commands  16:40:37  pt able to follow commands    Seizure 4     Nothing clinical     Impression  Four seizure originating from L Hippo and Entro areas   No seizures recorded 2020/09/02>03 (#3)  No seizures recorded 2020/09/03>04 (#4)  No seizures recorded 2020/09/04>05 (#5)  No seizures recorded 2020/09/05>06 (#6)      BEBETO Marina MD  Professor Emeritus  Epilepsy Division    "

## 2020-09-07 NOTE — SUBJECTIVE & OBJECTIVE
Review of Systems    Review of Symptoms:  Constitutional: Denies fevers, weight loss, chills, or weakness.  Eyes: Denies changes in vision.  ENT: Denies dysphagia, nasal discharge, ear pain or discharge.  Cardiovascular: Denies chest pain, palpitations, orthopnea, or claudication.  Respiratory: Denies shortness of breath, cough, hemoptysis, or wheezing.  GI: Denies nausea/vomitting, hematochezia, melena, abd pain, or changes in appetite.  : Denies dysuria, incontinence, or hematuria.  Musculoskeletal: Denies joint pain or myalgias.  Skin/breast: Denies rashes, lumps, lesions, or discharge.  Neurologic: Denies headache, dizziness, vertigo, or paresthesias.  Psychiatric: Denies changes in mood or hallucinations.  Endocrine: Denies polyuria, polydipsia, heat/cold intolerance.  Hematologic/Lymph: Denies lymphadenopathy, easy bruising or easy bleeding.  Allergic/Immunologic: Denies rash, rhinitis.   Objective:     Vitals:  Temp: 98.4 °F (36.9 °C)  Pulse: 73  Rhythm: normal sinus rhythm  BP: 127/86  MAP (mmHg): 101  Resp: 18  SpO2: 100 %  O2 Device (Oxygen Therapy): room air    Temp  Min: 97.6 °F (36.4 °C)  Max: 98.7 °F (37.1 °C)  Pulse  Min: 52  Max: 114  BP  Min: 106/66  Max: 132/91  MAP (mmHg)  Min: 81  Max: 107  Resp  Min: 9  Max: 35  SpO2  Min: 80 %  Max: 100 %    09/06 0701 - 09/07 0700  In: 600 [P.O.:600]  Out: 1400 [Urine:1400]   Unmeasured Output  Urine Occurrence: 1  Stool Occurrence: 0       Physical Exam    GA:  comfortable, no acute distress.   HEENT: No scleral icterus or JVD.   Pulmonary: Clear to auscultation A/L.   Cardiac: RRR S1 & S2 w/o rubs/murmurs/gallops.   Abdominal: Bowel sounds present x 4. No appreciable hepatosplenomegaly.  Skin: No jaundice, rashes, or visible lesions.  Neuro:  --GCS: E4 V5 M6  --Mental Status:  AAOX4, follows commands, fluent speech  --CN II-XII grossly intact.   --Pupils 3mm, PERRL.   --Corneal reflex, gag, cough intact.  --ELIZABETH spont    Medications:  Continuous  Scheduledcitalopram, 20 mg, Daily  cloBAZam, 10 mg, BID  heparin (porcine), 5,000 Units, Q8H  senna-docusate 8.6-50 mg, 1 tablet, Daily  topiramate, 50 mg, BID  vancomycin (VANCOCIN) IVPB, 1,500 mg, Q12H    PRNacetaminophen, 650 mg, Q4H PRN  hydrALAZINE, 10 mg, Q4H PRN  labetaloL, 10 mg, Q4H PRN  magnesium oxide, 800 mg, PRN  magnesium oxide, 800 mg, PRN  oxyCODONE, 5 mg, Q6H PRN  potassium chloride, 40 mEq, PRN  potassium chloride, 40 mEq, PRN  potassium chloride, 40 mEq, PRN  potassium, sodium phosphates, 2 packet, PRN  potassium, sodium phosphates, 2 packet, PRN  potassium, sodium phosphates, 2 packet, PRN  promethazine (PHENERGAN) IVPB, 6.25 mg, Q8H PRN      Today I personally reviewed pertinent medications, lines/drains/airways, imaging, cardiology results, laboratory results,     Diet  Diet Adult Regular (IDDSI Level 7)

## 2020-09-07 NOTE — SUBJECTIVE & OBJECTIVE
Interval History: No sz overnight.     Medications:  Continuous Infusions:  Scheduled Meds:   citalopram  20 mg Oral Daily    cloBAZam  10 mg Oral BID    heparin (porcine)  5,000 Units Subcutaneous Q8H    senna-docusate 8.6-50 mg  1 tablet Oral Daily    topiramate  50 mg Oral BID    vancomycin (VANCOCIN) IVPB  1,500 mg Intravenous Q12H     PRN Meds:acetaminophen, hydrALAZINE, labetaloL, magnesium oxide, magnesium oxide, oxyCODONE, potassium chloride, potassium chloride, potassium chloride, potassium, sodium phosphates, potassium, sodium phosphates, potassium, sodium phosphates, promethazine (PHENERGAN) IVPB     Review of Systems   Neurological: Positive for seizures.   All other systems reviewed and are negative.    Objective:     Weight: 78.5 kg (173 lb 1 oz)  Body mass index is 22.83 kg/m².  Vital Signs (Most Recent):  Temp: 98.4 °F (36.9 °C) (09/07/20 1505)  Pulse: 74 (09/07/20 1505)  Resp: (!) 21 (09/07/20 1505)  BP: 122/80 (09/07/20 1505)  SpO2: 99 % (09/07/20 1505) Vital Signs (24h Range):  Temp:  [97.6 °F (36.4 °C)-98.7 °F (37.1 °C)] 98.4 °F (36.9 °C)  Pulse:  [] 74  Resp:  [9-35] 21  SpO2:  [80 %-100 %] 99 %  BP: (104-132)/(59-92) 122/80     Date 09/07/20 0700 - 09/08/20 0659   Shift 3333-9807 7089-1932 2452-4278 24 Hour Total   INTAKE   P.O. 480   480   Shift Total(mL/kg) 480(6.1)   480(6.1)   OUTPUT   Urine(mL/kg/hr) 225(0.4) 200  425   Shift Total(mL/kg) 225(2.9) 200(2.5)  425(5.4)   Weight (kg) 78.5 78.5 78.5 78.5                        Physical Exam:    Eyes: Pupils are equal, round, and reactive to light. EOM are normal.     Musculoskeletal:        Right Upper Extremities: Muscle strength is 5/5.        Left Upper Extremities: Muscle strength is 5/5.       Right Lower Extremities: Muscle strength is 5/5.        Left Lower Extremities: Muscle strength is 5/5.     Neurological:        Cranial nerves: Cranial nerve(s) II, III, IV, V, VI, VII, VIII, IX, X, XI and XII are intact.        Significant Labs:  Recent Labs   Lab 09/06/20 0132 09/07/20  0049 09/07/20  1233   GLU 87 95  --     136  --    K 3.5 3.3* 4.3    106  --    CO2 19* 21*  --    BUN 13 14  --    CREATININE 0.8 0.9  --    CALCIUM 9.3 9.6  --    MG 1.8 1.9  --      Recent Labs   Lab 09/06/20 0132 09/07/20 0049   WBC 6.91 8.42   HGB 15.6 15.8   HCT 44.2 43.7    229     No results for input(s): LABPT, INR, APTT in the last 48 hours.  Microbiology Results (last 7 days)     ** No results found for the last 168 hours. **        All pertinent labs from the last 24 hours have been reviewed.    Significant Diagnostics:  I have reviewed all pertinent imaging results/findings within the past 24 hours.

## 2020-09-07 NOTE — ASSESSMENT & PLAN NOTE
25 M with intractable epilepsy s/p multiple failed AEDs and VNS placement now s/p sEEG lead placement:    Continues to be stable neurologically, will continue to monitor until procedure on 9/9/2020    Admit to [facility]  q1h neurochecks  sEEG leads in place, abx while in place, continue headwrap  Bed rest at all times  SBP<140  No HOB restrictions  Ok for recumbent bike in bed  Ok for diet  Voiding spontaneously s/p michelle removal  Post operative CTH reviewed - expected post op changes  Monitor for seizure activity - no benzos or sedating meds unless cleared by Epilepsy team  AEDs per Epilepsy  Please page w/exam change  Tentative sEEG bolt removal on 9/9, booked, will obtain consent.     Dispo: continue ICU care

## 2020-09-07 NOTE — ASSESSMENT & PLAN NOTE
-s/p sEEG placement  -cefazolin 2 q8 prophylaxis  -SBP goal <140  -NSGY and epilepsy following  -seizure precautions  -continue: Topiramate 50 mg BID, lamotrigine 100mg BID, clobazam 20 mg BID, hold home med Vimpat  -neuro checks q 1 hr  9/2/2020: 1 seizure this morning associated with lip smacking  -per epilepsy: Held home Vimpat 8/31  - Continue Topamax 50 mg BID  9/3/2020: no seizures this AM, continue to hold vimpat, continue SQH for DVT ppx  - Per epilepsy: If no seizures throughout day, plan to decrease Onfi to 10 mg BID (Dr. Abhijeet Do to change tonight if needed).   9/7/2020: continue AEDs per epilepsy team

## 2020-09-07 NOTE — PROGRESS NOTES
[facility]  Neurocritical Care  Progress Note    Admit Date: 8/31/2020  Service Date: 09/07/2020  Length of Stay: 7    Subjective:     Chief Complaint: Epilepsy    History of Present Illness: The patient is a 25 year old M with PMHx of depression, seizures, epilepsy admitted to [facility] s/p placement of sEED electrodes bilateral. Per chart review he has failed at least 4 AEDs at therapeutic dosages. Triggers include being tired and being stressed. He is not working (on disability) but finished his GED. He lives next door to grandmother and nearby to his mother. His major concern is memory loss from ongoing seizures. NSGY and epilepsy following. Patient admitted to [facility] for close monitoring and higher level of care.     Hospital Course: 8/31/2020: patient admitted to [facility] s/p sEEG electrodes placement, NSGY and epilepsy following, SBP goal < 140, perepilepsy restarted all AEDs except for Vimpat  9/1/2020: 1 episode of seizure, Vimpat  On hold, Onfi and lamictal decreased, continue topamax  9/2/2020: No seizures this AM, epilepsy managing AEDs, started SQH  9/3/2020: No electrographic seizures in AM, Per epilepsy: If no seizures throughout day, plan to decrease Onfi to 10 mg BID (Dr. Abhijeet Do to change tonight if needed)  9/4/2020: d/c A-line  9/5/2020: NAEON  9/6/2020: MERLENE, plan for removal of sEEG on 9/9 09/07/2020  naeon        Review of Systems    Review of Symptoms:  Constitutional: Denies fevers, weight loss, chills, or weakness.  Eyes: Denies changes in vision.  ENT: Denies dysphagia, nasal discharge, ear pain or discharge.  Cardiovascular: Denies chest pain, palpitations, orthopnea, or claudication.  Respiratory: Denies shortness of breath, cough, hemoptysis, or wheezing.  GI: Denies nausea/vomitting, hematochezia, melena, abd pain, or changes in appetite.  : Denies dysuria, incontinence, or hematuria.  Musculoskeletal: Denies joint pain or myalgias.  Skin/breast: Denies rashes, lumps,  lesions, or discharge.  Neurologic: Denies headache, dizziness, vertigo, or paresthesias.  Psychiatric: Denies changes in mood or hallucinations.  Endocrine: Denies polyuria, polydipsia, heat/cold intolerance.  Hematologic/Lymph: Denies lymphadenopathy, easy bruising or easy bleeding.  Allergic/Immunologic: Denies rash, rhinitis.   Objective:     Vitals:  Temp: 98.4 °F (36.9 °C)  Pulse: 73  Rhythm: normal sinus rhythm  BP: 127/86  MAP (mmHg): 101  Resp: 18  SpO2: 100 %  O2 Device (Oxygen Therapy): room air    Temp  Min: 97.6 °F (36.4 °C)  Max: 98.7 °F (37.1 °C)  Pulse  Min: 52  Max: 114  BP  Min: 106/66  Max: 132/91  MAP (mmHg)  Min: 81  Max: 107  Resp  Min: 9  Max: 35  SpO2  Min: 80 %  Max: 100 %    09/06 0701 - 09/07 0700  In: 600 [P.O.:600]  Out: 1400 [Urine:1400]   Unmeasured Output  Urine Occurrence: 1  Stool Occurrence: 0       Physical Exam    GA:  comfortable, no acute distress.   HEENT: No scleral icterus or JVD.   Pulmonary: Clear to auscultation A/L.   Cardiac: RRR S1 & S2 w/o rubs/murmurs/gallops.   Abdominal: Bowel sounds present x 4. No appreciable hepatosplenomegaly.  Skin: No jaundice, rashes, or visible lesions.  Neuro:  --GCS: E4 V5 M6  --Mental Status:  AAOX4, follows commands, fluent speech  --CN II-XII grossly intact.   --Pupils 3mm, PERRL.   --Corneal reflex, gag, cough intact.  --ELIZABETH spont    Medications:  Continuous Scheduledcitalopram, 20 mg, Daily  cloBAZam, 10 mg, BID  heparin (porcine), 5,000 Units, Q8H  senna-docusate 8.6-50 mg, 1 tablet, Daily  topiramate, 50 mg, BID  vancomycin (VANCOCIN) IVPB, 1,500 mg, Q12H    PRNacetaminophen, 650 mg, Q4H PRN  hydrALAZINE, 10 mg, Q4H PRN  labetaloL, 10 mg, Q4H PRN  magnesium oxide, 800 mg, PRN  magnesium oxide, 800 mg, PRN  oxyCODONE, 5 mg, Q6H PRN  potassium chloride, 40 mEq, PRN  potassium chloride, 40 mEq, PRN  potassium chloride, 40 mEq, PRN  potassium, sodium phosphates, 2 packet, PRN  potassium, sodium phosphates, 2 packet, PRN  potassium,  sodium phosphates, 2 packet, PRN  promethazine (PHENERGAN) IVPB, 6.25 mg, Q8H PRN      Today I personally reviewed pertinent medications, lines/drains/airways, imaging, cardiology results, laboratory results,     Diet  Diet Adult Regular (IDDSI Level 7)      Assessment/Plan:     Neuro  * Epilepsy  -s/p sEEG placement  -cefazolin 2 q8 prophylaxis  -SBP goal <140  -NSGY and epilepsy following  -seizure precautions  -continue: Topiramate 50 mg BID, lamotrigine 100mg BID, clobazam 20 mg BID, hold home med Vimpat  -neuro checks q 1 hr  9/2/2020: 1 seizure this morning associated with lip smacking  -per epilepsy: Held home Vimpat 8/31  - Continue Topamax 50 mg BID  9/3/2020: no seizures this AM, continue to hold vimpat, continue SQH for DVT ppx  - Per epilepsy: If no seizures throughout day, plan to decrease Onfi to 10 mg BID (Dr. Abhijeet Do to change tonight if needed).   9/7/2020: continue AEDs per epilepsy team         Psychiatric  Depression  - continue home med celexa          The patient is being Prophylaxed for:  Venous Thromboembolism with: Mechanical or Chemical  Stress Ulcer with: Not Applicable   Ventilator Pneumonia with: not applicable    Activity Orders          Diet Adult Regular (IDDSI Level 7): Regular starting at 09/01 0941        Full Code    Tony Cantor MD  Neurocritical Care  [facility]    Level 3 of hospital care time was spent personally by me on the following activities: development of treatment plan with patient or surrogate and bedside caregivers, discussions with consultants, evaluation of patient's response to treatment, examination of patient, ordering and performing treatments and interventions, ordering and review of laboratory studies, ordering and review of radiographic studies, pulse oximetry, antibiotic titration if applicable, vasopressor titration if applicable, re-evaluation of patient's condition. This care time did not overlap with that of any other  provider or involve time for any procedures.

## 2020-09-07 NOTE — PROGRESS NOTES
Discontinuing lamotrigine in order to elicit additional seizures.    Kiara Luque MD PhD  Neurology-Epilepsy  [facility].  [facility]

## 2020-09-07 NOTE — PROGRESS NOTES
[facility]  Neurosurgery  Progress Note    Subjective:     History of Present Illness: Darin Cunha is a 25 y.o. male with intractable epilepsy since 2013 who presents as a referral from Dr. Abhijeet Do to discuss the gamut of epilepsy surgery options. Today's visit is a video visit, and the patient is a passenger in a moving vehicle. His mother is not with him.      The patient states that when he has events, he stares off and smacks his lips. The episodes last for about 5-6 minutes. He often waves his hands and feels tired afterwards. He currently has about 3-5 episodes a week. He has failed at least 4 AEDs at therapeutic dosages.      Per EEG review, he has bilateral temporal involvement, moreso left-sided than right.      Triggers include being tired and being stressed. He is not working (on disability) but finished his GED. He lives next door to grandmother and nearby to his mother. His major concern is memory loss from ongoing seizures.      Of notes, he endorses a history of staph infection previously. He denies history of bleeding or anesthetic complications. He has a VNS in place.        Post-Op Info:  Procedure(s) (LRB):  CREATION, CRANIAL YUNG HOLE, PLACEMENT OF THE FIDUCIAL SCREWS BILATERAL 2.PLACEMENT OF SEEG ELECTRODES BILATERAL WITH OWEN ROBOT (Bilateral)   6 Days Post-Op      Neurosurgery Physical Exam      Physical Exam:     Constitutional: No distress.      Eyes: Pupils are equal, round, and reactive to light. EOM are normal.      Cardiovascular: Normal rate and regular rhythm.     Musculoskeletal:        Right Upper Extremities: Muscle strength is 5/5.        Left Upper Extremities: Muscle strength is 5/5.       Right Lower Extremities: Muscle strength is 5/5.        Left Lower Extremities: Muscle strength is 5/5.     Neurological:        Cranial nerves: Cranial nerve(s) II, III, IV, V, VI, VII, VIII, IX, X, XI and XII are intact.         Physical Exam:     Constitutional: No  distress.      Eyes: Pupils are equal, round, and reactive to light. EOM are normal.      Cardiovascular: Normal rate and regular rhythm.     Musculoskeletal:        Right Upper Extremities: Muscle strength is 5/5.        Left Upper Extremities: Muscle strength is 5/5.       Right Lower Extremities: Muscle strength is 5/5.        Left Lower Extremities: Muscle strength is 5/5.     Neurological:        Cranial nerves: Cranial nerve(s) II, III, IV, V, VI, VII, VIII, IX, X, XI and XII are intact.        Assessment/Plan:     * Epilepsy  25 M with intractable epilepsy s/p multiple failed AEDs and VNS placement now s/p sEEG lead placement:    Continues to be stable neurologically, will continue to monitor until procedure on 9/9/2020    Admit to [facility]  q1h neurochecks  sEEG leads in place, abx while in place, continue headwrap  Bed rest at all times  SBP<140  No HOB restrictions  Ok for recumbent bike in bed  Ok for diet  Voiding spontaneously s/p michelle removal  Post operative CTH reviewed - expected post op changes  Monitor for seizure activity - no benzos or sedating meds unless cleared by Epilepsy team  AEDs per Epilepsy  Please page w/exam change  Tentative sEEG bolt removal on 9/9, booked, will obtain consent.     Dispo: continue ICU care    Appreciate neuro recs/ DC lamotragine to elicit seizures.       Harshal Tony MD  Neurosurgery  [facility]

## 2020-09-07 NOTE — PROGRESS NOTES
[facility]  Neurosurgery  Progress Note    Subjective:     History of Present Illness: Darin Cunha is a 25 y.o. male with intractable epilepsy since 2013 who presents as a referral from Dr. Abhijeet Do to discuss the gamut of epilepsy surgery options. Today's visit is a video visit, and the patient is a passenger in a moving vehicle. His mother is not with him.      The patient states that when he has events, he stares off and smacks his lips. The episodes last for about 5-6 minutes. He often waves his hands and feels tired afterwards. He currently has about 3-5 episodes a week. He has failed at least 4 AEDs at therapeutic dosages.      Per EEG review, he has bilateral temporal involvement, moreso left-sided than right.      Triggers include being tired and being stressed. He is not working (on disability) but finished his GED. He lives next door to grandmother and nearby to his mother. His major concern is memory loss from ongoing seizures.      Of notes, he endorses a history of staph infection previously. He denies history of bleeding or anesthetic complications. He has a VNS in place.        Post-Op Info:  Procedure(s) (LRB):  CREATION, CRANIAL YUNG HOLE, PLACEMENT OF THE FIDUCIAL SCREWS BILATERAL 2.PLACEMENT OF SEEG ELECTRODES BILATERAL WITH OWEN ROBOT (Bilateral)   7 Days Post-Op     Interval History: No sz overnight.     Medications:  Continuous Infusions:  Scheduled Meds:   citalopram  20 mg Oral Daily    cloBAZam  10 mg Oral BID    heparin (porcine)  5,000 Units Subcutaneous Q8H    senna-docusate 8.6-50 mg  1 tablet Oral Daily    topiramate  50 mg Oral BID    vancomycin (VANCOCIN) IVPB  1,500 mg Intravenous Q12H     PRN Meds:acetaminophen, hydrALAZINE, labetaloL, magnesium oxide, magnesium oxide, oxyCODONE, potassium chloride, potassium chloride, potassium chloride, potassium, sodium phosphates, potassium, sodium phosphates, potassium, sodium phosphates, promethazine (PHENERGAN)  Physical Therapy  Facility/Department: [address] REHAB  Daily Treatment Note  - AM and PM Sessions    NAME: Ying Henderson  : 1972  MRN: [id number]    Date of Service: 8/15/2019    Discharge Recommendations:  Patient would benefit from continued therapy after discharge, 5-7 sessions per week   PT Equipment Recommendations  Other: will continue to assess for least restrictive assistive device (LRAD)    Assessment   Body structures, Functions, Activity limitations: Decreased functional mobility   Assessment: Patient continues to be driven to improve, but needs sufficient rest breaks due to fatigue. She is used to working third shift and will need to allow some time to adjust her schedule. She was able to transfer with min A and ambulate with hemiwalker for 15' with min A. The motor control in her left arm is significantly more impaired than her left leg. Attempted WC mobility, but complained of cramping in her R hamstrings and required several rest breaks, including assitance to stretch her hamstrings. She would benefit from continue intensive therapies for 2-3 weeks on inpatient rehab to improve her L-sided weakness, increase safety awareness, and maximize her independence performing all mobility tasks. Treatment Diagnosis: Decreased functional mobility; Difficulty walking; Decreased strength; Decreased ROM  Prognosis: Good  Decision Making: Medium Complexity  History: See below  PT Education: General Safety;Gait Training  Patient Education: progression of recovery after stroke, post-stroke fatigue, typical process for relating therapy schedule for the following day  Barriers to Learning: slightly impulsive; some L sided neglect  REQUIRES PT FOLLOW UP: Yes  Activity Tolerance  Activity Tolerance: Patient Tolerated treatment well;Patient limited by fatigue     Patient Diagnosis(es): There were no encounter diagnoses. has a past medical history of Hypertension.    has a past surgical history that IVPB     Review of Systems   Neurological: Positive for seizures.   All other systems reviewed and are negative.    Objective:     Weight: 78.5 kg (173 lb 1 oz)  Body mass index is 22.83 kg/m².  Vital Signs (Most Recent):  Temp: 98.4 °F (36.9 °C) (09/07/20 1505)  Pulse: 74 (09/07/20 1505)  Resp: (!) 21 (09/07/20 1505)  BP: 122/80 (09/07/20 1505)  SpO2: 99 % (09/07/20 1505) Vital Signs (24h Range):  Temp:  [97.6 °F (36.4 °C)-98.7 °F (37.1 °C)] 98.4 °F (36.9 °C)  Pulse:  [] 74  Resp:  [9-35] 21  SpO2:  [80 %-100 %] 99 %  BP: (104-132)/(59-92) 122/80     Date 09/07/20 0700 - 09/08/20 0659   Shift 0848-5414 9536-3550 4306-5321 24 Hour Total   INTAKE   P.O. 480   480   Shift Total(mL/kg) 480(6.1)   480(6.1)   OUTPUT   Urine(mL/kg/hr) 225(0.4) 200  425   Shift Total(mL/kg) 225(2.9) 200(2.5)  425(5.4)   Weight (kg) 78.5 78.5 78.5 78.5                        Physical Exam:    Eyes: Pupils are equal, round, and reactive to light. EOM are normal.     Musculoskeletal:        Right Upper Extremities: Muscle strength is 5/5.        Left Upper Extremities: Muscle strength is 5/5.       Right Lower Extremities: Muscle strength is 5/5.        Left Lower Extremities: Muscle strength is 5/5.     Neurological:        Cranial nerves: Cranial nerve(s) II, III, IV, V, VI, VII, VIII, IX, X, XI and XII are intact.       Significant Labs:  Recent Labs   Lab 09/06/20  0132 09/07/20  0049 09/07/20  1233   GLU 87 95  --     136  --    K 3.5 3.3* 4.3    106  --    CO2 19* 21*  --    BUN 13 14  --    CREATININE 0.8 0.9  --    CALCIUM 9.3 9.6  --    MG 1.8 1.9  --      Recent Labs   Lab 09/06/20  0132 09/07/20  0049   WBC 6.91 8.42   HGB 15.6 15.8   HCT 44.2 43.7    229     No results for input(s): LABPT, INR, APTT in the last 48 hours.  Microbiology Results (last 7 days)     ** No results found for the last 168 hours. **        All pertinent labs from the last 24 hours have been reviewed.    Significant Diagnostics:  I  includes  section. Restrictions  Restrictions/Precautions  Restrictions/Precautions: Fall Risk  Position Activity Restriction  Other position/activity restrictions: Impulsive, mild left neglect. Cardiac diet. Subjective   General  Chart Reviewed: Yes  Additional Pertinent Hx: Per Dr. Tito Diallo, , \"49 y.o. female who presented with facial weakness and progressed to having left face and arm weakness in setting of hypertension overnight. CTA with bilateral soft plaque at bifurcation. Exam with left face and arm weakness much more than leg and right gaze preference suggesting of cortical right MCA stroke. Patient found to have thrombotic right MCA stroke on MRI in deep right corona radiata area. Patient started on DAPT with aspirin and Plavix, blood pressure medication, and patient was started in therapies. Patient is well below her baseline function of being Ind without an AD and working full time; pt currently is needing assist of 1-2 for mobility tasks and needing pedrito stedy lift for transfers with nursing and A of 2 for short distance gait with hemiwalker. Patient needs more therapy before discharge to home, where she lives with her son in a 2nd floor apartment. Patient was approved by her insurance for rehab unit admission today. She is agreeable to Rehab Unit treatment\"  Response To Previous Treatment: Patient with no complaints from previous session. Referring Practitioner: Dr. Chantal Valencia: Patient very talkative, talking to everyone walking past her. Complaining of pain/discomfort in L UE due to attempting to use it with functional activites. Complain of significant fatigue - woke up at 4:30 AM, usually works third shift, and apparently was not told what time her therapy sessions were scheduled today until this morning.             Orientation  Orientation  Overall Orientation Status: Within Functional Limits  Cognition      Objective   Bed mobility  Supine to Sit: Minimal have reviewed all pertinent imaging results/findings within the past 24 hours.    Assessment/Plan:     * Epilepsy  25 M with intractable epilepsy s/p multiple failed AEDs and VNS placement now s/p sEEG lead placement:    Continues to be stable neurologically, will continue to monitor until procedure on 9/9/2020    Admit to [facility]  q1h neurochecks  sEEG leads in place, abx while in place, continue headwrap  Bed rest at all times  SBP<140  No HOB restrictions  Ok for recumbent bike in bed  Ok for diet  Voiding spontaneously s/p michelle removal  Post operative CTH reviewed - expected post op changes  Monitor for seizure activity - no benzos or sedating meds unless cleared by Epilepsy team  AEDs per Epilepsy  Please page w/exam change  Tentative sEEG bolt removal on 9/9, booked, will obtain consent.     Dispo: continue ICU care        Harshal Tony MD  Neurosurgery  [facility]

## 2020-09-07 NOTE — PLAN OF CARE
[facility] Care Plan    POC reviewed with Darin Cunha and family at 0300. Pt verbalized understanding. Questions and concerns addressed. No acute events overnight. Pt progressing toward goals. Will continue to monitor. See below and flowsheets for full assessment and VS info.   Pt had no seizure activity throughout the shift. WCTM      Neuro:  Benedict Coma Scale  Best Eye Response: 4-->(E4) spontaneous  Best Motor Response: 6-->(M6) obeys commands  Best Verbal Response: 5-->(V5) oriented  Benedict Coma Scale Score: 15  Assessment Qualifiers: patient not sedated/intubated  Pupil PERRLA: yes     24hr Temp:  [97.6 °F (36.4 °C)-98.7 °F (37.1 °C)]     CV:   Rhythm: normal sinus rhythm  BP goals:   SBP < 140      Resp:   O2 Device (Oxygen Therapy): room air       Plan: N/A    GI/:  STEPHENIE Total Score: 20  Diet/Nutrition Received: regular  Last Bowel Movement: 09/06/20  Voiding Characteristics: voids spontaneously without difficulty    Intake/Output Summary (Last 24 hours) at 9/7/2020 0348  Last data filed at 9/7/2020 0005  Gross per 24 hour   Intake 600 ml   Output 2000 ml   Net -1400 ml     Unmeasured Output  Urine Occurrence: 1  Stool Occurrence: 1    Labs/Accuchecks:  Recent Labs   Lab 09/07/20  0049   WBC 8.42   RBC 5.03   HGB 15.8   HCT 43.7         Recent Labs   Lab 09/07/20  0049      K 3.3*   CO2 21*      BUN 14   CREATININE 0.9   ALKPHOS 107   ALT 40   AST 39   BILITOT 0.9      Recent Labs   Lab 08/31/20  0531   INR 1.0   APTT 31.4    No results for input(s): CPK, CPKMB, TROPONINI, MB in the last 168 hours.    Electrolytes: Electrolytes replaced (Potassium)  Accuchecks: none    Gtts:       LDA/Wounds:  Lines/Drains/Airways       Peripheral Intravenous Line                   Peripheral IV - Single Lumen 09/04/20 1101 18 G Left Antecubital 2 days         Peripheral IV - Single Lumen 09/06/20 1127 20 G Right Antecubital less than 1 day

## 2020-09-08 ENCOUNTER — ANESTHESIA EVENT (OUTPATIENT)
Dept: SURGERY | Facility: HOSPITAL | Age: 25
DRG: 027 | End: 2020-09-08
Payer: MEDICAID

## 2020-09-08 LAB
ABO + RH BLD: NORMAL
ALBUMIN SERPL BCP-MCNC: 4.1 G/DL (ref 3.5–5.2)
ALP SERPL-CCNC: 104 U/L (ref 55–135)
ALT SERPL W/O P-5'-P-CCNC: 40 U/L (ref 10–44)
ANION GAP SERPL CALC-SCNC: 9 MMOL/L (ref 8–16)
AST SERPL-CCNC: 28 U/L (ref 10–40)
BASOPHILS # BLD AUTO: 0.03 K/UL (ref 0–0.2)
BASOPHILS NFR BLD: 0.3 % (ref 0–1.9)
BILIRUB SERPL-MCNC: 0.6 MG/DL (ref 0.1–1)
BLD GP AB SCN CELLS X3 SERPL QL: NORMAL
BUN SERPL-MCNC: 13 MG/DL (ref 6–20)
CALCIUM SERPL-MCNC: 9.1 MG/DL (ref 8.7–10.5)
CHLORIDE SERPL-SCNC: 107 MMOL/L (ref 95–110)
CO2 SERPL-SCNC: 22 MMOL/L (ref 23–29)
CREAT SERPL-MCNC: 0.9 MG/DL (ref 0.5–1.4)
DIFFERENTIAL METHOD: ABNORMAL
EOSINOPHIL # BLD AUTO: 0.1 K/UL (ref 0–0.5)
EOSINOPHIL NFR BLD: 1 % (ref 0–8)
ERYTHROCYTE [DISTWIDTH] IN BLOOD BY AUTOMATED COUNT: 11.6 % (ref 11.5–14.5)
EST. GFR  (AFRICAN AMERICAN): >60 ML/MIN/1.73 M^2
EST. GFR  (NON AFRICAN AMERICAN): >60 ML/MIN/1.73 M^2
GLUCOSE SERPL-MCNC: 97 MG/DL (ref 70–110)
HCT VFR BLD AUTO: 43.8 % (ref 40–54)
HGB BLD-MCNC: 15.8 G/DL (ref 14–18)
IMM GRANULOCYTES # BLD AUTO: 0.04 K/UL (ref 0–0.04)
IMM GRANULOCYTES NFR BLD AUTO: 0.4 % (ref 0–0.5)
LYMPHOCYTES # BLD AUTO: 1.4 K/UL (ref 1–4.8)
LYMPHOCYTES NFR BLD: 15 % (ref 18–48)
MAGNESIUM SERPL-MCNC: 1.9 MG/DL (ref 1.6–2.6)
MCH RBC QN AUTO: 31.3 PG (ref 27–31)
MCHC RBC AUTO-ENTMCNC: 36.1 G/DL (ref 32–36)
MCV RBC AUTO: 87 FL (ref 82–98)
MONOCYTES # BLD AUTO: 0.9 K/UL (ref 0.3–1)
MONOCYTES NFR BLD: 9.2 % (ref 4–15)
NEUTROPHILS # BLD AUTO: 7.1 K/UL (ref 1.8–7.7)
NEUTROPHILS NFR BLD: 74.1 % (ref 38–73)
NRBC BLD-RTO: 0 /100 WBC
PHOSPHATE SERPL-MCNC: 3.8 MG/DL (ref 2.7–4.5)
PLATELET # BLD AUTO: 260 K/UL (ref 150–350)
PMV BLD AUTO: 10.1 FL (ref 9.2–12.9)
POTASSIUM SERPL-SCNC: 3.9 MMOL/L (ref 3.5–5.1)
PROT SERPL-MCNC: 6.8 G/DL (ref 6–8.4)
RBC # BLD AUTO: 5.04 M/UL (ref 4.6–6.2)
SODIUM SERPL-SCNC: 138 MMOL/L (ref 136–145)
WBC # BLD AUTO: 9.53 K/UL (ref 3.9–12.7)

## 2020-09-08 PROCEDURE — 95720 EEG PHY/QHP EA INCR W/VEEG: CPT | Mod: ,,, | Performed by: PSYCHIATRY & NEUROLOGY

## 2020-09-08 PROCEDURE — 63600175 PHARM REV CODE 636 W HCPCS: Performed by: NEUROLOGICAL SURGERY

## 2020-09-08 PROCEDURE — 86850 RBC ANTIBODY SCREEN: CPT

## 2020-09-08 PROCEDURE — 25000003 PHARM REV CODE 250: Performed by: NEUROLOGICAL SURGERY

## 2020-09-08 PROCEDURE — 80053 COMPREHEN METABOLIC PANEL: CPT

## 2020-09-08 PROCEDURE — 25000003 PHARM REV CODE 250: Performed by: PSYCHIATRY & NEUROLOGY

## 2020-09-08 PROCEDURE — 95714 VEEG EA 12-26 HR UNMNTR: CPT

## 2020-09-08 PROCEDURE — 95720 PR EEG, W/VIDEO, CONT RECORD, I&R, >12<26 HRS: ICD-10-PCS | Mod: ,,, | Performed by: PSYCHIATRY & NEUROLOGY

## 2020-09-08 PROCEDURE — 99233 SBSQ HOSP IP/OBS HIGH 50: CPT | Mod: ,,, | Performed by: PSYCHIATRY & NEUROLOGY

## 2020-09-08 PROCEDURE — 20000000 HC ICU ROOM

## 2020-09-08 PROCEDURE — 63600175 PHARM REV CODE 636 W HCPCS: Performed by: PHYSICIAN ASSISTANT

## 2020-09-08 PROCEDURE — 84100 ASSAY OF PHOSPHORUS: CPT

## 2020-09-08 PROCEDURE — 25000003 PHARM REV CODE 250: Performed by: NURSE PRACTITIONER

## 2020-09-08 PROCEDURE — 83735 ASSAY OF MAGNESIUM: CPT

## 2020-09-08 PROCEDURE — 25000003 PHARM REV CODE 250: Performed by: PHYSICIAN ASSISTANT

## 2020-09-08 PROCEDURE — 99233 PR SUBSEQUENT HOSPITAL CARE,LEVL III: ICD-10-PCS | Mod: ,,, | Performed by: PSYCHIATRY & NEUROLOGY

## 2020-09-08 PROCEDURE — 85025 COMPLETE CBC W/AUTO DIFF WBC: CPT

## 2020-09-08 RX ORDER — MUPIROCIN 20 MG/G
OINTMENT TOPICAL
Status: CANCELLED | OUTPATIENT
Start: 2020-09-08

## 2020-09-08 RX ORDER — VANCOMYCIN HCL IN 5 % DEXTROSE 1G/250ML
1000 PLASTIC BAG, INJECTION (ML) INTRAVENOUS
Status: CANCELLED | OUTPATIENT
Start: 2020-09-08

## 2020-09-08 RX ORDER — MUPIROCIN 20 MG/G
1 OINTMENT TOPICAL 2 TIMES DAILY
Status: CANCELLED | OUTPATIENT
Start: 2020-09-08 | End: 2020-09-09

## 2020-09-08 RX ADMIN — TOPIRAMATE 50 MG: 25 TABLET, FILM COATED ORAL at 08:09

## 2020-09-08 RX ADMIN — HEPARIN SODIUM 5000 UNITS: 5000 INJECTION INTRAVENOUS; SUBCUTANEOUS at 01:09

## 2020-09-08 RX ADMIN — HEPARIN SODIUM 5000 UNITS: 5000 INJECTION INTRAVENOUS; SUBCUTANEOUS at 06:09

## 2020-09-08 RX ADMIN — HEPARIN SODIUM 5000 UNITS: 5000 INJECTION INTRAVENOUS; SUBCUTANEOUS at 10:09

## 2020-09-08 RX ADMIN — CLOBAZAM 10 MG: 10 TABLET ORAL at 08:09

## 2020-09-08 RX ADMIN — VANCOMYCIN HYDROCHLORIDE 1500 MG: 1.5 INJECTION, POWDER, LYOPHILIZED, FOR SOLUTION INTRAVENOUS at 01:09

## 2020-09-08 RX ADMIN — CITALOPRAM HYDROBROMIDE 20 MG: 10 TABLET ORAL at 08:09

## 2020-09-08 NOTE — PROGRESS NOTES
[facility]  Neurocritical Care  Progress Note    Admit Date: 8/31/2020  Service Date: 09/08/2020  Length of Stay: 8    Subjective:     Chief Complaint: Epilepsy    History of Present Illness: The patient is a 25 year old M with PMHx of depression, seizures, epilepsy admitted to [facility] s/p placement of sEED electrodes bilateral. Per chart review he has failed at least 4 AEDs at therapeutic dosages. Triggers include being tired and being stressed. He is not working (on disability) but finished his GED. He lives next door to grandmother and nearby to his mother. His major concern is memory loss from ongoing seizures. NSGY and epilepsy following. Patient admitted to [facility] for close monitoring and higher level of care.     Hospital Course: 8/31/2020: patient admitted to [facility] s/p sEEG electrodes placement, NSGY and epilepsy following, SBP goal < 140, perepilepsy restarted all AEDs except for Vimpat  9/1/2020: 1 episode of seizure, Vimpat  On hold, Onfi and lamictal decreased, continue topamax  9/2/2020: No seizures this AM, epilepsy managing AEDs, started SQH  9/3/2020: No electrographic seizures in AM, Per epilepsy: If no seizures throughout day, plan to decrease Onfi to 10 mg BID (Dr. Abhijeet Do to change tonight if needed)  9/4/2020: d/c A-line  9/5/2020: NAEON  9/6/2020: MERLENE, plan for removal of sEEG on 9/9 09/07/2020 naeon  9/8/2020: NAEON, off AEDs. Likely bolt removal this Friday        Review of Systems  Constitutional: Denies fevers, weight loss, chills, or weakness.  Eyes: Denies changes in vision.  ENT: Denies dysphagia, nasal discharge, ear pain or discharge.  Cardiovascular: Denies chest pain, palpitations, orthopnea, or claudication.  Respiratory: Denies shortness of breath, cough, hemoptysis, or wheezing.  GI: Denies nausea/vomitting, hematochezia, melena, abd pain, or changes in appetite.  : Denies dysuria, incontinence, or hematuria.  Musculoskeletal: Denies joint pain or  myalgias.  Skin/breast: Denies rashes, lumps, lesions, or discharge.  Neurologic: Denies headache, dizziness, vertigo, or paresthesias.  Psychiatric: Denies changes in mood or hallucinations.  Endocrine: Denies polyuria, polydipsia, heat/cold intolerance.  Hematologic/Lymph: Denies lymphadenopathy, easy bruising or easy bleeding.  Allergic/Immunologic: Denies rash, rhinitis.   Objective:     Vitals:  Temp: 97.9 °F (36.6 °C)  Pulse: 70  Rhythm: normal sinus rhythm  BP: 115/66  MAP (mmHg): 85  Resp: 18  SpO2: 98 %  O2 Device (Oxygen Therapy): room air    Temp  Min: 97.7 °F (36.5 °C)  Max: 98.7 °F (37.1 °C)  Pulse  Min: 51  Max: 110  BP  Min: 101/57  Max: 134/79  MAP (mmHg)  Min: 74  Max: 105  Resp  Min: 16  Max: 47  SpO2  Min: 96 %  Max: 100 %    09/07 0701 - 09/08 0700  In: 600 [P.O.:600]  Out: 1025 [Urine:1025]   Unmeasured Output  Urine Occurrence: 1  Stool Occurrence: 1       Physical Exam  GA: Alert, comfortable, no acute distress.   HEENT: No scleral icterus or JVD.   Pulmonary: Clear to auscultation A/L.   Cardiac: RRR S1 & S2 w/o rubs/murmurs/gallops.   Abdominal: Bowel sounds present x 4. No appreciable hepatosplenomegaly.  Skin: No jaundice, rashes, or visible lesions.  Neuro:  --GCS: E4 V5 M6  --Mental Status:  Awake, oriented X4, follows commands, fluent speech  --CN II-XII grossly intact.   --Pupils 3mm, PERRL.   --Corneal reflex, gag, cough intact.  --ELIZABETH spont    Medications:  Continuous Scheduledcitalopram, 20 mg, Daily  heparin (porcine), 5,000 Units, Q8H  senna-docusate 8.6-50 mg, 1 tablet, Daily  vancomycin (VANCOCIN) IVPB, 1,500 mg, Q12H    PRNacetaminophen, 650 mg, Q4H PRN  hydrALAZINE, 10 mg, Q4H PRN  labetaloL, 10 mg, Q4H PRN  magnesium oxide, 800 mg, PRN  magnesium oxide, 800 mg, PRN  oxyCODONE, 5 mg, Q6H PRN  potassium chloride, 40 mEq, PRN  potassium chloride, 40 mEq, PRN  potassium chloride, 40 mEq, PRN  potassium, sodium phosphates, 2 packet, PRN  potassium, sodium phosphates, 2 packet,  PRN  potassium, sodium phosphates, 2 packet, PRN  promethazine (PHENERGAN) IVPB, 6.25 mg, Q8H PRN      Today I personally reviewed pertinent medications, lines/drains/airways, imaging, cardiology results, laboratory results,     Diet  Diet Adult Regular (IDDSI Level 7)  Diet NPO        Assessment/Plan:     Neuro  * Epilepsy  -s/p sEEG placement  -cefazolin 2 q8 prophylaxis  -SBP goal <140  -NSGY and epilepsy following  -seizure precautions  -continue: Topiramate 50 mg BID, lamotrigine 100mg BID, clobazam 20 mg BID, hold home med Vimpat  -neuro checks q 1 hr  9/2/2020: 1 seizure this morning associated with lip smacking  -per epilepsy: Held home Vimpat 8/31  - Continue Topamax 50 mg BID  9/3/2020: no seizures this AM, continue to hold vimpat, continue SQH for DVT ppx  - Per epilepsy: If no seizures throughout day, plan to decrease Onfi to 10 mg BID (Dr. Abhijeet Do to change tonight if needed).   9/8/2020: off AEDs today per epilepsy        Psychiatric  Depression  - continue home med celexa          The patient is being Prophylaxed for:  Venous Thromboembolism with: Chemical  Stress Ulcer with: None  Ventilator Pneumonia with: not applicable    Activity Orders          Diet NPO: NPO starting at 09/09 0001    Diet Adult Regular (IDDSI Level 7): Regular starting at 09/01 0941        Full Code    Georgie Hoyos NP  Neurocritical Care  [facility]

## 2020-09-08 NOTE — PROGRESS NOTES
[facility]  Neurosurgery  Progress Note    Subjective:     History of Present Illness: Darin Cunha is a 25 y.o. male with intractable epilepsy since 2013 who presents as a referral from Dr. Abhijeet Do to discuss the gamut of epilepsy surgery options. Today's visit is a video visit, and the patient is a passenger in a moving vehicle. His mother is not with him.      The patient states that when he has events, he stares off and smacks his lips. The episodes last for about 5-6 minutes. He often waves his hands and feels tired afterwards. He currently has about 3-5 episodes a week. He has failed at least 4 AEDs at therapeutic dosages.      Per EEG review, he has bilateral temporal involvement, moreso left-sided than right.      Triggers include being tired and being stressed. He is not working (on disability) but finished his GED. He lives next door to grandmother and nearby to his mother. His major concern is memory loss from ongoing seizures.      Of notes, he endorses a history of staph infection previously. He denies history of bleeding or anesthetic complications. He has a VNS in place.        Post-Op Info:  Procedure(s) (LRB):  CREATION, CRANIAL YUNG HOLE, PLACEMENT OF THE FIDUCIAL SCREWS BILATERAL 2.PLACEMENT OF SEEG ELECTRODES BILATERAL WITH OWEN ROBOT (Bilateral)   8 Days Post-Op     Interval History: Off lamictal, no seizures overnight, neurointact.     Medications:  Continuous Infusions:  Scheduled Meds:   citalopram  20 mg Oral Daily    heparin (porcine)  5,000 Units Subcutaneous Q8H    senna-docusate 8.6-50 mg  1 tablet Oral Daily    topiramate  50 mg Oral BID    vancomycin (VANCOCIN) IVPB  1,500 mg Intravenous Q12H     PRN Meds:acetaminophen, hydrALAZINE, labetaloL, magnesium oxide, magnesium oxide, oxyCODONE, potassium chloride, potassium chloride, potassium chloride, potassium, sodium phosphates, potassium, sodium phosphates, potassium, sodium phosphates, promethazine  (PHENERGAN) IVPB     Review of Systems  Objective:     Weight: 78.5 kg (173 lb 1 oz)  Body mass index is 22.83 kg/m².  Vital Signs (Most Recent):  Temp: 97.7 °F (36.5 °C) (09/08/20 0705)  Pulse: (!) 51 (09/08/20 0805)  Resp: (!) 22 (09/08/20 0805)  BP: 112/67 (09/08/20 0805)  SpO2: 99 % (09/08/20 0805) Vital Signs (24h Range):  Temp:  [97.7 °F (36.5 °C)-98.7 °F (37.1 °C)] 97.7 °F (36.5 °C)  Pulse:  [] 51  Resp:  [13-35] 22  SpO2:  [92 %-100 %] 99 %  BP: (105-134)/(67-92) 112/67     Date 09/08/20 0700 - 09/09/20 0659   Shift 2341-3218 3153-4850 9068-9941 24 Hour Total   INTAKE   P.O. 120   120   Shift Total(mL/kg) 120(1.5)   120(1.5)   OUTPUT   Urine(mL/kg/hr) 375   375   Shift Total(mL/kg) 375(4.8)   375(4.8)   Weight (kg) 78.5 78.5 78.5 78.5                        Neurosurgery Physical Exam     GCS15  AOx3  PERRLA/EOMI  CN intact   Full strength throughout    Significant Labs:  Recent Labs   Lab 09/07/20  0049 09/07/20  1233 09/08/20  0207   GLU 95  --  97     --  138   K 3.3* 4.3 3.9     --  107   CO2 21*  --  22*   BUN 14  --  13   CREATININE 0.9  --  0.9   CALCIUM 9.6  --  9.1   MG 1.9  --  1.9     Recent Labs   Lab 09/07/20  0049 09/08/20  0207   WBC 8.42 9.53   HGB 15.8 15.8   HCT 43.7 43.8    260     No results for input(s): LABPT, INR, APTT in the last 48 hours.  Microbiology Results (last 7 days)     ** No results found for the last 168 hours. **        All pertinent labs from the last 24 hours have been reviewed.    Significant Diagnostics:  I have reviewed and interpreted all pertinent imaging results/findings within the past 24 hours.    Assessment/Plan:     * Epilepsy  25 M with intractable epilepsy s/p multiple failed AEDs and VNS placement now s/p sEEG lead placement:    Continues to be stable neurologically, no seizures of lamictal.  -- Plan for OR tomorrow for sEEG removal     Admit to [facility]  q1h neurochecks  sEEG leads in place, abx while in place, continue headwrap  Bed rest  at all times  SBP<140  No HOB restrictions  Ok for recumbent bike in bed  Ok for diet  Voiding spontaneously s/p michelle removal  Post operative CTH reviewed - expected post op changes  Monitor for seizure activity - no benzos or sedating meds unless cleared by Epilepsy team  AEDs per Epilepsy  Please page w/exam change  Tentative sEEG bolt removal on 9/9, booked, will obtain consent.     Dispo: continue ICU care        Ino Collado MD  Neurosurgery  [facility]

## 2020-09-08 NOTE — ASSESSMENT & PLAN NOTE
25 M with intractable epilepsy s/p multiple failed AEDs and VNS placement now s/p sEEG lead placement:    Continues to be stable neurologically, no seizures of lamictal.  -- Plan for OR tomorrow for sEEG removal     Admit to [facility]  q1h neurochecks  sEEG leads in place, abx while in place, continue headwrap  Bed rest at all times  SBP<140  No HOB restrictions  Ok for recumbent bike in bed  Ok for diet  Voiding spontaneously s/p michelle removal  Post operative CTH reviewed - expected post op changes  Monitor for seizure activity - no benzos or sedating meds unless cleared by Epilepsy team  AEDs per Epilepsy  Please page w/exam change  Tentative sEEG bolt removal on 9/9, booked, will obtain consent.     Dispo: continue ICU care

## 2020-09-08 NOTE — PLAN OF CARE
[facility] Care Plan    POC reviewed with Darin Cunha and family at 0300. Pt verbalized understanding. Questions and concerns addressed. No acute events overnight. Pt progressing toward goals. Will continue to monitor. See below and flowsheets for full assessment and VS info.   No seizure activity throughout shift. WCTM      Neuro:  [place] Coma Scale  Best Eye Response: 4-->(E4) spontaneous  Best Motor Response: 6-->(M6) obeys commands  Best Verbal Response: 5-->(V5) oriented  [place] Coma Scale Score: 15  Assessment Qualifiers: patient not sedated/intubated  Pupil PERRLA: yes     24hr Temp:  [97.6 °F (36.4 °C)-98.7 °F (37.1 °C)]     CV:   Rhythm: normal sinus rhythm  BP goals:   SBP < 140      Resp:   O2 Device (Oxygen Therapy): room air       GI/:  STEPHENIE Total Score: 20  Diet/Nutrition Received: regular  Last Bowel Movement: 09/07/20  Voiding Characteristics: voids spontaneously without difficulty    Intake/Output Summary (Last 24 hours) at 9/8/2020 0526  Last data filed at 9/8/2020 0205  Gross per 24 hour   Intake 600 ml   Output 1025 ml   Net -425 ml     Unmeasured Output  Urine Occurrence: 1  Stool Occurrence: 0    Labs/Accuchecks:  Recent Labs   Lab 09/08/20  0207   WBC 9.53   RBC 5.04   HGB 15.8   HCT 43.8         Recent Labs   Lab 09/08/20  0207      K 3.9   CO2 22*      BUN 13   CREATININE 0.9   ALKPHOS 104   ALT 40   AST 28   BILITOT 0.6    No results for input(s): PROTIME, INR, APTT, HEPANTIXA in the last 168 hours. No results for input(s): CPK, CPKMB, TROPONINI, MB in the last 168 hours.    Gtts:       LDA/Wounds:  Lines/Drains/Airways       Peripheral Intravenous Line                   Peripheral IV - Single Lumen 09/04/20 1101 18 G Left Antecubital 3 days         Peripheral IV - Single Lumen 09/08/20 0257 20 G Anterior;Right Forearm less than 1 day                  Wounds: No  Wound care consulted: No

## 2020-09-08 NOTE — PROCEDURES
Stereo EEG Depth Electrode Recording  DATE OF SERVICE: 09/07/2020  EEG NUMBER: [id number]  REQUESTED BY: Dr Alix Ortiz  LOCATION OF SERVICE: [id number]     METHODOLOGY              Depth electrodes consisted of thin wires with electrical contacts it fixed distances along the wire.  These are then implanted using a stereotactic procedure targeting cortical and subcortical structures.  The up to 256 electrode contact can be recorded simultaneously with this procedure.  Recording band pass was 0.1  in the low past filters setting could be set at the 512, 1024, or 2048.  Digital video recording of the patient is simultaneously recorded with the EEG.  The patient is instructed report clinical symptoms which may occur during the recording session.  EEG and video recording is stored and archived in digital format. Activation procedures which include photic stimulation, hyperventilation and instructing patients to perform simple task are done in selected patients.   Compresses spectral analysis (CSA) is also performed on the activity recorded from each individual channel.  This is displayed as a power display of frequencies from 0 to 30 Hz over time.   The CSA analysis is done and displayed continuously.  This is reviewed for asymmetries in power between homologous areas of the scalp and for presence of changes in power which canbe seen when seizures occur.  Sections of suspected abnormalities on the CSA is then compared with the original EEG recording.          Implant Date MRN Name Last First                      8/31/2020 [id number]  Alphonse Webster                            Contacts                   Depth Elect Name SN  # contacts Color 1 Color 2 1 2 3 4 5 6 7 8 9 10 11 12 13 14 15 16   1 L Amyg 24117  14 Yellow Green 1 2 3 4 5 6 7 8 9 10 11 12 13 14     2 L Hippo 99637  14 Black Blue 15 16 17 18 19 20 21 22 23 24 25 26 27 28     3 L Entor 34714  14 Red Blue 29 30 31 32 33 34 35 36 37 38 39 40 41 42     4 L AntCi 32017  14  Red Green 43 44 45 46 47 48 49 50 51 52 53 54 55 56     5 L OrbFr 53203  16 Blue Red 57 58 59 60 61 62 63 64 65 66 67 68 69 70 71 72   6 L PreFr 89355  16 Yellow Green 73 74 75 76 77 78 79 80 81 82 83 84 85 86 87 88   7 L AntLn 60169  8 Black Green 89 90 91 92 93 94 95 96           8 L PoIns 48387  8 Yellow Black 97 98 99 100 101 102 103 104           9 L TeOcc 75306  8 Black Red 105 106 107 108 109 110 111 112           10 L Pariet 09603  8 Black Red 113 114 115 116 117 118 119 120           11 R Amyg 96896  12 Green Red 121 122 123 124 125 126 127 128 129 130 131 132       12 R Hippo 39189  12 Green Blue 133 134 135 136 137 138 139 140 141 142 143 144       13 R Asuin 64708  8 Blue Red 145 146 147 148 149 150 151 152           14 R Acing 08716  16 Yellow Blue 153 154 155 156 157 158 159 160 161 162 163 164 165 166 167 168       Electrode integrety   High amplitude artifact present from the following contacts               Depth electrode          Contact #                       L Pariet  4              L Entor   12, 13, 14  The L Pariet  3, 4   R Amygd  2, 3    11, 12  Appropriate recording obtained from the remaining electrode contacts.     RECORDING TIMES  Start on 09/07/2020 7:00 a.m.  Stop on 09/08/2020 7:00 a.m.  A total of 24 hrs of EEG monitoring was obtained    Interictal Activity  Inner ictal activity continues to be frequently recorded.  Of commonly starting from the left hippocampus contacts 10 11 and 12,  spread is most common to the ipsilateral depth electrodes however and occasional initial spread is noted to the right hippocampus.  Infrequently a right hippocampal spike discharges noted.  The  Seizures Recorded   Date  Start  End  Sz 1 2020/09/01 08:34:43 08:35:28    Sz 2 2020/09/01 13:18:13 13:18:55    Sz 3  2020/09/01  16:39:13   start L hippo 1 2   16:39:13   sz spread Amyg Entor Prefr   16:39:38   Patient Event   16:39:43   nurse telling him a code word, Purple elephant   16:39:48  Sz 3  "electrographic stop   16:40:14  pt not following commands   16:40:37  pt able to follow commands    Sz 4 2020/09/01    21:05:53 start Emtpr > Hippo    very focal   21:06:17 Stop      EEG FINDINGS  Ictal Recording   Seizure #         Date                 Start                 End  #1                    2020/09/01 08:34:43 08:35:28   Build up started in L Hippo contacts 1, 2  >> 3, 4      then spread to L Hippo contacts 11, 12, 13 L Amygd contacts 11, 12, 13      then spread to L Entor contacts 11, 12, 13     Seizure Description   Seizure 1   08:34:43  Sz 1 start  08:34:58  Oral facial mvt  08:35:01  L forearm and leg mvt  08:35:16  L hand auttomatism  08:35:23  Oral facial mvt continues  08:35:28  Sz end    Seizure 2  d1 13:18:13  Sz 2 start d1 13:18:29  Head turn slightly to L  d1 13:18:33  Oral facial mvt  d1 13:18:41  Moving some in bed  d1 13:18:45  oral facial mvt continues  d1 13:18:55  Sz 2 stop  d1 13:18:57  Returns to looking at iPhone    Seizure 3  16:39:13  Lying back in bed - no mvt  16:39:20  Raises hed and slightly flexes knees  16:39:26  no response to nurse "are U OK?  16:39:30  Oral facial mvts  16:39:38  Patient Event  16:39:43  nurse telling him a code word, Purple elephant  16:39:48  Sz 3 stop  16:39:52  limb mvts stop  16:40:02  not responding to nurse  16:40:14  pt not following commands  A the 16:40:37  pt able to follow commands    Seizure 4     Nothing clinical     Impression  Four seizure originating from L Hippo and Entro areas   No seizures recorded 2020/09/02>03 (#3)  No seizures recorded 2020/09/03>04 (#4)  No seizures recorded 2020/09/04>05 (#5)  No seizures recorded 2020/09/05>06 (#6)  No seizures recorded 2020/09/06>07 (#7)  No seizures recorded 2020/09/06>07 (#8)    BEBETO Marina MD  Professor Emeritus  Epilepsy Division    "

## 2020-09-08 NOTE — ANESTHESIA PREPROCEDURE EVALUATION
[facility] [place] [address]  Anesthesia Pre-Operative Evaluation     Patient Name: Drain Cunha  YOB: 1995  MRN: [id number]  CSN: [id number]       Admit Date: 8/31/2020   Admit Team: Networked reference to record PCT   Hospital Day: 15  Date of Procedure: 9/14/2020  Anesthesia: General/MAC Procedure: Procedure(s) (LRB):  REMOVAL OF STEREO EEG LEADS (DEPTH ELECTRODES); removal of sEEG bolts and depth electrodes (N/A)  Pre-Operative Diagnosis: Epilepsy [G40.909]  Proceduralist:Surgeon(s) and Role:     * Melani Ortiz MD - Primary  Code Status: Full Code   Advanced Directive: <no information>  Isolation Precautions: No active isolations  Capacity: Full capacity     SUBJECTIVE:   25 year old M with PMHx of depression, seizures, epilepsy admitted to [facility] s/p placement of sEED electrodes bilateral.  Patient's last seizure was on 9/10/2020.  The patient states that when he has events, he stares off and smacks his lips. The episodes last for about 5-6 minutes. He often waves his hands and feels tired afterwards. He currently has about 3-5 episodes a week. He has failed at least 4 AEDs at therapeutic dosages.     Patient to have electrodes removed.    Prev airway: None documented.    Revised cardiac risk index (RCRI) score is 1.  Hospital LOS: 14 days  ICU LOS: 13d 22h    he has a current medication list which includes the following long-term medication(s): clobazam, lacosamide, lamotrigine, and topiramate.     ALLERGIES:     Review of patient's allergies indicates:   Allergen Reactions    Zofran [ondansetron hcl (pf)] Hives and Rash     LDA:   AIRWAY:     * No LDAs found *      Lines/Drains/Airways     Peripheral Intravenous Line                 Peripheral IV - Single Lumen 09/09/20 2134 20 G Anterior;Left Forearm 4 days         Peripheral IV - Single Lumen 09/12/20 1458 20 G Right Hand 1 day               Anesthesia Evaluation      Airway   Mallampati: III  TM distance: Normal, at least 6 cm  Neck ROM:  Normal ROM  Dental          Pulmonary    Cardiovascular   Exercise tolerance: good    Rate: Normal    Neuro/Psych    (+) seizures,     GI/Hepatic/Renal      Endo/Other    Abdominal                  MEDICATIONS:     Current Outpatient Medications on File Prior to Encounter   Medication Sig Dispense Refill Last Dose    cloBAZam (ONFI) 20 mg Tab Take 2 tablets (40 mg total) by mouth 2 (two) times daily. (Patient taking differently: Take 40 mg by mouth 2 (two) times daily. Take am of surgery) 120 tablet 2 8/30/2020 at 2100    lamoTRIgine (LAMICTAL) 200 MG tablet Take 1 tablet (200 mg total) by mouth 2 (two) times daily. (Patient taking differently: Take 200 mg by mouth 2 (two) times daily. Take am of surgery) 60 tablet 2 8/30/2020 at 2100    topiramate (TOPAMAX) 50 MG tablet Take 1 tablet (50 mg total) by mouth 2 (two) times daily. (Patient taking differently: Take 50 mg by mouth 2 (two) times daily. Take am of surgery) 60 tablet 11 8/30/2020 at 2100      Inpatient Medications:  Antibiotics (From admission, onward)    Start     Stop Route Frequency Ordered    09/09/20 0545  ceFAZolin injection 2 g      -- IV Every 8 hours (non-standard times) 09/09/20 0542        VTE Risk Mitigation (From admission, onward)         Ordered     Place sequential compression device  Until discontinued      08/31/20 1646               ceFAZolin (ANCEF) IVPB  2 g Intravenous Q8H    citalopram  20 mg Oral Daily    cloBAZam  40 mg Oral BID    lacosamide (VIMPAT) in saline IVPB  200 mg Intravenous Q12H    lamoTRIgine  200 mg Oral BID    senna-docusate 8.6-50 mg  1 tablet Oral Daily    topiramate  50 mg Oral BID       Current Facility-Administered Medications   Medication Dose Route Frequency Provider Last Rate Last Dose    0.9%  NaCl infusion (for blood administration)   Intravenous Q24H PRN Ino Collado MD        0.9%  NaCl infusion   Intravenous Continuous Estefani Turpin PA-C 75 mL/hr at 09/14/20 1105      acetaminophen  tablet 650 mg  650 mg Oral Q4H PRN Georgie Miinea, NP   325 mg at 09/10/20 2106    ceFAZolin injection 2 g  2 g Intravenous Q8H Ethan Owen MD   2 g at 09/14/20 0535    citalopram tablet 20 mg  20 mg Oral Daily Georgie Miinea, NP   20 mg at 09/14/20 0809    cloBAZam Tab 40 mg  40 mg Oral BID Kiara Luque MD PhD   40 mg at 09/14/20 1039    hydrALAZINE injection 10 mg  10 mg Intravenous Q4H PRN Georgie Miinea, NP   10 mg at 08/31/20 1601    labetaloL injection 10 mg  10 mg Intravenous Q4H PRN Georgie Miinea, NP        lacosamide (VIMPAT) 200 mg in sodium chloride 0.9% 100 mL IVPB  200 mg Intravenous Q12H Kiara Luque MD PhD        lamoTRIgine tablet 200 mg  200 mg Oral BID Kiara Luque MD PhD   200 mg at 09/14/20 1134    magnesium oxide tablet 800 mg  800 mg Oral PRN Melissa Flores, NP        magnesium oxide tablet 800 mg  800 mg Oral PRN Melissa Flores NP        oxyCODONE immediate release tablet 5 mg  5 mg Oral Q6H PRN Georgie Miinea, NP   5 mg at 09/02/20 1914    potassium chloride packet 40 mEq  40 mEq Oral PRN Melissa Flores NP   40 mEq at 09/14/20 0519    potassium chloride packet 40 mEq  40 mEq Oral PRN Melissa Flores, NP   40 mEq at 09/10/20 0933    potassium chloride packet 40 mEq  40 mEq Oral PRN Melissa Flores NP        potassium, sodium phosphates 280-160-250 mg packet 2 packet  2 packet Oral PRN Melissa Flores NP        potassium, sodium phosphates 280-160-250 mg packet 2 packet  2 packet Oral PRN Melissa Flores NP        potassium, sodium phosphates 280-160-250 mg packet 2 packet  2 packet Oral PRN Melissa Flores NP        promethazine (PHENERGAN) 6.25 mg in dextrose 5 % 50 mL IVPB  6.25 mg Intravenous Q8H PRN Georgie Miinea,  mL/hr at 08/31/20 1730 6.25 mg at 08/31/20 1730    senna-docusate 8.6-50 mg per tablet 1 tablet  1 tablet Oral Daily Georgie Hoyos NP   Stopped at 09/08/20 0900    topiramate tablet 50 mg  50 mg Oral BID  Kiara Luque MD PhD   50 mg at 09/14/20 1038          History:     Active Hospital Problems    Diagnosis  POA    *Epilepsy [G40.909]  Yes    Hypokalemia [E87.6]  Yes    Nausea [R11.0]  Yes    Depression [F32.9]  Yes      Resolved Hospital Problems   No resolved problems to display.     Surgical History:    has a past surgical history that includes Adenoidectomy (2015); finger abscess removal; Vagus nerve stimulator insertion (Left, 4/4/2019); Adenoidectomy; Tonsillectomy; wisdom teeth extracted- about 2010 ; and Creation of boogie hole with evacuation of hematoma (Bilateral, 8/31/2020).   Social History:    reports being sexually active and has had partner(s) who are Female.  reports that he has never smoked. He has never used smokeless tobacco. He reports current alcohol use of about 10.0 standard drinks of alcohol per week. He reports that he does not use drugs.    Vitals:    09/14/20 0811 09/14/20 0905 09/14/20 1005 09/14/20 1105   BP:  110/67 124/70 124/77   BP Location:    Right arm   Patient Position:    Lying   Pulse: 63 (!) 59 62 64   Resp: (!) 3 15 (!) 27 17   Temp:    36.6 °C (97.9 °F)   TempSrc:    Oral   SpO2: 99% 100% 100% 97%   Weight:       Height:         Vital Signs Range (Last 24H):  Temp:  [36.4 °C (97.6 °F)-37 °C (98.6 °F)]   Pulse:  [49-80]   Resp:  [3-28]   BP: ()/(57-80)   SpO2:  [95 %-100 %]     Body mass index is 22.83 kg/m².  Wt Readings from Last 4 Encounters:   08/31/20 78.5 kg (173 lb 1 oz)   08/28/20 76.2 kg (168 lb)   07/21/20 77.1 kg (170 lb)   06/08/20 77.1 kg (170 lb)        Intake/Output - Last 3 Shifts       09/12 0700 - 09/13 0659 09/13 0700 - 09/14 0659 09/14 0700 - 09/15 0659    P.O. 200 300     I.V. (mL/kg) 110 (1.4) 448.8 (5.7) 411.3 (5.2)    IV Piggyback   100    Total Intake(mL/kg) 310 (3.9) 748.8 (9.5) 511.3 (6.5)    Urine (mL/kg/hr) 400 (0.2) 1100 (0.6) 250 (0.6)    Other 0 0     Stool 0 0 0    Total Output 400 1100 250    Net -90 -351.3 +261.3           Urine  Occurrence 452 x 3 x     Stool Occurrence 1 x 0 x 0 x        Lab Results   Component Value Date    WBC 5.74 09/14/2020    HGB 14.2 09/14/2020    HCT 39.9 (L) 09/14/2020     09/14/2020     09/14/2020    K 3.6 09/14/2020     09/14/2020    CREATININE 0.7 09/14/2020    BUN 10 09/14/2020    CO2 26 09/14/2020    GLU 94 09/14/2020    CALCIUM 9.1 09/14/2020    MG 1.9 09/14/2020    PHOS 3.8 09/14/2020    ALKPHOS 85 09/14/2020    ALT 23 09/14/2020    AST 27 09/14/2020    ALBUMIN 4.0 09/14/2020    INR 1.1 09/13/2020    APTT 29.6 09/13/2020    HGBA1C 4.4 08/31/2020     Recent Results (from the past 12 hour(s))   Comprehensive metabolic panel    Collection Time: 09/14/20  3:17 AM   Result Value Ref Range    Sodium 139 136 - 145 mmol/L    Potassium 3.6 3.5 - 5.1 mmol/L    Chloride 104 95 - 110 mmol/L    CO2 26 23 - 29 mmol/L    Glucose 94 70 - 110 mg/dL    BUN, Bld 10 6 - 20 mg/dL    Creatinine 0.7 0.5 - 1.4 mg/dL    Calcium 9.1 8.7 - 10.5 mg/dL    Total Protein 6.4 6.0 - 8.4 g/dL    Albumin 4.0 3.5 - 5.2 g/dL    Total Bilirubin 0.7 0.1 - 1.0 mg/dL    Alkaline Phosphatase 85 55 - 135 U/L    AST 27 10 - 40 U/L    ALT 23 10 - 44 U/L    Anion Gap 9 8 - 16 mmol/L    eGFR if African American >60.0 >60 mL/min/1.73 m^2    eGFR if non African American >60.0 >60 mL/min/1.73 m^2   Magnesium    Collection Time: 09/14/20  3:17 AM   Result Value Ref Range    Magnesium 1.9 1.6 - 2.6 mg/dL   Phosphorus    Collection Time: 09/14/20  3:17 AM   Result Value Ref Range    Phosphorus 3.8 2.7 - 4.5 mg/dL   CBC auto differential    Collection Time: 09/14/20  3:17 AM   Result Value Ref Range    WBC 5.74 3.90 - 12.70 K/uL    RBC 4.54 (L) 4.60 - 6.20 M/uL    Hemoglobin 14.2 14.0 - 18.0 g/dL    Hematocrit 39.9 (L) 40.0 - 54.0 %    Mean Corpuscular Volume 88 82 - 98 fL    Mean Corpuscular Hemoglobin 31.3 (H) 27.0 - 31.0 pg    Mean Corpuscular Hemoglobin Conc 35.6 32.0 - 36.0 g/dL    RDW 11.4 (L) 11.5 - 14.5 %    Platelets 220 150 - 350  K/uL    MPV 9.7 9.2 - 12.9 fL    Immature Granulocytes 0.5 0.0 - 0.5 %    Gran # (ANC) 3.6 1.8 - 7.7 K/uL    Immature Grans (Abs) 0.03 0.00 - 0.04 K/uL    Lymph # 1.5 1.0 - 4.8 K/uL    Mono # 0.5 0.3 - 1.0 K/uL    Eos # 0.1 0.0 - 0.5 K/uL    Baso # 0.03 0.00 - 0.20 K/uL    nRBC 0 0 /100 WBC    Gran% 63.0 38.0 - 73.0 %    Lymph% 26.1 18.0 - 48.0 %    Mono% 8.9 4.0 - 15.0 %    Eosinophil% 1.0 0.0 - 8.0 %    Basophil% 0.5 0.0 - 1.9 %    Differential Method Automated      Recent Labs   Lab 09/09/20  0112  09/12/20  0441 09/13/20  0400 09/13/20  1143 09/14/20  0317   WBC 7.50   < > 5.89 7.54  --  5.74   HGB 15.0   < > 14.4 14.2  --  14.2   HCT 41.7   < > 40.2 39.7*  --  39.9*      < > 230 231  --  220      < > 137 139  --  139   K 3.8   < > 3.5 3.5  --  3.6   CREATININE 0.9   < > 0.8 0.8  --  0.7   GLU 96   < > 95 96  --  94   INR 1.0  --   --   --  1.1  --     < > = values in this interval not displayed.     No LMP for male patient.    EKG:   Results for orders placed or performed in visit on 09/14/20   EKG 12-lead    Collection Time: 09/14/20  5:27 AM    Narrative    Test Reason : R00.1    Vent. Rate : 054 BPM     Atrial Rate : 054 BPM     P-R Int : 130 ms          QRS Dur : 098 ms      QT Int : 424 ms       P-R-T Axes : 072 062 060 degrees     QTc Int : 402 ms    Sinus bradycardia  Otherwise normal ECG  When compared with ECG of 14-SEP-2020 05:26,  No significant change was found    Referred By: GEOVANNY VALENTINE           Confirmed By:      Anesthesia Evaluation    I have reviewed the Patient Summary Reports.    I have reviewed the Nursing Notes.    I have reviewed the Medications.     Review of Systems  Anesthesia Hx:  No problems with previous Anesthesia  History of prior surgery of interest to airway management or planning: Previous anesthesia: General Denies Family Hx of Anesthesia complications.   Denies Personal Hx of Anesthesia complications.   Hematology/Oncology:  Hematology Normal         Cardiovascular:   Exercise tolerance: good    Pulmonary:  Pulmonary Normal    Renal/:  Renal/ Normal     Hepatic/GI:  Hepatic/GI Normal    Neurological:   Seizures    Endocrine:  Endocrine Normal    Psych:   depression          Physical Exam  General:  Well nourished    Airway/Jaw/Neck:  Airway Findings: Mouth Opening: Normal Tongue: Normal  General Airway Assessment: Adult  Mallampati: III  Improves to II, III with phonation.  TM Distance: Normal, at least 6 cm  Jaw/Neck Findings:  Neck ROM: Normal ROM      Dental:  Dental Findings:    Chest/Lungs:  Chest/Lungs Findings: Clear to auscultation, Normal Respiratory Rate     Heart/Vascular:  Heart Findings: Rate: Normal  Rhythm: Regular Rhythm  Sounds: Normal     Abdomen:  Abdomen Findings: Normal      Mental Status:  Mental Status Findings:  Cooperative, Alert and Oriented         Anesthesia Plan  Type of Anesthesia, risks & benefits discussed:  Anesthesia Type:  general, MAC  Patient's Preference:   Intra-op Monitoring Plan: standard ASA monitors  Intra-op Monitoring Plan Comments:   Post Op Pain Control Plan: multimodal analgesia, IV/PO Opioids PRN and per primary service following discharge from PACU  Post Op Pain Control Plan Comments:   Induction:   IV  Beta Blocker:  Patient is not currently on a Beta-Blocker (No further documentation required).       Informed Consent: Patient understands risks and agrees with Anesthesia plan.  Questions answered. Anesthesia consent signed with patient.  ASA Score: 2     Day of Surgery Review of History & Physical: I have interviewed and examined the patient. I have reviewed the patient's H&P dated:    H&P update referred to the provider.         Ready For Surgery From Anesthesia Perspective.

## 2020-09-08 NOTE — PLAN OF CARE
"[facility] Care Plan    POC reviewed with Darin Cunha at 1500. Pt verbalized understanding. Questions and concerns addressed. No acute events overnight. Pt progressing toward goals. Will continue to monitor. See below and flowsheets for full assessment and VS info.     All AEDs stopped today (patient did get morning dose because the order was discontinued after am meds were already given). Patient encouraged to use the recumbent bike today to try and get his brain more stimulated in order to elicit a seizure. Patient did the bike for 40 minutes and did 7 miles.  Dr. Abhijeet Oritz explained to the patient that he would like to keep the leads in until at least Friday to get more data and hopefully have another 2-5 seizures in order to obtain the best data to move forward with a plan. Patient agreeable to stay longer even though he was very anxious to have them removed tomorrow and go home Thursday, but he said "I'm already here, lets do what we have to do".   No seizures so far this shift at time of note. Denies any pain or discomfort today.       Neuro:  [place] Coma Scale  Best Eye Response: 4-->(E4) spontaneous  Best Motor Response: 6-->(M6) obeys commands  Best Verbal Response: 5-->(V5) oriented  [place] Coma Scale Score: 15  Assessment Qualifiers: patient not sedated/intubated, no eye obstruction present  Pupil PERRLA: yes     24hr Temp:  [97.7 °F (36.5 °C)-98.7 °F (37.1 °C)]     CV:   Rhythm: normal sinus rhythm  BP goals:   SBP < 140  MAP > 65    Resp:   O2 Device (Oxygen Therapy): room air    GI/:  STEPHENIE Total Score: 20  Diet/Nutrition Received: regular  Last Bowel Movement: 09/07/20  Voiding Characteristics: voids spontaneously without difficulty- uses urinal    Intake/Output Summary (Last 24 hours) at 9/8/2020 1603  Last data filed at 9/8/2020 1505  Gross per 24 hour   Intake 600 ml   Output 1275 ml   Net -675 ml     Unmeasured Output  Urine Occurrence: 1  Stool Occurrence: 1    Labs/Accuchecks:  Recent Labs   Lab " 09/08/20  0207   WBC 9.53   RBC 5.04   HGB 15.8   HCT 43.8         Recent Labs   Lab 09/08/20  0207      K 3.9   CO2 22*      BUN 13   CREATININE 0.9   ALKPHOS 104   ALT 40   AST 28   BILITOT 0.6    No results for input(s): PROTIME, INR, APTT, HEPANTIXA in the last 168 hours. No results for input(s): CPK, CPKMB, TROPONINI, MB in the last 168 hours.    Electrolytes: no replacements needed    Gtts: none- IV Vanc BID       LDA/Wounds:  Lines/Drains/Airways       Peripheral Intravenous Line                   Peripheral IV - Single Lumen 09/04/20 1101 18 G Left Antecubital 4 days         Peripheral IV - Single Lumen 09/08/20 0257 20 G Anterior;Right Forearm less than 1 day                  Wounds: none

## 2020-09-08 NOTE — PLAN OF CARE
Per MD: Likely bolt removal this Friday.  Not medically ready for discharge.        09/08/20 1441   Discharge Reassessment   Assessment Type Discharge Planning Reassessment   Provided patient/caregiver education on the expected discharge date and the discharge plan Yes   Do you have any problems affording any of your prescribed medications? No   Discharge Plan A Home with family   Discharge Plan B Home with family   DME Needed Upon Discharge  none   Anticipated Discharge Disposition Home   Can the patient/caregiver answer the patient profile reliably? Yes, cognitively intact   How does the patient rate their overall health at the present time? Good   Describe the patient's ability to walk at the present time. Minor restrictions or changes   How often would a person be available to care for the patient? Whenever needed   Number of comorbid conditions (as recorded on the chart) One       Shivani De Leon RN, CCRN-K, [facility]  Neuro-Critical Care   [phone]

## 2020-09-08 NOTE — PROGRESS NOTES
[facility]  Neurology-Epilepsy  Progress Note    Patient Name: Darin Cunha  MRN: [id number]  Admission Date: 8/31/2020  Hospital Length of Stay: 8 days  Code Status: Full Code   Attending Provider: Melani Ortiz MD  Primary Care Physician: Primary Doctor No   Principal Problem:Epilepsy    Subjective:     Hospital Course:   8/31: sEEG placement. Hold Vimpat. Continue other AEDs: Onfi 40 mg BID, Lamictal 200 mg BID, Topamax 50 mg BID.  8/31>9/1: No clinical or electrographic seizures overnight.   9/1>9/2: Four electrographic seizures (3 with clinical correlate) originating from L hippo and entro areas.  9/2>9/3: No electrographic seizures  9/3>9/4: No electrographic seizures  9/4>9/5: No electrographic seizures  9/5>9/6: No electrographic seizures  9/6>9/7: No electrographic seizures  9/7>9/8: No electrographic seizures    Interval History: No acute events overnight. This AM, patient lying in bed resting comfortably. No complaints this morning. Planning to ride stationary bike today. Discussed plan of care.    Current Facility-Administered Medications   Medication Dose Route Frequency Provider Last Rate Last Dose    acetaminophen tablet 650 mg  650 mg Oral Q4H PRN Georgie Miinea, NP   650 mg at 09/02/20 1914    citalopram tablet 20 mg  20 mg Oral Daily Georgie Miinea, NP   20 mg at 09/08/20 0828    heparin (porcine) injection 5,000 Units  5,000 Units Subcutaneous Q8H Carito Amezquita PA-C   5,000 Units at 09/08/20 0600    hydrALAZINE injection 10 mg  10 mg Intravenous Q4H PRN Georgie Miinea, NP   10 mg at 08/31/20 1601    labetaloL injection 10 mg  10 mg Intravenous Q4H PRN Georgie Miinea, NP        magnesium oxide tablet 800 mg  800 mg Oral PRN Melissa Flores, NP        magnesium oxide tablet 800 mg  800 mg Oral PRN Melissa Flores, NP        oxyCODONE immediate release tablet 5 mg  5 mg Oral Q6H PRN Georgie Miinea, NP   5 mg at 09/02/20 1914    potassium chloride packet 40 mEq  40 mEq Oral  PRN Melissa Flores NP   40 mEq at 09/06/20 0630    potassium chloride packet 40 mEq  40 mEq Oral PRN Melissa Flores NP   40 mEq at 09/07/20 0923    potassium chloride packet 40 mEq  40 mEq Oral PRN Melissa Flores NP        potassium, sodium phosphates 280-160-250 mg packet 2 packet  2 packet Oral PRN Melissa Flores NP        potassium, sodium phosphates 280-160-250 mg packet 2 packet  2 packet Oral PRN Melissa Flores NP        potassium, sodium phosphates 280-160-250 mg packet 2 packet  2 packet Oral PRN Melissa Flores NP        promethazine (PHENERGAN) 6.25 mg in dextrose 5 % 50 mL IVPB  6.25 mg Intravenous Q8H PRN Georgie Hoyos  mL/hr at 08/31/20 1730 6.25 mg at 08/31/20 1730    senna-docusate 8.6-50 mg per tablet 1 tablet  1 tablet Oral Daily Georgie Hoyos NP   Stopped at 09/08/20 0900    vancomycin 1.5 g in dextrose 5 % 250 mL IVPB (ready to mix)  1,500 mg Intravenous Q12H Melani Ortiz .7 mL/hr at 09/08/20 0100 1,500 mg at 09/08/20 0100     Continuous Infusions:    Review of Systems   Constitutional: Negative for chills, diaphoresis, fatigue and fever.   HENT: Negative for sore throat and trouble swallowing.    Respiratory: Negative for cough, shortness of breath and wheezing.    Cardiovascular: Negative for chest pain and palpitations.   Gastrointestinal: Negative for abdominal pain, diarrhea, nausea and vomiting.   Neurological: Positive for seizures. Negative for dizziness, syncope, speech difficulty, weakness and headaches (resolved).   Psychiatric/Behavioral: Negative for behavioral problems, confusion and sleep disturbance. The patient is not nervous/anxious.      Objective:     Vital Signs (Most Recent):  Temp: 97.7 °F (36.5 °C) (09/08/20 0705)  Pulse: 69 (09/08/20 1005)  Resp: 18 (09/08/20 1005)  BP: 118/74 (09/08/20 1005)  SpO2: 99 % (09/08/20 1005) Vital Signs (24h Range):  Temp:  [97.7 °F (36.5 °C)-98.7 °F (37.1 °C)] 97.7 °F (36.5 °C)  Pulse:   [] 69  Resp:  [16-35] 18  SpO2:  [92 %-100 %] 99 %  BP: (101-134)/(57-92) 118/74     Weight: 78.5 kg (173 lb 1 oz)  Body mass index is 22.83 kg/m².    Physical Exam  Constitutional:       General: He is not in acute distress.     Appearance: Normal appearance. He is not diaphoretic.   HENT:      Head: Normocephalic.      Comments: sEEG in place  Eyes:      General: No scleral icterus.        Right eye: No discharge.         Left eye: No discharge.      Extraocular Movements: EOM normal.      Conjunctiva/sclera: Conjunctivae normal.      Pupils: Pupils are equal, round, and reactive to light.   Cardiovascular:      Rate and Rhythm: Normal rate.   Pulmonary:      Effort: Pulmonary effort is normal. No respiratory distress.   Abdominal:      General: There is no distension.      Palpations: Abdomen is soft.      Tenderness: There is no abdominal tenderness.   Musculoskeletal: Normal range of motion.         General: No deformity or signs of injury.   Skin:     General: Skin is warm and dry.   Neurological:      General: No focal deficit present.      Mental Status: He is alert and oriented to person, place, and time. Mental status is at baseline.      Coordination: Finger-Nose-Finger Test normal.   Psychiatric:         Mood and Affect: Mood normal.         Speech: Speech normal.         Behavior: Behavior normal.         Thought Content: Thought content normal.         NEUROLOGICAL EXAMINATION:     MENTAL STATUS   Oriented to person, place, and time.   Speech: speech is normal   Level of consciousness: alert    CRANIAL NERVES     CN III, IV, VI   Pupils are equal, round, and reactive to light.  Extraocular motions are normal.     CN VII   Facial expression full, symmetric.     CN VIII   Hearing: intact    CN XI   CN XI normal.     CN XII   CN XII normal.     GAIT AND COORDINATION      Coordination   Finger to nose coordination: normal      Significant Labs: All pertinent lab results from the past 24 hours have  been reviewed.    Significant Studies: I have reviewed all pertinent imaging results/findings within the past 24 hours.    Assessment and Plan:     * Epilepsy  25M with a PMHx of intractable epilepsy s/p VNS placement on Onfi 40 mg BID, Lamictal 200 mg BID, Vimpat 200 mg BID, and Topamax 50 mg BID s/p bilateral sEEG placement with Dr. Melani Ortiz on 8/31. Postop CTH stable.    Recommendations:  - Continuous vEEG monitoring  - 4 electrographic seizures from L hippo and entro areas since admit  - Vimpat held on admit 8/31, Lamictal weaned and stopped 9/6, Onfi weaned and stopped 9/8, Topamax stopped 9/8  - Please call epilepsy on call prior to administering benzodiazepines   - If patient has more than one GTC, can consider giving IV Vimpat 100 mgx1  - Neurosurgery following; anticipate sEEG removal later this week- Dr. Abhijeet Do to discuss with Dr. Melani Ortiz   - Seizure precautions  - Avoid agents that lower seizure thresold      Plan of care discussed with patient and NCC team. Will follow. Please call with questions.    Depression  - Chronic and stable  - Continue home Celexa        VTE Risk Mitigation (From admission, onward)         Ordered     heparin (porcine) injection 5,000 Units  Every 8 hours      09/02/20 1035     Place sequential compression device  Until discontinued      08/31/20 1647                Minerva Oconnor PA-C  Neurology-Epilepsy  [facility]  Staff: Dr. Curtis

## 2020-09-08 NOTE — SUBJECTIVE & OBJECTIVE
Interval History: No acute events overnight. This AM, patient lying in bed resting comfortably. No complaints this morning. Planning to ride stationary bike today. Discussed plan of care.    Current Facility-Administered Medications   Medication Dose Route Frequency Provider Last Rate Last Dose    acetaminophen tablet 650 mg  650 mg Oral Q4H PRN Georgie Miinea, NP   650 mg at 09/02/20 1914    citalopram tablet 20 mg  20 mg Oral Daily Geogrie Miinea, NP   20 mg at 09/08/20 0828    heparin (porcine) injection 5,000 Units  5,000 Units Subcutaneous Q8H Carito Amezquita PA-C   5,000 Units at 09/08/20 0600    hydrALAZINE injection 10 mg  10 mg Intravenous Q4H PRN Georgie Miinea, NP   10 mg at 08/31/20 1601    labetaloL injection 10 mg  10 mg Intravenous Q4H PRN Georgie Miinea, NP        magnesium oxide tablet 800 mg  800 mg Oral PRN Melissa Flores NP        magnesium oxide tablet 800 mg  800 mg Oral PRN Melissa Flores NP        oxyCODONE immediate release tablet 5 mg  5 mg Oral Q6H PRN Georgie Miinea, NP   5 mg at 09/02/20 1914    potassium chloride packet 40 mEq  40 mEq Oral PRN Melissa Flores NP   40 mEq at 09/06/20 0630    potassium chloride packet 40 mEq  40 mEq Oral PRN Melissa Flores NP   40 mEq at 09/07/20 0923    potassium chloride packet 40 mEq  40 mEq Oral PRN Melissa Flores NP        potassium, sodium phosphates 280-160-250 mg packet 2 packet  2 packet Oral PRN Melissa Flores NP        potassium, sodium phosphates 280-160-250 mg packet 2 packet  2 packet Oral PRN Melissa Flores NP        potassium, sodium phosphates 280-160-250 mg packet 2 packet  2 packet Oral PRN Melissa Flores NP        promethazine (PHENERGAN) 6.25 mg in dextrose 5 % 50 mL IVPB  6.25 mg Intravenous Q8H PRN Georgie Mithomas,  mL/hr at 08/31/20 1730 6.25 mg at 08/31/20 1730    senna-docusate 8.6-50 mg per tablet 1 tablet  1 tablet Oral Daily Georgie Hoyos NP   Stopped at 09/08/20 0900     vancomycin 1.5 g in dextrose 5 % 250 mL IVPB (ready to mix)  1,500 mg Intravenous Q12H Melani Ortiz .7 mL/hr at 09/08/20 0100 1,500 mg at 09/08/20 0100     Continuous Infusions:    Review of Systems   Constitutional: Negative for chills, diaphoresis, fatigue and fever.   HENT: Negative for sore throat and trouble swallowing.    Respiratory: Negative for cough, shortness of breath and wheezing.    Cardiovascular: Negative for chest pain and palpitations.   Gastrointestinal: Negative for abdominal pain, diarrhea, nausea and vomiting.   Neurological: Positive for seizures. Negative for dizziness, syncope, speech difficulty, weakness and headaches (resolved).   Psychiatric/Behavioral: Negative for behavioral problems, confusion and sleep disturbance. The patient is not nervous/anxious.      Objective:     Vital Signs (Most Recent):  Temp: 97.7 °F (36.5 °C) (09/08/20 0705)  Pulse: 69 (09/08/20 1005)  Resp: 18 (09/08/20 1005)  BP: 118/74 (09/08/20 1005)  SpO2: 99 % (09/08/20 1005) Vital Signs (24h Range):  Temp:  [97.7 °F (36.5 °C)-98.7 °F (37.1 °C)] 97.7 °F (36.5 °C)  Pulse:  [] 69  Resp:  [16-35] 18  SpO2:  [92 %-100 %] 99 %  BP: (101-134)/(57-92) 118/74     Weight: 78.5 kg (173 lb 1 oz)  Body mass index is 22.83 kg/m².    Physical Exam  Constitutional:       General: He is not in acute distress.     Appearance: Normal appearance. He is not diaphoretic.   HENT:      Head: Normocephalic.      Comments: sEEG in place  Eyes:      General: No scleral icterus.        Right eye: No discharge.         Left eye: No discharge.      Extraocular Movements: EOM normal.      Conjunctiva/sclera: Conjunctivae normal.      Pupils: Pupils are equal, round, and reactive to light.   Cardiovascular:      Rate and Rhythm: Normal rate.   Pulmonary:      Effort: Pulmonary effort is normal. No respiratory distress.   Abdominal:      General: There is no distension.      Palpations: Abdomen is soft.      Tenderness: There is no  abdominal tenderness.   Musculoskeletal: Normal range of motion.         General: No deformity or signs of injury.   Skin:     General: Skin is warm and dry.   Neurological:      General: No focal deficit present.      Mental Status: He is alert and oriented to person, place, and time. Mental status is at baseline.      Coordination: Finger-Nose-Finger Test normal.   Psychiatric:         Mood and Affect: Mood normal.         Speech: Speech normal.         Behavior: Behavior normal.         Thought Content: Thought content normal.         NEUROLOGICAL EXAMINATION:     MENTAL STATUS   Oriented to person, place, and time.   Speech: speech is normal   Level of consciousness: alert    CRANIAL NERVES     CN III, IV, VI   Pupils are equal, round, and reactive to light.  Extraocular motions are normal.     CN VII   Facial expression full, symmetric.     CN VIII   Hearing: intact    CN XI   CN XI normal.     CN XII   CN XII normal.     GAIT AND COORDINATION      Coordination   Finger to nose coordination: normal      Significant Labs: All pertinent lab results from the past 24 hours have been reviewed.    Significant Studies: I have reviewed all pertinent imaging results/findings within the past 24 hours.

## 2020-09-08 NOTE — SUBJECTIVE & OBJECTIVE
Review of Systems  Constitutional: Denies fevers, weight loss, chills, or weakness.  Eyes: Denies changes in vision.  ENT: Denies dysphagia, nasal discharge, ear pain or discharge.  Cardiovascular: Denies chest pain, palpitations, orthopnea, or claudication.  Respiratory: Denies shortness of breath, cough, hemoptysis, or wheezing.  GI: Denies nausea/vomitting, hematochezia, melena, abd pain, or changes in appetite.  : Denies dysuria, incontinence, or hematuria.  Musculoskeletal: Denies joint pain or myalgias.  Skin/breast: Denies rashes, lumps, lesions, or discharge.  Neurologic: Denies headache, dizziness, vertigo, or paresthesias.  Psychiatric: Denies changes in mood or hallucinations.  Endocrine: Denies polyuria, polydipsia, heat/cold intolerance.  Hematologic/Lymph: Denies lymphadenopathy, easy bruising or easy bleeding.  Allergic/Immunologic: Denies rash, rhinitis.   Objective:     Vitals:  Temp: 97.9 °F (36.6 °C)  Pulse: 70  Rhythm: normal sinus rhythm  BP: 115/66  MAP (mmHg): 85  Resp: 18  SpO2: 98 %  O2 Device (Oxygen Therapy): room air    Temp  Min: 97.7 °F (36.5 °C)  Max: 98.7 °F (37.1 °C)  Pulse  Min: 51  Max: 110  BP  Min: 101/57  Max: 134/79  MAP (mmHg)  Min: 74  Max: 105  Resp  Min: 16  Max: 47  SpO2  Min: 96 %  Max: 100 %    09/07 0701 - 09/08 0700  In: 600 [P.O.:600]  Out: 1025 [Urine:1025]   Unmeasured Output  Urine Occurrence: 1  Stool Occurrence: 1       Physical Exam  GA: Alert, comfortable, no acute distress.   HEENT: No scleral icterus or JVD.   Pulmonary: Clear to auscultation A/L.   Cardiac: RRR S1 & S2 w/o rubs/murmurs/gallops.   Abdominal: Bowel sounds present x 4. No appreciable hepatosplenomegaly.  Skin: No jaundice, rashes, or visible lesions.  Neuro:  --GCS: E4 V5 M6  --Mental Status:  Awake, oriented X4, follows commands, fluent speech  --CN II-XII grossly intact.   --Pupils 3mm, PERRL.   --Corneal reflex, gag, cough intact.  --ELIZABETH spont    Medications:  Continuous  Scheduledcitalopram, 20 mg, Daily  heparin (porcine), 5,000 Units, Q8H  senna-docusate 8.6-50 mg, 1 tablet, Daily  vancomycin (VANCOCIN) IVPB, 1,500 mg, Q12H    PRNacetaminophen, 650 mg, Q4H PRN  hydrALAZINE, 10 mg, Q4H PRN  labetaloL, 10 mg, Q4H PRN  magnesium oxide, 800 mg, PRN  magnesium oxide, 800 mg, PRN  oxyCODONE, 5 mg, Q6H PRN  potassium chloride, 40 mEq, PRN  potassium chloride, 40 mEq, PRN  potassium chloride, 40 mEq, PRN  potassium, sodium phosphates, 2 packet, PRN  potassium, sodium phosphates, 2 packet, PRN  potassium, sodium phosphates, 2 packet, PRN  promethazine (PHENERGAN) IVPB, 6.25 mg, Q8H PRN      Today I personally reviewed pertinent medications, lines/drains/airways, imaging, cardiology results, laboratory results,     Diet  Diet Adult Regular (IDDSI Level 7)  Diet NPO

## 2020-09-08 NOTE — ASSESSMENT & PLAN NOTE
-s/p sEEG placement  -cefazolin 2 q8 prophylaxis  -SBP goal <140  -NSGY and epilepsy following  -seizure precautions  -continue: Topiramate 50 mg BID, lamotrigine 100mg BID, clobazam 20 mg BID, hold home med Vimpat  -neuro checks q 1 hr  9/2/2020: 1 seizure this morning associated with lip smacking  -per epilepsy: Held home Vimpat 8/31  - Continue Topamax 50 mg BID  9/3/2020: no seizures this AM, continue to hold vimpat, continue SQH for DVT ppx  - Per epilepsy: If no seizures throughout day, plan to decrease Onfi to 10 mg BID (Dr. Abhijeet Do to change tonight if needed).   9/8/2020: off AEDs today per epilepsy

## 2020-09-08 NOTE — ASSESSMENT & PLAN NOTE
25M with a PMHx of intractable epilepsy s/p VNS placement on Onfi 40 mg BID, Lamictal 200 mg BID, Vimpat 200 mg BID, and Topamax 50 mg BID s/p bilateral sEEG placement with Dr. Melani Ortiz on 8/31. Postop CTH stable.    Recommendations:  - Continuous vEEG monitoring  - 4 electrographic seizures from L hippo and entro areas since admit  - Vimpat held on admit 8/31, Lamictal weaned and stopped 9/6, Onfi weaned and stopped 9/8, Topamax stopped 9/8  - Please call epilepsy on call prior to administering benzodiazepines   - If patient has more than one GTC, can consider giving IV Vimpat 100 mgx1  - Neurosurgery following; anticipate sEEG removal later this week- Dr. Abhijeet oD to discuss with Dr. Melani Ortiz   - Seizure precautions  - Avoid agents that lower seizure thresold      Plan of care discussed with patient and NCC team. Will follow. Please call with questions.

## 2020-09-09 ENCOUNTER — ANESTHESIA (OUTPATIENT)
Dept: SURGERY | Facility: HOSPITAL | Age: 25
DRG: 027 | End: 2020-09-09
Payer: MEDICAID

## 2020-09-09 LAB
ALBUMIN SERPL BCP-MCNC: 4.1 G/DL (ref 3.5–5.2)
ALP SERPL-CCNC: 101 U/L (ref 55–135)
ALT SERPL W/O P-5'-P-CCNC: 39 U/L (ref 10–44)
ANION GAP SERPL CALC-SCNC: 8 MMOL/L (ref 8–16)
APTT BLDCRRT: 31.4 SEC (ref 21–32)
AST SERPL-CCNC: 26 U/L (ref 10–40)
BASOPHILS # BLD AUTO: 0.03 K/UL (ref 0–0.2)
BASOPHILS NFR BLD: 0.4 % (ref 0–1.9)
BILIRUB SERPL-MCNC: 0.8 MG/DL (ref 0.1–1)
BUN SERPL-MCNC: 11 MG/DL (ref 6–20)
CALCIUM SERPL-MCNC: 9.2 MG/DL (ref 8.7–10.5)
CHLORIDE SERPL-SCNC: 105 MMOL/L (ref 95–110)
CO2 SERPL-SCNC: 24 MMOL/L (ref 23–29)
CREAT SERPL-MCNC: 0.9 MG/DL (ref 0.5–1.4)
DIFFERENTIAL METHOD: ABNORMAL
EOSINOPHIL # BLD AUTO: 0.1 K/UL (ref 0–0.5)
EOSINOPHIL NFR BLD: 1.1 % (ref 0–8)
ERYTHROCYTE [DISTWIDTH] IN BLOOD BY AUTOMATED COUNT: 11.6 % (ref 11.5–14.5)
EST. GFR  (AFRICAN AMERICAN): >60 ML/MIN/1.73 M^2
EST. GFR  (NON AFRICAN AMERICAN): >60 ML/MIN/1.73 M^2
GLUCOSE SERPL-MCNC: 96 MG/DL (ref 70–110)
HCT VFR BLD AUTO: 41.7 % (ref 40–54)
HGB BLD-MCNC: 15 G/DL (ref 14–18)
IMM GRANULOCYTES # BLD AUTO: 0.04 K/UL (ref 0–0.04)
IMM GRANULOCYTES NFR BLD AUTO: 0.5 % (ref 0–0.5)
INR PPP: 1 (ref 0.8–1.2)
LYMPHOCYTES # BLD AUTO: 1.4 K/UL (ref 1–4.8)
LYMPHOCYTES NFR BLD: 18.8 % (ref 18–48)
MAGNESIUM SERPL-MCNC: 1.9 MG/DL (ref 1.6–2.6)
MCH RBC QN AUTO: 31.4 PG (ref 27–31)
MCHC RBC AUTO-ENTMCNC: 36 G/DL (ref 32–36)
MCV RBC AUTO: 87 FL (ref 82–98)
MONOCYTES # BLD AUTO: 0.8 K/UL (ref 0.3–1)
MONOCYTES NFR BLD: 10.7 % (ref 4–15)
NEUTROPHILS # BLD AUTO: 5.1 K/UL (ref 1.8–7.7)
NEUTROPHILS NFR BLD: 68.5 % (ref 38–73)
NRBC BLD-RTO: 0 /100 WBC
PHOSPHATE SERPL-MCNC: 4.1 MG/DL (ref 2.7–4.5)
PLATELET # BLD AUTO: 247 K/UL (ref 150–350)
PMV BLD AUTO: 9.8 FL (ref 9.2–12.9)
POTASSIUM SERPL-SCNC: 3.8 MMOL/L (ref 3.5–5.1)
PROT SERPL-MCNC: 6.7 G/DL (ref 6–8.4)
PROTHROMBIN TIME: 11.5 SEC (ref 9–12.5)
RBC # BLD AUTO: 4.77 M/UL (ref 4.6–6.2)
SODIUM SERPL-SCNC: 137 MMOL/L (ref 136–145)
WBC # BLD AUTO: 7.5 K/UL (ref 3.9–12.7)

## 2020-09-09 PROCEDURE — 99233 PR SUBSEQUENT HOSPITAL CARE,LEVL III: ICD-10-PCS | Mod: ,,, | Performed by: NURSE PRACTITIONER

## 2020-09-09 PROCEDURE — 85025 COMPLETE CBC W/AUTO DIFF WBC: CPT

## 2020-09-09 PROCEDURE — 94761 N-INVAS EAR/PLS OXIMETRY MLT: CPT

## 2020-09-09 PROCEDURE — 84100 ASSAY OF PHOSPHORUS: CPT

## 2020-09-09 PROCEDURE — 86255 FLUORESCENT ANTIBODY SCREEN: CPT | Mod: 59

## 2020-09-09 PROCEDURE — 99233 SBSQ HOSP IP/OBS HIGH 50: CPT | Mod: ,,, | Performed by: NURSE PRACTITIONER

## 2020-09-09 PROCEDURE — 80053 COMPREHEN METABOLIC PANEL: CPT

## 2020-09-09 PROCEDURE — 95714 VEEG EA 12-26 HR UNMNTR: CPT

## 2020-09-09 PROCEDURE — 85730 THROMBOPLASTIN TIME PARTIAL: CPT

## 2020-09-09 PROCEDURE — 95720 EEG PHY/QHP EA INCR W/VEEG: CPT | Mod: ,,, | Performed by: PSYCHIATRY & NEUROLOGY

## 2020-09-09 PROCEDURE — 95720 PR EEG, W/VIDEO, CONT RECORD, I&R, >12<26 HRS: ICD-10-PCS | Mod: ,,, | Performed by: PSYCHIATRY & NEUROLOGY

## 2020-09-09 PROCEDURE — 99233 PR SUBSEQUENT HOSPITAL CARE,LEVL III: ICD-10-PCS | Mod: ,,, | Performed by: PSYCHIATRY & NEUROLOGY

## 2020-09-09 PROCEDURE — 83735 ASSAY OF MAGNESIUM: CPT

## 2020-09-09 PROCEDURE — 63600175 PHARM REV CODE 636 W HCPCS: Performed by: PHYSICIAN ASSISTANT

## 2020-09-09 PROCEDURE — 83519 RIA NONANTIBODY: CPT | Mod: 59

## 2020-09-09 PROCEDURE — 25000003 PHARM REV CODE 250: Performed by: NURSE PRACTITIONER

## 2020-09-09 PROCEDURE — 85610 PROTHROMBIN TIME: CPT

## 2020-09-09 PROCEDURE — 20000000 HC ICU ROOM

## 2020-09-09 PROCEDURE — 99233 SBSQ HOSP IP/OBS HIGH 50: CPT | Mod: ,,, | Performed by: PSYCHIATRY & NEUROLOGY

## 2020-09-09 PROCEDURE — 63600175 PHARM REV CODE 636 W HCPCS: Performed by: STUDENT IN AN ORGANIZED HEALTH CARE EDUCATION/TRAINING PROGRAM

## 2020-09-09 PROCEDURE — 63600175 PHARM REV CODE 636 W HCPCS: Performed by: NEUROLOGICAL SURGERY

## 2020-09-09 RX ORDER — CEFAZOLIN SODIUM 1 G/3ML
2 INJECTION, POWDER, FOR SOLUTION INTRAMUSCULAR; INTRAVENOUS
Status: DISCONTINUED | OUTPATIENT
Start: 2020-09-09 | End: 2020-09-14

## 2020-09-09 RX ADMIN — CITALOPRAM HYDROBROMIDE 20 MG: 10 TABLET ORAL at 09:09

## 2020-09-09 RX ADMIN — CEFAZOLIN 2 G: 1 INJECTION, POWDER, FOR SOLUTION INTRAMUSCULAR; INTRAVENOUS at 02:09

## 2020-09-09 RX ADMIN — HEPARIN SODIUM 5000 UNITS: 5000 INJECTION INTRAVENOUS; SUBCUTANEOUS at 09:09

## 2020-09-09 RX ADMIN — HEPARIN SODIUM 5000 UNITS: 5000 INJECTION INTRAVENOUS; SUBCUTANEOUS at 02:09

## 2020-09-09 RX ADMIN — CEFAZOLIN 2 G: 1 INJECTION, POWDER, FOR SOLUTION INTRAMUSCULAR; INTRAVENOUS at 09:09

## 2020-09-09 RX ADMIN — VANCOMYCIN HYDROCHLORIDE 1500 MG: 1.5 INJECTION, POWDER, LYOPHILIZED, FOR SOLUTION INTRAVENOUS at 12:09

## 2020-09-09 RX ADMIN — POTASSIUM CHLORIDE 40 MEQ: 1.5 POWDER, FOR SOLUTION ORAL at 05:09

## 2020-09-09 RX ADMIN — CEFAZOLIN 2 G: 1 INJECTION, POWDER, FOR SOLUTION INTRAMUSCULAR; INTRAVENOUS at 06:09

## 2020-09-09 RX ADMIN — HEPARIN SODIUM 5000 UNITS: 5000 INJECTION INTRAVENOUS; SUBCUTANEOUS at 05:09

## 2020-09-09 NOTE — ASSESSMENT & PLAN NOTE
-s/p sEEG placement  -cefazolin 2 q8 prophylaxis  -SBP goal <140  -NSGY and epilepsy following  -seizure precautions  -continue: Topiramate 50 mg BID, lamotrigine 100mg BID, clobazam 20 mg BID, hold home med Vimpat  -neuro checks q 1 hr  9/2/2020: 1 seizure this morning associated with lip smacking  -per epilepsy: Held home Vimpat 8/31  - Continue Topamax 50 mg BID  9/3/2020: no seizures this AM, continue to hold vimpat, continue SQH for DVT ppx  - Per epilepsy: If no seizures throughout day, plan to decrease Onfi to 10 mg BID (Dr. Abhijeet Do to change tonight if needed).   9/9/2020: off AEDs today per epilepsy, plan for sEEG removal this Friday

## 2020-09-09 NOTE — ASSESSMENT & PLAN NOTE
25 M with intractable epilepsy s/p multiple failed AEDs and VNS placement now s/p sEEG lead placement:    OR cancelled yesterday. Will continue to monitor for seizures until Friday or Monday, pending seizure observance.   -- Weaned off of all antiepileptics    Admit to NCC  q1h neurochecks  sEEG leads in place, abx while in place, continue headwrap  Bed rest at all times  SBP<140  No HOB restrictions  Ok for recumbent bike in bed  Ok for diet  Voiding spontaneously s/p michelle removal  Post operative CTH reviewed - expected post op changes  Monitor for seizure activity - no benzos or sedating meds unless cleared by Epilepsy team  AEDs per Epilepsy  Please page w/exam change  Tentative sEEG bolt removal on 9/9, booked, will obtain consent.     Dispo: continue ICU care

## 2020-09-09 NOTE — SUBJECTIVE & OBJECTIVE
Review of Systems  Review of Symptoms:  Constitutional: Denies fevers, weight loss, chills, or weakness.  Eyes: Denies changes in vision.  ENT: Denies dysphagia, nasal discharge, ear pain or discharge.  Cardiovascular: Denies chest pain, palpitations, orthopnea, or claudication.  Respiratory: Denies shortness of breath, cough, hemoptysis, or wheezing.  GI: Denies nausea/vomitting, hematochezia, melena, abd pain, or changes in appetite.  : Denies dysuria, incontinence, or hematuria.  Musculoskeletal: Denies joint pain or myalgias.  Skin/breast: Denies rashes, lumps, lesions, or discharge.  Neurologic: Denies headache, dizziness, vertigo, or paresthesias.  Psychiatric: Denies changes in mood or hallucinations.  Endocrine: Denies polyuria, polydipsia, heat/cold intolerance.  Hematologic/Lymph: Denies lymphadenopathy, easy bruising or easy bleeding.  Allergic/Immunologic: Denies rash, rhinitis.   Objective:     Vitals:  Temp: 98.1 °F (36.7 °C)  Pulse: 76  Rhythm: normal sinus rhythm  BP: 124/79  MAP (mmHg): 95  Resp: 20  SpO2: 100 %  O2 Device (Oxygen Therapy): room air    Temp  Min: 97.8 °F (36.6 °C)  Max: 98.3 °F (36.8 °C)  Pulse  Min: 55  Max: 95  BP  Min: 103/66  Max: 137/79  MAP (mmHg)  Min: 79  Max: 105  Resp  Min: 10  Max: 22  SpO2  Min: 98 %  Max: 100 %    09/08 0701 - 09/09 0700  In: 880 [P.O.:880]  Out: 1675 [Urine:1675]   Unmeasured Output  Urine Occurrence: 1  Stool Occurrence: 1       Physical Exam  GA: Alert, comfortable, no acute distress.   HEENT: No scleral icterus or JVD.   Pulmonary: Clear to auscultation A/L. No wheezing, crackles, or rhonchi.  Cardiac: RRR S1 & S2 w/o rubs/murmurs/gallops.   Abdominal: Bowel sounds present x 4. No appreciable hepatosplenomegaly.  Skin: No jaundice, rashes, or visible lesions.  Neuro:  --GCS: E4 V5 M6  --Mental Status:  AAOX4, follows commands, fluent speech  --CN II-XII grossly intact.   --Pupils 3mm, PERRL.   --Corneal reflex, gag, cough intact.  --GLENN  spont    Medications:  Continuous ScheduledceFAZolin (ANCEF) IVPB, 2 g, Q8H  citalopram, 20 mg, Daily  heparin (porcine), 5,000 Units, Q8H  senna-docusate 8.6-50 mg, 1 tablet, Daily    PRNacetaminophen, 650 mg, Q4H PRN  hydrALAZINE, 10 mg, Q4H PRN  labetaloL, 10 mg, Q4H PRN  magnesium oxide, 800 mg, PRN  magnesium oxide, 800 mg, PRN  oxyCODONE, 5 mg, Q6H PRN  potassium chloride, 40 mEq, PRN  potassium chloride, 40 mEq, PRN  potassium chloride, 40 mEq, PRN  potassium, sodium phosphates, 2 packet, PRN  potassium, sodium phosphates, 2 packet, PRN  potassium, sodium phosphates, 2 packet, PRN  promethazine (PHENERGAN) IVPB, 6.25 mg, Q8H PRN      Today I personally reviewed pertinent medications, lines/drains/airways, imaging, cardiology results, laboratory results,     Diet  Diet Adult Regular (IDDSI Level 7)

## 2020-09-09 NOTE — PLAN OF CARE
[facility] Care Plan    POC reviewed with Darin Cunha. Pt verbalized understanding. Questions and concerns addressed. No acute events today. No seizure like activity noted during shift after pt was stopped on all AEDs. Pt denies having a seizure during shift. No fevers during shift. Potassium was replaced. Pt slept the majority of shift. Pt progressing toward goals. Will continue to monitor. See below and flowsheets for full assessment and VS info.       Neuro:  [place] Coma Scale  Best Eye Response: 4-->(E4) spontaneous  Best Motor Response: 6-->(M6) obeys commands  Best Verbal Response: 5-->(V5) oriented  [place] Coma Scale Score: 15  Assessment Qualifiers: patient not sedated/intubated, no eye obstruction present  Pupil PERRLA: yes     24 hr Temp:  [97.7 °F (36.5 °C)-98.3 °F (36.8 °C)]     CV:   Rhythm: sinus bradycardia  BP goals:   SBP < 140  MAP > 65    Resp:   O2 Device (Oxygen Therapy): room air       Plan: N/A    GI/:  STEPHENIE Total Score: 20  Diet/Nutrition Received: regular  Last Bowel Movement: 09/08/20  Voiding Characteristics: voids spontaneously without difficulty    Intake/Output Summary (Last 24 hours) at 9/9/2020 0524  Last data filed at 9/8/2020 1900  Gross per 24 hour   Intake 480 ml   Output 1275 ml   Net -795 ml     Unmeasured Output  Urine Occurrence: 1  Stool Occurrence: 0    Labs/Accuchecks:  Recent Labs   Lab 09/09/20  0112   WBC 7.50   RBC 4.77   HGB 15.0   HCT 41.7         Recent Labs   Lab 09/09/20  0112      K 3.8   CO2 24      BUN 11   CREATININE 0.9   ALKPHOS 101   ALT 39   AST 26   BILITOT 0.8      Recent Labs   Lab 09/09/20  0112   INR 1.0   APTT 31.4    No results for input(s): CPK, CPKMB, TROPONINI, MB in the last 168 hours.    Electrolytes: Electrolytes replaced  Accuchecks: none    Gtts:       LDA/Wounds:  Lines/Drains/Airways       Peripheral Intravenous Line                   Peripheral IV - Single Lumen 09/04/20 1101 18 G Left Antecubital 4 days          Peripheral IV - Single Lumen 09/08/20 0257 20 G Anterior;Right Forearm 1 day                  Wounds: No  Wound care consulted: No

## 2020-09-09 NOTE — ASSESSMENT & PLAN NOTE
25M with a PMHx of intractable epilepsy s/p VNS placement on Onfi 40 mg BID, Lamictal 200 mg BID, Vimpat 200 mg BID, and Topamax 50 mg BID s/p bilateral sEEG placement with Dr. Melani Ortiz on 8/31. Postop CTH stable.    Recommendations:  - Continuous vEEG monitoring  - 4 electrographic seizures from L hippo and entro areas since admit  - Vimpat held on admit 8/31, Lamictal weaned and stopped 9/6, Onfi weaned and stopped 9/8, Topamax stopped 9/8  - Please call epilepsy on call prior to administering benzodiazepines   - If patient has more than one GTC, can consider giving IV Vimpat 100 mgx1  - Neurosurgery following; tentative plan for sEEG removal Friday pending seizures  - Seizure precautions  - Avoid agents that lower seizure thresold      Plan of care discussed with patient and NCC team. Will follow. Please call with questions.

## 2020-09-09 NOTE — PROGRESS NOTES
[facility]  Neurocritical Care  Progress Note    Admit Date: 8/31/2020  Service Date: 09/09/2020  Length of Stay: 9    Subjective:     Chief Complaint: Epilepsy    History of Present Illness: The patient is a 25 year old M with PMHx of depression, seizures, epilepsy admitted to [facility] s/p placement of sEED electrodes bilateral. Per chart review he has failed at least 4 AEDs at therapeutic dosages. Triggers include being tired and being stressed. He is not working (on disability) but finished his GED. He lives next door to grandmother and nearby to his mother. His major concern is memory loss from ongoing seizures. NSGY and epilepsy following. Patient admitted to [facility] for close monitoring and higher level of care.     Hospital Course: 8/31/2020: patient admitted to [facility] s/p sEEG electrodes placement, NSGY and epilepsy following, SBP goal < 140, perepilepsy restarted all AEDs except for Vimpat  9/1/2020: 1 episode of seizure, Vimpat  On hold, Onfi and lamictal decreased, continue topamax  9/2/2020: No seizures this AM, epilepsy managing AEDs, started SQH  9/3/2020: No electrographic seizures in AM, Per epilepsy: If no seizures throughout day, plan to decrease Onfi to 10 mg BID (Dr. Abhijeet Do to change tonight if needed)  9/4/2020: d/c A-line  9/5/2020: NAEON  9/6/2020: MERLENE, plan for removal of sEEG on 9/9 09/07/2020 naeon  9/8/2020: MERLENE, off AEDs. Likely bolt removal this Friday 9/9/2020: MERLENE        Review of Systems  Review of Symptoms:  Constitutional: Denies fevers, weight loss, chills, or weakness.  Eyes: Denies changes in vision.  ENT: Denies dysphagia, nasal discharge, ear pain or discharge.  Cardiovascular: Denies chest pain, palpitations, orthopnea, or claudication.  Respiratory: Denies shortness of breath, cough, hemoptysis, or wheezing.  GI: Denies nausea/vomitting, hematochezia, melena, abd pain, or changes in appetite.  : Denies dysuria, incontinence, or  hematuria.  Musculoskeletal: Denies joint pain or myalgias.  Skin/breast: Denies rashes, lumps, lesions, or discharge.  Neurologic: Denies headache, dizziness, vertigo, or paresthesias.  Psychiatric: Denies changes in mood or hallucinations.  Endocrine: Denies polyuria, polydipsia, heat/cold intolerance.  Hematologic/Lymph: Denies lymphadenopathy, easy bruising or easy bleeding.  Allergic/Immunologic: Denies rash, rhinitis.   Objective:     Vitals:  Temp: 98.1 °F (36.7 °C)  Pulse: 76  Rhythm: normal sinus rhythm  BP: 124/79  MAP (mmHg): 95  Resp: 20  SpO2: 100 %  O2 Device (Oxygen Therapy): room air    Temp  Min: 97.8 °F (36.6 °C)  Max: 98.3 °F (36.8 °C)  Pulse  Min: 55  Max: 95  BP  Min: 103/66  Max: 137/79  MAP (mmHg)  Min: 79  Max: 105  Resp  Min: 10  Max: 22  SpO2  Min: 98 %  Max: 100 %    09/08 0701 - 09/09 0700  In: 880 [P.O.:880]  Out: 1675 [Urine:1675]   Unmeasured Output  Urine Occurrence: 1  Stool Occurrence: 1       Physical Exam  GA: Alert, comfortable, no acute distress.   HEENT: No scleral icterus or JVD.   Pulmonary: Clear to auscultation A/L. No wheezing, crackles, or rhonchi.  Cardiac: RRR S1 & S2 w/o rubs/murmurs/gallops.   Abdominal: Bowel sounds present x 4. No appreciable hepatosplenomegaly.  Skin: No jaundice, rashes, or visible lesions.  Neuro:  --GCS: E4 V5 M6  --Mental Status:  AAOX4, follows commands, fluent speech  --CN II-XII grossly intact.   --Pupils 3mm, PERRL.   --Corneal reflex, gag, cough intact.  --ELIZABETH spont    Medications:  Continuous ScheduledceFAZolin (ANCEF) IVPB, 2 g, Q8H  citalopram, 20 mg, Daily  heparin (porcine), 5,000 Units, Q8H  senna-docusate 8.6-50 mg, 1 tablet, Daily    PRNacetaminophen, 650 mg, Q4H PRN  hydrALAZINE, 10 mg, Q4H PRN  labetaloL, 10 mg, Q4H PRN  magnesium oxide, 800 mg, PRN  magnesium oxide, 800 mg, PRN  oxyCODONE, 5 mg, Q6H PRN  potassium chloride, 40 mEq, PRN  potassium chloride, 40 mEq, PRN  potassium chloride, 40 mEq, PRN  potassium, sodium  phosphates, 2 packet, PRN  potassium, sodium phosphates, 2 packet, PRN  potassium, sodium phosphates, 2 packet, PRN  promethazine (PHENERGAN) IVPB, 6.25 mg, Q8H PRN      Today I personally reviewed pertinent medications, lines/drains/airways, imaging, cardiology results, laboratory results,     Diet  Diet Adult Regular (IDDSI Level 7)        Assessment/Plan:     Neuro  * Epilepsy  -s/p sEEG placement  -cefazolin 2 q8 prophylaxis  -SBP goal <140  -NSGY and epilepsy following  -seizure precautions  -continue: Topiramate 50 mg BID, lamotrigine 100mg BID, clobazam 20 mg BID, hold home med Vimpat  -neuro checks q 1 hr  9/2/2020: 1 seizure this morning associated with lip smacking  -per epilepsy: Held home Vimpat 8/31  - Continue Topamax 50 mg BID  9/3/2020: no seizures this AM, continue to hold vimpat, continue SQH for DVT ppx  - Per epilepsy: If no seizures throughout day, plan to decrease Onfi to 10 mg BID (Dr. Abhijeet Do to change tonight if needed).   9/9/2020: off AEDs today per epilepsy, plan for sEEG removal this Friday        Psychiatric  Depression  - continue home med celexa          The patient is being Prophylaxed for:  Venous Thromboembolism with: Chemical  Stress Ulcer with: None  Ventilator Pneumonia with: not applicable    Activity Orders          Diet Adult Regular (IDDSI Level 7): Regular starting at 09/09 0902        Full Code    Georgie Hoyos NP  Neurocritical Care  [facility]

## 2020-09-09 NOTE — PROGRESS NOTES
[facility]  Neurosurgery  Progress Note    Subjective:     History of Present Illness: Darin Cunha is a 25 y.o. male with intractable epilepsy since 2013 who presents as a referral from Dr. Abhijeet Do to discuss the gamut of epilepsy surgery options. Today's visit is a video visit, and the patient is a passenger in a moving vehicle. His mother is not with him.      The patient states that when he has events, he stares off and smacks his lips. The episodes last for about 5-6 minutes. He often waves his hands and feels tired afterwards. He currently has about 3-5 episodes a week. He has failed at least 4 AEDs at therapeutic dosages.      Per EEG review, he has bilateral temporal involvement, moreso left-sided than right.      Triggers include being tired and being stressed. He is not working (on disability) but finished his GED. He lives next door to grandmother and nearby to his mother. His major concern is memory loss from ongoing seizures.      Of notes, he endorses a history of staph infection previously. He denies history of bleeding or anesthetic complications. He has a VNS in place.        Post-Op Info:  Procedure(s) (LRB):  CREATION, CRANIAL YUNG HOLE, PLACEMENT OF THE FIDUCIAL SCREWS BILATERAL 2.PLACEMENT OF SEEG ELECTRODES BILATERAL WITH OWEN ROBOT (Bilateral)   9 Days Post-Op     Interval History: OR for sEEG removal cancelled yesterday to continue to observe for more seizures. Weaned off of all antiepileptics now    Medications:  Continuous Infusions:  Scheduled Meds:   ceFAZolin (ANCEF) IVPB  2 g Intravenous Q8H    citalopram  20 mg Oral Daily    heparin (porcine)  5,000 Units Subcutaneous Q8H    senna-docusate 8.6-50 mg  1 tablet Oral Daily    vancomycin (VANCOCIN) IVPB  1,500 mg Intravenous Q12H     PRN Meds:acetaminophen, hydrALAZINE, labetaloL, magnesium oxide, magnesium oxide, oxyCODONE, potassium chloride, potassium chloride, potassium chloride, potassium, sodium  phosphates, potassium, sodium phosphates, potassium, sodium phosphates, promethazine (PHENERGAN) IVPB     Review of Systems  Objective:     Weight: 78.5 kg (173 lb 1 oz)  Body mass index is 22.83 kg/m².  Vital Signs (Most Recent):  Temp: 98 °F (36.7 °C) (09/09/20 0700)  Pulse: 81 (09/09/20 0830)  Resp: 12 (09/09/20 0830)  BP: 112/71 (09/09/20 0800)  SpO2: 99 % (09/09/20 0830) Vital Signs (24h Range):  Temp:  [97.8 °F (36.6 °C)-98.3 °F (36.8 °C)] 98 °F (36.7 °C)  Pulse:  [] 81  Resp:  [10-47] 12  SpO2:  [95 %-100 %] 99 %  BP: (101-137)/(57-89) 112/71     Date 09/09/20 0700 - 09/10/20 0659   Shift 2447-6981 3167-6631 2199-5057 24 Hour Total   INTAKE   Shift Total(mL/kg)       OUTPUT   Urine(mL/kg/hr) 400   400   Shift Total(mL/kg) 400(5.1)   400(5.1)   Weight (kg) 78.5 78.5 78.5 78.5                        Neurosurgery Physical Exam    GCS 15  AOx3  CN intact  Strength 5/5 throughout    Significant Labs:  Recent Labs   Lab 09/07/20  1233 09/08/20 0207 09/09/20 0112   GLU  --  97 96   NA  --  138 137   K 4.3 3.9 3.8   CL  --  107 105   CO2  --  22* 24   BUN  --  13 11   CREATININE  --  0.9 0.9   CALCIUM  --  9.1 9.2   MG  --  1.9 1.9     Recent Labs   Lab 09/08/20  0207 09/09/20 0112   WBC 9.53 7.50   HGB 15.8 15.0   HCT 43.8 41.7    247     Recent Labs   Lab 09/09/20 0112   INR 1.0   APTT 31.4     Microbiology Results (last 7 days)     ** No results found for the last 168 hours. **        All pertinent labs from the last 24 hours have been reviewed.    Significant Diagnostics:  I have reviewed and interpreted all pertinent imaging results/findings within the past 24 hours.    Assessment/Plan:     * Epilepsy  25 M with intractable epilepsy s/p multiple failed AEDs and VNS placement now s/p sEEG lead placement:    OR cancelled yesterday. Will continue to monitor for seizures until Friday or Monday, pending seizure observance.   -- Weaned off of all antiepileptics    Admit to [facility]  q1h neurochecks  sEEG  leads in place, abx while in place, continue headwrap  Bed rest at all times  SBP<140  No HOB restrictions  Ok for recumbent bike in bed  Ok for diet  Voiding spontaneously s/p michelle removal  Post operative CTH reviewed - expected post op changes  Monitor for seizure activity - no benzos or sedating meds unless cleared by Epilepsy team  AEDs per Epilepsy  Please page w/exam change  Tentative sEEG bolt removal on 9/9, booked, will obtain consent.     Dispo: continue ICU care        Ino Collado MD  Neurosurgery  [facility] [address] [facility]

## 2020-09-09 NOTE — SUBJECTIVE & OBJECTIVE
Interval History: No acute events overnight. This AM, patient lying in bed resting comfortably. No complaints. Reports riding stationary bike for 7 miles yesterday; encouraged riding again today. Discussed plan of care.    Current Facility-Administered Medications   Medication Dose Route Frequency Provider Last Rate Last Dose    acetaminophen tablet 650 mg  650 mg Oral Q4H PRN Georgiefroilan Hoyos, NP   650 mg at 09/02/20 1914    ceFAZolin injection 2 g  2 g Intravenous Q8H Ethan Owen MD   2 g at 09/09/20 0611    citalopram tablet 20 mg  20 mg Oral Daily Georgie Miinea, NP   20 mg at 09/09/20 0904    heparin (porcine) injection 5,000 Units  5,000 Units Subcutaneous Q8H Carito Amezquita PA-C   5,000 Units at 09/09/20 0518    hydrALAZINE injection 10 mg  10 mg Intravenous Q4H PRN Georgiefroilan Hoyos, NP   10 mg at 08/31/20 1601    labetaloL injection 10 mg  10 mg Intravenous Q4H PRN Georgie Romain, NP        magnesium oxide tablet 800 mg  800 mg Oral PRN Melissa Flores, NP        magnesium oxide tablet 800 mg  800 mg Oral PRN Melissa Flores NP        oxyCODONE immediate release tablet 5 mg  5 mg Oral Q6H PRN Georgiefroilan Hoyos, NP   5 mg at 09/02/20 1914    potassium chloride packet 40 mEq  40 mEq Oral PRN Melissa Flores NP   40 mEq at 09/09/20 0518    potassium chloride packet 40 mEq  40 mEq Oral PRN Melissa Flores NP   40 mEq at 09/07/20 0923    potassium chloride packet 40 mEq  40 mEq Oral PRN Melissa Flores NP        potassium, sodium phosphates 280-160-250 mg packet 2 packet  2 packet Oral PRN Melissa Flores NP        potassium, sodium phosphates 280-160-250 mg packet 2 packet  2 packet Oral PRN Melissa Flores NP        potassium, sodium phosphates 280-160-250 mg packet 2 packet  2 packet Oral PRN Melissa Flores NP        promethazine (PHENERGAN) 6.25 mg in dextrose 5 % 50 mL IVPB  6.25 mg Intravenous Q8H PRN Georgie Mithomas,  mL/hr at 08/31/20 1730 6.25 mg at 08/31/20  1730    senna-docusate 8.6-50 mg per tablet 1 tablet  1 tablet Oral Daily Georgie Hoyos NP   Stopped at 09/08/20 0900     Continuous Infusions:    Review of Systems   Constitutional: Negative for chills, diaphoresis, fatigue and fever.   HENT: Negative for sore throat and trouble swallowing.    Respiratory: Negative for cough, shortness of breath and wheezing.    Cardiovascular: Negative for chest pain and palpitations.   Gastrointestinal: Negative for abdominal pain, diarrhea, nausea and vomiting.   Neurological: Positive for seizures. Negative for dizziness, syncope, speech difficulty, weakness and headaches (resolved).   Psychiatric/Behavioral: Negative for behavioral problems, confusion and sleep disturbance. The patient is not nervous/anxious.      Objective:     Vital Signs (Most Recent):  Temp: 98.1 °F (36.7 °C) (09/09/20 1100)  Pulse: 78 (09/09/20 1100)  Resp: 15 (09/09/20 1100)  BP: 122/80 (09/09/20 1100)  SpO2: 100 % (09/09/20 1100) Vital Signs (24h Range):  Temp:  [97.8 °F (36.6 °C)-98.3 °F (36.8 °C)] 98.1 °F (36.7 °C)  Pulse:  [55-95] 78  Resp:  [10-27] 15  SpO2:  [95 %-100 %] 100 %  BP: (108-137)/(61-91) 122/80     Weight: 78.5 kg (173 lb 1 oz)  Body mass index is 22.83 kg/m².    Physical Exam  Constitutional:       General: He is not in acute distress.     Appearance: Normal appearance. He is not diaphoretic.   HENT:      Head: Normocephalic.      Comments: sEEG in place  Eyes:      General: No scleral icterus.        Right eye: No discharge.         Left eye: No discharge.      Extraocular Movements: EOM normal.      Conjunctiva/sclera: Conjunctivae normal.      Pupils: Pupils are equal, round, and reactive to light.   Cardiovascular:      Rate and Rhythm: Normal rate.   Pulmonary:      Effort: Pulmonary effort is normal. No respiratory distress.   Abdominal:      General: There is no distension.      Palpations: Abdomen is soft.      Tenderness: There is no abdominal tenderness.   Musculoskeletal:  Normal range of motion.         General: No deformity or signs of injury.   Skin:     General: Skin is warm and dry.   Neurological:      General: No focal deficit present.      Mental Status: He is alert and oriented to person, place, and time. Mental status is at baseline.      Coordination: Finger-Nose-Finger Test normal.   Psychiatric:         Mood and Affect: Mood normal.         Speech: Speech normal.         Behavior: Behavior normal.         Thought Content: Thought content normal.         NEUROLOGICAL EXAMINATION:     MENTAL STATUS   Oriented to person, place, and time.   Speech: speech is normal   Level of consciousness: alert    CRANIAL NERVES     CN III, IV, VI   Pupils are equal, round, and reactive to light.  Extraocular motions are normal.     CN VII   Facial expression full, symmetric.     CN VIII   Hearing: intact    CN XI   CN XI normal.     CN XII   CN XII normal.     GAIT AND COORDINATION      Coordination   Finger to nose coordination: normal      Significant Labs: All pertinent lab results from the past 24 hours have been reviewed.    Significant Studies: I have reviewed all pertinent imaging results/findings within the past 24 hours.

## 2020-09-09 NOTE — SUBJECTIVE & OBJECTIVE
Interval History: OR for sEEG removal cancelled yesterday to continue to observe for more seizures. Weaned off of all antiepileptics now    Medications:  Continuous Infusions:  Scheduled Meds:   ceFAZolin (ANCEF) IVPB  2 g Intravenous Q8H    citalopram  20 mg Oral Daily    heparin (porcine)  5,000 Units Subcutaneous Q8H    senna-docusate 8.6-50 mg  1 tablet Oral Daily    vancomycin (VANCOCIN) IVPB  1,500 mg Intravenous Q12H     PRN Meds:acetaminophen, hydrALAZINE, labetaloL, magnesium oxide, magnesium oxide, oxyCODONE, potassium chloride, potassium chloride, potassium chloride, potassium, sodium phosphates, potassium, sodium phosphates, potassium, sodium phosphates, promethazine (PHENERGAN) IVPB     Review of Systems  Objective:     Weight: 78.5 kg (173 lb 1 oz)  Body mass index is 22.83 kg/m².  Vital Signs (Most Recent):  Temp: 98 °F (36.7 °C) (09/09/20 0700)  Pulse: 81 (09/09/20 0830)  Resp: 12 (09/09/20 0830)  BP: 112/71 (09/09/20 0800)  SpO2: 99 % (09/09/20 0830) Vital Signs (24h Range):  Temp:  [97.8 °F (36.6 °C)-98.3 °F (36.8 °C)] 98 °F (36.7 °C)  Pulse:  [] 81  Resp:  [10-47] 12  SpO2:  [95 %-100 %] 99 %  BP: (101-137)/(57-89) 112/71     Date 09/09/20 0700 - 09/10/20 0659   Shift 8714-3537 3345-7869 8661-0178 24 Hour Total   INTAKE   Shift Total(mL/kg)       OUTPUT   Urine(mL/kg/hr) 400   400   Shift Total(mL/kg) 400(5.1)   400(5.1)   Weight (kg) 78.5 78.5 78.5 78.5                        Neurosurgery Physical Exam    GCS 15  AOx3  CN intact  Strength 5/5 throughout    Significant Labs:  Recent Labs   Lab 09/07/20  1233 09/08/20  0207 09/09/20  0112   GLU  --  97 96   NA  --  138 137   K 4.3 3.9 3.8   CL  --  107 105   CO2  --  22* 24   BUN  --  13 11   CREATININE  --  0.9 0.9   CALCIUM  --  9.1 9.2   MG  --  1.9 1.9     Recent Labs   Lab 09/08/20  0207 09/09/20  0112   WBC 9.53 7.50   HGB 15.8 15.0   HCT 43.8 41.7    247     Recent Labs   Lab 09/09/20 0112   INR 1.0   APTT 31.4      Microbiology Results (last 7 days)     ** No results found for the last 168 hours. **        All pertinent labs from the last 24 hours have been reviewed.    Significant Diagnostics:  I have reviewed and interpreted all pertinent imaging results/findings within the past 24 hours.

## 2020-09-09 NOTE — CONSULTS
Therapy with vancomycin complete and/or consult discontinued by provider.  Pharmacy will sign off, please re-consult as needed.      Staci Teran, PharmD, [facility]  Neurocritical Care Pharmacist  [phone]

## 2020-09-09 NOTE — PROGRESS NOTES
[facility]  Neurology-Epilepsy  Progress Note    Patient Name: Darin Cunha  MRN: [id number]  Admission Date: 8/31/2020  Hospital Length of Stay: 9 days  Code Status: Full Code   Attending Provider: Melani Ortiz MD  Primary Care Physician: Primary Doctor No   Principal Problem:Epilepsy    Subjective:     Hospital Course:   8/31: sEEG placement. Hold Vimpat. Continue other AEDs: Onfi 40 mg BID, Lamictal 200 mg BID, Topamax 50 mg BID.  8/31>9/1: No clinical or electrographic seizures overnight.   9/1>9/2: Four electrographic seizures (3 with clinical correlate) originating from L hippo and entro areas.  9/2>9/3: No electrographic seizures  9/3>9/4: No electrographic seizures  9/4>9/5: No electrographic seizures  9/5>9/6: No electrographic seizures  9/6>9/7: No electrographic seizures  9/7>9/8: No electrographic seizures  9/8>9/9: No clinical seizures    Interval History: No acute events overnight. This AM, patient lying in bed resting comfortably. No complaints. Reports riding stationary bike for 7 miles yesterday; encouraged riding again today. Discussed plan of care.    Current Facility-Administered Medications   Medication Dose Route Frequency Provider Last Rate Last Dose    acetaminophen tablet 650 mg  650 mg Oral Q4H PRN Georgie Miinea, NP   650 mg at 09/02/20 1914    ceFAZolin injection 2 g  2 g Intravenous Q8H Ethan Owen MD   2 g at 09/09/20 0611    citalopram tablet 20 mg  20 mg Oral Daily Georgie Miinea, NP   20 mg at 09/09/20 0904    heparin (porcine) injection 5,000 Units  5,000 Units Subcutaneous Q8H Carito Amezquita PA-C   5,000 Units at 09/09/20 0518    hydrALAZINE injection 10 mg  10 mg Intravenous Q4H PRN Georgie Miinea, NP   10 mg at 08/31/20 1601    labetaloL injection 10 mg  10 mg Intravenous Q4H PRN Georgie Miinea, NP        magnesium oxide tablet 800 mg  800 mg Oral PRN Melissa Flores NP        magnesium oxide tablet 800 mg  800 mg Oral PRN Melissa Flores, NP         oxyCODONE immediate release tablet 5 mg  5 mg Oral Q6H PRN Georgie Hoyos NP   5 mg at 09/02/20 1914    potassium chloride packet 40 mEq  40 mEq Oral PRN Melissa Flores NP   40 mEq at 09/09/20 0518    potassium chloride packet 40 mEq  40 mEq Oral PRN Melissa Flores NP   40 mEq at 09/07/20 0923    potassium chloride packet 40 mEq  40 mEq Oral PRN Melissa Flores NP        potassium, sodium phosphates 280-160-250 mg packet 2 packet  2 packet Oral PRN Melissa Flores NP        potassium, sodium phosphates 280-160-250 mg packet 2 packet  2 packet Oral PRN Melissa Flores NP        potassium, sodium phosphates 280-160-250 mg packet 2 packet  2 packet Oral PRN Melissa Flores NP        promethazine (PHENERGAN) 6.25 mg in dextrose 5 % 50 mL IVPB  6.25 mg Intravenous Q8H PRN Georgie Hoyos  mL/hr at 08/31/20 1730 6.25 mg at 08/31/20 1730    senna-docusate 8.6-50 mg per tablet 1 tablet  1 tablet Oral Daily Georgie Hoyos NP   Stopped at 09/08/20 0900     Continuous Infusions:    Review of Systems   Constitutional: Negative for chills, diaphoresis, fatigue and fever.   HENT: Negative for sore throat and trouble swallowing.    Respiratory: Negative for cough, shortness of breath and wheezing.    Cardiovascular: Negative for chest pain and palpitations.   Gastrointestinal: Negative for abdominal pain, diarrhea, nausea and vomiting.   Neurological: Positive for seizures. Negative for dizziness, syncope, speech difficulty, weakness and headaches (resolved).   Psychiatric/Behavioral: Negative for behavioral problems, confusion and sleep disturbance. The patient is not nervous/anxious.      Objective:     Vital Signs (Most Recent):  Temp: 98.1 °F (36.7 °C) (09/09/20 1100)  Pulse: 78 (09/09/20 1100)  Resp: 15 (09/09/20 1100)  BP: 122/80 (09/09/20 1100)  SpO2: 100 % (09/09/20 1100) Vital Signs (24h Range):  Temp:  [97.8 °F (36.6 °C)-98.3 °F (36.8 °C)] 98.1 °F (36.7 °C)  Pulse:  [55-95]  78  Resp:  [10-27] 15  SpO2:  [95 %-100 %] 100 %  BP: (108-137)/(61-91) 122/80     Weight: 78.5 kg (173 lb 1 oz)  Body mass index is 22.83 kg/m².    Physical Exam  Constitutional:       General: He is not in acute distress.     Appearance: Normal appearance. He is not diaphoretic.   HENT:      Head: Normocephalic.      Comments: sEEG in place  Eyes:      General: No scleral icterus.        Right eye: No discharge.         Left eye: No discharge.      Extraocular Movements: EOM normal.      Conjunctiva/sclera: Conjunctivae normal.      Pupils: Pupils are equal, round, and reactive to light.   Cardiovascular:      Rate and Rhythm: Normal rate.   Pulmonary:      Effort: Pulmonary effort is normal. No respiratory distress.   Abdominal:      General: There is no distension.      Palpations: Abdomen is soft.      Tenderness: There is no abdominal tenderness.   Musculoskeletal: Normal range of motion.         General: No deformity or signs of injury.   Skin:     General: Skin is warm and dry.   Neurological:      General: No focal deficit present.      Mental Status: He is alert and oriented to person, place, and time. Mental status is at baseline.      Coordination: Finger-Nose-Finger Test normal.   Psychiatric:         Mood and Affect: Mood normal.         Speech: Speech normal.         Behavior: Behavior normal.         Thought Content: Thought content normal.         NEUROLOGICAL EXAMINATION:     MENTAL STATUS   Oriented to person, place, and time.   Speech: speech is normal   Level of consciousness: alert    CRANIAL NERVES     CN III, IV, VI   Pupils are equal, round, and reactive to light.  Extraocular motions are normal.     CN VII   Facial expression full, symmetric.     CN VIII   Hearing: intact    CN XI   CN XI normal.     CN XII   CN XII normal.     GAIT AND COORDINATION      Coordination   Finger to nose coordination: normal      Significant Labs: All pertinent lab results from the past 24 hours have been  reviewed.    Significant Studies: I have reviewed all pertinent imaging results/findings within the past 24 hours.    Assessment and Plan:     * Epilepsy  25M with a PMHx of intractable epilepsy s/p VNS placement on Onfi 40 mg BID, Lamictal 200 mg BID, Vimpat 200 mg BID, and Topamax 50 mg BID s/p bilateral sEEG placement with Dr. Melani Ortiz on 8/31. Postop CTH stable.    Recommendations:  - Continuous vEEG monitoring  - 4 electrographic seizures from L hippo and entro areas since admit  - Vimpat held on admit 8/31, Lamictal weaned and stopped 9/6, Onfi weaned and stopped 9/8, Topamax stopped 9/8  - Please call epilepsy on call prior to administering benzodiazepines   - If patient has more than one GTC, can consider giving IV Vimpat 100 mgx1  - Neurosurgery following; tentative plan for sEEG removal Friday pending seizures  - Seizure precautions  - Avoid agents that lower seizure thresold      Plan of care discussed with patient and NCC team. Will follow. Please call with questions.    Depression  - Chronic and stable  - Continue home Celexa        VTE Risk Mitigation (From admission, onward)         Ordered     heparin (porcine) injection 5,000 Units  Every 8 hours      09/02/20 1035     Place sequential compression device  Until discontinued      08/31/20 1646                Minerva Oconnor PA-C  Neurology-Epilepsy  [facility]  Staff: Dr. Curtis

## 2020-09-09 NOTE — PLAN OF CARE
POC reviewed with pt and family (by phone) at 1400. Pt and family verbalized understanding. Questions and concerns addressed. No acute events today. Pt progressing toward goals. Will continue to monitor. See flowsheets for full assessment and VS info.     No seizures witnessed today.   In-bed exercise with recumbent bike completed this afternoon.  Patient eating/drinking/voiding without issue.

## 2020-09-10 LAB
ABO + RH BLD: NORMAL
ALBUMIN SERPL BCP-MCNC: 4.1 G/DL (ref 3.5–5.2)
ALP SERPL-CCNC: 103 U/L (ref 55–135)
ALT SERPL W/O P-5'-P-CCNC: 36 U/L (ref 10–44)
ANION GAP SERPL CALC-SCNC: 10 MMOL/L (ref 8–16)
AST SERPL-CCNC: 26 U/L (ref 10–40)
BASOPHILS # BLD AUTO: 0.04 K/UL (ref 0–0.2)
BASOPHILS NFR BLD: 0.6 % (ref 0–1.9)
BILIRUB SERPL-MCNC: 0.7 MG/DL (ref 0.1–1)
BLD GP AB SCN CELLS X3 SERPL QL: NORMAL
BUN SERPL-MCNC: 14 MG/DL (ref 6–20)
CALCIUM SERPL-MCNC: 9.1 MG/DL (ref 8.7–10.5)
CHLORIDE SERPL-SCNC: 105 MMOL/L (ref 95–110)
CO2 SERPL-SCNC: 22 MMOL/L (ref 23–29)
CREAT SERPL-MCNC: 0.8 MG/DL (ref 0.5–1.4)
DIFFERENTIAL METHOD: ABNORMAL
EOSINOPHIL # BLD AUTO: 0.1 K/UL (ref 0–0.5)
EOSINOPHIL NFR BLD: 1.3 % (ref 0–8)
ERYTHROCYTE [DISTWIDTH] IN BLOOD BY AUTOMATED COUNT: 11.5 % (ref 11.5–14.5)
EST. GFR  (AFRICAN AMERICAN): >60 ML/MIN/1.73 M^2
EST. GFR  (NON AFRICAN AMERICAN): >60 ML/MIN/1.73 M^2
GLUCOSE SERPL-MCNC: 97 MG/DL (ref 70–110)
HCT VFR BLD AUTO: 43.4 % (ref 40–54)
HGB BLD-MCNC: 15.5 G/DL (ref 14–18)
IMM GRANULOCYTES # BLD AUTO: 0.03 K/UL (ref 0–0.04)
IMM GRANULOCYTES NFR BLD AUTO: 0.4 % (ref 0–0.5)
LYMPHOCYTES # BLD AUTO: 1.7 K/UL (ref 1–4.8)
LYMPHOCYTES NFR BLD: 23.5 % (ref 18–48)
MAGNESIUM SERPL-MCNC: 2.1 MG/DL (ref 1.6–2.6)
MCH RBC QN AUTO: 31.4 PG (ref 27–31)
MCHC RBC AUTO-ENTMCNC: 35.7 G/DL (ref 32–36)
MCV RBC AUTO: 88 FL (ref 82–98)
MONOCYTES # BLD AUTO: 0.8 K/UL (ref 0.3–1)
MONOCYTES NFR BLD: 11.1 % (ref 4–15)
NEUTROPHILS # BLD AUTO: 4.4 K/UL (ref 1.8–7.7)
NEUTROPHILS NFR BLD: 63.1 % (ref 38–73)
NRBC BLD-RTO: 0 /100 WBC
PHOSPHATE SERPL-MCNC: 4.3 MG/DL (ref 2.7–4.5)
PLATELET # BLD AUTO: 244 K/UL (ref 150–350)
PMV BLD AUTO: 9.3 FL (ref 9.2–12.9)
POCT GLUCOSE: 119 MG/DL (ref 70–110)
POTASSIUM SERPL-SCNC: 3.4 MMOL/L (ref 3.5–5.1)
POTASSIUM SERPL-SCNC: 5.1 MMOL/L (ref 3.5–5.1)
PROT SERPL-MCNC: 6.8 G/DL (ref 6–8.4)
RBC # BLD AUTO: 4.93 M/UL (ref 4.6–6.2)
SODIUM SERPL-SCNC: 137 MMOL/L (ref 136–145)
WBC # BLD AUTO: 7.01 K/UL (ref 3.9–12.7)

## 2020-09-10 PROCEDURE — 25000003 PHARM REV CODE 250: Performed by: NURSE PRACTITIONER

## 2020-09-10 PROCEDURE — 80053 COMPREHEN METABOLIC PANEL: CPT

## 2020-09-10 PROCEDURE — 83735 ASSAY OF MAGNESIUM: CPT

## 2020-09-10 PROCEDURE — 95714 VEEG EA 12-26 HR UNMNTR: CPT

## 2020-09-10 PROCEDURE — 94761 N-INVAS EAR/PLS OXIMETRY MLT: CPT

## 2020-09-10 PROCEDURE — 95720 PR EEG, W/VIDEO, CONT RECORD, I&R, >12<26 HRS: ICD-10-PCS | Mod: ,,, | Performed by: PSYCHIATRY & NEUROLOGY

## 2020-09-10 PROCEDURE — 99233 SBSQ HOSP IP/OBS HIGH 50: CPT | Mod: ,,, | Performed by: PSYCHIATRY & NEUROLOGY

## 2020-09-10 PROCEDURE — 63600175 PHARM REV CODE 636 W HCPCS: Performed by: PHYSICIAN ASSISTANT

## 2020-09-10 PROCEDURE — 97802 MEDICAL NUTRITION INDIV IN: CPT

## 2020-09-10 PROCEDURE — 84100 ASSAY OF PHOSPHORUS: CPT

## 2020-09-10 PROCEDURE — 86901 BLOOD TYPING SEROLOGIC RH(D): CPT

## 2020-09-10 PROCEDURE — 99233 SBSQ HOSP IP/OBS HIGH 50: CPT | Mod: ,,, | Performed by: NURSE PRACTITIONER

## 2020-09-10 PROCEDURE — 99233 PR SUBSEQUENT HOSPITAL CARE,LEVL III: ICD-10-PCS | Mod: ,,, | Performed by: PSYCHIATRY & NEUROLOGY

## 2020-09-10 PROCEDURE — 20000000 HC ICU ROOM

## 2020-09-10 PROCEDURE — 63600175 PHARM REV CODE 636 W HCPCS: Performed by: STUDENT IN AN ORGANIZED HEALTH CARE EDUCATION/TRAINING PROGRAM

## 2020-09-10 PROCEDURE — 99233 PR SUBSEQUENT HOSPITAL CARE,LEVL III: ICD-10-PCS | Mod: ,,, | Performed by: NURSE PRACTITIONER

## 2020-09-10 PROCEDURE — 95720 EEG PHY/QHP EA INCR W/VEEG: CPT | Mod: ,,, | Performed by: PSYCHIATRY & NEUROLOGY

## 2020-09-10 PROCEDURE — 85025 COMPLETE CBC W/AUTO DIFF WBC: CPT

## 2020-09-10 PROCEDURE — 84132 ASSAY OF SERUM POTASSIUM: CPT

## 2020-09-10 RX ADMIN — ACETAMINOPHEN 325 MG: 325 TABLET ORAL at 09:09

## 2020-09-10 RX ADMIN — HEPARIN SODIUM 5000 UNITS: 5000 INJECTION INTRAVENOUS; SUBCUTANEOUS at 05:09

## 2020-09-10 RX ADMIN — CEFAZOLIN 2 G: 1 INJECTION, POWDER, FOR SOLUTION INTRAMUSCULAR; INTRAVENOUS at 05:09

## 2020-09-10 RX ADMIN — HEPARIN SODIUM 5000 UNITS: 5000 INJECTION INTRAVENOUS; SUBCUTANEOUS at 09:09

## 2020-09-10 RX ADMIN — CEFAZOLIN 2 G: 1 INJECTION, POWDER, FOR SOLUTION INTRAMUSCULAR; INTRAVENOUS at 09:09

## 2020-09-10 RX ADMIN — POTASSIUM CHLORIDE 40 MEQ: 1.5 POWDER, FOR SOLUTION ORAL at 09:09

## 2020-09-10 RX ADMIN — CITALOPRAM HYDROBROMIDE 20 MG: 10 TABLET ORAL at 09:09

## 2020-09-10 RX ADMIN — ACETAMINOPHEN 325 MG: 325 TABLET ORAL at 05:09

## 2020-09-10 RX ADMIN — SODIUM CHLORIDE 500 ML: 0.9 INJECTION, SOLUTION INTRAVENOUS at 06:09

## 2020-09-10 RX ADMIN — POTASSIUM CHLORIDE 40 MEQ: 1.5 POWDER, FOR SOLUTION ORAL at 05:09

## 2020-09-10 RX ADMIN — CEFAZOLIN 2 G: 1 INJECTION, POWDER, FOR SOLUTION INTRAMUSCULAR; INTRAVENOUS at 01:09

## 2020-09-10 RX ADMIN — HEPARIN SODIUM 5000 UNITS: 5000 INJECTION INTRAVENOUS; SUBCUTANEOUS at 02:09

## 2020-09-10 NOTE — PLAN OF CARE
Recommendations    Recommendation:   1. Continue regular diet, encourage good PO intake at meals   2. Add boost plus if pt accepts  3. RD to monitor and follow up    Goals: pt to tolerate >85% of EEN/EPN by RD follow up  Nutrition Goal Status: new  Communication of RD Recs: other (comment)(POC)

## 2020-09-10 NOTE — PROGRESS NOTES
[facility]  Neurocritical Care  Progress Note    Admit Date: 8/31/2020  Service Date: 09/10/2020  Length of Stay: 10    Subjective:     Chief Complaint: Epilepsy    History of Present Illness: The patient is a 25 year old M with PMHx of depression, seizures, epilepsy admitted to [facility] s/p placement of sEED electrodes bilateral. Per chart review he has failed at least 4 AEDs at therapeutic dosages. Triggers include being tired and being stressed. He is not working (on disability) but finished his GED. He lives next door to grandmother and nearby to his mother. His major concern is memory loss from ongoing seizures. NSGY and epilepsy following. Patient admitted to [facility] for close monitoring and higher level of care.     Hospital Course: 8/31/2020: patient admitted to [facility] s/p sEEG electrodes placement, NSGY and epilepsy following, SBP goal < 140, perepilepsy restarted all AEDs except for Vimpat  9/1/2020: 1 episode of seizure, Vimpat  On hold, Onfi and lamictal decreased, continue topamax  9/2/2020: No seizures this AM, epilepsy managing AEDs, started SQH  9/3/2020: No electrographic seizures in AM, Per epilepsy: If no seizures throughout day, plan to decrease Onfi to 10 mg BID (Dr. Abhijeet Do to change tonight if needed)  9/4/2020: d/c A-line  9/5/2020: NAEON  9/6/2020: MERLENE, plan for removal of sEEG on 9/9 09/07/2020 naeon  9/8/2020: MERLENE, off AEDs. Likely bolt removal this Friday 9/9/2020: NAEON  9/10/2020: one tonic seizure today-epilepsy notified        Review of Systems  Constitutional: Denies fevers, weight loss, chills, or weakness.  Eyes: Denies changes in vision.  ENT: Denies dysphagia, nasal discharge, ear pain or discharge.  Cardiovascular: Denies chest pain, palpitations, orthopnea, or claudication.  Respiratory: Denies shortness of breath, cough, hemoptysis, or wheezing.  GI: Denies nausea/vomitting, hematochezia, melena, abd pain, or changes in appetite.  : Denies dysuria,  incontinence, or hematuria.  Musculoskeletal: Denies joint pain or myalgias.  Skin/breast: Denies rashes, lumps, lesions, or discharge.  Neurologic: Denies headache, dizziness, vertigo, or paresthesias.  Psychiatric: Denies changes in mood or hallucinations.  Endocrine: Denies polyuria, polydipsia, heat/cold intolerance.  Hematologic/Lymph: Denies lymphadenopathy, easy bruising or easy bleeding.  Allergic/Immunologic: Denies rash, rhinitis.   Objective:     Vitals:  Temp: 97.8 °F (36.6 °C)  Pulse: 108  Rhythm: sinus bradycardia  BP: 127/79  MAP (mmHg): 97  Resp: (!) 29  SpO2: 96 %  O2 Device (Oxygen Therapy): room air    Temp  Min: 97.8 °F (36.6 °C)  Max: 98.4 °F (36.9 °C)  Pulse  Min: 51  Max: 134  BP  Min: 96/56  Max: 159/95  MAP (mmHg)  Min: 71  Max: 119  Resp  Min: 13  Max: 30  SpO2  Min: 69 %  Max: 100 %    09/09 0701 - 09/10 0700  In: 550 [P.O.:550]  Out: 1050 [Urine:1050]   Unmeasured Output  Urine Occurrence: 1  Stool Occurrence: 0       Physical Exam  GA: Alert, comfortable, no acute distress.   HEENT: No scleral icterus or JVD.   Pulmonary: Clear to auscultation A/L. No wheezing, crackles, or rhonchi.  Cardiac: RRR S1 & S2 w/o rubs/murmurs/gallops.   Abdominal: Bowel sounds present x 4. No appreciable hepatosplenomegaly.  Skin: No jaundice, rashes, or visible lesions.  Neuro:  --GCS: E4 V5 M6  --Mental Status:  Awake, oriented X4, follows commands, clear speech  --CN II-XII grossly intact.   --Pupils 4mm, PERRL.   --Corneal reflex, gag, cough intact.  --ELIZABETH spont    Medications:  Continuous ScheduledceFAZolin (ANCEF) IVPB, 2 g, Q8H  citalopram, 20 mg, Daily  heparin (porcine), 5,000 Units, Q8H  senna-docusate 8.6-50 mg, 1 tablet, Daily    PRNacetaminophen, 650 mg, Q4H PRN  hydrALAZINE, 10 mg, Q4H PRN  labetaloL, 10 mg, Q4H PRN  magnesium oxide, 800 mg, PRN  magnesium oxide, 800 mg, PRN  oxyCODONE, 5 mg, Q6H PRN  potassium chloride, 40 mEq, PRN  potassium chloride, 40 mEq, PRN  potassium chloride, 40 mEq,  PRN  potassium, sodium phosphates, 2 packet, PRN  potassium, sodium phosphates, 2 packet, PRN  potassium, sodium phosphates, 2 packet, PRN  promethazine (PHENERGAN) IVPB, 6.25 mg, Q8H PRN      Today I personally reviewed pertinent medications, lines/drains/airways, imaging, cardiology results, laboratory results,    Diet  Diet Adult Regular (IDDSI Level 7)      Assessment/Plan:     Neuro  * Epilepsy  -s/p sEEG placement  -cefazolin 2 q8 prophylaxis  -SBP goal <140  -NSGY and epilepsy following  -seizure precautions  -continue: Topiramate 50 mg BID, lamotrigine 100mg BID, clobazam 20 mg BID, hold home med Vimpat  -neuro checks q 1 hr  9/2/2020: 1 seizure this morning associated with lip smacking  -per epilepsy: Held home Vimpat 8/31  - Continue Topamax 50 mg BID  9/3/2020: no seizures this AM, continue to hold vimpat, continue SQH for DVT ppx  - Per epilepsy: If no seizures throughout day, plan to decrease Onfi to 10 mg BID (Dr. Abhijeet Do to change tonight if needed).   9/10/2020: one seizure today, off AEDs today per epilepsy, plan for sEEG removal this Friday        Psychiatric  Depression  - continue home med celexa    Renal/  Hypokalemia  - replace PRN  - follow CMP          The patient is being Prophylaxed for:  Venous Thromboembolism with: Chemical  Stress Ulcer with: None  Ventilator Pneumonia with: not applicable    Activity Orders          Diet Adult Regular (IDDSI Level 7): Regular starting at 09/09 0902        Full Code    Georgie Hoyos NP  Neurocritical Care  [facility]

## 2020-09-10 NOTE — SUBJECTIVE & OBJECTIVE
Interval History: No seizures overnight, biked almost 8 miles on recumbent, possible OR tomorrow pending discussion with epileptologist      Medications:  Continuous Infusions:  Scheduled Meds:   ceFAZolin (ANCEF) IVPB  2 g Intravenous Q8H    citalopram  20 mg Oral Daily    heparin (porcine)  5,000 Units Subcutaneous Q8H    senna-docusate 8.6-50 mg  1 tablet Oral Daily     PRN Meds:acetaminophen, hydrALAZINE, labetaloL, magnesium oxide, magnesium oxide, oxyCODONE, potassium chloride, potassium chloride, potassium chloride, potassium, sodium phosphates, potassium, sodium phosphates, potassium, sodium phosphates, promethazine (PHENERGAN) IVPB     Review of Systems  Objective:     Weight: 78.5 kg (173 lb 1 oz)  Body mass index is 22.83 kg/m².  Vital Signs (Most Recent):  Temp: 97.8 °F (36.6 °C) (09/10/20 0305)  Pulse: (!) 58 (09/10/20 0605)  Resp: 16 (09/10/20 0605)  BP: 114/71 (09/10/20 0605)  SpO2: 100 % (09/10/20 0605) Vital Signs (24h Range):  Temp:  [97.8 °F (36.6 °C)-98.2 °F (36.8 °C)] 97.8 °F (36.6 °C)  Pulse:  [] 58  Resp:  [11-21] 16  SpO2:  [98 %-100 %] 100 %  BP: ()/(56-91) 114/71                          Neurosurgery Physical Exam     GCS 15  AOx3  PERRL  CN intact  Full strength x4    Significant Labs:  Recent Labs   Lab 09/09/20  0112 09/10/20  0119   GLU 96 97    137   K 3.8 3.4*    105   CO2 24 22*   BUN 11 14   CREATININE 0.9 0.8   CALCIUM 9.2 9.1   MG 1.9 2.1     Recent Labs   Lab 09/09/20  0112 09/10/20  0119   WBC 7.50 7.01   HGB 15.0 15.5   HCT 41.7 43.4    244     Recent Labs   Lab 09/09/20 0112   INR 1.0   APTT 31.4     Microbiology Results (last 7 days)     ** No results found for the last 168 hours. **        All pertinent labs from the last 24 hours have been reviewed.    Significant Diagnostics:  I have reviewed and interpreted all pertinent imaging results/findings within the past 24 hours.

## 2020-09-10 NOTE — ASSESSMENT & PLAN NOTE
25M with a PMHx of intractable epilepsy s/p VNS placement on Onfi 40 mg BID, Lamictal 200 mg BID, Vimpat 200 mg BID, and Topamax 50 mg BID s/p bilateral sEEG placement with Dr. Melani Ortiz on 8/31. Postop CTH stable.    Recommendations:  - Continuous vEEG monitoring  - 4 electrographic seizures from L hippo and entro areas since admit  - Vimpat held on admit 8/31, Lamictal weaned and stopped 9/6, Onfi weaned and stopped 9/8, Topamax stopped 9/8  - Please call epilepsy on call prior to administering benzodiazepines   - If patient has more than one GTC, can consider giving IV Vimpat 100 mgx1  - Neurosurgery following; tentative plan for sEEG removal Monday pending seizures  - Seizure precautions  - Avoid agents that lower seizure thresold      Plan of care discussed with patient and NCC team. Will follow. Please call with questions.

## 2020-09-10 NOTE — PLAN OF CARE
POC reviewed with pt and family at 10:30 am. Pt verbalized understanding. Questions and concerns addressed. Pt had one witnessed seizure lasting approx 2 - 2.5 min, epilepsy and NSCC team aware.  Pt progressing toward goals. Will continue to monitor. See flowsheets for full assessment and VS info. Very frustrated despite having seizure today, support given, rode stationary bike for 9 miles and 35 min with no further witnessed  seizures , support given.

## 2020-09-10 NOTE — PLAN OF CARE
[facility] Care Plan    POC reviewed with Darin Cunha at 0400. Pt verbalized understanding. Questions and concerns addressed. No acute events overnight. sEEG leads remain in place without complication. No seizure-like activity observed overnight. Pt progressing toward goals. Will continue to monitor. See below and flowsheets for full assessment and VS info.       Neuro:  [place] Coma Scale  Best Eye Response: 3-->(E3) to speech  Best Motor Response: 6-->(M6) obeys commands  Best Verbal Response: 5-->(V5) oriented  [place] Coma Scale Score: 14  Assessment Qualifiers: patient not sedated/intubated, no eye obstruction present  Pupil PERRLA: yes     24hr Temp:  [97.8 °F (36.6 °C)-98.2 °F (36.8 °C)]     CV:   Rhythm: sinus bradycardia  BP goals:   SBP < 140  MAP > 65    Resp:   O2 Device (Oxygen Therapy): room air       Plan: N/A    GI/:  STEPHENIE Total Score: 20  Diet/Nutrition Received: regular  Last Bowel Movement: 09/09/20  Voiding Characteristics: voids spontaneously without difficulty    Intake/Output Summary (Last 24 hours) at 9/10/2020 0441  Last data filed at 9/10/2020 0005  Gross per 24 hour   Intake 750 ml   Output 1350 ml   Net -600 ml     Unmeasured Output  Urine Occurrence: 1  Stool Occurrence: 0    Labs/Accuchecks:  Recent Labs   Lab 09/10/20  0119   WBC 7.01   RBC 4.93   HGB 15.5   HCT 43.4         Recent Labs   Lab 09/10/20  0119      K 3.4*   CO2 22*      BUN 14   CREATININE 0.8   ALKPHOS 103   ALT 36   AST 26   BILITOT 0.7      Recent Labs   Lab 09/09/20  0112   INR 1.0   APTT 31.4    No results for input(s): CPK, CPKMB, TROPONINI, MB in the last 168 hours.    Electrolytes: Electrolytes replaced  Accuchecks: none    Gtts:       LDA/Wounds:  Lines/Drains/Airways       Peripheral Intravenous Line                   Peripheral IV - Single Lumen 09/08/20 0257 20 G Anterior;Right Forearm 2 days         Peripheral IV - Single Lumen 09/09/20 2134 20 G Anterior;Left Forearm less than 1 day                   Wounds: No  Wound care consulted: No

## 2020-09-10 NOTE — PROGRESS NOTES
Georgie NP notified that pts HR has stayed in NSR -ST after seizure came down briefly but then back up with 9miles on stationary bike this afternoon, also not much po intake today despite encouragement, orders noted.

## 2020-09-10 NOTE — PROGRESS NOTES
At approx 10:39am pt observed to be having a seizure head toward left side of bed , body very rigid, tonic jerking movements noted, increased HR with desating into 70's, O2 immediately applied with sats back up   Low 90's initially, lasted approx 2.5 min, epilepsy paged, spoke to Dr. Curtis, JASVIR Cope also made aware, neurocritical care team notified and Georgie NP to bedside , once seizure stopped patient nonverbal and in immediate post ictal phase patient combative, pulling off oxygen, then at approx 10:53 able to state name, unaware of seizure and cooperative.

## 2020-09-10 NOTE — SUBJECTIVE & OBJECTIVE
Review of Systems  Constitutional: Denies fevers, weight loss, chills, or weakness.  Eyes: Denies changes in vision.  ENT: Denies dysphagia, nasal discharge, ear pain or discharge.  Cardiovascular: Denies chest pain, palpitations, orthopnea, or claudication.  Respiratory: Denies shortness of breath, cough, hemoptysis, or wheezing.  GI: Denies nausea/vomitting, hematochezia, melena, abd pain, or changes in appetite.  : Denies dysuria, incontinence, or hematuria.  Musculoskeletal: Denies joint pain or myalgias.  Skin/breast: Denies rashes, lumps, lesions, or discharge.  Neurologic: Denies headache, dizziness, vertigo, or paresthesias.  Psychiatric: Denies changes in mood or hallucinations.  Endocrine: Denies polyuria, polydipsia, heat/cold intolerance.  Hematologic/Lymph: Denies lymphadenopathy, easy bruising or easy bleeding.  Allergic/Immunologic: Denies rash, rhinitis.   Objective:     Vitals:  Temp: 97.8 °F (36.6 °C)  Pulse: 108  Rhythm: sinus bradycardia  BP: 127/79  MAP (mmHg): 97  Resp: (!) 29  SpO2: 96 %  O2 Device (Oxygen Therapy): room air    Temp  Min: 97.8 °F (36.6 °C)  Max: 98.4 °F (36.9 °C)  Pulse  Min: 51  Max: 134  BP  Min: 96/56  Max: 159/95  MAP (mmHg)  Min: 71  Max: 119  Resp  Min: 13  Max: 30  SpO2  Min: 69 %  Max: 100 %    09/09 0701 - 09/10 0700  In: 550 [P.O.:550]  Out: 1050 [Urine:1050]   Unmeasured Output  Urine Occurrence: 1  Stool Occurrence: 0       Physical Exam  GA: Alert, comfortable, no acute distress.   HEENT: No scleral icterus or JVD.   Pulmonary: Clear to auscultation A/L. No wheezing, crackles, or rhonchi.  Cardiac: RRR S1 & S2 w/o rubs/murmurs/gallops.   Abdominal: Bowel sounds present x 4. No appreciable hepatosplenomegaly.  Skin: No jaundice, rashes, or visible lesions.  Neuro:  --GCS: E4 V5 M6  --Mental Status:  Awake, oriented X4, follows commands, clear speech  --CN II-XII grossly intact.   --Pupils 4mm, PERRL.   --Corneal reflex, gag, cough intact.  --ELIZABETH  spont    Medications:  Continuous ScheduledceFAZolin (ANCEF) IVPB, 2 g, Q8H  citalopram, 20 mg, Daily  heparin (porcine), 5,000 Units, Q8H  senna-docusate 8.6-50 mg, 1 tablet, Daily    PRNacetaminophen, 650 mg, Q4H PRN  hydrALAZINE, 10 mg, Q4H PRN  labetaloL, 10 mg, Q4H PRN  magnesium oxide, 800 mg, PRN  magnesium oxide, 800 mg, PRN  oxyCODONE, 5 mg, Q6H PRN  potassium chloride, 40 mEq, PRN  potassium chloride, 40 mEq, PRN  potassium chloride, 40 mEq, PRN  potassium, sodium phosphates, 2 packet, PRN  potassium, sodium phosphates, 2 packet, PRN  potassium, sodium phosphates, 2 packet, PRN  promethazine (PHENERGAN) IVPB, 6.25 mg, Q8H PRN      Today I personally reviewed pertinent medications, lines/drains/airways, imaging, cardiology results, laboratory results,    Diet  Diet Adult Regular (IDDSI Level 7)

## 2020-09-10 NOTE — PROGRESS NOTES
[facility]  Neurology-Epilepsy  Progress Note    Patient Name: Darin Cunha  MRN: [id number]  Admission Date: 8/31/2020  Hospital Length of Stay: 10 days  Code Status: Full Code   Attending Provider: Melani Ortiz MD  Primary Care Physician: Primary Doctor No   Principal Problem:Epilepsy    Subjective:     Hospital Course:   8/31: sEEG placement. Hold Vimpat. Continue other AEDs: Onfi 40 mg BID, Lamictal 200 mg BID, Topamax 50 mg BID.  8/31>9/1: No clinical or electrographic seizures overnight.   9/1>9/2: Four electrographic seizures (3 with clinical correlate) originating from L hippo and entro areas.  9/2>9/3: No electrographic seizures  9/3>9/4: No electrographic seizures  9/4>9/5: No electrographic seizures  9/5>9/6: No electrographic seizures  9/6>9/7: No electrographic seizures  9/7>9/8: No electrographic seizures  9/8>9/9: No electrographic seizures  9/9>9/10: No electrographic seizures    Interval History: No acute events overnight. This AM, patient awake, watching TV. He has no complaints. Discussed plan of care. Patient agreeable to sEEG removal on Monday.     Current Facility-Administered Medications   Medication Dose Route Frequency Provider Last Rate Last Dose    acetaminophen tablet 650 mg  650 mg Oral Q4H PRN Georgie Miinea, NP   650 mg at 09/02/20 1914    ceFAZolin injection 2 g  2 g Intravenous Q8H Ethan Owen MD   2 g at 09/10/20 0552    citalopram tablet 20 mg  20 mg Oral Daily Georgie Miinea, NP   20 mg at 09/10/20 0933    heparin (porcine) injection 5,000 Units  5,000 Units Subcutaneous Q8H Carito Amezquita PA-C   5,000 Units at 09/10/20 0552    hydrALAZINE injection 10 mg  10 mg Intravenous Q4H PRN Georgie Miinea, NP   10 mg at 08/31/20 1601    labetaloL injection 10 mg  10 mg Intravenous Q4H PRN Georgie Miinea, NP        magnesium oxide tablet 800 mg  800 mg Oral PRN Melissa Flores NP        magnesium oxide tablet 800 mg  800 mg Oral PRN Melissa A. Launey, NP         oxyCODONE immediate release tablet 5 mg  5 mg Oral Q6H PRN Georgie Hoyos NP   5 mg at 09/02/20 1914    potassium chloride packet 40 mEq  40 mEq Oral PRN Melissa Flores NP   40 mEq at 09/09/20 0518    potassium chloride packet 40 mEq  40 mEq Oral PRN Melissa Flores NP   40 mEq at 09/10/20 0933    potassium chloride packet 40 mEq  40 mEq Oral PRN Melissa Flores NP        potassium, sodium phosphates 280-160-250 mg packet 2 packet  2 packet Oral PRN Melissa Flores NP        potassium, sodium phosphates 280-160-250 mg packet 2 packet  2 packet Oral PRN Melissa Flores NP        potassium, sodium phosphates 280-160-250 mg packet 2 packet  2 packet Oral PRN Melissa Flores NP        promethazine (PHENERGAN) 6.25 mg in dextrose 5 % 50 mL IVPB  6.25 mg Intravenous Q8H PRN Georgie Hoyos  mL/hr at 08/31/20 1730 6.25 mg at 08/31/20 1730    senna-docusate 8.6-50 mg per tablet 1 tablet  1 tablet Oral Daily Georgie Hoyos NP   Stopped at 09/08/20 0900     Continuous Infusions:    Review of Systems   Constitutional: Negative for chills, diaphoresis, fatigue and fever.   Respiratory: Negative for cough, shortness of breath and wheezing.    Cardiovascular: Negative for chest pain and palpitations.   Gastrointestinal: Negative for abdominal pain, diarrhea, nausea and vomiting.   Neurological: Positive for seizures. Negative for dizziness, syncope, speech difficulty, weakness and headaches (resolved).   Psychiatric/Behavioral: Negative for behavioral problems, confusion and sleep disturbance. The patient is not nervous/anxious.      Objective:     Vital Signs (Most Recent):  Temp: 98.4 °F (36.9 °C) (09/10/20 0701)  Pulse: 65 (09/10/20 1000)  Resp: 17 (09/10/20 1000)  BP: 119/63 (09/10/20 1000)  SpO2: 100 % (09/10/20 1000) Vital Signs (24h Range):  Temp:  [97.8 °F (36.6 °C)-98.4 °F (36.9 °C)] 98.4 °F (36.9 °C)  Pulse:  [] 65  Resp:  [15-23] 17  SpO2:  [98 %-100 %] 100 %  BP:  ()/(56-85) 119/63     Weight: 78.5 kg (173 lb 1 oz)  Body mass index is 22.83 kg/m².    Physical Exam  Constitutional:       General: He is not in acute distress.     Appearance: Normal appearance. He is not diaphoretic.   HENT:      Head: Normocephalic.      Comments: sEEG in place  Eyes:      General: No scleral icterus.        Right eye: No discharge.         Left eye: No discharge.      Extraocular Movements: EOM normal.      Conjunctiva/sclera: Conjunctivae normal.      Pupils: Pupils are equal, round, and reactive to light.   Cardiovascular:      Rate and Rhythm: Normal rate.   Pulmonary:      Effort: Pulmonary effort is normal. No respiratory distress.   Abdominal:      General: There is no distension.      Palpations: Abdomen is soft.      Tenderness: There is no abdominal tenderness.   Musculoskeletal: Normal range of motion.         General: No deformity or signs of injury.   Skin:     General: Skin is warm and dry.   Neurological:      General: No focal deficit present.      Mental Status: He is alert and oriented to person, place, and time. Mental status is at baseline.      Coordination: Finger-Nose-Finger Test normal.   Psychiatric:         Mood and Affect: Mood normal.         Speech: Speech normal.         Behavior: Behavior normal.         Thought Content: Thought content normal.         NEUROLOGICAL EXAMINATION:     MENTAL STATUS   Oriented to person, place, and time.   Speech: speech is normal   Level of consciousness: alert    CRANIAL NERVES     CN III, IV, VI   Pupils are equal, round, and reactive to light.  Extraocular motions are normal.     CN VII   Facial expression full, symmetric.     CN VIII   Hearing: intact    CN XI   CN XI normal.     CN XII   CN XII normal.     GAIT AND COORDINATION      Coordination   Finger to nose coordination: normal      Significant Labs: All pertinent lab results from the past 24 hours have been reviewed.    Significant Studies: I have reviewed all  pertinent imaging results/findings within the past 24 hours.    Assessment and Plan:     * Epilepsy  25M with a PMHx of intractable epilepsy s/p VNS placement on Onfi 40 mg BID, Lamictal 200 mg BID, Vimpat 200 mg BID, and Topamax 50 mg BID s/p bilateral sEEG placement with Dr. Melani Ortiz on 8/31. Postop CTH stable.    Recommendations:  - Continuous vEEG monitoring  - 4 electrographic seizures from L hippo and entro areas since admit  - Vimpat held on admit 8/31, Lamictal weaned and stopped 9/6, Onfi weaned and stopped 9/8, Topamax stopped 9/8  - Please call epilepsy on call prior to administering benzodiazepines   - If patient has more than one GTC, can consider giving IV Vimpat 100 mgx1  - Neurosurgery following; tentative plan for sEEG removal Monday pending seizures  - Seizure precautions  - Avoid agents that lower seizure thresold      Plan of care discussed with patient and NCC team. Will follow. Please call with questions.    Depression  - Chronic and stable  - Continue home Celexa        VTE Risk Mitigation (From admission, onward)         Ordered     heparin (porcine) injection 5,000 Units  Every 8 hours      09/02/20 1035     Place sequential compression device  Until discontinued      08/31/20 1646                Minerva Oconnor PA-C  Neurology-Epilepsy  [facility]  Staff: Dr. Curtis

## 2020-09-10 NOTE — SUBJECTIVE & OBJECTIVE
Interval History: No acute events overnight. This AM, patient awake, watching TV. He has no complaints. Discussed plan of care. Patient agreeable to sEEG removal on Monday.     Current Facility-Administered Medications   Medication Dose Route Frequency Provider Last Rate Last Dose    acetaminophen tablet 650 mg  650 mg Oral Q4H PRN Georgie Miinea, NP   650 mg at 09/02/20 1914    ceFAZolin injection 2 g  2 g Intravenous Q8H Ethan Owen MD   2 g at 09/10/20 0552    citalopram tablet 20 mg  20 mg Oral Daily Georgie Miinea, NP   20 mg at 09/10/20 0933    heparin (porcine) injection 5,000 Units  5,000 Units Subcutaneous Q8H Carito Amezquita PA-C   5,000 Units at 09/10/20 0552    hydrALAZINE injection 10 mg  10 mg Intravenous Q4H PRN Georgie Mithomas, NP   10 mg at 08/31/20 1601    labetaloL injection 10 mg  10 mg Intravenous Q4H PRN Georgie Romain, NP        magnesium oxide tablet 800 mg  800 mg Oral PRN Melissa Flores NP        magnesium oxide tablet 800 mg  800 mg Oral PRN Melissa Flores NP        oxyCODONE immediate release tablet 5 mg  5 mg Oral Q6H PRN Georgiefroilan Hoyos, NP   5 mg at 09/02/20 1914    potassium chloride packet 40 mEq  40 mEq Oral PRN Melissa Flores NP   40 mEq at 09/09/20 0518    potassium chloride packet 40 mEq  40 mEq Oral PRN Melissa Flores NP   40 mEq at 09/10/20 0933    potassium chloride packet 40 mEq  40 mEq Oral PRN Melissa Flores NP        potassium, sodium phosphates 280-160-250 mg packet 2 packet  2 packet Oral PRN Melissa Flores NP        potassium, sodium phosphates 280-160-250 mg packet 2 packet  2 packet Oral PRN Melissa Flores NP        potassium, sodium phosphates 280-160-250 mg packet 2 packet  2 packet Oral PRN Melissa Flores NP        promethazine (PHENERGAN) 6.25 mg in dextrose 5 % 50 mL IVPB  6.25 mg Intravenous Q8H PRN Georgie Romain,  mL/hr at 08/31/20 1730 6.25 mg at 08/31/20 1730    senna-docusate 8.6-50 mg per tablet  1 tablet  1 tablet Oral Daily Georgie Hoyos, NP   Stopped at 09/08/20 0900     Continuous Infusions:    Review of Systems   Constitutional: Negative for chills, diaphoresis, fatigue and fever.   Respiratory: Negative for cough, shortness of breath and wheezing.    Cardiovascular: Negative for chest pain and palpitations.   Gastrointestinal: Negative for abdominal pain, diarrhea, nausea and vomiting.   Neurological: Positive for seizures. Negative for dizziness, syncope, speech difficulty, weakness and headaches (resolved).   Psychiatric/Behavioral: Negative for behavioral problems, confusion and sleep disturbance. The patient is not nervous/anxious.      Objective:     Vital Signs (Most Recent):  Temp: 98.4 °F (36.9 °C) (09/10/20 0701)  Pulse: 65 (09/10/20 1000)  Resp: 17 (09/10/20 1000)  BP: 119/63 (09/10/20 1000)  SpO2: 100 % (09/10/20 1000) Vital Signs (24h Range):  Temp:  [97.8 °F (36.6 °C)-98.4 °F (36.9 °C)] 98.4 °F (36.9 °C)  Pulse:  [] 65  Resp:  [15-23] 17  SpO2:  [98 %-100 %] 100 %  BP: ()/(56-85) 119/63     Weight: 78.5 kg (173 lb 1 oz)  Body mass index is 22.83 kg/m².    Physical Exam  Constitutional:       General: He is not in acute distress.     Appearance: Normal appearance. He is not diaphoretic.   HENT:      Head: Normocephalic.      Comments: sEEG in place  Eyes:      General: No scleral icterus.        Right eye: No discharge.         Left eye: No discharge.      Extraocular Movements: EOM normal.      Conjunctiva/sclera: Conjunctivae normal.      Pupils: Pupils are equal, round, and reactive to light.   Cardiovascular:      Rate and Rhythm: Normal rate.   Pulmonary:      Effort: Pulmonary effort is normal. No respiratory distress.   Abdominal:      General: There is no distension.      Palpations: Abdomen is soft.      Tenderness: There is no abdominal tenderness.   Musculoskeletal: Normal range of motion.         General: No deformity or signs of injury.   Skin:     General: Skin is  warm and dry.   Neurological:      General: No focal deficit present.      Mental Status: He is alert and oriented to person, place, and time. Mental status is at baseline.      Coordination: Finger-Nose-Finger Test normal.   Psychiatric:         Mood and Affect: Mood normal.         Speech: Speech normal.         Behavior: Behavior normal.         Thought Content: Thought content normal.         NEUROLOGICAL EXAMINATION:     MENTAL STATUS   Oriented to person, place, and time.   Speech: speech is normal   Level of consciousness: alert    CRANIAL NERVES     CN III, IV, VI   Pupils are equal, round, and reactive to light.  Extraocular motions are normal.     CN VII   Facial expression full, symmetric.     CN VIII   Hearing: intact    CN XI   CN XI normal.     CN XII   CN XII normal.     GAIT AND COORDINATION      Coordination   Finger to nose coordination: normal      Significant Labs: All pertinent lab results from the past 24 hours have been reviewed.    Significant Studies: I have reviewed all pertinent imaging results/findings within the past 24 hours.

## 2020-09-10 NOTE — PROGRESS NOTES
[facility]  Neurosurgery  Progress Note    Subjective:     History of Present Illness: Darin Cunha is a 25 y.o. male with intractable epilepsy since 2013 who presents as a referral from Dr. Abhijeet Do to discuss the gamut of epilepsy surgery options. Today's visit is a video visit, and the patient is a passenger in a moving vehicle. His mother is not with him.      The patient states that when he has events, he stares off and smacks his lips. The episodes last for about 5-6 minutes. He often waves his hands and feels tired afterwards. He currently has about 3-5 episodes a week. He has failed at least 4 AEDs at therapeutic dosages.      Per EEG review, he has bilateral temporal involvement, moreso left-sided than right.      Triggers include being tired and being stressed. He is not working (on disability) but finished his GED. He lives next door to grandmother and nearby to his mother. His major concern is memory loss from ongoing seizures.      Of notes, he endorses a history of staph infection previously. He denies history of bleeding or anesthetic complications. He has a VNS in place.        Post-Op Info:  Procedure(s) (LRB):  CREATION, CRANIAL YUNG HOLE, PLACEMENT OF THE FIDUCIAL SCREWS BILATERAL 2.PLACEMENT OF SEEG ELECTRODES BILATERAL WITH OWEN ROBOT (Bilateral)   10 Days Post-Op     Interval History: No seizures overnight, biked almost 8 miles on recumbent, possible OR tomorrow pending discussion with epileptologist      Medications:  Continuous Infusions:  Scheduled Meds:   ceFAZolin (ANCEF) IVPB  2 g Intravenous Q8H    citalopram  20 mg Oral Daily    heparin (porcine)  5,000 Units Subcutaneous Q8H    senna-docusate 8.6-50 mg  1 tablet Oral Daily     PRN Meds:acetaminophen, hydrALAZINE, labetaloL, magnesium oxide, magnesium oxide, oxyCODONE, potassium chloride, potassium chloride, potassium chloride, potassium, sodium phosphates, potassium, sodium phosphates, potassium, sodium  phosphates, promethazine (PHENERGAN) IVPB     Review of Systems  Objective:     Weight: 78.5 kg (173 lb 1 oz)  Body mass index is 22.83 kg/m².  Vital Signs (Most Recent):  Temp: 97.8 °F (36.6 °C) (09/10/20 0305)  Pulse: (!) 58 (09/10/20 0605)  Resp: 16 (09/10/20 0605)  BP: 114/71 (09/10/20 0605)  SpO2: 100 % (09/10/20 0605) Vital Signs (24h Range):  Temp:  [97.8 °F (36.6 °C)-98.2 °F (36.8 °C)] 97.8 °F (36.6 °C)  Pulse:  [] 58  Resp:  [11-21] 16  SpO2:  [98 %-100 %] 100 %  BP: ()/(56-91) 114/71                          Neurosurgery Physical Exam     GCS 15  AOx3  PERRL  CN intact  Full strength x4    Significant Labs:  Recent Labs   Lab 09/09/20  0112 09/10/20  0119   GLU 96 97    137   K 3.8 3.4*    105   CO2 24 22*   BUN 11 14   CREATININE 0.9 0.8   CALCIUM 9.2 9.1   MG 1.9 2.1     Recent Labs   Lab 09/09/20  0112 09/10/20  0119   WBC 7.50 7.01   HGB 15.0 15.5   HCT 41.7 43.4    244     Recent Labs   Lab 09/09/20 0112   INR 1.0   APTT 31.4     Microbiology Results (last 7 days)     ** No results found for the last 168 hours. **        All pertinent labs from the last 24 hours have been reviewed.    Significant Diagnostics:  I have reviewed and interpreted all pertinent imaging results/findings within the past 24 hours.    Assessment/Plan:     * Epilepsy  25 M with intractable epilepsy s/p multiple failed AEDs and VNS placement now s/p sEEG lead placement:    OR cancelled yesterday. Will continue to monitor for seizures until Friday or Monday, pending seizure observance.   -- Weaned off of all antiepileptics    Admit to [facility]  q1h neurochecks  sEEG leads in place, abx while in place, continue headwrap  Bed rest at all times  SBP<140  No HOB restrictions  Ok for recumbent bike in bed  Ok for diet  Voiding spontaneously s/p michelle removal  Post operative CTH reviewed - expected post op changes  Monitor for seizure activity - no benzos or sedating meds unless cleared by Epilepsy team  AEDs  per Epilepsy  Please page w/exam change  Tentative sEEG bolt removal on 9/9, booked, will obtain consent.     Dispo: continue ICU care        nIo Collado MD  Neurosurgery  [facility]

## 2020-09-10 NOTE — PROCEDURES
Stereo EEG Depth Electrode Recording  DATE OF SERVICE: 09/08/2020  EEG NUMBER: [id number]  REQUESTED BY: Dr Alix Ortiz  LOCATION OF SERVICE: [id number]     METHODOLOGY              Depth electrodes consisted of thin wires with electrical contacts it fixed distances along the wire.  These are then implanted using a stereotactic procedure targeting cortical and subcortical structures.  The up to 256 electrode contact can be recorded simultaneously with this procedure.  Recording band pass was 0.1  in the low past filters setting could be set at the 512, 1024, or 2048.  Digital video recording of the patient is simultaneously recorded with the EEG.  The patient is instructed report clinical symptoms which may occur during the recording session.  EEG and video recording is stored and archived in digital format. Activation procedures which include photic stimulation, hyperventilation and instructing patients to perform simple task are done in selected patients.   Compresses spectral analysis (CSA) is also performed on the activity recorded from each individual channel.  This is displayed as a power display of frequencies from 0 to 30 Hz over time.   The CSA analysis is done and displayed continuously.  This is reviewed for asymmetries in power between homologous areas of the scalp and for presence of changes in power which canbe seen when seizures occur.  Sections of suspected abnormalities on the CSA is then compared with the original EEG recording.          Implant Date MRN Name Last First                      8/31/2020 [id number]  Alphonse Webster                            Contacts                   Depth Elect Name SN  # contacts Color 1 Color 2 1 2 3 4 5 6 7 8 9 10 11 12 13 14 15 16   1 L Amyg 92776  14 Yellow Green 1 2 3 4 5 6 7 8 9 10 11 12 13 14     2 L Hippo 94729  14 Black Blue 15 16 17 18 19 20 21 22 23 24 25 26 27 28     3 L Entor 02343  14 Red Blue 29 30 31 32 33 34 35 36 37 38 39 40 41 42     4 L AntCi 64164  14  Red Green 43 44 45 46 47 48 49 50 51 52 53 54 55 56     5 L OrbFr 50156  16 Blue Red 57 58 59 60 61 62 63 64 65 66 67 68 69 70 71 72   6 L PreFr 52797  16 Yellow Green 73 74 75 76 77 78 79 80 81 82 83 84 85 86 87 88   7 L AntLn 14823  8 Black Green 89 90 91 92 93 94 95 96           8 L PoIns 04386  8 Yellow Black 97 98 99 100 101 102 103 104           9 L TeOcc 52134  8 Black Red 105 106 107 108 109 110 111 112           10 L Pariet 18381  8 Black Red 113 114 115 116 117 118 119 120           11 R Amyg 81401  12 Green Red 121 122 123 124 125 126 127 128 129 130 131 132       12 R Hippo 80383  12 Green Blue 133 134 135 136 137 138 139 140 141 142 143 144       13 R Asuin 86336  8 Blue Red 145 146 147 148 149 150 151 152           14 R Acing 02507  16 Yellow Blue 153 154 155 156 157 158 159 160 161 162 163 164 165 166 167 168       Electrode integrety   High amplitude artifact present from the following contacts               Depth electrode          Contact #                       L Pariet  4              L Entor   12, 13, 14  The L Pariet  3, 4   R Amygd  2, 3    11, 12  Appropriate recording obtained from the remaining electrode contacts.     RECORDING TIMES  Start on 09/08/2020 7:00 a.m.  Stop on 09/09/2020 7:00 a.m.  A total of 24 hrs of EEG monitoring was obtained    Interictal Activity  Inner ictal activity continues to be frequently recorded.  Of commonly starting from the left hippocampus contacts 10 11 and 12,  spread is most common to the ipsilateral depth electrodes however and occasional initial spread is noted to the right hippocampus.  Infrequently a right hippocampal spike discharges noted.  The  Seizures Recorded   Date  Start  End  Sz 1 2020/09/01 08:34:43 08:35:28    Sz 2 2020/09/01 13:18:13 13:18:55    Sz 3  2020/09/01  16:39:13   start L hippo 1 2   16:39:13   sz spread Amyg Entor Prefr   16:39:38   Patient Event   16:39:43   nurse telling him a code word, Purple elephant   16:39:48  Sz 3  "electrographic stop   16:40:14  pt not following commands   16:40:37  pt able to follow commands    Sz 4 2020/09/01    21:05:53 start Emtpr > Hippo    very focal   21:06:17 Stop      EEG FINDINGS  Ictal Recording   Seizure #         Date                 Start                 End  #1                    2020/09/01 08:34:43 08:35:28   Build up started in L Hippo contacts 1, 2  >> 3, 4      then spread to L Hippo contacts 11, 12, 13 L Amygd contacts 11, 12, 13      then spread to L Entor contacts 11, 12, 13     Seizure Description   Seizure 1   08:34:43  Sz 1 start  08:34:58  Oral facial mvt  08:35:01  L forearm and leg mvt  08:35:16  L hand auttomatism  08:35:23  Oral facial mvt continues  08:35:28  Sz end    Seizure 2  d1 13:18:13  Sz 2 start d1 13:18:29  Head turn slightly to L  d1 13:18:33  Oral facial mvt  d1 13:18:41  Moving some in bed  d1 13:18:45  oral facial mvt continues  d1 13:18:55  Sz 2 stop  d1 13:18:57  Returns to looking at iPhone    Seizure 3  16:39:13  Lying back in bed - no mvt  16:39:20  Raises hed and slightly flexes knees  16:39:26  no response to nurse "are U OK?  16:39:30  Oral facial mvts  16:39:38  Patient Event  16:39:43  nurse telling him a code word, Purple elephant  16:39:48  Sz 3 stop  16:39:52  limb mvts stop  16:40:02  not responding to nurse  16:40:14  pt not following commands  A the 16:40:37  pt able to follow commands    Seizure 4     Nothing clinical     Impression  Four seizure originating from L Hippo and Entro areas   No seizures recorded 2020/09/02>03 (#3)  No seizures recorded 2020/09/03>04 (#4)  No seizures recorded 2020/09/04>05 (#5)  No seizures recorded 2020/09/05>06 (#6)  No seizures recorded 2020/09/06>07 (#7)  No seizures recorded 2020/09/07>08 (#8)  No seizures recorded 2020/09/08>09 (#9)      BEBETO Marina MD  Professor Emeritus  Epilepsy Division    "

## 2020-09-10 NOTE — PROGRESS NOTES
"[facility]  Adult Nutrition  Progress Note    SUMMARY       Recommendations    Recommendation:   1. Continue regular diet, encourage good PO intake at meals   2. Add boost plus if pt accepts  3. RD to monitor and follow up    Goals: pt to tolerate >85% of EEN/EPN by RD follow up  Nutrition Goal Status: new  Communication of RD Recs: other (comment)(POC)    Reason for Assessment    Reason For Assessment: length of stay  Diagnosis: (Epilepsy)  Relevant Medical History: Epilepsy; seizures; mitral valve prolapse  Interdisciplinary Rounds: attended  General Information Comments: Pt resting in bed reported good appetite at this time. PO poor today due to feeling frustruated. PTA good PO intake at home and no recent wt loss reported. UBW ~180 lbs per pt. NFPE not indicated at this time, no s/s of malnutrition.  Nutrition Discharge Planning: adequate intake via regular diet    Nutrition Risk Screen    Nutrition Risk Screen: no indicators present    Nutrition/Diet History    Spiritual, Cultural Beliefs, Church Practices, Values that Affect Care: no  Food Allergies: NKFA  Factors Affecting Nutritional Intake: None identified at this time    Anthropometrics    Temp: 97.8 °F (36.6 °C)  Height Method: Stated  Height: 6' 1" (185.4 cm)  Height (inches): 73 in  Weight Method: Bed Scale  Weight: 78.5 kg (173 lb 1 oz)  Weight (lb): 173.06 lb  Ideal Body Weight (IBW), Male: 184 lb  % Ideal Body Weight, Male (lb): 94.05 %  BMI (Calculated): 22.8  BMI Grade: 18.5-24.9 - normal    Lab/Procedures/Meds    Pertinent Labs Reviewed: reviewed  Pertinent Labs Comments: noted  Pertinent Medications Reviewed: reviewed  Pertinent Medications Comments: heparin; senna-docusate     Estimated/Assessed Needs    Weight Used For Calorie Calculations: 78.5 kg (173 lb 1 oz)  Energy Calorie Requirements (kcal): 2175 kcal/d  Energy Need Method: [place](x1.2)  Protein Requirements: 78-94 g/day(1.0-1.2 g/kg)  Weight Used For " Protein Calculations: 78.5 kg (173 lb 1 oz)  Fluid Requirements (mL): 1 mL/kcal or per MD  RDA Method (mL): 2175    Nutrition Prescription Ordered    Current Diet Order: Regular diet    Evaluation of Received Nutrient/Fluid Intake    I/O: -3.7 L since admit  Energy Calories Required: meeting needs  Protein Required: meeting needs  Fluid Required: meeting needs  Comments: LBM 9/9  Tolerance: tolerating  % Intake of Estimated Energy Needs: 75 - 100 %  % Meal Intake: 75 - 100 %    Nutrition Risk    Level of Risk/Frequency of Follow-up: low     Assessment and Plan  Nutrition Problem  inadequate oral intake    Related to (etiology):   Fluctuating appetite    Signs and Symptoms (as evidenced by):   PO <75% of EEN/EPN today     Interventions (treatment strategy):  Collaboration of care with other providers    Nutrition Diagnosis Status:   New    Monitor and Evaluation    Food and Nutrient Intake: energy intake, food and beverage intake  Food and Nutrient Adminstration: diet order  Knowledge/Beliefs/Attitudes: food and nutrition knowledge/skill  Anthropometric Measurements: weight, weight change  Biochemical Data, Medical Tests and Procedures: electrolyte and renal panel, gastrointestinal profile, glucose/endocrine profile, inflammatory profile, lipid profile  Nutrition-Focused Physical Findings: overall appearance     Nutrition Follow-Up    RD Follow-up?: Yes

## 2020-09-10 NOTE — ASSESSMENT & PLAN NOTE
-s/p sEEG placement  -cefazolin 2 q8 prophylaxis  -SBP goal <140  -NSGY and epilepsy following  -seizure precautions  -continue: Topiramate 50 mg BID, lamotrigine 100mg BID, clobazam 20 mg BID, hold home med Vimpat  -neuro checks q 1 hr  9/2/2020: 1 seizure this morning associated with lip smacking  -per epilepsy: Held home Vimpat 8/31  - Continue Topamax 50 mg BID  9/3/2020: no seizures this AM, continue to hold vimpat, continue SQH for DVT ppx  - Per epilepsy: If no seizures throughout day, plan to decrease Onfi to 10 mg BID (Dr. Abhijeet Do to change tonight if needed).   9/10/2020: one seizure today, off AEDs today per epilepsy, plan for sEEG removal this Friday

## 2020-09-10 NOTE — PLAN OF CARE
Per Neurosurgery, Epilepsy would like to monitor patient over the weekend, so patient's surgery has been postponed until Monday.  Not medically ready for discharge.        09/10/20 1317   Discharge Reassessment   Assessment Type Discharge Planning Reassessment   Provided patient/caregiver education on the expected discharge date and the discharge plan Yes   Do you have any problems affording any of your prescribed medications? No   Discharge Plan A Home with family   Discharge Plan B Home with family   DME Needed Upon Discharge  none   Patient choice form signed by patient/caregiver N/A   Anticipated Discharge Disposition Home   Can the patient/caregiver answer the patient profile reliably? Yes, cognitively intact   How does the patient rate their overall health at the present time? Good   Describe the patient's ability to walk at the present time. Minor restrictions or changes   How often would a person be available to care for the patient? Whenever needed   Number of comorbid conditions (as recorded on the chart) One       Shivani De Leon RN, CCRN-K, [facility]  Neuro-Critical Care   [phone]

## 2020-09-10 NOTE — PROGRESS NOTES
Back to baseline neuro exam, HR remains elevated in low 100's , BP and sats stable, pt denies headaches, other complaints., Bed remains padded and in lowest position. Glucose 119, afebrile.

## 2020-09-11 LAB
ALBUMIN SERPL BCP-MCNC: 3.9 G/DL (ref 3.5–5.2)
ALP SERPL-CCNC: 96 U/L (ref 55–135)
ALT SERPL W/O P-5'-P-CCNC: 28 U/L (ref 10–44)
ANION GAP SERPL CALC-SCNC: 10 MMOL/L (ref 8–16)
ANISOCYTOSIS BLD QL SMEAR: SLIGHT
AST SERPL-CCNC: 28 U/L (ref 10–40)
BASOPHILS # BLD AUTO: 0.04 K/UL (ref 0–0.2)
BASOPHILS NFR BLD: 0.4 % (ref 0–1.9)
BILIRUB SERPL-MCNC: 1.2 MG/DL (ref 0.1–1)
BUN SERPL-MCNC: 11 MG/DL (ref 6–20)
CALCIUM SERPL-MCNC: 9 MG/DL (ref 8.7–10.5)
CHLORIDE SERPL-SCNC: 106 MMOL/L (ref 95–110)
CO2 SERPL-SCNC: 21 MMOL/L (ref 23–29)
CREAT SERPL-MCNC: 0.8 MG/DL (ref 0.5–1.4)
DIFFERENTIAL METHOD: ABNORMAL
EOSINOPHIL # BLD AUTO: 0.1 K/UL (ref 0–0.5)
EOSINOPHIL NFR BLD: 0.7 % (ref 0–8)
ERYTHROCYTE [DISTWIDTH] IN BLOOD BY AUTOMATED COUNT: 11.4 % (ref 11.5–14.5)
EST. GFR  (AFRICAN AMERICAN): >60 ML/MIN/1.73 M^2
EST. GFR  (NON AFRICAN AMERICAN): >60 ML/MIN/1.73 M^2
GLUCOSE SERPL-MCNC: 95 MG/DL (ref 70–110)
HCT VFR BLD AUTO: 39.8 % (ref 40–54)
HGB BLD-MCNC: 14.9 G/DL (ref 14–18)
IMM GRANULOCYTES # BLD AUTO: 0.07 K/UL (ref 0–0.04)
IMM GRANULOCYTES NFR BLD AUTO: 0.8 % (ref 0–0.5)
LYMPHOCYTES # BLD AUTO: 1.6 K/UL (ref 1–4.8)
LYMPHOCYTES NFR BLD: 17.4 % (ref 18–48)
MAGNESIUM SERPL-MCNC: 2.1 MG/DL (ref 1.6–2.6)
MCH RBC QN AUTO: 31.6 PG (ref 27–31)
MCHC RBC AUTO-ENTMCNC: 37.4 G/DL (ref 32–36)
MCV RBC AUTO: 84 FL (ref 82–98)
MONOCYTES # BLD AUTO: 0.9 K/UL (ref 0.3–1)
MONOCYTES NFR BLD: 9.3 % (ref 4–15)
NEUTROPHILS # BLD AUTO: 6.6 K/UL (ref 1.8–7.7)
NEUTROPHILS NFR BLD: 71.4 % (ref 38–73)
NRBC BLD-RTO: 0 /100 WBC
PHOSPHATE SERPL-MCNC: 3.6 MG/DL (ref 2.7–4.5)
PLATELET # BLD AUTO: 201 K/UL (ref 150–350)
PLATELET BLD QL SMEAR: ABNORMAL
PMV BLD AUTO: 10.1 FL (ref 9.2–12.9)
POTASSIUM SERPL-SCNC: 4.1 MMOL/L (ref 3.5–5.1)
PROT SERPL-MCNC: 6.9 G/DL (ref 6–8.4)
RBC # BLD AUTO: 4.72 M/UL (ref 4.6–6.2)
SODIUM SERPL-SCNC: 137 MMOL/L (ref 136–145)
WBC # BLD AUTO: 9.2 K/UL (ref 3.9–12.7)

## 2020-09-11 PROCEDURE — 63600175 PHARM REV CODE 636 W HCPCS: Performed by: STUDENT IN AN ORGANIZED HEALTH CARE EDUCATION/TRAINING PROGRAM

## 2020-09-11 PROCEDURE — 94761 N-INVAS EAR/PLS OXIMETRY MLT: CPT

## 2020-09-11 PROCEDURE — 99233 SBSQ HOSP IP/OBS HIGH 50: CPT | Mod: ,,, | Performed by: PSYCHIATRY & NEUROLOGY

## 2020-09-11 PROCEDURE — 99233 PR SUBSEQUENT HOSPITAL CARE,LEVL III: ICD-10-PCS | Mod: ,,, | Performed by: PSYCHIATRY & NEUROLOGY

## 2020-09-11 PROCEDURE — 99233 PR SUBSEQUENT HOSPITAL CARE,LEVL III: ICD-10-PCS | Mod: ,,, | Performed by: NURSE PRACTITIONER

## 2020-09-11 PROCEDURE — 63600175 PHARM REV CODE 636 W HCPCS: Performed by: PHYSICIAN ASSISTANT

## 2020-09-11 PROCEDURE — 20000000 HC ICU ROOM

## 2020-09-11 PROCEDURE — 84100 ASSAY OF PHOSPHORUS: CPT

## 2020-09-11 PROCEDURE — 95720 PR EEG, W/VIDEO, CONT RECORD, I&R, >12<26 HRS: ICD-10-PCS | Mod: ,,, | Performed by: PSYCHIATRY & NEUROLOGY

## 2020-09-11 PROCEDURE — 99233 SBSQ HOSP IP/OBS HIGH 50: CPT | Mod: ,,, | Performed by: NURSE PRACTITIONER

## 2020-09-11 PROCEDURE — 25000003 PHARM REV CODE 250: Performed by: NURSE PRACTITIONER

## 2020-09-11 PROCEDURE — 85025 COMPLETE CBC W/AUTO DIFF WBC: CPT

## 2020-09-11 PROCEDURE — 80053 COMPREHEN METABOLIC PANEL: CPT

## 2020-09-11 PROCEDURE — 95714 VEEG EA 12-26 HR UNMNTR: CPT

## 2020-09-11 PROCEDURE — 95720 EEG PHY/QHP EA INCR W/VEEG: CPT | Mod: ,,, | Performed by: PSYCHIATRY & NEUROLOGY

## 2020-09-11 PROCEDURE — 83735 ASSAY OF MAGNESIUM: CPT

## 2020-09-11 RX ADMIN — CEFAZOLIN 2 G: 1 INJECTION, POWDER, FOR SOLUTION INTRAMUSCULAR; INTRAVENOUS at 01:09

## 2020-09-11 RX ADMIN — CITALOPRAM HYDROBROMIDE 20 MG: 10 TABLET ORAL at 08:09

## 2020-09-11 RX ADMIN — HEPARIN SODIUM 5000 UNITS: 5000 INJECTION INTRAVENOUS; SUBCUTANEOUS at 01:09

## 2020-09-11 RX ADMIN — HEPARIN SODIUM 5000 UNITS: 5000 INJECTION INTRAVENOUS; SUBCUTANEOUS at 06:09

## 2020-09-11 RX ADMIN — HEPARIN SODIUM 5000 UNITS: 5000 INJECTION INTRAVENOUS; SUBCUTANEOUS at 09:09

## 2020-09-11 RX ADMIN — CEFAZOLIN 2 G: 1 INJECTION, POWDER, FOR SOLUTION INTRAMUSCULAR; INTRAVENOUS at 09:09

## 2020-09-11 RX ADMIN — CEFAZOLIN 2 G: 1 INJECTION, POWDER, FOR SOLUTION INTRAMUSCULAR; INTRAVENOUS at 06:09

## 2020-09-11 NOTE — SUBJECTIVE & OBJECTIVE
Interval History:     9/11: AF, VSS. One seizure yesterday, pending formal EEG read, will plan for tentative OR Monday for sEEG removal.     Medications:  Continuous Infusions:  Scheduled Meds:   ceFAZolin (ANCEF) IVPB  2 g Intravenous Q8H    citalopram  20 mg Oral Daily    heparin (porcine)  5,000 Units Subcutaneous Q8H    senna-docusate 8.6-50 mg  1 tablet Oral Daily     PRN Meds:acetaminophen, hydrALAZINE, labetaloL, magnesium oxide, magnesium oxide, oxyCODONE, potassium chloride, potassium chloride, potassium chloride, potassium, sodium phosphates, potassium, sodium phosphates, potassium, sodium phosphates, promethazine (PHENERGAN) IVPB     Review of Systems  Objective:     Weight: 78.5 kg (173 lb 1 oz)  Body mass index is 22.83 kg/m².  Vital Signs (Most Recent):  Temp: 98.1 °F (36.7 °C) (09/11/20 0805)  Pulse: 83 (09/11/20 0905)  Resp: 20 (09/11/20 0905)  BP: 119/74 (09/11/20 0905)  SpO2: 99 % (09/11/20 0905) Vital Signs (24h Range):  Temp:  [97.8 °F (36.6 °C)-99.2 °F (37.3 °C)] 98.1 °F (36.7 °C)  Pulse:  [] 83  Resp:  [12-30] 20  SpO2:  [69 %-100 %] 99 %  BP: ()/(53-95) 119/74     Date 09/11/20 0700 - 09/12/20 0659   Shift 9211-0225 6325-6804 6579-2696 24 Hour Total   INTAKE   P.O. 60   60   I.V.(mL/kg) 15(0.2)   15(0.2)   Shift Total(mL/kg) 75(1)   75(1)   OUTPUT   Urine(mL/kg/hr) 300   300   Shift Total(mL/kg) 300(3.8)   300(3.8)   Weight (kg) 78.5 78.5 78.5 78.5                        Neurosurgery Physical Exam  GCS 15  AOx3  PERRL  CN intact  Full strength x4    Significant Labs:  Recent Labs   Lab 09/10/20  0119 09/10/20  1338 09/11/20  0322   GLU 97  --  95     --  137   K 3.4* 5.1 4.1     --  106   CO2 22*  --  21*   BUN 14  --  11   CREATININE 0.8  --  0.8   CALCIUM 9.1  --  9.0   MG 2.1  --  2.1     Recent Labs   Lab 09/10/20  0119 09/11/20  0322   WBC 7.01 9.20   HGB 15.5 14.9   HCT 43.4 39.8*    201     No results for input(s): LABPT, INR, APTT in the last 48  hours.  Microbiology Results (last 7 days)     ** No results found for the last 168 hours. **        All pertinent labs from the last 24 hours have been reviewed.    Significant Diagnostics:  I have reviewed all pertinent imaging results/findings within the past 24 hours.

## 2020-09-11 NOTE — NURSING
Pt exercised on recumbent bike for 45 minutes, 11 miles. No seizures witnessed. VSS. Will continue to monitor very closely.

## 2020-09-11 NOTE — PLAN OF CARE
POC reviewed with pt at 0500. Pt verbalized understanding. Tylenol 325 mg x1 given for pt c/o LBP. Afebrile. No BM. SBP < 140 maintained. sEEG in place - no seizures observed overnight. Questions and concerns addressed. No acute events overnight. Pt progressing toward goals. Will continue to monitor. See flowsheets for full assessment and VS info.

## 2020-09-11 NOTE — ASSESSMENT & PLAN NOTE
25M with a PMHx of intractable epilepsy s/p VNS placement on Onfi 40 mg BID, Lamictal 200 mg BID, Vimpat 200 mg BID, and Topamax 50 mg BID s/p bilateral sEEG placement with Dr. Melani Ortiz on 8/31. Postop CTH stable.    Recommendations:  - Continuous vEEG monitoring  - 5 electrographic seizures from L hippo and entro areas since admit  - Vimpat held on admit 8/31, Lamictal weaned and stopped 9/6, Onfi weaned and stopped 9/8, Topamax stopped 9/8  - Please call epilepsy on call prior to administering benzodiazepines   - If patient has more than one GTC, can consider giving IV Vimpat 100 mgx1  - Neurosurgery following; tentative plan for sEEG removal Monday   - Seizure precautions  - Avoid agents that lower seizure thresold      Plan of care discussed with patient and NCC team. Will follow. Please call with questions.

## 2020-09-11 NOTE — ASSESSMENT & PLAN NOTE
-8/31 s/p sEEG placement.  -NSGY and epilepsy following  9/11  No seizures over night. Total seizures since admit 5. Coming from L hippocampus and entorhinal cortex areas.  -cefazolin 2 q8 prophylaxis  -SBP goal <140  -seizure precautions  Off all AEDs.  Oxycodone 5mg prn moderate pain  -neuro checks q 1 hr  Ok for stationary bike activity.

## 2020-09-11 NOTE — PROGRESS NOTES
[facility]  Neurosurgery  Progress Note    Subjective:     History of Present Illness: Darin Cunha is a 25 y.o. male with intractable epilepsy since 2013 who presents as a referral from Dr. Abhijeet Do to discuss the gamut of epilepsy surgery options. Today's visit is a video visit, and the patient is a passenger in a moving vehicle. His mother is not with him.      The patient states that when he has events, he stares off and smacks his lips. The episodes last for about 5-6 minutes. He often waves his hands and feels tired afterwards. He currently has about 3-5 episodes a week. He has failed at least 4 AEDs at therapeutic dosages.      Per EEG review, he has bilateral temporal involvement, moreso left-sided than right.      Triggers include being tired and being stressed. He is not working (on disability) but finished his GED. He lives next door to grandmother and nearby to his mother. His major concern is memory loss from ongoing seizures.      Of notes, he endorses a history of staph infection previously. He denies history of bleeding or anesthetic complications. He has a VNS in place.        Post-Op Info:  Procedure(s) (LRB):  CREATION, CRANIAL YUNG HOLE, PLACEMENT OF THE FIDUCIAL SCREWS BILATERAL 2.PLACEMENT OF SEEG ELECTRODES BILATERAL WITH OWEN ROBOT (Bilateral)   11 Days Post-Op     Interval History:     9/11: AF, VSS. One seizure yesterday, pending formal EEG read, will plan for tentative OR Monday for sEEG removal.     Medications:  Continuous Infusions:  Scheduled Meds:   ceFAZolin (ANCEF) IVPB  2 g Intravenous Q8H    citalopram  20 mg Oral Daily    heparin (porcine)  5,000 Units Subcutaneous Q8H    senna-docusate 8.6-50 mg  1 tablet Oral Daily     PRN Meds:acetaminophen, hydrALAZINE, labetaloL, magnesium oxide, magnesium oxide, oxyCODONE, potassium chloride, potassium chloride, potassium chloride, potassium, sodium phosphates, potassium, sodium phosphates, potassium, sodium  phosphates, promethazine (PHENERGAN) IVPB     Review of Systems  Objective:     Weight: 78.5 kg (173 lb 1 oz)  Body mass index is 22.83 kg/m².  Vital Signs (Most Recent):  Temp: 98.1 °F (36.7 °C) (09/11/20 0805)  Pulse: 83 (09/11/20 0905)  Resp: 20 (09/11/20 0905)  BP: 119/74 (09/11/20 0905)  SpO2: 99 % (09/11/20 0905) Vital Signs (24h Range):  Temp:  [97.8 °F (36.6 °C)-99.2 °F (37.3 °C)] 98.1 °F (36.7 °C)  Pulse:  [] 83  Resp:  [12-30] 20  SpO2:  [69 %-100 %] 99 %  BP: ()/(53-95) 119/74     Date 09/11/20 0700 - 09/12/20 0659   Shift 6240-3548 6975-5406 0141-5429 24 Hour Total   INTAKE   P.O. 60   60   I.V.(mL/kg) 15(0.2)   15(0.2)   Shift Total(mL/kg) 75(1)   75(1)   OUTPUT   Urine(mL/kg/hr) 300   300   Shift Total(mL/kg) 300(3.8)   300(3.8)   Weight (kg) 78.5 78.5 78.5 78.5                        Neurosurgery Physical Exam  GCS 15  AOx3  PERRL  CN intact  Full strength x4    Significant Labs:  Recent Labs   Lab 09/10/20  0119 09/10/20  1338 09/11/20  0322   GLU 97  --  95     --  137   K 3.4* 5.1 4.1     --  106   CO2 22*  --  21*   BUN 14  --  11   CREATININE 0.8  --  0.8   CALCIUM 9.1  --  9.0   MG 2.1  --  2.1     Recent Labs   Lab 09/10/20  0119 09/11/20  0322   WBC 7.01 9.20   HGB 15.5 14.9   HCT 43.4 39.8*    201     No results for input(s): LABPT, INR, APTT in the last 48 hours.  Microbiology Results (last 7 days)     ** No results found for the last 168 hours. **        All pertinent labs from the last 24 hours have been reviewed.    Significant Diagnostics:  I have reviewed all pertinent imaging results/findings within the past 24 hours.    Assessment/Plan:     * Epilepsy  25 M with intractable epilepsy s/p multiple failed AEDs and VNS placement now s/p sEEG lead placement:      Admitted to [facility]  q1h neurochecks  sEEG leads in place, abx while in place, continue headwrap  Weaned off of all antiepileptics  Bed rest at all times  SBP<140  No HOB restrictions  Ok for recumbent  bike in bed  Ok for diet  Voiding spontaneously s/p michelle removal  Post operative CTH reviewed - expected post op changes  Monitor for seizure activity - no benzos or sedating meds unless cleared by Epilepsy team  AEDs per Epilepsy  Please page w/exam change  Tentative sEEG bolt removal on 9/14, booked, consented, will obtain pre-op.     Dispo: continue ICU care        Ethan Owen MD  Neurosurgery  [facility]

## 2020-09-11 NOTE — PROGRESS NOTES
[facility]  Neurology-Epilepsy  Progress Note    Patient Name: Darin Cunha  MRN: [id number]  Admission Date: 8/31/2020  Hospital Length of Stay: 11 days  Code Status: Full Code   Attending Provider: Melani Ortiz MD  Primary Care Physician: Primary Doctor No   Principal Problem:Epilepsy    Subjective:     Hospital Course:   8/31: sEEG placement. Hold Vimpat. Continue other AEDs: Onfi 40 mg BID, Lamictal 200 mg BID, Topamax 50 mg BID.  8/31>9/1: No clinical or electrographic seizures overnight  9/1>9/2: Four electrographic seizures (3 with clinical correlate) originating from L hippo and entro areas  9/2>9/3: No electrographic seizures  9/3>9/4: No electrographic seizures  9/4>9/5: No electrographic seizures  9/5>9/6: No electrographic seizures  9/6>9/7: No electrographic seizures  9/7>9/8: No electrographic seizures  9/8>9/9: No electrographic seizures  9/9>9/10: No electrographic seizures  9/10>9/11: One clinical and electrographic seizure originating from L Hippo and Entro areas    Interval History: No acute events overnight. This AM, patient lying in bed resting comfortably. Patient endorses muscle soreness s/p seizure yesterday which was relieved with tylenol. No complaints today. Discussed plan of care, including caffeine and riding stationary bike today.    Current Facility-Administered Medications   Medication Dose Route Frequency Provider Last Rate Last Dose    acetaminophen tablet 650 mg  650 mg Oral Q4H PRN Georgie Romain, NP   325 mg at 09/10/20 2106    ceFAZolin injection 2 g  2 g Intravenous Q8H Ethan Owen MD   2 g at 09/11/20 0611    citalopram tablet 20 mg  20 mg Oral Daily Georgie Mithomas, NP   20 mg at 09/11/20 0828    heparin (porcine) injection 5,000 Units  5,000 Units Subcutaneous Q8H Carito Amezquita PA-C   5,000 Units at 09/11/20 0611    hydrALAZINE injection 10 mg  10 mg Intravenous Q4H PRN Georgie Romain, NP   10 mg at 08/31/20 1601    labetaloL injection 10 mg  10 mg  Intravenous Q4H PRN Georgie Mithomas, NP        magnesium oxide tablet 800 mg  800 mg Oral PRN Melissa Flores NP        magnesium oxide tablet 800 mg  800 mg Oral PRN Melissa Flores NP        oxyCODONE immediate release tablet 5 mg  5 mg Oral Q6H PRN Georgie Romain, NP   5 mg at 09/02/20 1914    potassium chloride packet 40 mEq  40 mEq Oral PRN Melissa Flores NP   40 mEq at 09/09/20 0518    potassium chloride packet 40 mEq  40 mEq Oral PRN Melissa Flores NP   40 mEq at 09/10/20 0933    potassium chloride packet 40 mEq  40 mEq Oral PRN Melissa Flores NP        potassium, sodium phosphates 280-160-250 mg packet 2 packet  2 packet Oral PRN Melissa Flores NP        potassium, sodium phosphates 280-160-250 mg packet 2 packet  2 packet Oral PRN Melissa Flores NP        potassium, sodium phosphates 280-160-250 mg packet 2 packet  2 packet Oral PRN Melissa Flores NP        promethazine (PHENERGAN) 6.25 mg in dextrose 5 % 50 mL IVPB  6.25 mg Intravenous Q8H PRN Georgie Hoyos  mL/hr at 08/31/20 1730 6.25 mg at 08/31/20 1730    senna-docusate 8.6-50 mg per tablet 1 tablet  1 tablet Oral Daily Georgie Hoyos NP   Stopped at 09/08/20 0900     Continuous Infusions:    Review of Systems   Constitutional: Negative for chills, diaphoresis, fatigue and fever.   Respiratory: Negative for cough, shortness of breath and wheezing.    Cardiovascular: Negative for chest pain and palpitations.   Gastrointestinal: Negative for abdominal pain, diarrhea, nausea and vomiting.   Musculoskeletal: Negative for myalgias (resolved s/p tylenol).   Neurological: Positive for seizures. Negative for dizziness, syncope, speech difficulty, weakness and headaches (resolved).   Psychiatric/Behavioral: Negative for behavioral problems, confusion and sleep disturbance. The patient is not nervous/anxious.      Objective:     Vital Signs (Most Recent):  Temp: 98.1 °F (36.7 °C) (09/11/20 0805)  Pulse: 83  (09/11/20 0905)  Resp: 20 (09/11/20 0905)  BP: 119/74 (09/11/20 0905)  SpO2: 99 % (09/11/20 0905) Vital Signs (24h Range):  Temp:  [97.8 °F (36.6 °C)-99.2 °F (37.3 °C)] 98.1 °F (36.7 °C)  Pulse:  [] 83  Resp:  [12-30] 20  SpO2:  [69 %-100 %] 99 %  BP: ()/(53-95) 119/74     Weight: 78.5 kg (173 lb 1 oz)  Body mass index is 22.83 kg/m².    Physical Exam  Constitutional:       General: He is not in acute distress.     Appearance: Normal appearance. He is not diaphoretic.   HENT:      Head: Normocephalic.      Comments: sEEG in place  Eyes:      General: No scleral icterus.        Right eye: No discharge.         Left eye: No discharge.      Extraocular Movements: EOM normal.      Conjunctiva/sclera: Conjunctivae normal.      Pupils: Pupils are equal, round, and reactive to light.   Cardiovascular:      Rate and Rhythm: Normal rate.   Pulmonary:      Effort: Pulmonary effort is normal. No respiratory distress.   Abdominal:      General: There is no distension.      Palpations: Abdomen is soft.      Tenderness: There is no abdominal tenderness.   Musculoskeletal: Normal range of motion.         General: No deformity or signs of injury.   Skin:     General: Skin is warm and dry.   Neurological:      General: No focal deficit present.      Mental Status: He is alert and oriented to person, place, and time. Mental status is at baseline.      Coordination: Finger-Nose-Finger Test normal.   Psychiatric:         Mood and Affect: Mood normal.         Speech: Speech normal.         Behavior: Behavior normal.         Thought Content: Thought content normal.         NEUROLOGICAL EXAMINATION:     MENTAL STATUS   Oriented to person, place, and time.   Speech: speech is normal   Level of consciousness: alert    CRANIAL NERVES     CN III, IV, VI   Pupils are equal, round, and reactive to light.  Extraocular motions are normal.     CN VII   Facial expression full, symmetric.     CN VIII   Hearing: intact    CN XI   CN XI  normal.     CN XII   CN XII normal.     GAIT AND COORDINATION      Coordination   Finger to nose coordination: normal      Significant Labs: All pertinent lab results from the past 24 hours have been reviewed.    Significant Studies: I have reviewed all pertinent imaging results/findings within the past 24 hours.    Assessment and Plan:     * Epilepsy  25M with a PMHx of intractable epilepsy s/p VNS placement on Onfi 40 mg BID, Lamictal 200 mg BID, Vimpat 200 mg BID, and Topamax 50 mg BID s/p bilateral sEEG placement with Dr. Melani Ortiz on 8/31. Postop CTH stable.    Recommendations:  - Continuous vEEG monitoring  - 5 electrographic seizures from L hippo and entro areas since admit  - Vimpat held on admit 8/31, Lamictal weaned and stopped 9/6, Onfi weaned and stopped 9/8, Topamax stopped 9/8  - Please call epilepsy on call prior to administering benzodiazepines   - If patient has more than one GTC, can consider giving IV Vimpat 100 mgx1  - Neurosurgery following; tentative plan for sEEG removal Monday   - Seizure precautions  - Avoid agents that lower seizure thresold      Plan of care discussed with patient and NCC team. Will follow. Please call with questions.    Depression  - Chronic and stable  - Continue home Celexa        VTE Risk Mitigation (From admission, onward)         Ordered     heparin (porcine) injection 5,000 Units  Every 8 hours      09/02/20 1035     Place sequential compression device  Until discontinued      08/31/20 8904                Minerva Oconnor PA-C  Neurology-Epilepsy  [facility]  Staff: Dr. Curtis

## 2020-09-11 NOTE — SUBJECTIVE & OBJECTIVE
Interval History: No acute events overnight. This AM, patient lying in bed resting comfortably. Patient endorses muscle soreness s/p seizure yesterday which was relieved with tylenol. No complaints today. Discussed plan of care, including caffeine and riding stationary bike today.    Current Facility-Administered Medications   Medication Dose Route Frequency Provider Last Rate Last Dose    acetaminophen tablet 650 mg  650 mg Oral Q4H PRN Georgie Romain, NP   325 mg at 09/10/20 2106    ceFAZolin injection 2 g  2 g Intravenous Q8H Ethan Owen MD   2 g at 09/11/20 0611    citalopram tablet 20 mg  20 mg Oral Daily Georgie Miinea, NP   20 mg at 09/11/20 0828    heparin (porcine) injection 5,000 Units  5,000 Units Subcutaneous Q8H Carito mAezquita PA-C   5,000 Units at 09/11/20 0611    hydrALAZINE injection 10 mg  10 mg Intravenous Q4H PRN Georgie Romain, NP   10 mg at 08/31/20 1601    labetaloL injection 10 mg  10 mg Intravenous Q4H PRN Georgie Miinea, NP        magnesium oxide tablet 800 mg  800 mg Oral PRN Melissa Flores NP        magnesium oxide tablet 800 mg  800 mg Oral PRN Melissa Flores NP        oxyCODONE immediate release tablet 5 mg  5 mg Oral Q6H PRN Georgie Mithomas, NP   5 mg at 09/02/20 1914    potassium chloride packet 40 mEq  40 mEq Oral PRN Melissa Flores NP   40 mEq at 09/09/20 0518    potassium chloride packet 40 mEq  40 mEq Oral PRN Melissa Flores NP   40 mEq at 09/10/20 0933    potassium chloride packet 40 mEq  40 mEq Oral PRN Melissa Flores NP        potassium, sodium phosphates 280-160-250 mg packet 2 packet  2 packet Oral PRN Melissa Flores NP        potassium, sodium phosphates 280-160-250 mg packet 2 packet  2 packet Oral PRN Melissa Flores NP        potassium, sodium phosphates 280-160-250 mg packet 2 packet  2 packet Oral PRN Melissa Flores NP        promethazine (PHENERGAN) 6.25 mg in dextrose 5 % 50 mL IVPB  6.25 mg Intravenous Q8H PRN  Georgie Hoyos  mL/hr at 08/31/20 1730 6.25 mg at 08/31/20 1730    senna-docusate 8.6-50 mg per tablet 1 tablet  1 tablet Oral Daily Georgiefroilan HopkinsPRIETO guzman   Stopped at 09/08/20 0900     Continuous Infusions:    Review of Systems   Constitutional: Negative for chills, diaphoresis, fatigue and fever.   Respiratory: Negative for cough, shortness of breath and wheezing.    Cardiovascular: Negative for chest pain and palpitations.   Gastrointestinal: Negative for abdominal pain, diarrhea, nausea and vomiting.   Musculoskeletal: Negative for myalgias (resolved s/p tylenol).   Neurological: Positive for seizures. Negative for dizziness, syncope, speech difficulty, weakness and headaches (resolved).   Psychiatric/Behavioral: Negative for behavioral problems, confusion and sleep disturbance. The patient is not nervous/anxious.      Objective:     Vital Signs (Most Recent):  Temp: 98.1 °F (36.7 °C) (09/11/20 0805)  Pulse: 83 (09/11/20 0905)  Resp: 20 (09/11/20 0905)  BP: 119/74 (09/11/20 0905)  SpO2: 99 % (09/11/20 0905) Vital Signs (24h Range):  Temp:  [97.8 °F (36.6 °C)-99.2 °F (37.3 °C)] 98.1 °F (36.7 °C)  Pulse:  [] 83  Resp:  [12-30] 20  SpO2:  [69 %-100 %] 99 %  BP: ()/(53-95) 119/74     Weight: 78.5 kg (173 lb 1 oz)  Body mass index is 22.83 kg/m².    Physical Exam  Constitutional:       General: He is not in acute distress.     Appearance: Normal appearance. He is not diaphoretic.   HENT:      Head: Normocephalic.      Comments: sEEG in place  Eyes:      General: No scleral icterus.        Right eye: No discharge.         Left eye: No discharge.      Extraocular Movements: EOM normal.      Conjunctiva/sclera: Conjunctivae normal.      Pupils: Pupils are equal, round, and reactive to light.   Cardiovascular:      Rate and Rhythm: Normal rate.   Pulmonary:      Effort: Pulmonary effort is normal. No respiratory distress.   Abdominal:      General: There is no distension.      Palpations: Abdomen is soft.       Tenderness: There is no abdominal tenderness.   Musculoskeletal: Normal range of motion.         General: No deformity or signs of injury.   Skin:     General: Skin is warm and dry.   Neurological:      General: No focal deficit present.      Mental Status: He is alert and oriented to person, place, and time. Mental status is at baseline.      Coordination: Finger-Nose-Finger Test normal.   Psychiatric:         Mood and Affect: Mood normal.         Speech: Speech normal.         Behavior: Behavior normal.         Thought Content: Thought content normal.         NEUROLOGICAL EXAMINATION:     MENTAL STATUS   Oriented to person, place, and time.   Speech: speech is normal   Level of consciousness: alert    CRANIAL NERVES     CN III, IV, VI   Pupils are equal, round, and reactive to light.  Extraocular motions are normal.     CN VII   Facial expression full, symmetric.     CN VIII   Hearing: intact    CN XI   CN XI normal.     CN XII   CN XII normal.     GAIT AND COORDINATION      Coordination   Finger to nose coordination: normal      Significant Labs: All pertinent lab results from the past 24 hours have been reviewed.    Significant Studies: I have reviewed all pertinent imaging results/findings within the past 24 hours.

## 2020-09-11 NOTE — PLAN OF CARE
[facility] Care Plan    POC reviewed with Darin Cunha at 1530. Pt verbalized understanding. Questions and concerns addressed. No acute events today. Pt progressing toward goals. Per epilepsy team ok to drink caffeine to induce seizures. Per NSY, plan is to remove sEEG electrodes on Monday (9/14). Will continue to monitor. See below and flowsheets for full assessment and VS info.       Neuro:  [place] Coma Scale  Best Eye Response: 4-->(E4) spontaneous  Best Motor Response: 6-->(M6) obeys commands  Best Verbal Response: 5-->(V5) oriented  [place] Coma Scale Score: 15  Assessment Qualifiers: patient not sedated/intubated  Pupil PERRLA: yes     24 hr Temp:  [98.1 °F (36.7 °C)-99.2 °F (37.3 °C)]     CV:   Rhythm: normal sinus rhythm  BP goals:   SBP < 140  MAP > 65    Resp:   O2 Device (Oxygen Therapy): room air       Plan: N/A    GI/:  STEPHENIE Total Score: 20  Diet/Nutrition Received: tube feeding  Last Bowel Movement: 09/09/20  Voiding Characteristics: voids spontaneously without difficulty    Intake/Output Summary (Last 24 hours) at 9/11/2020 1548  Last data filed at 9/11/2020 1505  Gross per 24 hour   Intake 1075 ml   Output 855 ml   Net 220 ml     Unmeasured Output  Urine Occurrence: 1  Stool Occurrence: 1    Labs/Accuchecks:  Recent Labs   Lab 09/11/20  0322   WBC 9.20   RBC 4.72   HGB 14.9   HCT 39.8*         Recent Labs   Lab 09/11/20  0322      K 4.1   CO2 21*      BUN 11   CREATININE 0.8   ALKPHOS 96   ALT 28   AST 28   BILITOT 1.2*      Recent Labs   Lab 09/09/20  0112   INR 1.0   APTT 31.4    No results for input(s): CPK, CPKMB, TROPONINI, MB in the last 168 hours.    Electrolytes: N/A - electrolytes WDL  Accuchecks: none    Gtts:       LDA/Wounds:  Lines/Drains/Airways       Peripheral Intravenous Line                   Peripheral IV - Single Lumen 09/08/20 0257 20 G Anterior;Right Forearm 3 days         Peripheral IV - Single Lumen 09/09/20 2134 20 G Anterior;Left Forearm 1 day                   Wounds: No  Wound care consulted: No

## 2020-09-11 NOTE — ASSESSMENT & PLAN NOTE
25 M with intractable epilepsy s/p multiple failed AEDs and VNS placement now s/p sEEG lead placement:      Admitted to [facility]  q1h neurochecks  sEEG leads in place, abx while in place, continue headwrap  Weaned off of all antiepileptics  Bed rest at all times  SBP<140  No HOB restrictions  Ok for recumbent bike in bed  Ok for diet  Voiding spontaneously s/p michelle removal  Post operative CTH reviewed - expected post op changes  Monitor for seizure activity - no benzos or sedating meds unless cleared by Epilepsy team  AEDs per Epilepsy  Please page w/exam change  Tentative sEEG bolt removal on 9/14, booked, consented, will obtain pre-op.     Dispo: continue ICU care

## 2020-09-11 NOTE — PROCEDURES
Stereo EEG Depth Electrode Recording  DATE OF SERVICE: 09/10/2020  EEG NUMBER: [id number]  REQUESTED BY: Dr Alix Ortiz  LOCATION OF SERVICE: [id number]     METHODOLOGY              Depth electrodes consisted of thin wires with electrical contacts it fixed distances along the wire.  These are then implanted using a stereotactic procedure targeting cortical and subcortical structures.  The up to 256 electrode contact can be recorded simultaneously with this procedure.  Recording band pass was 0.1  in the low past filters setting could be set at the 512, 1024, or 2048.  Digital video recording of the patient is simultaneously recorded with the EEG.  The patient is instructed report clinical symptoms which may occur during the recording session.  EEG and video recording is stored and archived in digital format. Activation procedures which include photic stimulation, hyperventilation and instructing patients to perform simple task are done in selected patients.   Compresses spectral analysis (CSA) is also performed on the activity recorded from each individual channel.  This is displayed as a power display of frequencies from 0 to 30 Hz over time.   The CSA analysis is done and displayed continuously.  This is reviewed for asymmetries in power between homologous areas of the scalp and for presence of changes in power which canbe seen when seizures occur.  Sections of suspected abnormalities on the CSA is then compared with the original EEG recording.          Implant Date MRN Name Last First                      8/31/2020 [id number]  Alphonse Webster                            Contacts                   Depth Elect Name SN  # contacts Color 1 Color 2 1 2 3 4 5 6 7 8 9 10 11 12 13 14 15 16   1 L Amyg 44460  14 Yellow Green 1 2 3 4 5 6 7 8 9 10 11 12 13 14     2 L Hippo 50165  14 Black Blue 15 16 17 18 19 20 21 22 23 24 25 26 27 28     3 L Entor 37799  14 Red Blue 29 30 31 32 33 34 35 36 37 38 39 40 41 42     4 L AntCi 47813  14  Red Green 43 44 45 46 47 48 49 50 51 52 53 54 55 56     5 L OrbFr 27297  16 Blue Red 57 58 59 60 61 62 63 64 65 66 67 68 69 70 71 72   6 L PreFr 16535  16 Yellow Green 73 74 75 76 77 78 79 80 81 82 83 84 85 86 87 88   7 L AntLn 73655  8 Black Green 89 90 91 92 93 94 95 96           8 L PoIns 84025  8 Yellow Black 97 98 99 100 101 102 103 104           9 L TeOcc 38108  8 Black Red 105 106 107 108 109 110 111 112           10 L Pariet 76206  8 Black Red 113 114 115 116 117 118 119 120           11 R Amyg 99086  12 Green Red 121 122 123 124 125 126 127 128 129 130 131 132       12 R Hippo 49218  12 Green Blue 133 134 135 136 137 138 139 140 141 142 143 144       13 R Asuin 38024  8 Blue Red 145 146 147 148 149 150 151 152           14 R Acing 06220  16 Yellow Blue 153 154 155 156 157 158 159 160 161 162 163 164 165 166 167 168       Electrode integrety   High amplitude artifact present from the following contacts               Depth electrode          Contact #                       L Pariet  4              L Entor   12, 13, 14  The L Pariet  3, 4   R Amygd  2, 3    11, 12  Appropriate recording obtained from the remaining electrode contacts.     RECORDING TIMES  Start on 09/10/2020 7:00 a.m.  Stop on 09/11/2020 7:00 a.m.  A total of 24 hrs of EEG monitoring was obtained    Interictal Activity  Inner ictal activity continues to be frequently recorded.  Of commonly starting from the left hippocampus contacts 10 11 and 12,  spread is most common to the ipsilateral depth electrodes however and occasional initial spread is noted to the right hippocampus.  Infrequently a right hippocampal spike discharges noted.  The  Seizures Recorded   Date  Start  End  Sz 1 2020/09/01 08:34:43 08:35:28    Sz 2 2020/09/01 13:18:13 13:18:55    Sz 3  2020/09/01  16:39:13   start L hippo 1 2   16:39:13   sz spread Amyg Entor Prefr   16:39:38   Patient Event   16:39:43   nurse telling him a code word, Purple elephant   16:39:48  Sz 3  "electrographic stop   16:40:14  pt not following commands   16:40:37  pt able to follow commands    Sz 4 2020/09/01    21:05:53 start Emtpr > Hippo    very focal   21:06:17 Stop    Sz 5 2020/09/10   10:38:13 Andrea Entor > Hippo   10:38:14  Rhythmic activity PreFro & Anc   10:38:19  Rhythic activity PreFr 11 12 13   10:38:22  spikes start PoIns   10:39:29  Sz 5 Stops      EEG FINDINGS  Ictal Recording   Seizure #         Date                 Start                 End  #1                    2020/09/01 08:34:43 08:35:28   Build up started in L Hippo contacts 1, 2  >> 3, 4      then spread to L Hippo contacts 11, 12, 13 L Amygd contacts 11, 12, 13      then spread to L Entor contacts 11, 12, 13     Seizure Description   Seizure 1   08:34:43  Sz 1 start  08:34:58  Oral facial mvt  08:35:01  L forearm and leg mvt  08:35:16  L hand auttomatism  08:35:23  Oral facial mvt continues  08:35:28  Sz end    Seizure 2  d1 13:18:13  Sz 2 start d1 13:18:29  Head turn slightly to L  d1 13:18:33  Oral facial mvt  d1 13:18:41  Moving some in bed  d1 13:18:45  oral facial mvt continues  d1 13:18:55  Sz 2 stop  d1 13:18:57  Returns to looking at iPhone    Seizure 3  16:39:13  Lying back in bed - no mvt  16:39:20  Raises hed and slightly flexes knees  16:39:26  no response to nurse "are U OK?  16:39:30  Oral facial mvts  16:39:38  Patient Event  16:39:43  nurse telling him a code word, Purple elephant  16:39:48  Sz 3 stop  16:39:52  limb mvts stop  16:40:02  not responding to nurse  16:40:14  pt not following commands  A the 16:40:37  pt able to follow commands    Seizure 4     Nothing clinical     Seizure 5  2020/09/10  d1 10:38:13  Sz 5 Start  d1 10:38:13  Andrea Entor > Hippo  d1 10:38:14  Rhythmic activity PreFro & Anc  d1 10:38:16  Looking at iPhone  d1 10:38:17  raise head off bed  d1 10:38:19  Rhythic activity PreFr 11 12 13  d1 10:38:22  spikes start PoIns  d1 10:38:24  Moving L hand/index finger extended and waving  d1 " 10:38:27  Waving L hand  d1 10:38:33  emitting gutteral sound  d1 10:38:42  open mouth emiting cry  d1 10:38:43  both hands/arms extended tonically  d1 10:38:45  legs flexed then extended  d1 10:38:50  Tonic extension all limbs  d1 10:38:58  Clonic jering begins  d1 10:39:07  Gen jerking continues  d1 10:39:16  Gen jerking continues  d1 10:39:21  Jerking slows  d1 10:39:29 0:10.0 Sz 5 Stops      Impression  Four seizure originating from L [facility] and Entro areas   No seizures recorded 2020/09/02>03 (#3)  No seizures recorded 2020/09/03>04 (#4)  No seizures recorded 2020/09/04>05 (#5)  No seizures recorded 2020/09/05>06 (#6)  No seizures recorded 2020/09/06>07 (#7)  No seizures recorded 2020/09/07>08 (#8)  No seizures recorded 2020/09/08>09 (#9)  No seizures recorded 2020/09/08>09 (#9)  No seizures recorded 2020/09/09>10 (#10)  One seizure recorded 2020/09/10>11 (#10)    Seizures 5 recorded originating from L [facility] and Entro areas      BEBETO Marina MD  Professor Emeritus  Epilepsy Division

## 2020-09-11 NOTE — PROGRESS NOTES
[facility]  Neurocritical Care  Progress Note    Admit Date: 8/31/2020  Service Date: 09/11/2020  Length of Stay: 11    Subjective:     Chief Complaint: Epilepsy    History of Present Illness: The patient is a 25 year old M with PMHx of depression, seizures, epilepsy admitted to [facility] s/p placement of sEED electrodes bilateral. Per chart review he has failed at least 4 AEDs at therapeutic dosages. Triggers include being tired and being stressed. He is not working (on disability) but finished his GED. He lives next door to grandmother and nearby to his mother. His major concern is memory loss from ongoing seizures. NSGY and epilepsy following. Patient admitted to [facility] for close monitoring and higher level of care.     Hospital Course: 8/31/2020: patient admitted to [facility] s/p sEEG electrodes placement, NSGY and epilepsy following, SBP goal < 140, perepilepsy restarted all AEDs except for Vimpat  9/1/2020: 1 episode of seizure, Vimpat  On hold, Onfi and lamictal decreased, continue topamax  9/2/2020: No seizures this AM, epilepsy managing AEDs, started SQH  9/3/2020: No electrographic seizures in AM, Per epilepsy: If no seizures throughout day, plan to decrease Onfi to 10 mg BID (Dr. Abhijeet Do to change tonight if needed)  9/4/2020: d/c A-line  9/5/2020: NAEON  9/6/2020: MERLENE, plan for removal of sEEG on 9/9 09/07/2020 naeon  9/8/2020: NAEON, off AEDs. Likely bolt removal this Friday 9/9/2020: NAEON  9/10/2020: one tonic seizure today-epilepsy notified  9/11/2020  NAEON. No seizures over night. All AEDs on hold. Since admit has hadTotal 5 seizures from L hippocampus and entorhinal cortex areas. Tentative plan per NSGY to remove sEEG electrodes on Monday.    Interval History: NAEON. No seizures over night. All AEDs on hold. Since admit has hadTotal 5 seizures from L hippocampus and entorhinal cortex areas. Tentative plan per NSGY to remove sEEG electrodes on Monday.      Review of Systems:    Constitutional: Denies fevers, weight loss, chills, or weakness.  Eyes: Denies changes in vision.  ENT: Denies dysphagia, nasal discharge, ear pain or discharge.  Cardiovascular: Denies chest pain, palpitations, orthopnea, or claudication.  Respiratory: Denies shortness of breath, cough, hemoptysis, or wheezing.  GI: Denies nausea/vomitting, hematochezia, melena, abd pain, or changes in appetite.  : Denies dysuria, incontinence, or hematuria.  Musculoskeletal: Denies joint pain or myalgias.  Skin/breast: Denies rashes, lumps, lesions, or discharge.  Neurologic: Denies headache, dizziness, vertigo, or paresthesias.  Psychiatric: Denies changes in mood or hallucinations.  Endocrine: Denies polyuria, polydipsia, heat/cold intolerance.  Hematologic/Lymph: Denies lymphadenopathy, easy bruising or easy bleeding.  Allergic/Immunologic: Denies rash, rhinitis.       Vitals:   Temp: 98.1 °F (36.7 °C)  Pulse: 69  Rhythm: normal sinus rhythm  BP: 123/71  MAP (mmHg): 91  Resp: 19  SpO2: 98 %  O2 Device (Oxygen Therapy): room air    Temp  Min: 97.8 °F (36.6 °C)  Max: 99.2 °F (37.3 °C)  Pulse  Min: 56  Max: 130  BP  Min: 92/53  Max: 143/93  MAP (mmHg)  Min: 67  Max: 109  Resp  Min: 12  Max: 30  SpO2  Min: 95 %  Max: 100 %    09/10 0701 - 09/11 0700  In: 950 [P.O.:450]  Out: 1355 [Urine:1355]   Unmeasured Output  Urine Occurrence: 1  Stool Occurrence: 1     Examination:   Constitutional: Well-nourished and -developed. No apparent distress.   Eyes: Conjunctiva clear, anicteric. Lids no lesions.  Head/Ears/Nose/Mouth/Throat/Neck: Moist mucous membranes. External ears, nose atraumatic.   Cardiovascular: Regular rhythm. No murmurs. No leg edema.  Respiratory: Comfortable respirations. Clear to auscultation.  Gastrointestinal: No hernia. Soft, nondistended, nontender. + bowel sounds.  Skin: Head incisional dressing c/d/i. EEG monitoring attached    Neurologic:  -GCS [id number]  -Alert. Oriented to person, place, and time. Speech fluent.  Follows commands.  -Cranial nerves II-XII grossly intact, PERRL3+  -Motor 5/5 all extremities Dre spon. And antigravity  -Sensation bilat intact    Medications:   Continuous ScheduledceFAZolin (ANCEF) IVPB, 2 g, Q8H  citalopram, 20 mg, Daily  heparin (porcine), 5,000 Units, Q8H  senna-docusate 8.6-50 mg, 1 tablet, Daily    PRNacetaminophen, 650 mg, Q4H PRN  hydrALAZINE, 10 mg, Q4H PRN  labetaloL, 10 mg, Q4H PRN  magnesium oxide, 800 mg, PRN  magnesium oxide, 800 mg, PRN  oxyCODONE, 5 mg, Q6H PRN  potassium chloride, 40 mEq, PRN  potassium chloride, 40 mEq, PRN  potassium chloride, 40 mEq, PRN  potassium, sodium phosphates, 2 packet, PRN  potassium, sodium phosphates, 2 packet, PRN  potassium, sodium phosphates, 2 packet, PRN  promethazine (PHENERGAN) IVPB, 6.25 mg, Q8H PRN       Today I independently reviewed pertinent medications, lines/drains/airways, imaging, laboratory results, microbiology results, notably:     ISTAT: No results for input(s): PH, PCO2, PO2, POCSATURATED, HCO3, BE, POCNA, POCK, POCTCO2, POCGLU, POCICA, POCLAC, SAMPLE in the last 24 hours.   Chem:   Recent Labs   Lab 09/11/20  0322      K 4.1      CO2 21*   GLU 95   BUN 11   CREATININE 0.8   ESTGFRAFRICA >60.0   EGFRNONAA >60.0   CALCIUM 9.0   MG 2.1   PHOS 3.6   ANIONGAP 10   PROT 6.9   ALBUMIN 3.9   BILITOT 1.2*   ALKPHOS 96   AST 28   ALT 28     Heme:   Recent Labs   Lab 09/11/20  0322   WBC 9.20   HGB 14.9   HCT 39.8*        Endo:   Recent Labs   Lab 09/10/20  1136   POCTGLUCOSE 119*      Assessment/Plan:     Neuro  * Epilepsy  -8/31 s/p sEEG placement.  -NSGY and epilepsy following  9/11  No seizures over night. Total seizures since admit 5. Coming from L hippocampus and entorhinal cortex areas.  -cefazolin 2 q8 prophylaxis  -SBP goal <140  -seizure precautions  Off all AEDs.  Oxycodone 5mg prn moderate pain  -neuro checks q 1 hr  Ok for stationary bike activity.                  Psychiatric  Depression  - continue  citalopram 20mg daily          The patient is being Prophylaxed for:  Venous Thromboembolism with: Mechanical or Chemical  Stress Ulcer with: Not Applicable   Ventilator Pneumonia with: not applicable    Activity Orders          Diet Adult Regular (IDDSI Level 7): Regular starting at 09/09 0902        Full Code     I have spent 35 min with this patient, with over 50% of this time spent coordinating care and speaking with the family    Christiane Harrington NP  Neurocritical Care  [facility]

## 2020-09-12 LAB
ALBUMIN SERPL BCP-MCNC: 4.1 G/DL (ref 3.5–5.2)
ALP SERPL-CCNC: 91 U/L (ref 55–135)
ALT SERPL W/O P-5'-P-CCNC: 24 U/L (ref 10–44)
ANION GAP SERPL CALC-SCNC: 8 MMOL/L (ref 8–16)
AST SERPL-CCNC: 26 U/L (ref 10–40)
BASOPHILS # BLD AUTO: 0.03 K/UL (ref 0–0.2)
BASOPHILS NFR BLD: 0.5 % (ref 0–1.9)
BILIRUB SERPL-MCNC: 0.8 MG/DL (ref 0.1–1)
BUN SERPL-MCNC: 11 MG/DL (ref 6–20)
CALCIUM SERPL-MCNC: 9.1 MG/DL (ref 8.7–10.5)
CHLORIDE SERPL-SCNC: 102 MMOL/L (ref 95–110)
CO2 SERPL-SCNC: 27 MMOL/L (ref 23–29)
CREAT SERPL-MCNC: 0.8 MG/DL (ref 0.5–1.4)
DIFFERENTIAL METHOD: ABNORMAL
EOSINOPHIL # BLD AUTO: 0.1 K/UL (ref 0–0.5)
EOSINOPHIL NFR BLD: 1 % (ref 0–8)
ERYTHROCYTE [DISTWIDTH] IN BLOOD BY AUTOMATED COUNT: 11.6 % (ref 11.5–14.5)
EST. GFR  (AFRICAN AMERICAN): >60 ML/MIN/1.73 M^2
EST. GFR  (NON AFRICAN AMERICAN): >60 ML/MIN/1.73 M^2
GLUCOSE SERPL-MCNC: 95 MG/DL (ref 70–110)
HCT VFR BLD AUTO: 40.2 % (ref 40–54)
HGB BLD-MCNC: 14.4 G/DL (ref 14–18)
IMM GRANULOCYTES # BLD AUTO: 0.03 K/UL (ref 0–0.04)
IMM GRANULOCYTES NFR BLD AUTO: 0.5 % (ref 0–0.5)
LYMPHOCYTES # BLD AUTO: 1.6 K/UL (ref 1–4.8)
LYMPHOCYTES NFR BLD: 27.2 % (ref 18–48)
MAGNESIUM SERPL-MCNC: 2 MG/DL (ref 1.6–2.6)
MCH RBC QN AUTO: 31.6 PG (ref 27–31)
MCHC RBC AUTO-ENTMCNC: 35.8 G/DL (ref 32–36)
MCV RBC AUTO: 88 FL (ref 82–98)
MONOCYTES # BLD AUTO: 0.7 K/UL (ref 0.3–1)
MONOCYTES NFR BLD: 11.2 % (ref 4–15)
NEUTROPHILS # BLD AUTO: 3.5 K/UL (ref 1.8–7.7)
NEUTROPHILS NFR BLD: 59.6 % (ref 38–73)
NRBC BLD-RTO: 0 /100 WBC
PHOSPHATE SERPL-MCNC: 3.7 MG/DL (ref 2.7–4.5)
PLATELET # BLD AUTO: 230 K/UL (ref 150–350)
PMV BLD AUTO: 9.8 FL (ref 9.2–12.9)
POTASSIUM SERPL-SCNC: 3.5 MMOL/L (ref 3.5–5.1)
PROT SERPL-MCNC: 6.7 G/DL (ref 6–8.4)
RBC # BLD AUTO: 4.55 M/UL (ref 4.6–6.2)
SODIUM SERPL-SCNC: 137 MMOL/L (ref 136–145)
WBC # BLD AUTO: 5.89 K/UL (ref 3.9–12.7)

## 2020-09-12 PROCEDURE — 84100 ASSAY OF PHOSPHORUS: CPT

## 2020-09-12 PROCEDURE — 25000003 PHARM REV CODE 250: Performed by: NURSE PRACTITIONER

## 2020-09-12 PROCEDURE — 99233 PR SUBSEQUENT HOSPITAL CARE,LEVL III: ICD-10-PCS | Mod: ,,, | Performed by: PSYCHIATRY & NEUROLOGY

## 2020-09-12 PROCEDURE — 95720 EEG PHY/QHP EA INCR W/VEEG: CPT | Mod: ,,, | Performed by: PSYCHIATRY & NEUROLOGY

## 2020-09-12 PROCEDURE — 99233 SBSQ HOSP IP/OBS HIGH 50: CPT | Mod: ,,, | Performed by: NURSE PRACTITIONER

## 2020-09-12 PROCEDURE — 20000000 HC ICU ROOM

## 2020-09-12 PROCEDURE — 95720 PR EEG, W/VIDEO, CONT RECORD, I&R, >12<26 HRS: ICD-10-PCS | Mod: ,,, | Performed by: PSYCHIATRY & NEUROLOGY

## 2020-09-12 PROCEDURE — 99233 SBSQ HOSP IP/OBS HIGH 50: CPT | Mod: ,,, | Performed by: PSYCHIATRY & NEUROLOGY

## 2020-09-12 PROCEDURE — 95714 VEEG EA 12-26 HR UNMNTR: CPT

## 2020-09-12 PROCEDURE — 94761 N-INVAS EAR/PLS OXIMETRY MLT: CPT

## 2020-09-12 PROCEDURE — 83735 ASSAY OF MAGNESIUM: CPT

## 2020-09-12 PROCEDURE — 63600175 PHARM REV CODE 636 W HCPCS: Performed by: PHYSICIAN ASSISTANT

## 2020-09-12 PROCEDURE — 80053 COMPREHEN METABOLIC PANEL: CPT

## 2020-09-12 PROCEDURE — 63600175 PHARM REV CODE 636 W HCPCS: Performed by: STUDENT IN AN ORGANIZED HEALTH CARE EDUCATION/TRAINING PROGRAM

## 2020-09-12 PROCEDURE — 85025 COMPLETE CBC W/AUTO DIFF WBC: CPT

## 2020-09-12 PROCEDURE — 99233 PR SUBSEQUENT HOSPITAL CARE,LEVL III: ICD-10-PCS | Mod: ,,, | Performed by: NURSE PRACTITIONER

## 2020-09-12 RX ADMIN — CEFAZOLIN 2 G: 1 INJECTION, POWDER, FOR SOLUTION INTRAMUSCULAR; INTRAVENOUS at 02:09

## 2020-09-12 RX ADMIN — CEFAZOLIN 2 G: 1 INJECTION, POWDER, FOR SOLUTION INTRAMUSCULAR; INTRAVENOUS at 09:09

## 2020-09-12 RX ADMIN — HEPARIN SODIUM 5000 UNITS: 5000 INJECTION INTRAVENOUS; SUBCUTANEOUS at 09:09

## 2020-09-12 RX ADMIN — HEPARIN SODIUM 5000 UNITS: 5000 INJECTION INTRAVENOUS; SUBCUTANEOUS at 02:09

## 2020-09-12 RX ADMIN — HEPARIN SODIUM 5000 UNITS: 5000 INJECTION INTRAVENOUS; SUBCUTANEOUS at 05:09

## 2020-09-12 RX ADMIN — CITALOPRAM HYDROBROMIDE 20 MG: 10 TABLET ORAL at 09:09

## 2020-09-12 RX ADMIN — POTASSIUM CHLORIDE 40 MEQ: 1.5 POWDER, FOR SOLUTION ORAL at 09:09

## 2020-09-12 RX ADMIN — CEFAZOLIN 2 G: 1 INJECTION, POWDER, FOR SOLUTION INTRAMUSCULAR; INTRAVENOUS at 05:09

## 2020-09-12 NOTE — ASSESSMENT & PLAN NOTE
-8/31 s/p sEEG placement.  -NSGY and epilepsy following  9/11  No seizures over night. Total seizures since admit 6. Coming from L hippocampus and entorhinal cortex areas.  9/12 mid morning GTC epilepsy aware, if another one may consider vimpat, call epilepsy  -cefazolin 2 q8 prophylaxis  -SBP goal <140  -seizure precautions  Off all AEDs.  Oxycodone 5mg prn moderate pain  -neuro checks q 1 hr  Ok for stationary bike activity.                 Form received from: [facility]    Form requesting following info/need: WIC    SPENCER needed?: No    Location of form: [place] / [place]    When completed the route for return: Fax [phone]

## 2020-09-12 NOTE — PLAN OF CARE
[facility] Care Plan    POC reviewed with Darin Cunha . Pt verbalized understanding. Questions and concerns addressed. Pt experienced 1 GTC seizure, back to baseline. sEEG leads intact and remain in place, plan to remove Monday. Medications given per MD orders. Pt has poor appetite, seemingly melancholic mood. Denied exercersing on recumbent bike ride today. IV replaced. VSS. Pt progressing toward goals. Will continue to monitor. See below and flowsheets for full assessment and VS info.       Neuro:  [place] Coma Scale  Best Eye Response: 4-->(E4) spontaneous  Best Motor Response: 6-->(M6) obeys commands  Best Verbal Response: 5-->(V5) oriented  [place] Coma Scale Score: 15  Assessment Qualifiers: patient not sedated/intubated, no eye obstruction present  Pupil PERRLA: yes     24 hr Temp:  [97.7 °F (36.5 °C)-99.1 °F (37.3 °C)]     CV:   Rhythm: sinus tachycardia  BP goals:   SBP < 140  MAP > 65    Resp:   O2 Device (Oxygen Therapy): room air  Oxygen Concentration (%): 100    Plan: N/A continue to monitor for seizure activity, remove sEEG leads Monday    GI/:  STEPHENIE Total Score: 20  Diet/Nutrition Received: consistent carb/diabetic diet  Last Bowel Movement: 09/11/20  Voiding Characteristics: voids spontaneously without difficulty    Intake/Output Summary (Last 24 hours) at 9/12/2020 1550  Last data filed at 9/12/2020 1505  Gross per 24 hour   Intake 810 ml   Output 875 ml   Net -65 ml     Unmeasured Output  Urine Occurrence: 1  Stool Occurrence: 0  Pad Count: 1    Labs/Accuchecks:  Recent Labs   Lab 09/12/20  0441   WBC 5.89   RBC 4.55*   HGB 14.4   HCT 40.2         Recent Labs   Lab 09/12/20  0441      K 3.5   CO2 27      BUN 11   CREATININE 0.8   ALKPHOS 91   ALT 24   AST 26   BILITOT 0.8      Recent Labs   Lab 09/09/20  0112   INR 1.0   APTT 31.4    No results for input(s): CPK, CPKMB, TROPONINI, MB in the last 168 hours.    Electrolytes: Electrolytes replaced  Accuchecks: none    Gtts:v none        LDA/Wounds:  Lines/Drains/Airways       Peripheral Intravenous Line                   Peripheral IV - Single Lumen 09/09/20 2134 20 G Anterior;Left Forearm 2 days         Peripheral IV - Single Lumen 09/12/20 1458 20 G Right Hand less than 1 day                  Wounds: No  Wound care consulted: No

## 2020-09-12 NOTE — PROGRESS NOTES
[facility]  Neurocritical Care  Progress Note    Admit Date: 8/31/2020  Service Date: 09/12/2020  Length of Stay: 12    Subjective:     Chief Complaint: Epilepsy    History of Present Illness: The patient is a 25 year old M with PMHx of depression, seizures, epilepsy admitted to [facility] s/p placement of sEED electrodes bilateral. Per chart review he has failed at least 4 AEDs at therapeutic dosages. Triggers include being tired and being stressed. He is not working (on disability) but finished his GED. He lives next door to grandmother and nearby to his mother. His major concern is memory loss from ongoing seizures. NSGY and epilepsy following. Patient admitted to [facility] for close monitoring and higher level of care.     Hospital Course: 8/31/2020: patient admitted to [facility] s/p sEEG electrodes placement, NSGY and epilepsy following, SBP goal < 140, perepilepsy restarted all AEDs except for Vimpat  9/1/2020: 1 episode of seizure, Vimpat  On hold, Onfi and lamictal decreased, continue topamax  9/2/2020: No seizures this AM, epilepsy managing AEDs, started SQH  9/3/2020: No electrographic seizures in AM, Per epilepsy: If no seizures throughout day, plan to decrease Onfi to 10 mg BID (Dr. Abhijeet Do to change tonight if needed)  9/4/2020: d/c A-line  9/5/2020: NAEON  9/6/2020: NABHARATHI, plan for removal of sEEG on 9/9 09/07/2020 naeon  9/8/2020: NAEON, off AEDs. Likely bolt removal this Friday 9/9/2020: NAEON  9/10/2020: one tonic seizure today-epilepsy notified  9/11/2020  NAEON. No seizures over night. All AEDs on hold. Since admit has hadTotal 5 seizures from L hippocampus and entorhinal cortex areas. Tentative plan per NSGY to remove sEEG electrodes on Monday.  9/12 NAEON. Mid morning had one GTC seizure, epilepsy notified. Continue to hold AEDs, no ativan, if another GTC in 12h may consider vimpat dose.    Interval History: NAEON. Mid morning had one GTC seizure, epilepsy notified. Continue to hold AEDs,  no ativan, if another GTC in 12h may consider vimpat dose.      Review of Systems:   Constitutional: Denies fevers, weight loss, chills, or weakness.  Eyes: Denies changes in vision.  ENT: Denies dysphagia, nasal discharge, ear pain or discharge.  Cardiovascular: Denies chest pain, palpitations, orthopnea, or claudication.  Respiratory: Denies shortness of breath, cough, hemoptysis, or wheezing.  GI: Denies nausea/vomitting, hematochezia, melena, abd pain, or changes in appetite.  : Denies dysuria, incontinence, or hematuria.  Musculoskeletal: Denies joint pain or myalgias.  Skin/breast: Denies rashes, lumps, lesions, or discharge.  Neurologic: Denies headache, dizziness, vertigo, or paresthesias.  Psychiatric: Denies changes in mood or hallucinations.  Endocrine: Denies polyuria, polydipsia, heat/cold intolerance.  Hematologic/Lymph: Denies lymphadenopathy, easy bruising or easy bleeding.  Allergic/Immunologic: Denies rash, rhinitis.        Vitals:   Temp: 98 °F (36.7 °C)  Pulse: 75  Rhythm: sinus tachycardia  BP: 112/69  MAP (mmHg): 83  Resp: 12  SpO2: 96 %  Oxygen Concentration (%): 100  O2 Device (Oxygen Therapy): room air    Temp  Min: 97.7 °F (36.5 °C)  Max: 99.1 °F (37.3 °C)  Pulse  Min: 50  Max: 120  BP  Min: 99/55  Max: 142/77  MAP (mmHg)  Min: 70  Max: 103  Resp  Min: 10  Max: 27  SpO2  Min: 67 %  Max: 100 %  Oxygen Concentration (%)  Min: 100  Max: 100    09/11 0701 - 09/12 0700  In: 870 [P.O.:750; I.V.:120]  Out: 875 [Urine:875]   Unmeasured Output  Urine Occurrence: 1  Stool Occurrence: 0  Pad Count: 1     Examination:   Constitutional: Well-nourished and -developed. No apparent distress.   Eyes: Conjunctiva clear, anicteric. Lids no lesions.  Head/Ears/Nose/Mouth/Throat/Neck: Moist mucous membranes. External ears, nose atraumatic.   Cardiovascular: Regular rhythm. No murmurs. No leg edema.  Respiratory: Comfortable respirations. Clear to auscultation.  Gastrointestinal: No hernia. Soft, nondistended,  nontender. + bowel sounds.  Skin: Head incisional dressing c/d/i. EEG monitoring attached     Neurologic:  -GCS [id number]  -Alert. Oriented to person, place, and time. Speech fluent. Follows commands.  -Cranial nerves II-XII grossly intact, PERRL3+  -Motor 5/5 all extremities Dre spon. And antigravity  -Sensation bilat intact     Medications:   Continuous ScheduledceFAZolin (ANCEF) IVPB, 2 g, Q8H  citalopram, 20 mg, Daily  heparin (porcine), 5,000 Units, Q8H  senna-docusate 8.6-50 mg, 1 tablet, Daily    PRNacetaminophen, 650 mg, Q4H PRN  hydrALAZINE, 10 mg, Q4H PRN  labetaloL, 10 mg, Q4H PRN  magnesium oxide, 800 mg, PRN  magnesium oxide, 800 mg, PRN  oxyCODONE, 5 mg, Q6H PRN  potassium chloride, 40 mEq, PRN  potassium chloride, 40 mEq, PRN  potassium chloride, 40 mEq, PRN  potassium, sodium phosphates, 2 packet, PRN  potassium, sodium phosphates, 2 packet, PRN  potassium, sodium phosphates, 2 packet, PRN  promethazine (PHENERGAN) IVPB, 6.25 mg, Q8H PRN       Today I independently reviewed pertinent medications, lines/drains/airways, imaging, laboratory results, microbiology results, notably:     ISTAT: No results for input(s): PH, PCO2, PO2, POCSATURATED, HCO3, BE, POCNA, POCK, POCTCO2, POCGLU, POCICA, POCLAC, SAMPLE in the last 24 hours.   Chem:   Recent Labs   Lab 09/12/20  0441      K 3.5      CO2 27   GLU 95   BUN 11   CREATININE 0.8   ESTGFRAFRICA >60.0   EGFRNONAA >60.0   CALCIUM 9.1   MG 2.0   PHOS 3.7   ANIONGAP 8   PROT 6.7   ALBUMIN 4.1   BILITOT 0.8   ALKPHOS 91   AST 26   ALT 24     Heme:   Recent Labs   Lab 09/12/20  0441   WBC 5.89   HGB 14.4   HCT 40.2        Endo: No results for input(s): POCTGLUCOSE in the last 24 hours.   Assessment/Plan:     Neuro  * Epilepsy  -8/31 s/p sEEG placement.  -NSGY and epilepsy following  9/11  No seizures over night. Total seizures since admit 6. Coming from L hippocampus and entorhinal cortex areas.  9/12 mid morning GTC epilepsy aware, if another  one may consider vimpat, call epilepsy  -cefazolin 2 q8 prophylaxis  -SBP goal <140  -seizure precautions  Off all AEDs.  Oxycodone 5mg prn moderate pain  -neuro checks q 1 hr  Ok for stationary bike activity.                  Psychiatric  Depression  - continue citalopram 20mg daily          The patient is being Prophylaxed for:  Venous Thromboembolism with: Mechanical or Chemical  Stress Ulcer with: Not Applicable   Ventilator Pneumonia with: not applicable    Activity Orders          Diet Adult Regular (IDDSI Level 7): Regular starting at 09/09 0902        Full Code     I have spent 35 min with this patient, with over 50% of this time spent coordinating care and speaking with the family    Christiane Harrington NP  Neurocritical Care  [facility]

## 2020-09-12 NOTE — ASSESSMENT & PLAN NOTE
25M with a PMHx of intractable epilepsy s/p VNS placement on Onfi 40 mg BID, Lamictal 200 mg BID, Vimpat 200 mg BID, and Topamax 50 mg BID s/p bilateral sEEG placement with Dr. Melani Ortiz on 8/31. Postop CTH stable.    Recommendations:  - Continuous vEEG monitoring  - 6 electrographic seizures from L hippo and entro areas since admit  - Vimpat held on admit 8/31, Lamictal weaned and stopped 9/6, Onfi weaned and stopped 9/8, Topamax stopped 9/8  - Please call epilepsy on call prior to administering benzodiazepines   - If patient has more than one GTC, can consider giving IV Vimpat 100 mgx1  - Neurosurgery following; tentative plan for sEEG removal Monday   - Seizure precautions  - Avoid agents that lower seizure thresold      Plan of care discussed with patient and NCC team. Will follow. Please call with questions.

## 2020-09-12 NOTE — SUBJECTIVE & OBJECTIVE
Interval History:     9/12: AF, VSS. No seizures yesterday, 5 total since sEEG placement. Will plan for tentative OR Monday for sEEG removal.     Medications:  Continuous Infusions:  Scheduled Meds:   ceFAZolin (ANCEF) IVPB  2 g Intravenous Q8H    citalopram  20 mg Oral Daily    heparin (porcine)  5,000 Units Subcutaneous Q8H    senna-docusate 8.6-50 mg  1 tablet Oral Daily     PRN Meds:acetaminophen, hydrALAZINE, labetaloL, magnesium oxide, magnesium oxide, oxyCODONE, potassium chloride, potassium chloride, potassium chloride, potassium, sodium phosphates, potassium, sodium phosphates, potassium, sodium phosphates, promethazine (PHENERGAN) IVPB     Review of Systems    Objective:     Weight: 78.5 kg (173 lb 1 oz)  Body mass index is 22.83 kg/m².  Vital Signs (Most Recent):  Temp: 97.7 °F (36.5 °C) (09/12/20 0705)  Pulse: (!) 50 (09/12/20 0805)  Resp: 18 (09/12/20 0805)  BP: (!) 105/56 (09/12/20 0805)  SpO2: 97 % (09/12/20 0805) Vital Signs (24h Range):  Temp:  [97.7 °F (36.5 °C)-99.1 °F (37.3 °C)] 97.7 °F (36.5 °C)  Pulse:  [50-96] 50  Resp:  [10-25] 18  SpO2:  [96 %-100 %] 97 %  BP: (102-142)/(55-82) 105/56     Date 09/12/20 0700 - 09/13/20 0659   Shift 2382-0908 9315-2005 9176-6393 24 Hour Total   INTAKE   I.V.(mL/kg) 10(0.1)   10(0.1)   Shift Total(mL/kg) 10(0.1)   10(0.1)   OUTPUT   Shift Total(mL/kg)       Weight (kg) 78.5 78.5 78.5 78.5                        Neurosurgery Physical Exam    GCS 15  AOx3  PERRL  CN intact  Full strength x4      Significant Labs:  Recent Labs   Lab 09/10/20  1338 09/11/20  0322 09/12/20  0441   GLU  --  95 95   NA  --  137 137   K 5.1 4.1 3.5   CL  --  106 102   CO2  --  21* 27   BUN  --  11 11   CREATININE  --  0.8 0.8   CALCIUM  --  9.0 9.1   MG  --  2.1 2.0     Recent Labs   Lab 09/11/20  0322 09/12/20  0441   WBC 9.20 5.89   HGB 14.9 14.4   HCT 39.8* 40.2    230     No results for input(s): LABPT, INR, APTT in the last 48 hours.  Microbiology Results (last 7 days)      ** No results found for the last 168 hours. **        All pertinent labs from the last 24 hours have been reviewed.    Significant Diagnostics:  I have reviewed all pertinent imaging results/findings within the past 24 hours.

## 2020-09-12 NOTE — PROGRESS NOTES
[facility]  Neurosurgery  Progress Note    Subjective:     History of Present Illness: Darin Cunha is a 25 y.o. male with intractable epilepsy since 2013 who presents as a referral from Dr. Abhijeet Do to discuss the gamut of epilepsy surgery options. Today's visit is a video visit, and the patient is a passenger in a moving vehicle. His mother is not with him.      The patient states that when he has events, he stares off and smacks his lips. The episodes last for about 5-6 minutes. He often waves his hands and feels tired afterwards. He currently has about 3-5 episodes a week. He has failed at least 4 AEDs at therapeutic dosages.      Per EEG review, he has bilateral temporal involvement, moreso left-sided than right.      Triggers include being tired and being stressed. He is not working (on disability) but finished his GED. He lives next door to grandmother and nearby to his mother. His major concern is memory loss from ongoing seizures.      Of notes, he endorses a history of staph infection previously. He denies history of bleeding or anesthetic complications. He has a VNS in place.        Post-Op Info:  Procedure(s) (LRB):  CREATION, CRANIAL YUNG HOLE, PLACEMENT OF THE FIDUCIAL SCREWS BILATERAL 2.PLACEMENT OF SEEG ELECTRODES BILATERAL WITH OWEN ROBOT (Bilateral)   12 Days Post-Op     Interval History:     9/12: AF, VSS. No seizures yesterday, 5 total since sEEG placement. Will plan for tentative OR Monday for sEEG removal.     Medications:  Continuous Infusions:  Scheduled Meds:   ceFAZolin (ANCEF) IVPB  2 g Intravenous Q8H    citalopram  20 mg Oral Daily    heparin (porcine)  5,000 Units Subcutaneous Q8H    senna-docusate 8.6-50 mg  1 tablet Oral Daily     PRN Meds:acetaminophen, hydrALAZINE, labetaloL, magnesium oxide, magnesium oxide, oxyCODONE, potassium chloride, potassium chloride, potassium chloride, potassium, sodium phosphates, potassium, sodium phosphates, potassium, sodium  phosphates, promethazine (PHENERGAN) IVPB     Review of Systems    Objective:     Weight: 78.5 kg (173 lb 1 oz)  Body mass index is 22.83 kg/m².  Vital Signs (Most Recent):  Temp: 97.7 °F (36.5 °C) (09/12/20 0705)  Pulse: (!) 50 (09/12/20 0805)  Resp: 18 (09/12/20 0805)  BP: (!) 105/56 (09/12/20 0805)  SpO2: 97 % (09/12/20 0805) Vital Signs (24h Range):  Temp:  [97.7 °F (36.5 °C)-99.1 °F (37.3 °C)] 97.7 °F (36.5 °C)  Pulse:  [50-96] 50  Resp:  [10-25] 18  SpO2:  [96 %-100 %] 97 %  BP: (102-142)/(55-82) 105/56     Date 09/12/20 0700 - 09/13/20 0659   Shift 6342-8445 1229-6290 3090-6512 24 Hour Total   INTAKE   I.V.(mL/kg) 10(0.1)   10(0.1)   Shift Total(mL/kg) 10(0.1)   10(0.1)   OUTPUT   Shift Total(mL/kg)       Weight (kg) 78.5 78.5 78.5 78.5                        Neurosurgery Physical Exam    GCS 15  AOx3  PERRL  CN intact  Full strength x4      Significant Labs:  Recent Labs   Lab 09/10/20  1338 09/11/20 0322 09/12/20  0441   GLU  --  95 95   NA  --  137 137   K 5.1 4.1 3.5   CL  --  106 102   CO2  --  21* 27   BUN  --  11 11   CREATININE  --  0.8 0.8   CALCIUM  --  9.0 9.1   MG  --  2.1 2.0     Recent Labs   Lab 09/11/20  0322 09/12/20  0441   WBC 9.20 5.89   HGB 14.9 14.4   HCT 39.8* 40.2    230     No results for input(s): LABPT, INR, APTT in the last 48 hours.  Microbiology Results (last 7 days)     ** No results found for the last 168 hours. **        All pertinent labs from the last 24 hours have been reviewed.    Significant Diagnostics:  I have reviewed all pertinent imaging results/findings within the past 24 hours.    Assessment/Plan:     * Epilepsy  25 M with intractable epilepsy s/p multiple failed AEDs and VNS placement now s/p sEEG lead placement:      Admitted to [facility]  q1h neurochecks  sEEG leads in place, abx while in place, continue headwrap  Weaned off of all antiepileptics  Bed rest at all times  SBP<140  No HOB restrictions  Ok for recumbent bike in bed  Ok for diet  Voiding  spontaneously s/p michelle removal  Post operative CTH reviewed - expected post op changes  Monitor for seizure activity - no benzos or sedating meds unless cleared by Epilepsy team  AEDs per Epilepsy  Please page w/exam change  Tentative sEEG bolt removal on 9/14, booked, consented, will obtain pre-op.     Dispo: continue ICU care        Wing Huynh MD  Neurosurgery  [facility]

## 2020-09-12 NOTE — ASSESSMENT & PLAN NOTE
25 M with intractable epilepsy s/p multiple failed AEDs and VNS placement now s/p sEEG lead placement:      Admitted to [facility]  q1h neurochecks  sEEG leads in place, abx while in place, continue headwrap  Weaned off of all antiepileptics  Bed rest at all times  SBP<140  No HOB restrictions  Ok for recumbent bike in bed  Ok for diet  Voiding spontaneously s/p michelle removal  Post operative CTH reviewed - expected post op changes  Monitor for seizure activity - no benzos or sedating meds unless cleared by Epilepsy team  AEDs per Epilepsy  Please page w/exam change  Tentative sEEG bolt removal on 9/14, booked, consented, will obtain pre-op.     Dispo: continue ICU care

## 2020-09-12 NOTE — SUBJECTIVE & OBJECTIVE
Interval History: One clinical and electrographic seizure 9/12 am around 10:10, progressed to generalized tonic clonic convulsion, nursing in room with patient during event. Evaluated afterwards and back to baseline, some fatigue noted. Continue close monitoring.    Current Facility-Administered Medications   Medication Dose Route Frequency Provider Last Rate Last Dose    acetaminophen tablet 650 mg  650 mg Oral Q4H PRN Georgie Miinea, NP   325 mg at 09/10/20 2106    ceFAZolin injection 2 g  2 g Intravenous Q8H Ethan Owen MD   2 g at 09/12/20 0557    citalopram tablet 20 mg  20 mg Oral Daily Georgie Miinea, NP   20 mg at 09/12/20 0900    heparin (porcine) injection 5,000 Units  5,000 Units Subcutaneous Q8H Carito Amezquita PA-C   5,000 Units at 09/12/20 0557    hydrALAZINE injection 10 mg  10 mg Intravenous Q4H PRN Georgie Miinea, NP   10 mg at 08/31/20 1601    labetaloL injection 10 mg  10 mg Intravenous Q4H PRN Georgie Miinea, NP        magnesium oxide tablet 800 mg  800 mg Oral PRN Melissa Flores NP        magnesium oxide tablet 800 mg  800 mg Oral PRN Melissa Flores NP        oxyCODONE immediate release tablet 5 mg  5 mg Oral Q6H PRN Georgie Mithomas, NP   5 mg at 09/02/20 1914    potassium chloride packet 40 mEq  40 mEq Oral PRN Melissa Flores, NP   40 mEq at 09/12/20 0900    potassium chloride packet 40 mEq  40 mEq Oral PRN Melissa Flores NP   40 mEq at 09/10/20 0933    potassium chloride packet 40 mEq  40 mEq Oral PRN Melissasony Flores NP        potassium, sodium phosphates 280-160-250 mg packet 2 packet  2 packet Oral PRN Melissa Flores NP        potassium, sodium phosphates 280-160-250 mg packet 2 packet  2 packet Oral PRN Melissa Flores, NP        potassium, sodium phosphates 280-160-250 mg packet 2 packet  2 packet Oral PRN Melissasony Flores NP        promethazine (PHENERGAN) 6.25 mg in dextrose 5 % 50 mL IVPB  6.25 mg Intravenous Q8H PRN Georgie Miinea, NP  150 mL/hr at 08/31/20 1730 6.25 mg at 08/31/20 1730    senna-docusate 8.6-50 mg per tablet 1 tablet  1 tablet Oral Daily Georgie Hoyos NP   Stopped at 09/08/20 0900     Continuous Infusions:    Review of Systems   Constitutional: Negative for chills, diaphoresis, fatigue and fever.   Respiratory: Negative for cough, shortness of breath and wheezing.    Cardiovascular: Negative for chest pain and palpitations.   Gastrointestinal: Negative for abdominal pain, diarrhea, nausea and vomiting.   Musculoskeletal: Negative for arthralgias and myalgias.   Neurological: Positive for seizures. Negative for dizziness, syncope, speech difficulty, weakness and headaches.   Psychiatric/Behavioral: Negative for behavioral problems, confusion and sleep disturbance. The patient is not nervous/anxious.      Objective:     Vital Signs (Most Recent):  Temp: 98 °F (36.7 °C) (09/12/20 1105)  Pulse: 104 (09/12/20 1105)  Resp: 18 (09/12/20 1105)  BP: 120/69 (09/12/20 1105)  SpO2: 95 % (09/12/20 1105) Vital Signs (24h Range):  Temp:  [97.7 °F (36.5 °C)-99.1 °F (37.3 °C)] 98 °F (36.7 °C)  Pulse:  [] 104  Resp:  [10-27] 18  SpO2:  [67 %-100 %] 95 %  BP: ()/(55-82) 120/69     Weight: 78.5 kg (173 lb 1 oz)  Body mass index is 22.83 kg/m².    Physical Exam  Constitutional:       General: He is not in acute distress.     Appearance: Normal appearance. He is not diaphoretic.   HENT:      Head: Normocephalic.      Comments: sEEG in place  Eyes:      General: No scleral icterus.        Right eye: No discharge.         Left eye: No discharge.      Extraocular Movements: EOM normal.      Conjunctiva/sclera: Conjunctivae normal.      Pupils: Pupils are equal, round, and reactive to light.   Cardiovascular:      Rate and Rhythm: Normal rate.   Pulmonary:      Effort: Pulmonary effort is normal. No respiratory distress.   Abdominal:      General: There is no distension.      Palpations: Abdomen is soft.      Tenderness: There is no abdominal  tenderness.   Musculoskeletal: Normal range of motion.         General: No deformity or signs of injury.   Skin:     General: Skin is warm and dry.   Neurological:      General: No focal deficit present.      Mental Status: He is alert and oriented to person, place, and time. Mental status is at baseline.      Coordination: Finger-Nose-Finger Test normal.   Psychiatric:         Mood and Affect: Mood normal.         Speech: Speech normal.         Behavior: Behavior normal.         Thought Content: Thought content normal.         NEUROLOGICAL EXAMINATION:     MENTAL STATUS   Oriented to person, place, and time.   Speech: speech is normal   Level of consciousness: alert    CRANIAL NERVES     CN III, IV, VI   Pupils are equal, round, and reactive to light.  Extraocular motions are normal.     CN VII   Facial expression full, symmetric.     CN VIII   Hearing: intact    CN XI   CN XI normal.     CN XII   CN XII normal.     GAIT AND COORDINATION      Coordination   Finger to nose coordination: normal      Significant Labs: All pertinent lab results from the past 24 hours have been reviewed.    Significant Studies: I have reviewed all pertinent imaging results/findings within the past 24 hours.

## 2020-09-12 NOTE — PROGRESS NOTES
[facility]  Neurology-Epilepsy  Progress Note    Patient Name: Darin Cunha  MRN: [id number]  Admission Date: 8/31/2020  Hospital Length of Stay: 12 days  Code Status: Full Code   Attending Provider: Melani Ortiz MD  Primary Care Physician: Primary Doctor No   Principal Problem:Epilepsy    Subjective:     Hospital Course:   8/31: sEEG placement. Hold Vimpat. Continue other AEDs: Onfi 40 mg BID, Lamictal 200 mg BID, Topamax 50 mg BID.  8/31>9/1: No clinical or electrographic seizures overnight  9/1>9/2: Four electrographic seizures (3 with clinical correlate) originating from L hippo and entro areas  9/2>9/3: No electrographic seizures  9/3>9/4: No electrographic seizures  9/4>9/5: No electrographic seizures  9/5>9/6: No electrographic seizures  9/6>9/7: No electrographic seizures  9/7>9/8: No electrographic seizures  9/8>9/9: No electrographic seizures  9/9>9/10: No electrographic seizures  9/10>9/11: One clinical and electrographic seizure originating from L Hippo and Entro areas  9/11>9/12: One clinical and electrographic seizure so far 9/12, progressed to GTC    Interval History: One clinical and electrographic seizure 9/12 am around 10:10, progressed to generalized tonic clonic convulsion, nursing in room with patient during event. Evaluated afterwards and back to baseline, some fatigue noted. Continue close monitoring.    Current Facility-Administered Medications   Medication Dose Route Frequency Provider Last Rate Last Dose    acetaminophen tablet 650 mg  650 mg Oral Q4H PRN Georgie Hooys NP   325 mg at 09/10/20 2106    ceFAZolin injection 2 g  2 g Intravenous Q8H Ethan Owen MD   2 g at 09/12/20 0557    citalopram tablet 20 mg  20 mg Oral Daily Georgie Hoyos NP   20 mg at 09/12/20 0900    heparin (porcine) injection 5,000 Units  5,000 Units Subcutaneous Q8H Carito Amezquita PA-C   5,000 Units at 09/12/20 0557    hydrALAZINE injection 10 mg  10 mg Intravenous Q4H PRN Georgie Hoyos NP    10 mg at 08/31/20 1601    labetaloL injection 10 mg  10 mg Intravenous Q4H PRN Georgie Hoyos NP        magnesium oxide tablet 800 mg  800 mg Oral PRN Melissa Flores NP        magnesium oxide tablet 800 mg  800 mg Oral PRN Melissa Flores NP        oxyCODONE immediate release tablet 5 mg  5 mg Oral Q6H PRN Georgie Hoyos NP   5 mg at 09/02/20 1914    potassium chloride packet 40 mEq  40 mEq Oral PRN Melissa Flores NP   40 mEq at 09/12/20 0900    potassium chloride packet 40 mEq  40 mEq Oral PRN Melissa Flores NP   40 mEq at 09/10/20 0933    potassium chloride packet 40 mEq  40 mEq Oral PRN Melissa Flores NP        potassium, sodium phosphates 280-160-250 mg packet 2 packet  2 packet Oral PRN Melissa Flores NP        potassium, sodium phosphates 280-160-250 mg packet 2 packet  2 packet Oral PRN Melissa Flores NP        potassium, sodium phosphates 280-160-250 mg packet 2 packet  2 packet Oral PRN Melissa Flores NP        promethazine (PHENERGAN) 6.25 mg in dextrose 5 % 50 mL IVPB  6.25 mg Intravenous Q8H PRN Georgie Hoyos  mL/hr at 08/31/20 1730 6.25 mg at 08/31/20 1730    senna-docusate 8.6-50 mg per tablet 1 tablet  1 tablet Oral Daily Georgie Hoyos NP   Stopped at 09/08/20 0900     Continuous Infusions:    Review of Systems   Constitutional: Negative for chills, diaphoresis, fatigue and fever.   Respiratory: Negative for cough, shortness of breath and wheezing.    Cardiovascular: Negative for chest pain and palpitations.   Gastrointestinal: Negative for abdominal pain, diarrhea, nausea and vomiting.   Musculoskeletal: Negative for arthralgias and myalgias.   Neurological: Positive for seizures. Negative for dizziness, syncope, speech difficulty, weakness and headaches.   Psychiatric/Behavioral: Negative for behavioral problems, confusion and sleep disturbance. The patient is not nervous/anxious.      Objective:     Vital Signs (Most Recent):  Temp:  98 °F (36.7 °C) (09/12/20 1105)  Pulse: 104 (09/12/20 1105)  Resp: 18 (09/12/20 1105)  BP: 120/69 (09/12/20 1105)  SpO2: 95 % (09/12/20 1105) Vital Signs (24h Range):  Temp:  [97.7 °F (36.5 °C)-99.1 °F (37.3 °C)] 98 °F (36.7 °C)  Pulse:  [] 104  Resp:  [10-27] 18  SpO2:  [67 %-100 %] 95 %  BP: ()/(55-82) 120/69     Weight: 78.5 kg (173 lb 1 oz)  Body mass index is 22.83 kg/m².    Physical Exam  Constitutional:       General: He is not in acute distress.     Appearance: Normal appearance. He is not diaphoretic.   HENT:      Head: Normocephalic.      Comments: sEEG in place  Eyes:      General: No scleral icterus.        Right eye: No discharge.         Left eye: No discharge.      Extraocular Movements: EOM normal.      Conjunctiva/sclera: Conjunctivae normal.      Pupils: Pupils are equal, round, and reactive to light.   Cardiovascular:      Rate and Rhythm: Normal rate.   Pulmonary:      Effort: Pulmonary effort is normal. No respiratory distress.   Abdominal:      General: There is no distension.      Palpations: Abdomen is soft.      Tenderness: There is no abdominal tenderness.   Musculoskeletal: Normal range of motion.         General: No deformity or signs of injury.   Skin:     General: Skin is warm and dry.   Neurological:      General: No focal deficit present.      Mental Status: He is alert and oriented to person, place, and time. Mental status is at baseline.      Coordination: Finger-Nose-Finger Test normal.   Psychiatric:         Mood and Affect: Mood normal.         Speech: Speech normal.         Behavior: Behavior normal.         Thought Content: Thought content normal.         NEUROLOGICAL EXAMINATION:     MENTAL STATUS   Oriented to person, place, and time.   Speech: speech is normal   Level of consciousness: alert    CRANIAL NERVES     CN III, IV, VI   Pupils are equal, round, and reactive to light.  Extraocular motions are normal.     CN VII   Facial expression full, symmetric.     CN  VIII   Hearing: intact    CN XI   CN XI normal.     CN XII   CN XII normal.     GAIT AND COORDINATION      Coordination   Finger to nose coordination: normal      Significant Labs: All pertinent lab results from the past 24 hours have been reviewed.    Significant Studies: I have reviewed all pertinent imaging results/findings within the past 24 hours.    Assessment and Plan:     * Epilepsy  25M with a PMHx of intractable epilepsy s/p VNS placement on Onfi 40 mg BID, Lamictal 200 mg BID, Vimpat 200 mg BID, and Topamax 50 mg BID s/p bilateral sEEG placement with Dr. Melani Ortiz on 8/31. Postop CTH stable.    Recommendations:  - Continuous vEEG monitoring  - 6 electrographic seizures from L hippo and entro areas since admit  - Vimpat held on admit 8/31, Lamictal weaned and stopped 9/6, Onfi weaned and stopped 9/8, Topamax stopped 9/8  - Please call epilepsy on call prior to administering benzodiazepines   - If patient has more than one GTC, can consider giving IV Vimpat 100 mgx1  - Neurosurgery following; tentative plan for sEEG removal Monday   - Seizure precautions  - Avoid agents that lower seizure thresold      Plan of care discussed with patient and NCC team. Will follow. Please call with questions.    Depression  - Chronic and stable  - Continue home Celexa        VTE Risk Mitigation (From admission, onward)         Ordered     heparin (porcine) injection 5,000 Units  Every 8 hours      09/02/20 1035     Place sequential compression device  Until discontinued      08/31/20 2577                Pilar Shepard PA-C  Neurology-Epilepsy  [facility]  Staff: Dr. Do

## 2020-09-12 NOTE — NURSING
1010 pt noticed to be having generalized tonic clonic jerking seizure like acitivty, eyes deviated upward and with bilateral left eye gauzing, tremor and twitching in face noted, drooling, body simultaneously completely flaccid and rigid while jerking, fist clenching, pupils dilated. 's-130's, pt desated to 65%, 100% O2 applied via simple face mask, sats improved to 94%. Airway maintained. Pt incontinent of urine during this time. Red button pressed. Epilepsy and NCC paged during event. Diana WOLF with epilepsy and NCC notified and stated will be at the bedside within 10 minutes to asses. Episode lasted about 1.5 minutes.    1014 pt post ictal, able to follow simple commands but is nonverbal, pt denies biting tongue. Juany MOREAU with NCC at bedside, pt VSS.    1031 Pilar TAY with Epilepsy at bedside.  No further new orders at this time. Will continue to monitor and notify as needed,

## 2020-09-12 NOTE — PLAN OF CARE
POC reviewed with pt at 0500. Pt verbalized understanding. sEEG in place - no seizure-like activity noted during shift. Afebrile. No BM. SBP < 140 maintained throughout shift. RA. Replacing potassium. Questions and concerns addressed. No acute events overnight. Pt progressing toward goals. Will continue to monitor. See flowsheets for full assessment and VS info.

## 2020-09-13 LAB
ABO + RH BLD: NORMAL
ALBUMIN SERPL BCP-MCNC: 3.9 G/DL (ref 3.5–5.2)
ALP SERPL-CCNC: 84 U/L (ref 55–135)
ALT SERPL W/O P-5'-P-CCNC: 20 U/L (ref 10–44)
ANION GAP SERPL CALC-SCNC: 11 MMOL/L (ref 8–16)
APTT BLDCRRT: 29.6 SEC (ref 21–32)
AST SERPL-CCNC: 23 U/L (ref 10–40)
BASOPHILS # BLD AUTO: 0.03 K/UL (ref 0–0.2)
BASOPHILS NFR BLD: 0.4 % (ref 0–1.9)
BILIRUB SERPL-MCNC: 0.8 MG/DL (ref 0.1–1)
BLD GP AB SCN CELLS X3 SERPL QL: NORMAL
BUN SERPL-MCNC: 10 MG/DL (ref 6–20)
CALCIUM SERPL-MCNC: 9 MG/DL (ref 8.7–10.5)
CHLORIDE SERPL-SCNC: 104 MMOL/L (ref 95–110)
CO2 SERPL-SCNC: 24 MMOL/L (ref 23–29)
CREAT SERPL-MCNC: 0.8 MG/DL (ref 0.5–1.4)
DIFFERENTIAL METHOD: ABNORMAL
EOSINOPHIL # BLD AUTO: 0.1 K/UL (ref 0–0.5)
EOSINOPHIL NFR BLD: 0.8 % (ref 0–8)
ERYTHROCYTE [DISTWIDTH] IN BLOOD BY AUTOMATED COUNT: 11.3 % (ref 11.5–14.5)
EST. GFR  (AFRICAN AMERICAN): >60 ML/MIN/1.73 M^2
EST. GFR  (NON AFRICAN AMERICAN): >60 ML/MIN/1.73 M^2
GLUCOSE SERPL-MCNC: 96 MG/DL (ref 70–110)
HCT VFR BLD AUTO: 39.7 % (ref 40–54)
HGB BLD-MCNC: 14.2 G/DL (ref 14–18)
IMM GRANULOCYTES # BLD AUTO: 0.03 K/UL (ref 0–0.04)
IMM GRANULOCYTES NFR BLD AUTO: 0.4 % (ref 0–0.5)
INR PPP: 1.1 (ref 0.8–1.2)
LYMPHOCYTES # BLD AUTO: 1.4 K/UL (ref 1–4.8)
LYMPHOCYTES NFR BLD: 18.4 % (ref 18–48)
MAGNESIUM SERPL-MCNC: 1.9 MG/DL (ref 1.6–2.6)
MCH RBC QN AUTO: 31.5 PG (ref 27–31)
MCHC RBC AUTO-ENTMCNC: 35.8 G/DL (ref 32–36)
MCV RBC AUTO: 88 FL (ref 82–98)
MONOCYTES # BLD AUTO: 0.8 K/UL (ref 0.3–1)
MONOCYTES NFR BLD: 9.9 % (ref 4–15)
NEUTROPHILS # BLD AUTO: 5.3 K/UL (ref 1.8–7.7)
NEUTROPHILS NFR BLD: 70.1 % (ref 38–73)
NRBC BLD-RTO: 0 /100 WBC
PHOSPHATE SERPL-MCNC: 4.6 MG/DL (ref 2.7–4.5)
PLATELET # BLD AUTO: 231 K/UL (ref 150–350)
PMV BLD AUTO: 9.5 FL (ref 9.2–12.9)
POTASSIUM SERPL-SCNC: 3.5 MMOL/L (ref 3.5–5.1)
PROT SERPL-MCNC: 6.4 G/DL (ref 6–8.4)
PROTHROMBIN TIME: 12 SEC (ref 9–12.5)
RBC # BLD AUTO: 4.51 M/UL (ref 4.6–6.2)
SARS-COV-2 RDRP RESP QL NAA+PROBE: NEGATIVE
SODIUM SERPL-SCNC: 139 MMOL/L (ref 136–145)
WBC # BLD AUTO: 7.54 K/UL (ref 3.9–12.7)

## 2020-09-13 PROCEDURE — U0002 COVID-19 LAB TEST NON-CDC: HCPCS

## 2020-09-13 PROCEDURE — 99233 PR SUBSEQUENT HOSPITAL CARE,LEVL III: ICD-10-PCS | Mod: ,,, | Performed by: PSYCHIATRY & NEUROLOGY

## 2020-09-13 PROCEDURE — 80053 COMPREHEN METABOLIC PANEL: CPT

## 2020-09-13 PROCEDURE — 99233 PR SUBSEQUENT HOSPITAL CARE,LEVL III: ICD-10-PCS | Mod: ,,, | Performed by: NURSE PRACTITIONER

## 2020-09-13 PROCEDURE — 99233 SBSQ HOSP IP/OBS HIGH 50: CPT | Mod: ,,, | Performed by: NURSE PRACTITIONER

## 2020-09-13 PROCEDURE — 25000003 PHARM REV CODE 250: Performed by: NURSE PRACTITIONER

## 2020-09-13 PROCEDURE — 85025 COMPLETE CBC W/AUTO DIFF WBC: CPT

## 2020-09-13 PROCEDURE — 63600175 PHARM REV CODE 636 W HCPCS: Performed by: PHYSICIAN ASSISTANT

## 2020-09-13 PROCEDURE — 99233 SBSQ HOSP IP/OBS HIGH 50: CPT | Mod: ,,, | Performed by: PSYCHIATRY & NEUROLOGY

## 2020-09-13 PROCEDURE — 86901 BLOOD TYPING SEROLOGIC RH(D): CPT

## 2020-09-13 PROCEDURE — 83735 ASSAY OF MAGNESIUM: CPT

## 2020-09-13 PROCEDURE — 85730 THROMBOPLASTIN TIME PARTIAL: CPT

## 2020-09-13 PROCEDURE — 99024 POSTOP FOLLOW-UP VISIT: CPT | Mod: ,,, | Performed by: NEUROLOGICAL SURGERY

## 2020-09-13 PROCEDURE — 85610 PROTHROMBIN TIME: CPT

## 2020-09-13 PROCEDURE — 86920 COMPATIBILITY TEST SPIN: CPT

## 2020-09-13 PROCEDURE — 99024 PR POST-OP FOLLOW-UP VISIT: ICD-10-PCS | Mod: ,,, | Performed by: NEUROLOGICAL SURGERY

## 2020-09-13 PROCEDURE — 95720 EEG PHY/QHP EA INCR W/VEEG: CPT | Mod: ,,, | Performed by: PSYCHIATRY & NEUROLOGY

## 2020-09-13 PROCEDURE — 95720 PR EEG, W/VIDEO, CONT RECORD, I&R, >12<26 HRS: ICD-10-PCS | Mod: ,,, | Performed by: PSYCHIATRY & NEUROLOGY

## 2020-09-13 PROCEDURE — 20000000 HC ICU ROOM

## 2020-09-13 PROCEDURE — 94761 N-INVAS EAR/PLS OXIMETRY MLT: CPT

## 2020-09-13 PROCEDURE — 63600175 PHARM REV CODE 636 W HCPCS: Performed by: STUDENT IN AN ORGANIZED HEALTH CARE EDUCATION/TRAINING PROGRAM

## 2020-09-13 PROCEDURE — 95714 VEEG EA 12-26 HR UNMNTR: CPT

## 2020-09-13 PROCEDURE — 84100 ASSAY OF PHOSPHORUS: CPT

## 2020-09-13 RX ORDER — HYDROCODONE BITARTRATE AND ACETAMINOPHEN 500; 5 MG/1; MG/1
TABLET ORAL
Status: DISCONTINUED | OUTPATIENT
Start: 2020-09-13 | End: 2020-09-15 | Stop reason: HOSPADM

## 2020-09-13 RX ORDER — AMIODARONE HYDROCHLORIDE 150 MG/3ML
INJECTION, SOLUTION INTRAVENOUS
Status: DISPENSED
Start: 2020-09-13 | End: 2020-09-13

## 2020-09-13 RX ORDER — SODIUM CHLORIDE 9 MG/ML
INJECTION, SOLUTION INTRAVENOUS CONTINUOUS
Status: DISCONTINUED | OUTPATIENT
Start: 2020-09-14 | End: 2020-09-15 | Stop reason: HOSPADM

## 2020-09-13 RX ADMIN — CEFAZOLIN 2 G: 1 INJECTION, POWDER, FOR SOLUTION INTRAMUSCULAR; INTRAVENOUS at 02:09

## 2020-09-13 RX ADMIN — CEFAZOLIN 2 G: 1 INJECTION, POWDER, FOR SOLUTION INTRAMUSCULAR; INTRAVENOUS at 09:09

## 2020-09-13 RX ADMIN — CITALOPRAM HYDROBROMIDE 20 MG: 10 TABLET ORAL at 08:09

## 2020-09-13 RX ADMIN — POTASSIUM CHLORIDE 40 MEQ: 1.5 POWDER, FOR SOLUTION ORAL at 08:09

## 2020-09-13 RX ADMIN — CEFAZOLIN 2 G: 1 INJECTION, POWDER, FOR SOLUTION INTRAMUSCULAR; INTRAVENOUS at 06:09

## 2020-09-13 RX ADMIN — HEPARIN SODIUM 5000 UNITS: 5000 INJECTION INTRAVENOUS; SUBCUTANEOUS at 06:09

## 2020-09-13 NOTE — PROCEDURES
Stereo EEG Depth Electrode Recording  DATE OF SERVICE: 09/12/2020  EEG NUMBER: [id number]  REQUESTED BY: Dr Alix Ortiz  LOCATION OF SERVICE: [id number]     METHODOLOGY              Depth electrodes consisted of thin wires with electrical contacts it fixed distances along the wire.  These are then implanted using a stereotactic procedure targeting cortical and subcortical structures.  The up to 256 electrode contact can be recorded simultaneously with this procedure.  Recording band pass was 0.1  in the low past filters setting could be set at the 512, 1024, or 2048.  Digital video recording of the patient is simultaneously recorded with the EEG.  The patient is instructed report clinical symptoms which may occur during the recording session.  EEG and video recording is stored and archived in digital format. Activation procedures which include photic stimulation, hyperventilation and instructing patients to perform simple task are done in selected patients.   Compresses spectral analysis (CSA) is also performed on the activity recorded from each individual channel.  This is displayed as a power display of frequencies from 0 to 30 Hz over time.   The CSA analysis is done and displayed continuously.  This is reviewed for asymmetries in power between homologous areas of the scalp and for presence of changes in power which canbe seen when seizures occur.  Sections of suspected abnormalities on the CSA is then compared with the original EEG recording.          Implant Date MRN Name Last First                      8/31/2020 [id number]  Alphonse Webster                            Contacts                   Depth Elect Name SN  # contacts Color 1 Color 2 1 2 3 4 5 6 7 8 9 10 11 12 13 14 15 16   1 L Amyg 18718  14 Yellow Green 1 2 3 4 5 6 7 8 9 10 11 12 13 14     2 L Hippo 98484  14 Black Blue 15 16 17 18 19 20 21 22 23 24 25 26 27 28     3 L Entor 44559  14 Red Blue 29 30 31 32 33 34 35 36 37 38 39 40 41 42     4 L AntCi 41674  14  Red Green 43 44 45 46 47 48 49 50 51 52 53 54 55 56     5 L OrbFr 50201  16 Blue Red 57 58 59 60 61 62 63 64 65 66 67 68 69 70 71 72   6 L PreFr 76712  16 Yellow Green 73 74 75 76 77 78 79 80 81 82 83 84 85 86 87 88   7 L AntLn 80793  8 Black Green 89 90 91 92 93 94 95 96           8 L PoIns 58534  8 Yellow Black 97 98 99 100 101 102 103 104           9 L TeOcc 12271  8 Black Red 105 106 107 108 109 110 111 112           10 L Pariet 68446  8 Black Red 113 114 115 116 117 118 119 120           11 R Amyg 62723  12 Green Red 121 122 123 124 125 126 127 128 129 130 131 132       12 R Hippo 88507  12 Green Blue 133 134 135 136 137 138 139 140 141 142 143 144       13 R Asuin 98456  8 Blue Red 145 146 147 148 149 150 151 152           14 R Acing 19515  16 Yellow Blue 153 154 155 156 157 158 159 160 161 162 163 164 165 166 167 168       Electrode integrety   High amplitude artifact present from the following contacts               Depth electrode          Contact #                       L Pariet  4              L Entor   12, 13, 14  The L Pariet  3, 4   R Amygd  2, 3    11, 12  Appropriate recording obtained from the remaining electrode contacts.     RECORDING TIMES  Start on 09/1122020 7:00 a.m.  Stop on 09/13/2020 7:00 a.m.  A total of 24 hrs of EEG monitoring was obtained    Interictal Activity  Inner ictal activity continues to be frequently recorded.  Of commonly starting from the left hippocampus contacts 10 11 and 12,  spread is most common to the ipsilateral depth electrodes however and occasional initial spread is noted to the right hippocampus.  Infrequently a right hippocampal spike discharges noted.  The  Seizures Recorded   Date  Start  End  Sz 1 2020/09/01 08:34:43 08:35:28    Sz 2 2020/09/01 13:18:13 13:18:55    Sz 3  2020/09/01  16:39:13   start L hippo 1 2   16:39:13   sz spread Amyg Entor Prefr   16:39:38   Patient Event   16:39:43   nurse telling him a code word, Purple elephant   16:39:48  Sz 3  "electrographic stop   16:40:14  pt not following commands   16:40:37  pt able to follow commands    Sz 4 2020/09/01    21:05:53 startL Entor > Hippo    very focal   21:06:17 Stop    Sz 5 2020/09/09   10:38:13 Andrea Entor > Hippo   10:38:14  Rhythmic activity Entor & Hippo   10:38:19  Rhythic activity PreFr 11 12 13   10:38:22  spikes start PoIns   10:39:29  Sz 5 Stops    Sz 6 2020/09/12   10:08:58 Fast beta onset L Hippo 1, 2, 3   10:09:01  Andrea start  L Entor 2, 3 > Hippo 2, 3   10:09:11  Poly spikes L Hippo & Entor remain focal then spread     EEG FINDINGS  Ictal Recording   Seizure #         Date                 Start                 End  #1                    2020/09/01 08:34:43 08:35:28   Build up started in L Hippo contacts 1, 2  >> 3, 4      then spread to L Hippo contacts 11, 12, 13 L Amygd contacts 11, 12, 13      then spread to L Entor contacts 11, 12, 13     Seizure Description   Seizure 1   08:34:43  Sz 1 start  08:34:58  Oral facial mvt  08:35:01  L forearm and leg mvt  08:35:16  L hand auttomatism  08:35:23  Oral facial mvt continues  08:35:28  Sz end    Seizure 2  d1 13:18:13  Sz 2 start d1 13:18:29  Head turn slightly to L  d1 13:18:33  Oral facial mvt  d1 13:18:41  Moving some in bed  d1 13:18:45  oral facial mvt continues  d1 13:18:55  Sz 2 stop  d1 13:18:57  Returns to looking at iPhone    Seizure 3  16:39:13  Lying back in bed - no mvt  16:39:20  Raises hed and slightly flexes knees  16:39:26  no response to nurse "are U OK?  16:39:30  Oral facial mvts  16:39:38  Patient Event  16:39:43  nurse telling him a code word, Purple elephant  16:39:48  Sz 3 stop  16:39:52  limb mvts stop  16:40:02  not responding to nurse  16:40:14  pt not following commands  A the 16:40:37  pt able to follow commands    Seizure 4     Nothing clinical     Seizure 5  2020/09/09  d1 10:38:13  Sz 5 Start  d1 10:38:13  Andrea Entor > Hippo  d1 10:38:14  Rhythmic activity PreFro & Anc  d1 10:38:16  Looking at iPhone  d1 " 10:38:17  raise head off bed  d1 10:38:19  Rhythic activity PreFr 11 12 13  d1 10:38:22  spikes start PoIns  d1 10:38:24  Moving L hand/index finger extended and waving  d1 10:38:27  Waving L hand  d1 10:38:33  emitting gutteral sound  d1 10:38:42  open mouth emiting cry  d1 10:38:43  both hands/arms extended tonically  d1 10:38:45  legs flexed then extended  d1 10:38:50  Tonic extension all limbs  d1 10:38:58  Clonic jering begins  d1 10:39:07  Gen jerking continues  d1 10:39:16  Gen jerking continues  d1 10:39:21  Jerking slows  d1 10:39:29  Sz 5 Stops    Seizure 6  2020/09/12  10:08:58  Sz 6 Start  10:08:58  Lying supine lights off. On iPhone  10:09:05  change in attention - looks up  10:09:07  head turn to L  10:09:11  oral autom  10:09:12  legs flextion at hip  10:09:13  sits up  10:09:17  LUE autom  10:09:24  RUE tonic  10:09:25  High pitched cry  10:09:25  GTC  10:09:29  Mouth open, arms extended  10:09:37  Legs extended  10:09:48  Gen clonic activity evolving  10:10:14  Sz 6 end    Impression  Six seizure originating from [facility]   No seizures recorded 2020/09/02>03 (#3)  No seizures recorded 2020/09/03>04 (#4)  No seizures recorded 2020/09/04>05 (#5)  No seizures recorded 2020/09/05>06 (#6)  No seizures recorded 2020/09/06>07 (#7)  No seizures recorded 2020/09/07>08 (#8)  No seizures recorded 2020/09/08>09 (#9)  No seizures recorded 2020/09/09>10 (#10)  One seizures recorded 2020/09/10>11 (#11)  No seizures recorded 2020/09/11>12 (#12)  One seizures recorded 2020/09/12>13 (#13)    Seizures 6 recorded total all originating from [facility] and [facility] areas     BEBETO Marina MD  Professor Emeritus  Epilepsy Division

## 2020-09-13 NOTE — PLAN OF CARE
POC reviewed with pt at 0500. Pt verbalized understanding. sEEG in place - no seizure like activity noted. Afebrile. No BM. Plans for sEEG removal on Monday 9/14. SBP < 140 maintained. Replacing potassium (3.5). Questions and concerns addressed. No acute events overnight. Pt progressing toward goals. Will continue to monitor. See flowsheets for full assessment and VS info

## 2020-09-13 NOTE — PROGRESS NOTES
[facility]  Neurocritical Care  Progress Note    Admit Date: 8/31/2020  Service Date: 09/13/2020  Length of Stay: 13    Subjective:     Chief Complaint: Epilepsy    History of Present Illness: The patient is a 25 year old M with PMHx of depression, seizures, epilepsy admitted to [facility] s/p placement of sEED electrodes bilateral. Per chart review he has failed at least 4 AEDs at therapeutic dosages. Triggers include being tired and being stressed. He is not working (on disability) but finished his GED. He lives next door to grandmother and nearby to his mother. His major concern is memory loss from ongoing seizures. NSGY and epilepsy following. Patient admitted to [facility] for close monitoring and higher level of care.     Hospital Course: 8/31/2020: patient admitted to [facility] s/p sEEG electrodes placement, NSGY and epilepsy following, SBP goal < 140, perepilepsy restarted all AEDs except for Vimpat  9/1/2020: 1 episode of seizure, Vimpat  On hold, Onfi and lamictal decreased, continue topamax  9/2/2020: No seizures this AM, epilepsy managing AEDs, started SQH  9/3/2020: No electrographic seizures in AM, Per epilepsy: If no seizures throughout day, plan to decrease Onfi to 10 mg BID (Dr. Abhijeet Do to change tonight if needed)  9/4/2020: d/c A-line  9/5/2020: NAEON  9/6/2020: NABHARATHI, plan for removal of sEEG on 9/9 09/07/2020 naeon  9/8/2020: NAEON, off AEDs. Likely bolt removal this Friday 9/9/2020: NAEON  9/10/2020: one tonic seizure today-epilepsy notified  9/11/2020  NAEON. No seizures over night. All AEDs on hold. Since admit has hadTotal 5 seizures from L hippocampus and entorhinal cortex areas. Tentative plan per NSGY to remove sEEG electrodes on Monday.  9/12 NAEON. Mid morning had one GTC seizure, epilepsy notified. Continue to hold AEDs, no ativan, if another GTC in 12h may consider vimpat dose.  9/13 No seizures overnight or today. Plan is for removal of sEEG electrodes on Monday.    Interval  History: No seizures overnight or today. Plan is for removal of sEEG electrodes on Monday.      Review of Systems:   Constitutional: Denies fevers, weight loss, chills, or weakness.  Eyes: Denies changes in vision.  ENT: Denies dysphagia, nasal discharge, ear pain or discharge.  Cardiovascular: Denies chest pain, palpitations, orthopnea, or claudication.  Respiratory: Denies shortness of breath, cough, hemoptysis, or wheezing.  GI: Denies nausea/vomitting, hematochezia, melena, abd pain, or changes in appetite.  : Denies dysuria, incontinence, or hematuria.  Musculoskeletal: Denies joint pain or myalgias.  Skin/breast: Denies rashes, lumps, lesions, or discharge.  Neurologic: Denies headache, dizziness, vertigo, or paresthesias.  Psychiatric: Denies changes in mood or hallucinations.  Endocrine: Denies polyuria, polydipsia, heat/cold intolerance.  Hematologic/Lymph: Denies lymphadenopathy, easy bruising or easy bleeding.  Allergic/Immunologic: Denies rash, rhinitis.     Vitals:   Temp: 98.1 °F (36.7 °C)  Pulse: 80  Rhythm: normal sinus rhythm  BP: 128/73  MAP (mmHg): 92  Resp: 16  SpO2: 99 %  O2 Device (Oxygen Therapy): room air    Temp  Min: 98.1 °F (36.7 °C)  Max: 98.7 °F (37.1 °C)  Pulse  Min: 56  Max: 97  BP  Min: 95/56  Max: 134/84  MAP (mmHg)  Min: 70  Max: 105  Resp  Min: 10  Max: 24  SpO2  Min: 94 %  Max: 100 %    09/12 0701 - 09/13 0700  In: 310 [P.O.:200; I.V.:110]  Out: 400 [Urine:400]   Unmeasured Output  Urine Occurrence: 1  Stool Occurrence: 0  Pad Count: 1     Examination:   Constitutional: Well-nourished and -developed. No apparent distress.   Eyes: Conjunctiva clear, anicteric. Lids no lesions.  Head/Ears/Nose/Mouth/Throat/Neck: Moist mucous membranes. External ears, nose atraumatic.   Cardiovascular: Regular rhythm. No murmurs. No leg edema.  Respiratory: Comfortable respirations. Clear to auscultation.  Gastrointestinal: No hernia. Soft, nondistended, nontender. + bowel sounds.  Skin: Head  incisional dressing c/d/i. EEG monitoring attached     Neurologic:  -GCS [id number]  -Alert. Oriented to person, place, and time. Speech fluent. Follows commands.  -Cranial nerves II-XII grossly intact, PERRL3+  -Motor 5/5 all extremities Dre spon. And antigravity  -Sensation bilat intact      Medications:   Continuous Scheduledamiodarone, ,   ceFAZolin (ANCEF) IVPB, 2 g, Q8H  citalopram, 20 mg, Daily  senna-docusate 8.6-50 mg, 1 tablet, Daily    PRNsodium chloride, , Q24H PRN  acetaminophen, 650 mg, Q4H PRN  hydrALAZINE, 10 mg, Q4H PRN  labetaloL, 10 mg, Q4H PRN  magnesium oxide, 800 mg, PRN  magnesium oxide, 800 mg, PRN  oxyCODONE, 5 mg, Q6H PRN  potassium chloride, 40 mEq, PRN  potassium chloride, 40 mEq, PRN  potassium chloride, 40 mEq, PRN  potassium, sodium phosphates, 2 packet, PRN  potassium, sodium phosphates, 2 packet, PRN  potassium, sodium phosphates, 2 packet, PRN  promethazine (PHENERGAN) IVPB, 6.25 mg, Q8H PRN       Today I independently reviewed pertinent medications, lines/drains/airways, imaging, laboratory results, microbiology results, notably:     ISTAT: No results for input(s): PH, PCO2, PO2, POCSATURATED, HCO3, BE, POCNA, POCK, POCTCO2, POCGLU, POCICA, POCLAC, SAMPLE in the last 24 hours.   Chem:   Recent Labs   Lab 09/13/20  0400      K 3.5      CO2 24   GLU 96   BUN 10   CREATININE 0.8   ESTGFRAFRICA >60.0   EGFRNONAA >60.0   CALCIUM 9.0   MG 1.9   PHOS 4.6*   ANIONGAP 11   PROT 6.4   ALBUMIN 3.9   BILITOT 0.8   ALKPHOS 84   AST 23   ALT 20     Heme:   Recent Labs   Lab 09/13/20  0400 09/13/20  1143   WBC 7.54  --    HGB 14.2  --    HCT 39.7*  --      --    INR  --  1.1     Endo: No results for input(s): POCTGLUCOSE in the last 24 hours.   Assessment/Plan:     Neuro  * Epilepsy  -8/31 s/p sEEG placement.  -NSGY and epilepsy following  9/11  No seizures over night. Total seizures since admit 6. Coming from L hippocampus and entorhinal cortex areas.  9/12 mid morning GTC  epilepsy aware, if another one may consider vimpat, call epilepsy  -cefazolin 2 q8 prophylaxis  -SBP goal <140  -seizure precautions  Off all AEDs.  Oxycodone 5mg prn moderate pain  -neuro checks q 1 hr  Ok for stationary bike activity.                  Psychiatric  Depression  - continue citalopram 20mg daily          The patient is being Prophylaxed for:  Venous Thromboembolism with: Mechanical or Chemical  Stress Ulcer with: Not Applicable   Ventilator Pneumonia with: not applicable    Activity Orders          Diet NPO: NPO starting at 09/14 0001    Diet Adult Regular (IDDSI Level 7): Regular starting at 09/09 0902        Full Code     I have spent 35 min with this patient, with over 50% of this time spent coordinating care and speaking with the family    Christiane Harrington NP  Neurocritical Care  [facility]

## 2020-09-13 NOTE — SUBJECTIVE & OBJECTIVE
Interval History: NAEON.  No seizures overnight.  Patient stable, waiting for OR Monday.    Medications:  Continuous Infusions:  Scheduled Meds:   amiodarone        ceFAZolin (ANCEF) IVPB  2 g Intravenous Q8H    citalopram  20 mg Oral Daily    senna-docusate 8.6-50 mg  1 tablet Oral Daily     PRN Meds:sodium chloride, acetaminophen, hydrALAZINE, labetaloL, magnesium oxide, magnesium oxide, oxyCODONE, potassium chloride, potassium chloride, potassium chloride, potassium, sodium phosphates, potassium, sodium phosphates, potassium, sodium phosphates, promethazine (PHENERGAN) IVPB     Review of Systems  Objective:     Weight: 78.5 kg (173 lb 1 oz)  Body mass index is 22.83 kg/m².  Vital Signs (Most Recent):  Temp: 98.2 °F (36.8 °C) (09/13/20 1105)  Pulse: 77 (09/13/20 1305)  Resp: 10 (09/13/20 1305)  BP: 129/64 (09/13/20 1305)  SpO2: 98 % (09/13/20 1305) Vital Signs (24h Range):  Temp:  [98.2 °F (36.8 °C)-98.7 °F (37.1 °C)] 98.2 °F (36.8 °C)  Pulse:  [] 77  Resp:  [10-24] 10  SpO2:  [94 %-100 %] 98 %  BP: ()/(56-86) 129/64     Date 09/13/20 0700 - 09/14/20 0659   Shift 3436-3272 5557-5560 9314-3893 24 Hour Total   INTAKE   P.O. 300   300   I.V.(mL/kg) 35(0.4)   35(0.4)   Shift Total(mL/kg) 335(4.3)   335(4.3)   OUTPUT   Urine(mL/kg/hr) 400   400   Shift Total(mL/kg) 400(5.1)   400(5.1)   Weight (kg) 78.5 78.5 78.5 78.5                        Neurosurgery Physical Exam     GCS 15  AOx3  PERRL  CN intact  Full strength x4          Significant Labs:  Recent Labs   Lab 09/12/20  0441 09/13/20  0400   GLU 95 96    139   K 3.5 3.5    104   CO2 27 24   BUN 11 10   CREATININE 0.8 0.8   CALCIUM 9.1 9.0   MG 2.0 1.9     Recent Labs   Lab 09/12/20  0441 09/13/20  0400   WBC 5.89 7.54   HGB 14.4 14.2   HCT 40.2 39.7*    231     Recent Labs   Lab 09/13/20  1143   INR 1.1   APTT 29.6     Microbiology Results (last 7 days)     ** No results found for the last 168 hours. **        All pertinent labs  from the last 24 hours have been reviewed.    Significant Diagnostics:  CT: No results found in the last 24 hours.  MRI: No results found in the last 24 hours.

## 2020-09-13 NOTE — PROGRESS NOTES
[facility]  Neurosurgery  Progress Note    Subjective:     History of Present Illness: Darin Cunha is a 25 y.o. male with intractable epilepsy since 2013 who presents as a referral from Dr. Abhijeet Do to discuss the gamut of epilepsy surgery options. Today's visit is a video visit, and the patient is a passenger in a moving vehicle. His mother is not with him.      The patient states that when he has events, he stares off and smacks his lips. The episodes last for about 5-6 minutes. He often waves his hands and feels tired afterwards. He currently has about 3-5 episodes a week. He has failed at least 4 AEDs at therapeutic dosages.      Per EEG review, he has bilateral temporal involvement, moreso left-sided than right.      Triggers include being tired and being stressed. He is not working (on disability) but finished his GED. He lives next door to grandmother and nearby to his mother. His major concern is memory loss from ongoing seizures.      Of notes, he endorses a history of staph infection previously. He denies history of bleeding or anesthetic complications. He has a VNS in place.        Post-Op Info:  Procedure(s) (LRB):  CREATION, CRANIAL YUNG HOLE, PLACEMENT OF THE FIDUCIAL SCREWS BILATERAL 2.PLACEMENT OF SEEG ELECTRODES BILATERAL WITH OWEN ROBOT (Bilateral)   13 Days Post-Op     Interval History: NAEON.  No seizures overnight.  Patient stable, waiting for OR Monday.    Medications:  Continuous Infusions:  Scheduled Meds:   amiodarone        ceFAZolin (ANCEF) IVPB  2 g Intravenous Q8H    citalopram  20 mg Oral Daily    senna-docusate 8.6-50 mg  1 tablet Oral Daily     PRN Meds:sodium chloride, acetaminophen, hydrALAZINE, labetaloL, magnesium oxide, magnesium oxide, oxyCODONE, potassium chloride, potassium chloride, potassium chloride, potassium, sodium phosphates, potassium, sodium phosphates, potassium, sodium phosphates, promethazine (PHENERGAN) IVPB     Review of  Systems  Objective:     Weight: 78.5 kg (173 lb 1 oz)  Body mass index is 22.83 kg/m².  Vital Signs (Most Recent):  Temp: 98.2 °F (36.8 °C) (09/13/20 1105)  Pulse: 77 (09/13/20 1305)  Resp: 10 (09/13/20 1305)  BP: 129/64 (09/13/20 1305)  SpO2: 98 % (09/13/20 1305) Vital Signs (24h Range):  Temp:  [98.2 °F (36.8 °C)-98.7 °F (37.1 °C)] 98.2 °F (36.8 °C)  Pulse:  [] 77  Resp:  [10-24] 10  SpO2:  [94 %-100 %] 98 %  BP: ()/(56-86) 129/64     Date 09/13/20 0700 - 09/14/20 0659   Shift 6134-5353 9778-3674 9884-3067 24 Hour Total   INTAKE   P.O. 300   300   I.V.(mL/kg) 35(0.4)   35(0.4)   Shift Total(mL/kg) 335(4.3)   335(4.3)   OUTPUT   Urine(mL/kg/hr) 400   400   Shift Total(mL/kg) 400(5.1)   400(5.1)   Weight (kg) 78.5 78.5 78.5 78.5                        Neurosurgery Physical Exam     GCS 15  AOx3  PERRL  CN intact  Full strength x4          Significant Labs:  Recent Labs   Lab 09/12/20  0441 09/13/20  0400   GLU 95 96    139   K 3.5 3.5    104   CO2 27 24   BUN 11 10   CREATININE 0.8 0.8   CALCIUM 9.1 9.0   MG 2.0 1.9     Recent Labs   Lab 09/12/20  0441 09/13/20  0400   WBC 5.89 7.54   HGB 14.4 14.2   HCT 40.2 39.7*    231     Recent Labs   Lab 09/13/20  1143   INR 1.1   APTT 29.6     Microbiology Results (last 7 days)     ** No results found for the last 168 hours. **        All pertinent labs from the last 24 hours have been reviewed.    Significant Diagnostics:  CT: No results found in the last 24 hours.  MRI: No results found in the last 24 hours.    Assessment/Plan:     * Epilepsy  25 M with intractable epilepsy s/p multiple failed AEDs and VNS placement now s/p sEEG lead placement:      Admitted to [facility]  q1h neurochecks  sEEG leads in place, abx while in place, continue headwrap  Weaned off of all antiepileptics  Bed rest at all times  SBP<140  No HOB restrictions  Ok for recumbent bike in bed  NPO at midnight for OR tomorrow  Hold evening SQH for OR tomorrow  Voiding spontaneously  s/p michelle removal  Post operative CTH reviewed - expected post op changes  Monitor for seizure activity - no benzos or sedating meds unless cleared by Epilepsy team  AEDs per Epilepsy  Please page w/exam change  Tentative sEEG bolt removal on 9/14, booked, consented, will pre-op tonight for OR tomorrow.     Dispo: continue ICU care        Wing Huynh MD  Neurosurgery  [facility]

## 2020-09-13 NOTE — PROCEDURES
Stereo EEG Depth Electrode Recording  DATE OF SERVICE: 09/11/2020  EEG NUMBER: [id number]  REQUESTED BY: Dr Alix Ortiz  LOCATION OF SERVICE: [id number]     METHODOLOGY              Depth electrodes consisted of thin wires with electrical contacts it fixed distances along the wire.  These are then implanted using a stereotactic procedure targeting cortical and subcortical structures.  The up to 256 electrode contact can be recorded simultaneously with this procedure.  Recording band pass was 0.1  in the low past filters setting could be set at the 512, 1024, or 2048.  Digital video recording of the patient is simultaneously recorded with the EEG.  The patient is instructed report clinical symptoms which may occur during the recording session.  EEG and video recording is stored and archived in digital format. Activation procedures which include photic stimulation, hyperventilation and instructing patients to perform simple task are done in selected patients.   Compresses spectral analysis (CSA) is also performed on the activity recorded from each individual channel.  This is displayed as a power display of frequencies from 0 to 30 Hz over time.   The CSA analysis is done and displayed continuously.  This is reviewed for asymmetries in power between homologous areas of the scalp and for presence of changes in power which canbe seen when seizures occur.  Sections of suspected abnormalities on the CSA is then compared with the original EEG recording.          Implant Date MRN Name Last First                      8/31/2020 [id number]  Alphonse Webster                            Contacts                   Depth Elect Name SN  # contacts Color 1 Color 2 1 2 3 4 5 6 7 8 9 10 11 12 13 14 15 16   1 L Amyg 62362  14 Yellow Green 1 2 3 4 5 6 7 8 9 10 11 12 13 14     2 L Hippo 11273  14 Black Blue 15 16 17 18 19 20 21 22 23 24 25 26 27 28     3 L Entor 14878  14 Red Blue 29 30 31 32 33 34 35 36 37 38 39 40 41 42     4 L AntCi 73307  14  Red Green 43 44 45 46 47 48 49 50 51 52 53 54 55 56     5 L OrbFr 78375  16 Blue Red 57 58 59 60 61 62 63 64 65 66 67 68 69 70 71 72   6 L PreFr 21715  16 Yellow Green 73 74 75 76 77 78 79 80 81 82 83 84 85 86 87 88   7 L AntLn 36640  8 Black Green 89 90 91 92 93 94 95 96           8 L PoIns 32175  8 Yellow Black 97 98 99 100 101 102 103 104           9 L TeOcc 68609  8 Black Red 105 106 107 108 109 110 111 112           10 L Pariet 79156  8 Black Red 113 114 115 116 117 118 119 120           11 R Amyg 57480  12 Green Red 121 122 123 124 125 126 127 128 129 130 131 132       12 R Hippo 19080  12 Green Blue 133 134 135 136 137 138 139 140 141 142 143 144       13 R Asuin 30614  8 Blue Red 145 146 147 148 149 150 151 152           14 R Acing 57158  16 Yellow Blue 153 154 155 156 157 158 159 160 161 162 163 164 165 166 167 168       Electrode integrety   High amplitude artifact present from the following contacts               Depth electrode          Contact #                       L Pariet  4              L Entor   12, 13, 14  The L Pariet  3, 4   R Amygd  2, 3    11, 12  Appropriate recording obtained from the remaining electrode contacts.     RECORDING TIMES  Start on 09/11/2020 7:00 a.m.  Stop on 09/12/2020 7:00 a.m.  A total of 24 hrs of EEG monitoring was obtained    Interictal Activity  Inner ictal activity continues to be frequently recorded.  Of commonly starting from the left hippocampus contacts 10 11 and 12,  spread is most common to the ipsilateral depth electrodes however and occasional initial spread is noted to the right hippocampus.  Infrequently a right hippocampal spike discharges noted.  The  Seizures Recorded   Date  Start  End  Sz 1 2020/09/01 08:34:43 08:35:28    Sz 2 2020/09/01 13:18:13 13:18:55    Sz 3  2020/09/01  16:39:13   start L hippo 1 2   16:39:13   sz spread Amyg Entor Prefr   16:39:38   Patient Event   16:39:43   nurse telling him a code word, Purple elephant   16:39:48  Sz 3  "electrographic stop   16:40:14  pt not following commands   16:40:37  pt able to follow commands    Sz 4 2020/09/01    21:05:53 start Emtpr > Hippo    very focal   21:06:17 Stop    Sz 5 2020/09/09   10:38:13 Andrea Entor > Hippo   10:38:14  Rhythmic activity PreFro & Anc   10:38:19  Rhythic activity PreFr 11 12 13   10:38:22  spikes start PoIns   10:39:29  Sz 5 Stops      EEG FINDINGS  Ictal Recording   Seizure #         Date                 Start                 End  #1                    2020/09/01 08:34:43 08:35:28   Build up started in L Hippo contacts 1, 2  >> 3, 4      then spread to L Hippo contacts 11, 12, 13 L Amygd contacts 11, 12, 13      then spread to L Entor contacts 11, 12, 13     Seizure Description   Seizure 1   08:34:43  Sz 1 start  08:34:58  Oral facial mvt  08:35:01  L forearm and leg mvt  08:35:16  L hand auttomatism  08:35:23  Oral facial mvt continues  08:35:28  Sz end    Seizure 2  d1 13:18:13  Sz 2 start d1 13:18:29  Head turn slightly to L  d1 13:18:33  Oral facial mvt  d1 13:18:41  Moving some in bed  d1 13:18:45  oral facial mvt continues  d1 13:18:55  Sz 2 stop  d1 13:18:57  Returns to looking at iPhone    Seizure 3  16:39:13  Lying back in bed - no mvt  16:39:20  Raises hed and slightly flexes knees  16:39:26  no response to nurse "are U OK?  16:39:30  Oral facial mvts  16:39:38  Patient Event  16:39:43  nurse telling him a code word, Purple elephant  16:39:48  Sz 3 stop  16:39:52  limb mvts stop  16:40:02  not responding to nurse  16:40:14  pt not following commands  A the 16:40:37  pt able to follow commands    Seizure 4     Nothing clinical     Seizure 5  2020/09/09  d1 10:38:13  Sz 5 Start  d1 10:38:13  Andrea Entor > Hippo  d1 10:38:14  Rhythmic activity PreFro & Anc  d1 10:38:16  Looking at iPhone  d1 10:38:17  raise head off bed  d1 10:38:19  Rhythic activity PreFr 11 12 13  d1 10:38:22  spikes start PoIns  d1 10:38:24  Moving L hand/index finger extended and waving  d1 " 10:38:27  Waving L hand  d1 10:38:33  emitting gutteral sound  d1 10:38:42  open mouth emiting cry  d1 10:38:43  both hands/arms extended tonically  d1 10:38:45  legs flexed then extended  d1 10:38:50  Tonic extension all limbs  d1 10:38:58  Clonic jering begins  d1 10:39:07  Gen jerking continues  d1 10:39:16  Gen jerking continues  d1 10:39:21  Jerking slows  d1 10:39:29 0:10.0 Sz 5 Stops      Impression  Four seizure originating from [facility]   No seizures recorded 2020/09/02>03 (#3)  No seizures recorded 2020/09/03>04 (#4)  No seizures recorded 2020/09/04>05 (#5)  No seizures recorded 2020/09/05>06 (#6)  No seizures recorded 2020/09/06>07 (#7)  No seizures recorded 2020/09/07>08 (#8)  No seizures recorded 2020/09/08>09 (#9)  No seizures recorded 2020/09/08>09 (#9)  No seizures recorded 2020/09/09>10 (#10)  One seizures recorded 2020/09/10>11 (#11)  No seizures recorded 2020/09/11>12 (#12)    Five (5) sz  Total recorded all oginating from [facility]     BEBETO Marina MD  Professor Emeritus  Epilepsy Division

## 2020-09-13 NOTE — ASSESSMENT & PLAN NOTE
25 M with intractable epilepsy s/p multiple failed AEDs and VNS placement now s/p sEEG lead placement:      Admitted to [facility]  q1h neurochecks  sEEG leads in place, abx while in place, continue headwrap  Weaned off of all antiepileptics  Bed rest at all times  SBP<140  No HOB restrictions  Ok for recumbent bike in bed  NPO at midnight for OR tomorrow  Hold evening SQH for OR tomorrow  Voiding spontaneously s/p michelle removal  Post operative CTH reviewed - expected post op changes  Monitor for seizure activity - no benzos or sedating meds unless cleared by Epilepsy team  AEDs per Epilepsy  Please page w/exam change  Tentative sEEG bolt removal on 9/14, booked, consented, will pre-op tonight for OR tomorrow.     Dispo: continue ICU care

## 2020-09-13 NOTE — ASSESSMENT & PLAN NOTE
-8/31 s/p sEEG placement.  -NSGY and epilepsy following  9/11  No seizures over night. Total seizures since admit 6. Coming from L hippocampus and entorhinal cortex areas.  9/12 mid morning GTC epilepsy aware, if another one may consider vimpat, call epilepsy  -cefazolin 2 q8 prophylaxis  -SBP goal <140  -seizure precautions  Off all AEDs.  Oxycodone 5mg prn moderate pain  -neuro checks q 1 hr  Ok for stationary bike activity.

## 2020-09-14 PROBLEM — Z96.89 S/P PLACEMENT OF VNS (VAGUS NERVE STIMULATION) DEVICE: Status: ACTIVE | Noted: 2020-09-14

## 2020-09-14 LAB
ALBUMIN SERPL BCP-MCNC: 4 G/DL (ref 3.5–5.2)
ALP SERPL-CCNC: 85 U/L (ref 55–135)
ALT SERPL W/O P-5'-P-CCNC: 23 U/L (ref 10–44)
ANION GAP SERPL CALC-SCNC: 11 MMOL/L (ref 8–16)
ANION GAP SERPL CALC-SCNC: 9 MMOL/L (ref 8–16)
AST SERPL-CCNC: 27 U/L (ref 10–40)
BASOPHILS # BLD AUTO: 0.02 K/UL (ref 0–0.2)
BASOPHILS # BLD AUTO: 0.03 K/UL (ref 0–0.2)
BASOPHILS NFR BLD: 0.4 % (ref 0–1.9)
BASOPHILS NFR BLD: 0.5 % (ref 0–1.9)
BILIRUB SERPL-MCNC: 0.7 MG/DL (ref 0.1–1)
BUN SERPL-MCNC: 10 MG/DL (ref 6–20)
BUN SERPL-MCNC: 7 MG/DL (ref 6–20)
CALCIUM SERPL-MCNC: 8.7 MG/DL (ref 8.7–10.5)
CALCIUM SERPL-MCNC: 9.1 MG/DL (ref 8.7–10.5)
CHLORIDE SERPL-SCNC: 104 MMOL/L (ref 95–110)
CHLORIDE SERPL-SCNC: 105 MMOL/L (ref 95–110)
CO2 SERPL-SCNC: 22 MMOL/L (ref 23–29)
CO2 SERPL-SCNC: 26 MMOL/L (ref 23–29)
CREAT SERPL-MCNC: 0.7 MG/DL (ref 0.5–1.4)
CREAT SERPL-MCNC: 0.7 MG/DL (ref 0.5–1.4)
DIFFERENTIAL METHOD: ABNORMAL
DIFFERENTIAL METHOD: ABNORMAL
EOSINOPHIL # BLD AUTO: 0 K/UL (ref 0–0.5)
EOSINOPHIL # BLD AUTO: 0.1 K/UL (ref 0–0.5)
EOSINOPHIL NFR BLD: 0.6 % (ref 0–8)
EOSINOPHIL NFR BLD: 1 % (ref 0–8)
ERYTHROCYTE [DISTWIDTH] IN BLOOD BY AUTOMATED COUNT: 11.4 % (ref 11.5–14.5)
ERYTHROCYTE [DISTWIDTH] IN BLOOD BY AUTOMATED COUNT: 11.5 % (ref 11.5–14.5)
EST. GFR  (AFRICAN AMERICAN): >60 ML/MIN/1.73 M^2
EST. GFR  (AFRICAN AMERICAN): >60 ML/MIN/1.73 M^2
EST. GFR  (NON AFRICAN AMERICAN): >60 ML/MIN/1.73 M^2
EST. GFR  (NON AFRICAN AMERICAN): >60 ML/MIN/1.73 M^2
GLUCOSE SERPL-MCNC: 80 MG/DL (ref 70–110)
GLUCOSE SERPL-MCNC: 94 MG/DL (ref 70–110)
HCT VFR BLD AUTO: 39.9 % (ref 40–54)
HCT VFR BLD AUTO: 42.5 % (ref 40–54)
HGB BLD-MCNC: 14.2 G/DL (ref 14–18)
HGB BLD-MCNC: 14.6 G/DL (ref 14–18)
IMM GRANULOCYTES # BLD AUTO: 0.03 K/UL (ref 0–0.04)
IMM GRANULOCYTES # BLD AUTO: 0.07 K/UL (ref 0–0.04)
IMM GRANULOCYTES NFR BLD AUTO: 0.5 % (ref 0–0.5)
IMM GRANULOCYTES NFR BLD AUTO: 1.3 % (ref 0–0.5)
LYMPHOCYTES # BLD AUTO: 1.3 K/UL (ref 1–4.8)
LYMPHOCYTES # BLD AUTO: 1.5 K/UL (ref 1–4.8)
LYMPHOCYTES NFR BLD: 24.5 % (ref 18–48)
LYMPHOCYTES NFR BLD: 26.1 % (ref 18–48)
MAGNESIUM SERPL-MCNC: 1.9 MG/DL (ref 1.6–2.6)
MCH RBC QN AUTO: 31 PG (ref 27–31)
MCH RBC QN AUTO: 31.3 PG (ref 27–31)
MCHC RBC AUTO-ENTMCNC: 34.4 G/DL (ref 32–36)
MCHC RBC AUTO-ENTMCNC: 35.6 G/DL (ref 32–36)
MCV RBC AUTO: 88 FL (ref 82–98)
MCV RBC AUTO: 90 FL (ref 82–98)
MONOCYTES # BLD AUTO: 0.5 K/UL (ref 0.3–1)
MONOCYTES # BLD AUTO: 0.5 K/UL (ref 0.3–1)
MONOCYTES NFR BLD: 8.9 % (ref 4–15)
MONOCYTES NFR BLD: 8.9 % (ref 4–15)
NEUTROPHILS # BLD AUTO: 3.5 K/UL (ref 1.8–7.7)
NEUTROPHILS # BLD AUTO: 3.6 K/UL (ref 1.8–7.7)
NEUTROPHILS NFR BLD: 63 % (ref 38–73)
NEUTROPHILS NFR BLD: 64.3 % (ref 38–73)
NRBC BLD-RTO: 0 /100 WBC
NRBC BLD-RTO: 0 /100 WBC
PHOSPHATE SERPL-MCNC: 3.8 MG/DL (ref 2.7–4.5)
PLATELET # BLD AUTO: 215 K/UL (ref 150–350)
PLATELET # BLD AUTO: 220 K/UL (ref 150–350)
PMV BLD AUTO: 9.7 FL (ref 9.2–12.9)
PMV BLD AUTO: 9.8 FL (ref 9.2–12.9)
POTASSIUM SERPL-SCNC: 3.6 MMOL/L (ref 3.5–5.1)
POTASSIUM SERPL-SCNC: 3.6 MMOL/L (ref 3.5–5.1)
PROT SERPL-MCNC: 6.4 G/DL (ref 6–8.4)
RBC # BLD AUTO: 4.54 M/UL (ref 4.6–6.2)
RBC # BLD AUTO: 4.71 M/UL (ref 4.6–6.2)
SODIUM SERPL-SCNC: 138 MMOL/L (ref 136–145)
SODIUM SERPL-SCNC: 139 MMOL/L (ref 136–145)
WBC # BLD AUTO: 5.39 K/UL (ref 3.9–12.7)
WBC # BLD AUTO: 5.74 K/UL (ref 3.9–12.7)

## 2020-09-14 PROCEDURE — 37000009 HC ANESTHESIA EA ADD 15 MINS: Performed by: NEUROLOGICAL SURGERY

## 2020-09-14 PROCEDURE — 36000710: Performed by: NEUROLOGICAL SURGERY

## 2020-09-14 PROCEDURE — 85025 COMPLETE CBC W/AUTO DIFF WBC: CPT | Mod: 91

## 2020-09-14 PROCEDURE — 25000003 PHARM REV CODE 250: Performed by: NURSE PRACTITIONER

## 2020-09-14 PROCEDURE — 25000003 PHARM REV CODE 250: Performed by: NURSE ANESTHETIST, CERTIFIED REGISTERED

## 2020-09-14 PROCEDURE — 63600175 PHARM REV CODE 636 W HCPCS: Performed by: PSYCHIATRY & NEUROLOGY

## 2020-09-14 PROCEDURE — 99233 PR SUBSEQUENT HOSPITAL CARE,LEVL III: ICD-10-PCS | Mod: ,,, | Performed by: NURSE PRACTITIONER

## 2020-09-14 PROCEDURE — 64999 UNLISTED PX NERVOUS SYSTEM: CPT | Mod: ,,, | Performed by: NEUROLOGICAL SURGERY

## 2020-09-14 PROCEDURE — 63600175 PHARM REV CODE 636 W HCPCS: Performed by: NURSE ANESTHETIST, CERTIFIED REGISTERED

## 2020-09-14 PROCEDURE — 25000003 PHARM REV CODE 250: Performed by: STUDENT IN AN ORGANIZED HEALTH CARE EDUCATION/TRAINING PROGRAM

## 2020-09-14 PROCEDURE — 83735 ASSAY OF MAGNESIUM: CPT

## 2020-09-14 PROCEDURE — 63600175 PHARM REV CODE 636 W HCPCS: Performed by: NEUROLOGICAL SURGERY

## 2020-09-14 PROCEDURE — 36000708 HC OR TIME LEV III 1ST 15 MIN: Performed by: NEUROLOGICAL SURGERY

## 2020-09-14 PROCEDURE — 25000003 PHARM REV CODE 250: Performed by: PSYCHIATRY & NEUROLOGY

## 2020-09-14 PROCEDURE — 63600175 PHARM REV CODE 636 W HCPCS: Performed by: STUDENT IN AN ORGANIZED HEALTH CARE EDUCATION/TRAINING PROGRAM

## 2020-09-14 PROCEDURE — D9220A PRA ANESTHESIA: Mod: CRNA,,, | Performed by: NURSE ANESTHETIST, CERTIFIED REGISTERED

## 2020-09-14 PROCEDURE — 95718 PR EEG, W/VIDEO, CONT RECORD, I&R, 2-12 HRS: ICD-10-PCS | Mod: ,,, | Performed by: PSYCHIATRY & NEUROLOGY

## 2020-09-14 PROCEDURE — 80053 COMPREHEN METABOLIC PANEL: CPT

## 2020-09-14 PROCEDURE — 84100 ASSAY OF PHOSPHORUS: CPT

## 2020-09-14 PROCEDURE — 36000709 HC OR TIME LEV III EA ADD 15 MIN: Performed by: NEUROLOGICAL SURGERY

## 2020-09-14 PROCEDURE — 95718 EEG PHYS/QHP 2-12 HR W/VEEG: CPT | Mod: ,,, | Performed by: PSYCHIATRY & NEUROLOGY

## 2020-09-14 PROCEDURE — 25000003 PHARM REV CODE 250: Performed by: NEUROLOGICAL SURGERY

## 2020-09-14 PROCEDURE — 99233 SBSQ HOSP IP/OBS HIGH 50: CPT | Mod: ,,, | Performed by: NURSE PRACTITIONER

## 2020-09-14 PROCEDURE — C9254 INJECTION, LACOSAMIDE: HCPCS | Performed by: STUDENT IN AN ORGANIZED HEALTH CARE EDUCATION/TRAINING PROGRAM

## 2020-09-14 PROCEDURE — C9254 INJECTION, LACOSAMIDE: HCPCS | Performed by: PSYCHIATRY & NEUROLOGY

## 2020-09-14 PROCEDURE — 80048 BASIC METABOLIC PNL TOTAL CA: CPT

## 2020-09-14 PROCEDURE — D9220A PRA ANESTHESIA: Mod: ANES,,, | Performed by: STUDENT IN AN ORGANIZED HEALTH CARE EDUCATION/TRAINING PROGRAM

## 2020-09-14 PROCEDURE — 94761 N-INVAS EAR/PLS OXIMETRY MLT: CPT

## 2020-09-14 PROCEDURE — 25000003 PHARM REV CODE 250: Performed by: PHYSICIAN ASSISTANT

## 2020-09-14 PROCEDURE — 99233 SBSQ HOSP IP/OBS HIGH 50: CPT | Mod: ,,, | Performed by: PSYCHIATRY & NEUROLOGY

## 2020-09-14 PROCEDURE — 36000711: Performed by: NEUROLOGICAL SURGERY

## 2020-09-14 PROCEDURE — 20000000 HC ICU ROOM

## 2020-09-14 PROCEDURE — D9220A PRA ANESTHESIA: ICD-10-PCS | Mod: CRNA,,, | Performed by: NURSE ANESTHETIST, CERTIFIED REGISTERED

## 2020-09-14 PROCEDURE — D9220A PRA ANESTHESIA: ICD-10-PCS | Mod: ANES,,, | Performed by: STUDENT IN AN ORGANIZED HEALTH CARE EDUCATION/TRAINING PROGRAM

## 2020-09-14 PROCEDURE — 37000008 HC ANESTHESIA 1ST 15 MINUTES: Performed by: NEUROLOGICAL SURGERY

## 2020-09-14 PROCEDURE — 99233 PR SUBSEQUENT HOSPITAL CARE,LEVL III: ICD-10-PCS | Mod: ,,, | Performed by: PSYCHIATRY & NEUROLOGY

## 2020-09-14 PROCEDURE — 64999 PR REMOVAL OF EEG ELECTRODES: ICD-10-PCS | Mod: ,,, | Performed by: NEUROLOGICAL SURGERY

## 2020-09-14 RX ORDER — ACETAMINOPHEN 650 MG/1
650 SUPPOSITORY RECTAL EVERY 6 HOURS PRN
Status: DISCONTINUED | OUTPATIENT
Start: 2020-09-14 | End: 2020-09-15 | Stop reason: HOSPADM

## 2020-09-14 RX ORDER — KETOROLAC TROMETHAMINE 30 MG/ML
15 INJECTION, SOLUTION INTRAMUSCULAR; INTRAVENOUS EVERY 8 HOURS PRN
Status: CANCELLED | OUTPATIENT
Start: 2020-09-14 | End: 2020-09-16

## 2020-09-14 RX ORDER — HALOPERIDOL 5 MG/ML
0.5 INJECTION INTRAMUSCULAR
Status: DISCONTINUED | OUTPATIENT
Start: 2020-09-14 | End: 2020-09-15 | Stop reason: HOSPADM

## 2020-09-14 RX ORDER — NEOSTIGMINE METHYLSULFATE 0.5 MG/ML
INJECTION, SOLUTION INTRAVENOUS
Status: DISCONTINUED | OUTPATIENT
Start: 2020-09-14 | End: 2020-09-14

## 2020-09-14 RX ORDER — OXYCODONE AND ACETAMINOPHEN 5; 325 MG/1; MG/1
1 TABLET ORAL
Status: CANCELLED | OUTPATIENT
Start: 2020-09-14

## 2020-09-14 RX ORDER — HYDROCODONE BITARTRATE AND ACETAMINOPHEN 5; 325 MG/1; MG/1
1 TABLET ORAL EVERY 4 HOURS PRN
Status: DISCONTINUED | OUTPATIENT
Start: 2020-09-14 | End: 2020-09-15 | Stop reason: HOSPADM

## 2020-09-14 RX ORDER — LAMOTRIGINE 100 MG/1
200 TABLET ORAL 2 TIMES DAILY
Status: DISCONTINUED | OUTPATIENT
Start: 2020-09-14 | End: 2020-09-15 | Stop reason: HOSPADM

## 2020-09-14 RX ORDER — MIDAZOLAM HYDROCHLORIDE 1 MG/ML
INJECTION, SOLUTION INTRAMUSCULAR; INTRAVENOUS
Status: DISCONTINUED | OUTPATIENT
Start: 2020-09-14 | End: 2020-09-14

## 2020-09-14 RX ORDER — VANCOMYCIN HYDROCHLORIDE 1 G/20ML
INJECTION, POWDER, LYOPHILIZED, FOR SOLUTION INTRAVENOUS
Status: DISCONTINUED | OUTPATIENT
Start: 2020-09-14 | End: 2020-09-14 | Stop reason: HOSPADM

## 2020-09-14 RX ORDER — ACETAMINOPHEN 325 MG/1
650 TABLET ORAL EVERY 6 HOURS PRN
Status: DISCONTINUED | OUTPATIENT
Start: 2020-09-14 | End: 2020-09-15 | Stop reason: HOSPADM

## 2020-09-14 RX ORDER — LIDOCAINE HYDROCHLORIDE 20 MG/ML
INJECTION INTRAVENOUS
Status: DISCONTINUED | OUTPATIENT
Start: 2020-09-14 | End: 2020-09-14

## 2020-09-14 RX ORDER — FENTANYL CITRATE 50 UG/ML
INJECTION, SOLUTION INTRAMUSCULAR; INTRAVENOUS
Status: DISCONTINUED | OUTPATIENT
Start: 2020-09-14 | End: 2020-09-14

## 2020-09-14 RX ORDER — MUPIROCIN 20 MG/G
OINTMENT TOPICAL 2 TIMES DAILY
Status: DISCONTINUED | OUTPATIENT
Start: 2020-09-14 | End: 2020-09-15 | Stop reason: HOSPADM

## 2020-09-14 RX ORDER — ROCURONIUM BROMIDE 10 MG/ML
INJECTION, SOLUTION INTRAVENOUS
Status: DISCONTINUED | OUTPATIENT
Start: 2020-09-14 | End: 2020-09-14

## 2020-09-14 RX ORDER — HYDROMORPHONE HYDROCHLORIDE 1 MG/ML
0.2 INJECTION, SOLUTION INTRAMUSCULAR; INTRAVENOUS; SUBCUTANEOUS EVERY 5 MIN PRN
Status: CANCELLED | OUTPATIENT
Start: 2020-09-14

## 2020-09-14 RX ORDER — PROPOFOL 10 MG/ML
VIAL (ML) INTRAVENOUS
Status: DISCONTINUED | OUTPATIENT
Start: 2020-09-14 | End: 2020-09-14

## 2020-09-14 RX ORDER — CLOBAZAM 10 MG/1
40 TABLET ORAL 2 TIMES DAILY
Status: DISCONTINUED | OUTPATIENT
Start: 2020-09-14 | End: 2020-09-15 | Stop reason: HOSPADM

## 2020-09-14 RX ORDER — TOPIRAMATE 25 MG/1
50 TABLET ORAL 2 TIMES DAILY
Status: DISCONTINUED | OUTPATIENT
Start: 2020-09-14 | End: 2020-09-15 | Stop reason: HOSPADM

## 2020-09-14 RX ORDER — CEFAZOLIN SODIUM 1 G/3ML
INJECTION, POWDER, FOR SOLUTION INTRAMUSCULAR; INTRAVENOUS
Status: DISCONTINUED | OUTPATIENT
Start: 2020-09-14 | End: 2020-09-14

## 2020-09-14 RX ORDER — BACITRACIN ZINC 500 UNIT/G
OINTMENT (GRAM) TOPICAL
Status: DISCONTINUED | OUTPATIENT
Start: 2020-09-14 | End: 2020-09-14 | Stop reason: HOSPADM

## 2020-09-14 RX ADMIN — TOPIRAMATE 50 MG: 25 TABLET, FILM COATED ORAL at 09:09

## 2020-09-14 RX ADMIN — POTASSIUM CHLORIDE 40 MEQ: 1.5 POWDER, FOR SOLUTION ORAL at 05:09

## 2020-09-14 RX ADMIN — PROPOFOL 100 MG: 10 INJECTION, EMULSION INTRAVENOUS at 04:09

## 2020-09-14 RX ADMIN — CEFAZOLIN 2 G: 1 INJECTION, POWDER, FOR SOLUTION INTRAMUSCULAR; INTRAVENOUS at 05:09

## 2020-09-14 RX ADMIN — CEFAZOLIN 2 G: 330 INJECTION, POWDER, FOR SOLUTION INTRAMUSCULAR; INTRAVENOUS at 04:09

## 2020-09-14 RX ADMIN — CLOBAZAM 40 MG: 10 TABLET ORAL at 10:09

## 2020-09-14 RX ADMIN — ROCURONIUM BROMIDE 20 MG: 10 INJECTION, SOLUTION INTRAVENOUS at 04:09

## 2020-09-14 RX ADMIN — NEOSTIGMINE METHYLSULFATE 4 MG: 0.5 INJECTION INTRAVENOUS at 05:09

## 2020-09-14 RX ADMIN — LAMOTRIGINE 200 MG: 100 TABLET ORAL at 11:09

## 2020-09-14 RX ADMIN — CITALOPRAM HYDROBROMIDE 20 MG: 10 TABLET ORAL at 08:09

## 2020-09-14 RX ADMIN — SODIUM CHLORIDE, SODIUM GLUCONATE, SODIUM ACETATE, POTASSIUM CHLORIDE, MAGNESIUM CHLORIDE, SODIUM PHOSPHATE, DIBASIC, AND POTASSIUM PHOSPHATE: .53; .5; .37; .037; .03; .012; .00082 INJECTION, SOLUTION INTRAVENOUS at 04:09

## 2020-09-14 RX ADMIN — VANCOMYCIN HYDROCHLORIDE 1500 MG: 1.5 INJECTION, POWDER, LYOPHILIZED, FOR SOLUTION INTRAVENOUS at 07:09

## 2020-09-14 RX ADMIN — FENTANYL CITRATE 50 MCG: 50 INJECTION, SOLUTION INTRAMUSCULAR; INTRAVENOUS at 05:09

## 2020-09-14 RX ADMIN — PROPOFOL 200 MG: 10 INJECTION, EMULSION INTRAVENOUS at 04:09

## 2020-09-14 RX ADMIN — FENTANYL CITRATE 50 MCG: 50 INJECTION, SOLUTION INTRAMUSCULAR; INTRAVENOUS at 04:09

## 2020-09-14 RX ADMIN — LIDOCAINE HYDROCHLORIDE 25 MG: 20 INJECTION, SOLUTION INTRAVENOUS at 04:09

## 2020-09-14 RX ADMIN — SODIUM CHLORIDE 400 MG: 9 INJECTION, SOLUTION INTRAVENOUS at 10:09

## 2020-09-14 RX ADMIN — SODIUM CHLORIDE: 0.9 INJECTION, SOLUTION INTRAVENOUS at 12:09

## 2020-09-14 RX ADMIN — LAMOTRIGINE 200 MG: 100 TABLET ORAL at 09:09

## 2020-09-14 RX ADMIN — OXYCODONE HYDROCHLORIDE 5 MG: 5 TABLET ORAL at 06:09

## 2020-09-14 RX ADMIN — TOPIRAMATE 50 MG: 25 TABLET, FILM COATED ORAL at 10:09

## 2020-09-14 RX ADMIN — CEFAZOLIN 2 G: 1 INJECTION, POWDER, FOR SOLUTION INTRAMUSCULAR; INTRAVENOUS at 01:09

## 2020-09-14 RX ADMIN — SODIUM CHLORIDE 200 MG: 9 INJECTION, SOLUTION INTRAVENOUS at 09:09

## 2020-09-14 RX ADMIN — MIDAZOLAM HYDROCHLORIDE 2 MG: 1 INJECTION, SOLUTION INTRAMUSCULAR; INTRAVENOUS at 04:09

## 2020-09-14 RX ADMIN — ROCURONIUM BROMIDE 40 MG: 10 INJECTION, SOLUTION INTRAVENOUS at 04:09

## 2020-09-14 RX ADMIN — MUPIROCIN: 20 OINTMENT TOPICAL at 09:09

## 2020-09-14 RX ADMIN — SODIUM CHLORIDE: 0.9 INJECTION, SOLUTION INTRAVENOUS at 01:09

## 2020-09-14 RX ADMIN — CLOBAZAM 40 MG: 10 TABLET ORAL at 09:09

## 2020-09-14 RX ADMIN — ROCURONIUM BROMIDE 10 MG: 10 INJECTION, SOLUTION INTRAVENOUS at 04:09

## 2020-09-14 NOTE — PLAN OF CARE
[facility] Care Plan    POC reviewed with Darin Cunha and family at 1700. Pt verbalized understanding. Questions and concerns addressed. No acute events today. Depth electrodes removed successfully. Plan for CTH. Pt progressing toward goals. Will continue to monitor. See below and flowsheets for full assessment and VS info.       Neuro:  Remington Coma Scale  Best Eye Response: 4-->(E4) spontaneous  Best Motor Response: 6-->(M6) obeys commands  Best Verbal Response: 5-->(V5) oriented  Anthony Coma Scale Score: 15  Assessment Qualifiers: patient not sedated/intubated, no eye obstruction present  Pupil PERRLA: yes     24 hr Temp:  [97.6 °F (36.4 °C)-98.6 °F (37 °C)]     CV:   Rhythm: normal sinus rhythm  BP goals:   SBP < 140  MAP > 65    Resp:   O2 Device (Oxygen Therapy): room air  Oxygen Concentration (%): 100    Plan: N/A    GI/:  STEPHENIE Total Score: 20  Diet/Nutrition Received: NPO(awaiting surgery)  Last Bowel Movement: 09/13/20  Voiding Characteristics: voids spontaneously without difficulty    Intake/Output Summary (Last 24 hours) at 9/14/2020 1658  Last data filed at 9/14/2020 1605  Gross per 24 hour   Intake 1290 ml   Output 2050 ml   Net -760 ml     Unmeasured Output  Urine Occurrence: 1  Stool Occurrence: 0  Pad Count: 1    Labs/Accuchecks:  Recent Labs   Lab 09/14/20  0317   WBC 5.74   RBC 4.54*   HGB 14.2   HCT 39.9*         Recent Labs   Lab 09/14/20  0317      K 3.6   CO2 26      BUN 10   CREATININE 0.7   ALKPHOS 85   ALT 23   AST 27   BILITOT 0.7      Recent Labs   Lab 09/13/20  1143   INR 1.1   APTT 29.6    No results for input(s): CPK, CPKMB, TROPONINI, MB in the last 168 hours.    Electrolytes: N/A - electrolytes WDL  Accuchecks: none    Gtts:   sodium chloride 0.9% 75 mL/hr at 09/14/20 1605       LDA/Wounds:  Lines/Drains/Airways       Peripheral Intravenous Line                   Peripheral IV - Single Lumen 09/09/20 2134 20 G Anterior;Left Forearm 4 days         Peripheral IV -  Single Lumen 09/12/20 1458 20 G Right Hand 2 days                  Wounds: No  Wound care consulted: No

## 2020-09-14 NOTE — PROGRESS NOTES
"Post Op Check    Examined in [facility]    Awake, alert,oriented, "thank you, when can I go home?"  PERRL  Head wrap in place  ELIZABETH spontaneously  FCx4    Plan:  Vanc x24h  CT stat   PT/OT  Anticipate DC home tomorrow  AED per Epilepsy  VNS is on today per Epilepsy    Katey Lee MD  Neurosurgery  [facility]    "

## 2020-09-14 NOTE — SUBJECTIVE & OBJECTIVE
Interval History: To OR today for sEEG removal    Medications:  Continuous Infusions:   sodium chloride 0.9% 75 mL/hr at 09/14/20 0805     Scheduled Meds:   ceFAZolin (ANCEF) IVPB  2 g Intravenous Q8H    citalopram  20 mg Oral Daily    senna-docusate 8.6-50 mg  1 tablet Oral Daily     PRN Meds:sodium chloride, acetaminophen, hydrALAZINE, labetaloL, magnesium oxide, magnesium oxide, oxyCODONE, potassium chloride, potassium chloride, potassium chloride, potassium, sodium phosphates, potassium, sodium phosphates, potassium, sodium phosphates, promethazine (PHENERGAN) IVPB     Review of Systems    Objective:     Weight: 78.5 kg (173 lb 1 oz)  Body mass index is 22.83 kg/m².  Vital Signs (Most Recent):  Temp: 98 °F (36.7 °C) (09/14/20 0705)  Pulse: 63 (09/14/20 0811)  Resp: (!) 3 (09/14/20 0811)  BP: 116/73 (09/14/20 0805)  SpO2: 99 % (09/14/20 0811) Vital Signs (24h Range):  Temp:  [97.6 °F (36.4 °C)-98.6 °F (37 °C)] 98 °F (36.7 °C)  Pulse:  [49-87] 63  Resp:  [3-28] 3  SpO2:  [95 %-100 %] 99 %  BP: ()/(57-86) 116/73     Date 09/14/20 0700 - 09/15/20 0659   Shift 0241-2932 0196-4503 6646-2084 24 Hour Total   INTAKE   I.V.(mL/kg) 186.3(2.4)   186.3(2.4)   Shift Total(mL/kg) 186.3(2.4)   186.3(2.4)   OUTPUT   Shift Total(mL/kg)       Weight (kg) 78.5 78.5 78.5 78.5                        Neurosurgery Physical Exam       GCS 15  AOx3  PERRL  CN intact  Full strength x4          Significant Labs:  Recent Labs   Lab 09/13/20  0400 09/14/20  0317   GLU 96 94    139   K 3.5 3.6    104   CO2 24 26   BUN 10 10   CREATININE 0.8 0.7   CALCIUM 9.0 9.1   MG 1.9 1.9     Recent Labs   Lab 09/13/20  0400 09/14/20  0317   WBC 7.54 5.74   HGB 14.2 14.2   HCT 39.7* 39.9*    220     Recent Labs   Lab 09/13/20  1143   INR 1.1   APTT 29.6     Microbiology Results (last 7 days)     ** No results found for the last 168 hours. **        All pertinent labs from the last 24 hours have been reviewed.    Significant  Diagnostics:  CT: No results found in the last 24 hours.  MRI: No results found in the last 24 hours.

## 2020-09-14 NOTE — SUBJECTIVE & OBJECTIVE
Interval History: No clinical seizures overnight, resume home AEDs, VNS turned back on. Patient to OR today with NSGY for sEEG removal.     Current Facility-Administered Medications   Medication Dose Route Frequency Provider Last Rate Last Dose    0.9%  NaCl infusion (for blood administration)   Intravenous Q24H PRN Ino Collado MD        0.9%  NaCl infusion   Intravenous Continuous Estefani Turpin PA-C 75 mL/hr at 09/14/20 1305      acetaminophen tablet 650 mg  650 mg Oral Q4H PRN Georgie Miinea, NP   325 mg at 09/10/20 2106    ceFAZolin injection 2 g  2 g Intravenous Q8H Ethan Owen MD   2 g at 09/14/20 0535    citalopram tablet 20 mg  20 mg Oral Daily Georgie Miinea, NP   20 mg at 09/14/20 0809    cloBAZam Tab 40 mg  40 mg Oral BID Kiara Luque MD PhD   40 mg at 09/14/20 1039    haloperidol lactate injection 0.5 mg  0.5 mg Intravenous Q1H PRN Madalyn Dunaway MD        hydrALAZINE injection 10 mg  10 mg Intravenous Q4H PRN Georgie Miinea, NP   10 mg at 08/31/20 1601    labetaloL injection 10 mg  10 mg Intravenous Q4H PRN Georgie Miinea, NP        lacosamide (VIMPAT) 200 mg in sodium chloride 0.9% 100 mL IVPB  200 mg Intravenous Q12H Kiara Luque MD PhD        lamoTRIgine tablet 200 mg  200 mg Oral BID Kiara Luque MD PhD   200 mg at 09/14/20 1134    magnesium oxide tablet 800 mg  800 mg Oral PRN Melissa Flores, NP        magnesium oxide tablet 800 mg  800 mg Oral PRN Melissa Flores, NP        oxyCODONE immediate release tablet 5 mg  5 mg Oral Q6H PRN Georgie Miinea, NP   5 mg at 09/02/20 1914    potassium chloride packet 40 mEq  40 mEq Oral PRN Melissa Flores, NP   40 mEq at 09/14/20 0519    potassium chloride packet 40 mEq  40 mEq Oral PRN Melissa Flores, NP   40 mEq at 09/10/20 0933    potassium chloride packet 40 mEq  40 mEq Oral PRN Melissa Flores NP        potassium, sodium phosphates 280-160-250 mg packet 2 packet  2 packet Oral PRN Melissa PALMER  PRIETO Flores        potassium, sodium phosphates 280-160-250 mg packet 2 packet  2 packet Oral PRN Melissa Flores NP        potassium, sodium phosphates 280-160-250 mg packet 2 packet  2 packet Oral PRN Melissa Flores NP        promethazine (PHENERGAN) 6.25 mg in dextrose 5 % 50 mL IVPB  6.25 mg Intravenous Q8H PRN Georgie Hoyos  mL/hr at 08/31/20 1730 6.25 mg at 08/31/20 1730    senna-docusate 8.6-50 mg per tablet 1 tablet  1 tablet Oral Daily Georgie Hoyos NP   Stopped at 09/08/20 0900    topiramate tablet 50 mg  50 mg Oral BID Kiara Luque MD PhD   50 mg at 09/14/20 1038     Continuous Infusions:   sodium chloride 0.9% 75 mL/hr at 09/14/20 1305       Review of Systems   Constitutional: Negative for chills, diaphoresis, fatigue and fever.   Respiratory: Negative for cough, shortness of breath and wheezing.    Cardiovascular: Negative for chest pain and palpitations.   Gastrointestinal: Negative for abdominal pain, diarrhea, nausea and vomiting.   Musculoskeletal: Negative for arthralgias and myalgias.   Neurological: Positive for seizures. Negative for dizziness, syncope, speech difficulty, weakness and headaches.   Psychiatric/Behavioral: Negative for behavioral problems, confusion and sleep disturbance. The patient is not nervous/anxious.      Objective:     Vital Signs (Most Recent):  Temp: 97.9 °F (36.6 °C) (09/14/20 1105)  Pulse: 75 (09/14/20 1305)  Resp: (!) 26 (09/14/20 1305)  BP: 130/85 (09/14/20 1305)  SpO2: 99 % (09/14/20 1305) Vital Signs (24h Range):  Temp:  [97.6 °F (36.4 °C)-98.6 °F (37 °C)] 97.9 °F (36.6 °C)  Pulse:  [49-80] 75  Resp:  [3-28] 26  SpO2:  [95 %-100 %] 99 %  BP: ()/(57-85) 130/85     Weight: 78.5 kg (173 lb 1 oz)  Body mass index is 22.83 kg/m².    Physical Exam  Constitutional:       General: He is not in acute distress.     Appearance: Normal appearance. He is not diaphoretic.   HENT:      Head: Normocephalic.      Comments: sEEG in place  Eyes:       General: No scleral icterus.        Right eye: No discharge.         Left eye: No discharge.      Extraocular Movements: EOM normal.      Conjunctiva/sclera: Conjunctivae normal.      Pupils: Pupils are equal, round, and reactive to light.   Cardiovascular:      Rate and Rhythm: Normal rate.   Pulmonary:      Effort: Pulmonary effort is normal. No respiratory distress.   Abdominal:      General: There is no distension.      Palpations: Abdomen is soft.      Tenderness: There is no abdominal tenderness.   Musculoskeletal: Normal range of motion.         General: No deformity or signs of injury.   Skin:     General: Skin is warm and dry.   Neurological:      General: No focal deficit present.      Mental Status: He is alert and oriented to person, place, and time. Mental status is at baseline.      Coordination: Finger-Nose-Finger Test normal.   Psychiatric:         Mood and Affect: Mood normal.         Speech: Speech normal.         Behavior: Behavior normal.         Thought Content: Thought content normal.         NEUROLOGICAL EXAMINATION:     MENTAL STATUS   Oriented to person, place, and time.   Speech: speech is normal   Level of consciousness: alert    CRANIAL NERVES     CN III, IV, VI   Pupils are equal, round, and reactive to light.  Extraocular motions are normal.     CN VII   Facial expression full, symmetric.     CN VIII   Hearing: intact    CN XI   CN XI normal.     CN XII   CN XII normal.     GAIT AND COORDINATION      Coordination   Finger to nose coordination: normal      Significant Labs: All pertinent lab results from the past 24 hours have been reviewed.    Significant Studies: I have reviewed all pertinent imaging results/findings within the past 24 hours.

## 2020-09-14 NOTE — PROGRESS NOTES
[facility]  Neurosurgery  Progress Note    Subjective:     History of Present Illness: Darin Cunha is a 25 y.o. male with intractable epilepsy since 2013 who presents as a referral from Dr. Abhijeet Do to discuss the gamut of epilepsy surgery options. Today's visit is a video visit, and the patient is a passenger in a moving vehicle. His mother is not with him.      The patient states that when he has events, he stares off and smacks his lips. The episodes last for about 5-6 minutes. He often waves his hands and feels tired afterwards. He currently has about 3-5 episodes a week. He has failed at least 4 AEDs at therapeutic dosages.      Per EEG review, he has bilateral temporal involvement, moreso left-sided than right.      Triggers include being tired and being stressed. He is not working (on disability) but finished his GED. He lives next door to grandmother and nearby to his mother. His major concern is memory loss from ongoing seizures.      Of notes, he endorses a history of staph infection previously. He denies history of bleeding or anesthetic complications. He has a VNS in place.        Post-Op Info:  Procedure(s) (LRB):  CREATION, CRANIAL YUNG HOLE, PLACEMENT OF THE FIDUCIAL SCREWS BILATERAL 2.PLACEMENT OF SEEG ELECTRODES BILATERAL WITH OWEN ROBOT (Bilateral)   14 Days Post-Op     Interval History: To OR today for sEEG removal    Medications:  Continuous Infusions:   sodium chloride 0.9% 75 mL/hr at 09/14/20 0805     Scheduled Meds:   ceFAZolin (ANCEF) IVPB  2 g Intravenous Q8H    citalopram  20 mg Oral Daily    senna-docusate 8.6-50 mg  1 tablet Oral Daily     PRN Meds:sodium chloride, acetaminophen, hydrALAZINE, labetaloL, magnesium oxide, magnesium oxide, oxyCODONE, potassium chloride, potassium chloride, potassium chloride, potassium, sodium phosphates, potassium, sodium phosphates, potassium, sodium phosphates, promethazine (PHENERGAN) IVPB     Review of Systems    Objective:      Weight: 78.5 kg (173 lb 1 oz)  Body mass index is 22.83 kg/m².  Vital Signs (Most Recent):  Temp: 98 °F (36.7 °C) (09/14/20 0705)  Pulse: 63 (09/14/20 0811)  Resp: (!) 3 (09/14/20 0811)  BP: 116/73 (09/14/20 0805)  SpO2: 99 % (09/14/20 0811) Vital Signs (24h Range):  Temp:  [97.6 °F (36.4 °C)-98.6 °F (37 °C)] 98 °F (36.7 °C)  Pulse:  [49-87] 63  Resp:  [3-28] 3  SpO2:  [95 %-100 %] 99 %  BP: ()/(57-86) 116/73     Date 09/14/20 0700 - 09/15/20 0659   Shift 4709-5783 5130-5735 5261-9196 24 Hour Total   INTAKE   I.V.(mL/kg) 186.3(2.4)   186.3(2.4)   Shift Total(mL/kg) 186.3(2.4)   186.3(2.4)   OUTPUT   Shift Total(mL/kg)       Weight (kg) 78.5 78.5 78.5 78.5                        Neurosurgery Physical Exam       GCS 15  AOx3  PERRL  CN intact  Full strength x4          Significant Labs:  Recent Labs   Lab 09/13/20  0400 09/14/20  0317   GLU 96 94    139   K 3.5 3.6    104   CO2 24 26   BUN 10 10   CREATININE 0.8 0.7   CALCIUM 9.0 9.1   MG 1.9 1.9     Recent Labs   Lab 09/13/20  0400 09/14/20  0317   WBC 7.54 5.74   HGB 14.2 14.2   HCT 39.7* 39.9*    220     Recent Labs   Lab 09/13/20  1143   INR 1.1   APTT 29.6     Microbiology Results (last 7 days)     ** No results found for the last 168 hours. **        All pertinent labs from the last 24 hours have been reviewed.    Significant Diagnostics:  CT: No results found in the last 24 hours.  MRI: No results found in the last 24 hours.    Assessment/Plan:     * Epilepsy  25 M with intractable epilepsy s/p multiple failed AEDs and VNS placement now s/p sEEG lead placement:      OR today for sEEG lead removal  Admitted to [facility]  q1h neurochecks  sEEG leads in place, abx while in place, continue headwrap  SBP<140  No HOB restrictions  Ok for recumbent bike in bed  NPO for OR today  Holding SQH for OR today  Voiding spontaneously s/p michelle removal  Monitor for seizure activity - no benzos or sedating meds unless cleared by Epilepsy team  AEDs per  Epilepsy  Please page w/exam change  Tentative sEEG bolt removal on 9/14, booked, consented, will pre-op tonight for OR tomorrow.     Dispo: continue ICU care        Brody Ornelas MD  Neurosurgery  [facility]

## 2020-09-14 NOTE — ASSESSMENT & PLAN NOTE
25M with a PMHx of intractable epilepsy s/p VNS placement on Onfi 40 mg BID, Lamictal 200 mg BID, Vimpat 200 mg BID, and Topamax 50 mg BID s/p bilateral sEEG placement with Dr. Melani Ortiz on 8/31. Postop CTH stable.    Recommendations:  - Continuous vEEG monitoring  - 6 electrographic seizures from L hippo and entro areas since admit  - Resume home AED regimen (Vimpat 200 mg BID, Onfi 40 mg BID, Lamictal 200 mg BID, and TPM 50 mg BID). Vimpat 400 mg x1 loading dose prior to resuming home maintenance dose.  - Neurosurgery following; patient to OR for sEEG removal today  - Seizure precautions  - Avoid agents that lower seizure threshold  - VNS turned back on 9/14      Plan of care discussed with patient and NCC team. Will follow. Please call with questions.

## 2020-09-14 NOTE — TRANSFER OF CARE
"Anesthesia Transfer of Care Note    Patient: Darin Cunha    Procedure(s) Performed: Procedure(s) (LRB):  REMOVAL OF STEREO EEG LEADS (DEPTH ELECTRODES); removal of sEEG bolts and depth electrodes (N/A)    Patient location: ICU    Anesthesia Type: general    Transport from OR: Transported from OR on 6-10 L/min O2 by face mask with adequate spontaneous ventilation    Post pain: adequate analgesia    Post assessment: no apparent anesthetic complications and tolerated procedure well    Post vital signs: stable    Level of consciousness: awake, alert and oriented    Nausea/Vomiting: no nausea/vomiting    Complications: none    Transfer of care protocol was followed      Last vitals:   Visit Vitals  /81   Pulse 69   Temp 36.7 °C (98 °F) (Oral)   Resp 20   Ht 6' 1" (1.854 m)   Wt 78.5 kg (173 lb 1 oz)   SpO2 (!) 86%   BMI 22.83 kg/m²     "

## 2020-09-14 NOTE — ASSESSMENT & PLAN NOTE
25 M with intractable epilepsy s/p multiple failed AEDs and VNS placement now s/p sEEG lead placement:      OR today for sEEG lead removal  Admitted to [facility]  q1h neurochecks  sEEG leads in place, abx while in place, continue headwrap  SBP<140  No HOB restrictions  Ok for recumbent bike in bed  NPO for OR today  Holding SQH for OR today  Voiding spontaneously s/p michelle removal  Monitor for seizure activity - no benzos or sedating meds unless cleared by Epilepsy team  AEDs per Epilepsy  Please page w/exam change  Tentative sEEG bolt removal on 9/14, booked, consented, will pre-op tonight for OR tomorrow.     Dispo: continue ICU care

## 2020-09-14 NOTE — PROGRESS NOTES
[facility]  Neurology-Epilepsy  Progress Note    Patient Name: Darin Cunha  MRN: [id number]  Admission Date: 8/31/2020  Hospital Length of Stay: 13 days  Code Status: Full Code   Attending Provider: Tony Cantor MD  Primary Care Physician: Primary Doctor No   Principal Problem:Epilepsy    Subjective:     Hospital Course:   8/31: sEEG placement. Hold Vimpat. Continue other AEDs: Onfi 40 mg BID, Lamictal 200 mg BID, Topamax 50 mg BID.  8/31>9/1: No clinical or electrographic seizures overnight  9/1>9/2: Four electrographic seizures (3 with clinical correlate) originating from L hippo and entro areas  9/2>9/3: No electrographic seizures  9/3>9/4: No electrographic seizures  9/4>9/5: No electrographic seizures  9/5>9/6: No electrographic seizures  9/6>9/7: No electrographic seizures  9/7>9/8: No electrographic seizures  9/8>9/9: No electrographic seizures  9/9>9/10: No electrographic seizures  9/10>9/11: One clinical and electrographic seizure originating from L Hippo and Entro areas  9/11>9/12: One clinical and electrographic seizure so far 9/12, progressed to GTC  9/12>9/13 - no acute events overnight.     Interval History: No acute events. No pain or discomfort.     Current Facility-Administered Medications   Medication Dose Route Frequency Provider Last Rate Last Dose    0.9%  NaCl infusion (for blood administration)   Intravenous Q24H PRN Ino Collado MD        [START ON 9/14/2020] 0.9%  NaCl infusion   Intravenous Continuous Estefani Turpin PA-C        acetaminophen tablet 650 mg  650 mg Oral Q4H PRN Georgie Miinea, NP   325 mg at 09/10/20 2106    ceFAZolin injection 2 g  2 g Intravenous Q8H Ethan Owen MD   2 g at 09/13/20 2156    citalopram tablet 20 mg  20 mg Oral Daily Georgie Miinea, NP   20 mg at 09/13/20 0833    hydrALAZINE injection 10 mg  10 mg Intravenous Q4H PRN Georgie Miinea, NP   10 mg at 08/31/20 1601    labetaloL injection 10 mg  10 mg Intravenous Q4H PRN  Georgie Hoyos NP        magnesium oxide tablet 800 mg  800 mg Oral PRN Melissa Flores NP        magnesium oxide tablet 800 mg  800 mg Oral PRN Melissa Flores NP        oxyCODONE immediate release tablet 5 mg  5 mg Oral Q6H PRN Georgie Hoyos NP   5 mg at 09/02/20 1914    potassium chloride packet 40 mEq  40 mEq Oral PRN Melissa Flores NP   40 mEq at 09/13/20 0834    potassium chloride packet 40 mEq  40 mEq Oral PRN Melissa Flores NP   40 mEq at 09/10/20 0933    potassium chloride packet 40 mEq  40 mEq Oral PRN Melissa Flores NP        potassium, sodium phosphates 280-160-250 mg packet 2 packet  2 packet Oral PRN Melissa Flores NP        potassium, sodium phosphates 280-160-250 mg packet 2 packet  2 packet Oral PRN Melissa Flores NP        potassium, sodium phosphates 280-160-250 mg packet 2 packet  2 packet Oral PRN Melissa Flores NP        promethazine (PHENERGAN) 6.25 mg in dextrose 5 % 50 mL IVPB  6.25 mg Intravenous Q8H PRN Georgie Hoyos  mL/hr at 08/31/20 1730 6.25 mg at 08/31/20 1730    senna-docusate 8.6-50 mg per tablet 1 tablet  1 tablet Oral Daily Georgie Hoyos NP   Stopped at 09/08/20 0900     Continuous Infusions:   [START ON 9/14/2020] sodium chloride 0.9%         Review of Systems   Constitutional: Negative for chills, diaphoresis, fatigue and fever.   Respiratory: Negative for cough, shortness of breath and wheezing.    Cardiovascular: Negative for chest pain and palpitations.   Gastrointestinal: Negative for abdominal pain, diarrhea, nausea and vomiting.   Musculoskeletal: Negative for arthralgias and myalgias.   Neurological: Positive for seizures. Negative for dizziness, syncope, speech difficulty, weakness and headaches.   Psychiatric/Behavioral: Negative for behavioral problems, confusion and sleep disturbance. The patient is not nervous/anxious.      Objective:     Vital Signs (Most Recent):  Temp: 98.3 °F (36.8 °C) (09/13/20  1902)  Pulse: (!) 59 (09/13/20 2200)  Resp: (!) 28 (09/13/20 2200)  BP: 111/66 (09/13/20 2200)  SpO2: 99 % (09/13/20 2200) Vital Signs (24h Range):  Temp:  [98.1 °F (36.7 °C)-98.6 °F (37 °C)] 98.3 °F (36.8 °C)  Pulse:  [56-87] 59  Resp:  [10-28] 28  SpO2:  [94 %-100 %] 99 %  BP: ()/(56-86) 111/66     Weight: 78.5 kg (173 lb 1 oz)  Body mass index is 22.83 kg/m².    Physical Exam  Constitutional:       General: He is not in acute distress.     Appearance: Normal appearance. He is not diaphoretic.   HENT:      Head: Normocephalic.      Comments: sEEG in place  Eyes:      General: No scleral icterus.        Right eye: No discharge.         Left eye: No discharge.      Extraocular Movements: EOM normal.      Conjunctiva/sclera: Conjunctivae normal.      Pupils: Pupils are equal, round, and reactive to light.   Cardiovascular:      Rate and Rhythm: Normal rate.   Pulmonary:      Effort: Pulmonary effort is normal. No respiratory distress.   Abdominal:      General: There is no distension.      Palpations: Abdomen is soft.      Tenderness: There is no abdominal tenderness.   Musculoskeletal: Normal range of motion.         General: No deformity or signs of injury.   Skin:     General: Skin is warm and dry.   Neurological:      General: No focal deficit present.      Mental Status: He is alert and oriented to person, place, and time. Mental status is at baseline.      Coordination: Finger-Nose-Finger Test normal.   Psychiatric:         Mood and Affect: Mood normal.         Speech: Speech normal.         Behavior: Behavior normal.         Thought Content: Thought content normal.         NEUROLOGICAL EXAMINATION:     MENTAL STATUS   Oriented to person, place, and time.   Speech: speech is normal   Level of consciousness: alert    CRANIAL NERVES     CN III, IV, VI   Pupils are equal, round, and reactive to light.  Extraocular motions are normal.     CN VII   Facial expression full, symmetric.     CN VIII   Hearing:  intact    CN XI   CN XI normal.     CN XII   CN XII normal.     GAIT AND COORDINATION      Coordination   Finger to nose coordination: normal      Significant Labs: All pertinent lab results from the past 24 hours have been reviewed.    Significant Studies: I have reviewed all pertinent imaging results/findings within the past 24 hours.    Assessment and Plan:     * Epilepsy  25M with a PMHx of intractable epilepsy s/p VNS placement on Onfi 40 mg BID, Lamictal 200 mg BID, Vimpat 200 mg BID, and Topamax 50 mg BID s/p bilateral sEEG placement with Dr. Melani Ortiz on 8/31. Postop CTH stable.    Recommendations:  - Continuous vEEG monitoring  - 6 electrographic seizures from L hippo and entro areas since admit  - Vimpat held on admit 8/31, Lamictal weaned and stopped 9/6, Onfi weaned and stopped 9/8, Topamax stopped 9/8  - Please call epilepsy on call prior to administering benzodiazepines   - If patient has more than one GTC, can consider giving IV Vimpat 100 mgx1  - Neurosurgery following; tentative plan for sEEG removal Monday   - Seizure precautions  - Avoid agents that lower seizure thresold      Plan of care discussed with patient and NCC team. Will follow. Please call with questions.    Depression  - Chronic and stable  - Continue home Celexa        VTE Risk Mitigation (From admission, onward)         Ordered     Place sequential compression device  Until discontinued      08/31/20 8757                Abhijeet Do MD  Neurology-Epilepsy  [facility]

## 2020-09-14 NOTE — BRIEF OP NOTE
[facility]  Surgery Department  Operative Note    SUMMARY     Date of Procedure: 9/14/2020     Procedure: Procedure(s) (LRB):  REMOVAL OF STEREO EEG LEADS (DEPTH ELECTRODES); removal of sEEG bolts and depth electrodes (N/A)     Surgeon(s) and Role:     * Melani Ortiz MD - Primary     * Katey Lee MD - Resident - Assisting        Pre-Operative Diagnosis: Epilepsy [G40.909]    Post-Operative Diagnosis: Post-Op Diagnosis Codes:     * Epilepsy [G40.909]    Anesthesia: General/MAC    Technical Procedures Used: see full operative note    Description of the Findings of the Procedure: removal of 14 sEEG depth electrodes and bolts/caps, placement of Nylon sutures and removal of fiducial staples (10)      Complications: No    Estimated Blood Loss (EBL): * No values recorded between 9/14/2020 12:00 AM and 9/14/2020  4:32 PM *           Implants: * No implants in log *    Specimens:   Specimen (12h ago, onward)    None                  Condition: Stable    Disposition: ICU - extubated and stable.    Attestation: Dr Ortiz was scrubbed for procedure     Katey Lee MD  Neurosurgery  [facility]

## 2020-09-14 NOTE — SUBJECTIVE & OBJECTIVE
Review of Systems  Review of Symptoms:  Constitutional: Denies fevers, weight loss, chills, or weakness.  Eyes: Denies changes in vision.  ENT: Denies dysphagia, nasal discharge, ear pain or discharge.  Cardiovascular: Denies chest pain, palpitations, orthopnea, or claudication.  Respiratory: Denies shortness of breath, cough, hemoptysis, or wheezing.  GI: Denies nausea/vomitting, hematochezia, melena, abd pain, or changes in appetite.  : Denies dysuria, incontinence, or hematuria.  Musculoskeletal: Denies joint pain or myalgias.  Skin/breast: Denies rashes, lumps, lesions, or discharge.  Neurologic: Denies headache, dizziness, vertigo, or paresthesias.  Psychiatric: Denies changes in mood or hallucinations.  Endocrine: Denies polyuria, polydipsia, heat/cold intolerance.  Hematologic/Lymph: Denies lymphadenopathy, easy bruising or easy bleeding.  Allergic/Immunologic: Denies rash, rhinitis.   Objective:     Vitals:  Temp: 98 °F (36.7 °C)  Pulse: (!) 59  Rhythm: sinus bradycardia  BP: 110/67  MAP (mmHg): 84  Resp: 15  SpO2: 100 %  O2 Device (Oxygen Therapy): room air    Temp  Min: 97.6 °F (36.4 °C)  Max: 98.6 °F (37 °C)  Pulse  Min: 49  Max: 83  BP  Min: 99/57  Max: 133/86  MAP (mmHg)  Min: 71  Max: 105  Resp  Min: 3  Max: 28  SpO2  Min: 95 %  Max: 100 %    09/13 0701 - 09/14 0700  In: 748.8 [P.O.:300; I.V.:448.8]  Out: 1100 [Urine:1100]   Unmeasured Output  Urine Occurrence: 1  Stool Occurrence: 0  Pad Count: 1       Physical Exam  GA: Alert, comfortable, no acute distress.   HEENT: No scleral icterus or JVD.   Pulmonary: Clear to auscultation A/P/L. No wheezing, crackles, or rhonchi.  Cardiac: RRR S1 & S2 w/o rubs/murmurs/gallops.   Abdominal: Bowel sounds present x 4. No appreciable hepatosplenomegaly.  Skin: No jaundice, rashes, or visible lesions.  Neuro:  --GCS: E4 V5 M6  --Mental Status:  Oriented X4, follows commands, fluent speech  --CN II-XII grossly intact.   --Pupils 3mm, PERRL.   --Corneal reflex,  gag, cough intact.  --ELIZABETH spont    Medications:  Continuoussodium chloride 0.9%, Last Rate: 75 mL/hr at 09/14/20 0805    ScheduledceFAZolin (ANCEF) IVPB, 2 g, Q8H  citalopram, 20 mg, Daily  cloBAZam, 40 mg, BID  lacosamide (VIMPAT) in saline IVPB, 200 mg, Q12H  lacosamide (VIMPAT) in saline IVPB, 400 mg, Once  lamoTRIgine, 200 mg, BID  senna-docusate 8.6-50 mg, 1 tablet, Daily  topiramate, 50 mg, BID    PRNsodium chloride, , Q24H PRN  acetaminophen, 650 mg, Q4H PRN  hydrALAZINE, 10 mg, Q4H PRN  labetaloL, 10 mg, Q4H PRN  magnesium oxide, 800 mg, PRN  magnesium oxide, 800 mg, PRN  oxyCODONE, 5 mg, Q6H PRN  potassium chloride, 40 mEq, PRN  potassium chloride, 40 mEq, PRN  potassium chloride, 40 mEq, PRN  potassium, sodium phosphates, 2 packet, PRN  potassium, sodium phosphates, 2 packet, PRN  potassium, sodium phosphates, 2 packet, PRN  promethazine (PHENERGAN) IVPB, 6.25 mg, Q8H PRN      Today I personally reviewed pertinent medications, lines/drains/airways, imaging, cardiology results, laboratory results,     Diet  Diet NPO

## 2020-09-14 NOTE — PROGRESS NOTES
[facility]  Neurocritical Care  Progress Note    Admit Date: 8/31/2020  Service Date: 09/14/2020  Length of Stay: 14    Subjective:     Chief Complaint: Epilepsy    History of Present Illness: The patient is a 25 year old M with PMHx of depression, seizures, epilepsy admitted to [facility] s/p placement of sEED electrodes bilateral. Per chart review he has failed at least 4 AEDs at therapeutic dosages. Triggers include being tired and being stressed. He is not working (on disability) but finished his GED. He lives next door to grandmother and nearby to his mother. His major concern is memory loss from ongoing seizures. NSGY and epilepsy following. Patient admitted to [facility] for close monitoring and higher level of care.     Hospital Course: 8/31/2020: patient admitted to [facility] s/p sEEG electrodes placement, NSGY and epilepsy following, SBP goal < 140, perepilepsy restarted all AEDs except for Vimpat  9/1/2020: 1 episode of seizure, Vimpat  On hold, Onfi and lamictal decreased, continue topamax  9/2/2020: No seizures this AM, epilepsy managing AEDs, started SQH  9/3/2020: No electrographic seizures in AM, Per epilepsy: If no seizures throughout day, plan to decrease Onfi to 10 mg BID (Dr. Abhijeet Do to change tonight if needed)  9/4/2020: d/c A-line  9/5/2020: NAEON  9/6/2020: NABHARATHI, plan for removal of sEEG on 9/9 09/07/2020 naeon  9/8/2020: NAEON, off AEDs. Likely bolt removal this Friday 9/9/2020: NAEON  9/10/2020: one tonic seizure today-epilepsy notified  9/11/2020  NAEON. No seizures over night. All AEDs on hold. Since admit has hadTotal 5 seizures from L hippocampus and entorhinal cortex areas. Tentative plan per NSGY to remove sEEG electrodes on Monday.  9/12 NAEON. Mid morning had one GTC seizure, epilepsy notified. Continue to hold AEDs, no ativan, if another GTC in 12h may consider vimpat dose.  9/13 No seizures overnight or today. Plan is for removal of sEEG electrodes on Monday.  9/14/2020:  plan for OR today for sEEG removal        Review of Systems  Review of Symptoms:  Constitutional: Denies fevers, weight loss, chills, or weakness.  Eyes: Denies changes in vision.  ENT: Denies dysphagia, nasal discharge, ear pain or discharge.  Cardiovascular: Denies chest pain, palpitations, orthopnea, or claudication.  Respiratory: Denies shortness of breath, cough, hemoptysis, or wheezing.  GI: Denies nausea/vomitting, hematochezia, melena, abd pain, or changes in appetite.  : Denies dysuria, incontinence, or hematuria.  Musculoskeletal: Denies joint pain or myalgias.  Skin/breast: Denies rashes, lumps, lesions, or discharge.  Neurologic: Denies headache, dizziness, vertigo, or paresthesias.  Psychiatric: Denies changes in mood or hallucinations.  Endocrine: Denies polyuria, polydipsia, heat/cold intolerance.  Hematologic/Lymph: Denies lymphadenopathy, easy bruising or easy bleeding.  Allergic/Immunologic: Denies rash, rhinitis.   Objective:     Vitals:  Temp: 98 °F (36.7 °C)  Pulse: (!) 59  Rhythm: sinus bradycardia  BP: 110/67  MAP (mmHg): 84  Resp: 15  SpO2: 100 %  O2 Device (Oxygen Therapy): room air    Temp  Min: 97.6 °F (36.4 °C)  Max: 98.6 °F (37 °C)  Pulse  Min: 49  Max: 83  BP  Min: 99/57  Max: 133/86  MAP (mmHg)  Min: 71  Max: 105  Resp  Min: 3  Max: 28  SpO2  Min: 95 %  Max: 100 %    09/13 0701 - 09/14 0700  In: 748.8 [P.O.:300; I.V.:448.8]  Out: 1100 [Urine:1100]   Unmeasured Output  Urine Occurrence: 1  Stool Occurrence: 0  Pad Count: 1       Physical Exam  GA: Alert, comfortable, no acute distress.   HEENT: No scleral icterus or JVD.   Pulmonary: Clear to auscultation A/P/L. No wheezing, crackles, or rhonchi.  Cardiac: RRR S1 & S2 w/o rubs/murmurs/gallops.   Abdominal: Bowel sounds present x 4. No appreciable hepatosplenomegaly.  Skin: No jaundice, rashes, or visible lesions.  Neuro:  --GCS: E4 V5 M6  --Mental Status:  Oriented X4, follows commands, fluent speech  --CN II-XII grossly intact.    --Pupils 3mm, PERRL.   --Corneal reflex, gag, cough intact.  --ELIZABETH spont    Medications:  Continuoussodium chloride 0.9%, Last Rate: 75 mL/hr at 09/14/20 0805    ScheduledceFAZolin (ANCEF) IVPB, 2 g, Q8H  citalopram, 20 mg, Daily  cloBAZam, 40 mg, BID  lacosamide (VIMPAT) in saline IVPB, 200 mg, Q12H  lacosamide (VIMPAT) in saline IVPB, 400 mg, Once  lamoTRIgine, 200 mg, BID  senna-docusate 8.6-50 mg, 1 tablet, Daily  topiramate, 50 mg, BID    PRNsodium chloride, , Q24H PRN  acetaminophen, 650 mg, Q4H PRN  hydrALAZINE, 10 mg, Q4H PRN  labetaloL, 10 mg, Q4H PRN  magnesium oxide, 800 mg, PRN  magnesium oxide, 800 mg, PRN  oxyCODONE, 5 mg, Q6H PRN  potassium chloride, 40 mEq, PRN  potassium chloride, 40 mEq, PRN  potassium chloride, 40 mEq, PRN  potassium, sodium phosphates, 2 packet, PRN  potassium, sodium phosphates, 2 packet, PRN  potassium, sodium phosphates, 2 packet, PRN  promethazine (PHENERGAN) IVPB, 6.25 mg, Q8H PRN      Today I personally reviewed pertinent medications, lines/drains/airways, imaging, cardiology results, laboratory results,     Diet  Diet NPO      Assessment/Plan:     Neuro  * Epilepsy  -8/31 s/p sEEG placement.  -NSGY and epilepsy following  9/11  No seizures over night. Total seizures since admit 6. Coming from L hippocampus and entorhinal cortex areas.  9/12 mid morning GTC epilepsy aware, if another one may consider vimpat, call epilepsy  -cefazolin 2 q8 prophylaxis  -SBP goal <140  -seizure precautions  Off all AEDs.  Oxycodone 5mg prn moderate pain  -neuro checks q 1 hr  Ok for stationary bike activity.  9/14/2020: plan for OR today for removal of sEEG                  Psychiatric  Depression  - continue citalopram 20mg daily          The patient is being Prophylaxed for:  Venous Thromboembolism with: None  Stress Ulcer with: None  Ventilator Pneumonia with: not applicable    Activity Orders          Diet NPO: NPO starting at 09/14 0001        Full Code    Georgie Hoyos,  NP  Neurocritical Care  [facility]

## 2020-09-14 NOTE — ASSESSMENT & PLAN NOTE
-8/31 s/p sEEG placement.  -NSGY and epilepsy following  9/11  No seizures over night. Total seizures since admit 6. Coming from L hippocampus and entorhinal cortex areas.  9/12 mid morning GTC epilepsy aware, if another one may consider vimpat, call epilepsy  -cefazolin 2 q8 prophylaxis  -SBP goal <140  -seizure precautions  Off all AEDs.  Oxycodone 5mg prn moderate pain  -neuro checks q 1 hr  Ok for stationary bike activity.  9/14/2020: plan for OR today for removal of sEEG

## 2020-09-14 NOTE — ANESTHESIA PROCEDURE NOTES
Intubation  Performed by: Rosita Capps CRNA  Authorized by: Ernie Schultz MD     Intubation:     Induction:  Intravenous    Intubated:  Postinduction    Mask Ventilation:  Easy mask    Attempts:  1    Attempted By:  CRNA    Method of Intubation:  Direct    Blade:  Dulce Maria 3    Laryngeal View Grade: Grade I - full view of chords      Difficult Airway Encountered?: No      Complications:  None    Airway Device:  Oral endotracheal tube    Airway Device Size:  7.5    Style/Cuff Inflation:  Cuffed    Inflation Amount (mL):  6    Tube secured:  23    Secured at:  The lips    Placement Verified By:  Capnometry    Complicating Factors:  None    Findings Post-Intubation:  BS equal bilateral and atraumatic/condition of teeth unchanged

## 2020-09-14 NOTE — PLAN OF CARE
Per MD, OR today for sEEG removal.   Not medically ready for discharge.   No post acute needs anticipated.       09/14/20 1615   Discharge Reassessment   Assessment Type Discharge Planning Reassessment   Provided patient/caregiver education on the expected discharge date and the discharge plan Yes   Do you have any problems affording any of your prescribed medications? No   Discharge Plan A Home with family   Discharge Plan B Home with family   DME Needed Upon Discharge  none   Patient choice form signed by patient/caregiver N/A   Anticipated Discharge Disposition Home   Can the patient/caregiver answer the patient profile reliably? Yes, cognitively intact   How does the patient rate their overall health at the present time? Good   Describe the patient's ability to walk at the present time. Minor restrictions or changes   How often would a person be available to care for the patient? Whenever needed   Number of comorbid conditions (as recorded on the chart) One     Shivani De Leon RN, CCRN-K, [facility]  Neuro-Critical Care   [phone]

## 2020-09-14 NOTE — PROGRESS NOTES
[facility]  Neurology-Epilepsy  Progress Note    Patient Name: Darin Cunha  MRN: [id number]  Admission Date: 8/31/2020  Hospital Length of Stay: 14 days  Code Status: Full Code   Attending Provider: Tony Cantor MD  Primary Care Physician: Primary Doctor No   Principal Problem:Epilepsy    Subjective:     Hospital Course:   8/31: sEEG placement. Hold Vimpat. Continue other AEDs: Onfi 40 mg BID, Lamictal 200 mg BID, Topamax 50 mg BID.  8/31>9/1: No clinical or electrographic seizures overnight  9/1>9/2: Four electrographic seizures (3 with clinical correlate) originating from L hippo and entro areas  9/2>9/3: No electrographic seizures  9/3>9/4: No electrographic seizures  9/4>9/5: No electrographic seizures  9/5>9/6: No electrographic seizures  9/6>9/7: No electrographic seizures  9/7>9/8: No electrographic seizures  9/8>9/9: No electrographic seizures  9/9>9/10: No electrographic seizures  9/10>9/11: One clinical and electrographic seizure originating from L Hippo and Entro areas  9/11>9/12: One clinical and electrographic seizure so far 9/12, progressed to GTC  9/12>9/13 - no acute events overnight.   9/13>9/14 - no acute events overnight, resume home AEDs, VNS turned back on to prior settings. To OR today for sEEG removal.    Interval History: No clinical seizures overnight, resume home AEDs, VNS turned back on. Patient to OR today with NSGY for sEEG removal.     Current Facility-Administered Medications   Medication Dose Route Frequency Provider Last Rate Last Dose    0.9%  NaCl infusion (for blood administration)   Intravenous Q24H PRN Ino Collado MD        0.9%  NaCl infusion   Intravenous Continuous Estefani Turpin PA-C 75 mL/hr at 09/14/20 1305      acetaminophen tablet 650 mg  650 mg Oral Q4H PRN Georgie Romain, NP   325 mg at 09/10/20 2106    ceFAZolin injection 2 g  2 g Intravenous Q8H Ethan Owen MD   2 g at 09/14/20 0535    citalopram tablet 20 mg  20 mg Oral  Daily Georgie Miinea, NP   20 mg at 09/14/20 0809    cloBAZam Tab 40 mg  40 mg Oral BID Kiara Luque MD PhD   40 mg at 09/14/20 1039    haloperidol lactate injection 0.5 mg  0.5 mg Intravenous Q1H PRN Madalyn Dunaway MD        hydrALAZINE injection 10 mg  10 mg Intravenous Q4H PRN Georgie Miinea, NP   10 mg at 08/31/20 1601    labetaloL injection 10 mg  10 mg Intravenous Q4H PRN Georgei Miinea, NP        lacosamide (VIMPAT) 200 mg in sodium chloride 0.9% 100 mL IVPB  200 mg Intravenous Q12H Kiara Luque MD PhD        lamoTRIgine tablet 200 mg  200 mg Oral BID Kiara Luque MD PhD   200 mg at 09/14/20 1134    magnesium oxide tablet 800 mg  800 mg Oral PRN Melissa Flores NP        magnesium oxide tablet 800 mg  800 mg Oral PRN Melissa Flores NP        oxyCODONE immediate release tablet 5 mg  5 mg Oral Q6H PRN Georgie Miinea, NP   5 mg at 09/02/20 1914    potassium chloride packet 40 mEq  40 mEq Oral PRN Melissa Flores NP   40 mEq at 09/14/20 0519    potassium chloride packet 40 mEq  40 mEq Oral PRN Melissa Flores NP   40 mEq at 09/10/20 0933    potassium chloride packet 40 mEq  40 mEq Oral PRN Melissa Flores NP        potassium, sodium phosphates 280-160-250 mg packet 2 packet  2 packet Oral PRN Melissa Flores NP        potassium, sodium phosphates 280-160-250 mg packet 2 packet  2 packet Oral PRN Melissa Flores NP        potassium, sodium phosphates 280-160-250 mg packet 2 packet  2 packet Oral PRN Melissa Flores NP        promethazine (PHENERGAN) 6.25 mg in dextrose 5 % 50 mL IVPB  6.25 mg Intravenous Q8H PRN Georgie Miinea,  mL/hr at 08/31/20 1730 6.25 mg at 08/31/20 1730    senna-docusate 8.6-50 mg per tablet 1 tablet  1 tablet Oral Daily Georgie Miinea, NP   Stopped at 09/08/20 0900    topiramate tablet 50 mg  50 mg Oral BID Kiara Luque MD PhD   50 mg at 09/14/20 1038     Continuous Infusions:   sodium chloride 0.9% 75 mL/hr at  09/14/20 1305       Review of Systems   Constitutional: Negative for chills, diaphoresis, fatigue and fever.   Respiratory: Negative for cough, shortness of breath and wheezing.    Cardiovascular: Negative for chest pain and palpitations.   Gastrointestinal: Negative for abdominal pain, diarrhea, nausea and vomiting.   Musculoskeletal: Negative for arthralgias and myalgias.   Neurological: Positive for seizures. Negative for dizziness, syncope, speech difficulty, weakness and headaches.   Psychiatric/Behavioral: Negative for behavioral problems, confusion and sleep disturbance. The patient is not nervous/anxious.      Objective:     Vital Signs (Most Recent):  Temp: 97.9 °F (36.6 °C) (09/14/20 1105)  Pulse: 75 (09/14/20 1305)  Resp: (!) 26 (09/14/20 1305)  BP: 130/85 (09/14/20 1305)  SpO2: 99 % (09/14/20 1305) Vital Signs (24h Range):  Temp:  [97.6 °F (36.4 °C)-98.6 °F (37 °C)] 97.9 °F (36.6 °C)  Pulse:  [49-80] 75  Resp:  [3-28] 26  SpO2:  [95 %-100 %] 99 %  BP: ()/(57-85) 130/85     Weight: 78.5 kg (173 lb 1 oz)  Body mass index is 22.83 kg/m².    Physical Exam  Constitutional:       General: He is not in acute distress.     Appearance: Normal appearance. He is not diaphoretic.   HENT:      Head: Normocephalic.      Comments: sEEG in place  Eyes:      General: No scleral icterus.        Right eye: No discharge.         Left eye: No discharge.      Extraocular Movements: EOM normal.      Conjunctiva/sclera: Conjunctivae normal.      Pupils: Pupils are equal, round, and reactive to light.   Cardiovascular:      Rate and Rhythm: Normal rate.   Pulmonary:      Effort: Pulmonary effort is normal. No respiratory distress.   Abdominal:      General: There is no distension.      Palpations: Abdomen is soft.      Tenderness: There is no abdominal tenderness.   Musculoskeletal: Normal range of motion.         General: No deformity or signs of injury.   Skin:     General: Skin is warm and dry.   Neurological:       General: No focal deficit present.      Mental Status: He is alert and oriented to person, place, and time. Mental status is at baseline.      Coordination: Finger-Nose-Finger Test normal.   Psychiatric:         Mood and Affect: Mood normal.         Speech: Speech normal.         Behavior: Behavior normal.         Thought Content: Thought content normal.         NEUROLOGICAL EXAMINATION:     MENTAL STATUS   Oriented to person, place, and time.   Speech: speech is normal   Level of consciousness: alert    CRANIAL NERVES     CN III, IV, VI   Pupils are equal, round, and reactive to light.  Extraocular motions are normal.     CN VII   Facial expression full, symmetric.     CN VIII   Hearing: intact    CN XI   CN XI normal.     CN XII   CN XII normal.     GAIT AND COORDINATION      Coordination   Finger to nose coordination: normal      Significant Labs: All pertinent lab results from the past 24 hours have been reviewed.    Significant Studies: I have reviewed all pertinent imaging results/findings within the past 24 hours.    Assessment and Plan:     * Epilepsy  25M with a PMHx of intractable epilepsy s/p VNS placement on Onfi 40 mg BID, Lamictal 200 mg BID, Vimpat 200 mg BID, and Topamax 50 mg BID s/p bilateral sEEG placement with Dr. Melani Ortiz on 8/31. Postop CTH stable.    Recommendations:  - Continuous vEEG monitoring  - 6 electrographic seizures from L hippo and entro areas since admit  - Resume home AED regimen (Vimpat 200 mg BID, Onfi 40 mg BID, Lamictal 200 mg BID, and TPM 50 mg BID). Vimpat 400 mg x1 loading dose prior to resuming home maintenance dose.  - Neurosurgery following; patient to OR for sEEG removal today  - Seizure precautions  - Avoid agents that lower seizure threshold  - VNS turned back on 9/14      Plan of care discussed with patient and NCC team. Will follow. Please call with questions.    S/P placement of VNS (vagus nerve stimulation) device  Model # [id number]  Implant Date 4/1/19  Is  Device palpable in chest wall  No  Side of implant    L            Initial  Reprogram   Output current  mA 0.0  0.75  Signal Freq          Hz 30  30  Pulse width        uSec 1000  1000  Signal on time     Sec 30  30  Signal off time      Min 5.0  5.0    Magnet settings  Output current          mA 0.0  0.875  Pulse width        uSec 1000  1000  Signal on time         Sec 30  30    Autostimulation (AS)  Output Current          mA 0.00  0.75  Pulse width        uSec 1000  1000  Signal on time         Sec 30            Depression  - Chronic and stable  - Continue home Celexa        VTE Risk Mitigation (From admission, onward)         Ordered     Place sequential compression device  Until discontinued      08/31/20 7440                Pilar Shepard PA-C  Neurology-Epilepsy  [facility]  Staff: Dr. Luque

## 2020-09-14 NOTE — PLAN OF CARE
14pcs EEG leads and caps , 14pcs sEEg bolts removed, counted and confirmed with Dr. Melani Ortiz.

## 2020-09-14 NOTE — PLAN OF CARE
[facility] Care Plan    POC reviewed with Darin Cunha. Pt verbalized understanding. Questions and concerns addressed. No acute events overnight. Pt had no seizures during shift and denies having a seizure during shift. Pt made NPO at midnight for surgery in the AM. Pre-op checklist preformed. No fevers during shift. Pt progressing toward goals. Will continue to monitor. See below and flowsheets for full assessment and VS info.       Neuro:  Rupert Coma Scale  Best Eye Response: 4-->(E4) spontaneous  Best Motor Response: 6-->(M6) obeys commands  Best Verbal Response: 5-->(V5) oriented  Rupert Coma Scale Score: 15  Assessment Qualifiers: patient not sedated/intubated, no eye obstruction present  Pupil PERRLA: yes     24hr Temp:  [97.6 °F (36.4 °C)-98.6 °F (37 °C)]     CV:   Rhythm: sinus bradycardia  BP goals:   SBP < 140  MAP > 65    Resp:   O2 Device (Oxygen Therapy): room air  Oxygen Concentration (%): 100    Plan: N/A    GI/:  STEPHENIE Total Score: 20  Diet/Nutrition Received: regular  Last Bowel Movement: 09/13/20  Voiding Characteristics: voids spontaneously without difficulty    Intake/Output Summary (Last 24 hours) at 9/14/2020 0443  Last data filed at 9/14/2020 0400  Gross per 24 hour   Intake 608.75 ml   Output 1100 ml   Net -491.25 ml     Unmeasured Output  Urine Occurrence: 1  Stool Occurrence: 0  Pad Count: 1    Labs/Accuchecks:  Recent Labs   Lab 09/14/20  0317   WBC 5.74   RBC 4.54*   HGB 14.2   HCT 39.9*         Recent Labs   Lab 09/14/20  0317      K 3.6   CO2 26      BUN 10   CREATININE 0.7   ALKPHOS 85   ALT 23   AST 27   BILITOT 0.7      Recent Labs   Lab 09/13/20  1143   INR 1.1   APTT 29.6    No results for input(s): CPK, CPKMB, TROPONINI, MB in the last 168 hours.    Electrolytes: Electrolytes replaced  Accuchecks: none    Gtts:   sodium chloride 0.9% 75 mL/hr at 09/14/20 0400       LDA/Wounds:  Lines/Drains/Airways       Peripheral Intravenous Line                   Peripheral IV  - Single Lumen 09/09/20 2134 20 G Anterior;Left Forearm 4 days         Peripheral IV - Single Lumen 09/12/20 1458 20 G Right Hand 1 day                  Wounds: No  Wound care consulted: No

## 2020-09-14 NOTE — SUBJECTIVE & OBJECTIVE
Interval History: No acute events. No pain or discomfort.     Current Facility-Administered Medications   Medication Dose Route Frequency Provider Last Rate Last Dose    0.9%  NaCl infusion (for blood administration)   Intravenous Q24H PRN Ino Collado MD        [START ON 9/14/2020] 0.9%  NaCl infusion   Intravenous Continuous Estefani Turpin PA-C        acetaminophen tablet 650 mg  650 mg Oral Q4H PRN Georgie Miinea, NP   325 mg at 09/10/20 2106    ceFAZolin injection 2 g  2 g Intravenous Q8H Ethan Owen MD   2 g at 09/13/20 2156    citalopram tablet 20 mg  20 mg Oral Daily Georgie Miinea, NP   20 mg at 09/13/20 0833    hydrALAZINE injection 10 mg  10 mg Intravenous Q4H PRN Georgie Miinea, NP   10 mg at 08/31/20 1601    labetaloL injection 10 mg  10 mg Intravenous Q4H PRN Georgie Miinea, NP        magnesium oxide tablet 800 mg  800 mg Oral PRN Melissa Flores NP        magnesium oxide tablet 800 mg  800 mg Oral PRN Melissa Flores, NP        oxyCODONE immediate release tablet 5 mg  5 mg Oral Q6H PRN Georgie Miinea, NP   5 mg at 09/02/20 1914    potassium chloride packet 40 mEq  40 mEq Oral PRN Melissa Flores, NP   40 mEq at 09/13/20 0834    potassium chloride packet 40 mEq  40 mEq Oral PRN Melissa Flores, NP   40 mEq at 09/10/20 0933    potassium chloride packet 40 mEq  40 mEq Oral PRN Melissa Flores NP        potassium, sodium phosphates 280-160-250 mg packet 2 packet  2 packet Oral PRN Melissasony Flores, NP        potassium, sodium phosphates 280-160-250 mg packet 2 packet  2 packet Oral PRN Melissasony Flores NP        potassium, sodium phosphates 280-160-250 mg packet 2 packet  2 packet Oral PRN Melissa Flores NP        promethazine (PHENERGAN) 6.25 mg in dextrose 5 % 50 mL IVPB  6.25 mg Intravenous Q8H PRN Georgie Miinea,  mL/hr at 08/31/20 1730 6.25 mg at 08/31/20 1730    senna-docusate 8.6-50 mg per tablet 1 tablet  1 tablet Oral Daily Georgie Hoyos,  NP   Stopped at 09/08/20 0900     Continuous Infusions:   [START ON 9/14/2020] sodium chloride 0.9%         Review of Systems   Constitutional: Negative for chills, diaphoresis, fatigue and fever.   Respiratory: Negative for cough, shortness of breath and wheezing.    Cardiovascular: Negative for chest pain and palpitations.   Gastrointestinal: Negative for abdominal pain, diarrhea, nausea and vomiting.   Musculoskeletal: Negative for arthralgias and myalgias.   Neurological: Positive for seizures. Negative for dizziness, syncope, speech difficulty, weakness and headaches.   Psychiatric/Behavioral: Negative for behavioral problems, confusion and sleep disturbance. The patient is not nervous/anxious.      Objective:     Vital Signs (Most Recent):  Temp: 98.3 °F (36.8 °C) (09/13/20 1902)  Pulse: (!) 59 (09/13/20 2200)  Resp: (!) 28 (09/13/20 2200)  BP: 111/66 (09/13/20 2200)  SpO2: 99 % (09/13/20 2200) Vital Signs (24h Range):  Temp:  [98.1 °F (36.7 °C)-98.6 °F (37 °C)] 98.3 °F (36.8 °C)  Pulse:  [56-87] 59  Resp:  [10-28] 28  SpO2:  [94 %-100 %] 99 %  BP: ()/(56-86) 111/66     Weight: 78.5 kg (173 lb 1 oz)  Body mass index is 22.83 kg/m².    Physical Exam  Constitutional:       General: He is not in acute distress.     Appearance: Normal appearance. He is not diaphoretic.   HENT:      Head: Normocephalic.      Comments: sEEG in place  Eyes:      General: No scleral icterus.        Right eye: No discharge.         Left eye: No discharge.      Extraocular Movements: EOM normal.      Conjunctiva/sclera: Conjunctivae normal.      Pupils: Pupils are equal, round, and reactive to light.   Cardiovascular:      Rate and Rhythm: Normal rate.   Pulmonary:      Effort: Pulmonary effort is normal. No respiratory distress.   Abdominal:      General: There is no distension.      Palpations: Abdomen is soft.      Tenderness: There is no abdominal tenderness.   Musculoskeletal: Normal range of motion.         General: No  deformity or signs of injury.   Skin:     General: Skin is warm and dry.   Neurological:      General: No focal deficit present.      Mental Status: He is alert and oriented to person, place, and time. Mental status is at baseline.      Coordination: Finger-Nose-Finger Test normal.   Psychiatric:         Mood and Affect: Mood normal.         Speech: Speech normal.         Behavior: Behavior normal.         Thought Content: Thought content normal.         NEUROLOGICAL EXAMINATION:     MENTAL STATUS   Oriented to person, place, and time.   Speech: speech is normal   Level of consciousness: alert    CRANIAL NERVES     CN III, IV, VI   Pupils are equal, round, and reactive to light.  Extraocular motions are normal.     CN VII   Facial expression full, symmetric.     CN VIII   Hearing: intact    CN XI   CN XI normal.     CN XII   CN XII normal.     GAIT AND COORDINATION      Coordination   Finger to nose coordination: normal      Significant Labs: All pertinent lab results from the past 24 hours have been reviewed.    Significant Studies: I have reviewed all pertinent imaging results/findings within the past 24 hours.

## 2020-09-14 NOTE — ASSESSMENT & PLAN NOTE
Model # [id number] M106  Implant Date 4/1/19  Is Device palpable in chest wall  No  Side of implant    L            Initial  Reprogram   Output current  mA 0.0  0.75  Signal Freq          Hz 30  30  Pulse width        uSec 1000  1000  Signal on time     Sec 30  30  Signal off time      Min 5.0  5.0    Magnet settings  Output current          mA 0.0  0.875  Pulse width        uSec 1000  1000  Signal on time         Sec 30  30    Autostimulation (AS)  Output Current          mA 0.00  0.75  Pulse width        uSec 1000  1000  Signal on time         Sec 30

## 2020-09-15 VITALS
SYSTOLIC BLOOD PRESSURE: 116 MMHG | HEIGHT: 73 IN | DIASTOLIC BLOOD PRESSURE: 77 MMHG | BODY MASS INDEX: 22.94 KG/M2 | TEMPERATURE: 98 F | OXYGEN SATURATION: 99 % | WEIGHT: 173.06 LBS | RESPIRATION RATE: 28 BRPM | HEART RATE: 82 BPM

## 2020-09-15 LAB
ALBUMIN SERPL BCP-MCNC: 3.7 G/DL (ref 3.5–5.2)
ALP SERPL-CCNC: 78 U/L (ref 55–135)
ALT SERPL W/O P-5'-P-CCNC: 23 U/L (ref 10–44)
ANION GAP SERPL CALC-SCNC: 9 MMOL/L (ref 8–16)
ANION GAP SERPL CALC-SCNC: 9 MMOL/L (ref 8–16)
AST SERPL-CCNC: 26 U/L (ref 10–40)
BASOPHILS # BLD AUTO: 0.02 K/UL (ref 0–0.2)
BASOPHILS # BLD AUTO: 0.02 K/UL (ref 0–0.2)
BASOPHILS NFR BLD: 0.3 % (ref 0–1.9)
BASOPHILS NFR BLD: 0.3 % (ref 0–1.9)
BILIRUB SERPL-MCNC: 0.9 MG/DL (ref 0.1–1)
BUN SERPL-MCNC: 9 MG/DL (ref 6–20)
BUN SERPL-MCNC: 9 MG/DL (ref 6–20)
CALCIUM SERPL-MCNC: 8.7 MG/DL (ref 8.7–10.5)
CALCIUM SERPL-MCNC: 8.7 MG/DL (ref 8.7–10.5)
CHLORIDE SERPL-SCNC: 106 MMOL/L (ref 95–110)
CHLORIDE SERPL-SCNC: 106 MMOL/L (ref 95–110)
CO2 SERPL-SCNC: 23 MMOL/L (ref 23–29)
CO2 SERPL-SCNC: 23 MMOL/L (ref 23–29)
CREAT SERPL-MCNC: 0.7 MG/DL (ref 0.5–1.4)
CREAT SERPL-MCNC: 0.7 MG/DL (ref 0.5–1.4)
DIFFERENTIAL METHOD: ABNORMAL
DIFFERENTIAL METHOD: ABNORMAL
EOSINOPHIL # BLD AUTO: 0 K/UL (ref 0–0.5)
EOSINOPHIL # BLD AUTO: 0 K/UL (ref 0–0.5)
EOSINOPHIL NFR BLD: 0.5 % (ref 0–8)
EOSINOPHIL NFR BLD: 0.5 % (ref 0–8)
ERYTHROCYTE [DISTWIDTH] IN BLOOD BY AUTOMATED COUNT: 11.5 % (ref 11.5–14.5)
ERYTHROCYTE [DISTWIDTH] IN BLOOD BY AUTOMATED COUNT: 11.5 % (ref 11.5–14.5)
EST. GFR  (AFRICAN AMERICAN): >60 ML/MIN/1.73 M^2
EST. GFR  (AFRICAN AMERICAN): >60 ML/MIN/1.73 M^2
EST. GFR  (NON AFRICAN AMERICAN): >60 ML/MIN/1.73 M^2
EST. GFR  (NON AFRICAN AMERICAN): >60 ML/MIN/1.73 M^2
GLUCOSE SERPL-MCNC: 79 MG/DL (ref 70–110)
GLUCOSE SERPL-MCNC: 79 MG/DL (ref 70–110)
HCT VFR BLD AUTO: 38.9 % (ref 40–54)
HCT VFR BLD AUTO: 38.9 % (ref 40–54)
HGB BLD-MCNC: 13.5 G/DL (ref 14–18)
HGB BLD-MCNC: 13.5 G/DL (ref 14–18)
IMM GRANULOCYTES # BLD AUTO: 0.05 K/UL (ref 0–0.04)
IMM GRANULOCYTES # BLD AUTO: 0.05 K/UL (ref 0–0.04)
IMM GRANULOCYTES NFR BLD AUTO: 0.7 % (ref 0–0.5)
IMM GRANULOCYTES NFR BLD AUTO: 0.7 % (ref 0–0.5)
LYMPHOCYTES # BLD AUTO: 0.9 K/UL (ref 1–4.8)
LYMPHOCYTES # BLD AUTO: 0.9 K/UL (ref 1–4.8)
LYMPHOCYTES NFR BLD: 11.9 % (ref 18–48)
LYMPHOCYTES NFR BLD: 11.9 % (ref 18–48)
MAGNESIUM SERPL-MCNC: 1.8 MG/DL (ref 1.6–2.6)
MCH RBC QN AUTO: 31.3 PG (ref 27–31)
MCH RBC QN AUTO: 31.3 PG (ref 27–31)
MCHC RBC AUTO-ENTMCNC: 34.7 G/DL (ref 32–36)
MCHC RBC AUTO-ENTMCNC: 34.7 G/DL (ref 32–36)
MCV RBC AUTO: 90 FL (ref 82–98)
MCV RBC AUTO: 90 FL (ref 82–98)
MONOCYTES # BLD AUTO: 0.6 K/UL (ref 0.3–1)
MONOCYTES # BLD AUTO: 0.6 K/UL (ref 0.3–1)
MONOCYTES NFR BLD: 8.2 % (ref 4–15)
MONOCYTES NFR BLD: 8.2 % (ref 4–15)
NEUTROPHILS # BLD AUTO: 5.9 K/UL (ref 1.8–7.7)
NEUTROPHILS # BLD AUTO: 5.9 K/UL (ref 1.8–7.7)
NEUTROPHILS NFR BLD: 78.4 % (ref 38–73)
NEUTROPHILS NFR BLD: 78.4 % (ref 38–73)
NRBC BLD-RTO: 0 /100 WBC
NRBC BLD-RTO: 0 /100 WBC
PHOSPHATE SERPL-MCNC: 4 MG/DL (ref 2.7–4.5)
PLATELET # BLD AUTO: 198 K/UL (ref 150–350)
PLATELET # BLD AUTO: 198 K/UL (ref 150–350)
PMV BLD AUTO: 9.6 FL (ref 9.2–12.9)
PMV BLD AUTO: 9.6 FL (ref 9.2–12.9)
POTASSIUM SERPL-SCNC: 3.7 MMOL/L (ref 3.5–5.1)
POTASSIUM SERPL-SCNC: 3.7 MMOL/L (ref 3.5–5.1)
PROT SERPL-MCNC: 5.9 G/DL (ref 6–8.4)
RBC # BLD AUTO: 4.31 M/UL (ref 4.6–6.2)
RBC # BLD AUTO: 4.31 M/UL (ref 4.6–6.2)
SODIUM SERPL-SCNC: 138 MMOL/L (ref 136–145)
SODIUM SERPL-SCNC: 138 MMOL/L (ref 136–145)
WBC # BLD AUTO: 7.47 K/UL (ref 3.9–12.7)
WBC # BLD AUTO: 7.47 K/UL (ref 3.9–12.7)

## 2020-09-15 PROCEDURE — 99238 PR HOSPITAL DISCHARGE DAY,<30 MIN: ICD-10-PCS | Mod: ,,, | Performed by: PSYCHIATRY & NEUROLOGY

## 2020-09-15 PROCEDURE — 25000003 PHARM REV CODE 250: Performed by: STUDENT IN AN ORGANIZED HEALTH CARE EDUCATION/TRAINING PROGRAM

## 2020-09-15 PROCEDURE — 85025 COMPLETE CBC W/AUTO DIFF WBC: CPT

## 2020-09-15 PROCEDURE — 97116 GAIT TRAINING THERAPY: CPT

## 2020-09-15 PROCEDURE — 83735 ASSAY OF MAGNESIUM: CPT

## 2020-09-15 PROCEDURE — 80053 COMPREHEN METABOLIC PANEL: CPT

## 2020-09-15 PROCEDURE — C9254 INJECTION, LACOSAMIDE: HCPCS | Performed by: STUDENT IN AN ORGANIZED HEALTH CARE EDUCATION/TRAINING PROGRAM

## 2020-09-15 PROCEDURE — 63600175 PHARM REV CODE 636 W HCPCS: Performed by: STUDENT IN AN ORGANIZED HEALTH CARE EDUCATION/TRAINING PROGRAM

## 2020-09-15 PROCEDURE — 97535 SELF CARE MNGMENT TRAINING: CPT

## 2020-09-15 PROCEDURE — 84100 ASSAY OF PHOSPHORUS: CPT

## 2020-09-15 PROCEDURE — 99238 HOSP IP/OBS DSCHRG MGMT 30/<: CPT | Mod: ,,, | Performed by: PSYCHIATRY & NEUROLOGY

## 2020-09-15 PROCEDURE — 97165 OT EVAL LOW COMPLEX 30 MIN: CPT

## 2020-09-15 PROCEDURE — 97161 PT EVAL LOW COMPLEX 20 MIN: CPT

## 2020-09-15 RX ORDER — PROMETHAZINE HYDROCHLORIDE 12.5 MG/1
12.5 TABLET ORAL EVERY 6 HOURS PRN
Qty: 30 TABLET | Refills: 0 | Status: SHIPPED | OUTPATIENT
Start: 2020-09-15

## 2020-09-15 RX ORDER — OXYCODONE HYDROCHLORIDE 5 MG/1
5 TABLET ORAL EVERY 6 HOURS PRN
Qty: 28 TABLET | Refills: 0 | Status: SHIPPED | OUTPATIENT
Start: 2020-09-15 | End: 2020-09-22

## 2020-09-15 RX ORDER — OXYCODONE HYDROCHLORIDE 5 MG/1
5 TABLET ORAL EVERY 6 HOURS PRN
Qty: 28 TABLET | Refills: 0 | Status: SHIPPED | OUTPATIENT
Start: 2020-09-15 | End: 2020-09-15

## 2020-09-15 RX ADMIN — TOPIRAMATE 50 MG: 25 TABLET, FILM COATED ORAL at 08:09

## 2020-09-15 RX ADMIN — SODIUM CHLORIDE 200 MG: 9 INJECTION, SOLUTION INTRAVENOUS at 08:09

## 2020-09-15 RX ADMIN — VANCOMYCIN HYDROCHLORIDE 1500 MG: 1.5 INJECTION, POWDER, LYOPHILIZED, FOR SOLUTION INTRAVENOUS at 08:09

## 2020-09-15 RX ADMIN — CITALOPRAM HYDROBROMIDE 20 MG: 10 TABLET ORAL at 08:09

## 2020-09-15 RX ADMIN — DOCUSATE SODIUM 50MG AND SENNOSIDES 8.6MG 1 TABLET: 8.6; 5 TABLET, FILM COATED ORAL at 08:09

## 2020-09-15 RX ADMIN — LAMOTRIGINE 200 MG: 100 TABLET ORAL at 09:09

## 2020-09-15 RX ADMIN — CLOBAZAM 40 MG: 10 TABLET ORAL at 08:09

## 2020-09-15 RX ADMIN — MUPIROCIN: 20 OINTMENT TOPICAL at 08:09

## 2020-09-15 RX ADMIN — POTASSIUM CHLORIDE 40 MEQ: 1.5 POWDER, FOR SOLUTION ORAL at 05:09

## 2020-09-15 NOTE — OP NOTE
[facility]  Neurosurgery  Operative Note    SUMMARY      Date of Procedure: 9/14/2020     Procedure: Procedure(s) (LRB):  REMOVAL OF STEREO EEG LEADS (DEPTH ELECTRODES); removal of sEEG bolts and depth electrodes (N/A)     Surgeon(s) and Role:     * Melani Ortiz MD - Primary     * Katey Lee MD - Resident - Assisting    Pre-Operative Diagnosis: Epilepsy [G40.909]    Post-Operative Diagnosis: Post-Op Diagnosis Codes:     * Epilepsy [G40.909]    Anesthesia: General/MAC    Technical Procedures Used:   Removal of bilateral sEEG electrodes    Indications:   Darin Cunha is a 25 y.o. male with intractable epilepsy.Two weeks ago, he underwent placement fourteen depth electrodes for phase II monitoring. Dr. Do now feels that we had obtained adequate concordance such that we could proceed with removal of the electrodes. The patient was thus returned to the OR for removal of the depth electrodes. Informed consent was obtained.      Description of the Procedure:   The patient was brought to the operating room, and general anesthesia was induced by the anesthesia team. He was carefully positioned, with all pressure points padded. SCDs were applied. He received two grams of Ancef for prophylaxis. He was prepped and draped in the usual sterile fashion. Anesthesia was instructed to keep SBP <140 and that his NPS375 is upregulated due to chronic AED use.      A time-out was performed. All electrodes were irrigated with gentamicin-containing irrigation. Attention was then turned to each of the electrodes in turn. The cap was unscrewed from the anterior-most bone anchor bolt, and the electrode carefully withdrawn, taking care to remove it slowly and ensure the entire piece was intact. The anchor bolt was then removed using the appropriate bolt . After more irrigation, a 3.0 Nylon suture was used to fashion a figure-of-eight stitch.      This exact procedure was repeated another 13 times, except that  Vicryl Rapide sutures were used near the hairline.     All electrodes were counted at the end of the procedure and found to be correct in count and in continuity. A sterile dressing of bacitracin ointment, Telfa gauze, and two-Kerlix head wrap was applied. All counts were correct x2. Antibiotic-containing solution was used throughout the procedure.      The patient was then awakened by the anesthesia team and returned to the neuro ICU in satisfactory condition.     Complications: No    Estimated Blood Loss (EBL): minimal            Specimens:   Specimen (12h ago, onward)    None           Implants: * No implants in log *           Condition: Stable    Disposition: ICU - extubated and stable.    Attestation: I was present and scrubbed for the entire procedure.

## 2020-09-15 NOTE — ANESTHESIA POSTPROCEDURE EVALUATION
Anesthesia Post Evaluation    Patient: Darin Cunha    Procedure(s) Performed: Procedure(s) (LRB):  REMOVAL OF STEREO EEG LEADS (DEPTH ELECTRODES); removal of sEEG bolts and depth electrodes (N/A)    Final Anesthesia Type: general    Patient location during evaluation: ICU  Patient participation: Yes- Able to Participate  Level of consciousness: awake and alert and oriented  Post-procedure vital signs: reviewed and stable  Pain management: adequate  Airway patency: patent    PONV status at discharge: No PONV  Anesthetic complications: no      Cardiovascular status: blood pressure returned to baseline and hemodynamically stable  Respiratory status: spontaneous ventilation, unassisted and room air  Hydration status: euvolemic  Follow-up not needed.          Vitals Value Taken Time   /68 09/15/20 0702   Temp 36.8 °C (98.2 °F) 09/15/20 0302   Pulse 61 09/15/20 0752   Resp 27 09/15/20 0752   SpO2 96 % 09/15/20 0752   Vitals shown include unvalidated device data.      No case tracking events are documented in the log.      Pain/Valeria Score: Pain Rating Prior to Med Admin: 7 (9/14/2020  6:56 PM)  Pain Rating Post Med Admin: 0 (9/14/2020  7:56 PM)

## 2020-09-15 NOTE — SUBJECTIVE & OBJECTIVE
Interval History: NAEON. CTH with expected post operative changes, small amount of pneumocephalus, no hemorrhage. No seizure activity overnight. Restarted on AED/VNS turned on before OR yesterday.     Medications:  Continuous Infusions:   sodium chloride 0.9% 75 mL/hr at 09/15/20 0500     Scheduled Meds:   citalopram  20 mg Oral Daily    cloBAZam  40 mg Oral BID    lacosamide (VIMPAT) in saline IVPB  200 mg Intravenous Q12H    lamoTRIgine  200 mg Oral BID    mupirocin   Nasal BID    senna-docusate 8.6-50 mg  1 tablet Oral Daily    topiramate  50 mg Oral BID    vancomycin (VANCOCIN) IVPB  1,500 mg Intravenous Q12H     PRN Meds:sodium chloride, acetaminophen, acetaminophen, acetaminophen, haloperidol lactate, hydrALAZINE, HYDROcodone-acetaminophen, labetaloL, magnesium oxide, magnesium oxide, oxyCODONE, potassium chloride, potassium chloride, potassium chloride, potassium, sodium phosphates, potassium, sodium phosphates, potassium, sodium phosphates, promethazine (PHENERGAN) IVPB     Review of Systems  Objective:     Weight: 78.5 kg (173 lb 1 oz)  Body mass index is 22.83 kg/m².  Vital Signs (Most Recent):  Temp: 98.2 °F (36.8 °C) (09/15/20 0302)  Pulse: (!) 53 (09/15/20 0502)  Resp: 15 (09/15/20 0502)  BP: (!) 103/58 (09/15/20 0502)  SpO2: 96 % (09/15/20 0502) Vital Signs (24h Range):  Temp:  [97.6 °F (36.4 °C)-98.2 °F (36.8 °C)] 98.2 °F (36.8 °C)  Pulse:  [53-80] 53  Resp:  [3-27] 15  SpO2:  [86 %-100 %] 96 %  BP: ()/(53-90) 103/58                          Neurosurgery Physical Exam   Awake, alert  Oriented x3  Head wrap in place  PERRL  Face symmetric, tongue midline  FCX4  AG in all extremities  SILT    Significant Labs:  Recent Labs   Lab 09/14/20  0317 09/14/20  1840 09/15/20  0346   GLU 94 80 79  79    138 138  138   K 3.6 3.6 3.7  3.7    105 106  106   CO2 26 22* 23  23   BUN 10 7 9  9   CREATININE 0.7 0.7 0.7  0.7   CALCIUM 9.1 8.7 8.7  8.7   MG 1.9  --  1.8     Recent Labs    Lab 09/14/20  0317 09/14/20  1840 09/15/20  0346   WBC 5.74 5.39 7.47  7.47   HGB 14.2 14.6 13.5*  13.5*   HCT 39.9* 42.5 38.9*  38.9*    215 198  198     Recent Labs   Lab 09/13/20  1143   INR 1.1   APTT 29.6       Significant Diagnostics:  Ct Head Without Contrast    Result Date: 9/14/2020  1. Recent postoperative changes.  See above comments. 2. No acute intracranial process. Electronically signed by: Shyam Hill Date:    09/14/2020 Time:    18:48

## 2020-09-15 NOTE — ASSESSMENT & PLAN NOTE
25 M with intractable epilepsy s/p multiple failed AEDs and VNS placement now s/p sEEG lead placement and subsequent removal, POD#1:    Discharge home today  Will follow up in clinic as scheduled for wound check and follow up  Prescriptions for pain and nausea meds given to patient, AEDs refilled per Epilepsy team  CTH with expected post operative changes  No HOB restrictions  AEDs per Epilepsy/VNS turned back on 9/14

## 2020-09-15 NOTE — PLAN OF CARE
09/15/20 1630   Post-Acute Status   Post-Acute Authorization Other   Other Status No Post-Acute Service Needs     Pt to dc today.    Jaimie Knight LCSW  Neurocritical Care   [facility]  [id number]

## 2020-09-15 NOTE — PLAN OF CARE
Problem: Physical Therapy Goal  Goal: Physical Therapy Goal  Outcome: Met   PT D/Mikael due to pt able to perform functional mobility including up/down steps. Jelena Calvillo PT 9/15/20

## 2020-09-15 NOTE — PLAN OF CARE
Problem: Occupational Therapy Goal  Goal: Occupational Therapy Goal  Description: Intial eval completed   No goals established; pt at baseline with mobility and ADLs.   Pt d/c from acute OT 9/15/2020    Linda Waddell, OTR/L  Pager: [phone]  9/15/2020      Outcome: Met

## 2020-09-15 NOTE — PT/OT/SLP EVAL
"Physical Therapy Evaluation/D/C Summary    Patient Name:  Darin Cunha   MRN:  [id number]    Recommendations:     Discharge Recommendations:  home   Discharge Equipment Recommendations: none   Barriers to discharge: None    Assessment:     Darin Cunha is a 25 y.o. male admitted with a medical diagnosis of Epilepsy. PT D/Mikael due to pt able to perform functional mobility including up/down steps.   .    Recent Surgery: Procedure(s) (LRB):  REMOVAL OF STEREO EEG LEADS (DEPTH ELECTRODES); removal of sEEG bolts and depth electrodes (N/A) 1 Day Post-Op    Plan:      (D/C PT due to pt able to perform functional mobility including up/down steps)   · Plan of Care Expires:  10/15/20    Subjective   "I haven't been able to walk for a few weeks, this feels good"    Pain/Comfort:  · Pain Rating 1: 3/10  · Location - Side 1: Left  · Location - Orientation 1: lateral  · Location 1: head  · Pain Addressed 1: Reposition, Cessation of Activity  · Pain Rating Post-Intervention 1: 3/10    Patients cultural, spiritual, Mormonism conflicts given the current situation: no    Living Environment:  Pt lives alone in a 1 story home with 3 DEBBIE with R UE rail support , pt states he will stay with his mother upon D/C  Prior to admission, patients level of function was independent.  Equipment used at home: none.  Upon discharge, patient will have assistance from mother.    Objective:     Communicated with nurse prior to session.  Patient found supine with blood pressure cuff, pulse ox (continuous), telemetry, SCD  upon PT entry to room.    General Precautions: Standard, fall   Orthopedic Precautions:N/A   Braces: N/A     Exams:  · Cognitive Exam:  Patient is oriented to Person, Place, Time and Situation  · Sensation:    · -       Intact  light/touch B LE  · RLE ROM: WFL  · RLE Strength: WFL  · LLE ROM: WFL  · LLE Strength: WFL    Functional Mobility:  · Bed Mobility:     · Supine to Sit: independence  · Sit to Supine: independence  · Transfers:   "   · Sit to Stand:  stand by assistance with no AD  · Gait: 150ft with no AD with CGA initially due to mild instability upon standing then with supervision . Pt performed standing balance activities: high knee gait, single leg stance and ambulated backwards with no instability noted.  · Stairs:  Pt ascended/descended 4 steps with no AD with R UE rail with supervision.     AM-PAC 6 CLICK MOBILITY  Total Score:23     Patient left up in chair with all lines intact, call button in reach and nurse notified.    GOALS:   Multidisciplinary Problems     Physical Therapy Goals     Not on file          Multidisciplinary Problems (Resolved)        Problem: Physical Therapy Goal    Goal Priority Disciplines Outcome Goal Variances Interventions   Physical Therapy Goal   (Resolved)     PT, PT/OT Met                     History:     Past Medical History:   Diagnosis Date    Depressed 9/12/2018    Epilepsy 2015    Mitral valve prolapse 2015    Seizures        Past Surgical History:   Procedure Laterality Date    ADENOIDECTOMY  2015    ADENOIDECTOMY      CREATION OF YUNG HOLE WITH EVACUATION OF HEMATOMA Bilateral 8/31/2020    Procedure: CREATION, CRANIAL YUNG HOLE, PLACEMENT OF THE FIDUCIAL SCREWS BILATERAL 2.PLACEMENT OF SEEG ELECTRODES BILATERAL WITH OWEN ROBOT;  Surgeon: Melani Ortiz MD;  Location: [facility] OR [address] [place] [address];  Service: Neurosurgery;  Laterality: Bilateral;  TORONTO III, ASA III, BLOOD TYPE & SCREEN, HEADREST [place], REGULAR , SUPINE. SPECIAL EQUIPMENT [employer] DAIN WILCOX, OWEN REP, PMT    finger abscess removal      REMOVAL OF STEREO EEG LEADS (DEPTH ELECTRODES) N/A 9/14/2020    Procedure: REMOVAL OF STEREO EEG LEADS (DEPTH ELECTRODES); removal of sEEG bolts and depth electrodes;  Surgeon: Melani Ortiz MD;  Location: [facility] OR [address] [place] [address];  Service: Neurosurgery;  Laterality: N/A;    TONSILLECTOMY      VAGUS NERVE STIMULATOR INSERTION Left 4/4/2019    Procedure: INSERTION, VAGUS NERVE STIMULATOR;  Surgeon:  Reuben Gupta MD;  Location: [facility] OR [address] [place] [address];  Service: Neurosurgery;  Laterality: Left;  toronto I, asa 1, regular bed, supine     wisdom teeth extracted- about 2010          Time Tracking:     PT Received On: 09/15/20  PT Start Time: 0830     PT Stop Time: 0850  PT Total Time (min): 20 min     Billable Minutes: Evaluation 10 and Gait Training 10      Jelena Calvillo, PT  09/15/2020

## 2020-09-15 NOTE — PROGRESS NOTES
[facility]  Neurosurgery  Progress Note    Subjective:     History of Present Illness: Darin Cunha is a 25 y.o. male with intractable epilepsy since 2013 who presents as a referral from Dr. Abhijeet Do to discuss the gamut of epilepsy surgery options. Today's visit is a video visit, and the patient is a passenger in a moving vehicle. His mother is not with him.      The patient states that when he has events, he stares off and smacks his lips. The episodes last for about 5-6 minutes. He often waves his hands and feels tired afterwards. He currently has about 3-5 episodes a week. He has failed at least 4 AEDs at therapeutic dosages.      Per EEG review, he has bilateral temporal involvement, moreso left-sided than right.      Triggers include being tired and being stressed. He is not working (on disability) but finished his GED. He lives next door to grandmother and nearby to his mother. His major concern is memory loss from ongoing seizures.      Of notes, he endorses a history of staph infection previously. He denies history of bleeding or anesthetic complications. He has a VNS in place.        Post-Op Info:  Procedure(s) (LRB):  REMOVAL OF STEREO EEG LEADS (DEPTH ELECTRODES); removal of sEEG bolts and depth electrodes (N/A)   1 Day Post-Op     Interval History: NAEON. CTH with expected post operative changes, small amount of pneumocephalus, no hemorrhage. No seizure activity overnight. Restarted on AED/VNS turned on before OR yesterday.     Medications:  Continuous Infusions:   sodium chloride 0.9% 75 mL/hr at 09/15/20 0500     Scheduled Meds:   citalopram  20 mg Oral Daily    cloBAZam  40 mg Oral BID    lacosamide (VIMPAT) in saline IVPB  200 mg Intravenous Q12H    lamoTRIgine  200 mg Oral BID    mupirocin   Nasal BID    senna-docusate 8.6-50 mg  1 tablet Oral Daily    topiramate  50 mg Oral BID    vancomycin (VANCOCIN) IVPB  1,500 mg Intravenous Q12H     PRN Meds:sodium chloride,  acetaminophen, acetaminophen, acetaminophen, haloperidol lactate, hydrALAZINE, HYDROcodone-acetaminophen, labetaloL, magnesium oxide, magnesium oxide, oxyCODONE, potassium chloride, potassium chloride, potassium chloride, potassium, sodium phosphates, potassium, sodium phosphates, potassium, sodium phosphates, promethazine (PHENERGAN) IVPB     Review of Systems  Objective:     Weight: 78.5 kg (173 lb 1 oz)  Body mass index is 22.83 kg/m².  Vital Signs (Most Recent):  Temp: 98.2 °F (36.8 °C) (09/15/20 0302)  Pulse: (!) 53 (09/15/20 0502)  Resp: 15 (09/15/20 0502)  BP: (!) 103/58 (09/15/20 0502)  SpO2: 96 % (09/15/20 0502) Vital Signs (24h Range):  Temp:  [97.6 °F (36.4 °C)-98.2 °F (36.8 °C)] 98.2 °F (36.8 °C)  Pulse:  [53-80] 53  Resp:  [3-27] 15  SpO2:  [86 %-100 %] 96 %  BP: ()/(53-90) 103/58                          Neurosurgery Physical Exam   Awake, alert  Oriented x3  Head wrap in place  PERRL  Face symmetric, tongue midline  FCX4  AG in all extremities  SILT    Significant Labs:  Recent Labs   Lab 09/14/20  0317 09/14/20  1840 09/15/20  0346   GLU 94 80 79  79    138 138  138   K 3.6 3.6 3.7  3.7    105 106  106   CO2 26 22* 23  23   BUN 10 7 9  9   CREATININE 0.7 0.7 0.7  0.7   CALCIUM 9.1 8.7 8.7  8.7   MG 1.9  --  1.8     Recent Labs   Lab 09/14/20  0317 09/14/20  1840 09/15/20  0346   WBC 5.74 5.39 7.47  7.47   HGB 14.2 14.6 13.5*  13.5*   HCT 39.9* 42.5 38.9*  38.9*    215 198  198     Recent Labs   Lab 09/13/20  1143   INR 1.1   APTT 29.6       Significant Diagnostics:  Ct Head Without Contrast    Result Date: 9/14/2020  1. Recent postoperative changes.  See above comments. 2. No acute intracranial process. Electronically signed by: Shyam Hill Date:    09/14/2020 Time:    18:48      Assessment/Plan:     * Epilepsy  25 M with intractable epilepsy s/p multiple failed AEDs and VNS placement now s/p sEEG lead placement and subsequent removal, POD#1:    Admitted to  [facility]  q1h neurochecks  CTH with expected post operative changes  SBP<140  No HOB restrictions  Ok for recumbent bike in bed  IV Vanc x24h  Monitor for seizure activity - no benzos or sedating meds unless cleared by Epilepsy team  AEDs per Epilepsy/VNS turned back on 9/14  Please page w/exam change  PT/OT eval     Dispo: tentative step down v DC home pending PT eval        aKtey Power MD  Neurosurgery  [facility]

## 2020-09-15 NOTE — PT/OT/SLP EVAL
Occupational Therapy   Evaluation    Name: Darin Cunha  MRN: [id number]  Admitting Diagnosis:  Epilepsy 1 Day Post-Op    Recommendations:     Discharge Recommendations: home  Discharge Equipment Recommendations:  none  Barriers to discharge:  None    Assessment:     Darin Cunha is a 25 y.o. male with a medical diagnosis of Epilepsy.  He presents with performance deficits affecting function: decreased safety awareness, impaired functional mobilty.  Pt is functioning close to his PLOF. Pt did demonstrate slight impulsivity during transfers and functional mobility. Education provided on safety and risk of falls. Pt verbalized all understanding. Pt performed bed mobility with independence, transfers with SBA, functional mobility with SBA, and grooming standing at the sink with SBA. Pt is to be d/c from acute OT 9/15/2020. OT recommending home, no OT needs.     Rehab Prognosis: Good; patient would benefit from acute skilled OT services to address these deficits and reach maximum level of function.       Plan:     Patient to be seen   to address the above listed problems via    · Plan of Care Expires:    · Plan of Care Reviewed with: patient    Subjective     Chief Complaint: ready to go home   Patient/Family Comments/goals: to return home     Occupational Profile:  Living Environment: Pt lives alone in a H with 3 DEBBIE and R HR present, Upon d/c, pt plans to d/c to his mother's house with which is a H with 1 DEBBIE. Pt's house and his mother's house both have a tub/shower combo with no DME present. Pt does not work or drive.    Previous level of function: PTA, pt was (I) with ADLs and functional mobility   Roles and Routines: father, son   Equipment Used at Home:  none  Assistance upon Discharge: Pt will have assistance from family upon d/c.     Pain/Comfort:  · Pain Rating 1: 3/10  · Location 1: head  · Pain Addressed 1: Distraction  · Pain Rating Post-Intervention 1: 3/10    Patients cultural, spiritual, Adventist  conflicts given the current situation: no    Objective:     Communicated with: RN prior to session.  Patient found HOB elevated with blood pressure cuff, pulse ox (continuous), telemetry, SCD upon OT entry to room. Pt agreeable to therapy session.    General Precautions: Standard, fall   Orthopedic Precautions:N/A   Braces: N/A     Occupational Performance:    Bed Mobility:    · Patient completed Scooting/Bridging with independence  · Patient completed Supine to Sit with independence    Functional Mobility/Transfers:  · Patient completed Sit <> Stand Transfer with stand by assistance  with  no assistive device   · Functional Mobility: Pt engaging in functional mobility to simulate household/community distances within neuro ICU  with SBA and utilizing no AD in order to maximize functional activity tolerance and standing balance required for engagement in occupations of choice.   · Verbal cues for safety awareness and pacing  · Slight deviation to left  · No overt LOB   · Portable monitor and IV pole in tow    Activities of Daily Living:  · Grooming: supervision pt completed oral care and washed face while standing at the sink with SBA for standing balance   · Upper Body Dressing: set-up A  pt donned gown like robe while sitting EOB   · Lower Body Dressing: independence to don B  socks sitting supported in bed     Cognitive/Visual Perceptual:  Cognitive/Psychosocial Skills:     -       Oriented to: Person, Place, Time and Situation   -       Follows Commands/attention:Follows multistep  commands  -       Communication: clear/fluent  -       Memory: No Deficits noted  -       Safety awareness/insight to disability: intact   -       Mood/Affect/Coping skills/emotional control: Appropriate to situation  Visual/Perceptual:      -Intact      Physical Exam:  Balance:    - Static sit: (I)  -  Dynamic sit: (I)  - Static standing: (S)  - Dynamic Standing: SBA    Postural examination/scapula alignment:    -       No  postural abnormalities identified  Skin integrity: Visible skin intact  Edema:  None noted  Sensation:    -       Intact  Dominant hand:    -       right   Upper Extremity Range of Motion:     -       Right Upper Extremity: WFL  -       Left Upper Extremity: WFL  Upper Extremity Strength:    -       Right Upper Extremity: WFL  -       Left Upper Extremity: WFL   Strength:    -       Right Upper Extremity: WFL  -       Left Upper Extremity: WFL    AMPAC 6 Click ADL:  AMPAC Total Score: 24    Treatment & Education:  - Pt educated on role of OT, POC, and goals for therapy.    - Pt educated on safety with mobility due to pt's impulsivity with mobility and transfers   - Patient and family aware of patient's deficits and therapy progression.   - Pt educated on being appropriate to transfer with nsg and PCT with x 1 person assist for safety   - Time provided for therapeutic counseling and discussion of health disposition.   - Importance of OOB ax's with staff member assistance and sitting OOB majority of day.   - Pt completed ADLs and functional mobility for treatment session as noted above   - Pt verbalized understanding. Pt expressed no further concerns/questions.  - Whiteboard updated     Education:    Patient left up in chair with all lines intact, call button in reach and RN notified    GOALS:   Multidisciplinary Problems     Occupational Therapy Goals     Not on file          Multidisciplinary Problems (Resolved)        Problem: Occupational Therapy Goal    Goal Priority Disciplines Outcome Interventions   Occupational Therapy Goal   (Resolved)     OT, PT/OT Met    Description: Intial eval completed   No goals established; pt at baseline with mobility and ADLs.   Pt d/c from acute OT 9/15/2020    Linda Waddell OTR/L  Pager: [phone]  9/15/2020                       History:     Past Medical History:   Diagnosis Date    Depressed 9/12/2018    Epilepsy 2015    Mitral valve prolapse 2015    Seizures         Past Surgical History:   Procedure Laterality Date    ADENOIDECTOMY  2015    ADENOIDECTOMY      CREATION OF YUNG HOLE WITH EVACUATION OF HEMATOMA Bilateral 8/31/2020    Procedure: CREATION, CRANIAL YUNG HOLE, PLACEMENT OF THE FIDUCIAL SCREWS BILATERAL 2.PLACEMENT OF SEEG ELECTRODES BILATERAL WITH OWEN ROBOT;  Surgeon: Melani Ortiz MD;  Location: [facility] OR [address] [place] [address];  Service: Neurosurgery;  Laterality: Bilateral;  TORONTO III, ASA III, BLOOD TYPE & SCREEN, HEADREST SAPP, REGULAR , SUPINE. SPECIAL EQUIPMENT [employer] DAIN WILCOX, OWEN REP, PMT    finger abscess removal      REMOVAL OF STEREO EEG LEADS (DEPTH ELECTRODES) N/A 9/14/2020    Procedure: REMOVAL OF STEREO EEG LEADS (DEPTH ELECTRODES); removal of sEEG bolts and depth electrodes;  Surgeon: Melani Ortiz MD;  Location: [facility] OR [address] [place] [address];  Service: Neurosurgery;  Laterality: N/A;    TONSILLECTOMY      VAGUS NERVE STIMULATOR INSERTION Left 4/4/2019    Procedure: INSERTION, VAGUS NERVE STIMULATOR;  Surgeon: Reuben Gupta MD;  Location: [facility] OR [address] [place] [address];  Service: Neurosurgery;  Laterality: Left;  toronto I, asa 1, regular bed, supine     wisdom teeth extracted- about 2010          Time Tracking:     OT Date of Treatment: 09/15/20  OT Start Time: 0829  OT Stop Time: 0851  OT Total Time (min): 22 min (co-eval with PT)     Billable Minutes:Evaluation 10  Self Care/Home Management 12    Linda M Mensi, OT  9/15/2020

## 2020-09-15 NOTE — ASSESSMENT & PLAN NOTE
25M with a PMHx of intractable epilepsy s/p VNS placement on Onfi 40 mg BID, Lamictal 200 mg BID, Vimpat 200 mg BID, and Topamax 50 mg BID s/p bilateral sEEG placement with Dr. Melani Ortiz on 8/31. Postop CTH stable.    Recommendations:  - sEEG leads removed 9/14, patient tolerated procedure well, at baseline today  - Further data analysis of seizures during admission in process, patient to follow up with Dr. Do (epilepsy) and Dr. Ortiz (NSGY) for further review and discussion of potential surgical options  - Continue home AED regimen (Vimpat 200 mg BID, Onfi 40 mg BID, Lamictal 200 mg BID, and TPM 50 mg BID)  - Seizure precautions  - Avoid agents that lower seizure threshold  - VNS turned back on 9/14    Plan of care discussed with patient and NCC team. Patient stable for discharge from Epilepsy standpoint. Please call with questions.

## 2020-09-15 NOTE — DISCHARGE INSTRUCTIONS
Wound care:    Keep incisions dry. Do not soak under water (bathtub, swimming pool, etc.). Please shower with baby shampoo, but do not take a bath. If the incision becomes wet, gently pat it dry with a clean towel; do not rub.    Stitches will be removed in outpatient clinic in two weeks      Other post-op instructions:    No lifting anything heavier than a gallon of milk until cleared in post-operative visit.    No driving until cleared by your epilepsy neurologist.

## 2020-09-15 NOTE — ASSESSMENT & PLAN NOTE
VNS turned back on to prior settings 9/14/20 as below  Model # [id number] [id number]  Implant Date 4/1/19  Is Device palpable in chest wall  No  Side of implant    L            Initial  Reprogram   Output current  mA 0.0  0.75  Signal Freq          Hz 30  30  Pulse width        uSec 1000  1000  Signal on time     Sec 30  30  Signal off time      Min 5.0  5.0    Magnet settings  Output current          mA 0.0  0.875  Pulse width        uSec 1000  1000  Signal on time         Sec 30  30    Autostimulation (AS)  Output Current          mA 0.00  0.75  Pulse width        uSec 1000  1000  Signal on time         Sec 30

## 2020-09-15 NOTE — PLAN OF CARE
[facility] Care Plan    POC reviewed with Darin Cunha. Pt verbalized understanding. Questions and concerns addressed. No acute events overnight. Pt restarted on AEDs. No seizure like activity noted during shift. Procedure site WDL. No fevers during shift. Potassium being replaced. Pt slept the majority of shift. Pt progressing toward goals. Will continue to monitor. See below and flowsheets for full assessment and VS info.       Neuro:  [place] Coma Scale  Best Eye Response: 4-->(E4) spontaneous  Best Motor Response: 6-->(M6) obeys commands  Best Verbal Response: 5-->(V5) oriented  [place] Coma Scale Score: 15  Assessment Qualifiers: patient not sedated/intubated, no eye obstruction present  Pupil PERRLA: yes     24hr Temp:  [97.6 °F (36.4 °C)-98.2 °F (36.8 °C)]     CV:   Rhythm: sinus bradycardia  BP goals:   SBP < 140  MAP > 65    Resp:   O2 Device (Oxygen Therapy): room air  Oxygen Concentration (%): 100    Plan: N/A    GI/:  STEPHENIE Total Score: 20  Diet/Nutrition Received: regular  Last Bowel Movement: 09/13/20  Voiding Characteristics: voids spontaneously without difficulty    Intake/Output Summary (Last 24 hours) at 9/15/2020 0517  Last data filed at 9/15/2020 0500  Gross per 24 hour   Intake 2280 ml   Output 1350 ml   Net 930 ml     Unmeasured Output  Urine Occurrence: 1  Stool Occurrence: 0  Pad Count: 1    Labs/Accuchecks:  Recent Labs   Lab 09/15/20  0346   WBC 7.47  7.47   RBC 4.31*  4.31*   HGB 13.5*  13.5*   HCT 38.9*  38.9*     198      Recent Labs   Lab 09/15/20  0346     138   K 3.7  3.7   CO2 23  23     106   BUN 9  9   CREATININE 0.7  0.7   ALKPHOS 78   ALT 23   AST 26   BILITOT 0.9      Recent Labs   Lab 09/13/20  1143   INR 1.1   APTT 29.6    No results for input(s): CPK, CPKMB, TROPONINI, MB in the last 168 hours.    Electrolytes: Electrolytes replaced  Accuchecks: none    Gtts:   sodium chloride 0.9% 75 mL/hr at 09/15/20 0500       LDA/Wounds:  Lines/Drains/Airways        Peripheral Intravenous Line                   Peripheral IV - Single Lumen 09/09/20 2134 20 G Anterior;Left Forearm 5 days         Peripheral IV - Single Lumen 09/12/20 1458 20 G Right Hand 2 days                  Wounds: No  Wound care consulted: No

## 2020-09-15 NOTE — ASSESSMENT & PLAN NOTE
25 M with intractable epilepsy s/p multiple failed AEDs and VNS placement now s/p sEEG lead placement and subsequent removal, POD#1:    Admitted to [facility]  q1h neurochecks  CTH with expected post operative changes  SBP<140  No HOB restrictions  Ok for recumbent bike in bed  IV Vanc x24h  Monitor for seizure activity - no benzos or sedating meds unless cleared by Epilepsy team  AEDs per Epilepsy/VNS turned back on 9/14  Please page w/exam change  PT/OT eval     Dispo: tentative step down v DC home pending PT eval

## 2020-09-15 NOTE — DISCHARGE SUMMARY
[facility]  Neurosurgery  Discharge Summary      Patient Name: Darin Cunha  MRN: [id number]  Admission Date: 8/31/2020  Hospital Length of Stay: 15 days  Discharge Date and Time:  09/15/2020 3:17 PM  Attending Physician: Melani Ortiz MD   Discharging Provider: Brody Ornelas MD  Primary Care Provider: Primary Doctor No    HPI:   Darin Cunha is a 25 y.o. male with intractable epilepsy since 2013 who presents as a referral from Dr. Abhijeet Do to discuss the gamut of epilepsy surgery options. Today's visit is a video visit, and the patient is a passenger in a moving vehicle. His mother is not with him.      The patient states that when he has events, he stares off and smacks his lips. The episodes last for about 5-6 minutes. He often waves his hands and feels tired afterwards. He currently has about 3-5 episodes a week. He has failed at least 4 AEDs at therapeutic dosages.      Per EEG review, he has bilateral temporal involvement, moreso left-sided than right.      Triggers include being tired and being stressed. He is not working (on disability) but finished his GED. He lives next door to grandmother and nearby to his mother. His major concern is memory loss from ongoing seizures.      Of notes, he endorses a history of staph infection previously. He denies history of bleeding or anesthetic complications. He has a VNS in place.        Procedure(s) (LRB):  REMOVAL OF STEREO EEG LEADS (DEPTH ELECTRODES); removal of sEEG bolts and depth electrodes (N/A)     Hospital Course: 9/5  Pt exam stable.  No known sz overnight.    9/6 no knwn sz overnight. Pt doing well clinically on SEEG   9/7: No known sz overnight. Pt was taken off of Lamictal to further instigate sz.   9/8: Off lamictal, no seizures overnight, neurointact.   9/9: OR sEEG removal cancelled yesterday to continue to observe for more seizures. Weaned off of all antiepileptics  9/10: No seizures overnight, biked almost 8 miles on  recumbent, possible OR tomorrow pending discussion with epileptologist  9/11: AF, VSS. One seizure yesterday, pending formal EEG read, will plan for tentative OR Monday for sEEG removal.   9/14: To OR today for sEEG removal  9/15: POD1 patient doing well, worked with PT/OT, who stated patient is stable for discharge. Patient to go home with family today    Consults:   Consults (From admission, onward)        Status Ordering Provider     Pharmacy to dose Vancomycin consult  Once     Provider:  (Not yet assigned)    Completed RAYSHAWN PEREZ          Significant Diagnostic Studies: Labs:   BMP:   Recent Labs   Lab 09/14/20  0317 09/14/20  1840 09/15/20  0346   GLU 94 80 79  79    138 138  138   K 3.6 3.6 3.7  3.7    105 106  106   CO2 26 22* 23  23   BUN 10 7 9  9   CREATININE 0.7 0.7 0.7  0.7   CALCIUM 9.1 8.7 8.7  8.7   MG 1.9  --  1.8   , CMP   Recent Labs   Lab 09/14/20  0317 09/14/20  1840 09/15/20  0346    138 138  138   K 3.6 3.6 3.7  3.7    105 106  106   CO2 26 22* 23  23   GLU 94 80 79  79   BUN 10 7 9  9   CREATININE 0.7 0.7 0.7  0.7   CALCIUM 9.1 8.7 8.7  8.7   PROT 6.4  --  5.9*   ALBUMIN 4.0  --  3.7   BILITOT 0.7  --  0.9   ALKPHOS 85  --  78   AST 27  --  26   ALT 23  --  23   ANIONGAP 9 11 9  9   ESTGFRAFRICA >60.0 >60.0 >60.0  >60.0   EGFRNONAA >60.0 >60.0 >60.0  >60.0    and CBC   Recent Labs   Lab 09/14/20  0317 09/14/20  1840 09/15/20  0346   WBC 5.74 5.39 7.47  7.47   HGB 14.2 14.6 13.5*  13.5*   HCT 39.9* 42.5 38.9*  38.9*    215 198  198       Pending Diagnostic Studies:     Procedure Component Value Units Date/Time    Basic metabolic panel [[id number]] Collected: 08/31/20 0531    Order Status: Sent Lab Status: In process Updated: 08/31/20 0631    Specimen: Blood     CBC auto differential [[id number]] Collected: 08/31/20 0531    Order Status: Sent Lab Status: In process Updated: 08/31/20 0631    Specimen: Blood     CT Head Without Contrast  [[id number]]     Order Status: Sent Lab Status: No result     Paraneoplastic Autoantibody Evaulation, Serum [[id number]] Collected: 09/09/20 1125    Order Status: Sent Lab Status: In process Updated: 09/09/20 1159    Specimen: Blood         Final Active Diagnoses:    Diagnosis Date Noted POA    PRINCIPAL PROBLEM:  Epilepsy [G40.909] 04/04/2019 Yes    S/P placement of VNS (vagus nerve stimulation) device [Z96.89] 09/14/2020 Not Applicable    Hypokalemia [E87.6] 09/01/2020 Yes    Nausea [R11.0] 08/31/2020 Yes    Depression [F32.9] 08/28/2020 Yes      Problems Resolved During this Admission:      Discharged Condition: stable    Disposition: Home or Self Care    Follow Up:  Follow-up Information     Melani Ortiz MD. Go on 10/8/2020.    Specialty: Neurosurgery  Why: For wound re-check at 3 pm   Contact information:  [address] [facility] [address]  [phone]             Primary Care Physician. Schedule an appointment as soon as possible for a visit in 1 week.    Why: Hospital follow up  Contact information:  Please establish with a Primary Care doctor and make an appointment to be seen in one week                Patient Instructions:      Diet Adult Regular     Lifting restrictions     Notify your health care provider if you experience any of the following:  increased confusion or weakness     Notify your health care provider if you experience any of the following:  persistent dizziness, light-headedness, or visual disturbances     Notify your health care provider if you experience any of the following:  worsening rash     Notify your health care provider if you experience any of the following:  severe persistent headache     Notify your health care provider if you experience any of the following:  difficulty breathing or increased cough     Notify your health care provider if you experience any of the following:  redness, tenderness, or signs of infection (pain, swelling, redness, odor or green/yellow  discharge around incision site)     Notify your health care provider if you experience any of the following:  severe uncontrolled pain     Notify your health care provider if you experience any of the following:  persistent nausea and vomiting or diarrhea     Notify your health care provider if you experience any of the following:  temperature >100.4     Medications:  Reconciled Home Medications:      Medication List      START taking these medications    oxyCODONE 5 MG immediate release tablet  Commonly known as: ROXICODONE  Take 1 tablet (5 mg total) by mouth every 6 (six) hours as needed for Pain.     promethazine 12.5 MG Tab  Commonly known as: PHENERGAN  Take 1 tablet (12.5 mg total) by mouth every 6 (six) hours as needed (nausea/vomiting).        CHANGE how you take these medications    cloBAZam 20 mg Tab  Commonly known as: ONFI  Take 2 tablets (40 mg total) by mouth 2 (two) times daily.  What changed: additional instructions     lacosamide 200 mg Tab tablet  Commonly known as: VIMPAT  Take 1 tablet (200 mg total) by mouth every 12 (twelve) hours.  What changed: additional instructions     lamoTRIgine 200 MG tablet  Commonly known as: LAMICTAL  Take 1 tablet (200 mg total) by mouth 2 (two) times daily.  What changed: additional instructions     topiramate 50 MG tablet  Commonly known as: TOPAMAX  Take 1 tablet (50 mg total) by mouth 2 (two) times daily.  What changed: additional instructions        CONTINUE taking these medications    citalopram 20 MG tablet  Commonly known as: CELEXA  Take 20 mg by mouth once daily.        STOP taking these medications    mupirocin 2 % ointment  Commonly known as: BACTROBAN            Brody Ornelas MD  Neurosurgery  [facility]

## 2020-09-15 NOTE — PROGRESS NOTES
[facility]  Neurology-Epilepsy  Progress Note    Patient Name: Darin Cunha  MRN: [id number]  Admission Date: 8/31/2020  Hospital Length of Stay: 15 days  Code Status: Full Code   Attending Provider: Melani Ortiz MD  Primary Care Physician: Primary Doctor No   Principal Problem:Epilepsy    Subjective:     Hospital Course:   8/31: sEEG placement. Hold Vimpat. Continue other AEDs: Onfi 40 mg BID, Lamictal 200 mg BID, Topamax 50 mg BID.  8/31>9/1: No clinical or electrographic seizures overnight  9/1>9/2: Four electrographic seizures (3 with clinical correlate) originating from L hippo and entro areas  9/2>9/3: No electrographic seizures  9/3>9/4: No electrographic seizures  9/4>9/5: No electrographic seizures  9/5>9/6: No electrographic seizures  9/6>9/7: No electrographic seizures  9/7>9/8: No electrographic seizures  9/8>9/9: No electrographic seizures  9/9>9/10: No electrographic seizures  9/10>9/11: One clinical and electrographic seizure originating from L Hippo and Entro areas  9/11>9/12: One clinical and electrographic seizure so far 9/12, progressed to GTC  9/12>9/13 - no acute events overnight.   9/13>9/14 - no acute events overnight, resume home AEDs, VNS turned back on to prior settings. To OR today for sEEG removal.  9/14>9/15 - no acute events, tolerating home AEDs well, no issues with VNS. Worked with PT/OT, states he is feeling well and looking to discharge home. Discharge per NSGY/NCC.    Interval History: sEEG leads removed 9/14, patient tolerated procedure well and at baseline today. Tolerating home AEDs, VNS settings well. Looking forward to discharge home.    Current Facility-Administered Medications   Medication Dose Route Frequency Provider Last Rate Last Dose    0.9%  NaCl infusion (for blood administration)   Intravenous Q24H PRN Katey Lee MD        0.9%  NaCl infusion   Intravenous Continuous Katey Lee MD 75 mL/hr at 09/15/20 0800      acetaminophen  suppository 650 mg  650 mg Rectal Q6H PRN Katey Lee MD        acetaminophen tablet 650 mg  650 mg Oral Q4H PRN Katey Lee MD   325 mg at 09/10/20 2106    acetaminophen tablet 650 mg  650 mg Oral Q6H PRN Katey Lee MD        citalopram tablet 20 mg  20 mg Oral Daily Katey Lee MD   20 mg at 09/15/20 0818    cloBAZam Tab 40 mg  40 mg Oral BID Katey Lee MD   40 mg at 09/15/20 0818    haloperidol lactate injection 0.5 mg  0.5 mg Intravenous Q1H PRN Katey Lee MD        hydrALAZINE injection 10 mg  10 mg Intravenous Q4H PRN Katey Lee MD   10 mg at 08/31/20 1601    HYDROcodone-acetaminophen 5-325 mg per tablet 1 tablet  1 tablet Oral Q4H PRN Katey Lee MD        labetaloL injection 10 mg  10 mg Intravenous Q4H PRN Katey Lee MD        lacosamide (VIMPAT) 200 mg in sodium chloride 0.9% 100 mL IVPB  200 mg Intravenous Q12H Katey Lee  mL/hr at 09/15/20 0818 200 mg at 09/15/20 0818    lamoTRIgine tablet 200 mg  200 mg Oral BID Katey Lee MD   200 mg at 09/14/20 2123    magnesium oxide tablet 800 mg  800 mg Oral PRN Katey Lee MD        magnesium oxide tablet 800 mg  800 mg Oral PRTREVOR Lee MD        mupirocin 2 % ointment   Nasal BID Katey Lee MD        oxyCODONE immediate release tablet 5 mg  5 mg Oral Q6H PRN Katey Lee MD   5 mg at 09/14/20 1856    potassium chloride packet 40 mEq  40 mEq Oral JOE Lee MD   40 mEq at 09/15/20 0545    potassium chloride packet 40 mEq  40 mEq Oral PRN Katey Lee MD   40 mEq at 09/10/20 0933    potassium chloride packet 40 mEq  40 mEq Oral PRN Katey Lee MD        potassium, sodium phosphates 280-160-250 mg packet 2 packet  2 packet Oral PRN Katey Lee MD        potassium, sodium phosphates 280-160-250 mg packet 2 packet  2 packet Oral PRN Katey Lee MD        potassium, sodium phosphates 280-160-250 mg packet 2 packet  2 packet Oral PRN Katey Lee,  MD        promethazine (PHENERGAN) 6.25 mg in dextrose 5 % 50 mL IVPB  6.25 mg Intravenous Q8H PRN Katey Lee  mL/hr at 08/31/20 1730 6.25 mg at 08/31/20 1730    senna-docusate 8.6-50 mg per tablet 1 tablet  1 tablet Oral Daily Katey Lee MD   1 tablet at 09/15/20 0818    topiramate tablet 50 mg  50 mg Oral BID Katey Lee MD   50 mg at 09/15/20 0818     Continuous Infusions:   sodium chloride 0.9% 75 mL/hr at 09/15/20 0800       Review of Systems   Constitutional: Negative for chills, diaphoresis, fatigue and fever.   Respiratory: Negative for cough, shortness of breath and wheezing.    Cardiovascular: Negative for chest pain and palpitations.   Gastrointestinal: Negative for abdominal pain, diarrhea, nausea and vomiting.   Musculoskeletal: Negative for arthralgias and myalgias.   Neurological: Positive for seizures (none in several days). Negative for dizziness, syncope, speech difficulty, weakness and headaches.   Psychiatric/Behavioral: Negative for behavioral problems, confusion and sleep disturbance. The patient is not nervous/anxious.      Objective:     Vital Signs (Most Recent):  Temp: 98.1 °F (36.7 °C) (09/15/20 0700)  Pulse: 80 (09/15/20 1300)  Resp: 20 (09/15/20 1300)  BP: 106/68 (09/15/20 1200)  SpO2: 99 % (09/15/20 1300) Vital Signs (24h Range):  Temp:  [97.6 °F (36.4 °C)-98.2 °F (36.8 °C)] 98.1 °F (36.7 °C)  Pulse:  [] 80  Resp:  [12-35] 20  SpO2:  [86 %-100 %] 99 %  BP: ()/(53-90) 106/68     Weight: 78.5 kg (173 lb 1 oz)  Body mass index is 22.83 kg/m².    Physical Exam  Constitutional:       General: He is not in acute distress.     Appearance: Normal appearance. He is not diaphoretic.   HENT:      Head: Normocephalic.   Eyes:      General: No scleral icterus.        Right eye: No discharge.         Left eye: No discharge.      Extraocular Movements: EOM normal.      Conjunctiva/sclera: Conjunctivae normal.      Pupils: Pupils are equal, round, and reactive to  light.   Cardiovascular:      Rate and Rhythm: Normal rate.   Pulmonary:      Effort: Pulmonary effort is normal. No respiratory distress.   Abdominal:      General: There is no distension.      Palpations: Abdomen is soft.      Tenderness: There is no abdominal tenderness.   Musculoskeletal: Normal range of motion.         General: No deformity or signs of injury.   Skin:     General: Skin is warm and dry.   Neurological:      General: No focal deficit present.      Mental Status: He is alert and oriented to person, place, and time. Mental status is at baseline.      Coordination: Finger-Nose-Finger Test normal.   Psychiatric:         Mood and Affect: Mood normal.         Speech: Speech normal.         Behavior: Behavior normal.         Thought Content: Thought content normal.         NEUROLOGICAL EXAMINATION:     MENTAL STATUS   Oriented to person, place, and time.   Speech: speech is normal   Level of consciousness: alert    CRANIAL NERVES     CN III, IV, VI   Pupils are equal, round, and reactive to light.  Extraocular motions are normal.     CN VII   Facial expression full, symmetric.     CN VIII   Hearing: intact    CN XI   CN XI normal.     CN XII   CN XII normal.     MOTOR EXAM   Muscle bulk: normal  Overall muscle tone: normal       Normal ROM, moves all extremities spontaneously and symmetrically     GAIT AND COORDINATION      Coordination   Finger to nose coordination: normal      Significant Labs: All pertinent lab results from the past 24 hours have been reviewed.    Significant Studies: I have reviewed all pertinent imaging results/findings within the past 24 hours.    Assessment and Plan:     * Epilepsy  25M with a PMHx of intractable epilepsy s/p VNS placement on Onfi 40 mg BID, Lamictal 200 mg BID, Vimpat 200 mg BID, and Topamax 50 mg BID s/p bilateral sEEG placement with Dr. Melani Ortiz on 8/31. Postop CTH stable.    Recommendations:  - sEEG leads removed 9/14, patient tolerated procedure well, at  baseline today  - Further data analysis of seizures during admission in process, patient to follow up with Dr. Do (epilepsy) and Dr. Ortiz (NSGY) for further review and discussion of potential surgical options  - Continue home AED regimen (Vimpat 200 mg BID, Onfi 40 mg BID, Lamictal 200 mg BID, and TPM 50 mg BID)  - Seizure precautions  - Avoid agents that lower seizure threshold  - VNS turned back on 9/14    Plan of care discussed with patient and NCC team. Patient stable for discharge from Epilepsy standpoint. Please call with questions.    S/P placement of VNS (vagus nerve stimulation) device  VNS turned back on to prior settings 9/14/20 as below  Model # [id number]  Implant Date 4/1/19  Is Device palpable in chest wall  No  Side of implant    L            Initial  Reprogram   Output current  mA 0.0  0.75  Signal Freq          Hz 30  30  Pulse width        uSec 1000  1000  Signal on time     Sec 30  30  Signal off time      Min 5.0  5.0    Magnet settings  Output current          mA 0.0  0.875  Pulse width        uSec 1000  1000  Signal on time         Sec 30  30    Autostimulation (AS)  Output Current          mA 0.00  0.75  Pulse width        uSec 1000  1000  Signal on time         Sec 30            Depression  - Chronic and stable  - Continue home Celexa        VTE Risk Mitigation (From admission, onward)         Ordered     IP VTE HIGH RISK PATIENT  Once      09/14/20 1812     Place sequential compression device  Until discontinued      09/14/20 1812     Place CHASE hose  Until discontinued      09/14/20 1812     Place sequential compression device  Until discontinued      08/31/20 1646                Pilar Shepard PA-C  Neurology-Epilepsy  [facility]  Staff: Dr. Luque

## 2020-09-15 NOTE — SUBJECTIVE & OBJECTIVE
Interval History: sEEG leads removed 9/14, patient tolerated procedure well and at baseline today. Tolerating home AEDs, VNS settings well. Looking forward to discharge home.    Current Facility-Administered Medications   Medication Dose Route Frequency Provider Last Rate Last Dose    0.9%  NaCl infusion (for blood administration)   Intravenous Q24H PRN Katey Lee MD        0.9%  NaCl infusion   Intravenous Continuous Katey Lee MD 75 mL/hr at 09/15/20 0800      acetaminophen suppository 650 mg  650 mg Rectal Q6H PRN Katey Lee MD        acetaminophen tablet 650 mg  650 mg Oral Q4H PRN Katey Lee MD   325 mg at 09/10/20 2106    acetaminophen tablet 650 mg  650 mg Oral Q6H PRN Katey Lee MD        citalopram tablet 20 mg  20 mg Oral Daily Katey Lee MD   20 mg at 09/15/20 0818    cloBAZam Tab 40 mg  40 mg Oral BID Katey Lee MD   40 mg at 09/15/20 0818    haloperidol lactate injection 0.5 mg  0.5 mg Intravenous Q1H PRN Katey Lee MD        hydrALAZINE injection 10 mg  10 mg Intravenous Q4H PRN Katey Lee MD   10 mg at 08/31/20 1601    HYDROcodone-acetaminophen 5-325 mg per tablet 1 tablet  1 tablet Oral Q4H PRN Katey Lee MD        labetaloL injection 10 mg  10 mg Intravenous Q4H PRN Katey Lee MD        lacosamide (VIMPAT) 200 mg in sodium chloride 0.9% 100 mL IVPB  200 mg Intravenous Q12H Katey Lee  mL/hr at 09/15/20 0818 200 mg at 09/15/20 0818    lamoTRIgine tablet 200 mg  200 mg Oral BID Katey Lee MD   200 mg at 09/14/20 2123    magnesium oxide tablet 800 mg  800 mg Oral PRN Katey Lee MD        magnesium oxide tablet 800 mg  800 mg Oral PRN Katey Lee MD        mupirocin 2 % ointment   Nasal BID Katey Lee MD        oxyCODONE immediate release tablet 5 mg  5 mg Oral Q6H PRN Katey Lee MD   5 mg at 09/14/20 1856    potassium chloride packet 40 mEq  40 mEq Oral PRN Katey Lee MD   40 mEq at 09/15/20  0545    potassium chloride packet 40 mEq  40 mEq Oral PRN Katey Lee MD   40 mEq at 09/10/20 0933    potassium chloride packet 40 mEq  40 mEq Oral PRN Katey Lee MD        potassium, sodium phosphates 280-160-250 mg packet 2 packet  2 packet Oral PRN Ktaey Lee MD        potassium, sodium phosphates 280-160-250 mg packet 2 packet  2 packet Oral PRN Katey Lee MD        potassium, sodium phosphates 280-160-250 mg packet 2 packet  2 packet Oral PRN Katey Lee MD        promethazine (PHENERGAN) 6.25 mg in dextrose 5 % 50 mL IVPB  6.25 mg Intravenous Q8H PRN Katey Lee  mL/hr at 08/31/20 1730 6.25 mg at 08/31/20 1730    senna-docusate 8.6-50 mg per tablet 1 tablet  1 tablet Oral Daily Katey Lee MD   1 tablet at 09/15/20 0818    topiramate tablet 50 mg  50 mg Oral BID Katey Lee MD   50 mg at 09/15/20 0818     Continuous Infusions:   sodium chloride 0.9% 75 mL/hr at 09/15/20 0800       Review of Systems   Constitutional: Negative for chills, diaphoresis, fatigue and fever.   Respiratory: Negative for cough, shortness of breath and wheezing.    Cardiovascular: Negative for chest pain and palpitations.   Gastrointestinal: Negative for abdominal pain, diarrhea, nausea and vomiting.   Musculoskeletal: Negative for arthralgias and myalgias.   Neurological: Positive for seizures (none in several days). Negative for dizziness, syncope, speech difficulty, weakness and headaches.   Psychiatric/Behavioral: Negative for behavioral problems, confusion and sleep disturbance. The patient is not nervous/anxious.      Objective:     Vital Signs (Most Recent):  Temp: 98.1 °F (36.7 °C) (09/15/20 0700)  Pulse: 80 (09/15/20 1300)  Resp: 20 (09/15/20 1300)  BP: 106/68 (09/15/20 1200)  SpO2: 99 % (09/15/20 1300) Vital Signs (24h Range):  Temp:  [97.6 °F (36.4 °C)-98.2 °F (36.8 °C)] 98.1 °F (36.7 °C)  Pulse:  [] 80  Resp:  [12-35] 20  SpO2:  [86 %-100 %] 99 %  BP: ()/(53-90)  106/68     Weight: 78.5 kg (173 lb 1 oz)  Body mass index is 22.83 kg/m².    Physical Exam  Constitutional:       General: He is not in acute distress.     Appearance: Normal appearance. He is not diaphoretic.   HENT:      Head: Normocephalic.   Eyes:      General: No scleral icterus.        Right eye: No discharge.         Left eye: No discharge.      Extraocular Movements: EOM normal.      Conjunctiva/sclera: Conjunctivae normal.      Pupils: Pupils are equal, round, and reactive to light.   Cardiovascular:      Rate and Rhythm: Normal rate.   Pulmonary:      Effort: Pulmonary effort is normal. No respiratory distress.   Abdominal:      General: There is no distension.      Palpations: Abdomen is soft.      Tenderness: There is no abdominal tenderness.   Musculoskeletal: Normal range of motion.         General: No deformity or signs of injury.   Skin:     General: Skin is warm and dry.   Neurological:      General: No focal deficit present.      Mental Status: He is alert and oriented to person, place, and time. Mental status is at baseline.      Coordination: Finger-Nose-Finger Test normal.   Psychiatric:         Mood and Affect: Mood normal.         Speech: Speech normal.         Behavior: Behavior normal.         Thought Content: Thought content normal.         NEUROLOGICAL EXAMINATION:     MENTAL STATUS   Oriented to person, place, and time.   Speech: speech is normal   Level of consciousness: alert    CRANIAL NERVES     CN III, IV, VI   Pupils are equal, round, and reactive to light.  Extraocular motions are normal.     CN VII   Facial expression full, symmetric.     CN VIII   Hearing: intact    CN XI   CN XI normal.     CN XII   CN XII normal.     MOTOR EXAM   Muscle bulk: normal  Overall muscle tone: normal       Normal ROM, moves all extremities spontaneously and symmetrically     GAIT AND COORDINATION      Coordination   Finger to nose coordination: normal      Significant Labs: All pertinent lab results  from the past 24 hours have been reviewed.    Significant Studies: I have reviewed all pertinent imaging results/findings within the past 24 hours.

## 2020-09-15 NOTE — PLAN OF CARE
Patient to discharge to home with no post acute needs.         09/15/20 1540   Final Note   Assessment Type Final Discharge Note   Anticipated Discharge Disposition Home   Hospital Follow Up  Appt(s) scheduled? Yes   Right Care Referral Info   Post Acute Recommendation No Care       Follow-up Information     Melani Ortiz MD. Go on 10/1/2020.    Specialty: Neurosurgery  Why: For wound re-check at at 7:30 am   Contact information:  [address] [facility] [address]  [phone]             Primary Care Physician. Schedule an appointment as soon as possible for a visit in 1 week.    Why: Hospital follow up  Contact information:  Please establish with a Primary Care doctor and make an appointment to be seen in one week                           PCP:  Primary Doctor No  None        Pharmacy:    [facility] [address] [facility] [address] [facility] [address]  [address] [address]  Phone: [phone] Fax: [phone]    [id number] [address] [facility] [address] [facility]  [address] [address]  Phone: [phone] Fax: [phone]        Emergency Contacts:  Extended Emergency Contact Information  Primary Emergency Contact: Alexandra Etienne  Address: [address] [place] [address] of [place]  Mobile Phone: [phone]  Relation: Mother      Insurance:    Payor: MEDICAID / Plan: [facility] / Product Type: Managed Medicaid /     Shivani De Leon RN, CCRN-K, [facility]  Neuro-Critical Care   X [id number]    09/15/2020  3:44 PM

## 2020-09-16 ENCOUNTER — TELEPHONE (OUTPATIENT)
Dept: NEUROSURGERY | Facility: CLINIC | Age: 25
End: 2020-09-16

## 2020-09-16 ENCOUNTER — TELEPHONE (OUTPATIENT)
Dept: NEUROLOGY | Facility: CLINIC | Age: 25
End: 2020-09-16

## 2020-09-16 LAB
AMPHIPHYSIN AB TITR SER: NEGATIVE TITER
CV2 IGG TITR SER: NEGATIVE TITER
GLIAL NUC TYPE 1 AB TITR SER: NEGATIVE TITER
HU1 AB TITR SER: NEGATIVE TITER
HU2 AB TITR SER IF: NEGATIVE TITER
HU3 AB TITR SER: NEGATIVE TITER
IMMUNOLOGIST REVIEW: NORMAL
NACHR AB SER-SCNC: 0 NMOL/L
PAVAL REFLEX TEST ADDED: NORMAL
PCA-1 AB TITR SER: NEGATIVE TITER
PCA-2 AB TITR SER: NEGATIVE TITER
PCA-TR AB TITR SER: NEGATIVE TITER
STRIA MUS AB TITR SER: NEGATIVE TITER
VGCC-N BIND AB SER-SCNC: 0 NMOL/L
VGCC-P/Q BIND AB SER-SCNC: 0 NMOL/L
VGKC AB SER-SCNC: 0 NMOL/L

## 2020-09-16 NOTE — PROCEDURES
Stereo EEG Depth Electrode Recording  DATE OF SERVICE: 09/13/2020  EEG NUMBER: [id number]  REQUESTED BY: Dr Alix Ortiz  LOCATION OF SERVICE: [id number]     METHODOLOGY              Depth electrodes consisted of thin wires with electrical contacts it fixed distances along the wire.  These are then implanted using a stereotactic procedure targeting cortical and subcortical structures.  The up to 256 electrode contact can be recorded simultaneously with this procedure.  Recording band pass was 0.1  in the low past filters setting could be set at the 512, 1024, or 2048.  Digital video recording of the patient is simultaneously recorded with the EEG.  The patient is instructed report clinical symptoms which may occur during the recording session.  EEG and video recording is stored and archived in digital format. Activation procedures which include photic stimulation, hyperventilation and instructing patients to perform simple task are done in selected patients.   Compresses spectral analysis (CSA) is also performed on the activity recorded from each individual channel.  This is displayed as a power display of frequencies from 0 to 30 Hz over time.   The CSA analysis is done and displayed continuously.  This is reviewed for asymmetries in power between homologous areas of the scalp and for presence of changes in power which canbe seen when seizures occur.  Sections of suspected abnormalities on the CSA is then compared with the original EEG recording.          Implant Date MRN Name Last First                      8/31/2020 [id number]  Alphonse Webster                            Contacts                   Depth Elect Name SN  # contacts Color 1 Color 2 1 2 3 4 5 6 7 8 9 10 11 12 13 14 15 16   1 L Amyg 73575  14 Yellow Green 1 2 3 4 5 6 7 8 9 10 11 12 13 14     2 L Hippo 04388  14 Black Blue 15 16 17 18 19 20 21 22 23 24 25 26 27 28     3 L Entor 50987  14 Red Blue 29 30 31 32 33 34 35 36 37 38 39 40 41 42     4 L AntCi 81302  14  Red Green 43 44 45 46 47 48 49 50 51 52 53 54 55 56     5 L OrbFr 73040  16 Blue Red 57 58 59 60 61 62 63 64 65 66 67 68 69 70 71 72   6 L PreFr 22549  16 Yellow Green 73 74 75 76 77 78 79 80 81 82 83 84 85 86 87 88   7 L AntLn 81137  8 Black Green 89 90 91 92 93 94 95 96           8 L PoIns 40417  8 Yellow Black 97 98 99 100 101 102 103 104           9 L TeOcc 05320  8 Black Red 105 106 107 108 109 110 111 112           10 L Pariet 79750  8 Black Red 113 114 115 116 117 118 119 120           11 R Amyg 55422  12 Green Red 121 122 123 124 125 126 127 128 129 130 131 132       12 R Hippo 02844  12 Green Blue 133 134 135 136 137 138 139 140 141 142 143 144       13 R Asuin 59286  8 Blue Red 145 146 147 148 149 150 151 152           14 R Acing 88658  16 Yellow Blue 153 154 155 156 157 158 159 160 161 162 163 164 165 166 167 168       Electrode integrety   High amplitude artifact present from the following contacts               Depth electrode          Contact #                       L Pariet  4              L Entor   12, 13, 14  The L Pariet  3, 4   R Amygd  2, 3    11, 12  Appropriate recording obtained from the remaining electrode contacts.     RECORDING TIMES  Start on 09/13/2020 7:00 a.m.  Stop on 09/14/2020 7:00 a.m.  A total of 24 hrs of EEG monitoring was obtained    Interictal Activity  Inner ictal activity continues to be frequently recorded.  These are commonly starting from the left hippocampus contacts 10 11 and 12,  spread is most common to the ipsilateral depth electrodes however and occasional initial spread is noted to the right hippocampus.  Infrequently a right hippocampal spike discharges noted.  The  Seizures Recorded   Date  Start  End  Sz 1 2020/09/01 08:34:43 08:35:28    Sz 2 2020/09/01 13:18:13 13:18:55    Sz 3  2020/09/01  16:39:13   start L hippo 1 2   16:39:13   sz spread Amyg Entor Prefr   16:39:38   Patient Event   16:39:43   nurse telling him a code word, Purple elephant   16:39:48  Sz 3  "electrographic stop   16:40:14  pt not following commands   16:40:37  pt able to follow commands    Sz 4 2020/09/01    21:05:53 startL Entor > Hippo    very focal   21:06:17 Stop    Sz 5 2020/09/09   10:38:13 Andrea Entor > Hippo   10:38:14  Rhythmic activity Entor & Hippo   10:38:19  Rhythic activity PreFr 11 12 13   10:38:22  spikes start PoIns   10:39:29  Sz 5 Stops    Sz 6 2020/09/12   10:08:58 Fast beta onset L Hippo 1, 2, 3   10:09:01  Andrea start  L Entor 2, 3 > Hippo 2, 3   10:09:11  Poly spikes L Hippo & Entor remain focal then spread     EEG FINDINGS  Ictal Recording   Seizure #         Date                 Start                 End  #1                    2020/09/01 08:34:43 08:35:28   Build up started in L Hippo contacts 1, 2  >> 3, 4      then spread to L Hippo contacts 11, 12, 13 L Amygd contacts 11, 12, 13      then spread to L Entor contacts 11, 12, 13     Seizure Description   Seizure 1   08:34:43  Sz 1 start  08:34:58  Oral facial mvt  08:35:01  L forearm and leg mvt  08:35:16  L hand auttomatism  08:35:23  Oral facial mvt continues  08:35:28  Sz end    Seizure 2  d1 13:18:13  Sz 2 start d1 13:18:29  Head turn slightly to L  d1 13:18:33  Oral facial mvt  d1 13:18:41  Moving some in bed  d1 13:18:45  oral facial mvt continues  d1 13:18:55  Sz 2 stop  d1 13:18:57  Returns to looking at iPhone    Seizure 3  16:39:13  Lying back in bed - no mvt  16:39:20  Raises hed and slightly flexes knees  16:39:26  no response to nurse "are U OK?  16:39:30  Oral facial mvts  16:39:38  Patient Event  16:39:43  nurse telling him a code word, Purple elephant  16:39:48  Sz 3 stop  16:39:52  limb mvts stop  16:40:02  not responding to nurse  16:40:14  pt not following commands  A the 16:40:37  pt able to follow commands    Seizure 4     Nothing clinical     Seizure 5  2020/09/09  d1 10:38:13  Sz 5 Start  d1 10:38:13  Andrea Entor > Hippo  d1 10:38:14  Rhythmic activity PreFro & Anc  d1 10:38:16  Looking at iPhone  d1 " 10:38:17  raise head off bed  d1 10:38:19  Rhythic activity PreFr 11 12 13  d1 10:38:22  spikes start PoIns  d1 10:38:24  Moving L hand/index finger extended and waving  d1 10:38:27  Waving L hand  d1 10:38:33  emitting gutteral sound  d1 10:38:42  open mouth emiting cry  d1 10:38:43  both hands/arms extended tonically  d1 10:38:45  legs flexed then extended  d1 10:38:50  Tonic extension all limbs  d1 10:38:58  Clonic jering begins  d1 10:39:07  Gen jerking continues  d1 10:39:16  Gen jerking continues  d1 10:39:21  Jerking slows  d1 10:39:29  Sz 5 Stops    Seizure 6  2020/09/12  10:08:58  Sz 6 Start  10:08:58  Lying supine lights off. On iPhone  10:09:05  change in attention - looks up  10:09:07  head turn to L  10:09:11  oral autom  10:09:12  legs flextion at hip  10:09:13  sits up  10:09:17  LUE autom  10:09:24  RUE tonic  10:09:25  High pitched cry  10:09:25  GTC  10:09:29  Mouth open, arms extended  10:09:37  Legs extended  10:09:48  Gen clonic activity evolving  10:10:14  Sz 6 end    Impression  Six seizure originating from [facility]   No seizures recorded 2020/09/02>03 (#3)  No seizures recorded 2020/09/03>04 (#4)  No seizures recorded 2020/09/04>05 (#5)  No seizures recorded 2020/09/05>06 (#6)  No seizures recorded 2020/09/06>07 (#7)  No seizures recorded 2020/09/07>08 (#8)  No seizures recorded 2020/09/08>09 (#9)  No seizures recorded 2020/09/09>10 (#10)  One seizures recorded 2020/09/10>11 (#11)  No seizures recorded 2020/09/11>12 (#12)  One seizures recorded 2020/09/12>13 (#13)  No seizures recorded 2020/09/13>14 (#14)    Seizures 6 recorded total all originating from [facility]     BEBETO Marina MD  Professor Emeritus  Epilepsy Division

## 2020-09-16 NOTE — TELEPHONE ENCOUNTER
----- Message from Yadi Causey sent at 9/16/2020  1:06 PM CDT -----  Regarding: pt advice  Contact: pt @ [phone]  Pt asking to speak with Dr. Do regarding his medications. Please call.

## 2020-09-16 NOTE — TELEPHONE ENCOUNTER
----- Message from Mildred Butler sent at 9/16/2020 12:14 PM CDT -----  Contact: self @ [phone]  Pt was discharged from the hospital on yesterday.  Pt says Dr Abhijeet Do told him to stop taking Vimpat but it is still listed as a medication in his discharge paper work.  Pls call to clarify.

## 2020-09-17 ENCOUNTER — TELEPHONE (OUTPATIENT)
Dept: NEUROLOGY | Facility: CLINIC | Age: 25
End: 2020-09-17

## 2020-09-17 ENCOUNTER — TELEPHONE (OUTPATIENT)
Dept: NEUROSURGERY | Facility: CLINIC | Age: 25
End: 2020-09-17

## 2020-09-17 LAB
BLD PROD TYP BPU: NORMAL
BLD PROD TYP BPU: NORMAL
BLOOD UNIT EXPIRATION DATE: NORMAL
BLOOD UNIT EXPIRATION DATE: NORMAL
BLOOD UNIT TYPE CODE: 5100
BLOOD UNIT TYPE CODE: 5100
BLOOD UNIT TYPE: NORMAL
BLOOD UNIT TYPE: NORMAL
CODING SYSTEM: NORMAL
CODING SYSTEM: NORMAL
DISPENSE STATUS: NORMAL
DISPENSE STATUS: NORMAL
NUM UNITS TRANS PACKED RBC: NORMAL
NUM UNITS TRANS PACKED RBC: NORMAL

## 2020-09-17 NOTE — TELEPHONE ENCOUNTER
Patient called to say that his onfi was not called into pharmacy.  escribed this am.Patient states he is out of medication. Called pharmacy. They stated it was not received. Called in Clobazam 20 mg tablet 2 p.o bid. #120 4 refills per order of . They checked to be sure it is in stock. Called patient with update.

## 2020-09-17 NOTE — TELEPHONE ENCOUNTER
Attempted to return call, received no answer. Unable to LVM     ----- Message from Susan Vicente sent at 9/17/2020  4:10 PM CDT -----  Contact: [phone]  Pt called in states the guaze on his head from surgery are irritating and wants to know if he can take it off. Please call

## 2020-09-29 ENCOUNTER — TELEPHONE (OUTPATIENT)
Dept: NEUROSURGERY | Facility: CLINIC | Age: 25
End: 2020-09-29

## 2020-09-29 DIAGNOSIS — Z78.9 KNOWN HEALTH PROBLEMS: NONE: ICD-10-CM

## 2020-09-29 DIAGNOSIS — Z01.818 PRE-OP TESTING: Primary | ICD-10-CM

## 2020-09-29 DIAGNOSIS — G40.219 COMPLEX PARTIAL EPILEPSY WITH GENERALIZATION AND WITH INTRACTABLE EPILEPSY: ICD-10-CM

## 2020-09-29 DIAGNOSIS — G40.211 LOCALIZATION-RELATED (FOCAL) (PARTIAL) SYMPTOMATIC EPILEPSY AND EPILEPTIC SYNDROMES WITH COMPLEX PARTIAL SEIZURES, INTRACTABLE, WITH STATUS EPILEPTICUS: ICD-10-CM

## 2020-09-29 NOTE — TELEPHONE ENCOUNTER
I rtn'd the pt call and informed him that for now please refrain from cutting the grass until he has seen  on 10/1/20.  VU and will keep his scheduled appt on 10/1/20 @730am with .  ----- Message from Yadi Causey sent at 9/29/2020  9:28 AM CDT -----  Regarding: pt advice  Contact: pt@ [phone]  Calling to speak with someone in Dr. Ortiz's office regarding whether he can cut grass today. Says he recently had surgery . Please call.

## 2020-10-01 ENCOUNTER — DOCUMENTATION ONLY (OUTPATIENT)
Dept: NEUROLOGY | Facility: CLINIC | Age: 25
End: 2020-10-01

## 2020-10-01 ENCOUNTER — OFFICE VISIT (OUTPATIENT)
Dept: NEUROSURGERY | Facility: CLINIC | Age: 25
End: 2020-10-01
Payer: MEDICAID

## 2020-10-01 ENCOUNTER — TELEPHONE (OUTPATIENT)
Dept: NEUROLOGY | Facility: CLINIC | Age: 25
End: 2020-10-01

## 2020-10-01 ENCOUNTER — CLINICAL SUPPORT (OUTPATIENT)
Dept: OPHTHALMOLOGY | Facility: CLINIC | Age: 25
End: 2020-10-01
Payer: MEDICAID

## 2020-10-01 ENCOUNTER — HOSPITAL ENCOUNTER (OUTPATIENT)
Dept: RADIOLOGY | Facility: HOSPITAL | Age: 25
Discharge: HOME | End: 2020-10-01
Attending: NEUROLOGICAL SURGERY
Payer: MEDICAID

## 2020-10-01 DIAGNOSIS — G40.211 LOCALIZATION-RELATED (FOCAL) (PARTIAL) SYMPTOMATIC EPILEPSY AND EPILEPTIC SYNDROMES WITH COMPLEX PARTIAL SEIZURES, INTRACTABLE, WITH STATUS EPILEPTICUS: ICD-10-CM

## 2020-10-01 DIAGNOSIS — G40.219 COMPLEX PARTIAL EPILEPSY WITH GENERALIZATION AND WITH INTRACTABLE EPILEPSY: Primary | ICD-10-CM

## 2020-10-01 DIAGNOSIS — Z96.89 S/P PLACEMENT OF VNS (VAGUS NERVE STIMULATION) DEVICE: ICD-10-CM

## 2020-10-01 DIAGNOSIS — M25.9 SHOULDER DISORDER: Primary | ICD-10-CM

## 2020-10-01 PROCEDURE — 99024 PR POST-OP FOLLOW-UP VISIT: ICD-10-PCS | Mod: ,,, | Performed by: NEUROLOGICAL SURGERY

## 2020-10-01 PROCEDURE — 25500020 PHARM REV CODE 255: Performed by: NEUROLOGICAL SURGERY

## 2020-10-01 PROCEDURE — 70552 MRI BRAIN STEM W/DYE: CPT | Mod: 26,,, | Performed by: RADIOLOGY

## 2020-10-01 PROCEDURE — 99212 OFFICE O/P EST SF 10 MIN: CPT | Mod: PBBFAC,25 | Performed by: NEUROLOGICAL SURGERY

## 2020-10-01 PROCEDURE — 99999 PR PBB SHADOW E&M-EST. PATIENT-LVL II: ICD-10-PCS | Mod: PBBFAC,,, | Performed by: NEUROLOGICAL SURGERY

## 2020-10-01 PROCEDURE — 99024 POSTOP FOLLOW-UP VISIT: CPT | Mod: ,,, | Performed by: NEUROLOGICAL SURGERY

## 2020-10-01 PROCEDURE — 99999 PR PBB SHADOW E&M-EST. PATIENT-LVL II: CPT | Mod: PBBFAC,,, | Performed by: NEUROLOGICAL SURGERY

## 2020-10-01 PROCEDURE — A9585 GADOBUTROL INJECTION: HCPCS | Performed by: NEUROLOGICAL SURGERY

## 2020-10-01 PROCEDURE — 70552 MRI BRAIN STEM W/DYE: CPT | Mod: TC

## 2020-10-01 PROCEDURE — 70552 MRI BRAIN STEALTH WITHOUT FIDUCIALS: ICD-10-PCS | Mod: 26,,, | Performed by: RADIOLOGY

## 2020-10-01 RX ORDER — GADOBUTROL 604.72 MG/ML
8 INJECTION INTRAVENOUS
Status: COMPLETED | OUTPATIENT
Start: 2020-10-01 | End: 2020-10-01

## 2020-10-01 RX ADMIN — GADOBUTROL 8 ML: 604.72 INJECTION INTRAVENOUS at 09:10

## 2020-10-01 NOTE — PROGRESS NOTES
Family meeting: Dr. Ortiz, myself, Wally Aragon, Pt, pt's mother           Today we discussed the findings of the SEEG.  We discussed interictal as well as ictal abnormalities.  We also discussed various surgical approaches and options with the patient detail.  We discussed risks and benefits of each of these surgical approaches.  In particular we discussed risks associated with vision loss memory loss and unexpected complications.  He made the decision to proceed with laser ablation of the mesial temporal structures on the left.  The goal is to target the ictal onset zone.  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  The if he does not achieve substantial seizure reduction or seizure freedom following this procedure we may consider a repeat SEEG and followed by cortical resection versus RNS.     On exam he continues to have pressured speech and winged scapula on L    Plan:   1, proceed with laser ablation of the mesial temporal structures on the left  2, referral to PMR for bring scapula.  He will need guidance on physical therapy close to home for recovery and to minimize arthritic changes to shoulder  3, will follow-up on the pituitary abnormality in the next several months    Today he will have MRI brain and visual exam    Patient verbalized understanding to plan. We discussed appropriate and necessary diagnostic testing. We discussed risks and benefits of treatments. We discussed alternative treatments. I answered all his questions to his satisfaction.  Total visit time: 40 minutes, more than 50% in counseling.     Dr. Do

## 2020-10-01 NOTE — H&P (VIEW-ONLY)
Neurosurgery  Follow-up    SUBJECTIVE:     Chief Complaint: intractable epilepsy     History of Present Illness:  Darin Cunha is a 25 y.o. male with intractable epilepsy since 2013 who presents as a referral from Dr. Abhijeet Do to discuss the gamut of epilepsy surgery options. Today's visit is a video visit, and the patient is a passenger in a moving vehicle. His mother is not with him.     The patient states that when he has events, he stares off and smacks his lips. The episodes last for about 5-6 minutes. He often waves his hands and feels tired afterwards. He currently has about 3-5 episodes a week. He has failed at least 4 AEDs at therapeutic dosages.     Per EEG review, he has bilateral temporal involvement, moreso left-sided than right.     Triggers include being tired and being stressed. He is not working (on disability) but finished his GED. He lives next door to grandmother and nearby to his mother. His major concern is memory loss from ongoing seizures.     Of notes, he endorses a history of staph infection previously. He denies history of bleeding or anesthetic complications. He has a VNS in place.     As of 7/21/20, we are seeing each other in person. His mother presents with him today.  They report that while the patient was recently in the EMU, he had a grand mal seizure while off his medications and fell out of bed.  This resulted in his having a right-sided wing scapula.  The patient has undergone EMG nerve conduction study which demonstrates no evidence of long thoracic nerve injury.  They are also concerned about a cyst that he has on his right shoulder.    Regarding his seizure activity, he had 4 back-to-back seizures about 10 days ago.  He continues to have significant seizure burden.  He does not feel that the VNS is helpful.     As of 10/1/20, the patient underwent bilateral placement of sEEG electrodes on 8/31 with removal on 9/14.  He reports that he has been doing well since surgery.  He  has not had any seizures.  He denies any fevers, chills, or drainage from the incisions.  He presents today to interdisciplinary discussion with Dr. Do and Wally of the results of his intracranial recording.    Review of patient's allergies indicates:   Allergen Reactions    Zofran [ondansetron hcl (pf)] Hives and Rash       Current Outpatient Medications   Medication Sig Dispense Refill    citalopram (CELEXA) 20 MG tablet Take 20 mg by mouth once daily.      cloBAZam (ONFI) 20 mg Tab Take 2 tablets by mouth twice daily 120 tablet 4    lacosamide (VIMPAT) 200 mg Tab tablet Take 1 tablet (200 mg total) by mouth every 12 (twelve) hours. (Patient taking differently: Take 200 mg by mouth every 12 (twelve) hours. Take am of surgery) 60 tablet 4    lamoTRIgine (LAMICTAL) 200 MG tablet Take 1 tablet (200 mg total) by mouth 2 (two) times daily. (Patient taking differently: Take 200 mg by mouth 2 (two) times daily. Take am of surgery) 60 tablet 2    promethazine (PHENERGAN) 12.5 MG Tab Take 1 tablet (12.5 mg total) by mouth every 6 (six) hours as needed (nausea/vomiting). 30 tablet 0    topiramate (TOPAMAX) 50 MG tablet Take 1 tablet (50 mg total) by mouth 2 (two) times daily. (Patient taking differently: Take 50 mg by mouth 2 (two) times daily. Take am of surgery) 60 tablet 11     No current facility-administered medications for this visit.        Past Medical History:   Diagnosis Date    Depressed 9/12/2018    Epilepsy 2015    Mitral valve prolapse 2015    Seizures      Past Surgical History:   Procedure Laterality Date    ADENOIDECTOMY  2015    ADENOIDECTOMY      CREATION OF YUNG HOLE WITH EVACUATION OF HEMATOMA Bilateral 8/31/2020    Procedure: CREATION, CRANIAL YUNG HOLE, PLACEMENT OF THE FIDUCIAL SCREWS BILATERAL 2.PLACEMENT OF SEEG ELECTRODES BILATERAL WITH OWEN LIV;  Surgeon: Melani Ortiz MD;  Location: [facility] OR [address] [place] [address];  Service: Neurosurgery;  Laterality: Bilateral;  TORONTO III, ASA III, BLOOD  TYPE & SCREEN, HEADREST SAPP, REGULAR , SUPINE. SPECIAL EQUIPMENT [employer] DAIN WILCOX, OWEN REP, PMT    finger abscess removal      REMOVAL OF STEREO EEG LEADS (DEPTH ELECTRODES) N/A 9/14/2020    Procedure: REMOVAL OF STEREO EEG LEADS (DEPTH ELECTRODES); removal of sEEG bolts and depth electrodes;  Surgeon: Melani Ortiz MD;  Location: [facility] OR [address] [place] [address];  Service: Neurosurgery;  Laterality: N/A;    TONSILLECTOMY      VAGUS NERVE STIMULATOR INSERTION Left 4/4/2019    Procedure: INSERTION, VAGUS NERVE STIMULATOR;  Surgeon: Reuben Gupta MD;  Location: [facility] OR [address] [place] [address];  Service: Neurosurgery;  Laterality: Left;  toronto I, asa 1, regular bed, supine     wisdom teeth extracted- about 2010        Family History     Problem Relation (Age of Onset)    Heart disease Maternal Grandmother, Maternal Grandfather    Hypertension Mother, Maternal Grandmother, Maternal Grandfather        Social History     Socioeconomic History    Marital status: Significant Other     Spouse name: Not on file    Number of children: Not on file    Years of education: Not on file    Highest education level: Not on file   Occupational History    Not on file   Social Needs    Financial resource strain: Not on file    Food insecurity     Worry: Not on file     Inability: Not on file    Transportation needs     Medical: Not on file     Non-medical: Not on file   Tobacco Use    Smoking status: Never Smoker    Smokeless tobacco: Never Used   Substance and Sexual Activity    Alcohol use: Yes     Alcohol/week: 10.0 standard drinks     Types: 10 Cans of beer per week     Comment: 12 beers over a months - spread over a month     Drug use: No    Sexual activity: Yes     Partners: Female   Lifestyle    Physical activity     Days per week: Not on file     Minutes per session: Not on file    Stress: Not on file   Relationships    Social connections     Talks on phone: Not on file     Gets together: Not on file     Attends  Yazidism service: Not on file     Active member of club or organization: Not on file     Attends meetings of clubs or organizations: Not on file     Relationship status: Not on file   Other Topics Concern    Not on file   Social History Narrative    Not on file       Review of Systems   Constitutional: Negative for fever.   HENT: Negative for nosebleeds.    Eyes: Negative for visual disturbance.   Respiratory: Negative for shortness of breath.    Cardiovascular: Negative for chest pain.   Gastrointestinal: Negative for vomiting.        Heartburn   Endocrine: Positive for cold intolerance.   Genitourinary: Negative for difficulty urinating.   Musculoskeletal: Positive for back pain.        Shoulder dislocated   Skin: Negative for color change.   Neurological: Positive for seizures.   Hematological: Does not bruise/bleed easily.   Psychiatric/Behavioral: Positive for confusion.       OBJECTIVE:     Vital Signs     There is no height or weight on file to calculate BMI.      Physical Exam:    Constitutional: He appears well-developed and well-nourished. No distress.     Eyes: EOM are normal.     Abdominal: Soft.     Skin: Skin displays no rash on extremities. Skin displays lesions on trunk (cyst atop right shoulder).   Incisions well healed with edges opposed     Psych/Behavior: He is alert. He is oriented to person, place, and time.     Musculoskeletal: Gait is normal.        Right Upper Extremities: Muscle strength is 5/5.        Left Upper Extremities: Muscle strength is 5/5.       Right Lower Extremities: Muscle strength is 5/5.        Left Lower Extremities: Muscle strength is 5/5.      Comments: Scapular winging noted at rest and made worse by the patient flexing the arms forward against the wall.  I believe this appears most consistent with the serratus anterior palsy, rather than a trapezius palsy. Unchanged     Neurological:        Coordination: He has normal finger to nose coordination.        Sensory:  There is no sensory deficit in the trunk. There is no sensory deficit in the extremities.        DTRs: DTRs are DTRS NORMAL AND SYMMETRICnormal and symmetric.        Cranial nerves:   Face grossly symmetric; tongue midline     Pulmonary: nonlabored respirations     Diagnostic Results:  No new imaging   Intraoperative recording data and 3D Vega mapping personally reviewed with patient     ASSESSMENT/PLAN:     Darin Cunha is a 25 y.o. male with intractable epilepsy who presents to discuss surgical treatment options for epilepsy.  He is now status post phase two stereo electroencephalogram monitoring.  This revealed that all of his clinical seizures during the 2 weeks of documentation arose from the left mesial temporal structures.  There is strong interictal data from 1 of the more lateral cortex points along the hippocampal trajectory; however, this never resulted in ictal activity during the period of monitoring.  Moreover, there was no right-sided involvement during the period of monitoring.  These data were reviewed with the patient and his mother by Wally Hairston, and me. Sutures were removed without difficulty.     We had a lengthy, detailed discussion with the patient and his mother about the risks, benefits, and alternatives to laser amygdalohippocampectomy. We discussed that should he continue to have significant seizures after the procedure, we would might recommend repeat sEEG of the left temporal region to obtain better spatial resolution of the seizure-onset zone.     We discussed is an alternative doing a traditional open temporal lobectomy.  We discussed that seizure freedom rates are slightly higher in these published data than in the more recently published data regarding laser ablation of the medial temporal structures.  Regardless, given that the area of inter ictal focus is on the dominant hemisphere, I am concerned that trying to remove this area (which is more than 4 cm posterior to the  temporal tip) without finer spatial resolution may result in speech deficit.  Mr. Cunha and his mother reiterated a preference for the least invasive surgical procedure possible.  We discussed that it would be possible to consider repeat ablation and or open surgery in the future should he fail initial laser ablation.     Risks include, but are not limited to, bleeding, pain, infection, scarring, need for further/repeat procedure, death, paralysis, stroke (including venous infarct)/damage to major blood vessels, leak of cerebrospinal fluid, stereotactic inaccuracy, and hardware-related complications (including breakage). There is a chance that we would fail to improve the seizures.  We discussed the possibility of a contralateral superior quadranopsia secondary to disruption of Baez's loop and also of possible memory changes, including verbal memory, together with damage to cranial nerves. Informed consent was obtained of the patient in the presence of his mother.     Patient will require a new [facility] MRI of the brain with contrast prior to surgical planning so that we can visualize his cerebral vasculature and plan for laser ablation.  He will also require visual field testing to establish a baseline.    I have encouraged the patient to contact the clinic in the interim with any questions, concerns, or adverse clinical change.     Note generated with voice recognition software; please excuse any typographical errors.

## 2020-10-01 NOTE — TELEPHONE ENCOUNTER
Patient in room with Mother and . Vns turned off for mri.   Output-0.75/0  Autostim 0.75/0  Magnet 0.875/0. Pt tolerated well. Mri paper filled out and instructed patient to bring to mri. Patient aware that he needs to return to have vns turned back on.

## 2020-10-01 NOTE — TELEPHONE ENCOUNTER
Patient back to the clinic to have his vns turned back on. Output 0/0.75  Autostim 0/0.75  Magnet 0/0.875. Patient tolerated well.

## 2020-10-01 NOTE — PROGRESS NOTES
Neurosurgery  Follow-up    SUBJECTIVE:     Chief Complaint: intractable epilepsy     History of Present Illness:  Darin Cunha is a 25 y.o. male with intractable epilepsy since 2013 who presents as a referral from Dr. Abhijeet Do to discuss the gamut of epilepsy surgery options. Today's visit is a video visit, and the patient is a passenger in a moving vehicle. His mother is not with him.     The patient states that when he has events, he stares off and smacks his lips. The episodes last for about 5-6 minutes. He often waves his hands and feels tired afterwards. He currently has about 3-5 episodes a week. He has failed at least 4 AEDs at therapeutic dosages.     Per EEG review, he has bilateral temporal involvement, moreso left-sided than right.     Triggers include being tired and being stressed. He is not working (on disability) but finished his GED. He lives next door to grandmother and nearby to his mother. His major concern is memory loss from ongoing seizures.     Of notes, he endorses a history of staph infection previously. He denies history of bleeding or anesthetic complications. He has a VNS in place.     As of 7/21/20, we are seeing each other in person. His mother presents with him today.  They report that while the patient was recently in the EMU, he had a grand mal seizure while off his medications and fell out of bed.  This resulted in his having a right-sided wing scapula.  The patient has undergone EMG nerve conduction study which demonstrates no evidence of long thoracic nerve injury.  They are also concerned about a cyst that he has on his right shoulder.    Regarding his seizure activity, he had 4 back-to-back seizures about 10 days ago.  He continues to have significant seizure burden.  He does not feel that the VNS is helpful.     As of 10/1/20, the patient underwent bilateral placement of sEEG electrodes on 8/31 with removal on 9/14.  He reports that he has been doing well since surgery.  He  has not had any seizures.  He denies any fevers, chills, or drainage from the incisions.  He presents today to interdisciplinary discussion with Dr. Do and Wally of the results of his intracranial recording.    Review of patient's allergies indicates:   Allergen Reactions    Zofran [ondansetron hcl (pf)] Hives and Rash       Current Outpatient Medications   Medication Sig Dispense Refill    citalopram (CELEXA) 20 MG tablet Take 20 mg by mouth once daily.      cloBAZam (ONFI) 20 mg Tab Take 2 tablets by mouth twice daily 120 tablet 4    lacosamide (VIMPAT) 200 mg Tab tablet Take 1 tablet (200 mg total) by mouth every 12 (twelve) hours. (Patient taking differently: Take 200 mg by mouth every 12 (twelve) hours. Take am of surgery) 60 tablet 4    lamoTRIgine (LAMICTAL) 200 MG tablet Take 1 tablet (200 mg total) by mouth 2 (two) times daily. (Patient taking differently: Take 200 mg by mouth 2 (two) times daily. Take am of surgery) 60 tablet 2    promethazine (PHENERGAN) 12.5 MG Tab Take 1 tablet (12.5 mg total) by mouth every 6 (six) hours as needed (nausea/vomiting). 30 tablet 0    topiramate (TOPAMAX) 50 MG tablet Take 1 tablet (50 mg total) by mouth 2 (two) times daily. (Patient taking differently: Take 50 mg by mouth 2 (two) times daily. Take am of surgery) 60 tablet 11     No current facility-administered medications for this visit.        Past Medical History:   Diagnosis Date    Depressed 9/12/2018    Epilepsy 2015    Mitral valve prolapse 2015    Seizures      Past Surgical History:   Procedure Laterality Date    ADENOIDECTOMY  2015    ADENOIDECTOMY      CREATION OF YUNG HOLE WITH EVACUATION OF HEMATOMA Bilateral 8/31/2020    Procedure: CREATION, CRANIAL YUNG HOLE, PLACEMENT OF THE FIDUCIAL SCREWS BILATERAL 2.PLACEMENT OF SEEG ELECTRODES BILATERAL WITH OWEN LIV;  Surgeon: Melani Ortiz MD;  Location: [facility] OR [address] [place] [address];  Service: Neurosurgery;  Laterality: Bilateral;  TORONTO III, ASA III, BLOOD  TYPE & SCREEN, HEADREST SAPP, REGULAR , SUPINE. SPECIAL EQUIPMENT [employer] DAIN WILCOX, OWEN REP, PMT    finger abscess removal      REMOVAL OF STEREO EEG LEADS (DEPTH ELECTRODES) N/A 9/14/2020    Procedure: REMOVAL OF STEREO EEG LEADS (DEPTH ELECTRODES); removal of sEEG bolts and depth electrodes;  Surgeon: Melani Ortiz MD;  Location: [facility] OR [address] [place] [address];  Service: Neurosurgery;  Laterality: N/A;    TONSILLECTOMY      VAGUS NERVE STIMULATOR INSERTION Left 4/4/2019    Procedure: INSERTION, VAGUS NERVE STIMULATOR;  Surgeon: Reuben Gupta MD;  Location: [facility] OR [address] [place] [address];  Service: Neurosurgery;  Laterality: Left;  toronto I, asa 1, regular bed, supine     wisdom teeth extracted- about 2010        Family History     Problem Relation (Age of Onset)    Heart disease Maternal Grandmother, Maternal Grandfather    Hypertension Mother, Maternal Grandmother, Maternal Grandfather        Social History     Socioeconomic History    Marital status: Significant Other     Spouse name: Not on file    Number of children: Not on file    Years of education: Not on file    Highest education level: Not on file   Occupational History    Not on file   Social Needs    Financial resource strain: Not on file    Food insecurity     Worry: Not on file     Inability: Not on file    Transportation needs     Medical: Not on file     Non-medical: Not on file   Tobacco Use    Smoking status: Never Smoker    Smokeless tobacco: Never Used   Substance and Sexual Activity    Alcohol use: Yes     Alcohol/week: 10.0 standard drinks     Types: 10 Cans of beer per week     Comment: 12 beers over a months - spread over a month     Drug use: No    Sexual activity: Yes     Partners: Female   Lifestyle    Physical activity     Days per week: Not on file     Minutes per session: Not on file    Stress: Not on file   Relationships    Social connections     Talks on phone: Not on file     Gets together: Not on file     Attends  Episcopal service: Not on file     Active member of club or organization: Not on file     Attends meetings of clubs or organizations: Not on file     Relationship status: Not on file   Other Topics Concern    Not on file   Social History Narrative    Not on file       Review of Systems   Constitutional: Negative for fever.   HENT: Negative for nosebleeds.    Eyes: Negative for visual disturbance.   Respiratory: Negative for shortness of breath.    Cardiovascular: Negative for chest pain.   Gastrointestinal: Negative for vomiting.        Heartburn   Endocrine: Positive for cold intolerance.   Genitourinary: Negative for difficulty urinating.   Musculoskeletal: Positive for back pain.        Shoulder dislocated   Skin: Negative for color change.   Neurological: Positive for seizures.   Hematological: Does not bruise/bleed easily.   Psychiatric/Behavioral: Positive for confusion.       OBJECTIVE:     Vital Signs     There is no height or weight on file to calculate BMI.      Physical Exam:    Constitutional: He appears well-developed and well-nourished. No distress.     Eyes: EOM are normal.     Abdominal: Soft.     Skin: Skin displays no rash on extremities. Skin displays lesions on trunk (cyst atop right shoulder).   Incisions well healed with edges opposed     Psych/Behavior: He is alert. He is oriented to person, place, and time.     Musculoskeletal: Gait is normal.        Right Upper Extremities: Muscle strength is 5/5.        Left Upper Extremities: Muscle strength is 5/5.       Right Lower Extremities: Muscle strength is 5/5.        Left Lower Extremities: Muscle strength is 5/5.      Comments: Scapular winging noted at rest and made worse by the patient flexing the arms forward against the wall.  I believe this appears most consistent with the serratus anterior palsy, rather than a trapezius palsy. Unchanged     Neurological:        Coordination: He has normal finger to nose coordination.        Sensory:  There is no sensory deficit in the trunk. There is no sensory deficit in the extremities.        DTRs: DTRs are DTRS NORMAL AND SYMMETRICnormal and symmetric.        Cranial nerves:   Face grossly symmetric; tongue midline     Pulmonary: nonlabored respirations     Diagnostic Results:  No new imaging   Intraoperative recording data and 3D Vega mapping personally reviewed with patient     ASSESSMENT/PLAN:     Darin Cunha is a 25 y.o. male with intractable epilepsy who presents to discuss surgical treatment options for epilepsy.  He is now status post phase two stereo electroencephalogram monitoring.  This revealed that all of his clinical seizures during the 2 weeks of documentation arose from the left mesial temporal structures.  There is strong interictal data from 1 of the more lateral cortex points along the hippocampal trajectory; however, this never resulted in ictal activity during the period of monitoring.  Moreover, there was no right-sided involvement during the period of monitoring.  These data were reviewed with the patient and his mother by Wally Hairston, and me. Sutures were removed without difficulty.     We had a lengthy, detailed discussion with the patient and his mother about the risks, benefits, and alternatives to laser amygdalohippocampectomy. We discussed that should he continue to have significant seizures after the procedure, we would might recommend repeat sEEG of the left temporal region to obtain better spatial resolution of the seizure-onset zone.     We discussed is an alternative doing a traditional open temporal lobectomy.  We discussed that seizure freedom rates are slightly higher in these published data than in the more recently published data regarding laser ablation of the medial temporal structures.  Regardless, given that the area of inter ictal focus is on the dominant hemisphere, I am concerned that trying to remove this area (which is more than 4 cm posterior to the  temporal tip) without finer spatial resolution may result in speech deficit.  Mr. Cunha and his mother reiterated a preference for the least invasive surgical procedure possible.  We discussed that it would be possible to consider repeat ablation and or open surgery in the future should he fail initial laser ablation.     Risks include, but are not limited to, bleeding, pain, infection, scarring, need for further/repeat procedure, death, paralysis, stroke (including venous infarct)/damage to major blood vessels, leak of cerebrospinal fluid, stereotactic inaccuracy, and hardware-related complications (including breakage). There is a chance that we would fail to improve the seizures.  We discussed the possibility of a contralateral superior quadranopsia secondary to disruption of Baez's loop and also of possible memory changes, including verbal memory, together with damage to cranial nerves. Informed consent was obtained of the patient in the presence of his mother.     Patient will require a new STEALTH MRI of the brain with contrast prior to surgical planning so that we can visualize his cerebral vasculature and plan for laser ablation.  He will also require visual field testing to establish a baseline.    I have encouraged the patient to contact the clinic in the interim with any questions, concerns, or adverse clinical change.     Note generated with voice recognition software; please excuse any typographical errors.

## 2020-10-02 DIAGNOSIS — G40.219 COMPLEX PARTIAL EPILEPSY WITH GENERALIZATION AND WITH INTRACTABLE EPILEPSY: Primary | ICD-10-CM

## 2020-10-02 PROCEDURE — 92083 HUMPHREY VISUAL FIELD - OU - BOTH EYES: ICD-10-PCS | Mod: 26,S$PBB,, | Performed by: OPHTHALMOLOGY

## 2020-10-02 PROCEDURE — 92083 EXTENDED VISUAL FIELD XM: CPT | Mod: 26,S$PBB,, | Performed by: OPHTHALMOLOGY

## 2020-10-05 DIAGNOSIS — G40.909 SEIZURE DISORDER: ICD-10-CM

## 2020-10-06 RX ORDER — LACOSAMIDE 200 MG/1
200 TABLET ORAL EVERY 12 HOURS
Qty: 60 TABLET | Refills: 4 | Status: SHIPPED | OUTPATIENT
Start: 2020-10-06 | End: 2021-02-22 | Stop reason: SDUPTHER

## 2020-10-08 ENCOUNTER — TELEPHONE (OUTPATIENT)
Dept: NEUROLOGY | Facility: CLINIC | Age: 25
End: 2020-10-08

## 2020-10-09 DIAGNOSIS — Z01.818 PREOPERATIVE TESTING: Primary | ICD-10-CM

## 2020-10-09 NOTE — PRE-PROCEDURE INSTRUCTIONS
Patient stated has not had any problem with anesthesia in the past. Will need medical optimization by  and lab. Our  will call to set up these appts.   Preop instructions given. Hold aspirin, aspirin containing products, nsaids(aleve, advil, motrin, ibuprofen, naprosyn, naproxen, voltaren, diclofenac), vitamins and supplements one week prior to surgery.Medication instructions given.     May have solid foods, gum, and hard candy until 8 hours before surgery/procedure time.  May have clear liquids( water, gatorade, powerade or apple juice) until 2 hours prior to surgery/procedure time.  No red drinks. If in doubt , drink water. Nothing to drink 2 hours before arrival time for surgery/procedure. If you are told to take medication in the morning of surgery, it may be taken with a sip of water. If your doctor tells you something different pertaining to when to stop eating or drinking, follow your doctor's instructions.  Shower the night before surgery and the morning of surgery with an antibacterial soap( hibiclens or dial antibacterial soap).  Nothing on the skin once shower. Do not apply any deodorant, lotion, powder, perfume,or aftershave.  No makeup or moisturizer.No fingernail polish or jewelry going to surgery.  Call your surgeon for any changes in your medical condition.Stated knows where  The surgery center is located. Verbalizes understanding.Sent preop and med instructions to my chart.

## 2020-10-09 NOTE — ANESTHESIA PAT ROS NOTE
10/09/2020  Darin Cunha is a 25 y.o., male.      Pre-op Assessment          Review of Systems  Anesthesia Hx:  No problems with previous Anesthesia  Denies Family Hx of Anesthesia complications.   Denies Personal Hx of Anesthesia complications.   Social:  Social Alcohol Use USES DIP   Hematology/Oncology:     Oncology Normal     EENT/Dental:EENT/Dental Normal   Cardiovascular:    Denies Angina.  Functional Capacity 4 METS, ABLE TO CLIMB 2 FLIGHTS OF STAIRS     MITRAL VALVE PROLAPSE    Pulmonary:  Pulmonary Normal  Denies Shortness of breath.  Denies Recent URI.    Renal/:  Renal/ Normal     Hepatic/GI:  Esophageal / Stomach Disorders Gerd (OCCASIONAL)    Musculoskeletal:  Musculoskeletal General/Symptoms: Functional capacity is ambulatory without assistance.    Neurological:   HAS VAGUS NERVE STIMULATOR Seizure Disorder LAST SEIZURE ABOUT 9/14 IN HOSPITAL   Endocrine:  Endocrine Normal    Psych:  Depression.             Anesthesia Assessment: Preoperative EQUATION    Planned Procedure: Procedure(s) (LRB):  Mesial Temporal Laser Ablation Visualase/ [place] Hole LEFT (Left)  Requested Anesthesia Type:General  Surgeon: Melani Ortiz MD  Service: Neurosurgery  Known or anticipated Date of Surgery:10/26/2020    Surgeon notes: reviewed    Electronic QUestionnaire Assessment completed via nurse interview with patient.        Triage considerations:     The patient has no apparent active cardiac condition (No unstable coronary Syndrome such as severe unstable angina or recent [<1 month] myocardial infarction, decompensated CHF, severe valvular   disease or significant arrhythmia)    Previous anesthesia records:GETA and No problems  9/14/2020 REMOVAL OF STEREO EEG LEADS (DEPTH ELECTRODES); removal of sEEG bolts and depth electrodes (N/A Head)  Airway/Jaw/Neck:  Airway Findings: Mouth Opening: Normal Tongue: Normal   General Airway Assessment: Adult  Mallampati: III  Improves to II, III with phonation.  TM Distance: Normal, at least 6 cm  Jaw/Neck Findings:  Neck ROM: Normal ROM     Airway Present Prior to Hospital Arrival?: No Placement Date: 09/14/20 Placement Time: 1633 , created via procedure documentation  Method of Intubation: Direct laryngoscopy Inserted by: CRNA Airway Device: Endotracheal Tube Mask Ventilation: Easy Intubated: Postinduction Blade: Dulce Maria #3 Airway Device Size: 7.5 Style: Cuffed Cuff Inflation: Minimal occlusive pressure Placement Verified By: Capnometry;Auscultation;ETT Condensation Grade: Grade I Complicating Factors: None Findings Post-Intubation: Bilateral breath sounds;Atraumatic/Condition of teeth unchanged;Positive EtCO2 Secured at: Lips Complications: None Breath Sounds: Equal Bilateral Insertion attempts (enter comment if more than 2 attempts): 1 Removal Date: 09/14/20 Removal Time: 1755       Last PCP note: NO PCP  Subspecialty notes: Neurology, Neurosurgery, Psychiatry    Other important co-morbidities: SEIZURES,  USES DIP      Tests already available:  Available tests,  within 1 month , within 3 months , within [facility] .10/1/2020 MRI BRAIN STEALTH W FIDUCIALS, 9/15/2020 BMP, CBC, PHOS, MG, CMP, 9/14/2020 CT HEAD W/O CONTRAST, EKG 9/13/2020PT/INR, PTT, 8/31/2020 UA, MICRO UA, HGA1C            Instructions given. (See in Nurse's note)    Optimization:  Anesthesia Preop Clinic Assessment- Not   Indicated for this surgery    Medical Opinion Indicated             Plan:    Testing:  T&S      Consultation:IM Perioperative Hospitalist     Patient  has previously scheduled Medical Appointment:none    Navigation: Tests Scheduled. TBD             Consults scheduled.TBD             Results will be tracked by Preop Clinic.  10/21 Medical optimization by Dr. Walker on 10/20. Lab resulted.  Ketty Zaldivar RN BSN

## 2020-10-15 ENCOUNTER — TELEPHONE (OUTPATIENT)
Dept: NEUROSURGERY | Facility: CLINIC | Age: 25
End: 2020-10-15

## 2020-10-15 DIAGNOSIS — G40.219 COMPLEX PARTIAL EPILEPSY WITH GENERALIZATION AND WITH INTRACTABLE EPILEPSY: Primary | ICD-10-CM

## 2020-10-20 ENCOUNTER — LAB VISIT (OUTPATIENT)
Dept: LAB | Facility: HOSPITAL | Age: 25
End: 2020-10-20
Attending: ANESTHESIOLOGY
Payer: MEDICAID

## 2020-10-20 ENCOUNTER — INITIAL CONSULT (OUTPATIENT)
Dept: INTERNAL MEDICINE | Facility: CLINIC | Age: 25
End: 2020-10-20
Payer: MEDICAID

## 2020-10-20 VITALS
SYSTOLIC BLOOD PRESSURE: 140 MMHG | DIASTOLIC BLOOD PRESSURE: 94 MMHG | HEART RATE: 96 BPM | OXYGEN SATURATION: 100 % | WEIGHT: 164.13 LBS | BODY MASS INDEX: 21.75 KG/M2 | HEIGHT: 73 IN | TEMPERATURE: 98 F

## 2020-10-20 DIAGNOSIS — R41.3 MEMORY PROBLEM: ICD-10-CM

## 2020-10-20 DIAGNOSIS — Z01.818 PREOPERATIVE TESTING: ICD-10-CM

## 2020-10-20 DIAGNOSIS — L81.8 TATTOOS: ICD-10-CM

## 2020-10-20 DIAGNOSIS — R56.9 SEIZURES: ICD-10-CM

## 2020-10-20 DIAGNOSIS — M95.8 WINGED SCAPULA OF RIGHT SIDE: ICD-10-CM

## 2020-10-20 DIAGNOSIS — Z86.14 HISTORY OF MRSA INFECTION: ICD-10-CM

## 2020-10-20 DIAGNOSIS — Z01.818 PREOP EXAMINATION: Primary | ICD-10-CM

## 2020-10-20 DIAGNOSIS — R03.0 ELEVATED BP WITHOUT DIAGNOSIS OF HYPERTENSION: ICD-10-CM

## 2020-10-20 DIAGNOSIS — D64.9 ANEMIA, UNSPECIFIED TYPE: ICD-10-CM

## 2020-10-20 DIAGNOSIS — I34.1 MITRAL VALVE PROLAPSE: ICD-10-CM

## 2020-10-20 DIAGNOSIS — Z72.0 DIPS TOBACCO: ICD-10-CM

## 2020-10-20 DIAGNOSIS — R22.9 SOFT TISSUE SWELLING: ICD-10-CM

## 2020-10-20 DIAGNOSIS — R11.0 NAUSEA: ICD-10-CM

## 2020-10-20 DIAGNOSIS — Z96.89 S/P PLACEMENT OF VNS (VAGUS NERVE STIMULATION) DEVICE: ICD-10-CM

## 2020-10-20 PROBLEM — S02.5XXA BROKEN TOOTH: Status: ACTIVE | Noted: 2020-10-20

## 2020-10-20 PROBLEM — E87.6 HYPOKALEMIA: Status: RESOLVED | Noted: 2020-09-01 | Resolved: 2020-10-20

## 2020-10-20 LAB
ABO + RH BLD: NORMAL
BLD GP AB SCN CELLS X3 SERPL QL: NORMAL

## 2020-10-20 PROCEDURE — 99213 OFFICE O/P EST LOW 20 MIN: CPT | Mod: PBBFAC | Performed by: HOSPITALIST

## 2020-10-20 PROCEDURE — 99999 PR PBB SHADOW E&M-EST. PATIENT-LVL III: ICD-10-PCS | Mod: PBBFAC,,, | Performed by: HOSPITALIST

## 2020-10-20 PROCEDURE — 36415 COLL VENOUS BLD VENIPUNCTURE: CPT

## 2020-10-20 PROCEDURE — 99999 PR PBB SHADOW E&M-EST. PATIENT-LVL III: CPT | Mod: PBBFAC,,, | Performed by: HOSPITALIST

## 2020-10-20 PROCEDURE — 99214 PR OFFICE/OUTPT VISIT, EST, LEVL IV, 30-39 MIN: ICD-10-PCS | Mod: S$PBB,,, | Performed by: HOSPITALIST

## 2020-10-20 PROCEDURE — 86901 BLOOD TYPING SEROLOGIC RH(D): CPT

## 2020-10-20 PROCEDURE — 99214 OFFICE O/P EST MOD 30 MIN: CPT | Mod: S$PBB,,, | Performed by: HOSPITALIST

## 2020-10-20 RX ORDER — AMOXICILLIN 500 MG/1
CAPSULE ORAL
Status: ON HOLD | COMMUNITY
Start: 2020-10-19 | End: 2020-10-27 | Stop reason: HOSPADM

## 2020-10-20 NOTE — ASSESSMENT & PLAN NOTE
With opioid on an empty stomach   I suggest that the perioperative team be aware of this so that appropriate preventive care can be taken

## 2020-10-20 NOTE — ASSESSMENT & PLAN NOTE
Found when he was born   Had Cardiology evaluation long time ago - as per him no problem  No longer under Cardiology care   Does not cause any problem   No heart failure   No chest pain

## 2020-10-20 NOTE — OUTPATIENT SUBJECTIVE & OBJECTIVE
"Outpatient Subjective & Objective     Chief complaint-Preoperative evaluation, Perioperative Medical management, complication reduction plan     Active cardiac conditions- none    Revised cardiac risk index predictors- none    Functional capacity -Examples of physical activity, walks, used to work in road  construction until 2019 ,   house work and can take 1 flight of stairs----- He can undertake all the above activities without  chest pain,chest tightness, Shortness of breath ,dizziness,lightheadedness making his exercise tolerance more,   than 4 Mets.        Review of Systems   Constitutional: Negative for chills and fever.          Weight loss-about 15 # during his hospital stay -for 15 days -Attributes to smaller portion size in the hospital , different taste than  what he is used to     Gained about 10 # back since went home        HENT:        ISABELG score 1 / 8      Male gender   Eyes:        No unusual vision changes   Respiratory:        No cough , phlegm    No Hemoptysis   Cardiovascular:        As noted   Gastrointestinal:        Bowels- Regular - daily  No overt GI/ blood losses   Endocrine:        Prednisone use > 20 mg daily for 3 weeks- none   Genitourinary: Negative for dysuria.        No urinary hesitancy    Musculoskeletal:        As above      Skin: Negative for rash.   Neurological: Negative for syncope.        No unilateral weakness   Hematological:        Current use of Anticoagulants  None   Not on NSAID    Psychiatric/Behavioral:          No SI/HI   No vascular stenting               No anesthesia, bleeding, cardiac problems , with previous surgeries/procedures.  Medications and Allergies reviewed in epic.   FH- No anesthesia,bleeding / venous thrombosis , problems in family     Physical Exam  Blood pressure (!) 140/94, pulse 96, temperature 97.8 °F (36.6 °C), height 6' 1" (1.854 m), weight 74.4 kg (164 lb 1.6 oz), SpO2 100 %.    Physical Exam  Constitutional- Vitals - Body mass index is " 21.65 kg/m².,   Vitals:    10/20/20 1447   BP: (!) 140/94   Pulse: 96   Temp: 97.8 °F (36.6 °C)     General appearance-Conscious,Coherent  Eyes- No conjunctival icterus,pupils  round  and reactive to light   ENT-Oral cavity- moist  , Hearing grossly normal   Neck- No thyromegaly ,Trachea -central, No jugular venous distension,   No Carotid Bruit   Cardiovascular -Heart Sounds-sitting , reclining ,  Normal  and  no murmur   , No gallop rhythm   Respiratory - Normal Respiratory Effort, Normal breath sounds,  no wheeze  and  no forced expiratory wheeze    Peripheral pitting pedal edema-- none , no calf pain   Gastrointestinal -Soft abdomen, No palpable masses, Non Tender,Liver,Spleen not palpable. No-- free fluid and shifting dullness  Musculoskeletal- No finger Clubbing. Strength grossly normal   Lymphatic-No Palpable cervical, axillary,Inguinal lymphadenopathy   Psychiatric - normal effect,Orientation  Rt Dorsalis pedis pulses-palpable    Lt Dorsalis pedis pulses- palpable   Rt Posterior tibial pulses -palpable   Left posterior tibial pulses -palpable   Miscellaneous -  no asterixis,  no dupuytren's contracture,  no renal bruit and  tattoos  Vagal nerve stimulator Left upper chest   Investigations  Lab and Imaging have been reviewed in [facility].    Review of Medicine tests    EKG- I had independently reviewed the EKG from--9/14/2020   It was reported to be showing     Sinus bradycardia   Otherwise normal ECG   When compared with ECG of 14-SEP-2020 05:26,   No significant change was found     Review of clinical lab tests:  Lab Results   Component Value Date    CREATININE 0.7 09/15/2020    CREATININE 0.7 09/15/2020    HGB 13.5 (L) 09/15/2020    HGB 13.5 (L) 09/15/2020     09/15/2020     09/15/2020         Review of old records- Was done and information gathered regards to events leading to surgery and health conditions of significance in the perioperative period.    Outpatient Subjective & Objective

## 2020-10-20 NOTE — ASSESSMENT & PLAN NOTE
Suggested alcohol reduction   Intractable epilepsy - planned for  surgical treatment   As per his understanding he is going to stay on Seizure Medication when comes for surgery

## 2020-10-20 NOTE — ASSESSMENT & PLAN NOTE
Today's BP Is elevated - 140/94-   Had to come from 3 hours away - had an accident on the way   No head ache     Home BP readings -None   Recent BP readings in the record-130/80's    He was BP medication until about 4 years ago   Stopped Medication on the direction of Cardiologist     I suggested salt reduction and home BP monitoring ( Mother Is a nurse who can help with this )     Suggested calling with BP readings      I suggest blood pressure monitoring.I suggest addressing pain control as uncontrolled pain can increased blood pressure     Manual BP done by neris LINN - 120/76

## 2020-10-20 NOTE — PROGRESS NOTES
[facility]  Progress Note    Patient Name: Darin Cunha  MRN: [id number]  Date of Evaluation- 10/23/2020  PCP- Primary Doctor No    Future cases for Darin Cunha [[id number]]     Case ID Status Date Time Tono Procedure Provider Location    [id number] [facility] 10/26/2020  7:00  Mesial Temporal Laser Ablation Visualase/ [place] LEFT Melani Ortiz MD [[id number]] [facility] OR 2ND FLR          HPI:  History of present illness- I had the pleasure of meeting this pleasant 25 y.o. gentleman in the pre op clinic prior to his elective Neuro surgery. The patient is know to me .     I have obtained the history by speaking to the patient and by reviewing the electronic health records.    Events leading up to surgery / History of presenting illness -    Started with seizures about 7 years ago in 2013   Did not have seizures prior to that   Has been to see see different Neurologists ( [place], [place] )    Tried multiple seizure medication with no lasting benefits     Seizure frequency - average - once a week- lasting 2-3 minutes   As per friends , family who witness the seizures - has a Stare and smacking  - No Loss of Consciousness- Once recovered moves on with what he was doing ( Unless he has Grand mal seizure after which he goes to sleep )      Has tongue bite with grand mal seizures      In March 2019 [place] a [place] . Did not sustain any injuries and did not hurt any one else     Since then,  no longer driving      No headaches  No head injuries in the past     Takes seizure Medication regularly      No aggravating factors. No relieving factors     Relevant health conditions of significance for the perioperative period/ History of presenting illness -    Subjectively describes health as good      Lives partly  with her mother due to seizures and partly in his own place  Going to recover with mother post surgery     Not known to have coronary  disease , Diabetes Mellitus,  "Lung disease   ,HTN            Subjective/ Objective:     Chief complaint-Preoperative evaluation, Perioperative Medical management, complication reduction plan     Active cardiac conditions- none    Revised cardiac risk index predictors- none    Functional capacity -Examples of physical activity, walks, used to work in road  construction until 2019 ,   house work and can take 1 flight of stairs----- He can undertake all the above activities without  chest pain,chest tightness, Shortness of breath ,dizziness,lightheadedness making his exercise tolerance more,   than 4 Mets.        Review of Systems   Constitutional: Negative for chills and fever.          Weight loss-about 15 # during his hospital stay -for 15 days -Attributes to smaller portion size in the hospital , different taste than  what he is used to     Gained about 10 # back since went home        HENT:        ISABELG score 1 / 8      Male gender   Eyes:        No unusual vision changes   Respiratory:        No cough , phlegm    No Hemoptysis   Cardiovascular:        As noted   Gastrointestinal:        Bowels- Regular - daily  No overt GI/ blood losses   Endocrine:        Prednisone use > 20 mg daily for 3 weeks- none   Genitourinary: Negative for dysuria.        No urinary hesitancy    Musculoskeletal:        As above      Skin: Negative for rash.   Neurological: Negative for syncope.        No unilateral weakness   Hematological:        Current use of Anticoagulants  None   Not on NSAID    Psychiatric/Behavioral:          No SI/HI   No vascular stenting               No anesthesia, bleeding, cardiac problems , with previous surgeries/procedures.  Medications and Allergies reviewed in epic.   FH- No anesthesia,bleeding / venous thrombosis , problems in family     Physical Exam  Blood pressure (!) 140/94, pulse 96, temperature 97.8 °F (36.6 °C), height 6' 1" (1.854 m), weight 74.4 kg (164 lb 1.6 oz), SpO2 100 %.    Physical Exam  Constitutional- Vitals " - Body mass index is 21.65 kg/m².,   Vitals:    10/20/20 1447   BP: (!) 140/94   Pulse: 96   Temp: 97.8 °F (36.6 °C)     General appearance-Conscious,Coherent  Eyes- No conjunctival icterus,pupils  round  and reactive to light   ENT-Oral cavity- moist  , Hearing grossly normal   Neck- No thyromegaly ,Trachea -central, No jugular venous distension,   No Carotid Bruit   Cardiovascular -Heart Sounds-sitting , reclining ,  Normal  and  no murmur   , No gallop rhythm   Respiratory - Normal Respiratory Effort, Normal breath sounds,  no wheeze  and  no forced expiratory wheeze    Peripheral pitting pedal edema-- none , no calf pain   Gastrointestinal -Soft abdomen, No palpable masses, Non Tender,Liver,Spleen not palpable. No-- free fluid and shifting dullness  Musculoskeletal- No finger Clubbing. Strength grossly normal   Lymphatic-No Palpable cervical, axillary,Inguinal lymphadenopathy   Psychiatric - normal effect,Orientation  Rt Dorsalis pedis pulses-palpable    Lt Dorsalis pedis pulses- palpable   Rt Posterior tibial pulses -palpable   Left posterior tibial pulses -palpable   Miscellaneous -  no asterixis,  no dupuytren's contracture,  no renal bruit and  tattoos  Vagal nerve stimulator Left upper chest   Investigations  Lab and Imaging have been reviewed in epic.    Review of Medicine tests    EKG- I had independently reviewed the EKG from--9/14/2020   It was reported to be showing     Sinus bradycardia   Otherwise normal ECG   When compared with ECG of 14-SEP-2020 05:26,   No significant change was found     Review of clinical lab tests:  Lab Results   Component Value Date    CREATININE 0.7 09/15/2020    CREATININE 0.7 09/15/2020    HGB 13.5 (L) 09/15/2020    HGB 13.5 (L) 09/15/2020     09/15/2020     09/15/2020         Review of old records- Was done and information gathered regards to events leading to surgery and health conditions of significance in the perioperative period.        Preoperative  cardiac risk assessment-  The patient does not have any active cardiac conditions . Revised cardiac risk index predictors-0 ---.Functional capacity is more than 4 Mets. He will be undergoing a Neuro  procedure that carries a Moderate Risk risk     Risk of a major Cardiac event ( Defined as death, myocardial infarction, or cardiac arrest at 30 days after noncardiac surgery), based on RCRI score     -3.9%         No further cardiac work up is indicated prior to proceeding with the surgery          American Society of Anesthesiologists Physical status classification ( ASA ) class: 3     Postoperative pulmonary complication risk assessment:      ARISCAT ( [place]et) risk index- risk class -  Low    Assessment/Plan:     Seizures  Suggested alcohol reduction   Intractable epilepsy - planned for  surgical treatment   As per his understanding he is going to stay on Seizure Medication when comes for surgery    Memory problem  Short term memory   For past 5-6 years , since seizures have been troublesome   Used to work in construction in the past until 2019   He is unable to work due to seizures and on disability     Still able to function  Does not cook much   Does not leave on and forget that he left it on     As per his understanding , memory problem might get worse without seizure control     Depression  From having to depend on others to be able to drive him - due to  seizures      On Citalopram that helps     I suggest monitoring the sodium as SIADH from Citalopram  use and hypersecretion of ADH associated with surgery can reduce sodium in the perioperative period    Mitral valve prolapse  Found when he was born   Had Cardiology evaluation long time ago - as per him no problem  No longer under Cardiology care   Does not cause any problem   No heart failure   No chest pain    History of MRSA infection  Rt middle finger   2015   Had to have drained  No known to have Staph contacts   Was working in the yard   Was hospitalized  for 2 days ( [facility],  [place], [place])- Records requested -   Had to be drained   Had it once and had no recurrence      Based on chart review / Patient has a reported history of Staph infection   In an attempt to prevent Surgical site infection , with the up coming surgery , suggest the following          Intra nasal Bactroban for 5 days ( 2 days pre op and 3  days post op )      Instructions for use of  nasal Bactroban discussed      Generic ointment or generic cream (30gram tube) - place a drop on a q tip , insert into to very tip of the nose only , then press on the nose gently to distribute.       Records reviewed from that time Jan 2015   Had elevate White cell count   He may have received Vancomycin at that time   Microbiology cultures from finger- from  1/27/2015 showed moderate growth of MRSA    He still has Bactroban        Nausea  With opioid on an empty stomach   I suggest that the perioperative team be aware of this so that appropriate preventive care can be taken     Winged scapula of right side  Noticed by his step dad after having a Grand mal seizure ,around July 2018   Was evaluated for this    About 3 weeks ago , he had a sharp pain on his Rt shoulder lasting 1-2 seconds - Noticed the pain on lifting his arm up and relieved by resting the arm down  No weakness     X ray Rt shoulder June 2020 - There are no osseous or articular abnormalities.  Soft tissues are unremarkable.  The visualized right ribs demonstrate no significant abnormalities      X ray C spine June 2020 - The cervical vertebral bodies are normally aligned.  There is no disc space narrowing.  There is no foraminal narrowing.  No significant osteophytic spurring is present    CT spine - July 2020 -No acute fracture or dislocation.    Offered Ortho evaluation     He preferred having Ortho evaluation close to his home town    His mother works in Medical field ( Nurse at a nursing home ) who can help with this     S/P  placement of VNS (vagus nerve stimulation) device  Located - Left upper chest     Elevated BP without diagnosis of hypertension  Today's BP Is elevated - 140/94-   Had to come from 3 hours away - had an accident on the way   No head ache     Home BP readings -None   Recent BP readings in the record-130/80's    He was BP medication until about 4 years ago   Stopped Medication on the direction of Cardiologist     I suggested salt reduction and home BP monitoring ( Mother Is a nurse who can help with this )     Suggested calling with BP readings      I suggest blood pressure monitoring.I suggest addressing pain control as uncontrolled pain can increased blood pressure     Manual BP done by neris LINN - 120/76       Broken tooth  In the past about 2013 ( Bumped on a trailer ) - Had a cap put in then   The broke off 2 weeks ago - no injuries   Had been to the dentist yesterday- had cleaning done ,had scraping of the left over piece   As per his understanding, no infection   No infection     Was given Amoxicillin on 10/19/2020 that he did not start yet as he did not want that to affect his surgery   Suggested letting surgeon know   -  10/22/2020- 17 07     Corresponded with surgeon 10/21/2022-To take Amoxicillin         Dips tobacco  Suggested avoidance   Head and neck cancer risk discussed     Does not smoke tobacco     Not known to have COPD, chronic Bronchitis, Emphysema, wheezing , chronic phlegm   No inhaler, oxygen use     As per him , breathing fine      Anemia  Mild anemia  In the setting of hospital stay   Suggested follow up     Tattoos    No suggestion of  hepatic decompensation     Soft tissue swelling  Rt shoulder area - npt concerning   suggested follow up         Preventive perioperative care    Thromboembolic prophylaxis:  His risk factors for thrombosis include surgical procedure and age.I suggest  thromboembolic prophylaxis ( mechanical/pharmacological, weighing the risk benefits of pharmacological agent  use considering adria procedural bleeding )  during the perioperative period.I suggested being active in the post operative period.      Postoperative pulmonary complication prophylaxis-Risk factors for post operative pulmonary complications include ASA class >2- I suggest incentive spirometry use, early ambulation and end tidal carbon dioxide monitoring  Oral care , head end of bed elevation      Renal complication prophylaxis- I suggest keeping him well hydrated  in the perioperative period.      Surgical site Infection Prophylaxis-I  suggest appropriate antibiotic for Prophylaxis against Surgical site infections  Hibiclens - neck  down discussed      Delirium prophylaxis-Risk factors - cognitive  - I suggest avoidance / minimizing the use of  Benzodiazepines ( unless the patient has been taking it on a regular basis ),Anticholinergic medication,Antihistamines ( like  Benadryl).I suggest minimizing the use of opioid medication and use of IV tylenol,if it is appropriate. I suggest using the lowest possible dose of opioids for the shortest duration possible in the perioperative period. I suggest to Keep shades/blinds open during the day, lights off and shades closed at night to encourage normal sleep/wake cycle.I encourage the presence of the family member with the patient at all times, if at all possible as mental status changes can be picked up early by the family members and they help with reorientation. I encouraged the presence of family to help with orientation in the perioperative period. Benadryl avoidance suggested        This visit was focused on Preoperative evaluation, Perioperative Medical management, complication reduction plans. I suggest that the patient follows up with primary care or relevant sub specialists for ongoing health care.    I appreciate the opportunity to be involved in this patients care. Please feel free to contact me if there were any questions about this consultation.    Patient is  optimized     Patient  was instructed to call and update me about any changes to health,  medication, office visits ,testing out side of the [facility] operative care center , hospitalizations between now and surgery     Tania Walker MD  Perioperative Medicine  [facility]   Pager [phone]  --  10/20/2020 - 16 34     No reported testicular lumps     Looked up on any possible interactions of Amoxicillin with his current Medication - No interactions  found   -  10/23- 13 50     Called to follow up about BP and tooth     Called to follow up , to address any concerns with the up coming surgery or any questions on Medication instructions   Unable to speak   Left a message to call, if needed , if if had changes to medication or health or has any concerns with the up coming surgery or any questions on Medication instructions

## 2020-10-20 NOTE — ASSESSMENT & PLAN NOTE
In the past about 2013 ( Bumped on a trailer ) - Had a cap put in then   The broke off 2 weeks ago - no injuries   Had been to the dentist yesterday- had cleaning done ,had scraping of the left over piece   As per his understanding, no infection   No infection     Was given Amoxicillin on 10/19/2020 that he did not start yet as he did not want that to affect his surgery   Suggested letting surgeon know   -  10/22/2020- 17 07     Corresponded with surgeon 10/21/2022-To take Amoxicillin

## 2020-10-20 NOTE — ASSESSMENT & PLAN NOTE
Rt middle finger   2015   Had to have drained  No known to have Staph contacts   Was working in the yard   Was hospitalized for 2 days ( [facility],  [place], [place])- Records requested -   Had to be drained   Had it once and had no recurrence      Based on chart review / Patient has a reported history of Staph infection   In an attempt to prevent Surgical site infection , with the up coming surgery , suggest the following          Intra nasal Bactroban for 5 days ( 2 days pre op and 3  days post op )      Instructions for use of  nasal Bactroban discussed      Generic ointment or generic cream (30gram tube) - place a drop on a q tip , insert into to very tip of the nose only , then press on the nose gently to distribute.       Records reviewed from that time Jan 2015   Had elevate White cell count   He may have received Vancomycin at that time   Microbiology cultures from finger- from  1/27/2015 showed moderate growth of MRSA    He still has Bactroban

## 2020-10-20 NOTE — PROGRESS NOTES
HVF done /rel/fix poor OD Fair OS coop. Good ou/chart checked and asked patient about latex allergy, none noted. Hyrum/OD Hyrum/OS -[facility]

## 2020-10-20 NOTE — ASSESSMENT & PLAN NOTE
Suggested avoidance   Head and neck cancer risk discussed     Does not smoke tobacco     Not known to have COPD, chronic Bronchitis, Emphysema, wheezing , chronic phlegm   No inhaler, oxygen use     As per him , breathing fine

## 2020-10-20 NOTE — ASSESSMENT & PLAN NOTE
Short term memory   For past 5-6 years , since seizures have been troublesome   Used to work in construction in the past until 2019   He is unable to work due to seizures and on disability     Still able to function  Does not cook much   Does not leave on and forget that he left it on     As per his understanding , memory problem might get worse without seizure control

## 2020-10-20 NOTE — HPI
History of present illness- I had the pleasure of meeting this pleasant 25 y.o. gentleman in the pre op clinic prior to his elective Neuro surgery. The patient is know to me .     I have obtained the history by speaking to the patient and by reviewing the electronic health records.    Events leading up to surgery / History of presenting illness -    Started with seizures about 7 years ago in 2013   Did not have seizures prior to that   Has been to see see different Neurologists ( [facility] )    Tried multiple seizure medication with no lasting benefits     Seizure frequency - average - once a week- lasting 2-3 minutes   As per friends , family who witness the seizures - has a Stare and smacking  - No Loss of Consciousness- Once recovered moves on with what he was doing ( Unless he has Grand mal seizure after which he goes to sleep )      Has tongue bite with grand mal seizures      In March 2019 [place] a [place] . Did not sustain any injuries and did not hurt any one else     Since then,  no longer driving      No headaches  No head injuries in the past     Takes seizure Medication regularly      No aggravating factors. No relieving factors     Relevant health conditions of significance for the perioperative period/ History of presenting illness -    Subjectively describes health as good      Lives partly  with her mother due to seizures and partly in his own place  Going to recover with mother post surgery     Not known to have coronary  disease , Diabetes Mellitus, Lung disease   ,HTN

## 2020-10-20 NOTE — LETTER
October 20, 2020      Melani Ortiz MD  [address] [facility] [id number]           [facility]  [address] [facility] [address]  Phone: [phone]          Patient: Darin Cunha   MR Number: [id number]   YOB: 1995   Date of Visit: 10/20/2020       Dear Dr. Melani Ortiz:    Thank you for referring Darin Cunha to me for evaluation. Attached you will find relevant portions of my assessment and plan of care.    If you have questions, please do not hesitate to call me. I look forward to following Darin Cunha along with you.    Sincerely,    Tania Walker MD    Enclosure  CC:  MD KELLY Neil NP    If you would like to receive this communication electronically, please contact [email or web address] or [phone] to request more information on [employer] Link access.    For providers and/or their staff who would like to refer a patient to [facility], please contact us through our one-stop-shop provider referral line, [facility], at [phone].    If you feel you have received this communication in error or would no longer like to receive these types of communications, please e-mail [email or web address]

## 2020-10-20 NOTE — ASSESSMENT & PLAN NOTE
Noticed by his step dad after having a Grand mal seizure ,around July 2018   Was evaluated for this    About 3 weeks ago , he had a sharp pain on his Rt shoulder lasting 1-2 seconds - Noticed the pain on lifting his arm up and relieved by resting the arm down  No weakness     X ray Rt shoulder June 2020 - There are no osseous or articular abnormalities.  Soft tissues are unremarkable.  The visualized right ribs demonstrate no significant abnormalities      X ray C spine June 2020 - The cervical vertebral bodies are normally aligned.  There is no disc space narrowing.  There is no foraminal narrowing.  No significant osteophytic spurring is present    CT spine - July 2020 -No acute fracture or dislocation.    Offered Ortho evaluation     He preferred having Ortho evaluation close to his home town    His mother works in Medical field ( Nurse at a nursing home ) who can help with this

## 2020-10-23 ENCOUNTER — TELEPHONE (OUTPATIENT)
Dept: NEUROSURGERY | Facility: CLINIC | Age: 25
End: 2020-10-23

## 2020-10-23 NOTE — TELEPHONE ENCOUNTER
"I contacted  from my personal cell phone due to the pt and his mother numbers going straight to  . It would appear that both have [facility] on "Block" status therefore it makes it impossible to reach the pt when calling from the work line.   I informed the pt that he is scheduled to arrive at 5am 2nd floor [facility] with his surgery scheduled to start at. Darin stated he and his mother will be driving in that morning. He assured me that he will be on time for his surgery. Pre-op instructions were reviewed with the pt.  PT VU   "

## 2020-10-26 ENCOUNTER — HOSPITAL ENCOUNTER (OUTPATIENT)
Facility: HOSPITAL | Age: 25
Discharge: HOME | End: 2020-10-27
Attending: NEUROLOGICAL SURGERY | Admitting: NEUROLOGICAL SURGERY
Payer: MEDICAID

## 2020-10-26 ENCOUNTER — ANESTHESIA EVENT (OUTPATIENT)
Dept: SURGERY | Facility: HOSPITAL | Age: 25
End: 2020-10-26
Payer: MEDICAID

## 2020-10-26 ENCOUNTER — ANESTHESIA (OUTPATIENT)
Dept: SURGERY | Facility: HOSPITAL | Age: 25
End: 2020-10-26
Payer: MEDICAID

## 2020-10-26 ENCOUNTER — TELEPHONE (OUTPATIENT)
Dept: NEUROLOGY | Facility: CLINIC | Age: 25
End: 2020-10-26

## 2020-10-26 ENCOUNTER — HOSPITAL ENCOUNTER (OUTPATIENT)
Dept: RADIOLOGY | Facility: HOSPITAL | Age: 25
Discharge: HOME | End: 2020-10-26
Attending: NEUROLOGICAL SURGERY | Admitting: NEUROLOGICAL SURGERY
Payer: MEDICAID

## 2020-10-26 VITALS — OXYGEN SATURATION: 99 % | HEART RATE: 61 BPM

## 2020-10-26 DIAGNOSIS — G40.909 EPILEPSY: ICD-10-CM

## 2020-10-26 DIAGNOSIS — G40.219 COMPLEX PARTIAL EPILEPSY WITH GENERALIZATION AND WITH INTRACTABLE EPILEPSY: ICD-10-CM

## 2020-10-26 DIAGNOSIS — G40.019 LOCALIZATION-RELATED (FOCAL) (PARTIAL) IDIOPATHIC EPILEPSY AND EPILEPTIC SYNDROMES WITH SEIZURES OF LOCALIZED ONSET, INTRACTABLE, WITHOUT STATUS EPILEPTICUS: ICD-10-CM

## 2020-10-26 LAB
ANION GAP SERPL CALC-SCNC: 12 MMOL/L (ref 8–16)
ANION GAP SERPL CALC-SCNC: 8 MMOL/L (ref 8–16)
APTT BLDCRRT: 29.2 SEC (ref 21–32)
BASOPHILS # BLD AUTO: 0.02 K/UL (ref 0–0.2)
BASOPHILS NFR BLD: 0.3 % (ref 0–1.9)
BUN SERPL-MCNC: 12 MG/DL (ref 6–20)
BUN SERPL-MCNC: 9 MG/DL (ref 6–20)
CALCIUM SERPL-MCNC: 8.3 MG/DL (ref 8.7–10.5)
CALCIUM SERPL-MCNC: 8.9 MG/DL (ref 8.7–10.5)
CHLORIDE SERPL-SCNC: 108 MMOL/L (ref 95–110)
CHLORIDE SERPL-SCNC: 112 MMOL/L (ref 95–110)
CO2 SERPL-SCNC: 18 MMOL/L (ref 23–29)
CO2 SERPL-SCNC: 24 MMOL/L (ref 23–29)
CREAT SERPL-MCNC: 0.8 MG/DL (ref 0.5–1.4)
CREAT SERPL-MCNC: 0.8 MG/DL (ref 0.5–1.4)
DIFFERENTIAL METHOD: ABNORMAL
EOSINOPHIL # BLD AUTO: 0 K/UL (ref 0–0.5)
EOSINOPHIL NFR BLD: 0.5 % (ref 0–8)
ERYTHROCYTE [DISTWIDTH] IN BLOOD BY AUTOMATED COUNT: 12.8 % (ref 11.5–14.5)
EST. GFR  (AFRICAN AMERICAN): >60 ML/MIN/1.73 M^2
EST. GFR  (AFRICAN AMERICAN): >60 ML/MIN/1.73 M^2
EST. GFR  (NON AFRICAN AMERICAN): >60 ML/MIN/1.73 M^2
EST. GFR  (NON AFRICAN AMERICAN): >60 ML/MIN/1.73 M^2
GLUCOSE SERPL-MCNC: 214 MG/DL (ref 70–110)
GLUCOSE SERPL-MCNC: 81 MG/DL (ref 70–110)
HCT VFR BLD AUTO: 46.6 % (ref 40–54)
HGB BLD-MCNC: 15.7 G/DL (ref 14–18)
IMM GRANULOCYTES # BLD AUTO: 0.02 K/UL (ref 0–0.04)
IMM GRANULOCYTES NFR BLD AUTO: 0.3 % (ref 0–0.5)
INR PPP: 0.9 (ref 0.8–1.2)
LYMPHOCYTES # BLD AUTO: 1.2 K/UL (ref 1–4.8)
LYMPHOCYTES NFR BLD: 15.8 % (ref 18–48)
MCH RBC QN AUTO: 31.1 PG (ref 27–31)
MCHC RBC AUTO-ENTMCNC: 33.7 G/DL (ref 32–36)
MCV RBC AUTO: 92 FL (ref 82–98)
MONOCYTES # BLD AUTO: 0.6 K/UL (ref 0.3–1)
MONOCYTES NFR BLD: 8.6 % (ref 4–15)
NEUTROPHILS # BLD AUTO: 5.6 K/UL (ref 1.8–7.7)
NEUTROPHILS NFR BLD: 74.5 % (ref 38–73)
NRBC BLD-RTO: 0 /100 WBC
PLATELET # BLD AUTO: 197 K/UL (ref 150–350)
PMV BLD AUTO: 10.4 FL (ref 9.2–12.9)
POCT GLUCOSE: 202 MG/DL (ref 70–110)
POTASSIUM SERPL-SCNC: 3.8 MMOL/L (ref 3.5–5.1)
POTASSIUM SERPL-SCNC: 4.2 MMOL/L (ref 3.5–5.1)
PROTHROMBIN TIME: 10.5 SEC (ref 9–12.5)
RBC # BLD AUTO: 5.05 M/UL (ref 4.6–6.2)
SARS-COV-2 RDRP RESP QL NAA+PROBE: NEGATIVE
SODIUM SERPL-SCNC: 140 MMOL/L (ref 136–145)
SODIUM SERPL-SCNC: 142 MMOL/L (ref 136–145)
WBC # BLD AUTO: 7.47 K/UL (ref 3.9–12.7)

## 2020-10-26 PROCEDURE — A9585 GADOBUTROL INJECTION: HCPCS | Performed by: NEUROLOGICAL SURGERY

## 2020-10-26 PROCEDURE — 70552 MRI BRAIN STEM W/DYE: CPT | Mod: TC

## 2020-10-26 PROCEDURE — 37000009 HC ANESTHESIA EA ADD 15 MINS: Performed by: NEUROLOGICAL SURGERY

## 2020-10-26 PROCEDURE — 25000003 PHARM REV CODE 250: Performed by: NURSE ANESTHETIST, CERTIFIED REGISTERED

## 2020-10-26 PROCEDURE — 85610 PROTHROMBIN TIME: CPT

## 2020-10-26 PROCEDURE — 36000710: Performed by: NEUROLOGICAL SURGERY

## 2020-10-26 PROCEDURE — 25000003 PHARM REV CODE 250: Performed by: STUDENT IN AN ORGANIZED HEALTH CARE EDUCATION/TRAINING PROGRAM

## 2020-10-26 PROCEDURE — 63600175 PHARM REV CODE 636 W HCPCS: Performed by: NURSE ANESTHETIST, CERTIFIED REGISTERED

## 2020-10-26 PROCEDURE — C9113 INJ PANTOPRAZOLE SODIUM, VIA: HCPCS | Performed by: STUDENT IN AN ORGANIZED HEALTH CARE EDUCATION/TRAINING PROGRAM

## 2020-10-26 PROCEDURE — 63600175 PHARM REV CODE 636 W HCPCS: Performed by: STUDENT IN AN ORGANIZED HEALTH CARE EDUCATION/TRAINING PROGRAM

## 2020-10-26 PROCEDURE — 99233 PR SUBSEQUENT HOSPITAL CARE,LEVL III: ICD-10-PCS | Mod: ,,, | Performed by: PSYCHIATRY & NEUROLOGY

## 2020-10-26 PROCEDURE — D9220A PRA ANESTHESIA: ICD-10-PCS | Mod: ANES,,, | Performed by: ANESTHESIOLOGY

## 2020-10-26 PROCEDURE — 25500020 PHARM REV CODE 255: Performed by: NEUROLOGICAL SURGERY

## 2020-10-26 PROCEDURE — 25000003 PHARM REV CODE 250: Performed by: NEUROLOGICAL SURGERY

## 2020-10-26 PROCEDURE — 99233 SBSQ HOSP IP/OBS HIGH 50: CPT | Mod: ,,, | Performed by: PSYCHIATRY & NEUROLOGY

## 2020-10-26 PROCEDURE — 20000000 HC ICU ROOM

## 2020-10-26 PROCEDURE — 64999 UNLISTED PX NERVOUS SYSTEM: CPT | Mod: ,,, | Performed by: NEUROLOGICAL SURGERY

## 2020-10-26 PROCEDURE — 70552 MRI BRAIN STEALTH WITHOUT FIDUCIALS: ICD-10-PCS | Mod: 26,,, | Performed by: RADIOLOGY

## 2020-10-26 PROCEDURE — 36000711: Performed by: NEUROLOGICAL SURGERY

## 2020-10-26 PROCEDURE — D9220A PRA ANESTHESIA: ICD-10-PCS | Mod: CRNA,,, | Performed by: NURSE ANESTHETIST, CERTIFIED REGISTERED

## 2020-10-26 PROCEDURE — 85730 THROMBOPLASTIN TIME PARTIAL: CPT

## 2020-10-26 PROCEDURE — D9220A PRA ANESTHESIA: Mod: CRNA,,, | Performed by: NURSE ANESTHETIST, CERTIFIED REGISTERED

## 2020-10-26 PROCEDURE — 63600175 PHARM REV CODE 636 W HCPCS: Performed by: NEUROLOGICAL SURGERY

## 2020-10-26 PROCEDURE — 27201037 HC PRESSURE MONITORING SET UP

## 2020-10-26 PROCEDURE — A4648 IMPLANTABLE TISSUE MARKER: HCPCS | Performed by: NEUROLOGICAL SURGERY

## 2020-10-26 PROCEDURE — D9220A PRA ANESTHESIA: Mod: ANES,,, | Performed by: ANESTHESIOLOGY

## 2020-10-26 PROCEDURE — 99223 1ST HOSP IP/OBS HIGH 75: CPT | Mod: ,,, | Performed by: PSYCHIATRY & NEUROLOGY

## 2020-10-26 PROCEDURE — 70552 MRI BRAIN STEM W/DYE: CPT | Mod: 26,,, | Performed by: RADIOLOGY

## 2020-10-26 PROCEDURE — 94761 N-INVAS EAR/PLS OXIMETRY MLT: CPT

## 2020-10-26 PROCEDURE — 99223 PR INITIAL HOSPITAL CARE,LEVL III: ICD-10-PCS | Mod: ,,, | Performed by: PSYCHIATRY & NEUROLOGY

## 2020-10-26 PROCEDURE — 64999 PR  PLACEMENT OF SKULL FIDUCIALS FOR DBS: ICD-10-PCS | Mod: ,,, | Performed by: NEUROLOGICAL SURGERY

## 2020-10-26 PROCEDURE — U0002 COVID-19 LAB TEST NON-CDC: HCPCS

## 2020-10-26 PROCEDURE — C1713 ANCHOR/SCREW BN/BN,TIS/BN: HCPCS | Performed by: NEUROLOGICAL SURGERY

## 2020-10-26 PROCEDURE — 80048 BASIC METABOLIC PNL TOTAL CA: CPT

## 2020-10-26 PROCEDURE — 01922 ANES N-INVAS IMG/RADJ THER: CPT | Performed by: NEUROLOGICAL SURGERY

## 2020-10-26 PROCEDURE — 85025 COMPLETE CBC W/AUTO DIFF WBC: CPT

## 2020-10-26 PROCEDURE — 27201423 OPTIME MED/SURG SUP & DEVICES STERILE SUPPLY: Performed by: NEUROLOGICAL SURGERY

## 2020-10-26 PROCEDURE — 37000008 HC ANESTHESIA 1ST 15 MINUTES: Performed by: NEUROLOGICAL SURGERY

## 2020-10-26 DEVICE — FIDUCIAL KIT UNI STEREO 10 MM: Type: IMPLANTABLE DEVICE | Site: SCALP | Status: FUNCTIONAL

## 2020-10-26 RX ORDER — LACOSAMIDE 100 MG/1
200 TABLET ORAL EVERY 12 HOURS
Status: DISCONTINUED | OUTPATIENT
Start: 2020-10-26 | End: 2020-10-27 | Stop reason: HOSPADM

## 2020-10-26 RX ORDER — CLOBAZAM 10 MG/1
40 TABLET ORAL 2 TIMES DAILY
Status: DISCONTINUED | OUTPATIENT
Start: 2020-10-26 | End: 2020-10-27 | Stop reason: HOSPADM

## 2020-10-26 RX ORDER — FENTANYL CITRATE 50 UG/ML
INJECTION, SOLUTION INTRAMUSCULAR; INTRAVENOUS
Status: DISCONTINUED | OUTPATIENT
Start: 2020-10-26 | End: 2020-10-26

## 2020-10-26 RX ORDER — HYDROCODONE BITARTRATE AND ACETAMINOPHEN 5; 325 MG/1; MG/1
1 TABLET ORAL EVERY 4 HOURS PRN
Status: DISCONTINUED | OUTPATIENT
Start: 2020-10-26 | End: 2020-10-27 | Stop reason: HOSPADM

## 2020-10-26 RX ORDER — PANTOPRAZOLE SODIUM 40 MG/10ML
40 INJECTION, POWDER, LYOPHILIZED, FOR SOLUTION INTRAVENOUS DAILY
Status: COMPLETED | OUTPATIENT
Start: 2020-10-26 | End: 2020-10-26

## 2020-10-26 RX ORDER — LIDOCAINE HCL/PF 100 MG/5ML
SYRINGE (ML) INTRAVENOUS
Status: DISCONTINUED | OUTPATIENT
Start: 2020-10-26 | End: 2020-10-26

## 2020-10-26 RX ORDER — ROCURONIUM BROMIDE 10 MG/ML
INJECTION, SOLUTION INTRAVENOUS
Status: DISCONTINUED | OUTPATIENT
Start: 2020-10-26 | End: 2020-10-26

## 2020-10-26 RX ORDER — PROPOFOL 10 MG/ML
VIAL (ML) INTRAVENOUS
Status: DISCONTINUED | OUTPATIENT
Start: 2020-10-26 | End: 2020-10-26

## 2020-10-26 RX ORDER — LIDOCAINE HYDROCHLORIDE 10 MG/ML
1 INJECTION, SOLUTION EPIDURAL; INFILTRATION; INTRACAUDAL; PERINEURAL ONCE
Status: COMPLETED | OUTPATIENT
Start: 2020-10-26 | End: 2020-10-26

## 2020-10-26 RX ORDER — CLOBAZAM 10 MG/1
20 TABLET ORAL DAILY
Status: COMPLETED | OUTPATIENT
Start: 2020-10-26 | End: 2020-10-26

## 2020-10-26 RX ORDER — CEFAZOLIN SODIUM 1 G/3ML
2 INJECTION, POWDER, FOR SOLUTION INTRAMUSCULAR; INTRAVENOUS
Status: COMPLETED | OUTPATIENT
Start: 2020-10-26 | End: 2020-10-26

## 2020-10-26 RX ORDER — TOPIRAMATE 25 MG/1
50 TABLET ORAL 2 TIMES DAILY
Status: COMPLETED | OUTPATIENT
Start: 2020-10-26 | End: 2020-10-26

## 2020-10-26 RX ORDER — ACETAMINOPHEN 10 MG/ML
INJECTION, SOLUTION INTRAVENOUS
Status: DISCONTINUED | OUTPATIENT
Start: 2020-10-26 | End: 2020-10-26

## 2020-10-26 RX ORDER — EPHEDRINE SULFATE 50 MG/ML
INJECTION, SOLUTION INTRAVENOUS
Status: DISCONTINUED | OUTPATIENT
Start: 2020-10-26 | End: 2020-10-26

## 2020-10-26 RX ORDER — LIDOCAINE HYDROCHLORIDE AND EPINEPHRINE 10; 10 MG/ML; UG/ML
INJECTION, SOLUTION INFILTRATION; PERINEURAL
Status: DISCONTINUED | OUTPATIENT
Start: 2020-10-26 | End: 2020-10-26 | Stop reason: HOSPADM

## 2020-10-26 RX ORDER — LACOSAMIDE 50 MG/1
200 TABLET ORAL EVERY 12 HOURS
Status: COMPLETED | OUTPATIENT
Start: 2020-10-26 | End: 2020-10-26

## 2020-10-26 RX ORDER — BUPIVACAINE HYDROCHLORIDE AND EPINEPHRINE 5; 5 MG/ML; UG/ML
INJECTION, SOLUTION EPIDURAL; INTRACAUDAL; PERINEURAL
Status: DISCONTINUED | OUTPATIENT
Start: 2020-10-26 | End: 2020-10-26 | Stop reason: HOSPADM

## 2020-10-26 RX ORDER — TOPIRAMATE 25 MG/1
50 TABLET ORAL 2 TIMES DAILY
Status: DISCONTINUED | OUTPATIENT
Start: 2020-10-26 | End: 2020-10-27 | Stop reason: HOSPADM

## 2020-10-26 RX ORDER — ACETAMINOPHEN 650 MG/1
650 SUPPOSITORY RECTAL EVERY 6 HOURS PRN
Status: DISCONTINUED | OUTPATIENT
Start: 2020-10-26 | End: 2020-10-27 | Stop reason: HOSPADM

## 2020-10-26 RX ORDER — MUPIROCIN 20 MG/G
OINTMENT TOPICAL
Status: DISCONTINUED | OUTPATIENT
Start: 2020-10-26 | End: 2020-10-26 | Stop reason: HOSPADM

## 2020-10-26 RX ORDER — CITALOPRAM 20 MG/1
20 TABLET, FILM COATED ORAL DAILY
Status: DISCONTINUED | OUTPATIENT
Start: 2020-10-26 | End: 2020-10-27 | Stop reason: HOSPADM

## 2020-10-26 RX ORDER — MUPIROCIN 20 MG/G
1 OINTMENT TOPICAL 2 TIMES DAILY
Status: DISCONTINUED | OUTPATIENT
Start: 2020-10-26 | End: 2020-10-26 | Stop reason: HOSPADM

## 2020-10-26 RX ORDER — BACITRACIN ZINC 500 UNIT/G
OINTMENT (GRAM) TOPICAL
Status: DISCONTINUED | OUTPATIENT
Start: 2020-10-26 | End: 2020-10-26 | Stop reason: HOSPADM

## 2020-10-26 RX ORDER — DEXAMETHASONE SODIUM PHOSPHATE 4 MG/ML
4 INJECTION, SOLUTION INTRA-ARTICULAR; INTRALESIONAL; INTRAMUSCULAR; INTRAVENOUS; SOFT TISSUE EVERY 6 HOURS
Status: COMPLETED | OUTPATIENT
Start: 2020-10-26 | End: 2020-10-27

## 2020-10-26 RX ORDER — DEXAMETHASONE SODIUM PHOSPHATE 4 MG/ML
INJECTION, SOLUTION INTRA-ARTICULAR; INTRALESIONAL; INTRAMUSCULAR; INTRAVENOUS; SOFT TISSUE
Status: DISCONTINUED | OUTPATIENT
Start: 2020-10-26 | End: 2020-10-26

## 2020-10-26 RX ORDER — SODIUM CHLORIDE 9 MG/ML
INJECTION, SOLUTION INTRAVENOUS CONTINUOUS
Status: DISCONTINUED | OUTPATIENT
Start: 2020-10-26 | End: 2020-10-27 | Stop reason: HOSPADM

## 2020-10-26 RX ORDER — NICARDIPINE HYDROCHLORIDE 0.2 MG/ML
2.5 INJECTION INTRAVENOUS CONTINUOUS
Status: DISCONTINUED | OUTPATIENT
Start: 2020-10-26 | End: 2020-10-27 | Stop reason: HOSPADM

## 2020-10-26 RX ORDER — MUPIROCIN 20 MG/G
OINTMENT TOPICAL 2 TIMES DAILY
Status: DISCONTINUED | OUTPATIENT
Start: 2020-10-26 | End: 2020-10-27 | Stop reason: HOSPADM

## 2020-10-26 RX ORDER — LAMOTRIGINE 100 MG/1
200 TABLET ORAL 2 TIMES DAILY
Status: DISCONTINUED | OUTPATIENT
Start: 2020-10-26 | End: 2020-10-27 | Stop reason: HOSPADM

## 2020-10-26 RX ORDER — ACETAMINOPHEN 325 MG/1
650 TABLET ORAL EVERY 4 HOURS PRN
Status: DISCONTINUED | OUTPATIENT
Start: 2020-10-26 | End: 2020-10-27 | Stop reason: HOSPADM

## 2020-10-26 RX ORDER — MIDAZOLAM HYDROCHLORIDE 1 MG/ML
INJECTION, SOLUTION INTRAMUSCULAR; INTRAVENOUS
Status: DISCONTINUED | OUTPATIENT
Start: 2020-10-26 | End: 2020-10-26

## 2020-10-26 RX ORDER — LEVETIRACETAM 500 MG/5ML
INJECTION, SOLUTION, CONCENTRATE INTRAVENOUS
Status: DISCONTINUED | OUTPATIENT
Start: 2020-10-26 | End: 2020-10-26

## 2020-10-26 RX ORDER — GADOBUTROL 604.72 MG/ML
8 INJECTION INTRAVENOUS
Status: COMPLETED | OUTPATIENT
Start: 2020-10-26 | End: 2020-10-26

## 2020-10-26 RX ORDER — LAMOTRIGINE 100 MG/1
200 TABLET ORAL DAILY
Status: COMPLETED | OUTPATIENT
Start: 2020-10-26 | End: 2020-10-26

## 2020-10-26 RX ORDER — ACETAMINOPHEN 325 MG/1
650 TABLET ORAL EVERY 6 HOURS PRN
Status: DISCONTINUED | OUTPATIENT
Start: 2020-10-26 | End: 2020-10-27 | Stop reason: HOSPADM

## 2020-10-26 RX ADMIN — CEFTRIAXONE 2 G: 2 INJECTION, SOLUTION INTRAVENOUS at 09:10

## 2020-10-26 RX ADMIN — LIDOCAINE HYDROCHLORIDE 1 MG: 10 INJECTION, SOLUTION EPIDURAL; INFILTRATION; INTRACAUDAL at 06:10

## 2020-10-26 RX ADMIN — CLOBAZAM 40 MG: 10 TABLET ORAL at 09:10

## 2020-10-26 RX ADMIN — DEXAMETHASONE SODIUM PHOSPHATE 4 MG: 4 INJECTION INTRA-ARTICULAR; INTRALESIONAL; INTRAMUSCULAR; INTRAVENOUS; SOFT TISSUE at 11:10

## 2020-10-26 RX ADMIN — ROCURONIUM BROMIDE 50 MG: 10 INJECTION, SOLUTION INTRAVENOUS at 07:10

## 2020-10-26 RX ADMIN — ACETAMINOPHEN 1000 MG: 10 INJECTION, SOLUTION INTRAVENOUS at 07:10

## 2020-10-26 RX ADMIN — TOPIRAMATE 50 MG: 25 TABLET, FILM COATED ORAL at 09:10

## 2020-10-26 RX ADMIN — MIDAZOLAM HYDROCHLORIDE 2 MG: 1 INJECTION, SOLUTION INTRAMUSCULAR; INTRAVENOUS at 10:10

## 2020-10-26 RX ADMIN — ROCURONIUM BROMIDE 50 MG: 10 INJECTION, SOLUTION INTRAVENOUS at 09:10

## 2020-10-26 RX ADMIN — NICARDIPINE HYDROCHLORIDE 2.5 MG/HR: 0.2 INJECTION, SOLUTION INTRAVENOUS at 02:10

## 2020-10-26 RX ADMIN — ROCURONIUM BROMIDE 5 MCG/KG/MIN: 10 INJECTION, SOLUTION INTRAVENOUS at 10:10

## 2020-10-26 RX ADMIN — SODIUM CHLORIDE, SODIUM GLUCONATE, SODIUM ACETATE, POTASSIUM CHLORIDE, MAGNESIUM CHLORIDE, SODIUM PHOSPHATE, DIBASIC, AND POTASSIUM PHOSPHATE: .53; .5; .37; .037; .03; .012; .00082 INJECTION, SOLUTION INTRAVENOUS at 08:10

## 2020-10-26 RX ADMIN — EPHEDRINE SULFATE 10 MG: 50 INJECTION INTRAVENOUS at 09:10

## 2020-10-26 RX ADMIN — EPHEDRINE SULFATE 5 MG: 50 INJECTION INTRAVENOUS at 09:10

## 2020-10-26 RX ADMIN — CITALOPRAM HYDROBROMIDE 20 MG: 20 TABLET ORAL at 02:10

## 2020-10-26 RX ADMIN — LIDOCAINE HYDROCHLORIDE 80 MG: 20 INJECTION, SOLUTION INTRAVENOUS at 07:10

## 2020-10-26 RX ADMIN — EPHEDRINE SULFATE 10 MG: 50 INJECTION INTRAVENOUS at 10:10

## 2020-10-26 RX ADMIN — SODIUM CHLORIDE, SODIUM GLUCONATE, SODIUM ACETATE, POTASSIUM CHLORIDE, MAGNESIUM CHLORIDE, SODIUM PHOSPHATE, DIBASIC, AND POTASSIUM PHOSPHATE: .53; .5; .37; .037; .03; .012; .00082 INJECTION, SOLUTION INTRAVENOUS at 07:10

## 2020-10-26 RX ADMIN — LEVETIRACETAM 1000 MG: 100 INJECTION, SOLUTION, CONCENTRATE INTRAVENOUS at 08:10

## 2020-10-26 RX ADMIN — MIDAZOLAM HYDROCHLORIDE 2 MG: 1 INJECTION, SOLUTION INTRAMUSCULAR; INTRAVENOUS at 06:10

## 2020-10-26 RX ADMIN — TOPIRAMATE 50 MG: 25 TABLET, FILM COATED ORAL at 02:10

## 2020-10-26 RX ADMIN — DEXAMETHASONE SODIUM PHOSPHATE 4 MG: 4 INJECTION INTRA-ARTICULAR; INTRALESIONAL; INTRAMUSCULAR; INTRAVENOUS; SOFT TISSUE at 05:10

## 2020-10-26 RX ADMIN — MIDAZOLAM HYDROCHLORIDE 2 MG: 1 INJECTION, SOLUTION INTRAMUSCULAR; INTRAVENOUS at 08:10

## 2020-10-26 RX ADMIN — LAMOTRIGINE 200 MG: 100 TABLET ORAL at 09:10

## 2020-10-26 RX ADMIN — CEFAZOLIN 2 G: 330 INJECTION, POWDER, FOR SOLUTION INTRAMUSCULAR; INTRAVENOUS at 07:10

## 2020-10-26 RX ADMIN — CLOBAZAM 20 MG: 10 TABLET ORAL at 02:10

## 2020-10-26 RX ADMIN — CEFTRIAXONE 2 G: 1 INJECTION, SOLUTION INTRAVENOUS at 08:10

## 2020-10-26 RX ADMIN — SODIUM CHLORIDE: 0.9 INJECTION, SOLUTION INTRAVENOUS at 06:10

## 2020-10-26 RX ADMIN — EPHEDRINE SULFATE 10 MG: 50 INJECTION INTRAVENOUS at 07:10

## 2020-10-26 RX ADMIN — PROPOFOL 80 MG: 10 INJECTION, EMULSION INTRAVENOUS at 07:10

## 2020-10-26 RX ADMIN — FENTANYL CITRATE 100 MCG: 50 INJECTION, SOLUTION INTRAMUSCULAR; INTRAVENOUS at 07:10

## 2020-10-26 RX ADMIN — PANTOPRAZOLE SODIUM 40 MG: 40 INJECTION, POWDER, LYOPHILIZED, FOR SOLUTION INTRAVENOUS at 02:10

## 2020-10-26 RX ADMIN — PROPOFOL 200 MG: 10 INJECTION, EMULSION INTRAVENOUS at 07:10

## 2020-10-26 RX ADMIN — LACOSAMIDE 200 MG: 50 TABLET, FILM COATED ORAL at 02:10

## 2020-10-26 RX ADMIN — LAMOTRIGINE 200 MG: 100 TABLET ORAL at 05:10

## 2020-10-26 RX ADMIN — LACOSAMIDE 200 MG: 100 TABLET, FILM COATED ORAL at 09:10

## 2020-10-26 RX ADMIN — GADOBUTROL 8 ML: 604.72 INJECTION INTRAVENOUS at 12:10

## 2020-10-26 RX ADMIN — DEXAMETHASONE SODIUM PHOSPHATE 12 MG: 4 INJECTION, SOLUTION INTRAMUSCULAR; INTRAVENOUS at 07:10

## 2020-10-26 RX ADMIN — SUGAMMADEX 500 MG: 100 INJECTION, SOLUTION INTRAVENOUS at 12:10

## 2020-10-26 RX ADMIN — MUPIROCIN: 20 OINTMENT TOPICAL at 06:10

## 2020-10-26 RX ADMIN — CLOBAZAM 40 MG: 10 TABLET ORAL at 02:10

## 2020-10-26 RX ADMIN — FENTANYL CITRATE 100 MCG: 50 INJECTION, SOLUTION INTRAMUSCULAR; INTRAVENOUS at 08:10

## 2020-10-26 RX ADMIN — EPHEDRINE SULFATE 10 MG: 50 INJECTION INTRAVENOUS at 08:10

## 2020-10-26 NOTE — SUBJECTIVE & OBJECTIVE
Past Medical History:   Diagnosis Date    Anemia 10/20/2020    Depressed 9/12/2018    Epilepsy 2015    Hypertension     Mitral valve prolapse 2015    Seizures      Past Surgical History:   Procedure Laterality Date    ADENOIDECTOMY  2015    ADENOIDECTOMY      CREATION OF YUNG HOLE WITH EVACUATION OF HEMATOMA Bilateral 8/31/2020    Procedure: CREATION, CRANIAL YUNG HOLE, PLACEMENT OF THE FIDUCIAL SCREWS BILATERAL 2.PLACEMENT OF SEEG ELECTRODES BILATERAL WITH OWEN ROBOT;  Surgeon: Melani Ortiz MD;  Location: [facility] OR [address] [place] [address];  Service: Neurosurgery;  Laterality: Bilateral;  TORONTO III, ASA III, BLOOD TYPE & SCREEN, HEADREST [place], REGULAR , SUPINE. SPECIAL EQUIPMENT [employer] DAIN WHITE, OWEN REP, PMT    finger abscess removal      REMOVAL OF STEREO EEG LEADS (DEPTH ELECTRODES) N/A 9/14/2020    Procedure: REMOVAL OF STEREO EEG LEADS (DEPTH ELECTRODES); removal of sEEG bolts and depth electrodes;  Surgeon: Melani Ortiz MD;  Location: [facility] OR [address] [place] [address];  Service: Neurosurgery;  Laterality: N/A;    TONSILLECTOMY      VAGUS NERVE STIMULATOR INSERTION Left 4/4/2019    Procedure: INSERTION, VAGUS NERVE STIMULATOR;  Surgeon: Reuben Gupta MD;  Location: [facility] OR [address] [place] [address];  Service: Neurosurgery;  Laterality: Left;  toronto I, asa 1, regular bed, supine     wisdom teeth extracted- about 2010         No current facility-administered medications on file prior to encounter.      Current Outpatient Medications on File Prior to Encounter   Medication Sig Dispense Refill    citalopram (CELEXA) 20 MG tablet Take 20 mg by mouth once daily.      cloBAZam (ONFI) 20 mg Tab Take 2 tablets by mouth twice daily 120 tablet 4    lamoTRIgine (LAMICTAL) 200 MG tablet Take 1 tablet (200 mg total) by mouth 2 (two) times daily. (Patient taking differently: Take 200 mg by mouth 2 (two) times daily. Take am of surgery) 60 tablet 2    topiramate (TOPAMAX) 50 MG tablet Take 1 tablet (50 mg total) by mouth 2 (two) times  daily. (Patient taking differently: Take 50 mg by mouth 2 (two) times daily. Take am of surgery) 60 tablet 11    promethazine (PHENERGAN) 12.5 MG Tab Take 1 tablet (12.5 mg total) by mouth every 6 (six) hours as needed (nausea/vomiting). (Patient not taking: Reported on 10/20/2020) 30 tablet 0      Allergies: Zofran [ondansetron hcl (pf)]    Family History   Problem Relation Age of Onset    Hypertension Mother     Hypertension Maternal Grandmother     Heart disease Maternal Grandmother     Hypertension Maternal Grandfather     Heart disease Maternal Grandfather      Social History     Tobacco Use    Smoking status: Never Smoker    Smokeless tobacco: Current User   Substance Use Topics    Alcohol use: Yes     Alcohol/week: 10.0 standard drinks     Types: 10 Cans of beer per week     Comment: 12 beers over a month - spread over a month     Drug use: Never     Review of Systems   Constitutional: Positive for fatigue. Negative for fever.   HENT: Negative for sore throat and tinnitus.    Eyes: Negative for photophobia and visual disturbance.   Respiratory: Negative for cough and shortness of breath.    Cardiovascular: Negative for chest pain.   Gastrointestinal: Negative for abdominal distention and abdominal pain.   Neurological: Positive for seizures and headaches. Negative for facial asymmetry, speech difficulty and weakness.   Psychiatric/Behavioral: Positive for decreased concentration. Negative for agitation and confusion.     Objective:     Vitals:    Temp: (!) 91.9 °F (33.3 °C)  Pulse: 100  BP: (!) 119/57  MAP (mmHg): 93  Resp: 16  SpO2: 100 %    Temp  Min: 91.9 °F (33.3 °C)  Max: 97.6 °F (36.4 °C)  Pulse  Min: 53  Max: 100  BP  Min: 116/80  Max: 119/57  MAP (mmHg)  Min: 93  Max: 93  Resp  Min: 16  Max: 16  SpO2  Min: 98 %  Max: 100 %    No intake/output data recorded.           Physical Exam  Constitutional:       General: He is not in acute distress.  HENT:      Head: Normocephalic.      Comments:  Closed surgical wound over L side of scalp   Eyes:      Extraocular Movements: Extraocular movements intact.      Pupils: Pupils are equal, round, and reactive to light.   Cardiovascular:      Rate and Rhythm: Normal rate and regular rhythm.      Pulses: Normal pulses.   Pulmonary:      Effort: Pulmonary effort is normal. No respiratory distress.   Abdominal:      General: Abdomen is flat. There is no distension.      Tenderness: There is no abdominal tenderness.   Neurological:      General: No focal deficit present.      Mental Status: He is alert and oriented to person, place, and time.   Psychiatric:         Behavior: Behavior is slowed.           Today I personally reviewed pertinent medications, lines/drains/airways, imaging, cardiology results, laboratory results, microbiology results,

## 2020-10-26 NOTE — SUBJECTIVE & OBJECTIVE
Past Medical History:   Diagnosis Date    Anemia 10/20/2020    Depressed 9/12/2018    Epilepsy 2015    Hypertension     Mitral valve prolapse 2015    Seizures        Past Surgical History:   Procedure Laterality Date    ADENOIDECTOMY  2015    ADENOIDECTOMY      CREATION OF YUNG HOLE WITH EVACUATION OF HEMATOMA Bilateral 8/31/2020    Procedure: CREATION, CRANIAL YUNG HOLE, PLACEMENT OF THE FIDUCIAL SCREWS BILATERAL 2.PLACEMENT OF SEEG ELECTRODES BILATERAL WITH OWEN ROBOT;  Surgeon: Melani Ortiz MD;  Location: [facility] OR [address] [place] [address];  Service: Neurosurgery;  Laterality: Bilateral;  TORONTO III, ASA III, BLOOD TYPE & SCREEN, HEADREST [place], REGULAR , SUPINE. SPECIAL EQUIPMENT [employer] DAIN WHITE, OWEN REP, PMT    finger abscess removal      REMOVAL OF STEREO EEG LEADS (DEPTH ELECTRODES) N/A 9/14/2020    Procedure: REMOVAL OF STEREO EEG LEADS (DEPTH ELECTRODES); removal of sEEG bolts and depth electrodes;  Surgeon: Melani Ortiz MD;  Location: [facility] OR [address] [place] [address];  Service: Neurosurgery;  Laterality: N/A;    TONSILLECTOMY      VAGUS NERVE STIMULATOR INSERTION Left 4/4/2019    Procedure: INSERTION, VAGUS NERVE STIMULATOR;  Surgeon: Reuben Gupta MD;  Location: [facility] OR [address] [place] [address];  Service: Neurosurgery;  Laterality: Left;  toronto I, asa 1, regular bed, supine     wisdom teeth extracted- about 2010          Review of patient's allergies indicates:   Allergen Reactions    Zofran [ondansetron hcl (pf)] Hives and Rash       No current facility-administered medications on file prior to encounter.      Current Outpatient Medications on File Prior to Encounter   Medication Sig    citalopram (CELEXA) 20 MG tablet Take 20 mg by mouth once daily.    cloBAZam (ONFI) 20 mg Tab Take 2 tablets by mouth twice daily    lamoTRIgine (LAMICTAL) 200 MG tablet Take 1 tablet (200 mg total) by mouth 2 (two) times daily. (Patient taking differently: Take 200 mg by mouth 2 (two) times daily. Take am of surgery)    topiramate  (TOPAMAX) 50 MG tablet Take 1 tablet (50 mg total) by mouth 2 (two) times daily. (Patient taking differently: Take 50 mg by mouth 2 (two) times daily. Take am of surgery)    promethazine (PHENERGAN) 12.5 MG Tab Take 1 tablet (12.5 mg total) by mouth every 6 (six) hours as needed (nausea/vomiting). (Patient not taking: Reported on 10/20/2020)     Continuous Infusions:   sodium chloride 0.9% Stopped (10/26/20 0844)    nicardipine Stopped (10/26/20 1500)       Family History     Problem Relation (Age of Onset)    Heart disease Maternal Grandmother, Maternal Grandfather    Hypertension Mother, Maternal Grandmother, Maternal Grandfather        Tobacco Use    Smoking status: Never Smoker    Smokeless tobacco: Current User   Substance and Sexual Activity    Alcohol use: Yes     Alcohol/week: 10.0 standard drinks     Types: 10 Cans of beer per week     Comment: 12 beers over a month - spread over a month     Drug use: Never    Sexual activity: Yes     Partners: Female     Review of Systems   Constitutional: Positive for fatigue. Negative for diaphoresis and fever.   HENT: Negative for congestion, sore throat and trouble swallowing.    Eyes: Negative for photophobia and visual disturbance.   Respiratory: Negative for cough and shortness of breath.    Cardiovascular: Negative for palpitations and leg swelling.   Gastrointestinal: Negative for abdominal pain, diarrhea, nausea and vomiting.   Genitourinary: Negative for difficulty urinating, frequency and hematuria.   Musculoskeletal: Negative for gait problem, myalgias and neck pain.   Neurological: Positive for seizures and headaches (mild). Negative for dizziness, speech difficulty and weakness.   Psychiatric/Behavioral: Negative for agitation, behavioral problems, confusion and dysphoric mood.     Objective:     Vital Signs (Most Recent):  Temp: (!) 91.9 °F (33.3 °C) (10/26/20 1316)  Pulse: 100 (10/26/20 1316)  Resp: 16 (10/26/20 1316)  BP: (!) 119/57 (10/26/20  1316)  SpO2: 100 % (10/26/20 1316) Vital Signs (24h Range):  Temp:  [91.9 °F (33.3 °C)-97.6 °F (36.4 °C)] 91.9 °F (33.3 °C)  Pulse:  [] 100  Resp:  [16] 16  SpO2:  [98 %-100 %] 100 %  BP: (116-119)/(57-80) 119/57     Weight: 97.8 kg (215 lb 9.8 oz)  Body mass index is 28.45 kg/m².    Physical Exam  Vitals signs reviewed.   Constitutional:       General: He is not in acute distress.     Appearance: He is not diaphoretic.      Comments: Sleeping, easily aroused by voice   HENT:      Head:      Comments: Postop bandage intact   Eyes:      General: No scleral icterus.        Right eye: No discharge.         Left eye: No discharge.      Extraocular Movements: Extraocular movements intact and EOM normal.      Conjunctiva/sclera: Conjunctivae normal.      Pupils: Pupils are equal, round, and reactive to light.   Cardiovascular:      Rate and Rhythm: Normal rate.   Pulmonary:      Effort: Pulmonary effort is normal. No respiratory distress.   Abdominal:      General: There is no distension.      Palpations: Abdomen is soft.      Tenderness: There is no abdominal tenderness.   Musculoskeletal: Normal range of motion.         General: No swelling, tenderness or deformity.   Skin:     General: Skin is warm and dry.   Neurological:      General: No focal deficit present.      Mental Status: He is alert and oriented to person, place, and time. Mental status is at baseline.   Psychiatric:         Mood and Affect: Mood normal.         Speech: Speech normal.         Behavior: Behavior normal.         NEUROLOGICAL EXAMINATION:     MENTAL STATUS   Oriented to person, place, and time.   Attention: normal. Concentration: normal.   Speech: speech is normal   Level of consciousness: alert    CRANIAL NERVES     CN II   Visual fields full to confrontation.     CN III, IV, VI   Pupils are equal, round, and reactive to light.  Extraocular motions are normal.     CN V   Right corneal reflex: normal  Left corneal reflex: normal    CN VII    Facial expression full, symmetric.     CN VIII   Hearing: intact    CN XI   CN XI normal.     CN XII   CN XII normal.       Significant Labs: All pertinent lab results from the past 24 hours have been reviewed.    Significant Studies: I have reviewed all pertinent imaging results/findings within the past 24 hours.

## 2020-10-26 NOTE — NURSING
[facility] Care Plan    POC reviewed with Darin Cunha and mother at 1500. Pt verbalized understanding. Questions and concerns addressed by RN, [facility] and NSGY. Pt arrived from OR. One episode of desat (O2 69%) directly after admit to [facility]. NSGY at bedside, Possible seizure noted. Pt reoriented. STEPHENIE passed. PO AEDs restarted. Diet advanced to regular. Hopper discontinued. Cardene off. PT/OT scheduled for the morning.  Pt progressing toward goals. Will continue to monitor. See below and flowsheets for full assessment and VS info.       Neuro:  [place] Coma Scale  Best Eye Response: 4-->(E4) spontaneous  Best Motor Response: 6-->(M6) obeys commands  Best Verbal Response: 5-->(V5) oriented  Remington Coma Scale Score: 15  Assessment Qualifiers: patient not sedated/intubated, no eye obstruction present  Pupil PERRLA: yes     24 hr Temp:  [91.9 °F (33.3 °C)-98.1 °F (36.7 °C)]     CV:      BP goals:   SBP < 140  MAP > 65    Resp:   O2 Device (Oxygen Therapy): room air       Plan: N/A    GI/:  STEPHENIE Total Score: 20  Diet/Nutrition Received: NPO  Last Bowel Movement: 10/25/20       Intake/Output Summary (Last 24 hours) at 10/26/2020 1845  Last data filed at 10/26/2020 1705  Gross per 24 hour   Intake 2000 ml   Output 2625 ml   Net -625 ml          Labs/Accuchecks:  Recent Labs   Lab 10/26/20  0615   WBC 7.47   RBC 5.05   HGB 15.7   HCT 46.6         Recent Labs   Lab 10/26/20  1349      K 3.8   CO2 18*   *   BUN 9   CREATININE 0.8      Recent Labs   Lab 10/26/20  0615   INR 0.9   APTT 29.2    No results for input(s): CPK, CPKMB, TROPONINI, MB in the last 168 hours.    Electrolytes: N/A - electrolytes WDL  Accuchecks: none    Gtts:   sodium chloride 0.9% Stopped (10/26/20 0844)    nicardipine Stopped (10/26/20 1500)       LDA/Wounds:  Lines/Drains/Airways     Arterial Line            Arterial Line 10/26/20 0803 less than 1 day          Peripheral Intravenous Line                 Peripheral IV - Single Lumen  10/26/20 0615 18 G Right Forearm less than 1 day         Peripheral IV - Single Lumen 10/26/20 0708 18 G Left Forearm less than 1 day              Wounds: No  Wound care consulted: No

## 2020-10-26 NOTE — PLAN OF CARE
10/26/20    0755: Closing of fiducial screw placement.  0757: Fiducial screw placement procedure finished.   0804: Patient out of OR to CT with Melani Ortiz MD, Katey Lee MD, Stefano Marley, and anesthesia.   0828: Patient back in OR from CT with Melani Ortiz MD, Katey Lee MD, Stefano Marley, and anesthesia.   0919: Start of OWEN portion of procedure.

## 2020-10-26 NOTE — PROGRESS NOTES
Post Op Check    1310: Patient examined in [facility] following transport from OR. Initially patient was still lethargic, not moving to stimulation    1320: On repeat exam patient desatted to 70s, had dilated to 6mm and sluggish then 3mm then back to dilated pupils, with L gaze and R head tilt. Was not moving BUE/BLE    1330: Opens eyes to voice, sustains eye opening. PERRL. Vocalizing, able to state his name and that he is cold. Following commands in BUE. Moving BLE spontaneously.    Patient stated preoperatively he had not taken his AM meds due to NPO status. Was given Keppra in OR. One time dose of all home meds to be given as soon as patient passes STEPHENIE. Will then resume BID dosing at 2100. Epilepsy notified of arrival to 9084.    Plan:  SBP <140  STAT CTH  Continue home meds  Dex/Abx with PPI for 24h    D/w [facility] and Dr. Ortiz

## 2020-10-26 NOTE — HPI
Patient is a 25 yom w/ a pmh of intractable epilepsy since 2013 who presents to [facility] following a left mesial temporal laser ablation with neurosurgery on 10/26. As noted, he has suffered from epilepsy since 2013, with his episodes consisting of staring off and lip smacking. He has failed >4 AEDs and has a VNS in place. On admission to [facility], he desatted to the 70's post-procedure, with dilated pupils to 6 mm and sluggish, a leftward gaze, and R head tilt while not moving any of his 4 extremities. He did not take his medications this AM prior to his procedure due to NPO status, so a one time dose of each of his AED's was given prior to resuming his BID dosing schedule.

## 2020-10-26 NOTE — ASSESSMENT & PLAN NOTE
- See below for prior VNS settings on 9/14/2020  Model # [id number] [id number]  Implant Date 4/1/2019  Is Device palpable in chest wall  No  Side of implant    L           Initial    Output current  mA 0.75    Signal Freq          Hz 30    Pulse width        uSec 1000    Signal on time     Sec 30    Signal off time      Min 5.0      Magnet settings  Output current          mA 0.875    Pulse width        uSec 1000    Signal on time         Sec 30      Autostimulation (AS)  Output Current          mA 0.75    Pulse width        uSec 1000    Signal on time         Sec 30

## 2020-10-26 NOTE — HOSPITAL COURSE
10/26: s/p left mesial temporal laser ablation with Dr. Melani Ortiz. 1 clinical seizure post op in setting of missed AM AEDs. Continue home AED regimen.  10/27: No clinical seizures. Continue home AED regimen.

## 2020-10-26 NOTE — ASSESSMENT & PLAN NOTE
25M with a PMHx of depression and epilepsy since 2013 s/p VNS currently maintained on Onfi 40 mg BID, Lamictal 200 mg BID, Vimpat 200 mg BID, and Topamax 50 mg BID presenting for left mesial temporal laser ablation with Dr. Melani Ortiz and admitted to [facility] for postop monitoring.    Recommendations:  - Continue home AED regimen: Vimpat 200 mg BID, Onfi 40 mg BID, Lamictal 200 mg BID, and Topamax 50 mg BID  - Postop CTH ordered  - Postop management per neurosurgery   - Avoid agents that lower seizure threshold  - Management of infectious/metabolic abnormalities per NCC  - Seizure precautions      Discussed plan of care with patient, mother at bedside, and NCC team. Will follow, please call with questions.

## 2020-10-26 NOTE — CONSULTS
[facility]  Neurology-Epilepsy  Consult Note    Patient Name: Darin Cunha  MRN: [id number]  Admission Date: 10/26/2020  Hospital Length of Stay: 0 days  Code Status: Prior   Attending Provider: Melani Ortiz MD   Consulting Provider: Minerva Oconnor PA-C  Primary Care Physician: Primary Doctor No  Principal Problem:Epilepsy    Inpatient consult to Neurology  Consult performed by: Minerva Oconnor PA-C  Consult ordered by: Katey Lee MD         Subjective:     HPI:   25 year old male with a PMHx of depression and epilepsy since 2013 s/p VNS placement currently maintained on Onfi 40 mg BID, Lamictal 200 mg BID, Vimpat 200 mg BID, and Topamax 50 mg BID presenting for left mesial temporal laser ablation with neurosurgery and admitted to [facility] for post op monitoring. Patient seen post operatively with mother at bedside. He reports mild headache. He declines analgesics. He has no other complaints. Per neurosurgery note, patient examined in [facility] following transport from OR at ~1320 and noted oxygen desaturation, pupils dilated, L gaze, R head tilt, and not moving upper and lower extremities; at ~1330, patient opening eyes to voice, PERRL, oriented to self, following commands, and moving extremities spontaneosly. Of note, patient reports missed AM AEDs due to waking up ~1AM to travel for surgery today. Per neurosurgery note, patient given IV Keppra in OR. Patient endorses medication compliance and took all AEDs 10/25 PM.    Hospital Course:   10/26: s/p left mesial temporal laser ablation with Dr. Melani Ortiz. Continue home AED regimen.     Past Medical History:   Diagnosis Date    Anemia 10/20/2020    Depressed 9/12/2018    Epilepsy 2015    Hypertension     Mitral valve prolapse 2015    Seizures        Past Surgical History:   Procedure Laterality Date    ADENOIDECTOMY  2015    ADENOIDECTOMY      CREATION OF YUNG HOLE WITH EVACUATION OF HEMATOMA Bilateral 8/31/2020    Procedure: CREATION, CRANIAL  YUNG HOLE, PLACEMENT OF THE FIDUCIAL SCREWS BILATERAL 2.PLACEMENT OF SEEG ELECTRODES BILATERAL WITH OWEN ROBOT;  Surgeon: Melani Ortiz MD;  [facility];  Service: Neurosurgery;  Laterality: Bilateral;  TORONTO III, ASA III, BLOOD TYPE & SCREEN, HEADREST [place], REGULAR , SUPINE. SPECIAL EQUIPMENT [place] DAIN WILCOX, OWEN REP, PMT    finger abscess removal      REMOVAL OF STEREO EEG LEADS (DEPTH ELECTRODES) N/A 9/14/2020    Procedure: REMOVAL OF STEREO EEG LEADS (DEPTH ELECTRODES); removal of sEEG bolts and depth electrodes;  Surgeon: Melani Ortiz MD;  [facility];  Service: Neurosurgery;  Laterality: N/A;    TONSILLECTOMY      VAGUS NERVE STIMULATOR INSERTION Left 4/4/2019    Procedure: INSERTION, VAGUS NERVE STIMULATOR;  Surgeon: Reuben Gupta MD;  Location: [facility] OR [address] [place] [address];  Service: Neurosurgery;  Laterality: Left;  toronto I, asa 1, regular bed, supine     wisdom teeth extracted- about 2010          Review of patient's allergies indicates:   Allergen Reactions    Zofran [ondansetron hcl (pf)] Hives and Rash       No current facility-administered medications on file prior to encounter.      Current Outpatient Medications on File Prior to Encounter   Medication Sig    citalopram (CELEXA) 20 MG tablet Take 20 mg by mouth once daily.    cloBAZam (ONFI) 20 mg Tab Take 2 tablets by mouth twice daily    lamoTRIgine (LAMICTAL) 200 MG tablet Take 1 tablet (200 mg total) by mouth 2 (two) times daily. (Patient taking differently: Take 200 mg by mouth 2 (two) times daily. Take am of surgery)    topiramate (TOPAMAX) 50 MG tablet Take 1 tablet (50 mg total) by mouth 2 (two) times daily. (Patient taking differently: Take 50 mg by mouth 2 (two) times daily. Take am of surgery)    promethazine (PHENERGAN) 12.5 MG Tab Take 1 tablet (12.5 mg total) by mouth every 6 (six) hours as needed (nausea/vomiting). (Patient not taking: Reported on 10/20/2020)     Continuous Infusions:    sodium chloride 0.9% Stopped (10/26/20 0844)    nicardipine Stopped (10/26/20 1500)       Family History     Problem Relation (Age of Onset)    Heart disease Maternal Grandmother, Maternal Grandfather    Hypertension Mother, Maternal Grandmother, Maternal Grandfather        Tobacco Use    Smoking status: Never Smoker    Smokeless tobacco: Current User   Substance and Sexual Activity    Alcohol use: Yes     Alcohol/week: 10.0 standard drinks     Types: 10 Cans of beer per week     Comment: 12 beers over a month - spread over a month     Drug use: Never    Sexual activity: Yes     Partners: Female     Review of Systems   Constitutional: Positive for fatigue. Negative for diaphoresis and fever.   HENT: Negative for congestion, sore throat and trouble swallowing.    Eyes: Negative for photophobia and visual disturbance.   Respiratory: Negative for cough and shortness of breath.    Cardiovascular: Negative for palpitations and leg swelling.   Gastrointestinal: Negative for abdominal pain, diarrhea, nausea and vomiting.   Genitourinary: Negative for difficulty urinating, frequency and hematuria.   Musculoskeletal: Negative for gait problem, myalgias and neck pain.   Neurological: Positive for seizures and headaches (mild). Negative for dizziness, speech difficulty and weakness.   Psychiatric/Behavioral: Negative for agitation, behavioral problems, confusion and dysphoric mood.     Objective:     Vital Signs (Most Recent):  Temp: (!) 91.9 °F (33.3 °C) (10/26/20 1316)  Pulse: 100 (10/26/20 1316)  Resp: 16 (10/26/20 1316)  BP: (!) 119/57 (10/26/20 1316)  SpO2: 100 % (10/26/20 1316) Vital Signs (24h Range):  Temp:  [91.9 °F (33.3 °C)-97.6 °F (36.4 °C)] 91.9 °F (33.3 °C)  Pulse:  [] 100  Resp:  [16] 16  SpO2:  [98 %-100 %] 100 %  BP: (116-119)/(57-80) 119/57     Weight: 97.8 kg (215 lb 9.8 oz)  Body mass index is 28.45 kg/m².    Physical Exam  Vitals signs reviewed.   Constitutional:       General: He is not in  acute distress.     Appearance: He is not diaphoretic.      Comments: Sleeping, easily aroused by voice   HENT:      Head:      Comments: Postop bandage intact   Eyes:      General: No scleral icterus.        Right eye: No discharge.         Left eye: No discharge.      Extraocular Movements: Extraocular movements intact and EOM normal.      Conjunctiva/sclera: Conjunctivae normal.      Pupils: Pupils are equal, round, and reactive to light.   Cardiovascular:      Rate and Rhythm: Normal rate.   Pulmonary:      Effort: Pulmonary effort is normal. No respiratory distress.   Abdominal:      General: There is no distension.      Palpations: Abdomen is soft.      Tenderness: There is no abdominal tenderness.   Musculoskeletal: Normal range of motion.         General: No swelling, tenderness or deformity.   Skin:     General: Skin is warm and dry.   Neurological:      General: No focal deficit present.      Mental Status: He is alert and oriented to person, place, and time. Mental status is at baseline.   Psychiatric:         Mood and Affect: Mood normal.         Speech: Speech normal.         Behavior: Behavior normal.         NEUROLOGICAL EXAMINATION:     MENTAL STATUS   Oriented to person, place, and time.   Attention: normal. Concentration: normal.   Speech: speech is normal   Level of consciousness: alert    CRANIAL NERVES     CN II   Visual fields full to confrontation.     CN III, IV, VI   Pupils are equal, round, and reactive to light.  Extraocular motions are normal.     CN V   Right corneal reflex: normal  Left corneal reflex: normal    CN VII   Facial expression full, symmetric.     CN VIII   Hearing: intact    CN XI   CN XI normal.     CN XII   CN XII normal.       Significant Labs: All pertinent lab results from the past 24 hours have been reviewed.    Significant Studies: I have reviewed all pertinent imaging results/findings within the past 24 hours.    Assessment and Plan:     * Epilepsy  25M with a PMHx  of depression and epilepsy since 2013 s/p VNS currently maintained on Onfi 40 mg BID, Lamictal 200 mg BID, Vimpat 200 mg BID, and Topamax 50 mg BID presenting for left mesial temporal laser ablation with Dr. Melani Ortiz and admitted to [facility] for postop monitoring.    Recommendations:  - Continue home AED regimen: Vimpat 200 mg BID, Onfi 40 mg BID, Lamictal 200 mg BID, and Topamax 50 mg BID  - Postop CTH ordered  - Postop management per neurosurgery   - Avoid agents that lower seizure threshold  - Management of infectious/metabolic abnormalities per [facility]  - Seizure precautions      Discussed plan of care with patient, mother at bedside, and NCC team. Will follow, please call with questions.    S/P placement of VNS (vagus nerve stimulation) device  - See below for prior VNS settings on 9/14/2020  Model # [id number] [id number]  Implant Date 4/1/2019  Is Device palpable in chest wall  No  Side of implant    L           Initial    Output current  mA 0.75    Signal Freq          Hz 30    Pulse width        uSec 1000    Signal on time     Sec 30    Signal off time      Min 5.0      Magnet settings  Output current          mA 0.875    Pulse width        uSec 1000    Signal on time         Sec 30      Autostimulation (AS)  Output Current          mA 0.75    Pulse width        uSec 1000    Signal on time         Sec 30      Adjustment disorder with depressed mood  - Chronic and stable  - Continue home Celexa      VTE Risk Mitigation (From admission, onward)         Ordered     Place CHASE hose  Until discontinued      10/26/20 0530     Place sequential compression device  Until discontinued      10/26/20 0530                Thank you for your consult. I will follow-up with patient. Please contact us if you have any additional questions.    Minerva Oconnor PA-C  Neurology-Epilepsy  [facility]  Staff: Dr. Luque

## 2020-10-26 NOTE — HPI
25 year old male with a PMHx of depression and epilepsy since 2013 s/p VNS placement currently maintained on Onfi 40 mg BID, Lamictal 200 mg BID, Vimpat 200 mg BID, and Topamax 50 mg BID presenting for left mesial temporal laser ablation with neurosurgery and admitted to [facility] for post op monitoring. Patient seen post operatively with mother at bedside. He reports mild headache. He declines analgesics. He has no other complaints. Per neurosurgery note, patient examined in [facility] following transport from OR at ~1320 and noted oxygen desaturation, pupils dilated, L gaze, R head tilt, and not moving upper and lower extremities; at ~1330, patient opening eyes to voice, PERRL, oriented to self, following commands, and moving extremities spontaneosly. Of note, patient reports missed AM AEDs due to waking up ~1AM to travel for surgery today. Per neurosurgery note, patient given IV Keppra in OR. Patient endorses medication compliance and took all AEDs 10/25 PM.

## 2020-10-26 NOTE — ANESTHESIA PROCEDURE NOTES
Intubation  Performed by: Godfrey Sesay CRNA  Authorized by: Nicolas Hutchinson MD     Intubation:     Induction:  Intravenous    Intubated:  Postinduction    Mask Ventilation:  Easy mask    Attempts:  1    Attempted By:  Student    Method of Intubation:  Direct    Blade:  Faye 2    Laryngeal View Grade: Grade I - full view of chords      Difficult Airway Encountered?: No      Complications:  None    Airway Device:  Oral endotracheal tube    Airway Device Size:  8.0    Style/Cuff Inflation:  Cuffed (inflated to minimal occlusive pressure)    Tube secured:  23    Secured at:  The teeth    Placement Verified By:  Capnometry    Complicating Factors:  None    Findings Post-Intubation:  BS equal bilateral

## 2020-10-26 NOTE — TRANSFER OF CARE
"Anesthesia Transfer of Care Note    Patient: Darin Cunha    Procedure(s) Performed: Procedure(s) (LRB):  Mesial Temporal Laser Ablation Visualase/ [place] Hole LEFT (Left)    Patient location: ICU    Anesthesia Type: general    Transport from OR: Transported from OR on 6-10 L/min O2 by face mask with adequate spontaneous ventilation. Continuous ECG monitoring in transport. Continuous SpO2 monitoring in transport. Continuos invasive BP monitoring in transport    Post pain: adequate analgesia    Post assessment: no apparent anesthetic complications    Post vital signs: stable    Level of consciousness: sedated and comatose    Nausea/Vomiting: no nausea/vomiting    Complications: none    Transfer of care protocol was followed      Last vitals:   Visit Vitals  /80 (BP Location: Left arm, Patient Position: Lying)   Pulse (!) 53   Temp 36.4 °C (97.6 °F) (Oral)   Resp 16   Ht 6' 1" (1.854 m)   Wt 74.5 kg (164 lb 3.9 oz)   SpO2 100%   BMI 21.67 kg/m²     "

## 2020-10-26 NOTE — BRIEF OP NOTE
[facility]  Surgery Department  Operative Note    SUMMARY     Date of Procedure: 10/26/2020     Procedure: Procedure(s) (LRB):  Mesial Temporal Laser Ablation Visualase/ [place] Hole LEFT (Left)     Surgeon(s) and Role:     * Melani Ortiz MD - Primary     * Katey Lee MD - Resident - Assisting     * Isatu Leija PA-C - Assisting        Pre-Operative Diagnosis: Localization-related (focal) (partial) symptomatic epilepsy and epileptic syndromes with complex partial seizures, intractable, with status epilepticus [G40.211]    Post-Operative Diagnosis: Post-Op Diagnosis Codes:     * Localization-related (focal) (partial) symptomatic epilepsy and epileptic syndromes with complex partial seizures, intractable, with status epilepticus [G40.211]    Anesthesia: General    Technical Procedures Used: see full op note    Description of the Findings of the Procedure: insertion and removal of 5 fiducial screws. Insertion of intracranial laser for mesial temporal lobe ablation. Removal of catheter in MRI.     VNS was turned off prior to surgery. [employer]ence [employer]  VNS reprogrammed in MRI to following settings:    Normal 0.75mA  Autostim 0.75 mA  Magnet 0.875 mA    Complications: No    Estimated Blood Loss (EBL): * No values recorded between 10/26/2020  7:44 AM and 10/26/2020 10:23 AM *           Implants:   Implant Name Type Inv. Item Serial No.  Lot No. LRB No. Used Action   PINS SKULL ADULT [place] - [id number]  PINS SKULL ADULT [facility] [id number] Left 1 Implanted and Explanted   FIDUCIAL KIT UNI STEREO 10 MM - [id number]  FIDUCIAL KIT UNI STEREO 10 MM  [employer] [facility] [id number] Left 5 Implanted       Specimens:   Specimen (12h ago, onward)    None                  Condition: Stable    Disposition: ICU - extubated and stable.    Attestation: Dr Ortiz was present for procedure.     Katey Lee MD  Neurosurgery  [facility]

## 2020-10-26 NOTE — INTERVAL H&P NOTE
The patient has been examined and the H&P has been reviewed:    I concur with the findings and no changes have occurred since H&P was written. LEFT side marked. Preop labs pending. No interval seizure activity, did not take medication this AM as is NPO.     Surgery risks, benefits and alternative options discussed and understood by patient/family.          Active Hospital Problems    Diagnosis  POA    Epilepsy [G40.909]  Yes      Resolved Hospital Problems   No resolved problems to display.

## 2020-10-26 NOTE — NURSING
Patient arrived to [facility] from direct admit post op    Type of stroke/diagnosis:   Mesial temporal Laser ablation    TPA start and end time N/A    Thrombectomy start and end time N/A    Current symptoms: following commands, moves all extremities spontaneously      Skin assessment done: Y  Wounds noted:boogie hole incision to head   STEPHENIE Armband Applied: yes   Patient Belongings on Admit: none    NCC notified: name of person notified Christiane Talbert at bedside

## 2020-10-26 NOTE — ANESTHESIA PREPROCEDURE EVALUATION
10/26/2020  Darin Cunha is a 25 y.o., male.    Anesthesia Evaluation    I have reviewed the Patient Summary Reports.    I have reviewed the Nursing Notes. I have reviewed the NPO Status.   I have reviewed the Medications.     Review of Systems  Anesthesia Hx:  History of prior surgery of interest to airway management or planning: Previous anesthesia: General  Denies Personal Hx of Anesthesia complications.   Cardiovascular:   Hypertension Valvular problems/Murmurs, MVP    Neurological:   Seizures    Psych:   Psychiatric History depression          Physical Exam  General:  Well nourished    Airway/Jaw/Neck:  Airway Findings: Mouth Opening: Normal Tongue: Normal  General Airway Assessment: Adult  Mallampati: II  TM Distance: Normal, at least 6 cm  Jaw/Neck Findings:  Neck ROM: Normal ROM      Dental:  Dental Findings: In tact   Chest/Lungs:  Chest/Lungs Findings: Clear to auscultation     Heart/Vascular:  Heart Findings: Rate: Normal  Rhythm: Regular Rhythm  Sounds: Normal        Mental Status:  Mental Status Findings:  Cooperative, Alert and Oriented        Patient Active Problem List   Diagnosis    Complex partial epilepsy with generalization and with intractable epilepsy    Adjustment disorder with depressed mood    Seizures    Epilepsy    Mild neurocognitive disorder due to another medical condition    Winged scapula of right side    History of MRSA infection    Memory problem    Depression    Mitral valve prolapse    Nausea    S/P placement of VNS (vagus nerve stimulation) device    Elevated BP without diagnosis of hypertension    Broken tooth    Dips tobacco    Anemia    Tattoos    Soft tissue swelling     Past Medical History:   Diagnosis Date    Anemia 10/20/2020    Depressed 9/12/2018    Epilepsy 2015    Hypertension     Mitral valve prolapse 2015    Seizures      Past Surgical  History:   Procedure Laterality Date    ADENOIDECTOMY  2015    ADENOIDECTOMY      CREATION OF YUNG HOLE WITH EVACUATION OF HEMATOMA Bilateral 8/31/2020    Procedure: CREATION, CRANIAL YUNG HOLE, PLACEMENT OF THE FIDUCIAL SCREWS BILATERAL 2.PLACEMENT OF SEEG ELECTRODES BILATERAL WITH OWEN ROBOT;  Surgeon: Melani Ortiz MD;  Location: [facility] OR [address] [place] [address];  Service: Neurosurgery;  Laterality: Bilateral;  TORONTO III, ASA III, BLOOD TYPE & SCREEN, HEADREST [place], REGULAR , SUPINE. SPECIAL EQUIPMENT [employer] DAIN WILCOX, OWEN REP, PMT    finger abscess removal      REMOVAL OF STEREO EEG LEADS (DEPTH ELECTRODES) N/A 9/14/2020    Procedure: REMOVAL OF STEREO EEG LEADS (DEPTH ELECTRODES); removal of sEEG bolts and depth electrodes;  Surgeon: Melani Ortiz MD;  Location: [facility] OR [address] [place] [address];  Service: Neurosurgery;  Laterality: N/A;    TONSILLECTOMY      VAGUS NERVE STIMULATOR INSERTION Left 4/4/2019    Procedure: INSERTION, VAGUS NERVE STIMULATOR;  Surgeon: Reuben Gupta MD;  Location: [facility] OR [address] [place] [address];  Service: Neurosurgery;  Laterality: Left;  toronto I, asa 1, regular bed, supine     wisdom teeth extracted- about 2010            Anesthesia Plan  Type of Anesthesia, risks & benefits discussed:  Anesthesia Type:  general  Patient's Preference:   Intra-op Monitoring Plan: standard ASA monitors  Intra-op Monitoring Plan Comments:   Post Op Pain Control Plan: per primary service following discharge from PACU  Post Op Pain Control Plan Comments:   Induction:   IV  Beta Blocker:  Patient is not currently on a Beta-Blocker (No further documentation required).       Informed Consent: Patient understands risks and agrees with Anesthesia plan.  Questions answered. Anesthesia consent signed with patient.  ASA Score: 3     Day of Surgery Review of History & Physical:    H&P update referred to the surgeon.         Ready For Surgery From Anesthesia Perspective.

## 2020-10-27 ENCOUNTER — RESEARCH ENCOUNTER (OUTPATIENT)
Dept: RESEARCH | Facility: HOSPITAL | Age: 25
End: 2020-10-27

## 2020-10-27 VITALS
SYSTOLIC BLOOD PRESSURE: 121 MMHG | RESPIRATION RATE: 21 BRPM | BODY MASS INDEX: 28.58 KG/M2 | HEART RATE: 89 BPM | HEIGHT: 73 IN | WEIGHT: 215.63 LBS | TEMPERATURE: 100 F | OXYGEN SATURATION: 99 % | DIASTOLIC BLOOD PRESSURE: 66 MMHG

## 2020-10-27 LAB
ANION GAP SERPL CALC-SCNC: 8 MMOL/L (ref 8–16)
BASOPHILS # BLD AUTO: 0.01 K/UL (ref 0–0.2)
BASOPHILS NFR BLD: 0.1 % (ref 0–1.9)
BUN SERPL-MCNC: 6 MG/DL (ref 6–20)
CALCIUM SERPL-MCNC: 8.7 MG/DL (ref 8.7–10.5)
CHLORIDE SERPL-SCNC: 108 MMOL/L (ref 95–110)
CO2 SERPL-SCNC: 22 MMOL/L (ref 23–29)
CREAT SERPL-MCNC: 0.7 MG/DL (ref 0.5–1.4)
DIFFERENTIAL METHOD: ABNORMAL
EOSINOPHIL # BLD AUTO: 0 K/UL (ref 0–0.5)
EOSINOPHIL NFR BLD: 0 % (ref 0–8)
ERYTHROCYTE [DISTWIDTH] IN BLOOD BY AUTOMATED COUNT: 12.6 % (ref 11.5–14.5)
EST. GFR  (AFRICAN AMERICAN): >60 ML/MIN/1.73 M^2
EST. GFR  (NON AFRICAN AMERICAN): >60 ML/MIN/1.73 M^2
GLUCOSE SERPL-MCNC: 139 MG/DL (ref 70–110)
HCT VFR BLD AUTO: 40.4 % (ref 40–54)
HGB BLD-MCNC: 14.2 G/DL (ref 14–18)
IMM GRANULOCYTES # BLD AUTO: 0.05 K/UL (ref 0–0.04)
IMM GRANULOCYTES NFR BLD AUTO: 0.4 % (ref 0–0.5)
LYMPHOCYTES # BLD AUTO: 0.3 K/UL (ref 1–4.8)
LYMPHOCYTES NFR BLD: 2.9 % (ref 18–48)
MAGNESIUM SERPL-MCNC: 1.7 MG/DL (ref 1.6–2.6)
MCH RBC QN AUTO: 31.6 PG (ref 27–31)
MCHC RBC AUTO-ENTMCNC: 35.1 G/DL (ref 32–36)
MCV RBC AUTO: 90 FL (ref 82–98)
MONOCYTES # BLD AUTO: 0.2 K/UL (ref 0.3–1)
MONOCYTES NFR BLD: 2 % (ref 4–15)
NEUTROPHILS # BLD AUTO: 11.2 K/UL (ref 1.8–7.7)
NEUTROPHILS NFR BLD: 94.6 % (ref 38–73)
NRBC BLD-RTO: 0 /100 WBC
PHOSPHATE SERPL-MCNC: 3.4 MG/DL (ref 2.7–4.5)
PLATELET # BLD AUTO: 199 K/UL (ref 150–350)
PMV BLD AUTO: 9.8 FL (ref 9.2–12.9)
POTASSIUM SERPL-SCNC: 3.9 MMOL/L (ref 3.5–5.1)
RBC # BLD AUTO: 4.5 M/UL (ref 4.6–6.2)
SODIUM SERPL-SCNC: 138 MMOL/L (ref 136–145)
WBC # BLD AUTO: 11.82 K/UL (ref 3.9–12.7)

## 2020-10-27 PROCEDURE — 99214 OFFICE O/P EST MOD 30 MIN: CPT | Mod: ,,, | Performed by: PSYCHIATRY & NEUROLOGY

## 2020-10-27 PROCEDURE — 99214 PR OFFICE/OUTPT VISIT, EST, LEVL IV, 30-39 MIN: ICD-10-PCS | Mod: ,,, | Performed by: PSYCHIATRY & NEUROLOGY

## 2020-10-27 PROCEDURE — 85025 COMPLETE CBC W/AUTO DIFF WBC: CPT

## 2020-10-27 PROCEDURE — 97535 SELF CARE MNGMENT TRAINING: CPT

## 2020-10-27 PROCEDURE — 25000003 PHARM REV CODE 250: Performed by: STUDENT IN AN ORGANIZED HEALTH CARE EDUCATION/TRAINING PROGRAM

## 2020-10-27 PROCEDURE — 84100 ASSAY OF PHOSPHORUS: CPT

## 2020-10-27 PROCEDURE — 97161 PT EVAL LOW COMPLEX 20 MIN: CPT

## 2020-10-27 PROCEDURE — 97165 OT EVAL LOW COMPLEX 30 MIN: CPT

## 2020-10-27 PROCEDURE — 25000003 PHARM REV CODE 250: Performed by: NURSE PRACTITIONER

## 2020-10-27 PROCEDURE — 80048 BASIC METABOLIC PNL TOTAL CA: CPT

## 2020-10-27 PROCEDURE — 63600175 PHARM REV CODE 636 W HCPCS: Performed by: STUDENT IN AN ORGANIZED HEALTH CARE EDUCATION/TRAINING PROGRAM

## 2020-10-27 PROCEDURE — 83735 ASSAY OF MAGNESIUM: CPT

## 2020-10-27 RX ORDER — LANOLIN ALCOHOL/MO/W.PET/CERES
800 CREAM (GRAM) TOPICAL
Status: DISCONTINUED | OUTPATIENT
Start: 2020-10-27 | End: 2020-10-27 | Stop reason: HOSPADM

## 2020-10-27 RX ORDER — POTASSIUM CHLORIDE 1.5 G/1.58G
40 POWDER, FOR SOLUTION ORAL
Status: DISCONTINUED | OUTPATIENT
Start: 2020-10-27 | End: 2020-10-27 | Stop reason: HOSPADM

## 2020-10-27 RX ORDER — SODIUM,POTASSIUM PHOSPHATES 280-250MG
2 POWDER IN PACKET (EA) ORAL
Status: DISCONTINUED | OUTPATIENT
Start: 2020-10-27 | End: 2020-10-27 | Stop reason: HOSPADM

## 2020-10-27 RX ORDER — CEPHALEXIN 500 MG/1
500 CAPSULE ORAL EVERY 12 HOURS
Qty: 10 CAPSULE | Refills: 0 | Status: SHIPPED | OUTPATIENT
Start: 2020-10-27 | End: 2020-11-01

## 2020-10-27 RX ORDER — POTASSIUM CHLORIDE 1.5 G/1.58G
60 POWDER, FOR SOLUTION ORAL
Status: DISCONTINUED | OUTPATIENT
Start: 2020-10-27 | End: 2020-10-27 | Stop reason: HOSPADM

## 2020-10-27 RX ORDER — HYDROCODONE BITARTRATE AND ACETAMINOPHEN 5; 325 MG/1; MG/1
1 TABLET ORAL EVERY 4 HOURS PRN
Qty: 30 TABLET | Refills: 0 | Status: SHIPPED | OUTPATIENT
Start: 2020-10-27

## 2020-10-27 RX ADMIN — DEXAMETHASONE SODIUM PHOSPHATE 4 MG: 4 INJECTION INTRA-ARTICULAR; INTRALESIONAL; INTRAMUSCULAR; INTRAVENOUS; SOFT TISSUE at 05:10

## 2020-10-27 RX ADMIN — CEFTRIAXONE 2 G: 2 INJECTION, SOLUTION INTRAVENOUS at 09:10

## 2020-10-27 RX ADMIN — CLOBAZAM 40 MG: 10 TABLET ORAL at 09:10

## 2020-10-27 RX ADMIN — Medication 800 MG: at 06:10

## 2020-10-27 RX ADMIN — LAMOTRIGINE 200 MG: 100 TABLET ORAL at 09:10

## 2020-10-27 RX ADMIN — DEXAMETHASONE SODIUM PHOSPHATE 4 MG: 4 INJECTION INTRA-ARTICULAR; INTRALESIONAL; INTRAMUSCULAR; INTRAVENOUS; SOFT TISSUE at 11:10

## 2020-10-27 RX ADMIN — CITALOPRAM HYDROBROMIDE 20 MG: 20 TABLET ORAL at 09:10

## 2020-10-27 RX ADMIN — TOPIRAMATE 50 MG: 25 TABLET, FILM COATED ORAL at 09:10

## 2020-10-27 RX ADMIN — LACOSAMIDE 200 MG: 100 TABLET, FILM COATED ORAL at 09:10

## 2020-10-27 RX ADMIN — Medication 800 MG: at 09:10

## 2020-10-27 RX ADMIN — MUPIROCIN: 20 OINTMENT TOPICAL at 09:10

## 2020-10-27 NOTE — ASSESSMENT & PLAN NOTE
25 year old male admitted for intractable epilepsy for elective SILVIA treatment on 10/26/2020    Pt tolerated procedure well with no issues. He has no pain today, he is tolerating PO, and he was cleared by physical therapy for discharge home.    -- Discharge home with home antiepileptic drugs as detailed by epilepsy team  -- Discharge home with 5 days of keflex  -- Plan for 2 week follow up with Dr. Ortiz, to be set up by the neurosurgery clinic

## 2020-10-27 NOTE — PLAN OF CARE
Problem: Occupational Therapy Goal  Goal: Occupational Therapy Goal  Outcome: Met       Eval complete, no OT needs at this time.  Safe to return home when medically clear.  Reorder OT if status deteriorates.    EFREN Shen  10/27/2020

## 2020-10-27 NOTE — DISCHARGE SUMMARY
[facility]  Neurosurgery  Discharge Summary      Patient Name: Darin Cunha  MRN: [id number]  Admission Date: 10/26/2020  Hospital Length of Stay: 1 days  Discharge Date and Time:  10/27/2020 2:15 PM  Attending Physician: Titi Menard MD   Discharging Provider: Ino Collado MD  Primary Care Provider: PRIYANKA Vigil NP    HPI:   No notes on file    Procedure(s) (LRB):  Mesial Temporal Laser Ablation Visualase/ [place] LEFT (Left)     Hospital Course: Patient was admitted for intractable epilepsy on 10/26/2020 and underwent SILVIA treatment on 10/26/2020 without perioperative complications. The patient was kept on appropriate DVT prophylaxis during the course of admission. At the time of discharge, the patient was tolerating PO intake without N/V, dysphagia, denied bowel or bladder dysfunction, denied new neurological symptoms, and reported pain controlled with current regimen. The surgical site was without evidence of drainage, breakdown or infection and all sutures were intact. Therapy recommendations of antiepileptic drugs have been arranged and the patient will follow up in clinic as indicated in discharge instructions. All questions were answered and continued treatment/woundcare instructions were discussed in detail prior to discharge.    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    Specialty: Internal Medicine  Why: Hospital follow up 8 am   Contact information:  [facility] [address]  [phone]             Melani Ortiz MD On 11/10/2020.    Specialty: Neurosurgery  Why: For wound re-check at 1:30 pm   Contact information:  [address]  [facility] [address]  [phone]             Melani Ortiz MD On 12/3/2020.    Specialty: Neurosurgery  Why: Hospital follow up at 2:30 pm   Contact information:  [address]  [facility] [address]  [phone]                 Patient Instructions:      Notify your health care provider if you experience any of the following:  temperature >100.4     Notify your health care provider if you experience any of the following:  persistent nausea and vomiting or diarrhea     Notify your health care provider if you experience any of the following:  severe uncontrolled pain     Notify your health care provider if you experience any of the following:  redness, tenderness, or signs of infection (pain, swelling, redness, odor or green/yellow discharge around incision site)     Notify your health care provider if you experience any of the following:  difficulty breathing or increased cough     Notify your health care provider if you experience any of the following:  severe persistent headache     Notify your health care provider if you experience any of the following:  worsening rash     Notify your health care provider if you experience any of the following:  persistent dizziness, light-headedness, or visual disturbances     Notify your health care provider if you experience any of the following:  increased confusion or weakness     Activity as tolerated     Medications:  Reconciled Home Medications:      Medication List      START taking these medications     cephALEXin 500 MG capsule  Commonly known as: KEFLEX  Take 1 capsule (500 mg total) by mouth every 12 (twelve) hours. for 5 days     HYDROcodone-acetaminophen 5-325 mg per tablet  Commonly known as: NORCO  Take 1 tablet by mouth every 4 (four) hours as needed for Pain.        CHANGE how you take these medications    lamoTRIgine 200 MG tablet  Commonly known as: LAMICTAL  Take 1 tablet (200 mg total) by mouth 2 (two) times daily.  What changed: additional instructions     topiramate 50 MG tablet  Commonly known as: TOPAMAX  Take 1 tablet (50 mg total) by mouth 2 (two) times daily.  What changed: additional instructions        CONTINUE taking these medications    citalopram 20 MG tablet  Commonly known as: CELEXA  Take 20 mg by mouth once daily.     cloBAZam 20 mg Tab  Commonly known as: ONFI  Take 2 tablets by mouth twice daily     lacosamide 200 mg Tab tablet  Commonly known as: VIMPAT  Take 1 tablet (200 mg total) by mouth every 12 (twelve) hours.     promethazine 12.5 MG Tab  Commonly known as: PHENERGAN  Take 1 tablet (12.5 mg total) by mouth every 6 (six) hours as needed (nausea/vomiting).        STOP taking these medications    amoxicillin 500 MG capsule  Commonly known as: AMOXIL            Ino Collado MD  Neurosurgery  [facility]

## 2020-10-27 NOTE — ASSESSMENT & PLAN NOTE
Patient s/p left mesial temporal laser ablation. Post op CTH stable. Patient with possible seizure episode post-op likely due to not taking any of his medications the morning of his surgery. Given one time dose of all AED's and started on BID regimen.  -started back on home meds     -Clobazam 40 mg BID     -Vimpat 200 mg BID     -lamotrigine 200 mg BID     -topiramate 50 mg BID  -Blood pressure goal <140  -Rocephin 2g q12h x 2 doses- complete  -Decadron 4mg q6h x 4 doses- complete  -Pantoprazole x 1 dose- complete  -PT

## 2020-10-27 NOTE — PROGRESS NOTES
[facility]  Neurocritical Care  Progress Note    Admit Date: 10/26/2020  Service Date: 10/27/2020  Length of Stay: 1    Subjective:     Chief Complaint: Epilepsy    History of Present Illness: Patient is a 25 yom w/ a pmh of intractable epilepsy since 2013 who presents to [facility] following a left mesial temporal laser ablation with neurosurgery on 10/26. As noted, he has suffered from epilepsy since 2013, with his episodes consisting of staring off and lip smacking. He has failed >4 AEDs and has a VNS in place. On admission to [facility], he desatted to the 70's post-procedure, with dilated pupils to 6 mm and sluggish, a leftward gaze, and R head tilt while not moving any of his 4 extremities. He did not take his medications this AM prior to his procedure due to NPO status, so a one time dose of each of his AED's was given prior to resuming his BID dosing schedule.    Hospital Course: 10/27: patient neuro exam stable overnight; no respiratory issues; blood pressure controlled; anticipate dc today    Interval History: patient neuro exam stable overnight; no respiratory issues; blood pressure controlled; anticipate dc today    Review of Systems   Constitutional: Negative for fatigue and fever.   HENT: Negative for sore throat and tinnitus.    Eyes: Negative for photophobia and visual disturbance.   Respiratory: Negative for cough and shortness of breath.    Cardiovascular: Negative for chest pain.   Gastrointestinal: Negative for abdominal distention and abdominal pain.   Neurological: Positive for seizures. Negative for facial asymmetry, speech difficulty, weakness and headaches.   Psychiatric/Behavioral: Negative for agitation, confusion and decreased concentration.     Objective:     Vitals:    Temp: 99.8 °F (37.7 °C)  Pulse: 89  Rhythm: normal sinus rhythm  BP: 121/66  MAP (mmHg): 87  Resp: (!) 21  SpO2: 99 %  O2 Device (Oxygen Therapy): room air    Temp  Min: 97.9 °F (36.6 °C)  Max: 100.1 °F (37.8  °C)  Pulse  Min: 71  Max: 103  BP  Min: 90/54  Max: 121/66  MAP (mmHg)  Min: 65  Max: 87  Resp  Min: 18  Max: 25  SpO2  Min: 95 %  Max: 100 %    10/26 0701 - 10/27 0700  In: 2770 [P.O.:720; I.V.:2000]  Out: 3725 [Urine:3725]   Unmeasured Output  Urine Occurrence: 1  Stool Occurrence: 0  Pad Count: 1       Physical Exam  Constitutional:       General: He is not in acute distress.  HENT:      Head: Normocephalic.      Comments: Closed surgical wound over L side of scalp   Eyes:      Extraocular Movements: Extraocular movements intact.      Pupils: Pupils are equal, round, and reactive to light.   Cardiovascular:      Rate and Rhythm: Normal rate and regular rhythm.      Pulses: Normal pulses.   Pulmonary:      Effort: Pulmonary effort is normal. No respiratory distress.   Abdominal:      General: Abdomen is flat. There is no distension.      Tenderness: There is no abdominal tenderness.   Neurological:      General: No focal deficit present.      Mental Status: He is alert and oriented to person, place, and time.   Psychiatric:         Mood and Affect: Mood normal.         Speech: Speech normal.           Today I personally reviewed pertinent medications, lines/drains/airways, imaging, cardiology results, laboratory results, microbiology results,     Assessment/Plan:     Neuro  * Epilepsy  Patient s/p left mesial temporal laser ablation. Post op CTH stable. Patient with possible seizure episode post-op likely due to not taking any of his medications the morning of his surgery. Given one time dose of all AED's and started on BID regimen.  -started back on home meds     -Clobazam 40 mg BID     -Vimpat 200 mg BID     -lamotrigine 200 mg BID     -topiramate 50 mg BID  -Blood pressure goal <140  -Rocephin 2g q12h x 2 doses- complete  -Decadron 4mg q6h x 4 doses- complete  -Pantoprazole x 1 dose- complete  -PT     Psychiatric  Adjustment disorder with depressed mood  Citalopram 20 mg qd    Orthopedic  Winged scapula of  right side  Injury incurred after falling out of bed during seizure.    Other  S/P placement of VNS (vagus nerve stimulation) device  -device turned back on post procedure          The patient is being Prophylaxed for:  Venous Thromboembolism with: Mechanical  Stress Ulcer with: PPI  Ventilator Pneumonia with: not applicable    Activity Orders          Diet Adult Regular (IDDSI Level 7): Regular starting at 10/26 1214        Prior    Enrique Muñoz MD  Neurocritical Care  [facility]

## 2020-10-27 NOTE — PT/OT/SLP EVAL
Physical Therapy Co-Evaluation and Discharge Note    Patient Name:  Darin Cunah   MRN:  [id number]    Recommendations:     Discharge Recommendations:  home   Discharge Equipment Recommendations: none   Barriers to discharge: None    Assessment:     Darin Cunha is a 25 y.o. male admitted with a medical diagnosis of Epilepsy. .  Pt reports he is at his functional baseline and denies needing any further PT services.  At this time, patient is functioning at their prior level of function and does not require further acute PT services.     Recent Surgery: Procedure(s) (LRB):  Mesial Temporal Laser Ablation Visualase/ [place] Hole LEFT (Left) 1 Day Post-Op    Plan:     During this hospitalization, patient does not require further acute PT services.  Please re-consult if situation changes.      Subjective     Chief Complaint: none verbalized  Patient/Family Comments/goals: pt staying with mother upon DC from hospital  Pain/Comfort:  · Pain Rating 1: 0/10  · Pain Rating Post-Intervention 1: 0/10    Patients cultural, spiritual, Faith conflicts given the current situation: no    Living Environment:  Pt lives alone in a [facility] with 0STE and has a tub/shower. Pt is DCing to his mother's house that has a similar home set-up.  Prior to admission, patients level of function was Independent for functional mobility and ADLs. Pt does not drive and does not work. Pt is R handed.  Equipment used at home: none.  DME owned (not currently used): none.  Upon discharge, patient will have assistance from his mother.    Objective:     Communicated with RN prior to session.  Patient found HOB elevated with bed alarm, blood pressure cuff, telemetry, pulse ox (continuous), peripheral IV upon PT entry to room.    General Precautions: Standard, fall, seizure   Orthopedic Precautions:N/A   Braces: N/A     Exams:  · Cognitive Exam:  Patient is oriented to Person and Situation  · Fine Motor Coordination:    · -       Intact, BLE, heel/shin  · Gross  Motor Coordination:  WFL  · Sensation:  BLE, LT, WFL  · RLE ROM: WFL  · RLE Strength: WFL  · LLE ROM: WFL  · LLE Strength: WFL    Functional Mobility:  · Bed Mobility:     · Scooting: supervision  · Supine to Sit: supervision  · Transfers:     · Sit to Stand:  supervision with no AD  · Gait: ~30 ft, Supervision, no AD, no LOBs or unsteadiness noted  · Balance: Supervision for all sitting and standing balance     AM-PAC 6 CLICK MOBILITY  Total Score:24       Therapeutic Activities and Exercises:  Patient educated on role of therapy, goals of session, and benefits of mobilizing.   Discussed PT plan during hospitalization  Patient educated on calling for assistance.   Communication board up to date.  All questions answered within PT scope of practice.    Pt safe for ambulating in room with nursing staff assistance/supervision. Pt required assistance for line management.      AM-PAC 6 CLICK MOBILITY  Total Score:24     Patient left up in chair with all lines intact, call button in reach and RN notified.    GOALS:   Multidisciplinary Problems     Physical Therapy Goals     Not on file          Multidisciplinary Problems (Resolved)        Problem: Physical Therapy Goal    Goal Priority Disciplines Outcome Goal Variances Interventions   Physical Therapy Goal   (Resolved)     PT, PT/OT Met     Description:             Problem: Physical Therapy Goal    Goal Priority Disciplines Outcome Goal Variances Interventions   Physical Therapy Goal   (Resolved)     PT, PT/OT Met                     History:     Past Medical History:   Diagnosis Date    Anemia 10/20/2020    Depressed 9/12/2018    Epilepsy 2015    Hypertension     Mitral valve prolapse 2015    Seizures        Past Surgical History:   Procedure Laterality Date    ADENOIDECTOMY  2015    ADENOIDECTOMY      CREATION OF YUNG HOLE WITH EVACUATION OF HEMATOMA Bilateral 8/31/2020    Procedure: CREATION, CRANIAL YUNG HOLE, PLACEMENT OF THE FIDUCIAL SCREWS BILATERAL  2.PLACEMENT OF SEEG ELECTRODES BILATERAL WITH OWEN ROBOT;  Surgeon: Melani Ortiz MD;  [facility];  Service: Neurosurgery;  Laterality: Bilateral;  TORONTO III, ASA III, BLOOD TYPE & SCREEN, HEADREST SAPP, REGULAR , SUPINE. SPECIAL EQUIPMENT [employer] DAIN WILCOX, OWEN REP, PMT    finger abscess removal      REMOVAL OF STEREO EEG LEADS (DEPTH ELECTRODES) N/A 9/14/2020    Procedure: REMOVAL OF STEREO EEG LEADS (DEPTH ELECTRODES); removal of sEEG bolts and depth electrodes;  Surgeon: Melani Ortiz MD;  Location: [facility] OR [address] [place] [address];  Service: Neurosurgery;  Laterality: N/A;    TONSILLECTOMY      VAGUS NERVE STIMULATOR INSERTION Left 4/4/2019    Procedure: INSERTION, VAGUS NERVE STIMULATOR;  Surgeon: Reuben Gupta MD;  Location: [facility] OR [address] [place] [address];  Service: Neurosurgery;  Laterality: Left;  toronto I, asa 1, regular bed, supine     wisdom teeth extracted- about 2010          Time Tracking:   Co-treat reason: Co-eval performed due to medical complexity/ICU setting    PT Received On: 10/27/20  PT Start Time: 1344     PT Stop Time: 1352  PT Total Time (min): 8 min     Billable Minutes: Evaluation 8      Treva Guzman, PT  10/27/2020

## 2020-10-27 NOTE — PLAN OF CARE
PT Eval complete. Eval/DC, home with no needs. Pt at functional baseline and requires no further PT needs.    Treva Guzman, PT, DPT  10/27/2020      Problem: Physical Therapy Goal  Goal: Physical Therapy Goal  Outcome: Met

## 2020-10-27 NOTE — SUBJECTIVE & OBJECTIVE
Interval History: No acute events overnight. No clinical seizures reported. This AM, patient sleeping in bed and easily aroused by voice. Patient has no complaints. Discussed plan of care.    Current Facility-Administered Medications   Medication Dose Route Frequency Provider Last Rate Last Dose    0.9%  NaCl infusion   Intravenous Continuous Katey Lee MD   Stopped at 10/26/20 0844    acetaminophen suppository 650 mg  650 mg Rectal Q6H PRN Katey Lee MD        acetaminophen tablet 650 mg  650 mg Oral Q6H PRN Katey Lee MD        acetaminophen tablet 650 mg  650 mg Oral Q4H PRN Katey Lee MD        citalopram tablet 20 mg  20 mg Oral Daily Katey Lee MD   20 mg at 10/27/20 0909    cloBAZam Tab 40 mg  40 mg Oral BID Katey Lee MD   40 mg at 10/27/20 0909    HYDROcodone-acetaminophen 5-325 mg per tablet 1 tablet  1 tablet Oral Q4H PRN Katey Lee MD        lacosamide tablet 200 mg  200 mg Oral Q12H Katey Lee MD   200 mg at 10/27/20 0909    lamoTRIgine tablet 200 mg  200 mg Oral BID Katey Lee MD   200 mg at 10/27/20 0909    magnesium oxide tablet 800 mg  800 mg Oral PRN Madalyn Medellin NP   800 mg at 10/27/20 0910    magnesium oxide tablet 800 mg  800 mg Oral PRN Madalyn Medellin NP        mupirocin 2 % ointment   Nasal BID Katey Lee MD        niCARdipine 40 mg/200 mL (0.2 mg/mL) infusion  2.5 mg/hr Intravenous Continuous VALENTE Muñoz MD   Stopped at 10/26/20 1500    potassium chloride packet 40 mEq  40 mEq Oral PRN Madalyn Medellin NP        potassium chloride packet 40 mEq  40 mEq Oral PRN Madalyn Medellin NP        potassium chloride packet 60 mEq  60 mEq Oral PRN Madalyn Medellin NP        potassium, sodium phosphates 280-160-250 mg packet 2 packet  2 packet Oral PRN Madalyn Medellin NP        potassium, sodium phosphates 280-160-250 mg packet 2 packet  2 packet Oral PRN Madalyn Medellin NP         potassium, sodium phosphates 280-160-250 mg packet 2 packet  2 packet Oral PRN Madalyn Medellin NP        topiramate tablet 50 mg  50 mg Oral BID Katey Lee MD   50 mg at 10/27/20 0909     Continuous Infusions:   sodium chloride 0.9% Stopped (10/26/20 0844)    nicardipine Stopped (10/26/20 1500)       Review of Systems   Constitutional: Negative for diaphoresis, fatigue and fever.   HENT: Negative for sore throat and trouble swallowing.    Eyes: Negative for photophobia and visual disturbance.   Respiratory: Negative for cough and shortness of breath.    Gastrointestinal: Negative for abdominal pain, diarrhea, nausea and vomiting.   Genitourinary: Negative for difficulty urinating, frequency and hematuria.   Musculoskeletal: Negative for gait problem, myalgias and neck pain.   Neurological: Negative for dizziness, seizures, speech difficulty, weakness and headaches.   Psychiatric/Behavioral: Negative for agitation, behavioral problems, confusion and dysphoric mood.     Objective:     Vital Signs (Most Recent):  Temp: 99.8 °F (37.7 °C) (10/27/20 1101)  Pulse: 103 (10/27/20 1201)  Resp: (!) 21 (10/27/20 1201)  BP: (!) 97/54 (10/27/20 1201)  SpO2: 100 % (10/27/20 1201) Vital Signs (24h Range):  Temp:  [91.9 °F (33.3 °C)-100.1 °F (37.8 °C)] 99.8 °F (37.7 °C)  Pulse:  [] 103  Resp:  [12-25] 21  SpO2:  [95 %-100 %] 100 %  BP: ()/(45-72) 97/54  Arterial Line BP: ()/(53-76) 99/57     Weight: 97.8 kg (215 lb 9.8 oz)  Body mass index is 28.45 kg/m².    Physical Exam  Vitals signs reviewed.   Constitutional:       General: He is not in acute distress.     Appearance: He is not diaphoretic.      Comments: Sleeping, easily aroused by voice   HENT:      Head:      Comments: Postop bandage intact   Eyes:      General: No scleral icterus.        Right eye: No discharge.         Left eye: No discharge.      Extraocular Movements: Extraocular movements intact and EOM normal.      Conjunctiva/sclera:  Conjunctivae normal.      Pupils: Pupils are equal, round, and reactive to light.   Cardiovascular:      Rate and Rhythm: Normal rate.   Pulmonary:      Effort: Pulmonary effort is normal. No respiratory distress.   Abdominal:      General: There is no distension.      Palpations: Abdomen is soft.      Tenderness: There is no abdominal tenderness.   Musculoskeletal: Normal range of motion.         General: No swelling, tenderness or deformity.   Skin:     General: Skin is warm and dry.   Neurological:      General: No focal deficit present.      Mental Status: He is alert and oriented to person, place, and time. Mental status is at baseline.   Psychiatric:         Mood and Affect: Mood normal.         Speech: Speech normal.         Behavior: Behavior normal.         NEUROLOGICAL EXAMINATION:     MENTAL STATUS   Oriented to person, place, and time.   Attention: normal. Concentration: normal.   Speech: speech is normal   Level of consciousness: alert    CRANIAL NERVES     CN II   Visual fields full to confrontation.     CN III, IV, VI   Pupils are equal, round, and reactive to light.  Extraocular motions are normal.     CN V   Right corneal reflex: normal  Left corneal reflex: normal    CN VII   Facial expression full, symmetric.     CN VIII   Hearing: intact    CN XI   CN XI normal.     CN XII   CN XII normal.       Significant Labs: All pertinent lab results from the past 24 hours have been reviewed.    Significant Studies: I have reviewed all pertinent imaging results/findings within the past 24 hours.

## 2020-10-27 NOTE — PROGRESS NOTES
Pt and family were approached regarding participation in IRB protocol #2019.095 study.  Pt was agreeable after discussion and concerns were addressed and signed the ICF willingly and independently.    The Informed Consent Form (ICF) was reviewed with pt. The discussion included:    - voluntary participation in study with ability to opt out at any time    - participation in this study would not preclude pt from participating in any other research if offered    - specimens may be used by [facility] researchers, community researchers or research companies    - specimens collected include only those discussed with the patient at the time of consent and are indicated on the ICF     - specimens may be used for DNA, RNA or protein studies investigating biomarkers for diagnostic, prognostic, or treatment purposes    - a one time collection of 20 mL of blood specimen will be completed    - blood specimens released to researchers will be stripped of identifiers    - Adherence to HIPAA policy and [facility] confidentiality/privacy policy; personal medical information would not be disclosed to parties outside this research study     - there will be no other physical risks outside of those involved in standard of care procedure       A copy of signed ICF was given to pt and family and placed in the pts medical binder with instructions to call with any questions that may arise or if they wish to opt out of the study.

## 2020-10-27 NOTE — H&P
[facility]  Neurocritical Care  History & Physical    Admit Date: 10/26/2020  Service Date: 10/26/2020  Length of Stay: 0    Subjective:     Chief Complaint: Epilepsy    History of Present Illness: Patient is a 25 yom w/ a pmh of intractable epilepsy since 2013 who presents to [facility] following a left mesial temporal laser ablation with neurosurgery on 10/26. As noted, he has suffered from epilepsy since 2013, with his episodes consisting of staring off and lip smacking. He has failed >4 AEDs and has a VNS in place. On admission to [facility], he desatted to the 70's post-procedure, with dilated pupils to 6 mm and sluggish, a leftward gaze, and R head tilt while not moving any of his 4 extremities. He did not take his medications this AM prior to his procedure due to NPO status, so a one time dose of each of his AED's was given prior to resuming his BID dosing schedule.    Past Medical History:   Diagnosis Date    Anemia 10/20/2020    Depressed 9/12/2018    Epilepsy 2015    Hypertension     Mitral valve prolapse 2015    Seizures      Past Surgical History:   Procedure Laterality Date    ADENOIDECTOMY  2015    ADENOIDECTOMY      CREATION OF YUNG HOLE WITH EVACUATION OF HEMATOMA Bilateral 8/31/2020    Procedure: CREATION, CRANIAL YUNG HOLE, PLACEMENT OF THE FIDUCIAL SCREWS BILATERAL 2.PLACEMENT OF SEEG ELECTRODES BILATERAL WITH OWEN PECK;  Surgeon: Melani Ortiz MD;  Location: [facility] OR [address] [place] [address];  Service: Neurosurgery;  Laterality: Bilateral;  TORONTO III, ASA III, BLOOD TYPE & SCREEN, HEADREST Mason, REGULAR , SUPINE. SPECIAL EQUIPMENT TxtFeedbackS OWEN MAGAÑA, PMT    finger abscess removal      REMOVAL OF STEREO EEG LEADS (DEPTH ELECTRODES) N/A 9/14/2020    Procedure: REMOVAL OF STEREO EEG LEADS (DEPTH ELECTRODES); removal of sEEG bolts and depth electrodes;  Surgeon: Melani Ortiz MD;  Location: [facility] OR [address] [place] [address];  Service: Neurosurgery;  Laterality: N/A;    TONSILLECTOMY       VAGUS NERVE STIMULATOR INSERTION Left 4/4/2019    Procedure: INSERTION, VAGUS NERVE STIMULATOR;  Surgeon: Reuben Gupta MD;  Location: [facility] OR [address] [place] [address];  Service: Neurosurgery;  Laterality: Left;  toronto I, asa 1, regular bed, supine     wisdom teeth extracted- about 2010         No current facility-administered medications on file prior to encounter.      Current Outpatient Medications on File Prior to Encounter   Medication Sig Dispense Refill    citalopram (CELEXA) 20 MG tablet Take 20 mg by mouth once daily.      cloBAZam (ONFI) 20 mg Tab Take 2 tablets by mouth twice daily 120 tablet 4    lamoTRIgine (LAMICTAL) 200 MG tablet Take 1 tablet (200 mg total) by mouth 2 (two) times daily. (Patient taking differently: Take 200 mg by mouth 2 (two) times daily. Take am of surgery) 60 tablet 2    topiramate (TOPAMAX) 50 MG tablet Take 1 tablet (50 mg total) by mouth 2 (two) times daily. (Patient taking differently: Take 50 mg by mouth 2 (two) times daily. Take am of surgery) 60 tablet 11    promethazine (PHENERGAN) 12.5 MG Tab Take 1 tablet (12.5 mg total) by mouth every 6 (six) hours as needed (nausea/vomiting). (Patient not taking: Reported on 10/20/2020) 30 tablet 0      Allergies: Zofran [ondansetron hcl (pf)]    Family History   Problem Relation Age of Onset    Hypertension Mother     Hypertension Maternal Grandmother     Heart disease Maternal Grandmother     Hypertension Maternal Grandfather     Heart disease Maternal Grandfather      Social History     Tobacco Use    Smoking status: Never Smoker    Smokeless tobacco: Current User   Substance Use Topics    Alcohol use: Yes     Alcohol/week: 10.0 standard drinks     Types: 10 Cans of beer per week     Comment: 12 beers over a month - spread over a month     Drug use: Never     Review of Systems   Constitutional: Positive for fatigue. Negative for fever.   HENT: Negative for sore throat and tinnitus.    Eyes: Negative for photophobia and visual  disturbance.   Respiratory: Negative for cough and shortness of breath.    Cardiovascular: Negative for chest pain.   Gastrointestinal: Negative for abdominal distention and abdominal pain.   Neurological: Positive for seizures and headaches. Negative for facial asymmetry, speech difficulty and weakness.   Psychiatric/Behavioral: Positive for decreased concentration. Negative for agitation and confusion.     Objective:     Vitals:    Temp: (!) 91.9 °F (33.3 °C)  Pulse: 100  BP: (!) 119/57  MAP (mmHg): 93  Resp: 16  SpO2: 100 %    Temp  Min: 91.9 °F (33.3 °C)  Max: 97.6 °F (36.4 °C)  Pulse  Min: 53  Max: 100  BP  Min: 116/80  Max: 119/57  MAP (mmHg)  Min: 93  Max: 93  Resp  Min: 16  Max: 16  SpO2  Min: 98 %  Max: 100 %    No intake/output data recorded.           Physical Exam  Constitutional:       General: He is not in acute distress.  HENT:      Head: Normocephalic.      Comments: Closed surgical wound over L side of scalp   Eyes:      Extraocular Movements: Extraocular movements intact.      Pupils: Pupils are equal, round, and reactive to light.   Cardiovascular:      Rate and Rhythm: Normal rate and regular rhythm.      Pulses: Normal pulses.   Pulmonary:      Effort: Pulmonary effort is normal. No respiratory distress.   Abdominal:      General: Abdomen is flat. There is no distension.      Tenderness: There is no abdominal tenderness.   Neurological:      General: No focal deficit present.      Mental Status: He is alert and oriented to person, place, and time.   Psychiatric:         Behavior: Behavior is slowed.           Today I personally reviewed pertinent medications, lines/drains/airways, imaging, cardiology results, laboratory results, microbiology results,     Assessment/Plan:     Neuro  * Epilepsy  Patient s/p left mesial temporal laser ablation. Post op CTH stable. Patient with possible seizure episode post-op likely due to not taking any of his medications the morning of his surgery. Given one time  dose of all AED's and started on BID regimen.  -started back on home meds     -Clobazam 40 mg BID     -Vimpat 200 mg BID     -lamotrigine 200 mg BID     -topiramate 50 mg BID  -Blood pressure goal <140  -Rocephin 2g q12h x 2 doses  -Decadron 4mg q6h x 4 doses  -Pantoprazole x 1 dose  -PT     Psychiatric  Adjustment disorder with depressed mood  Citalopram 20 mg qd    Orthopedic  Winged scapula of right side  Injury incurred after falling out of bed during seizure.    Other  S/P placement of VNS (vagus nerve stimulation) device  -device turned back on post procedure        The patient is being Prophylaxed for:  Venous Thromboembolism with: Mechanical  Stress Ulcer with: PPI  Ventilator Pneumonia with: not applicable    Activity Orders          Diet Adult Regular (IDDSI Level 7): Regular starting at 10/26 1214        Prior    Enrique Muñoz MD  Neurocritical Care  [facility]

## 2020-10-27 NOTE — ASSESSMENT & PLAN NOTE
25M with a PMHx of depression and epilepsy since 2013 s/p VNS currently maintained on Onfi 40 mg BID, Lamictal 200 mg BID, Vimpat 200 mg BID, and Topamax 50 mg BID presenting for left mesial temporal laser ablation with Dr. Melani Ortiz and admitted to [facility] for postop monitoring. 1 clinical seizure post op in setting of missed AM AEDs.     Recommendations:  - Continue home AED regimen: Vimpat 200 mg BID, Onfi 40 mg BID, Lamictal 200 mg BID, and Topamax 50 mg BID  - Postop management per neurosurgery   - Avoid agents that lower seizure threshold  - Management of infectious/metabolic abnormalities per NCC  - Seizure precautions      Discussed plan of care with patient and NCC team. Will follow, please call with questions.

## 2020-10-27 NOTE — PROGRESS NOTES
[facility]  Neurology-Epilepsy  Progress Note    Patient Name: Darin Cunha  MRN: [id number]  Admission Date: 10/26/2020  Hospital Length of Stay: 1 days  Code Status: Prior   Attending Provider: Titi Menard MD  Primary Care Physician: PRIYANKA Vigil NP   Principal Problem:Epilepsy    Subjective:     Hospital Course:   10/26: s/p left mesial temporal laser ablation with Dr. Melani Ortiz. 1 clinical seizure post op in setting of missed AM AEDs. Continue home AED regimen.  10/27: No clinical seizures. Continue home AED regimen.    Interval History: No acute events overnight. No clinical seizures reported. This AM, patient sleeping in bed and easily aroused by voice. Patient has no complaints. Discussed plan of care.    Current Facility-Administered Medications   Medication Dose Route Frequency Provider Last Rate Last Dose    0.9%  NaCl infusion   Intravenous Continuous Katey Lee MD   Stopped at 10/26/20 0844    acetaminophen suppository 650 mg  650 mg Rectal Q6H PRN Katey Lee MD        acetaminophen tablet 650 mg  650 mg Oral Q6H PRN Katey Lee MD        acetaminophen tablet 650 mg  650 mg Oral Q4H PRN Katey Lee MD        citalopram tablet 20 mg  20 mg Oral Daily Katey Lee MD   20 mg at 10/27/20 0909    cloBAZam Tab 40 mg  40 mg Oral BID Katey Lee MD   40 mg at 10/27/20 0909    HYDROcodone-acetaminophen 5-325 mg per tablet 1 tablet  1 tablet Oral Q4H PRN Katey Lee MD        lacosamide tablet 200 mg  200 mg Oral Q12H Katey Lee MD   200 mg at 10/27/20 0909    lamoTRIgine tablet 200 mg  200 mg Oral BID Katey Lee MD   200 mg at 10/27/20 0909    magnesium oxide tablet 800 mg  800 mg Oral PRN Madalyn Medellin NP   800 mg at 10/27/20 0910    magnesium oxide tablet 800 mg  800 mg Oral PRN Madalyn Medellin NP        mupirocin 2 % ointment   Nasal BID Katey Lee MD        niCARdipine 40 mg/200 mL (0.2 mg/mL) infusion  2.5  mg/hr Intravenous Continuous G. Pineda Muñoz MD   Stopped at 10/26/20 1500    potassium chloride packet 40 mEq  40 mEq Oral PRN Madalyn Medellin NP        potassium chloride packet 40 mEq  40 mEq Oral PRN Madalyn Medellin NP        potassium chloride packet 60 mEq  60 mEq Oral PRN Madalyn Medellin NP        potassium, sodium phosphates 280-160-250 mg packet 2 packet  2 packet Oral PRN Madalyn Medellin NP        potassium, sodium phosphates 280-160-250 mg packet 2 packet  2 packet Oral PRN Madalyn Medellin NP        potassium, sodium phosphates 280-160-250 mg packet 2 packet  2 packet Oral PRN Madalyn Medellin NP        topiramate tablet 50 mg  50 mg Oral BID Katey Lee MD   50 mg at 10/27/20 0909     Continuous Infusions:   sodium chloride 0.9% Stopped (10/26/20 0844)    nicardipine Stopped (10/26/20 1500)       Review of Systems   Constitutional: Negative for diaphoresis, fatigue and fever.   HENT: Negative for sore throat and trouble swallowing.    Eyes: Negative for photophobia and visual disturbance.   Respiratory: Negative for cough and shortness of breath.    Gastrointestinal: Negative for abdominal pain, diarrhea, nausea and vomiting.   Genitourinary: Negative for difficulty urinating, frequency and hematuria.   Musculoskeletal: Negative for gait problem, myalgias and neck pain.   Neurological: Negative for dizziness, seizures, speech difficulty, weakness and headaches.   Psychiatric/Behavioral: Negative for agitation, behavioral problems, confusion and dysphoric mood.     Objective:     Vital Signs (Most Recent):  Temp: 99.8 °F (37.7 °C) (10/27/20 1101)  Pulse: 103 (10/27/20 1201)  Resp: (!) 21 (10/27/20 1201)  BP: (!) 97/54 (10/27/20 1201)  SpO2: 100 % (10/27/20 1201) Vital Signs (24h Range):  Temp:  [91.9 °F (33.3 °C)-100.1 °F (37.8 °C)] 99.8 °F (37.7 °C)  Pulse:  [] 103  Resp:  [12-25] 21  SpO2:  [95 %-100 %] 100 %  BP: ()/(45-72)  97/54  Arterial Line BP: ()/(53-76) 99/57     Weight: 97.8 kg (215 lb 9.8 oz)  Body mass index is 28.45 kg/m².    Physical Exam  Vitals signs reviewed.   Constitutional:       General: He is not in acute distress.     Appearance: He is not diaphoretic.      Comments: Sleeping, easily aroused by voice   HENT:      Head:      Comments: Postop bandage intact   Eyes:      General: No scleral icterus.        Right eye: No discharge.         Left eye: No discharge.      Extraocular Movements: Extraocular movements intact and EOM normal.      Conjunctiva/sclera: Conjunctivae normal.      Pupils: Pupils are equal, round, and reactive to light.   Cardiovascular:      Rate and Rhythm: Normal rate.   Pulmonary:      Effort: Pulmonary effort is normal. No respiratory distress.   Abdominal:      General: There is no distension.      Palpations: Abdomen is soft.      Tenderness: There is no abdominal tenderness.   Musculoskeletal: Normal range of motion.         General: No swelling, tenderness or deformity.   Skin:     General: Skin is warm and dry.   Neurological:      General: No focal deficit present.      Mental Status: He is alert and oriented to person, place, and time. Mental status is at baseline.   Psychiatric:         Mood and Affect: Mood normal.         Speech: Speech normal.         Behavior: Behavior normal.         NEUROLOGICAL EXAMINATION:     MENTAL STATUS   Oriented to person, place, and time.   Attention: normal. Concentration: normal.   Speech: speech is normal   Level of consciousness: alert    CRANIAL NERVES     CN II   Visual fields full to confrontation.     CN III, IV, VI   Pupils are equal, round, and reactive to light.  Extraocular motions are normal.     CN V   Right corneal reflex: normal  Left corneal reflex: normal    CN VII   Facial expression full, symmetric.     CN VIII   Hearing: intact    CN XI   CN XI normal.     CN XII   CN XII normal.       Significant Labs: All pertinent lab results  from the past 24 hours have been reviewed.    Significant Studies: I have reviewed all pertinent imaging results/findings within the past 24 hours.    Assessment and Plan:     * Epilepsy  25M with a PMHx of depression and epilepsy since 2013 s/p VNS currently maintained on Onfi 40 mg BID, Lamictal 200 mg BID, Vimpat 200 mg BID, and Topamax 50 mg BID presenting for left mesial temporal laser ablation with Dr. Melani Ortiz and admitted to [facility] for postop monitoring. 1 clinical seizure post op in setting of missed AM AEDs.     Recommendations:  - Continue home AED regimen: Vimpat 200 mg BID, Onfi 40 mg BID, Lamictal 200 mg BID, and Topamax 50 mg BID  - Postop management per neurosurgery   - Avoid agents that lower seizure threshold  - Management of infectious/metabolic abnormalities per NCC  - Seizure precautions      Discussed plan of care with patient and NCC team. Will follow, please call with questions.    S/P placement of VNS (vagus nerve stimulation) device  - See below for prior VNS settings on 9/14/2020  Model # [id number] M106  Implant Date 4/1/2019  Is Device palpable in chest wall  No  Side of implant    L           Initial    Output current  mA 0.75    Signal Freq          Hz 30    Pulse width        uSec 1000    Signal on time     Sec 30    Signal off time      Min 5.0      Magnet settings  Output current          mA 0.875    Pulse width        uSec 1000    Signal on time         Sec 30      Autostimulation (AS)  Output Current          mA 0.75    Pulse width        uSec 1000    Signal on time         Sec 30      Adjustment disorder with depressed mood  - Chronic and stable  - Continue home Celexa        VTE Risk Mitigation (From admission, onward)         Ordered     Place CHASE hose  Until discontinued      10/26/20 0530     Place sequential compression device  Until discontinued      10/26/20 0530                Minerva Oconnor PA-C  Neurology-Epilepsy  [facility]  Staff: Dr. Luque

## 2020-10-27 NOTE — HOSPITAL COURSE
10/27: patient neuro exam stable overnight; no respiratory issues; blood pressure controlled; anticipate dc today

## 2020-10-27 NOTE — PLAN OF CARE
[facility] Care Plan    POC reviewed with Darin Cunha at 0530. Pt verbalized understanding. Questions and concerns addressed. No acute events overnight. Pt progressing toward goals. Will continue to monitor. See below and flowsheets for full assessment and VS info.       Neuro:  [place] Coma Scale  Best Eye Response: 4-->(E4) spontaneous  Best Motor Response: 6-->(M6) obeys commands  Best Verbal Response: 5-->(V5) oriented  [place] Coma Scale Score: 15  Assessment Qualifiers: patient not sedated/intubated, no eye obstruction present  Pupil PERRLA: yes     24hr Temp:  [91.9 °F (33.3 °C)-98.3 °F (36.8 °C)]     CV:   Rhythm: normal sinus rhythm  BP goals:   SBP < 140  MAP > 65    Resp:   O2 Device (Oxygen Therapy): room air       Plan:  discharge    GI/:  STEPHENIE Total Score: 20  Diet/Nutrition Received: regular  Last Bowel Movement: 10/25/20       Intake/Output Summary (Last 24 hours) at 10/27/2020 0539  Last data filed at 10/27/2020 0305  Gross per 24 hour   Intake 2770 ml   Output 3725 ml   Net -955 ml          Labs/Accuchecks:  Recent Labs   Lab 10/27/20  0145   WBC 11.82   RBC 4.50*   HGB 14.2   HCT 40.4         Recent Labs   Lab 10/27/20  0145      K 3.9   CO2 22*      BUN 6   CREATININE 0.7      Recent Labs   Lab 10/26/20  0615   INR 0.9   APTT 29.2    No results for input(s): CPK, CPKMB, TROPONINI, MB in the last 168 hours.    Electrolytes: No replacement orders  Accuchecks: none    Gtts:   sodium chloride 0.9% Stopped (10/26/20 0844)    nicardipine Stopped (10/26/20 1500)       LDA/Wounds:  Lines/Drains/Airways       Arterial Line              Arterial Line 10/26/20 0803 less than 1 day              Peripheral Intravenous Line                   Peripheral IV - Single Lumen 10/26/20 0615 18 G Right Forearm less than 1 day         Peripheral IV - Single Lumen 10/26/20 0708 18 G Left Forearm less than 1 day                  Wounds: No  Wound care consulted: No

## 2020-10-27 NOTE — ASSESSMENT & PLAN NOTE
Patient s/p left mesial temporal laser ablation. Post op CTH stable. Patient with possible seizure episode post-op likely due to not taking any of his medications the morning of his surgery. Given one time dose of all AED's and started on BID regimen.  -started back on home meds     -Clobazam 40 mg BID     -Vimpat 200 mg BID     -lamotrigine 200 mg BID     -topiramate 50 mg BID  -Blood pressure goal <140  -Rocephin 2g q12h x 2 doses  -Decadron 4mg q6h x 4 doses  -Pantoprazole x 1 dose  -PT

## 2020-10-27 NOTE — ANESTHESIA POSTPROCEDURE EVALUATION
Anesthesia Post Evaluation    Patient: Darin Cunha    Procedure(s) Performed: Procedure(s) (LRB):  Mesial Temporal Laser Ablation Visualase/ Latonia Hole LEFT (Left)    Final Anesthesia Type: general    Patient location during evaluation: ICU  Patient participation: Yes- Able to Participate  Level of consciousness: awake and alert and oriented  Post-procedure vital signs: reviewed and stable  Pain management: adequate  Airway patency: patent    PONV status at discharge: No PONV  Anesthetic complications: no      Cardiovascular status: hemodynamically stable  Respiratory status: nasal cannula, unassisted and spontaneous ventilation  Hydration status: euvolemic  Follow-up not needed.          Vitals Value Taken Time   /66 10/27/20 1402   Temp 37.7 °C (99.8 °F) 10/27/20 1101   Pulse 85 10/27/20 1435   Resp 29 10/27/20 1435   SpO2 99 % 10/27/20 1401   Vitals shown include unvalidated device data.      No case tracking events are documented in the log.      Pain/Valeria Score: No data recorded

## 2020-10-27 NOTE — SUBJECTIVE & OBJECTIVE
Interval History: patient neuro exam stable overnight; no respiratory issues; blood pressure controlled; anticipate dc today    Review of Systems   Constitutional: Negative for fatigue and fever.   HENT: Negative for sore throat and tinnitus.    Eyes: Negative for photophobia and visual disturbance.   Respiratory: Negative for cough and shortness of breath.    Cardiovascular: Negative for chest pain.   Gastrointestinal: Negative for abdominal distention and abdominal pain.   Neurological: Positive for seizures. Negative for facial asymmetry, speech difficulty, weakness and headaches.   Psychiatric/Behavioral: Negative for agitation, confusion and decreased concentration.     Objective:     Vitals:    Temp: 99.8 °F (37.7 °C)  Pulse: 89  Rhythm: normal sinus rhythm  BP: 121/66  MAP (mmHg): 87  Resp: (!) 21  SpO2: 99 %  O2 Device (Oxygen Therapy): room air    Temp  Min: 97.9 °F (36.6 °C)  Max: 100.1 °F (37.8 °C)  Pulse  Min: 71  Max: 103  BP  Min: 90/54  Max: 121/66  MAP (mmHg)  Min: 65  Max: 87  Resp  Min: 18  Max: 25  SpO2  Min: 95 %  Max: 100 %    10/26 0701 - 10/27 0700  In: 2770 [P.O.:720; I.V.:2000]  Out: 3725 [Urine:3725]   Unmeasured Output  Urine Occurrence: 1  Stool Occurrence: 0  Pad Count: 1       Physical Exam  Constitutional:       General: He is not in acute distress.  HENT:      Head: Normocephalic.      Comments: Closed surgical wound over L side of scalp   Eyes:      Extraocular Movements: Extraocular movements intact.      Pupils: Pupils are equal, round, and reactive to light.   Cardiovascular:      Rate and Rhythm: Normal rate and regular rhythm.      Pulses: Normal pulses.   Pulmonary:      Effort: Pulmonary effort is normal. No respiratory distress.   Abdominal:      General: Abdomen is flat. There is no distension.      Tenderness: There is no abdominal tenderness.   Neurological:      General: No focal deficit present.      Mental Status: He is alert and oriented to person, place, and time.    Psychiatric:         Mood and Affect: Mood normal.         Speech: Speech normal.           Today I personally reviewed pertinent medications, lines/drains/airways, imaging, cardiology results, laboratory results, microbiology results,

## 2020-10-27 NOTE — PLAN OF CARE
10/27/20 1358   Post-Acute Status   Post-Acute Authorization Placement;Other   Post-Acute Placement Status Awaiting Internal Medical Clearance   Other Status No Post-Acute Service Needs     Per PT, therapy recs are home no needs.     Jaimie Knight LCSW  Neurocritical Care   [facility]  [id number]

## 2020-10-27 NOTE — PLAN OF CARE
Admit Date:  10/26/2020  4:39 AM      Admit Diagnosis:  Localization-related (focal) (partial) symptomatic epilepsy and epileptic syndromes with complex partial seizures, intractable, with status epilepticus [G40.211]  Epilepsy [G40.909]  Epilepsy [G40.909]  Epilepsy [G40.909]  Epilepsy [G40.909]    Transferred from:  Home    Past Medical History:   Diagnosis Date    Anemia 10/20/2020    Depressed 9/12/2018    Epilepsy 2015    Hypertension     Mitral valve prolapse 2015    Seizures          CM met with patient and mother, Alexandra Etienne,  in room for Discharge Planning Assessment.  Patient is able to answer some questions with yes or no, but falls asleep.   Per mother, the patient lives with parents (since his last hospital stay) in a single story house with one step(s) to enter.  The home has a tub/shower combo.    Per mother, the patient was independent with ADLS and used no dme for ambulation.  Patient will have assistance from his parents upon discharge.   Discharge Planning Booklet given to patient/family and discussed.  All questions addressed.  CM will follow for needs.         10/27/20 1047   Discharge Assessment   Assessment Type Discharge Planning Assessment   Confirmed/corrected address and phone number on facesheet? Yes   Assessment information obtained from? Caregiver;Patient   Expected Length of Stay (days) 3   Communicated expected length of stay with patient/caregiver yes   Prior to hospitilization cognitive status: Alert/Oriented   Prior to hospitalization functional status: Independent   Current cognitive status: Unable to Assess  (Patient falls asleep, can answer some questions (yes or no))   Current Functional Status: Needs Assistance   Lives With parent(s)   Able to Return to Prior Arrangements yes   Is patient able to care for self after discharge? Unable to determine at this time (comments)   Who are your caregiver(s) and their phone number(s)? Alexandra Jaimie (mother) [phone]   Patient's  perception of discharge disposition home or selfcare   Readmission Within the Last 30 Days no previous admission in last 30 days   Patient currently being followed by outpatient case management? No   Patient currently receives any other outside agency services? No   Equipment Currently Used at Home none   Do you have any problems affording any of your prescribed medications? No   Is the patient taking medications as prescribed? yes   Does the patient have transportation home? Yes   Transportation Anticipated family or friend will provide   Does the patient receive services at the Coumadin Clinic? No   Discharge Plan A Home with family   Discharge Plan B Home with family   DME Needed Upon Discharge  none   Patient/Family in Agreement with Plan yes                PCP:  PRIYANKA Vigil NP  [phone]        Pharmacy:    [facility] [address] [facility]  [address] [place] [address]  Phone: [phone] Fax: [phone]    [facility] [address] [facility] [facility] [facility]  [address] [place] [address]  Phone: [phone] Fax: [phone]        Emergency Contacts:  Extended Emergency Contact Information  Primary Emergency Contact: Alexandra Etienne  Address: [address] [place] [address]  Mobile Phone: [phone]  Relation: Mother      Insurance:    Payor: MEDICAID / Plan: [facility] / Product Type: Managed Medicaid /     Shivani DeL eon RN, CCRN-K, [facility]  Neuro-Critical Care   X [phone]    10/27/2020  10:51 AM

## 2020-10-27 NOTE — OP NOTE
[facility]  Neurosurgery  Operative Note    SUMMARY      Date of Procedure: 10/26/2020     Procedure: Procedure(s) (LRB):  Mesial Temporal Laser Ablation Visualase/ Latonia Hole LEFT (Left)     Surgeon(s) and Role:     * Melani Ortiz MD - Primary     * Katey Lee MD - Resident - Assisting       Pre-Operative Diagnosis: Localization-related (focal) (partial) symptomatic epilepsy and epileptic syndromes with complex partial seizures, intractable, with status epilepticus [G40.211]    Post-Operative Diagnosis: Post-Op Diagnosis Codes:     * Localization-related (focal) (partial) symptomatic epilepsy and epileptic syndromes with complex partial seizures, intractable, with status epilepticus [G40.211]    Anesthesia: General    Technical Procedures Used:  1. Placement of skull fiducials for stereotactic registration  2. Stereotactic placement of laser catheter in the left amygdala and hippocampus   3. Laser interstitial thermal therapy (SILVIA) amygdalohippocampectomy   4. Use of neuronavigation (OWEN robot)   5. Use of intraoperative CT scan (Ziehm)     Indications:   Darin Cunha is a 25 y.o. male with intractable epilepsy who presents to discuss surgical treatment options for epilepsy.  He is now status post phase two stereo electroencephalogram monitoring.  This revealed that all of his clinical seizures during the 2 weeks of documentation arose from the left mesial temporal structures.  There is strong interictal data from 1 of the more lateral cortex points along the hippocampal trajectory; however, this never resulted in ictal activity during the period of monitoring.  Moreover, there was no right-sided involvement during the period of monitoring.  These data were reviewed with the patient and his mother by Wally Hairston, and me.      We had a lengthy, detailed discussion with the patient and his mother about the risks, benefits, and alternatives to laser amygdalohippocampectomy. We discussed  that should he continue to have significant seizures after the procedure, we would might recommend repeat sEEG of the left temporal region to obtain better spatial resolution of the seizure-onset zone.      We discussed is an alternative doing a traditional open temporal lobectomy.  We discussed that seizure freedom rates are slightly higher in these published data than in the more recently published data regarding laser ablation of the medial temporal structures.  Regardless, given that the area of inter ictal focus is on the dominant hemisphere, I am concerned that trying to remove this area (which is more than 4 cm posterior to the temporal tip) without finer spatial resolution may result in speech deficit.  Mr. Cunha and his mother reiterated a preference for the least invasive surgical procedure possible.  We discussed that it would be possible to consider repeat ablation and or open surgery in the future should he fail initial laser ablation.     Risks include, but are not limited to, bleeding, pain, infection, scarring, need for further/repeat procedure, death, paralysis, stroke (including venous infarct)/damage to major blood vessels, leak of cerebrospinal fluid, stereotactic inaccuracy, and hardware-related complications (including breakage). There is a chance that we would fail to improve the seizures.  We discussed the possibility of a contralateral superior quadranopsia secondary to disruption of Baez's loop and also of possible memory changes, including verbal memory, together with damage to cranial nerves. Informed consent was obtained of the patient in the presence of his mother.      Description of the Procedure:   The patient was brought back to the operating room and intubated by anesthesia. He received 2 grams of IV Ancef. Instructions were given to keep her SBP <140 throughout the case, and the anesthesia team was reminded that his [id number] is upregulated due to his being on chronic AEDs. A time  out was performed.      Five fiducial sites were identified, taking care not to make them co-linear, in the bilateral frontal and parietal regions, plus an additional site near the top of the head, just off midline. His head was prepped and draped in sterile fashion, with hair-sparing clipping done. Each site was infiltrated with several cc's of 1:100,000 lidocaine plus epinephrine. A stab incision was carried down through the galea, and the pericranium was swept off the skull. Using the hand-held power screwdriver, a fiducial screw was applied to the outer table of the skull. It was dressed with bacitracin ointment and covered with a protective cap. This process was repeat exactly for each of the screws. 2 ccs of marcaine with epinephrine were injected into each site for long-lasting pain relief.      The patient was then taken to CT scan under anesthesia to receive a fiducial registration scan. The CT scan was uploaded to the OWEN neuro navigation system that was merged with a preoperative navigation-quality, contrasted MRI scan on which we had planned our trajectory. Care was taken to ensure that the trajectory satisfactorily covered the mesial aspect of the mesial temporal structures.      The patient was returned to the operating room and placed in a radiolucent [place] three-point head prieto. He was positioned supine but with his head turned to allow for access to the left parieto-occipital region. The Flores was securely attached to the OWEN robot. The navigation system was registered using the skull fiducials with an acceptable accuracy of 0.36mm. This was confirmed by navigating to known landmarks.      Once the registration was confirmed, we navigated the robotic arm to the entry point and marked it prior to clipping the surrounding hair. He was again prepped and draped in the usual sterile fashion. More local anesthetic with epinephrine was injected. The skin was incised. The entry point for the drill  was appreciated and decorticated slightly with a high-speed drill with the M8 attachment. We used a reducing tube to support a 3.2 mm drill to drill a boogie hole through the skull. Using a Steinmann pin, we cauterized the dura.  We then placed the PMT 30 mm bolt and used the OWEN to calculate our distance by subtracting the distance from the robot arm to the top of the bolt.      Our laser catheter length was 116.5 mm.  With the rigid stylet in place, we placed the outer sheath, then obtained intraoperative Ziehm CT scan and cross referenced it with OWEN to ensure accurate placement of the catheter. This was found to on the intended trajectory. After confirming, the rigid stylet was removed and replaced with the laser catheter. Great care was taken to maintain the laser in a straight line so as not to allow the fragile catheter to fracture. We then removed the skull fiducials, irrigating each incision and closing it with skin staples. The lateral portions of the primary incision were closed with 4.0 Monocryl. A gentleperson's stitch of 3.0 Nylon was placed in a figure-of-eight configuration around the laser catheter bolt in anticipation of closure at the end of the case.       We then carefully moved the patient onto stretcher and transported him (still under general anesthesia) to MRI.  After positioning him appropriately there, taking care to pad all pressure points and not twist or break the catheter, we obtained a  film to confirm the location of the catheter.       We then did a test dose to confirm our target in the amygdala and confirm appropriate thermography.  We fired the laser until we were satisfied with the size of the lesion projection, for several minutes at 60%, then 70%, then less than a minute at 94%.. We then pulled the catheter back 7 mm. After another test dose, we again fired until satisfactory coverage was obtained using the same protocol. We pulled back again another 7 mm, again testing  and then firing using the same parameters until satisfactory projection obtained.      We found the thermography studies satisfactory and thus obtained post-contrast T1 MRI to confirm blood-brain-barrier disruption in the amygdala and hippocampus. The catheter and skull anchor were withdrawn, and the stitch was secured in the skin. Bacitracin ointment was applied. The patient was extubated and brought to the neuro ICU in satisfactory condition.      All counts were correct x2.    Complications: No    Estimated Blood Loss (EBL): minimal            Specimens:   Specimen (12h ago, onward)    None           Implants:   Implant Name Type Inv. Item Serial No.  Lot No. LRB No. Used Action   PINS SKULL ADULT SAPP - [id number]  [facility]ULL [facility]ULT [facility] [id number] Left 1 Implanted and Explanted   FIDUCIAL KIT UNI STEREO 10 MM - [id number]  FIDUCIAL KIT UNI STEREO 10 MM  [facility] [id number] Left 5 Implanted              Condition: Stable    Disposition: ICU - extubated and stable.    Attestation: I was present and scrubbed for the entire procedure.

## 2020-10-27 NOTE — PT/OT/SLP EVAL
"Occupational Therapy   Evaluation and Discharge Note    Name: Darin Cunha  MRN: [id number]  Admitting Diagnosis:  Epilepsy 1 Day Post-Op    Recommendations:     Discharge Recommendations: home  Discharge Equipment Recommendations:  none  Barriers to discharge:  None    Assessment:     Darin Cunha is a 25 y.o. male with a medical diagnosis of Epilepsy. At this time, patient is functioning at their prior level of function and does not require further acute OT services.     Plan:     During this hospitalization, patient does not require further acute OT services.  Please re-consult if situation changes.    · Plan of Care Reviewed with: patient    Subjective     Chief Complaint: "I feel just fine, i'm ready to go home"   Patient/Family Comments/goals: "I wish I could work, no one will hire me because of my brain problems"     Occupational Profile:  Living Environment: Pt lives alone but will be staying with mother post-d/c in H, 0STE, tub shower   Previous level of function: Independent, disabled 2* seizure disorder  Roles and Routines: enjoys being active and assisting with outdoor work, unable to find employment at this time.   Equipment Used at home:  none  Assistance upon Discharge: supportive family. Pt will be stayign with mother.     Pain/Comfort:  · Pain Rating 1: 0/10  · Pain Rating Post-Intervention 1: 0/10    Patients cultural, spiritual, Sikhism conflicts given the current situation: no    Objective:     Communicated with: Rn prior to session.  Patient found HOB elevated with bed alarm, blood pressure cuff, telemetry, pulse ox (continuous) upon OT entry to room.    General Precautions: Standard, fall, seizure   Orthopedic Precautions:N/A   Braces: N/A     Occupational Performance:    Bed Mobility:    · Patient completed Rolling/Turning to Left with  supervision  · Patient completed Scooting/Bridging with supervision  · Patient completed Supine to Sit with supervision    Functional " Mobility/Transfers:  · Patient completed Sit <> Stand Transfer with supervision  with  no assistive device   · Patient completed Bed <> Chair Transfer using Step Transfer technique with supervision with no assistive device  · Patient completed Toilet Transfer Step Transfer technique with supervision with  no AD  · Functional Mobility: Pt ambulated bathroom distance with supervision to complete toileting at toilet level with supervision, assistance then provided to ambulate to sink where patient complete grooming/self-care in standing with supervision.     Activities of Daily Living:  · Grooming: supervision in standing at sink  · Upper Body Dressing: supervision donning gown like robe seated EOB  · Lower Body Dressing: supervision donning socks seated OEB  · Toileting: supervision urinating in standing at toilet level    Cognitive/Visual Perceptual:  Completely cognitively intact adult with no glasses worn or present during eval.     Physical Exam:  BUE WFL for strength, sensation, GM/FM for use during functional tasks.  Pt is R handed.    AMPAC 6 Click ADL:  AMPAC Total Score: 24    Treatment & Education:  -Pt education on OT role and POC   -Importance of E/OOB activity with staff assistance, emphasis on daily participation  -Multiple self-care tasks and functional mobility completed -- assistance level noted above  -Safety during functional transfer and mobility ensured  -Education provided reviewed, questions answered within OT scope of practice.      Patient left up in chair with all lines intact, call button in reach, chair alarm on, RN notified and mother present    GOALS:   Multidisciplinary Problems     Occupational Therapy Goals     Not on file          Multidisciplinary Problems (Resolved)        Problem: Occupational Therapy Goal    Goal Priority Disciplines Outcome Interventions   Occupational Therapy Goal   (Resolved)     OT, PT/OT Met                    History:     Past Medical History:   Diagnosis  Date    Anemia 10/20/2020    Depressed 9/12/2018    Epilepsy 2015    Hypertension     Mitral valve prolapse 2015    Seizures        Past Surgical History:   Procedure Laterality Date    ADENOIDECTOMY  2015    ADENOIDECTOMY      CREATION OF YUNG HOLE WITH EVACUATION OF HEMATOMA Bilateral 8/31/2020    Procedure: CREATION, CRANIAL YUNG HOLE, PLACEMENT OF THE FIDUCIAL SCREWS BILATERAL 2.PLACEMENT OF SEEG ELECTRODES BILATERAL WITH OWEN ROBOT;  Surgeon: Melani Ortiz MD;  Location: [facility] OR [address] [place] [address];  Service: Neurosurgery;  Laterality: Bilateral;  TORONTO III, ASA III, BLOOD TYPE & SCREEN, HEADREST [place], REGULAR , SUPINE. SPECIAL EQUIPMENT RETC DAIN WILCOX, OWEN REP, PMT    finger abscess removal      REMOVAL OF STEREO EEG LEADS (DEPTH ELECTRODES) N/A 9/14/2020    Procedure: REMOVAL OF STEREO EEG LEADS (DEPTH ELECTRODES); removal of sEEG bolts and depth electrodes;  Surgeon: Melani Ortiz MD;  Location: [facility] OR [address] [place] [address];  Service: Neurosurgery;  Laterality: N/A;    TONSILLECTOMY      VAGUS NERVE STIMULATOR INSERTION Left 4/4/2019    Procedure: INSERTION, VAGUS NERVE STIMULATOR;  Surgeon: Reuben Gupta MD;  Location: [facility] OR [address] [place] [address];  Service: Neurosurgery;  Laterality: Left;  toronto I, asa 1, regular bed, supine     wisdom teeth extracted- about 2010          Time Tracking:     OT Date of Treatment: 10/27/20  OT Start Time: 1341  OT Stop Time: 1352  OT Total Time (min): 11 min    Billable Minutes:Evaluation 3  Self Care/Home Management 8    Lea Fernandez, OT  10/27/2020

## 2020-10-27 NOTE — DISCHARGE INSTRUCTIONS
--Patient stable for discharge to home    --Please take prescriptions as detailed in medication list    --All questions/concerns addressed and answered    --Please followup with neurosurgery clinic in 2 weeks; to be arranged by Neurosurgery Clinic     --Please call immediately for any new onset nausea/vomiting/fever/chills, wound breakdown, numbness/tingling/weakness    Wound Care Instructions:  -If you have any dressings at discharge, please remove 48 hours after the surgery.  -If you have steri strips, do not remove, as they will fall off.  -If you have staples, do not remove, as they will be removed at clinic follow up.  -You may shower daily but do not soak or submerge wound in water.  -Scalp/head wound, wash hair daily with baby shampoo and do not use hair products. Pat incision dry, do not rub.  -Head wounds, do not wear scarfs or hats.  -Keep all wounds clean, dry, and open to air.  -Do not apply creams or ointments to the wound.  -No driving while on narcotic pain medications  -Call Neurosurgery if the wound opens, drains, or becomes red.

## 2020-10-27 NOTE — PLAN OF CARE
Patient discharged to home with mother, no post acute needs identified.        10/27/20 1413   Final Note   Assessment Type Final Discharge Note   Anticipated Discharge Disposition Home   Hospital Follow Up  Appt(s) scheduled? Yes   Discharge plans and expectations educations in teach back method with documentation complete? Yes   Right Care Referral Info   Post Acute Recommendation No Care     Future Appointments   Date Time Provider Department Center   11/10/2020  1:30 PM Melani Ortiz MD [place] [address] [address]   12/3/2020  2:30 PM Melani Ortiz MD [address]       Follow-up Information       P Karla Vigil NP On 11/3/2020.    Specialty: Internal Medicine  Why: Hospital follow up 8 am   Contact information:  [address] [place] [id number]  [phone]               Melani Ortiz MD On 11/10/2020.    Specialty: Neurosurgery  Why: For wound re-check at 1:30 pm   Contact information:  [address]  [facility] [address]  [phone]               Melani Ortiz MD On 12/3/2020.    Specialty: Neurosurgery  Why: Hospital follow up at 2:30 pm   Contact information:  [address]  [facility] [address]  [phone]                    Shivani De Leon RN, CCRN-K, [facility]  Neuro-Critical Care   X [phone]

## 2020-11-10 ENCOUNTER — OFFICE VISIT (OUTPATIENT)
Dept: NEUROSURGERY | Facility: CLINIC | Age: 25
End: 2020-11-10
Payer: MEDICAID

## 2020-11-10 VITALS
HEART RATE: 89 BPM | WEIGHT: 169.06 LBS | DIASTOLIC BLOOD PRESSURE: 83 MMHG | BODY MASS INDEX: 22.4 KG/M2 | HEIGHT: 73 IN | SYSTOLIC BLOOD PRESSURE: 130 MMHG

## 2020-11-10 DIAGNOSIS — G40.019 LOCALIZATION-RELATED (FOCAL) (PARTIAL) IDIOPATHIC EPILEPSY AND EPILEPTIC SYNDROMES WITH SEIZURES OF LOCALIZED ONSET, INTRACTABLE, WITHOUT STATUS EPILEPTICUS: Primary | ICD-10-CM

## 2020-11-10 PROCEDURE — 99024 PR POST-OP FOLLOW-UP VISIT: ICD-10-PCS | Mod: ,,, | Performed by: NEUROLOGICAL SURGERY

## 2020-11-10 PROCEDURE — 99999 PR PBB SHADOW E&M-EST. PATIENT-LVL III: ICD-10-PCS | Mod: PBBFAC,,, | Performed by: NEUROLOGICAL SURGERY

## 2020-11-10 PROCEDURE — 99213 OFFICE O/P EST LOW 20 MIN: CPT | Mod: PBBFAC | Performed by: NEUROLOGICAL SURGERY

## 2020-11-10 PROCEDURE — 99999 PR PBB SHADOW E&M-EST. PATIENT-LVL III: CPT | Mod: PBBFAC,,, | Performed by: NEUROLOGICAL SURGERY

## 2020-11-10 PROCEDURE — 99024 POSTOP FOLLOW-UP VISIT: CPT | Mod: ,,, | Performed by: NEUROLOGICAL SURGERY

## 2020-11-11 NOTE — PROGRESS NOTES
Neurosurgery  Follow-up    SUBJECTIVE:     Chief Complaint: intractable epilepsy     History of Present Illness:  Darin Cunha is a 25 y.o. male with intractable epilepsy since 2013 who presents as a referral from Dr. Abhijeet Do to discuss the gamut of epilepsy surgery options. Today's visit is a video visit, and the patient is a passenger in a moving vehicle. His mother is not with him.     The patient states that when he has events, he stares off and smacks his lips. The episodes last for about 5-6 minutes. He often waves his hands and feels tired afterwards. He currently has about 3-5 episodes a week. He has failed at least 4 AEDs at therapeutic dosages.     Per EEG review, he has bilateral temporal involvement, moreso left-sided than right.     Triggers include being tired and being stressed. He is not working (on disability) but finished his GED. He lives next door to grandmother and nearby to his mother. His major concern is memory loss from ongoing seizures.     Of notes, he endorses a history of staph infection previously. He denies history of bleeding or anesthetic complications. He has a VNS in place.     As of 7/21/20, we are seeing each other in person. His mother presents with him today.  They report that while the patient was recently in the EMU, he had a grand mal seizure while off his medications and fell out of bed.  This resulted in his having a right-sided wing scapula.  The patient has undergone EMG nerve conduction study which demonstrates no evidence of long thoracic nerve injury.  They are also concerned about a cyst that he has on his right shoulder.    Regarding his seizure activity, he had 4 back-to-back seizures about 10 days ago.  He continues to have significant seizure burden.  He does not feel that the VNS is helpful.     As of 10/1/20, the patient underwent bilateral placement of sEEG electrodes on 8/31 with removal on 9/14.  He reports that he has been doing well since surgery.  He  has not had any seizures.  He denies any fevers, chills, or drainage from the incisions.  He presents today to interdisciplinary discussion with Dr. Do and Wally of the results of his intracranial recording.    As of 11/10/20, the patient underwent left mesial temporal laser ablation on 10/26/20. He reports that he has been doing well since then, with no seizures since sEEG. He feels that his speaking has improved from preoperative baseline. He denies any visual deficit. He denies any fever, chills, or drainage from the incision. He is more animated today than I have seen him in past appointments.     Review of patient's allergies indicates:   Allergen Reactions    Zofran [ondansetron hcl (pf)] Hives and Rash       Current Outpatient Medications   Medication Sig Dispense Refill    citalopram (CELEXA) 20 MG tablet Take 20 mg by mouth once daily.      cloBAZam (ONFI) 20 mg Tab Take 2 tablets by mouth twice daily 120 tablet 4    lacosamide (VIMPAT) 200 mg Tab tablet Take 1 tablet (200 mg total) by mouth every 12 (twelve) hours. 60 tablet 4    lamoTRIgine (LAMICTAL) 200 MG tablet Take 1 tablet (200 mg total) by mouth 2 (two) times daily. (Patient taking differently: Take 200 mg by mouth 2 (two) times daily. Take am of surgery) 60 tablet 2    topiramate (TOPAMAX) 50 MG tablet Take 1 tablet (50 mg total) by mouth 2 (two) times daily. (Patient taking differently: Take 50 mg by mouth 2 (two) times daily. Take am of surgery) 60 tablet 11    HYDROcodone-acetaminophen (NORCO) 5-325 mg per tablet Take 1 tablet by mouth every 4 (four) hours as needed for Pain. (Patient not taking: Reported on 11/10/2020) 30 tablet 0    promethazine (PHENERGAN) 12.5 MG Tab Take 1 tablet (12.5 mg total) by mouth every 6 (six) hours as needed (nausea/vomiting). (Patient not taking: Reported on 10/20/2020) 30 tablet 0     No current facility-administered medications for this visit.        Past Medical History:   Diagnosis Date    Anemia  10/20/2020    Depressed 9/12/2018    Epilepsy 2015    Hypertension     Mitral valve prolapse 2015    Seizures      Past Surgical History:   Procedure Laterality Date    ADENOIDECTOMY  2015    ADENOIDECTOMY      CREATION OF YUNG HOLE WITH EVACUATION OF HEMATOMA Bilateral 8/31/2020    Procedure: CREATION, CRANIAL YUNG HOLE, PLACEMENT OF THE FIDUCIAL SCREWS BILATERAL 2.PLACEMENT OF SEEG ELECTRODES BILATERAL WITH OWEN ROBOT;  Surgeon: Melani Ortiz MD;  Location: [facility] OR 2ND FLR;  Service: Neurosurgery;  Laterality: Bilateral;  TORONTO III, ASA III, BLOOD TYPE & SCREEN, HEADREST [place], REGULAR , SUPINE. SPECIAL EQUIPMENT [employer] DAIN [place]ITE, OWEN REP, PMT    finger abscess removal      PLACEMENT OF LASER CATHETER FOR LASER ABLATION OF INTRACRANIAL STRUCTURE Left 10/26/2020    Procedure: Mesial Temporal Laser Ablation Visualase/ Yung Hole LEFT;  Surgeon: Melani Ortiz MD;  Location: [facility] OR 2ND FLR;  Service: Neurosurgery;  Laterality: Left;  TORONTO II, ASAII,[place] HEADREST, REGULAR BED, SUPINE POSITION, OWEN    REMOVAL OF STEREO EEG LEADS (DEPTH ELECTRODES) N/A 9/14/2020    Procedure: REMOVAL OF STEREO EEG LEADS (DEPTH ELECTRODES); removal of sEEG bolts and depth electrodes;  Surgeon: Melani Ortiz MD;  Location: [facility] OR 2ND FLR;  Service: Neurosurgery;  Laterality: N/A;    TONSILLECTOMY      VAGUS NERVE STIMULATOR INSERTION Left 4/4/2019    Procedure: INSERTION, VAGUS NERVE STIMULATOR;  Surgeon: Reuben Gupta MD;  Location: [facility] OR 2ND FLR;  Service: Neurosurgery;  Laterality: Left;  toronto I, asa 1, regular bed, supine     wisdom teeth extracted- about 2010        Family History     Problem Relation (Age of Onset)    Heart disease Maternal Grandmother, Maternal Grandfather    Hypertension Mother, Maternal Grandmother, Maternal Grandfather        Social History     Socioeconomic History    Marital status: Significant Other     Spouse name: Not on file    Number of children: Not on file  "   Years of education: Not on file    Highest education level: Not on file   Occupational History    Not on file   Social Needs    Financial resource strain: Not on file    Food insecurity     Worry: Not on file     Inability: Not on file    Transportation needs     Medical: Not on file     Non-medical: Not on file   Tobacco Use    Smoking status: Never Smoker    Smokeless tobacco: Current User   Substance and Sexual Activity    Alcohol use: Yes     Alcohol/week: 10.0 standard drinks     Types: 10 Cans of beer per week     Comment: 12 beers over a month - spread over a month     Drug use: Never    Sexual activity: Yes     Partners: Female   Lifestyle    Physical activity     Days per week: Not on file     Minutes per session: Not on file    Stress: Not on file   Relationships    Social connections     Talks on phone: Not on file     Gets together: Not on file     Attends Restorationist service: Not on file     Active member of club or organization: Not on file     Attends meetings of clubs or organizations: Not on file     Relationship status: Not on file   Other Topics Concern    Not on file   Social History Narrative    Not on file       Review of Systems   Constitutional: Negative for fever.   HENT: Negative for nosebleeds.    Eyes: Negative for visual disturbance.   Respiratory: Negative for shortness of breath.    Cardiovascular: Negative for chest pain.   Gastrointestinal: Negative for vomiting.        Heartburn   Endocrine: Positive for cold intolerance.   Genitourinary: Negative for difficulty urinating.   Musculoskeletal: Positive for back pain.        Shoulder dislocated   Skin: Negative for color change.   Neurological: Positive for seizures.   Hematological: Does not bruise/bleed easily.   Psychiatric/Behavioral: Positive for confusion.       OBJECTIVE:     Vital Signs  Pulse: 89  BP: 130/83  Pain Score: 0-No pain  Height: 6' 1" (185.4 cm)  Weight: 76.7 kg (169 lb 1.5 oz)  Body mass index is " 22.31 kg/m².      Physical Exam:    Constitutional: He appears well-developed and well-nourished. No distress.     Eyes: EOM are normal.     Abdominal: Soft.     Skin: Skin displays no rash on extremities. Skin displays lesions on trunk (cyst atop right shoulder).   Incisions well healed with edges opposed     Psych/Behavior: He is alert. He is oriented to person, place, and time.     Musculoskeletal: Gait is normal.        Right Upper Extremities: Muscle strength is 5/5.        Left Upper Extremities: Muscle strength is 5/5.       Right Lower Extremities: Muscle strength is 5/5.        Left Lower Extremities: Muscle strength is 5/5.      Comments: Scapular winging noted at rest and made worse by the patient flexing the arms forward against the wall.  I believe this appears most consistent with the serratus anterior palsy, rather than a trapezius palsy. Unchanged     Neurological:        Coordination: He has normal finger to nose coordination.        Sensory: There is no sensory deficit in the trunk. There is no sensory deficit in the extremities.        DTRs: DTRs are DTRS NORMAL AND SYMMETRICnormal and symmetric.        Cranial nerves:   Face grossly symmetric; tongue midline   Visual fields grossly full to confrontation     Pulmonary: nonlabored respirations     Diagnostic Results:  No new imaging     ASSESSMENT/PLAN:     Darin Cunha is a 25 y.o. male with intractable epilepsy who presents s/p L mesial temporal laser ablation. Overall he has been doing very well, and he has yet to have any seizures since sEEG. Sutures and staples were removed without difficulty.     He denies any adverse changes, such as memory difficulty or visual field change. He states that his friends and family feel he has been speaking better.     I will see him in about 4 weeks to ensure continued good healing of his incisions. This may be a virtual visit.     I have encouraged the patient to contact the clinic in the interim with any  questions, concerns, or adverse clinical change.     Note generated with voice recognition software; please excuse any typographical errors.

## 2020-12-08 ENCOUNTER — OFFICE VISIT (OUTPATIENT)
Dept: NEUROSURGERY | Facility: CLINIC | Age: 25
End: 2020-12-08
Payer: MEDICAID

## 2020-12-08 VITALS
TEMPERATURE: 98 F | HEART RATE: 51 BPM | BODY MASS INDEX: 23.03 KG/M2 | SYSTOLIC BLOOD PRESSURE: 140 MMHG | DIASTOLIC BLOOD PRESSURE: 90 MMHG | WEIGHT: 173.75 LBS | HEIGHT: 73 IN

## 2020-12-08 DIAGNOSIS — G40.219 COMPLEX PARTIAL EPILEPSY WITH GENERALIZATION AND WITH INTRACTABLE EPILEPSY: Primary | ICD-10-CM

## 2020-12-08 PROCEDURE — 99999 PR PBB SHADOW E&M-EST. PATIENT-LVL III: ICD-10-PCS | Mod: PBBFAC,,, | Performed by: NEUROLOGICAL SURGERY

## 2020-12-08 PROCEDURE — 99999 PR PBB SHADOW E&M-EST. PATIENT-LVL III: CPT | Mod: PBBFAC,,, | Performed by: NEUROLOGICAL SURGERY

## 2020-12-08 PROCEDURE — 99024 PR POST-OP FOLLOW-UP VISIT: ICD-10-PCS | Mod: ,,, | Performed by: NEUROLOGICAL SURGERY

## 2020-12-08 PROCEDURE — 99213 OFFICE O/P EST LOW 20 MIN: CPT | Mod: PBBFAC | Performed by: NEUROLOGICAL SURGERY

## 2020-12-08 PROCEDURE — 99024 POSTOP FOLLOW-UP VISIT: CPT | Mod: ,,, | Performed by: NEUROLOGICAL SURGERY

## 2020-12-09 NOTE — PATIENT INSTRUCTIONS
Per Dr. Do, it is OK for you to use melatonin.     Timmy will be reaching out to help you see a psychiatrist.

## 2020-12-09 NOTE — PROGRESS NOTES
Neurosurgery  Follow-up    SUBJECTIVE:     Chief Complaint: intractable epilepsy     History of Present Illness:  Darin Cunha is a 25 y.o. male with intractable epilepsy since 2013 who presents as a referral from Dr. Abhijeet Do to discuss the gamut of epilepsy surgery options. Today's visit is a video visit, and the patient is a passenger in a moving vehicle. His mother is not with him.     The patient states that when he has events, he stares off and smacks his lips. The episodes last for about 5-6 minutes. He often waves his hands and feels tired afterwards. He currently has about 3-5 episodes a week. He has failed at least 4 AEDs at therapeutic dosages.     Per EEG review, he has bilateral temporal involvement, moreso left-sided than right.     Triggers include being tired and being stressed. He is not working (on disability) but finished his GED. He lives next door to grandmother and nearby to his mother. His major concern is memory loss from ongoing seizures.     Of notes, he endorses a history of staph infection previously. He denies history of bleeding or anesthetic complications. He has a VNS in place.     As of 7/21/20, we are seeing each other in person. His mother presents with him today.  They report that while the patient was recently in the EMU, he had a grand mal seizure while off his medications and fell out of bed.  This resulted in his having a right-sided wing scapula.  The patient has undergone EMG nerve conduction study which demonstrates no evidence of long thoracic nerve injury.  They are also concerned about a cyst that he has on his right shoulder.    Regarding his seizure activity, he had 4 back-to-back seizures about 10 days ago.  He continues to have significant seizure burden.  He does not feel that the VNS is helpful.     As of 10/1/20, the patient underwent bilateral placement of sEEG electrodes on 8/31 with removal on 9/14.  He reports that he has been doing well since surgery.  He  has not had any seizures.  He denies any fevers, chills, or drainage from the incisions.  He presents today to interdisciplinary discussion with Dr. Do and Wally of the results of his intracranial recording.    As of 11/10/20, the patient underwent left mesial temporal laser ablation on 10/26/20. He reports that he has been doing well since then, with no seizures since sEEG. He feels that his speaking has improved from preoperative baseline. He denies any visual deficit. He denies any fever, chills, or drainage from the incision. He is more animated today than I have seen him in past appointments.     As of 12/8/20, the patient reports that he has remained seizure free. He and his mom are concerned that he has not been sleeping as well as in the past. He also reports increase in his anxiety and depression, which sometimes manifests as stuttering when he is around multiple people.  He denies any fevers, chills, or drainage from the incisions.    Review of patient's allergies indicates:   Allergen Reactions    Zofran [ondansetron hcl (pf)] Hives and Rash       Current Outpatient Medications   Medication Sig Dispense Refill    citalopram (CELEXA) 20 MG tablet Take 20 mg by mouth once daily.      cloBAZam (ONFI) 20 mg Tab Take 2 tablets by mouth twice daily 120 tablet 4    lacosamide (VIMPAT) 200 mg Tab tablet Take 1 tablet (200 mg total) by mouth every 12 (twelve) hours. 60 tablet 4    lamoTRIgine (LAMICTAL) 200 MG tablet Take 1 tablet (200 mg total) by mouth 2 (two) times daily. (Patient taking differently: Take 200 mg by mouth 2 (two) times daily. Take am of surgery) 60 tablet 2    HYDROcodone-acetaminophen (NORCO) 5-325 mg per tablet Take 1 tablet by mouth every 4 (four) hours as needed for Pain. (Patient not taking: Reported on 11/10/2020) 30 tablet 0    promethazine (PHENERGAN) 12.5 MG Tab Take 1 tablet (12.5 mg total) by mouth every 6 (six) hours as needed (nausea/vomiting). (Patient not taking: Reported  on 10/20/2020) 30 tablet 0    topiramate (TOPAMAX) 50 MG tablet Take 1 tablet (50 mg total) by mouth 2 (two) times daily. (Patient not taking: Reported on 12/8/2020) 60 tablet 11     No current facility-administered medications for this visit.        Past Medical History:   Diagnosis Date    Anemia 10/20/2020    Depressed 9/12/2018    Epilepsy 2015    Hypertension     Mitral valve prolapse 2015    Seizures      Past Surgical History:   Procedure Laterality Date    ADENOIDECTOMY  2015    ADENOIDECTOMY      CREATION OF YUNG HOLE WITH EVACUATION OF HEMATOMA Bilateral 8/31/2020    Procedure: CREATION, CRANIAL YUNG HOLE, PLACEMENT OF THE FIDUCIAL SCREWS BILATERAL 2.PLACEMENT OF SEEG ELECTRODES BILATERAL WITH OWEN ROBOT;  Surgeon: Melani Ortiz MD;  Location: [facility] OR [address] [place] [address];  Service: Neurosurgery;  Laterality: Bilateral;  TORONTO III, ASA III, BLOOD TYPE & SCREEN, HEADREST [place], REGULAR , SUPINE. SPECIAL EQUIPMENT [employer] DAIN [place], OWEN REP, PMT    finger abscess removal      PLACEMENT OF LASER CATHETER FOR LASER ABLATION OF INTRACRANIAL STRUCTURE Left 10/26/2020    Procedure: Mesial Temporal Laser Ablation Visualase/ Yung Hole LEFT;  Surgeon: Melani Ortiz MD;  Location: [facility] OR [address] [place] [address];  Service: Neurosurgery;  Laterality: Left;  TORONTO II, ASAII,[place] HEADREST, REGULAR BED, SUPINE POSITION, OWEN    REMOVAL OF STEREO EEG LEADS (DEPTH ELECTRODES) N/A 9/14/2020    Procedure: REMOVAL OF STEREO EEG LEADS (DEPTH ELECTRODES); removal of sEEG bolts and depth electrodes;  Surgeon: Melani Ortiz MD;  Location: [facility] OR [address] [place] [address];  Service: Neurosurgery;  Laterality: N/A;    TONSILLECTOMY      VAGUS NERVE STIMULATOR INSERTION Left 4/4/2019    Procedure: INSERTION, VAGUS NERVE STIMULATOR;  Surgeon: Reuben Gupta MD;  Location: [facility] OR [address] [place] [address];  Service: Neurosurgery;  Laterality: Left;  toronto I, asa 1, regular bed, supine     wisdom teeth extracted- about 2010        Family History     Problem  Relation (Age of Onset)    Heart disease Maternal Grandmother, Maternal Grandfather    Hypertension Mother, Maternal Grandmother, Maternal Grandfather        Social History     Socioeconomic History    Marital status: Significant Other     Spouse name: Not on file    Number of children: Not on file    Years of education: Not on file    Highest education level: Not on file   Occupational History    Not on file   Social Needs    Financial resource strain: Not on file    Food insecurity     Worry: Not on file     Inability: Not on file    Transportation needs     Medical: Not on file     Non-medical: Not on file   Tobacco Use    Smoking status: Never Smoker    Smokeless tobacco: Current User   Substance and Sexual Activity    Alcohol use: Yes     Alcohol/week: 10.0 standard drinks     Types: 10 Cans of beer per week     Comment: 12 beers over a month - spread over a month     Drug use: Never    Sexual activity: Yes     Partners: Female   Lifestyle    Physical activity     Days per week: Not on file     Minutes per session: Not on file    Stress: Not on file   Relationships    Social connections     Talks on phone: Not on file     Gets together: Not on file     Attends Sabianism service: Not on file     Active member of club or organization: Not on file     Attends meetings of clubs or organizations: Not on file     Relationship status: Not on file   Other Topics Concern    Not on file   Social History Narrative    Not on file       Review of Systems   Constitutional: Negative for fever.   HENT: Negative for nosebleeds.    Eyes: Negative for visual disturbance.   Respiratory: Negative for shortness of breath.    Cardiovascular: Negative for chest pain.   Gastrointestinal: Negative for vomiting.        Heartburn   Endocrine: Positive for cold intolerance.   Genitourinary: Negative for difficulty urinating.   Musculoskeletal: Positive for back pain.        Shoulder dislocated   Skin: Negative for color  "change.   Neurological: Positive for seizures.   Hematological: Does not bruise/bleed easily.   Psychiatric/Behavioral: Positive for confusion.       OBJECTIVE:     Vital Signs  Temp: 97.7 °F (36.5 °C)  Pulse: (!) 51  BP: (!) 140/90  Pain Score: 0-No pain  Height: 6' 1" (185.4 cm)  Weight: 78.8 kg (173 lb 11.6 oz)  Body mass index is 22.92 kg/m².      Physical Exam:    Constitutional: He appears well-developed and well-nourished. No distress.     Eyes: EOM are normal.     Abdominal: Soft.     Skin: Skin displays no rash on extremities. Skin displays lesions on trunk (cyst atop right shoulder).   Incisions well healed with edges opposed     Psych/Behavior: He is alert. He is oriented to person, place, and time.     Musculoskeletal: Gait is normal.        Right Upper Extremities: Muscle strength is 5/5.        Left Upper Extremities: Muscle strength is 5/5.       Right Lower Extremities: Muscle strength is 5/5.        Left Lower Extremities: Muscle strength is 5/5.      Comments: Scapular winging noted at rest and made worse by the patient flexing the arms forward against the wall.  I believe this appears most consistent with the serratus anterior palsy, rather than a trapezius palsy. Unchanged     Neurological:        Coordination: He has normal finger to nose coordination.        Sensory: There is no sensory deficit in the trunk. There is no sensory deficit in the extremities.        DTRs: DTRs are DTRS NORMAL AND SYMMETRICnormal and symmetric.        Cranial nerves:   Face grossly symmetric; tongue midline   Visual fields grossly full to confrontation     Pulmonary: nonlabored respirations     Diagnostic Results:  No new imaging     ASSESSMENT/PLAN:     Darin Cunha is a 25 y.o. male with intractable epilepsy who presents s/p L mesial temporal laser ablation. Overall he has been doing very well, and he has yet to have any seizures.     He denies any adverse changes, such as memory difficulty or visual field change. " He and his mom are concerned about his worsening anxiety and depression.  He has no plans to hurt himself or anyone else at this time.  We talked about coping mechanisms, such as reading the viable when his thoughts start to race.  We also discussed taking melatonin.  I will confirm with Dr. Abhijeet Do that there is no contraindication to his taking melatonin.  I will also ask for his assistance in referring Darin to a psychiatrist.    I will see him in about 6 weeks to see how he is continuing to recover. This may be a virtual visit.     I have encouraged the patient to contact the clinic in the interim with any questions, concerns, or adverse clinical change.     Note generated with voice recognition software; please excuse any typographical errors.

## 2020-12-14 DIAGNOSIS — G40.909 SEIZURE DISORDER: ICD-10-CM

## 2020-12-16 ENCOUNTER — TELEPHONE (OUTPATIENT)
Dept: NEUROLOGY | Facility: CLINIC | Age: 25
End: 2020-12-16

## 2020-12-16 RX ORDER — LAMOTRIGINE 200 MG/1
200 TABLET ORAL 2 TIMES DAILY
Qty: 60 TABLET | Refills: 2 | Status: SHIPPED | OUTPATIENT
Start: 2020-12-16 | End: 2021-02-22 | Stop reason: SDUPTHER

## 2021-01-19 ENCOUNTER — OFFICE VISIT (OUTPATIENT)
Dept: NEUROSURGERY | Facility: CLINIC | Age: 26
End: 2021-01-19
Payer: MEDICAID

## 2021-01-19 DIAGNOSIS — Z96.89 S/P PLACEMENT OF VNS (VAGUS NERVE STIMULATION) DEVICE: ICD-10-CM

## 2021-01-19 DIAGNOSIS — G40.019 LOCALIZATION-RELATED (FOCAL) (PARTIAL) IDIOPATHIC EPILEPSY AND EPILEPTIC SYNDROMES WITH SEIZURES OF LOCALIZED ONSET, INTRACTABLE, WITHOUT STATUS EPILEPTICUS: Primary | ICD-10-CM

## 2021-01-19 PROCEDURE — 99024 PR POST-OP FOLLOW-UP VISIT: ICD-10-PCS | Mod: 95,,, | Performed by: NEUROLOGICAL SURGERY

## 2021-01-19 PROCEDURE — 99024 POSTOP FOLLOW-UP VISIT: CPT | Mod: 95,,, | Performed by: NEUROLOGICAL SURGERY

## 2021-02-22 DIAGNOSIS — G40.909 SEIZURE DISORDER: ICD-10-CM

## 2021-02-22 RX ORDER — LACOSAMIDE 200 MG/1
200 TABLET ORAL EVERY 12 HOURS
Qty: 60 TABLET | Refills: 4 | Status: SHIPPED | OUTPATIENT
Start: 2021-02-22 | End: 2021-04-23 | Stop reason: SDUPTHER

## 2021-02-22 RX ORDER — CLOBAZAM 20 MG/1
TABLET ORAL
Qty: 120 TABLET | Refills: 4 | Status: SHIPPED | OUTPATIENT
Start: 2021-02-22 | End: 2021-07-26 | Stop reason: SDUPTHER

## 2021-02-22 RX ORDER — LAMOTRIGINE 200 MG/1
200 TABLET ORAL 2 TIMES DAILY
Qty: 60 TABLET | Refills: 4 | Status: SHIPPED | OUTPATIENT
Start: 2021-02-22 | End: 2021-07-26 | Stop reason: SDUPTHER

## 2021-02-24 ENCOUNTER — TELEPHONE (OUTPATIENT)
Dept: NEUROLOGY | Facility: CLINIC | Age: 26
End: 2021-02-24

## 2021-02-25 ENCOUNTER — TELEPHONE (OUTPATIENT)
Dept: NEUROLOGY | Facility: CLINIC | Age: 26
End: 2021-02-25

## 2021-03-23 ENCOUNTER — TELEPHONE (OUTPATIENT)
Dept: NEUROLOGY | Facility: CLINIC | Age: 26
End: 2021-03-23

## 2021-04-22 ENCOUNTER — TELEPHONE (OUTPATIENT)
Dept: NEUROSURGERY | Facility: CLINIC | Age: 26
End: 2021-04-22

## 2021-04-22 DIAGNOSIS — G40.019 LOCALIZATION-RELATED (FOCAL) (PARTIAL) IDIOPATHIC EPILEPSY AND EPILEPTIC SYNDROMES WITH SEIZURES OF LOCALIZED ONSET, INTRACTABLE, WITHOUT STATUS EPILEPTICUS: Primary | ICD-10-CM

## 2021-04-22 DIAGNOSIS — Z96.89 S/P PLACEMENT OF VNS (VAGUS NERVE STIMULATION) DEVICE: ICD-10-CM

## 2021-04-23 DIAGNOSIS — G40.909 SEIZURE DISORDER: ICD-10-CM

## 2021-04-23 RX ORDER — LACOSAMIDE 200 MG/1
200 TABLET ORAL EVERY 12 HOURS
Qty: 60 TABLET | Refills: 4 | Status: SHIPPED | OUTPATIENT
Start: 2021-04-23 | End: 2021-07-26 | Stop reason: SDUPTHER

## 2021-06-10 NOTE — PLAN OF CARE
05/08/20 0921   Post-Acute Status   Post-Acute Authorization Other   Discharge Delays (!) Procedure Scheduling (IR, OR, Labs, Echo, Cath, Echo, EEG)  (EMU work up in progress)   Discharge Plan   Discharge Plan A Home   Discharge Plan B Home with family     Alexandra Young RN  Case Management  Ext: [phone]  05/08/2020  9:21 AM       Bedside and Verbal shift change report given to Trevor Ochoa RN  (oncoming nurse) by Adiel Knight RN  (offgoing nurse). Report included the following information SBAR, Kardex, Intake/Output, MAR and Recent Results.      Breakfast intake: 0% of tray   Lunch : 0% of tray

## 2021-07-26 DIAGNOSIS — G40.909 SEIZURE DISORDER: ICD-10-CM

## 2021-07-27 RX ORDER — LAMOTRIGINE 200 MG/1
200 TABLET ORAL 2 TIMES DAILY
Qty: 60 TABLET | Refills: 11 | Status: SHIPPED | OUTPATIENT
Start: 2021-07-27 | End: 2021-10-05 | Stop reason: SDUPTHER

## 2021-07-27 RX ORDER — CLOBAZAM 20 MG/1
TABLET ORAL
Qty: 120 TABLET | Refills: 5 | Status: SHIPPED | OUTPATIENT
Start: 2021-07-27 | End: 2021-08-10

## 2021-07-27 RX ORDER — LACOSAMIDE 200 MG/1
200 TABLET ORAL EVERY 12 HOURS
Qty: 60 TABLET | Refills: 5 | Status: SHIPPED | OUTPATIENT
Start: 2021-07-27 | End: 2021-11-05

## 2021-07-27 RX ORDER — TOPIRAMATE 50 MG/1
50 TABLET, FILM COATED ORAL 2 TIMES DAILY
Qty: 60 TABLET | Refills: 11 | Status: SHIPPED | OUTPATIENT
Start: 2021-07-27 | End: 2022-07-27

## 2021-09-03 ENCOUNTER — PATIENT MESSAGE (OUTPATIENT)
Dept: NEUROSURGERY | Facility: CLINIC | Age: 26
End: 2021-09-03

## 2021-09-09 ENCOUNTER — TELEPHONE (OUTPATIENT)
Dept: NEUROLOGY | Facility: CLINIC | Age: 26
End: 2021-09-09

## 2021-10-05 DIAGNOSIS — G40.909 SEIZURE DISORDER: ICD-10-CM

## 2021-10-05 RX ORDER — LAMOTRIGINE 200 MG/1
200 TABLET ORAL 2 TIMES DAILY
Qty: 60 TABLET | Refills: 2 | Status: SHIPPED | OUTPATIENT
Start: 2021-10-05 | End: 2022-08-10

## 2021-12-15 ENCOUNTER — TELEPHONE (OUTPATIENT)
Dept: NEUROLOGY | Facility: CLINIC | Age: 26
End: 2021-12-15
Payer: MEDICAID

## 2022-03-14 ENCOUNTER — OFFICE VISIT (OUTPATIENT)
Dept: NEUROLOGY | Facility: CLINIC | Age: 27
End: 2022-03-14
Payer: MEDICAID

## 2022-03-14 ENCOUNTER — LAB VISIT (OUTPATIENT)
Dept: LAB | Facility: HOSPITAL | Age: 27
End: 2022-03-14
Payer: MEDICAID

## 2022-03-14 VITALS — DIASTOLIC BLOOD PRESSURE: 84 MMHG | SYSTOLIC BLOOD PRESSURE: 135 MMHG | HEART RATE: 75 BPM

## 2022-03-14 DIAGNOSIS — G40.909 SEIZURE DISORDER: Primary | ICD-10-CM

## 2022-03-14 DIAGNOSIS — Z51.81 ENCOUNTER FOR THERAPEUTIC DRUG LEVEL MONITORING: ICD-10-CM

## 2022-03-14 DIAGNOSIS — M95.8 WINGED SCAPULA: Primary | ICD-10-CM

## 2022-03-14 PROCEDURE — 3079F DIAST BP 80-89 MM HG: CPT | Mod: CPTII,,,

## 2022-03-14 PROCEDURE — 3075F SYST BP GE 130 - 139MM HG: CPT | Mod: CPTII,,,

## 2022-03-14 PROCEDURE — 99212 OFFICE O/P EST SF 10 MIN: CPT | Mod: PBBFAC

## 2022-03-14 PROCEDURE — 99999 PR PBB SHADOW E&M-EST. PATIENT-LVL II: CPT | Mod: PBBFAC,,,

## 2022-03-14 PROCEDURE — 3075F PR MOST RECENT SYSTOLIC BLOOD PRESS GE 130-139MM HG: ICD-10-PCS | Mod: CPTII,,,

## 2022-03-14 PROCEDURE — 80235 DRUG ASSAY LACOSAMIDE: CPT

## 2022-03-14 PROCEDURE — 1159F MED LIST DOCD IN RCRD: CPT | Mod: CPTII,,,

## 2022-03-14 PROCEDURE — 80339 ANTIEPILEPTICS NOS 1-3: CPT

## 2022-03-14 PROCEDURE — 99215 PR OFFICE/OUTPT VISIT, EST, LEVL V, 40-54 MIN: ICD-10-PCS | Mod: S$PBB,,,

## 2022-03-14 PROCEDURE — 1159F PR MEDICATION LIST DOCUMENTED IN MEDICAL RECORD: ICD-10-PCS | Mod: CPTII,,,

## 2022-03-14 PROCEDURE — 99999 PR PBB SHADOW E&M-EST. PATIENT-LVL II: ICD-10-PCS | Mod: PBBFAC,,,

## 2022-03-14 PROCEDURE — 80175 DRUG SCREEN QUAN LAMOTRIGINE: CPT

## 2022-03-14 PROCEDURE — 99215 OFFICE O/P EST HI 40 MIN: CPT | Mod: S$PBB,,,

## 2022-03-14 PROCEDURE — 3079F PR MOST RECENT DIASTOLIC BLOOD PRESSURE 80-89 MM HG: ICD-10-PCS | Mod: CPTII,,,

## 2022-03-14 RX ORDER — LACOSAMIDE 50 MG/1
50 TABLET ORAL 2 TIMES DAILY
Qty: 60 TABLET | Refills: 11 | Status: SHIPPED | OUTPATIENT
Start: 2022-03-14 | End: 2022-04-05 | Stop reason: SDUPTHER

## 2022-03-14 RX ORDER — LACOSAMIDE 200 MG/1
200 TABLET ORAL 2 TIMES DAILY
Qty: 60 TABLET | Refills: 4 | Status: SHIPPED | OUTPATIENT
Start: 2022-03-14 | End: 2022-03-21 | Stop reason: SDUPTHER
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
## 2022-03-15 ENCOUNTER — PATIENT OUTREACH (OUTPATIENT)
Dept: NEUROLOGY | Facility: CLINIC | Age: 27
End: 2022-03-15
Payer: MEDICAID

## 2022-03-16 LAB
CLOBAZAM SERPL-MCNC: 1380 NG/ML (ref 30–300)
LACOSAMIDE: 6.4 MCG/ML (ref 1–10)
NORCLOBAZAM SERPL-MCNC: 3380 NG/ML (ref 300–3000)

## 2022-03-17 LAB — LAMOTRIGINE SERPL-MCNC: 6.6 UG/ML (ref 2–15)

## 2022-03-21 DIAGNOSIS — G40.909 SEIZURE DISORDER: ICD-10-CM

## 2022-03-21 RX ORDER — LACOSAMIDE 200 MG/1
200 TABLET ORAL 2 TIMES DAILY
Qty: 60 TABLET | Refills: 4 | Status: SHIPPED | OUTPATIENT
Start: 2022-03-21 | End: 2022-10-04

## 2022-04-05 ENCOUNTER — OFFICE VISIT (OUTPATIENT)
Dept: NEUROLOGY | Facility: CLINIC | Age: 27
End: 2022-04-05
Payer: MEDICAID

## 2022-04-05 ENCOUNTER — PROCEDURE VISIT (OUTPATIENT)
Dept: NEUROLOGY | Facility: CLINIC | Age: 27
End: 2022-04-05
Payer: MEDICAID

## 2022-04-05 DIAGNOSIS — G40.909 SEIZURE DISORDER: Primary | ICD-10-CM

## 2022-04-05 DIAGNOSIS — M95.8 WINGED SCAPULA: ICD-10-CM

## 2022-04-05 PROCEDURE — 99214 OFFICE O/P EST MOD 30 MIN: CPT | Mod: S$PBB,,,

## 2022-04-05 PROCEDURE — 95886 PR EMG COMPLETE, W/ NERVE CONDUCTION STUDIES, 5+ MUSCLES: ICD-10-PCS | Mod: 26,S$PBB,, | Performed by: PSYCHIATRY & NEUROLOGY

## 2022-04-05 PROCEDURE — 95886 MUSC TEST DONE W/N TEST COMP: CPT | Mod: 26,S$PBB,, | Performed by: PSYCHIATRY & NEUROLOGY

## 2022-04-05 PROCEDURE — 95913 NRV CNDJ TEST 13/> STUDIES: CPT | Mod: 26,S$PBB,, | Performed by: PSYCHIATRY & NEUROLOGY

## 2022-04-05 PROCEDURE — 95913 NRV CNDJ TEST 13/> STUDIES: CPT | Mod: PBBFAC | Performed by: PSYCHIATRY & NEUROLOGY

## 2022-04-05 PROCEDURE — 95913 PR NERVE CONDUCTION STUDY; 13 OR MORE STUDIES: ICD-10-PCS | Mod: 26,S$PBB,, | Performed by: PSYCHIATRY & NEUROLOGY

## 2022-04-05 PROCEDURE — 95886 MUSC TEST DONE W/N TEST COMP: CPT | Mod: PBBFAC | Performed by: PSYCHIATRY & NEUROLOGY

## 2022-04-05 PROCEDURE — 99214 PR OFFICE/OUTPT VISIT, EST, LEVL IV, 30-39 MIN: ICD-10-PCS | Mod: S$PBB,,,

## 2022-04-05 RX ORDER — LACOSAMIDE 50 MG/1
50 TABLET ORAL 2 TIMES DAILY
Qty: 60 TABLET | Refills: 11 | Status: SHIPPED | OUTPATIENT
Start: 2022-04-05 | End: 2023-04-05
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
## 2022-09-09 ENCOUNTER — TELEPHONE (OUTPATIENT)
Dept: NEUROLOGY | Facility: CLINIC | Age: 27
End: 2022-09-09
Payer: MEDICAID

## 2022-09-12 ENCOUNTER — TELEPHONE (OUTPATIENT)
Dept: NEUROLOGY | Facility: CLINIC | Age: 27
End: 2022-09-12
Payer: MEDICAID

## 2022-11-07 ENCOUNTER — TELEPHONE (OUTPATIENT)
Dept: NEUROLOGY | Facility: CLINIC | Age: 27
End: 2022-11-07
Payer: MEDICAID

## 2022-11-07 NOTE — TELEPHONE ENCOUNTER
PA Initiated in [facility], 60 tabs/ 30 days, Key [id number]    KRYSTAL OLGUIN Gatica: [id number] - Rx #: [id number]    Outcome  Additional Information Required  The patient currently has access to the requested medication and a Prior Authorization is not needed for the patient/medication.    Drug  Lacosamide 200MG tablets    Form  [employer] Medicare Electronic PA Form (2017 NCPDP)

## 2022-11-09 DIAGNOSIS — G40.909 SEIZURE DISORDER: ICD-10-CM

## 2022-11-09 RX ORDER — LACOSAMIDE 200 MG/1
200 TABLET ORAL 2 TIMES DAILY
Qty: 60 TABLET | Refills: 5 | Status: SHIPPED | OUTPATIENT
Start: 2022-11-09 | End: 2023-05-15

## 2023-05-14 DIAGNOSIS — G40.909 SEIZURE DISORDER: ICD-10-CM

## 2023-05-15 RX ORDER — LACOSAMIDE 200 MG/1
200 TABLET ORAL 2 TIMES DAILY
Qty: 60 TABLET | Refills: 5 | Status: SHIPPED | OUTPATIENT
Start: 2023-05-15 | End: 2023-10-16 | Stop reason: SDUPTHER

## 2023-07-15 DIAGNOSIS — G40.909 SEIZURE DISORDER: ICD-10-CM

## 2023-07-17 RX ORDER — CLOBAZAM 20 MG/1
40 TABLET ORAL 2 TIMES DAILY
Qty: 360 TABLET | Refills: 1 | Status: SHIPPED | OUTPATIENT
Start: 2023-07-17 | End: 2023-12-27

## 2023-08-09 ENCOUNTER — TELEPHONE (OUTPATIENT)
Dept: NEUROLOGY | Facility: CLINIC | Age: 28
End: 2023-08-09

## 2023-08-09 ENCOUNTER — OFFICE VISIT (OUTPATIENT)
Dept: NEUROLOGY | Facility: CLINIC | Age: 28
End: 2023-08-09
Payer: MEDICARE

## 2023-08-09 VITALS
WEIGHT: 178 LBS | SYSTOLIC BLOOD PRESSURE: 155 MMHG | HEART RATE: 105 BPM | DIASTOLIC BLOOD PRESSURE: 104 MMHG | HEIGHT: 73 IN | BODY MASS INDEX: 23.59 KG/M2

## 2023-08-09 DIAGNOSIS — G40.019 LOCALIZATION-RELATED (FOCAL) (PARTIAL) IDIOPATHIC EPILEPSY AND EPILEPTIC SYNDROMES WITH SEIZURES OF LOCALIZED ONSET, INTRACTABLE, WITHOUT STATUS EPILEPTICUS: Primary | ICD-10-CM

## 2023-08-09 PROCEDURE — 99999 PR PBB SHADOW E&M-EST. PATIENT-LVL III: ICD-10-PCS | Mod: PBBFAC,,, | Performed by: PSYCHIATRY & NEUROLOGY

## 2023-08-09 PROCEDURE — 95977 ALYS CPLX CN NPGT PRGRMG: CPT | Mod: S$GLB,,, | Performed by: PSYCHIATRY & NEUROLOGY

## 2023-08-09 PROCEDURE — 3077F SYST BP >= 140 MM HG: CPT | Mod: CPTII,S$GLB,, | Performed by: PSYCHIATRY & NEUROLOGY

## 2023-08-09 PROCEDURE — 99215 PR OFFICE/OUTPT VISIT, EST, LEVL V, 40-54 MIN: ICD-10-PCS | Mod: 25,S$GLB,, | Performed by: PSYCHIATRY & NEUROLOGY

## 2023-08-09 PROCEDURE — 95977 PR ELEC ANALYSIS, IMPLT NEURO PULSE GEN, W/PRGRM, CMPLX CRAN NERVE: ICD-10-PCS | Mod: S$GLB,,, | Performed by: PSYCHIATRY & NEUROLOGY

## 2023-08-09 PROCEDURE — 3008F BODY MASS INDEX DOCD: CPT | Mod: CPTII,S$GLB,, | Performed by: PSYCHIATRY & NEUROLOGY

## 2023-08-09 PROCEDURE — 1159F PR MEDICATION LIST DOCUMENTED IN MEDICAL RECORD: ICD-10-PCS | Mod: CPTII,S$GLB,, | Performed by: PSYCHIATRY & NEUROLOGY

## 2023-08-09 PROCEDURE — 99215 OFFICE O/P EST HI 40 MIN: CPT | Mod: 25,S$GLB,, | Performed by: PSYCHIATRY & NEUROLOGY

## 2023-08-09 PROCEDURE — 3080F PR MOST RECENT DIASTOLIC BLOOD PRESSURE >= 90 MM HG: ICD-10-PCS | Mod: CPTII,S$GLB,, | Performed by: PSYCHIATRY & NEUROLOGY

## 2023-08-09 PROCEDURE — 95977 ALYS CPLX CN NPGT PRGRMG: CPT | Mod: 25,PBBFAC | Performed by: PSYCHIATRY & NEUROLOGY

## 2023-08-09 PROCEDURE — 3080F DIAST BP >= 90 MM HG: CPT | Mod: CPTII,S$GLB,, | Performed by: PSYCHIATRY & NEUROLOGY

## 2023-08-09 PROCEDURE — 99213 OFFICE O/P EST LOW 20 MIN: CPT | Mod: PBBFAC | Performed by: PSYCHIATRY & NEUROLOGY

## 2023-08-09 PROCEDURE — 3008F PR BODY MASS INDEX (BMI) DOCUMENTED: ICD-10-PCS | Mod: CPTII,S$GLB,, | Performed by: PSYCHIATRY & NEUROLOGY

## 2023-08-09 PROCEDURE — 99999 PR PBB SHADOW E&M-EST. PATIENT-LVL III: CPT | Mod: PBBFAC,,, | Performed by: PSYCHIATRY & NEUROLOGY

## 2023-08-09 PROCEDURE — 1159F MED LIST DOCD IN RCRD: CPT | Mod: CPTII,S$GLB,, | Performed by: PSYCHIATRY & NEUROLOGY

## 2023-08-09 PROCEDURE — 3077F PR MOST RECENT SYSTOLIC BLOOD PRESSURE >= 140 MM HG: ICD-10-PCS | Mod: CPTII,S$GLB,, | Performed by: PSYCHIATRY & NEUROLOGY

## 2023-08-09 RX ORDER — LACOSAMIDE 50 MG/1
50 TABLET ORAL EVERY 12 HOURS
Qty: 60 TABLET | Refills: 4 | Status: SHIPPED | OUTPATIENT
Start: 2023-08-09 | End: 2023-10-16 | Stop reason: SDUPTHER
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
## 2023-08-09 NOTE — TELEPHONE ENCOUNTER
----- Message from Corin Mann MA sent at 8/9/2023  2:41 PM CDT -----  Regarding: Dr. Hi Red ordered an MRI for this patient, but he has a VNS. Can you call this patient to schedule his MRI?  Thank you,  Corin PAREDES

## 2023-09-18 DIAGNOSIS — G40.909 SEIZURE DISORDER: ICD-10-CM

## 2023-09-19 RX ORDER — LAMOTRIGINE 200 MG/1
TABLET ORAL
Qty: 180 TABLET | Refills: 3 | Status: SHIPPED | OUTPATIENT
Start: 2023-09-19 | End: 2023-10-12 | Stop reason: SDUPTHER

## 2023-09-21 ENCOUNTER — HOSPITAL ENCOUNTER (OUTPATIENT)
Dept: RADIOLOGY | Facility: HOSPITAL | Age: 28
Discharge: HOME | End: 2023-09-21
Attending: PSYCHIATRY & NEUROLOGY
Payer: MEDICARE

## 2023-09-21 ENCOUNTER — CLINICAL SUPPORT (OUTPATIENT)
Dept: NEUROLOGY | Facility: CLINIC | Age: 28
End: 2023-09-21
Payer: MEDICARE

## 2023-09-21 DIAGNOSIS — Z96.89 S/P PLACEMENT OF VNS (VAGUS NERVE STIMULATION) DEVICE: Primary | ICD-10-CM

## 2023-09-21 DIAGNOSIS — G40.019 LOCALIZATION-RELATED (FOCAL) (PARTIAL) IDIOPATHIC EPILEPSY AND EPILEPTIC SYNDROMES WITH SEIZURES OF LOCALIZED ONSET, INTRACTABLE, WITHOUT STATUS EPILEPTICUS: ICD-10-CM

## 2023-09-21 PROCEDURE — 70551 MRI BRAIN EPILEPSY WITHOUT CONTRAST: ICD-10-PCS | Mod: 26,,, | Performed by: RADIOLOGY

## 2023-09-21 PROCEDURE — 95977 ALYS CPLX CN NPGT PRGRMG: CPT | Mod: PBBFAC

## 2023-09-21 PROCEDURE — 70551 MRI BRAIN STEM W/O DYE: CPT | Mod: 26,,, | Performed by: RADIOLOGY

## 2023-09-21 PROCEDURE — 95977 ALYS CPLX CN NPGT PRGRMG: CPT | Mod: S$GLB,,, | Performed by: PSYCHIATRY & NEUROLOGY

## 2023-09-21 PROCEDURE — 70551 MRI BRAIN STEM W/O DYE: CPT | Mod: TC

## 2023-09-21 PROCEDURE — 95977 PR ELEC ANALYSIS, IMPLT NEURO PULSE GEN, W/PRGRM, CMPLX CRAN NERVE: ICD-10-PCS | Mod: S$GLB,,, | Performed by: PSYCHIATRY & NEUROLOGY

## 2023-09-21 NOTE — PROGRESS NOTES
Patient arrived to have his VNS settings resumed after his MRI as follows:                                         NORMAL     AUTOSTIM      MAGNET        Output                          1 mA             1 mA                1.125 mA     Frequency                    30 Hz     Pulse Width                 250 uSec       250 uSec         250 uSec     On Time                      30 sec            30 sec             60 sec     Off Time                      5 min     Duty Cycle                  10%     Tachycardia                Enabled

## 2023-09-21 NOTE — PROGRESS NOTES
Patient arrived for his VNS off prior to MRI, settings are as follows upon arrival:    Patient ID: JENNA  Emmy M106 S/N: [id number]  Implant Date: Apr 1, 2019  Battery: 25%-50%                                         NORMAL     AUTOSTIM      MAGNET      Output                          1 mA             1 mA                1.125 mA    Frequency                    30 Hz    Pulse Width                 250 uSec       250 uSec         250 uSec    On Time                      30 sec            30 sec             60 sec    Off Time                      5 min    Duty Cycle                  10%    Tachycardia                Enabled    The following changes are implemented: Normal Output 0 mA, Autostim Output 0 mA, Magnet Output 0 mA, Tachycardia disabled    Instructed to return to clinic to have his VNS settings resumed after MRI.

## 2023-10-12 DIAGNOSIS — G40.909 SEIZURE DISORDER: ICD-10-CM

## 2023-10-12 RX ORDER — LAMOTRIGINE 200 MG/1
200 TABLET ORAL 2 TIMES DAILY
Qty: 180 TABLET | Refills: 3 | Status: SHIPPED | OUTPATIENT
Start: 2023-10-12

## 2023-10-16 DIAGNOSIS — G40.909 SEIZURE DISORDER: ICD-10-CM

## 2023-10-16 DIAGNOSIS — G40.019 LOCALIZATION-RELATED (FOCAL) (PARTIAL) IDIOPATHIC EPILEPSY AND EPILEPTIC SYNDROMES WITH SEIZURES OF LOCALIZED ONSET, INTRACTABLE, WITHOUT STATUS EPILEPTICUS: ICD-10-CM

## 2023-10-16 RX ORDER — LACOSAMIDE 50 MG/1
50 TABLET ORAL EVERY 12 HOURS
Qty: 60 TABLET | Refills: 5 | Status: SHIPPED | OUTPATIENT
Start: 2023-10-16 | End: 2024-10-15

## 2023-10-16 RX ORDER — LACOSAMIDE 200 MG/1
200 TABLET ORAL 2 TIMES DAILY
Qty: 60 TABLET | Refills: 5 | Status: SHIPPED | OUTPATIENT
Start: 2023-10-16 | End: 2024-04-13

## 2023-12-27 DIAGNOSIS — G40.909 SEIZURE DISORDER: ICD-10-CM

## 2023-12-27 RX ORDER — CLOBAZAM 20 MG/1
40 TABLET ORAL 2 TIMES DAILY
Qty: 360 TABLET | Refills: 1 | Status: SHIPPED | OUTPATIENT
Start: 2023-12-27 | End: 2024-06-24

## 2024-01-04 DIAGNOSIS — G40.909 SEIZURE DISORDER: ICD-10-CM

## 2024-01-04 RX ORDER — CLOBAZAM 20 MG/1
40 TABLET ORAL 2 TIMES DAILY
Qty: 360 TABLET | Refills: 1 | Status: CANCELLED | OUTPATIENT
Start: 2024-01-04 | End: 2024-07-02

## 2024-04-07 DIAGNOSIS — G40.909 SEIZURE DISORDER: ICD-10-CM

## 2024-04-08 RX ORDER — LACOSAMIDE 200 MG/1
200 TABLET ORAL 2 TIMES DAILY
Qty: 60 TABLET | Refills: 4 | Status: SHIPPED | OUTPATIENT
Start: 2024-04-08

## 2024-07-06 DIAGNOSIS — G40.909 SEIZURE DISORDER: ICD-10-CM

## 2024-07-06 RX ORDER — CLOBAZAM 20 MG/1
40 TABLET ORAL 2 TIMES DAILY
Qty: 360 TABLET | Refills: 1 | Status: SHIPPED | OUTPATIENT
Start: 2024-07-06 | End: 2025-01-02

## 2024-09-10 DIAGNOSIS — G40.909 SEIZURE DISORDER: ICD-10-CM

## 2024-09-10 DIAGNOSIS — G40.019 LOCALIZATION-RELATED (FOCAL) (PARTIAL) IDIOPATHIC EPILEPSY AND EPILEPTIC SYNDROMES WITH SEIZURES OF LOCALIZED ONSET, INTRACTABLE, WITHOUT STATUS EPILEPTICUS: ICD-10-CM

## 2024-09-11 RX ORDER — LACOSAMIDE 50 MG/1
50 TABLET ORAL EVERY 12 HOURS
Qty: 60 TABLET | Refills: 5 | Status: SHIPPED | OUTPATIENT
Start: 2024-09-11 | End: 2025-03-10

## 2024-09-11 RX ORDER — LACOSAMIDE 200 MG/1
200 TABLET ORAL 2 TIMES DAILY
Qty: 60 TABLET | Refills: 5 | Status: SHIPPED | OUTPATIENT
Start: 2024-09-11

## 2024-09-19 DIAGNOSIS — G40.909 SEIZURE DISORDER: ICD-10-CM

## 2024-09-19 NOTE — TELEPHONE ENCOUNTER
Pt stated he only received a script for the 50 mg tabs, I requested a refill for the 200 mg tablets be filled also. He usually takes both the 200 and the 50.

## 2024-09-23 RX ORDER — LACOSAMIDE 200 MG/1
200 TABLET ORAL 2 TIMES DAILY
Qty: 60 TABLET | Refills: 5 | Status: SHIPPED | OUTPATIENT
Start: 2024-09-23

## 2024-10-11 DIAGNOSIS — G40.909 SEIZURE DISORDER: ICD-10-CM

## 2024-10-11 RX ORDER — LAMOTRIGINE 200 MG/1
200 TABLET ORAL 2 TIMES DAILY
Qty: 180 TABLET | Refills: 3 | Status: SHIPPED | OUTPATIENT
Start: 2024-10-11

## 2024-10-28 ENCOUNTER — TELEPHONE (OUTPATIENT)
Dept: NEUROLOGY | Facility: CLINIC | Age: 29
End: 2024-10-28

## 2024-10-28 ENCOUNTER — PATIENT MESSAGE (OUTPATIENT)
Dept: NEUROLOGY | Facility: CLINIC | Age: 29
End: 2024-10-28
Payer: MEDICARE

## 2024-10-28 NOTE — TELEPHONE ENCOUNTER
I sent a [employer] message to the patient to let him know his appointment on 11/4 was reschedule with Dr. Do's PA on the same day and time. The patient was asked to call back or respond to the message if he had any questions.

## 2024-11-01 ENCOUNTER — TELEPHONE (OUTPATIENT)
Dept: NEUROLOGY | Facility: CLINIC | Age: 29
End: 2024-11-01
Payer: MEDICARE

## 2024-11-04 ENCOUNTER — OFFICE VISIT (OUTPATIENT)
Dept: NEUROLOGY | Facility: CLINIC | Age: 29
End: 2024-11-04
Payer: MEDICARE

## 2024-11-04 DIAGNOSIS — G40.909 SEIZURE DISORDER: Primary | ICD-10-CM

## 2024-11-04 DIAGNOSIS — Z79.899 OTHER LONG TERM (CURRENT) DRUG THERAPY: ICD-10-CM

## 2024-11-04 DIAGNOSIS — Z96.89 S/P PLACEMENT OF VNS (VAGUS NERVE STIMULATION) DEVICE: ICD-10-CM

## 2024-11-04 DIAGNOSIS — F06.70 MILD NEUROCOGNITIVE DISORDER DUE TO ANOTHER MEDICAL CONDITION: ICD-10-CM

## 2024-11-04 PROCEDURE — 4010F ACE/ARB THERAPY RXD/TAKEN: CPT | Mod: CPTII,95,,

## 2024-11-04 PROCEDURE — 99215 OFFICE O/P EST HI 40 MIN: CPT | Mod: 95,,,

## 2024-11-04 PROCEDURE — 1159F MED LIST DOCD IN RCRD: CPT | Mod: CPTII,95,,

## 2024-11-04 RX ORDER — LOSARTAN POTASSIUM 50 MG/1
50 TABLET ORAL EVERY MORNING
COMMUNITY
Start: 2024-10-31

## 2024-11-04 RX ORDER — CLOBAZAM 20 MG/1
40 TABLET ORAL 2 TIMES DAILY
Qty: 360 TABLET | Refills: 1 | Status: SHIPPED | OUTPATIENT
Start: 2024-11-04 | End: 2025-05-03

## 2024-11-04 RX ORDER — METOPROLOL SUCCINATE 50 MG/1
50 TABLET, EXTENDED RELEASE ORAL
COMMUNITY
Start: 2024-10-31

## 2024-11-04 NOTE — PROGRESS NOTES
Established Patient: Follow up    Patient statement: Pt wants to see Dr. Ortiz as soon as possible and wants to discuss VMS. Pt had last seizure two months ago    Falls: 0    Medications: up to date    Pharmacy: up to date    Pain Scale: 0

## 2024-11-04 NOTE — PATIENT INSTRUCTIONS
You came [facility] because of your seizure disorder. Please continue the same medications at the same dose.     Our epilepsy nurse Salud will call you to schedule the VNS check and can also increase the settings at this visit. If possible, we will try and schedule you with Dr. Do.     When you come for the VNS check, please get a lab test to check out the blood level of medication. I put the orders in. You can go to any [facility] lab at your convenience to get this done. Please get the labs either before you take your morning medications or at least 2-3 hours after your last dose to avoid getting a falsely elevated result.     Do not miss any doses of medication. If a dose of medication is missed, take it as soon as it is remembered even if that means doubling up on the dose. Get regular sleep. Go to sleep at the same time and wake up at the same time every day. People with epilepsy require more sleep than people without epilepsy.  Sleeping 10-12 hours a day can be normal for a person with epilepsy.  Every seizure makes it harder to prevent the next seizure. Epilepsy is associated with SUDEP, or sudden unexpected death in epilepsy.  The risk is significantly higher if convulsive seizures are not well controlled. For more information, check out these websites: https://www.epilepsy.com/, https://www.epilepsyalliance[place].org/, www.chirag-epilepsy.org, www.womenandepilepsy.org.  If you are interested in meeting other individuals in our epilepsy community, please reach out to the [facility] [place] ([phone], [phone], info@[place].org).  They organize many informative and fun activities in the region.  They can provide you invaluable information on how to get access to resources available for patients living with epilepsy as well as a rich community of like-minded individuals who are all learning to cope with the same issues.  It is very important to remember, you are not alone.      Per [place] law, no episodes of loss of consciousness for 6 months before driving.  Avoid dangerous situations.  For example, no baths/pools alone, no heights, no power tools.  Wear a bike helmet.  If breakthrough seizures occur that are different in character, frequency, or duration from normal episodes, please patient portal me or call the office and we will decide the next steps. If multiple seizures occur in a row without return back to baseline, 911 needs to be called.     Return to clinic in 3 months or sooner with issues.  Please patient portal with any questions or concerns.    Natalie Markham PA-C   Neurology-Epilepsy  [facility]
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
## 2024-11-05 ENCOUNTER — TELEPHONE (OUTPATIENT)
Dept: NEUROLOGY | Facility: CLINIC | Age: 29
End: 2024-11-05
Payer: MEDICARE

## 2024-11-05 NOTE — TELEPHONE ENCOUNTER
----- Message from Natalie Markham PA-C sent at 11/4/2024  2:37 PM CST -----  Hello,     Could we schedule this pt with you (or Dr. Do) for VNS check + up titration? He prefers to see Dr. Do if at all possible. If no openings in the next few weeks, told him we could schedule the VNS check w/ you and follow up w/ Dr. Do in 2 months.     Last documented VNS settings:  8/9/23  NEW SETTINGS   Output Current (MA)              1    Signal Freq  (Hz)              30   Pulse width  (u sec)  250   Signal on time (sec)              30   Signal off time (min)              5.0   Mag Current  (MA)              1.125  Mag on time  (sec)         60   Pulse width  (u sec)  250       Auto-stim Output Current  (MA)  1    Signal Freq                         (Hz)  30   Pulse width                       (u sec)  250   Signal on time             (sec)  30       Up titration:  - output to 1.25  - Magnet to 1.25  - autostim 1.25  - everything else can stay the same     Thank you!    Crystal

## 2024-11-05 NOTE — TELEPHONE ENCOUNTER
Spoke with patient [place] [facility] [place] trip once to see Dr. Do and have his VNS checked and increase his settings in addition to getting his labs drawn. This instead of coming to me for the VNS and then again for an appt with Dr. Do.

## 2024-11-25 NOTE — PROCEDURES
Please see the attached refill request.   Stereo EEG Depth Electrode Recording  DATE OF SERVICE: 2020/08/31  EEG NUMBER: [id number]  REQUESTED BY: Dr Alix Ortiz  LOCATION OF SERVICE: [id number]     METHODOLOGY              Depth electrodes consisted of thin wires with electrical contacts it fixed distances along the wire.  These are then implanted using a stereotactic procedure targeting cortical and subcortical structures.  The up to 256 electrode contact can be recorded simultaneously with this procedure.  Recording band pass was 0.1  in the low past filters setting could be set at the 512, 1024, or 2048.  Digital video recording of the patient is simultaneously recorded with the EEG.  The patient is instructed report clinical symptoms which may occur during the recording session.  EEG and video recording is stored and archived in digital format. Activation procedures which include photic stimulation, hyperventilation and instructing patients to perform simple task are done in selected patients.   Compresses spectral analysis (CSA) is also performed on the activity recorded from each individual channel.  This is displayed as a power display of frequencies from 0 to 30 Hz over time.   The CSA analysis is done and displayed continuously.  This is reviewed for asymmetries in power between homologous areas of the scalp and for presence of changes in power which canbe seen when seizures occur.  Sections of suspected abnormalities on the CSA is then compared with the original EEG recording.          Implant Date MRN Name Last First                      8/31/2020 [id number]  Alphonse Webster                            Contacts                   Depth Elect Name SN  # contacts Color 1 Color 2 1 2 3 4 5 6 7 8 9 10 11 12 13 14 15 16   1 L Amyg 84556  14 Yellow Green 1 2 3 4 5 6 7 8 9 10 11 12 13 14     2 L Hippo 66278  14 Black Blue 15 16 17 18 19 20 21 22 23 24 25 26 27 28     3 L Entor 39102  14 Red Blue 29 30 31 32 33 34 35 36 37 38 39 40 41 42     4 L AntCi 14955  14  Red Green 43 44 45 46 47 48 49 50 51 52 53 54 55 56     5 L OrbFr 17270  16 Blue Red 57 58 59 60 61 62 63 64 65 66 67 68 69 70 71 72   6 L PreFr 35763  16 Yellow Green 73 74 75 76 77 78 79 80 81 82 83 84 85 86 87 88   7 L AntLn 56057  8 Black Green 89 90 91 92 93 94 95 96           8 L PoIns 33769  8 Yellow Black 97 98 99 100 101 102 103 104           9 L TeOcc 19257  8 Black Red 105 106 107 108 109 110 111 112           10 L Pariet 92905  8 Black Red 113 114 115 116 117 118 119 120           11 R Amyg 36902  12 Green Red 121 122 123 124 125 126 127 128 129 130 131 132       12 R Hippo 42998  12 Green Blue 133 134 135 136 137 138 139 140 141 142 143 144       13 R Asuin 53772  8 Blue Red 145 146 147 148 149 150 151 152           14 R Acing 53087  16 Yellow Blue 153 154 155 156 157 158 159 160 161 162 163 164 165 166 167 168       Electrode integrety   High amplitude artifact present from the following contacts               Depth electrode          Contact #                       L Pariet  4              L Entor   12, 13, 14   L Pariet  4  Appropriate recording obtained from the remaining electrode contacts.     RECORDING TIMES  Start on 08/31/2020t 1:00 p.m.  Stop on 09/01/2020t 7:00 a.m.  A total of 18 hrs of EEG monitoring was obtained    Seizures Recorded   Date  Start  End  Sz 1 2020/09/01 08:34:43 08:35:28     EEG FINDINGS  Ictal Recording   Seizure #         Date                 Start                 End  #1                    2020/09/01 08:34:43 08:35:28   Build up started in L Hippo contacts 1, 2  >> 3, 4      then spread to L Hippo contacts 11, 12, 13 L Amygd contacts 11, 12, 13      then spread to L Entor contacts 11, 12, 13     Seizure Description   Seizure 1  08:34:43  Sz 1 start  08:34:58  Oral facial mvt  08:35:01  L forearm and leg mvt  08:35:16  L hand auttomatism  08:35:23  Oral facial mvt continues  08:35:28  Sz end       Impression  Single seizure originating from L Hippo and Entro areas

## 2024-12-12 ENCOUNTER — TELEPHONE (OUTPATIENT)
Dept: NEUROLOGY | Facility: CLINIC | Age: 29
End: 2024-12-12
Payer: MEDICARE

## 2025-02-16 DIAGNOSIS — G40.909 SEIZURE DISORDER: ICD-10-CM

## 2025-02-18 RX ORDER — LACOSAMIDE 200 MG/1
200 TABLET ORAL EVERY 12 HOURS
Qty: 60 TABLET | Refills: 5 | Status: SHIPPED | OUTPATIENT
Start: 2025-02-18 | End: 2025-08-17

## 2025-04-16 DIAGNOSIS — G40.909 SEIZURE DISORDER: ICD-10-CM

## 2025-04-16 RX ORDER — CLOBAZAM 20 MG/1
40 TABLET ORAL 2 TIMES DAILY
Qty: 360 TABLET | Refills: 1 | Status: SHIPPED | OUTPATIENT
Start: 2025-04-16 | End: 2025-10-13

## 2025-04-16 NOTE — TELEPHONE ENCOUNTER
----- Message from Marga sent at 3/11/2025  1:52 PM CDT -----  Regarding: Pharmacy Needing Assistance  Contact: Zulay with [facility]  PHARMACY NEEDING ASSISTANCEName of Pharmacy:  [facility] ZulayRegfatimah RX: cloBAZam (ONFI) 20 mg Tab and lacosamide (VIMPAT) 200 mg Tab tabletReason for Call: States they're pts new pharmacy and would like everything to sync up to be due on one day.  So pt won't forget to take themCall Back Number:  [phone]

## 2025-05-02 DIAGNOSIS — G40.909 SEIZURE DISORDER: ICD-10-CM

## 2025-05-05 RX ORDER — CLOBAZAM 20 MG/1
40 TABLET ORAL 2 TIMES DAILY
Qty: 120 TABLET | Refills: 5 | Status: SHIPPED | OUTPATIENT
Start: 2025-05-05 | End: 2025-11-01
